# Patient Record
Sex: MALE | Race: WHITE | HISPANIC OR LATINO | Employment: FULL TIME | URBAN - METROPOLITAN AREA
[De-identification: names, ages, dates, MRNs, and addresses within clinical notes are randomized per-mention and may not be internally consistent; named-entity substitution may affect disease eponyms.]

---

## 2017-01-05 ENCOUNTER — HOSPITAL ENCOUNTER (EMERGENCY)
Facility: HOSPITAL | Age: 47
Discharge: HOME | End: 2017-01-05
Attending: EMERGENCY MEDICINE
Payer: COMMERCIAL

## 2017-01-05 VITALS
TEMPERATURE: 99 F | DIASTOLIC BLOOD PRESSURE: 71 MMHG | WEIGHT: 184 LBS | BODY MASS INDEX: 26.34 KG/M2 | SYSTOLIC BLOOD PRESSURE: 125 MMHG | HEART RATE: 71 BPM | RESPIRATION RATE: 18 BRPM | HEIGHT: 70 IN | OXYGEN SATURATION: 98 %

## 2017-01-05 DIAGNOSIS — R20.2 TINGLING: Primary | ICD-10-CM

## 2017-01-05 LAB
BUN SERPL-MCNC: 10 MG/DL (ref 6–30)
CHLORIDE SERPL-SCNC: 102 MMOL/L (ref 95–110)
CREAT SERPL-MCNC: 0.8 MG/DL (ref 0.5–1.4)
GLUCOSE SERPL-MCNC: 91 MG/DL (ref 70–110)
HCT VFR BLD CALC: 45 %PCV (ref 36–54)
POC IONIZED CALCIUM: 1.16 MMOL/L (ref 1.06–1.42)
POC TCO2 (MEASURED): 26 MMOL/L (ref 23–29)
POTASSIUM BLD-SCNC: 3.9 MMOL/L (ref 3.5–5.1)
SAMPLE: NORMAL
SODIUM BLD-SCNC: 139 MMOL/L (ref 136–145)

## 2017-01-05 PROCEDURE — 99283 EMERGENCY DEPT VISIT LOW MDM: CPT

## 2017-01-05 PROCEDURE — 99284 EMERGENCY DEPT VISIT MOD MDM: CPT | Mod: ,,, | Performed by: EMERGENCY MEDICINE

## 2017-01-05 PROCEDURE — 25000003 PHARM REV CODE 250: Performed by: EMERGENCY MEDICINE

## 2017-01-05 RX ORDER — DIAZEPAM 5 MG/1
5 TABLET ORAL
Status: COMPLETED | OUTPATIENT
Start: 2017-01-05 | End: 2017-01-05

## 2017-01-05 RX ADMIN — DIAZEPAM 5 MG: 5 TABLET ORAL at 05:01

## 2017-01-05 NOTE — ED AVS SNAPSHOT
[facility]  [address] [facility] [address]               Andre Padgett   2017  4:29 PM   ED    Description:  Male : 1970   Department:  [facility]           Your Care was Coordinated By:     Provider Role From To    Zully Maldonado MD Attending Provider 17 1379 --      Reason for Visit     Tingling           Diagnoses this Visit        Comments    Tingling    -  Primary       ED Disposition     ED Disposition Condition Comment    Discharge             To Do List           [facility]     [facility] -  Assistance  Registered nurses in the [facility] provide clinical advisement, health education, appointment booking, and other advisory services.  Call for this free service at [phone].             Medications           Message regarding Medications     Verify the changes and/or additions to your medication regime listed below are the same as discussed with your clinician today.  If any of these changes or additions are incorrect, please notify your healthcare provider.        These medications were administered today        Dose Freq    diazePAM tablet 5 mg 5 mg ED 1 Time    Sig: Take 1 tablet (5 mg total) by mouth ED 1 Time.    Class: Normal    Route: Oral           Verify that the below list of medications is an accurate representation of the medications you are currently taking.  If none reported, the list may be blank. If incorrect, please contact your healthcare provider. Carry this list with you in case of emergency.           Current Medications     alprazolam (XANAX) 0.5 MG tablet Take 1 tablet (0.5 mg total) by mouth nightly as needed.    ascorbic acid (VITAMIN C) 100 MG tablet Take 100 mg by mouth once daily.    atorvastatin (LIPITOR) 40 MG tablet Take 1 tablet (40 mg total) by mouth once daily.    esomeprazole (NEXIUM) 40 MG capsule Take 1 capsule (40 mg total) by mouth  "before breakfast.    albuterol nebulizer solution 1.25 mg Take 3 mLs (1.25 mg total) by nebulization one time.           Clinical Reference Information           Your Vitals Were     BP Pulse Temp Resp Height Weight    132/77 76 98.7 °F (37.1 °C) (Oral) 18 5' 10" (1.778 m) 83.5 kg (184 lb)    SpO2 BMI             97% 26.4 kg/m2         Allergies as of 1/5/2017     No Known Allergies      Immunizations Administered on Date of Encounter - 1/5/2017     None      ED Micro, Lab, POCT     Start Ordered       Status Ordering Provider    01/05/17 1740 01/05/17 1740  ISTAT PROCEDURE  Once      Final result     01/05/17 1707 01/05/17 1706  ISTAT CHEM8  Once      Acknowledged       ED Imaging Orders     None        Discharge Instructions         Paraesthesias  Paraesthesia refers to a burning or prickling sensation that is sometimes felt in the hands, arms, legs or feet. It can also occur in other parts of the body. It can also feel like tingling or numbness, skin crawling, or itching. The feeling is not comfortable, but it is not painful. (The "pins and needles" feeling that happens when a foot or hand "falls asleep" is a temporary paraesthesia.)  Paraesthesias that last or come and go may be caused by medical issues that need to be treated. These include stroke, bulging disk (pressing on a nerve), a trapped nerve), vitamin deficiencies, or even certain medicines.  Tests are often done. These tests may include blood tests, X-ray, CT (computerized tomography) scan, or a muscle test (electromyography). Depending on the cause, treatment may include physical therapy.  Home care  · Tell the healthcare provider about all medicines you take. This includes prescription and over-the-counter medicines, vitamins, and herbs. Ask if any of the medicines may be causing your problems. Do not make any changes to prescription medicines without talking to your healthcare provider first.  · You may be prescribed medicines to help relieve the " tingling feeling or for pain. Take all medicines as directed.  · A numb hand or foot may be more prone to injury. To help protect it:  ¨ Always use oven mitts.  ¨ Test water with an unaffected hand or foot.  ¨ Use caution when trimming nails. File sharp areas.  ¨ Wear shoes that fit well to avoid pressure points, blisters, and ulcers.  ¨ Inspect your hands and feet carefully (including the soles of your feet and between your toes) at least once a week. If you see red areas, sores, or other problems, tell your healthcare provider.  Follow-up care  Follow up with your doctor or as advised by our staff. You may need further testing or evaluation.  When to seek medical advice  Call your healthcare provider right away if any of the following occur:  · Numbness or weakness of the face, one arm, or one leg  · Slurred speech, confusion, trouble speaking, walking, or seeing  · Severe headache, fainting spell, dizziness, or seizure  · Chest, arm, neck, or upper back pain  · Loss of bladder or bowel control  · Open wound with redness, swelling, or pus  © 4865-6770 [employer]. [address] [place] [address]. All rights reserved. This information is not intended as a substitute for professional medical care. Always follow your healthcare professional's instructions.          Your Scheduled Appointments     Vishnu 10, 2017  8:00 AM CST   DLCO with PULMONARY FUNCTION   [facility] ([facility] )    [address]  [place] [address]   [phone]            Vishnu 10, 2017  8:15 AM CST   Spirometry with Tracing with PULMONARY FUNCTION   [facility] ([facility] )    [address]  [place] [address]   [phone]            Vishnu 10, 2017  9:00 AM CST   Proc 15Min/6Min Walk with SIX, MINUTE WALK   [facility] ([facility] )    [address]  [place] [address]   [phone]            Vishnu 10, 2017  9:30 AM CST   Consult with Faiza  HUAN Garcia MD   [facility] ([facility] )    [address]  [facility] [id number] [phone]            Vishnu 10, 2017 10:00 AM CST   Lung Volumes with PULMONARY FUNCTION   [facility] ([facility] )    [address]  [facility] [id number] [phone]               [facility] complies with applicable Federal civil rights laws and does not discriminate on the basis of race, color, national origin, age, disability, or sex.        Language Assistance Services     ATTENTION: Language assistance services are available, free of charge. Please call [phone].      ATENCIÓN: Si habla español, tiene a infante disposición servicios gratuitos de asistencia lingüística. Llame al [phone].     CHÚ Ý: N?u b?n nói Ti?ng Vi?t, có các d?ch v? h? tr? ngôn ng? mi?n phí dành cho b?n. G?i s? [phone].

## 2017-01-05 NOTE — ED NOTES
Pt presented to the ED c/o tingling left foot x 2 days. Pt denies numbness. Pt states at time he feels tingling in his bilateral hands and his hands become warm. Pt denies chest pain and sob. Pt alert. Pt had a coil performed for an aneurysm here at [facility] in 2011. No slurred speech noted.

## 2017-01-05 NOTE — DISCHARGE INSTRUCTIONS
"  Paraesthesias  Paraesthesia refers to a burning or prickling sensation that is sometimes felt in the hands, arms, legs or feet. It can also occur in other parts of the body. It can also feel like tingling or numbness, skin crawling, or itching. The feeling is not comfortable, but it is not painful. (The "pins and needles" feeling that happens when a foot or hand "falls asleep" is a temporary paraesthesia.)  Paraesthesias that last or come and go may be caused by medical issues that need to be treated. These include stroke, bulging disk (pressing on a nerve), a trapped nerve), vitamin deficiencies, or even certain medicines.  Tests are often done. These tests may include blood tests, X-ray, CT (computerized tomography) scan, or a muscle test (electromyography). Depending on the cause, treatment may include physical therapy.  Home care  · Tell the healthcare provider about all medicines you take. This includes prescription and over-the-counter medicines, vitamins, and herbs. Ask if any of the medicines may be causing your problems. Do not make any changes to prescription medicines without talking to your healthcare provider first.  · You may be prescribed medicines to help relieve the tingling feeling or for pain. Take all medicines as directed.  · A numb hand or foot may be more prone to injury. To help protect it:  ¨ Always use oven mitts.  ¨ Test water with an unaffected hand or foot.  ¨ Use caution when trimming nails. File sharp areas.  ¨ Wear shoes that fit well to avoid pressure points, blisters, and ulcers.  ¨ Inspect your hands and feet carefully (including the soles of your feet and between your toes) at least once a week. If you see red areas, sores, or other problems, tell your healthcare provider.  Follow-up care  Follow up with your doctor or as advised by our staff. You may need further testing or evaluation.  When to seek medical advice  Call your healthcare provider right away if any of the " following occur:  · Numbness or weakness of the face, one arm, or one leg  · Slurred speech, confusion, trouble speaking, walking, or seeing  · Severe headache, fainting spell, dizziness, or seizure  · Chest, arm, neck, or upper back pain  · Loss of bladder or bowel control  · Open wound with redness, swelling, or pus  © 2302-1596 [employer]. [address] [place] [address]. All rights reserved. This information is not intended as a substitute for professional medical care. Always follow your healthcare professional's instructions.

## 2017-01-05 NOTE — ED NOTES
Pt identifiers Andre Padgett were checked and correct  LOC: The patient is awake, alert, aware of environment with an appropriate affect. Oriented x3, speaking appropriately  APPEARANCE: Pt resting comfortably, in no acute distress, pt is clean and well groomed, clothing properly fastened  SKIN: Skin warm, dry and intact, normal skin turgor, moist mucus membranes  RESPIRATORY: Airway is open and patent, respirations are spontaneous, even and unlabored, normal effort and rate  CARDIAC: Normal rate and rhythm, no peripheral edema noted, capillary refill < 3 seconds, bilateral radial pulses 2+  ABDOMEN: Soft, non tender, non distended. Bowel sounds present x 4 quadrants.   NEUROLOGIC: PERRLA, facial expression is symmetrical, patient moving all extremities spontaneously, normal sensation in all extremities when touched with a finger.  Follows all commands appropriately. Pt c/o tingling noted to his left foot and bilateral hands.   MUSCULOSKELETAL: No obvious deformities.

## 2017-01-05 NOTE — ED PROVIDER NOTES
"Encounter Date: 1/5/2017       History     Chief Complaint   Patient presents with    Tingling     tingling to both sera and L foot started on friday, 2011 had coiling to aneurysm,     Review of patient's allergies indicates:  No Known Allergies  HPI Comments: Mr. Padgett is a 47 yo M with PMHx of R cerebral aneurysm s/p coiling in 2011 and anxiety, who presents with 1 week hx of tingling in his bilateral hands and L foot. Patient had an IR cerebral angiogram done on 12/27 due to headaches. The angiogram was normal and patient resumed his daily activities. The tingling started on 12/30 and is not associated with any particular activity, can occur at any time and last for varying amounts of time. Patient has had days without tingling. He states that he does not have any numbness or weakness, but sometimes has "hot flashes" in his hands. He has had these hot flashes after his prior angiogram in 2009 and that resolved without treatment after a month. The present tingling has remained at around the same level and he called his neurologist yesterday, who told him to come to ED to be evaluated for stroke. He was too busy at work yesterday and decided to come in today. Denies fevers, chills, chest pain, SOB, changes in taste/smell/vision. He did take a xanax yesterday thinking the tingling may be due to anxiety, and the tingling improved but did not completely resolve. Patient has no personal or family history of stroke or clots.     The history is provided by the patient.     Past Medical History   Diagnosis Date    Aneurysm      Right sided filling anterior communicating aneurysm s/p coiling 2011    Anxiety     Asthma     Generalized headaches     Hyperlipidemia      Past Medical History Pertinent Negatives   Diagnosis Date Noted    Amblyopia 3/18/2014    Cataract 3/18/2014    Diabetic retinopathy 3/18/2014    Glaucoma 3/18/2014    Macular degeneration 3/18/2014    Retinal detachment 3/18/2014    Strabismus " 3/18/2014    Uveitis 3/18/2014     Past Surgical History   Procedure Laterality Date    Brain surgery  2011     angiogram/coiling     Family History   Problem Relation Age of Onset    DAVID disease Mother     Hypertension Mother     Cancer Father      brain    Celiac disease Neg Hx     Cirrhosis Neg Hx     Colon polyps Neg Hx     Crohn's disease Neg Hx     Cystic fibrosis Neg Hx     Esophageal cancer Neg Hx     Hemochromatosis Neg Hx     Inflammatory bowel disease Neg Hx     Irritable bowel syndrome Neg Hx     Liver cancer Neg Hx     Liver disease Neg Hx     Rectal cancer Neg Hx     Stomach cancer Neg Hx     Ulcerative colitis Neg Hx     Alli's disease Neg Hx     Heart disease Neg Hx     Heart attack Neg Hx     Amblyopia Neg Hx     Blindness Neg Hx     Cataracts Neg Hx     Glaucoma Neg Hx     Macular degeneration Neg Hx     Retinal detachment Neg Hx     Strabismus Neg Hx     Colon cancer Neg Hx      Social History   Substance Use Topics    Smoking status: Never Smoker    Smokeless tobacco: Never Used    Alcohol use Yes      Comment: ocassionally - twice a week     Review of Systems   Constitutional: Negative for chills and fever.   HENT: Positive for congestion.    Eyes: Negative.    Respiratory: Negative for cough, chest tightness and shortness of breath.    Cardiovascular: Negative for chest pain and palpitations.   Gastrointestinal: Negative for abdominal pain, nausea and vomiting.   Endocrine: Negative.    Genitourinary: Negative for difficulty urinating and dysuria.   Musculoskeletal: Negative for arthralgias and neck pain.   Skin: Negative for rash.   Neurological: Negative for speech difficulty, weakness, numbness and headaches.   Psychiatric/Behavioral: The patient is nervous/anxious.        Physical Exam   Initial Vitals   BP Pulse Resp Temp SpO2   01/05/17 1407 01/05/17 1407 01/05/17 1407 01/05/17 1407 01/05/17 1407   132/77 76 18 98.7 °F (37.1 °C) 97 %     Physical  Exam    Constitutional: He appears well-developed and well-nourished. No distress.   HENT:   Head: Normocephalic and atraumatic.   Eyes: Conjunctivae are normal. Pupils are equal, round, and reactive to light.   Neck: Normal range of motion. Neck supple.   Cardiovascular: Normal rate, regular rhythm, normal heart sounds and intact distal pulses. Exam reveals no gallop and no friction rub.    No murmur heard.  Pulmonary/Chest: Breath sounds normal. He exhibits no tenderness.   Abdominal: Soft. Bowel sounds are normal. He exhibits no mass. There is no tenderness.   Musculoskeletal: Normal range of motion. He exhibits no edema.   Neurological: He is alert and oriented to person, place, and time. He has normal strength and normal reflexes. No cranial nerve deficit or sensory deficit.   Skin: Skin is warm and dry. No rash noted.   Psychiatric: He has a normal mood and affect.         ED Course   Procedures  Labs Reviewed   ISTAT PROCEDURE             Medical Decision Making:   Initial Assessment:   45 yo M with prior cerebral aneurysm s/p coiling and anxiety, presents with tingling. Extremely low suspicion for stroke or seizure given history and physical exam.   Clinical Tests:   Lab Tests: Ordered  Radiological Study: Reviewed  ED Management:  ISTAT CHEM8 and valium 5 mg.        APC / Resident Notes:   CHEM8 reviewed and lab values within normal limits. Patient given 1 dose of valium 5 mg and currently has no tingling. Patient advised to return to ED if tingling becomes worse or if he develops as weakness, numbness, acute changes in condition. Patient in agreement with this plan.          Attending Attestation:   Physician Attestation Statement for Resident:  As the supervising MD   Physician Attestation Statement: I have personally seen and examined this patient.   I agree with the above history. -: 45 yo m, remote h/o cerebral aneurysm with recent issues of cp/sob/ha for which he has received a stress test (negative),  repeat cerebral angiogram 1 week ago (negative) and now presenting for hand paresthesias and L foot paresthesias.  No speech/language issues, no focal weakness, no new HA.  Neuro exam completely normal and angiogram access site no signs infection/swelling.  Very low suspicion for stroke/aneurysm rupture.  More likely anxiety.  Other issues to consider would be MS but this would be need an outpatient work-up including MRI.  Plan to check electrolytes and if ok, pt has f/u at [facility] next week   As the supervising MD I agree with the above PE.    As the supervising MD I agree with the above treatment, course, plan, and disposition.  I have reviewed and agree with the residents interpretation of the following: lab data.  I have reviewed the following: old records at this facility.                    ED Course     Clinical Impression:   The encounter diagnosis was Tingling.    Disposition:   Disposition: Discharged  Condition: Stable       Roshni Burt MD  Resident  01/06/17 0679

## 2017-01-10 ENCOUNTER — HOSPITAL ENCOUNTER (OUTPATIENT)
Dept: PULMONOLOGY | Facility: CLINIC | Age: 47
Discharge: HOME | End: 2017-01-10
Payer: COMMERCIAL

## 2017-01-10 ENCOUNTER — OFFICE VISIT (OUTPATIENT)
Dept: PULMONOLOGY | Facility: CLINIC | Age: 47
End: 2017-01-10
Payer: COMMERCIAL

## 2017-01-10 VITALS
BODY MASS INDEX: 27.75 KG/M2 | DIASTOLIC BLOOD PRESSURE: 83 MMHG | OXYGEN SATURATION: 100 % | SYSTOLIC BLOOD PRESSURE: 133 MMHG | WEIGHT: 187.38 LBS | WEIGHT: 187.38 LBS | HEIGHT: 69 IN | BODY MASS INDEX: 27.75 KG/M2 | HEIGHT: 69 IN | HEART RATE: 79 BPM

## 2017-01-10 DIAGNOSIS — R06.9 ABNORMAL BREATHING: ICD-10-CM

## 2017-01-10 DIAGNOSIS — R06.9 ABNORMAL BREATHING: Primary | ICD-10-CM

## 2017-01-10 LAB
PRE FEV1 FVC: 76
PRE FEV1: 3.12
PRE FVC: 4.12
PREDICTED FEV1 FVC: 82
PREDICTED FEV1: 3.83
PREDICTED FVC: 4.66

## 2017-01-10 PROCEDURE — 94727 GAS DIL/WSHOT DETER LNG VOL: CPT | Mod: S$GLB,,, | Performed by: INTERNAL MEDICINE

## 2017-01-10 PROCEDURE — 94010 BREATHING CAPACITY TEST: CPT | Mod: 59,S$GLB,, | Performed by: INTERNAL MEDICINE

## 2017-01-10 PROCEDURE — 94620 PR PULMONARY STRESS TESTING,SIMPLE: CPT | Mod: S$GLB,,, | Performed by: INTERNAL MEDICINE

## 2017-01-10 PROCEDURE — 99204 OFFICE O/P NEW MOD 45 MIN: CPT | Mod: 25,S$GLB,, | Performed by: INTERNAL MEDICINE

## 2017-01-10 PROCEDURE — 94729 DIFFUSING CAPACITY: CPT | Mod: S$GLB,,, | Performed by: INTERNAL MEDICINE

## 2017-01-10 PROCEDURE — 1159F MED LIST DOCD IN RCRD: CPT | Mod: S$GLB,,, | Performed by: INTERNAL MEDICINE

## 2017-01-10 PROCEDURE — 99999 PR PBB SHADOW E&M-EST. PATIENT-LVL III: CPT | Mod: PBBFAC,,, | Performed by: INTERNAL MEDICINE

## 2017-01-10 NOTE — LETTER
January 11, 2017      Hunter Diop MD  [address]  [place] [address]           [facility]  [address]  [place] [address]  Phone: [phone]          Patient: Andre Padgett   MR Number: [id number]   YOB: 1970   Date of Visit: 1/10/2017       Dear Dr. Hunter Diop:    Thank you for referring Andre Padgett to me for evaluation. Attached you will find relevant portions of my assessment and plan of care.    If you have questions, please do not hesitate to call me. I look forward to following Andre Padgett along with you.    Sincerely,    Faiza Garcia MD    Enclosure  CC:  No Recipients    If you would like to receive this communication electronically, please contact [email or web address] or [phone] to request more information on [facility] [employer] Link access.    For providers and/or their staff who would like to refer a patient to [facility], please contact us through our one-stop-shop provider referral line, [facility], at [phone].    If you feel you have received this communication in error or would no longer like to receive these types of communications, please e-mail [email or web address]

## 2017-01-11 ENCOUNTER — PATIENT MESSAGE (OUTPATIENT)
Dept: INTERNAL MEDICINE | Facility: CLINIC | Age: 47
End: 2017-01-11

## 2017-01-11 NOTE — PROGRESS NOTES
Subjective:       Patient ID: Andre Padgett is a 46 y.o. male.    Chief Complaint: abnormal breathing    HPI   Andre Padgett 46 y.o. male    has a past medical history of Aneurysm; Anxiety; Generalized headaches; and Hyperlipidemia.    has a past surgical history that includes Brain surgery (2011).   reports that he has never smoked. He has never used smokeless tobacco. He reports that he drinks alcohol. He reports that he does not use illicit drugs.  Referred by: Dr. Hunter Diop  Who had concerns including abnormal breathing.  The patient's last visit with me was on Visit date not found.    Patient with complaints of chills in the chest- notes sx began few months ago. Also notes pressure feeling on chest  States he thought it was related to cold, or viru.  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      Objective:      Physical Exam   Constitutional: He is oriented to person, place, and time. He appears well-developed and well-nourished. He appears not cachectic. No distress.   HENT:   Head: Normocephalic.   Nose: Nose normal. No mucosal edema.   Mouth/Throat: Oropharynx is clear and moist. Normal dentition. No oropharyngeal exudate.   Neck: Normal range of motion. Neck supple.   Cardiovascular: Normal rate, regular rhythm, normal heart sounds and intact distal pulses.  Exam reveals no gallop and no friction rub.    No murmur  heard.  Pulmonary/Chest: Effort normal and breath sounds normal. No stridor.   Abdominal: Soft. Bowel sounds are normal. He exhibits no distension.   Musculoskeletal: Normal range of motion. He exhibits no edema or tenderness.   Lymphadenopathy: No supraclavicular adenopathy is present.     He has no cervical adenopathy.   Neurological: He is alert and oriented to person, place, and time. Gait normal.   Skin: Skin is warm and dry. No rash noted. He is not diaphoretic. No cyanosis or erythema. No pallor. Nails show no clubbing.   Psychiatric: He has a normal mood and affect. His behavior is normal. Judgment and thought content normal.   Nursing note and vitals reviewed.    Personal Diagnostic Review    No flowsheet data found.      Assessment:       1. Abnormal breathing        Outpatient Encounter Prescriptions as of 1/10/2017   Medication Sig Dispense Refill    alprazolam (XANAX) 0.5 MG tablet Take 1 tablet (0.5 mg total) by mouth nightly as needed. 30 tablet 1    ascorbic acid (VITAMIN C) 100 MG tablet Take 100 mg by mouth once daily.      atorvastatin (LIPITOR) 40 MG tablet Take 1 tablet (40 mg total) by mouth once daily. 90 tablet 3    esomeprazole (NEXIUM) 40 MG capsule Take 1 capsule (40 mg total) by mouth before breakfast. 30 capsule 11     Facility-Administered Encounter Medications as of 1/10/2017   Medication Dose Route Frequency Provider Last Rate Last Dose    albuterol nebulizer solution 1.25 mg  1.25 mg Nebulization 1 time in Clinic/HOD Anjelica Loyola MD         No orders of the defined types were placed in this encounter.    Plan:           Andre was seen today for abnormal breathing.    Diagnoses and all orders for this visit:    Abnormal breathing        I personally reviewed the      1. Echo report 12/2016 nl stress  2. PFT wnl  3. 6MWD [id number], no desat  4. CXR normal  5. CXR report     Cause of his sx is unclear but no pulmonary cause identified.   Continue supportive care.  possibley  related to gerd, rec continued ppi therapy and may require bid therapy to test    Return if symptoms worsen or fail to improve.    There are no Patient Instructions on file for this visit.      There is no immunization history on file for this patient.

## 2017-01-12 NOTE — PROCEDURES
Andre Padgett is a 46 y.o.  male patient, who presents for a 6 minute walk test ordered by MD Jose.  The diagnosis is Shortness of Breath.  The patient's BMI is 27.7 kg/m2.  Predicted distance (lower limit of normal) is 519.3 meters.      Test Results:    The test was completed without stopping.    The total time walked was 360 seconds.  During walking, the patient reported:  No complaints. The patient used no assistive devices during testing.     1/10/2017--------Distance: 487.68 meters (1600 feet)     O2 Sat % Supplemental Oxygen Heart Rate Blood Pressure Tushar Scale   Pre-exercise  (Resting) 100 % Room Air 79 bpm 133/83 mmHg 0.5   During Exercise 98 % Room Air 108 bpm 140/82 mmHg 0.5   Post-exercise  (Recovery) 98 % Room Air  102 bpm   mmHg       Recovery Time: 30 seconds    Performing nurse/tech: DAHLIA Claros      PREVIOUS STUDY:   The patient has not had a previous study.      CLINICAL INTERPRETATION:  Six minute walk distance is 487.68 meters (1600 feet) with very, very light dyspnea.  During exercise, there was no significant desaturation while breathing room air.  Blood pressure remained stable and Heart rate increased significantly with walking.  This may represent a tachycardic response to exercise.  The patient did not report non-pulmonary symptoms during exercise.  No previous study performed.  Based upon age and body mass index, exercise capacity is less than predicted.

## 2017-01-13 ENCOUNTER — OFFICE VISIT (OUTPATIENT)
Dept: INTERNAL MEDICINE | Facility: CLINIC | Age: 47
End: 2017-01-13
Payer: COMMERCIAL

## 2017-01-13 ENCOUNTER — LAB VISIT (OUTPATIENT)
Dept: LAB | Facility: HOSPITAL | Age: 47
End: 2017-01-13
Attending: INTERNAL MEDICINE
Payer: COMMERCIAL

## 2017-01-13 ENCOUNTER — PATIENT MESSAGE (OUTPATIENT)
Dept: INTERNAL MEDICINE | Facility: CLINIC | Age: 47
End: 2017-01-13

## 2017-01-13 VITALS
HEART RATE: 76 BPM | HEIGHT: 69 IN | BODY MASS INDEX: 28.08 KG/M2 | WEIGHT: 189.63 LBS | SYSTOLIC BLOOD PRESSURE: 120 MMHG | DIASTOLIC BLOOD PRESSURE: 86 MMHG

## 2017-01-13 DIAGNOSIS — R20.0 NUMBNESS OF LEFT FOOT: ICD-10-CM

## 2017-01-13 DIAGNOSIS — I67.1 CEREBRAL ANEURYSM: ICD-10-CM

## 2017-01-13 DIAGNOSIS — R20.0 HAND NUMBNESS: Primary | ICD-10-CM

## 2017-01-13 DIAGNOSIS — R20.0 HAND NUMBNESS: ICD-10-CM

## 2017-01-13 DIAGNOSIS — F41.9 ANXIETY: ICD-10-CM

## 2017-01-13 LAB
TSH SERPL DL<=0.005 MIU/L-ACNC: 1.18 UIU/ML
VIT B12 SERPL-MCNC: 271 PG/ML

## 2017-01-13 PROCEDURE — 1159F MED LIST DOCD IN RCRD: CPT | Mod: S$GLB,,, | Performed by: INTERNAL MEDICINE

## 2017-01-13 PROCEDURE — 82607 VITAMIN B-12: CPT

## 2017-01-13 PROCEDURE — 99999 PR PBB SHADOW E&M-EST. PATIENT-LVL III: CPT | Mod: PBBFAC,,, | Performed by: INTERNAL MEDICINE

## 2017-01-13 PROCEDURE — 99214 OFFICE O/P EST MOD 30 MIN: CPT | Mod: S$GLB,,, | Performed by: INTERNAL MEDICINE

## 2017-01-13 PROCEDURE — 36415 COLL VENOUS BLD VENIPUNCTURE: CPT

## 2017-01-13 PROCEDURE — 84443 ASSAY THYROID STIM HORMONE: CPT

## 2017-01-13 NOTE — MR AVS SNAPSHOT
[facility]  [address]  [facility] [address]  Phone: [phone]  Fax: [phone]                  Andre Padgett   2017 7:45 AM   Office Visit    Description:  Male : 1970   Provider:  Hunter Diop MD   Department:  [facility]           Reason for Visit     Hospital Follow Up           Diagnoses this Visit        Comments    Hand numbness    -  Primary     Numbness of left foot                To Do List           Future Appointments        Provider Department Dept Phone    2017 8:30 AM LAB, SAME DAY [facility] [phone]      Goals (5 Years of Data)     None      [facility]     [facility] Nurse Care Line -  Assistance  Registered nurses in the [facility] provide clinical advisement, health education, appointment booking, and other advisory services.  Call for this free service at [phone].             Medications           Message regarding Medications     Verify the changes and/or additions to your medication regime listed below are the same as discussed with your clinician today.  If any of these changes or additions are incorrect, please notify your healthcare provider.             Verify that the below list of medications is an accurate representation of the medications you are currently taking.  If none reported, the list may be blank. If incorrect, please contact your healthcare provider. Carry this list with you in case of emergency.           Current Medications     alprazolam (XANAX) 0.5 MG tablet Take 1 tablet (0.5 mg total) by mouth nightly as needed.    ascorbic acid (VITAMIN C) 100 MG tablet Take 100 mg by mouth once daily.    atorvastatin (LIPITOR) 40 MG tablet Take 1 tablet (40 mg total) by mouth once daily.    esomeprazole (NEXIUM) 40 MG capsule Take 1 capsule (40 mg total) by mouth before breakfast.           Clinical Reference Information           Vital  "Signs - Last Recorded  Most recent update: 1/13/2017  7:59 AM by Bryanna Gonzalez    BP Pulse Ht Wt BMI    120/86 76 5' 9" (1.753 m) 86 kg (189 lb 9.5 oz) 28 kg/m2      Blood Pressure          Most Recent Value    BP  120/86      Allergies as of 1/13/2017     No Known Allergies      Immunizations Administered on Date of Encounter - 1/13/2017     None      Orders Placed During Today's Visit     Future Labs/Procedures Expected by Expires    TSH  1/13/2017 1/13/2018    Vitamin B12  1/13/2017 1/13/2018      "

## 2017-01-13 NOTE — PROGRESS NOTES
Subjective:       Patient ID: Andre Padgett is a 46 y.o. male.    Chief Complaint: Hospital Follow Up    HPI Comments: History of present illness: Patient comes in for hospital and ER follow-up.  He's had intermittent bilateral hand tingling and left foot tingling.  He had an angiogram on December 27 for follow-up of his previously coiled aneurysm.  That test was stable.  He said about 2 days later he had tingling in the left foot and both hands intermittently.  He spoke to the doctor who recommended he go to the emergency room for further evaluation.  He was evaluated there and thought to not be having a stroke of the risk for a stroke based on those symptoms so he was sent out.  Presently he says this is intermittent.  He thinks that he takes anxiety but he has not tested that a regular basis.  He has only noticed this once or twice.  The symptoms come intermittently and it is not always all 3 extremities at this sometimes the hands seem to sweat.  He denies any chest pain palpitations shortness of breath or other complaints.    Review of Systems   Constitutional: Negative for chills, fatigue, fever and unexpected weight change.   HENT: Negative for trouble swallowing.    Eyes: Negative for visual disturbance.   Respiratory: Negative for cough, shortness of breath and wheezing.    Cardiovascular: Negative for chest pain and palpitations.   Gastrointestinal: Negative for abdominal pain, constipation, diarrhea, nausea and vomiting.   Genitourinary: Negative for difficulty urinating.   Musculoskeletal: Negative for neck pain.   Skin: Negative for rash.   Neurological: Positive for numbness. Negative for dizziness, seizures, facial asymmetry, speech difficulty, weakness, light-headedness and headaches.   Psychiatric/Behavioral: The patient is nervous/anxious (chronic possibly worse recently).        Objective:      Physical Exam   Constitutional: He is oriented to person, place, and time. He appears well-developed  and well-nourished. No distress.   HENT:   Head: Normocephalic and atraumatic.   Mouth/Throat: No oropharyngeal exudate.   TM's clear, pharynx clear   Eyes: Conjunctivae and EOM are normal. Pupils are equal, round, and reactive to light. No scleral icterus.   Neck: Normal range of motion. Neck supple. No thyromegaly present.   No supraclavicular nodes palpated   Cardiovascular: Normal rate, regular rhythm, normal heart sounds and intact distal pulses.    No murmur heard.  Peripheral pulses are intact.   Pulmonary/Chest: Effort normal and breath sounds normal. He has no wheezes.   Abdominal: Soft. Bowel sounds are normal. He exhibits no mass. There is no tenderness.   Musculoskeletal: He exhibits no edema or tenderness.   Lymphadenopathy:     He has no cervical adenopathy.   Neurological: He is alert and oriented to person, place, and time. He displays normal reflexes. No cranial nerve deficit. He exhibits normal muscle tone. Coordination normal.   Patient says both hands feel tingly at this time.  The entire hand like a glove specific finger.  Gait is intact.  No tremors.   Skin: No pallor.   Psychiatric: He has a normal mood and affect.       Assessment:       1. Hand numbness    2. Numbness of left foot    3. Anxiety    4. Cerebral aneurysm        Plan:       Andre was seen today for hospital follow up.    Diagnoses and all orders for this visit:    Hand numbness  -     TSH; Future  -     Vitamin B12; Future    Numbness of left foot  -     TSH; Future  -     Vitamin B12; Future    Anxiety    Cerebral aneurysm        I have asked the patient to try taking his Xanax twice daily for the next 3 days since he is not working and observe symptoms.  If labs are unrevealing and that is not clearly helping from an anxiety standpoint we will proceed with a medical neurology workup.  Certainly call or present to the emergency room for any acute symptoms.

## 2017-01-16 RX ORDER — ALPRAZOLAM 0.5 MG/1
TABLET ORAL
Qty: 30 TABLET | Refills: 1 | Status: SHIPPED | OUTPATIENT
Start: 2017-01-16 | End: 2017-10-24

## 2017-01-21 ENCOUNTER — HOSPITAL ENCOUNTER (EMERGENCY)
Facility: HOSPITAL | Age: 47
Discharge: HOME | End: 2017-01-21
Attending: EMERGENCY MEDICINE
Payer: COMMERCIAL

## 2017-01-21 VITALS
BODY MASS INDEX: 27.7 KG/M2 | TEMPERATURE: 97 F | RESPIRATION RATE: 18 BRPM | WEIGHT: 187 LBS | OXYGEN SATURATION: 97 % | HEIGHT: 69 IN | DIASTOLIC BLOOD PRESSURE: 73 MMHG | HEART RATE: 70 BPM | SYSTOLIC BLOOD PRESSURE: 122 MMHG

## 2017-01-21 DIAGNOSIS — R25.2 SPASM: Primary | ICD-10-CM

## 2017-01-21 DIAGNOSIS — G81.94 HEMIPARESIS, LEFT: Primary | ICD-10-CM

## 2017-01-21 LAB
ABO + RH BLD: NORMAL
ALBUMIN SERPL BCP-MCNC: 4 G/DL
ALP SERPL-CCNC: 62 U/L
ALT SERPL W/O P-5'-P-CCNC: 23 U/L
ANION GAP SERPL CALC-SCNC: 8 MMOL/L
AST SERPL-CCNC: 18 U/L
BASOPHILS # BLD AUTO: 0.01 K/UL
BASOPHILS NFR BLD: 0.2 %
BILIRUB SERPL-MCNC: 0.7 MG/DL
BLD GP AB SCN CELLS X3 SERPL QL: NORMAL
BUN SERPL-MCNC: 12 MG/DL
CALCIUM SERPL-MCNC: 9.4 MG/DL
CHLORIDE SERPL-SCNC: 105 MMOL/L
CHOLEST/HDLC SERPL: 3.4 {RATIO}
CO2 SERPL-SCNC: 27 MMOL/L
CREAT SERPL-MCNC: 0.8 MG/DL
DIFFERENTIAL METHOD: ABNORMAL
EOSINOPHIL # BLD AUTO: 0.1 K/UL
EOSINOPHIL NFR BLD: 1.6 %
ERYTHROCYTE [DISTWIDTH] IN BLOOD BY AUTOMATED COUNT: 11.8 %
EST. GFR  (AFRICAN AMERICAN): >60 ML/MIN/1.73 M^2
EST. GFR  (NON AFRICAN AMERICAN): >60 ML/MIN/1.73 M^2
GLUCOSE SERPL-MCNC: 104 MG/DL
HCT VFR BLD AUTO: 43.6 %
HDL/CHOLESTEROL RATIO: 29.6 %
HDLC SERPL-MCNC: 162 MG/DL
HDLC SERPL-MCNC: 48 MG/DL
HGB BLD-MCNC: 15.3 G/DL
INR PPP: 1.3
LDLC SERPL CALC-MCNC: 92.6 MG/DL
LYMPHOCYTES # BLD AUTO: 1.3 K/UL
LYMPHOCYTES NFR BLD: 20.5 %
MCH RBC QN AUTO: 32.3 PG
MCHC RBC AUTO-ENTMCNC: 35.1 %
MCV RBC AUTO: 92 FL
MONOCYTES # BLD AUTO: 0.5 K/UL
MONOCYTES NFR BLD: 7.2 %
NEUTROPHILS # BLD AUTO: 4.5 K/UL
NEUTROPHILS NFR BLD: 70.3 %
NONHDLC SERPL-MCNC: 114 MG/DL
PLATELET # BLD AUTO: 178 K/UL
PMV BLD AUTO: 10.2 FL
POCT GLUCOSE: 112 MG/DL (ref 70–110)
POTASSIUM SERPL-SCNC: 4.1 MMOL/L
PROT SERPL-MCNC: 7.4 G/DL
PROTHROMBIN TIME: 13.3 SEC
RBC # BLD AUTO: 4.74 M/UL
SODIUM SERPL-SCNC: 140 MMOL/L
TRIGL SERPL-MCNC: 107 MG/DL
TSH SERPL DL<=0.005 MIU/L-ACNC: 1.89 UIU/ML
WBC # BLD AUTO: 6.43 K/UL

## 2017-01-21 PROCEDURE — 86900 BLOOD TYPING SEROLOGIC ABO: CPT

## 2017-01-21 PROCEDURE — 93005 ELECTROCARDIOGRAM TRACING: CPT

## 2017-01-21 PROCEDURE — 80053 COMPREHEN METABOLIC PANEL: CPT

## 2017-01-21 PROCEDURE — 99285 EMERGENCY DEPT VISIT HI MDM: CPT | Mod: 25

## 2017-01-21 PROCEDURE — 86901 BLOOD TYPING SEROLOGIC RH(D): CPT

## 2017-01-21 PROCEDURE — 99285 EMERGENCY DEPT VISIT HI MDM: CPT | Mod: ,,, | Performed by: EMERGENCY MEDICINE

## 2017-01-21 PROCEDURE — 85025 COMPLETE CBC W/AUTO DIFF WBC: CPT

## 2017-01-21 PROCEDURE — 93010 ELECTROCARDIOGRAM REPORT: CPT | Mod: ,,, | Performed by: INTERNAL MEDICINE

## 2017-01-21 PROCEDURE — 80061 LIPID PANEL: CPT

## 2017-01-21 PROCEDURE — 82962 GLUCOSE BLOOD TEST: CPT

## 2017-01-21 PROCEDURE — 84443 ASSAY THYROID STIM HORMONE: CPT

## 2017-01-21 PROCEDURE — 85610 PROTHROMBIN TIME: CPT

## 2017-01-21 NOTE — ED NOTES
Discharge instructions given; pt verbalized understanding; IV to left AC removed; catheter tip intact; hemostasis occurred; bandage applied to site

## 2017-01-21 NOTE — ED PROVIDER NOTES
Encounter Date: 1/21/2017       History     Chief Complaint   Patient presents with    Multiple Complaints     Pt reports left arm heaviness and left leg spasms x 2 weeks, worse last 2 days. Patient was seen in ED 2 weeks ago for the same. Patient had a cerebral angiogram on Dec 27, no diagnosis. Patient continues to have same symptoms. Patient also reports chills.     Review of patient's allergies indicates:  No Known Allergies  HPI Comments: Time seen by provider: 6:20 AM    This is a 46 y.o. male with active co-morbidities of anxiety, HLD, and a cerebral aneurysm who presents to the ED for evaluation of waxing and waning heaviness of his left arm and leg, and left leg spasms for the past 2 weeks, becoming worse this morning. The patient states that these symptoms began a few days after his angiogram, and they have not improved. He reports that his leg spasm this morning lasted for a few minutes, and was relieved after taking Xanax. He says that after the spasm he then became extremely cold and felt as if he may pass out, so came to the ED. Pt is experiencing associated abdominal pain and diarrhea, but denies any nausea, vomiting, diarrhea, or pain at his angiogram incision site.       The history is provided by the patient.     Past Medical History   Diagnosis Date    Aneurysm      Right sided filling anterior communicating aneurysm s/p coiling 2011    Anxiety     Generalized headaches     Hyperlipidemia      Past Medical History Pertinent Negatives   Diagnosis Date Noted    Amblyopia 3/18/2014    Cataract 3/18/2014    Diabetic retinopathy 3/18/2014    Glaucoma 3/18/2014    Macular degeneration 3/18/2014    Retinal detachment 3/18/2014    Strabismus 3/18/2014    Uveitis 3/18/2014     Past Surgical History   Procedure Laterality Date    Brain surgery  2011     angiogram/coiling     Family History   Problem Relation Age of Onset    DAVID disease Mother     Hypertension Mother     Cancer Father       brain    Celiac disease Neg Hx     Cirrhosis Neg Hx     Colon polyps Neg Hx     Crohn's disease Neg Hx     Cystic fibrosis Neg Hx     Esophageal cancer Neg Hx     Hemochromatosis Neg Hx     Inflammatory bowel disease Neg Hx     Irritable bowel syndrome Neg Hx     Liver cancer Neg Hx     Liver disease Neg Hx     Rectal cancer Neg Hx     Stomach cancer Neg Hx     Ulcerative colitis Neg Hx     Alli's disease Neg Hx     Heart disease Neg Hx     Heart attack Neg Hx     Amblyopia Neg Hx     Blindness Neg Hx     Cataracts Neg Hx     Glaucoma Neg Hx     Macular degeneration Neg Hx     Retinal detachment Neg Hx     Strabismus Neg Hx     Colon cancer Neg Hx      Social History   Substance Use Topics    Smoking status: Never Smoker    Smokeless tobacco: Never Used    Alcohol use Yes      Comment: ocassionally - twice a week     Review of Systems   Constitutional: Negative for fever.   HENT: Negative for sore throat.    Respiratory: Negative for shortness of breath.    Cardiovascular: Negative for chest pain.   Gastrointestinal: Positive for abdominal pain. Negative for nausea.   Genitourinary: Negative for dysuria.   Musculoskeletal: Negative for back pain.        Left arm pain. Left leg spasms.    Skin: Negative for rash.   Neurological: Negative for weakness.   Hematological: Does not bruise/bleed easily.     All systems were reviewed and were negative except as noted in the HPI.      Physical Exam   Initial Vitals   BP Pulse Resp Temp SpO2   01/21/17 0512 01/21/17 0512 01/21/17 0512 01/21/17 0512 01/21/17 0512   143/79 78 18 98.7 °F (37.1 °C) 96 %     Physical Exam    Constitutional: No distress.   HENT:   Head: Normocephalic.   Mouth/Throat: Oropharynx is clear and moist.   Eyes: Conjunctivae are normal.   Neck: Neck supple.   Cardiovascular: Normal rate, regular rhythm and intact distal pulses.   Pulmonary/Chest: Breath sounds normal.   Abdominal: Soft. There is no tenderness.    Musculoskeletal: Normal range of motion.   Neurological: He is alert and oriented to person, place, and time. He has normal strength.   Skin: Skin is warm and dry.   Access site for angiogram in the right groin is clean, dry, and intact. There are no masses or hematomas.    Psychiatric: Thought content normal.         ED Course   Procedures  Labs Reviewed - No data to display  EKG Readings: (Independently Interpreted)   Normal sinus rhythm at 77 bpm. No obvious ischemia. QTC of 400.        X-Rays:   Independently Interpreted Readings:   Head CT: No acute findings.     Medical Decision Making:    I reviewed and interpreted the ECG and any monitoring strips.  I reviewed radiology personally along with interpretations.  I reviewed and interpreted the laboratory results.    Raúl Hillman MD, NEGRO, CPE, FACEP  Department of Emergency Medicine  , [facility]        Medical Decision Making:   History:   Old Medical Records: I decided to obtain old medical records.  Independently Interpreted Test(s):   I have ordered and independently interpreted X-rays - see prior notes.  I have ordered and independently interpreted EKG Reading(s) - see prior notes  Clinical Tests:   Lab Tests: Reviewed and Ordered  Radiological Study: Ordered and Reviewed  Medical Tests: Ordered and Reviewed  ED Management:  His labs are unremarkable. I contacted neurology who evaluated the patient and feel his symptoms are not consistent with a stroke. Will schedule him for an outpatient MRI. Recommend continue taking Xanax intermittently as this has helped with muscle aches. Neurologist said pt had a spasm in front of him and he felt it was a muscle spasm rather than TIA. He will be discharged home.                   ED Course     Clinical Impression:   There were no encounter diagnoses.    Disposition:   Disposition: Discharged  Condition: Stable    Raúl Hillman MD, NEGRO, CPE, FACEP  Department of  Emergency Medicine  , [facility]       Azeem Hillman MD  01/22/17 1922

## 2017-01-21 NOTE — ED NOTES
Pt assisted to bed. No acute distress is noted. Pt identifiers verified  Appearance: Pt is clean and well groomed. Clothes appropriately fastened  Neuro: pt awake and alert. Pt oriented x4. Speech is clear and appropriate. Facial movement is symmetrical. Pt moving all extremities. Sensation in all extremities is intact, heaviness to left extremities, tingling to left foot.   Resp: airway is patent with normal rate and effort noted. Pt on room air.   Skin: skin is warm, dry and intact  Cardiac: Heart sounds are normal. Peripheral pulses are palpable and intact. Cap refill <3 seconds. No edema noted  GI: abdomen is soft and non-tender. Pt denies abdominal pain. Pt states bowel movements have been regular  : pt denies frequency or pain when urinating

## 2017-01-21 NOTE — SUBJECTIVE & OBJECTIVE
Past Medical History   Diagnosis Date    Aneurysm      Right sided filling anterior communicating aneurysm s/p coiling 2011    Anxiety     Generalized headaches     Hyperlipidemia      Past Surgical History   Procedure Laterality Date    Brain surgery  2011     angiogram/coiling     Family History   Problem Relation Age of Onset    DAVID disease Mother     Hypertension Mother     Cancer Father      brain    Celiac disease Neg Hx     Cirrhosis Neg Hx     Colon polyps Neg Hx     Crohn's disease Neg Hx     Cystic fibrosis Neg Hx     Esophageal cancer Neg Hx     Hemochromatosis Neg Hx     Inflammatory bowel disease Neg Hx     Irritable bowel syndrome Neg Hx     Liver cancer Neg Hx     Liver disease Neg Hx     Rectal cancer Neg Hx     Stomach cancer Neg Hx     Ulcerative colitis Neg Hx     Alli's disease Neg Hx     Heart disease Neg Hx     Heart attack Neg Hx     Amblyopia Neg Hx     Blindness Neg Hx     Cataracts Neg Hx     Glaucoma Neg Hx     Macular degeneration Neg Hx     Retinal detachment Neg Hx     Strabismus Neg Hx     Colon cancer Neg Hx      Social History   Substance Use Topics    Smoking status: Never Smoker    Smokeless tobacco: Never Used    Alcohol use Yes      Comment: ocassionally - twice a week     Review of patient's allergies indicates:  No Known Allergies  Medications: I have reviewed the current medication administration record.      (Not in a hospital admission)    Review of Systems   Constitutional: Negative for chills and fever.   HENT: Negative for trouble swallowing and voice change.    Eyes: Negative for pain and visual disturbance.   Respiratory: Negative for cough and shortness of breath.    Cardiovascular: Negative for chest pain and palpitations.   Gastrointestinal: Positive for constipation. Negative for nausea and vomiting.   Genitourinary: Negative for difficulty urinating and dysuria.   Musculoskeletal: Positive for myalgias (muscle spasms).  Negative for back pain.   Skin: Negative for rash and wound.   Psychiatric/Behavioral: Negative for confusion. The patient is nervous/anxious.      Objective:     Vital Signs (Most Recent):  Temp: 97.4 °F (36.3 °C) (01/21/17 0954)  Pulse: 70 (01/21/17 0954)  Resp: 18 (01/21/17 0954)  BP: 122/73 (01/21/17 0954)  SpO2: 97 % (01/21/17 0954)    Vital Signs Range (Last 24H):  Temp:  [97.4 °F (36.3 °C)-98.7 °F (37.1 °C)]   Pulse:  [70-85]   Resp:  [14-24]   BP: (116-143)/(73-89)   SpO2:  [96 %-98 %]     Physical Exam   Constitutional: He is oriented to person, place, and time. He appears well-developed and well-nourished. No distress.   HENT:   Head: Normocephalic and atraumatic.   Nose: Nose normal.   Mouth/Throat: Oropharynx is clear and moist.   Eyes: Conjunctivae are normal. Pupils are equal, round, and reactive to light.   Cardiovascular: Normal rate, regular rhythm and intact distal pulses.    Pulmonary/Chest: Effort normal. No respiratory distress.   Abdominal: Soft. He exhibits no distension. There is no tenderness.   Musculoskeletal: He exhibits no edema.   Neurological: He is alert and oriented to person, place, and time.   Skin: Skin is warm and dry. No rash noted.   Vitals reviewed.    Neurological Exam:   Mental status: Alert, oriented x 3, normal attention and concentration  Cranial nerves: CNII: PERRL, visual fields intact. CNIII, IV, VI: EOMI. CNV, VII: Facial muscles symmetrical with no noted weakness. Sensation intact and symmetrical. CNVIII: hearing intact. CNIX, X, XII: tongue and palatal motion intact, normal phonation and cough. CNXI: strength 5/5 and symmetrical.  Muscular: Normal bulk and tone, strength 5/5 all four extremities.  Sensation: Grossly intact to light touch and temperature all four extremities  Cerebellar: Normal finger-nose-finger and heel-to-shin bilaterally  Reflexes: 2+ brachioradialis, bicep, tricep, patellar, ankle bilaterally    NIH Stroke Scale:    Level of Consciousness: 0 -  alert  LOC Questions: 0 - answers both correctly  LOC Commands: 0 - performs both correctly  Best Gaze: 0 - normal  Visual: 0 - no visual loss  Facial Palsy: 0 - normal  Motor Left Arm: 0 - no drift  Motor Right Arm: 0 - no drift  Motor Left Le - no drift  Motor Right Le - no drift  Limb Ataxia: 0 - absent  Sensory: 0 - normal  Best Language: 0 - no aphasia  Dysarthria: 0 - normal articulation  Extinction and Inattention: 0 - no neglect  NIH Stroke Scale Total: 0      Laboratory:  Recent Results (from the past 24 hour(s))   POCT glucose    Collection Time: 17  6:37 AM   Result Value Ref Range    POCT Glucose 112 (H) 70 - 110 mg/dL   CBC W/ AUTO DIFFERENTIAL    Collection Time: 17  6:40 AM   Result Value Ref Range    WBC 6.43 3.90 - 12.70 K/uL    RBC 4.74 4.60 - 6.20 M/uL    Hemoglobin 15.3 14.0 - 18.0 g/dL    Hematocrit 43.6 40.0 - 54.0 %    MCV 92 82 - 98 fL    MCH 32.3 (H) 27.0 - 31.0 pg    MCHC 35.1 32.0 - 36.0 %    RDW 11.8 11.5 - 14.5 %    Platelets 178 150 - 350 K/uL    MPV 10.2 9.2 - 12.9 fL    Gran # 4.5 1.8 - 7.7 K/uL    Lymph # 1.3 1.0 - 4.8 K/uL    Mono # 0.5 0.3 - 1.0 K/uL    Eos # 0.1 0.0 - 0.5 K/uL    Baso # 0.01 0.00 - 0.20 K/uL    Gran% 70.3 38.0 - 73.0 %    Lymph% 20.5 18.0 - 48.0 %    Mono% 7.2 4.0 - 15.0 %    Eosinophil% 1.6 0.0 - 8.0 %    Basophil% 0.2 0.0 - 1.9 %    Differential Method Automated    Comprehensive metabolic panel    Collection Time: 17  6:40 AM   Result Value Ref Range    Sodium 140 136 - 145 mmol/L    Potassium 4.1 3.5 - 5.1 mmol/L    Chloride 105 95 - 110 mmol/L    CO2 27 23 - 29 mmol/L    Glucose 104 70 - 110 mg/dL    BUN, Bld 12 6 - 20 mg/dL    Creatinine 0.8 0.5 - 1.4 mg/dL    Calcium 9.4 8.7 - 10.5 mg/dL    Total Protein 7.4 6.0 - 8.4 g/dL    Albumin 4.0 3.5 - 5.2 g/dL    Total Bilirubin 0.7 0.1 - 1.0 mg/dL    Alkaline Phosphatase 62 55 - 135 U/L    AST 18 10 - 40 U/L    ALT 23 10 - 44 U/L    Anion Gap 8 8 - 16 mmol/L    eGFR if African American  >60.0 >60 mL/min/1.73 m^2    eGFR if non African American >60.0 >60 mL/min/1.73 m^2   Protime-INR    Collection Time: 01/21/17  6:40 AM   Result Value Ref Range    Prothrombin Time 13.3 (H) 9.0 - 12.5 sec    INR 1.3 (H) 0.8 - 1.2   TSH    Collection Time: 01/21/17  6:40 AM   Result Value Ref Range    TSH 1.891 0.400 - 4.000 uIU/mL   Type & Screen    Collection Time: 01/21/17  6:40 AM   Result Value Ref Range    Group & Rh A POS     Indirect Capri NEG    LDL - Lipid Panel    Collection Time: 01/21/17  6:40 AM   Result Value Ref Range    Cholesterol 162 120 - 199 mg/dL    Triglycerides 107 30 - 150 mg/dL    HDL 48 40 - 75 mg/dL    LDL Cholesterol 92.6 63.0 - 159.0 mg/dL    HDL/Chol Ratio 29.6 20.0 - 50.0 %    Total Cholesterol/HDL Ratio 3.4 2.0 - 5.0    Non-HDL Cholesterol 114 mg/dL       Diagnostic Results:  CT Head WO Contrast 01/21/17:  Remote operative change from anterior communicating artery endovascular aneurysm coiling. Allowing for artifact from the metallic coil pack there is no acute intracranial findings specifically without evidence for acute intracranial hemorrhage or sulcal effacement.

## 2017-01-21 NOTE — CONSULTS
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 Patient states had recent headaches for which he underwent angiography with neurosurgery to re-evaluate his coiling and vasculature. No acute abnormalities noted on angio. Patient states feels symptoms began several days after his angiogram and have been awakening him from sleep. States that symptoms feel improved after taking alprazolam.    He denies weakness/numbness, fever/chills, CP/palpitations, SOB/cough. He complains of some abdominal discomfort with constipation.           Past Medical History   Diagnosis Date    Aneurysm      Right sided filling anterior communicating aneurysm s/p coiling 2011    Anxiety     Generalized headaches     Hyperlipidemia      Past Surgical History   Procedure Laterality Date    Brain surgery  2011     angiogram/coiling     Family History   Problem Relation Age of Onset    DAVID disease Mother     Hypertension Mother     Cancer Father      brain    Celiac disease Neg Hx     Cirrhosis Neg Hx     Colon polyps Neg Hx     Crohn's disease Neg Hx     Cystic fibrosis Neg Hx     Esophageal cancer Neg Hx     Hemochromatosis Neg Hx     Inflammatory bowel disease Neg Hx     Irritable bowel syndrome Neg Hx     Liver cancer Neg Hx     Liver disease Neg Hx     Rectal cancer Neg Hx     Stomach cancer Neg Hx     Ulcerative colitis Neg Hx     Alli's disease Neg Hx     Heart disease Neg Hx     Heart attack Neg Hx     Amblyopia Neg Hx     Blindness Neg Hx     Cataracts Neg Hx     Glaucoma Neg Hx     Macular degeneration " Neg Hx     Retinal detachment Neg Hx     Strabismus Neg Hx     Colon cancer Neg Hx      Social History   Substance Use Topics    Smoking status: Never Smoker    Smokeless tobacco: Never Used    Alcohol use Yes      Comment: ocassionally - twice a week     Review of patient's allergies indicates:  No Known Allergies  Medications: I have reviewed the current medication administration record.      (Not in a hospital admission)    Review of Systems   Constitutional: Negative for chills and fever.   HENT: Negative for trouble swallowing and voice change.    Eyes: Negative for pain and visual disturbance.   Respiratory: Negative for cough and shortness of breath.    Cardiovascular: Negative for chest pain and palpitations.   Gastrointestinal: Positive for constipation. Negative for nausea and vomiting.   Genitourinary: Negative for difficulty urinating and dysuria.   Musculoskeletal: Positive for myalgias (muscle spasms). Negative for back pain.   Skin: Negative for rash and wound.   Psychiatric/Behavioral: Negative for confusion. The patient is nervous/anxious.      Objective:     Vital Signs (Most Recent):  Temp: 97.4 °F (36.3 °C) (01/21/17 0954)  Pulse: 70 (01/21/17 0954)  Resp: 18 (01/21/17 0954)  BP: 122/73 (01/21/17 0954)  SpO2: 97 % (01/21/17 0954)    Vital Signs Range (Last 24H):  Temp:  [97.4 °F (36.3 °C)-98.7 °F (37.1 °C)]   Pulse:  [70-85]   Resp:  [14-24]   BP: (116-143)/(73-89)   SpO2:  [96 %-98 %]     Physical Exam   Constitutional: He is oriented to person, place, and time. He appears well-developed and well-nourished. No distress.   HENT:   Head: Normocephalic and atraumatic.   Nose: Nose normal.   Mouth/Throat: Oropharynx is clear and moist.   Eyes: Conjunctivae are normal. Pupils are equal, round, and reactive to light.   Cardiovascular: Normal rate, regular rhythm and intact distal pulses.    Pulmonary/Chest: Effort normal. No respiratory distress.   Abdominal: Soft. He exhibits no distension.  There is no tenderness.   Musculoskeletal: He exhibits no edema.   Neurological: He is alert and oriented to person, place, and time.   Skin: Skin is warm and dry. No rash noted.   Vitals reviewed.    Neurological Exam:   Mental status: Alert, oriented x 3, normal attention and concentration  Cranial nerves: CNII: PERRL, visual fields intact. CNIII, IV, VI: EOMI. CNV, VII: Facial muscles symmetrical with no noted weakness. Sensation intact and symmetrical. CNVIII: hearing intact. CNIX, X, XII: tongue and palatal motion intact, normal phonation and cough. CNXI: strength 5/5 and symmetrical.  Muscular: Normal bulk and tone, strength 5/5 all four extremities.  Sensation: Grossly intact to light touch and temperature all four extremities  Cerebellar: Normal finger-nose-finger and heel-to-shin bilaterally  Reflexes: 2+ brachioradialis, bicep, tricep, patellar, ankle bilaterally    NIH Stroke Scale:    Level of Consciousness: 0 - alert  LOC Questions: 0 - answers both correctly  LOC Commands: 0 - performs both correctly  Best Gaze: 0 - normal  Visual: 0 - no visual loss  Facial Palsy: 0 - normal  Motor Left Arm: 0 - no drift  Motor Right Arm: 0 - no drift  Motor Left Le - no drift  Motor Right Le - no drift  Limb Ataxia: 0 - absent  Sensory: 0 - normal  Best Language: 0 - no aphasia  Dysarthria: 0 - normal articulation  Extinction and Inattention: 0 - no neglect  NIH Stroke Scale Total: 0      Laboratory:  Recent Results (from the past 24 hour(s))   POCT glucose    Collection Time: 17  6:37 AM   Result Value Ref Range    POCT Glucose 112 (H) 70 - 110 mg/dL   CBC W/ AUTO DIFFERENTIAL    Collection Time: 17  6:40 AM   Result Value Ref Range    WBC 6.43 3.90 - 12.70 K/uL    RBC 4.74 4.60 - 6.20 M/uL    Hemoglobin 15.3 14.0 - 18.0 g/dL    Hematocrit 43.6 40.0 - 54.0 %    MCV 92 82 - 98 fL    MCH 32.3 (H) 27.0 - 31.0 pg    MCHC 35.1 32.0 - 36.0 %    RDW 11.8 11.5 - 14.5 %    Platelets 178 150 - 350 K/uL     MPV 10.2 9.2 - 12.9 fL    Gran # 4.5 1.8 - 7.7 K/uL    Lymph # 1.3 1.0 - 4.8 K/uL    Mono # 0.5 0.3 - 1.0 K/uL    Eos # 0.1 0.0 - 0.5 K/uL    Baso # 0.01 0.00 - 0.20 K/uL    Gran% 70.3 38.0 - 73.0 %    Lymph% 20.5 18.0 - 48.0 %    Mono% 7.2 4.0 - 15.0 %    Eosinophil% 1.6 0.0 - 8.0 %    Basophil% 0.2 0.0 - 1.9 %    Differential Method Automated    Comprehensive metabolic panel    Collection Time: 01/21/17  6:40 AM   Result Value Ref Range    Sodium 140 136 - 145 mmol/L    Potassium 4.1 3.5 - 5.1 mmol/L    Chloride 105 95 - 110 mmol/L    CO2 27 23 - 29 mmol/L    Glucose 104 70 - 110 mg/dL    BUN, Bld 12 6 - 20 mg/dL    Creatinine 0.8 0.5 - 1.4 mg/dL    Calcium 9.4 8.7 - 10.5 mg/dL    Total Protein 7.4 6.0 - 8.4 g/dL    Albumin 4.0 3.5 - 5.2 g/dL    Total Bilirubin 0.7 0.1 - 1.0 mg/dL    Alkaline Phosphatase 62 55 - 135 U/L    AST 18 10 - 40 U/L    ALT 23 10 - 44 U/L    Anion Gap 8 8 - 16 mmol/L    eGFR if African American >60.0 >60 mL/min/1.73 m^2    eGFR if non African American >60.0 >60 mL/min/1.73 m^2   Protime-INR    Collection Time: 01/21/17  6:40 AM   Result Value Ref Range    Prothrombin Time 13.3 (H) 9.0 - 12.5 sec    INR 1.3 (H) 0.8 - 1.2   TSH    Collection Time: 01/21/17  6:40 AM   Result Value Ref Range    TSH 1.891 0.400 - 4.000 uIU/mL   Type & Screen    Collection Time: 01/21/17  6:40 AM   Result Value Ref Range    Group & Rh A POS     Indirect Capri NEG    LDL - Lipid Panel    Collection Time: 01/21/17  6:40 AM   Result Value Ref Range    Cholesterol 162 120 - 199 mg/dL    Triglycerides 107 30 - 150 mg/dL    HDL 48 40 - 75 mg/dL    LDL Cholesterol 92.6 63.0 - 159.0 mg/dL    HDL/Chol Ratio 29.6 20.0 - 50.0 %    Total Cholesterol/HDL Ratio 3.4 2.0 - 5.0    Non-HDL Cholesterol 114 mg/dL       Diagnostic Results:  CT Head WO Contrast 01/21/17:  Remote operative change from anterior communicating artery endovascular aneurysm coiling. Allowing for artifact from the metallic coil pack there is no acute  "intracranial findings specifically without evidence for acute intracranial hemorrhage or sulcal effacement.      Assessment/Plan:     46/M PMH HLD, R SOTO aneurysm s/p coiling, anxiety presents with L-sided "heaviness" that resolves with anxiolytics for the past ~3 weeks after angiography.    L-sided heaviness  - Low suspicion for stroke at this time. CT Head negative, NIHSS 0. Recent angiography without acute abnormalities. No symptoms present at this time. Given timeline, if stroke CT Head would likely demonstrate some abnormalities. Migrating pattern of numbness and inconsistent presentation not highly suggestive of stroke-like pattern.  - Given history will order outpatient MRI to re-evaluate.  - Recommend patient continue atorvastatin 40mg PO daily.  - OK from vascular neurology standpoint to D/C home at this time.    Discussed with staff, Dr. Castro.    HUAN Saavedra MD   PGY-2   [phone]    "

## 2017-01-21 NOTE — ED AVS SNAPSHOT
[facility]  [address]  [facility] [address]               Andre Padgett   2017  6:06 AM   ED    Description:  Male : 1970   Department:  [facility]           Your Care was Coordinated By:     Provider Role From To    Azeem Hillman MD Attending Provider 17 0619 --      Reason for Visit     Multiple Complaints           Diagnoses this Visit        Comments    Spasm    -  Primary       ED Disposition     None           To Do List           Follow-up Information     Schedule an appointment as soon as possible for a visit with [facility] - Neurology.    Specialty:  Neurology    Contact information:    [address]  [facility] [id number] [phone]    Additional information:    7th Floor - Clinic Tower [facility]     [facility] Nurse Care Line -  Assistance  Registered nurses in the [facility] provide clinical advisement, health education, appointment booking, and other advisory services.  Call for this free service at [phone].             Medications           Message regarding Medications     Verify the changes and/or additions to your medication regime listed below are the same as discussed with your clinician today.  If any of these changes or additions are incorrect, please notify your healthcare provider.             Verify that the below list of medications is an accurate representation of the medications you are currently taking.  If none reported, the list may be blank. If incorrect, please contact your healthcare provider. Carry this list with you in case of emergency.           Current Medications     ascorbic acid (VITAMIN C) 100 MG tablet Take 100 mg by mouth once daily.    atorvastatin (LIPITOR) 40 MG tablet Take 1 tablet (40 mg total) by mouth once daily.    albuterol nebulizer solution 1.25 mg Take 3 mLs (1.25 mg total) by nebulization one time.    alprazolam  "(XANAX) 0.5 MG tablet take 1 tablet by mouth at bedtime if needed    esomeprazole (NEXIUM) 40 MG capsule Take 1 capsule (40 mg total) by mouth before breakfast.           Clinical Reference Information           Your Vitals Were     BP Pulse Temp Resp Height Weight    116/73 72 98.5 °F (36.9 °C) (Oral) 20 5' 9" (1.753 m) 84.8 kg (187 lb)    SpO2 BMI             97% 27.62 kg/m2         Allergies as of 1/21/2017     No Known Allergies      Immunizations Administered on Date of Encounter - 1/21/2017     None      ED Micro, Lab, POCT     Start Ordered       Status Ordering Provider    01/21/17 0637 01/21/17 0637  POCT glucose  Once      Final result     01/21/17 0625 01/21/17 0624  CBC W/ AUTO DIFFERENTIAL  Once      Final result     01/21/17 0625 01/21/17 0624  Comprehensive metabolic panel  Once      Final result     01/21/17 0625 01/21/17 0624  Protime-INR  Once      Final result     01/21/17 0625 01/21/17 0624  TSH  Once      Final result     01/21/17 0625 01/21/17 0624  POCT glucose  Once      Acknowledged     01/21/17 0625 01/21/17 0624  LDL - Lipid Panel  STAT      Final result       ED Imaging Orders     Start Ordered       Status Ordering Provider    01/21/17 0625 01/21/17 0624  CT Head Without Contrast  1 time imaging      Final result     01/21/17 0625 01/21/17 0624  X-Ray Chest AP Portable  1 time imaging      Final result       Discharge References/Attachments     MUSCLE SPASM (ENGLISH)       [facility] complies with applicable Federal civil rights laws and does not discriminate on the basis of race, color, national origin, age, disability, or sex.        Language Assistance Services     ATTENTION: Language assistance services are available, free of charge. Please call [phone].      ATENCIÓN: Si habla español, tiene a infante disposición servicios gratuitos de asistencia lingüística. Llame al [phone].     CHÚ Ý: N?u b?n nói Ti?ng Vi?t, có các d?ch v? h? tr? ngôn ng? mi?n phí " dành cho b?aron. G?i s? [phone].

## 2017-01-21 NOTE — ED NOTES
Denies any symptoms at this time. Hand grasp strong and equal bilaterally. Maintained on cardiac monitor in NSR --Able lift legs off the bed. No c/o ha. Waiting for vascular

## 2017-01-23 ENCOUNTER — TELEPHONE (OUTPATIENT)
Dept: NEUROLOGY | Facility: CLINIC | Age: 47
End: 2017-01-23

## 2017-01-23 NOTE — TELEPHONE ENCOUNTER
Spoke with pt- MRI's booked 01/27/2017 @ 6:45pm and hosp. F/u booked 01/31/2017 @ 8am with Dr. Castro.

## 2017-01-23 NOTE — TELEPHONE ENCOUNTER
----- Message from Vinny Castro MD sent at 1/21/2017 10:24 AM CST -----  I saw Mr. Padgett on Sat in the ED and would like to schedule an MRI/MRA brain and MRA neck and then f/u appt with me.  I gave him Shay's #.  Thx

## 2017-01-26 ENCOUNTER — TELEPHONE (OUTPATIENT)
Dept: ADMINISTRATIVE | Facility: OTHER | Age: 47
End: 2017-01-26

## 2017-01-26 RX ORDER — LORAZEPAM 1 MG/1
1 TABLET ORAL
Qty: 2 TABLET | Refills: 0 | Status: SHIPPED | OUTPATIENT
Start: 2017-01-26 | End: 2017-02-02 | Stop reason: SDUPTHER

## 2017-01-26 NOTE — TELEPHONE ENCOUNTER
Dr. Castro,    Sedation is only administered on M-F from 8 am to 4pm, he's currently scheduled for tomorrow night starting at 6:45pm (pt told me that it was urgent that he get scheduled right away and this date/time worked for him with his work schedule). Would you like to call him in something or reschedule?    Please advise

## 2017-01-26 NOTE — TELEPHONE ENCOUNTER
Mr. Padgett has an MRI scheduled for the 27th of January at 6:45PM.  He has called, noting that he is claustrophobic and wants to make sure that the MRI staff, etc are aware and prepared to sedate him.  (He's had MRIs done in the past where a nurse has come in at the beginning of or just prior to the procedure and administered something that effectively sedates him for the procedure.)  Would someone please call him to confirm?  [phone].

## 2017-01-27 ENCOUNTER — HOSPITAL ENCOUNTER (OUTPATIENT)
Dept: RADIOLOGY | Facility: HOSPITAL | Age: 47
Discharge: HOME | End: 2017-01-27
Attending: PSYCHIATRY & NEUROLOGY
Payer: COMMERCIAL

## 2017-01-27 DIAGNOSIS — G81.94 HEMIPARESIS, LEFT: ICD-10-CM

## 2017-01-27 PROCEDURE — 70548 MR ANGIOGRAPHY NECK W/DYE: CPT | Mod: 26,,, | Performed by: RADIOLOGY

## 2017-01-27 PROCEDURE — 25500020 PHARM REV CODE 255: Performed by: PSYCHIATRY & NEUROLOGY

## 2017-01-27 PROCEDURE — 70544 MR ANGIOGRAPHY HEAD W/O DYE: CPT | Mod: 26,,, | Performed by: RADIOLOGY

## 2017-01-27 PROCEDURE — 70544 MR ANGIOGRAPHY HEAD W/O DYE: CPT | Mod: TC

## 2017-01-27 PROCEDURE — 70553 MRI BRAIN STEM W/O & W/DYE: CPT | Mod: 26,,, | Performed by: RADIOLOGY

## 2017-01-27 PROCEDURE — 70553 MRI BRAIN STEM W/O & W/DYE: CPT | Mod: TC

## 2017-01-27 PROCEDURE — 70548 MR ANGIOGRAPHY NECK W/DYE: CPT | Mod: TC

## 2017-01-27 PROCEDURE — A9585 GADOBUTROL INJECTION: HCPCS | Performed by: PSYCHIATRY & NEUROLOGY

## 2017-01-27 RX ORDER — GADOBUTROL 604.72 MG/ML
10 INJECTION INTRAVENOUS
Status: COMPLETED | OUTPATIENT
Start: 2017-01-27 | End: 2017-01-27

## 2017-01-27 RX ADMIN — GADOBUTROL 10 ML: 604.72 INJECTION INTRAVENOUS at 08:01

## 2017-01-30 ENCOUNTER — PATIENT MESSAGE (OUTPATIENT)
Dept: NEUROLOGY | Facility: CLINIC | Age: 47
End: 2017-01-30

## 2017-01-31 ENCOUNTER — OFFICE VISIT (OUTPATIENT)
Dept: NEUROLOGY | Facility: CLINIC | Age: 47
End: 2017-01-31
Payer: COMMERCIAL

## 2017-01-31 VITALS
BODY MASS INDEX: 27.75 KG/M2 | SYSTOLIC BLOOD PRESSURE: 125 MMHG | HEIGHT: 69 IN | DIASTOLIC BLOOD PRESSURE: 86 MMHG | WEIGHT: 187.38 LBS | HEART RATE: 93 BPM

## 2017-01-31 DIAGNOSIS — F41.9 ANXIETY: ICD-10-CM

## 2017-01-31 DIAGNOSIS — R20.2 NUMBNESS AND TINGLING OF LEFT ARM AND LEG: Primary | ICD-10-CM

## 2017-01-31 DIAGNOSIS — R20.0 NUMBNESS AND TINGLING OF LEFT ARM AND LEG: Primary | ICD-10-CM

## 2017-01-31 PROCEDURE — 99999 PR PBB SHADOW E&M-EST. PATIENT-LVL III: CPT | Mod: PBBFAC,,, | Performed by: PSYCHIATRY & NEUROLOGY

## 2017-01-31 PROCEDURE — 99215 OFFICE O/P EST HI 40 MIN: CPT | Mod: S$GLB,,, | Performed by: PSYCHIATRY & NEUROLOGY

## 2017-01-31 PROCEDURE — 1159F MED LIST DOCD IN RCRD: CPT | Mod: S$GLB,,, | Performed by: PSYCHIATRY & NEUROLOGY

## 2017-01-31 NOTE — PROGRESS NOTES
"Neurology Clinic    Impression:  1. Episodic L heaviness and numbness: etiology remains uncertain.  Anxiety high on differential which also includes migrainous, simple partial seizures. The long duration (up to hours) without associated typical symptoms make migraine and SPS unlikely. Not likely TIAs. Doubt cervical localization as heaviness and sensory symptoms are on the same side but this is also remotely possible.  2. Anxiety  3. ACOM aneurysm, s/p coiling 2011  4. Remote episodic migraine without aura    Plan:  1. EEG  2. F/U with Dr. Diop to discuss alternative to Xanax for chronic anxiety (e.g., SSRI)  3. Consider c-spine MRI and/or ambulatory EEG if routine EEG negative and symptoms do not continue to improve  4. Call me prn  5. RTC 3 months    45 y/o M seen in f/u from ED evaluation.  Episodic L sided "heaviness" since angio still persists but is less frequent and less severe.  He is taking Xanax bid since I saw him in the ED.  MRI/MRA brain and MRA neck were unrevealing.       Past Medical History   Diagnosis Date    Aneurysm      Right sided filling anterior communicating aneurysm s/p coiling 2011    Anxiety     Generalized headaches     Hyperlipidemia       Past Surgical History   Procedure Laterality Date    Brain surgery  2011     angiogram/coiling      Outpatient Prescriptions Marked as Taking for the 1/31/17 encounter (Office Visit) with Vinny Castro MD   Medication Sig Dispense Refill    alprazolam (XANAX) 0.5 MG tablet take 1 tablet by mouth at bedtime if needed 30 tablet 1    ascorbic acid (VITAMIN C) 100 MG tablet Take 100 mg by mouth once daily.      atorvastatin (LIPITOR) 40 MG tablet Take 1 tablet (40 mg total) by mouth once daily. 90 tablet 3    esomeprazole (NEXIUM) 40 MG capsule Take 1 capsule (40 mg total) by mouth before breakfast. 30 capsule 11    lorazepam (ATIVAN) 1 MG tablet Take 1 tablet (1 mg total) by mouth as needed for Anxiety (MRI). 1 tab 45 minutes prior to " MRI.  May repeat x1 if needed. 2 tablet 0     Current Facility-Administered Medications for the 1/31/17 encounter (Office Visit) with Vinny Castro MD   Medication Dose Route Frequency Provider Last Rate Last Dose    albuterol nebulizer solution 1.25 mg  1.25 mg Nebulization 1 time in Clinic/HOD Anjelica Loyola MD          Review of patient's allergies indicates:  No Known Allergies   Family History   Problem Relation Age of Onset    DAVID disease Mother     Hypertension Mother     Cancer Father      brain    Celiac disease Neg Hx     Cirrhosis Neg Hx     Colon polyps Neg Hx     Crohn's disease Neg Hx     Cystic fibrosis Neg Hx     Esophageal cancer Neg Hx     Hemochromatosis Neg Hx     Inflammatory bowel disease Neg Hx     Irritable bowel syndrome Neg Hx     Liver cancer Neg Hx     Liver disease Neg Hx     Rectal cancer Neg Hx     Stomach cancer Neg Hx     Ulcerative colitis Neg Hx     Alli's disease Neg Hx     Heart disease Neg Hx     Heart attack Neg Hx     Amblyopia Neg Hx     Blindness Neg Hx     Cataracts Neg Hx     Glaucoma Neg Hx     Macular degeneration Neg Hx     Retinal detachment Neg Hx     Strabismus Neg Hx     Colon cancer Neg Hx       Social History     Social History    Marital status:      Spouse name: N/A    Number of children: N/A    Years of education: N/A     Occupational History    Not on file.     Social History Main Topics    Smoking status: Never Smoker    Smokeless tobacco: Never Used    Alcohol use Yes      Comment: ocassionally - twice a week    Drug use: No    Sexual activity: Not on file     Other Topics Concern    Not on file     Social History Narrative     Review Of Systems:  General: Negative for fever   HENT: Negative for tinnitus, nose bleeds, neck stiffness   Cardiac Negative for palpitations   Vascular: Negative for easy bruising   Pulmonary: Negative for cough   Gastrointestinal: Negative for constipation   Urinary: Negative  "for incomplete bladder emptying   Musculoskeletal: Negative for muscle aches   Neurological: As above. Otherwise negative for abnormal movements   Psychiatric:  +anxiety       Visit Vitals    /86    Pulse 93    Ht 5' 9" (1.753 m)    Wt 85 kg (187 lb 6.3 oz)    BMI 27.67 kg/m2      Well developed, well nourished male  Extremities: no edema    Mental status:   Awake, alert and appropriately oriented   Normal recent and remote memory   Normal attention and concentration   Normal speech and language   Normal fund of knowledge   No extinction  Cranial nerves:   EOMF without nystagmus   VFF   Normal facial sensation   Normal facial movements  Motor:   No pronator drift   Normal FF movements bilaterally   Normal muscle tone, bulk and power   No abnormal movements  Sensory   Intact to LT  DTRs   2+ and symmetric   Plantar responses NT  Coordination   Intact to RAH  Gait   Normal base and gait    Data Reviewed:  CMP  Sodium   Date Value Ref Range Status   01/21/2017 140 136 - 145 mmol/L Final     Potassium   Date Value Ref Range Status   01/21/2017 4.1 3.5 - 5.1 mmol/L Final     Comment:     *Slightly Hemolyzed     Chloride   Date Value Ref Range Status   01/21/2017 105 95 - 110 mmol/L Final     CO2   Date Value Ref Range Status   01/21/2017 27 23 - 29 mmol/L Final     Glucose   Date Value Ref Range Status   01/21/2017 104 70 - 110 mg/dL Final     BUN, Bld   Date Value Ref Range Status   01/21/2017 12 6 - 20 mg/dL Final     Creatinine   Date Value Ref Range Status   01/21/2017 0.8 0.5 - 1.4 mg/dL Final     Calcium   Date Value Ref Range Status   01/21/2017 9.4 8.7 - 10.5 mg/dL Final     Total Protein   Date Value Ref Range Status   01/21/2017 7.4 6.0 - 8.4 g/dL Final     Albumin   Date Value Ref Range Status   01/21/2017 4.0 3.5 - 5.2 g/dL Final     Total Bilirubin   Date Value Ref Range Status   01/21/2017 0.7 0.1 - 1.0 mg/dL Final     Comment:     For infants and newborns, interpretation of results should be " based  on gestational age, weight and in agreement with clinical  observations.  Premature Infant recommended reference ranges:  Up to 24 hours.............<8.0 mg/dL  Up to 48 hours............<12.0 mg/dL  3-5 days..................<15.0 mg/dL  6-29 days.................<15.0 mg/dL       Alkaline Phosphatase   Date Value Ref Range Status   01/21/2017 62 55 - 135 U/L Final     AST   Date Value Ref Range Status   01/21/2017 18 10 - 40 U/L Final     ALT   Date Value Ref Range Status   01/21/2017 23 10 - 44 U/L Final     Anion Gap   Date Value Ref Range Status   01/21/2017 8 8 - 16 mmol/L Final     eGFR if    Date Value Ref Range Status   01/21/2017 >60.0 >60 mL/min/1.73 m^2 Final     eGFR if non    Date Value Ref Range Status   01/21/2017 >60.0 >60 mL/min/1.73 m^2 Final     Comment:     Calculation used to obtain the estimated glomerular filtration  rate (eGFR) is the CKD-EPI equation. Since race is unknown   in our information system, the eGFR values for   -American and Non--American patients are given   for each creatinine result.       MRI/MRAs nl    Vinny Castro MD

## 2017-01-31 NOTE — MR AVS SNAPSHOT
[facility] [place]  [address] [facility] [address]  Phone: [phone]                  Andre Padgett   2017 8:00 AM   Office Visit    Description:  Male : 1970   Provider:  Vinny Castro MD   Department:  [facility]           Diagnoses this Visit        Comments    Numbness and tingling of left arm and leg    -  Primary            To Do List           Future Appointments        Provider Department Dept Phone    2/10/2017 10:30 AM NEURODIAGNOSTICS, APPT [facility] [phone]    2017 9:40 AM Vinny Castro MD [facility] [place] [phone]      Goals (5 Years of Data)     None      Follow-Up and Disposition     Return in about 3 months (around 2017).      [facility]     [facility] On Call Nurse [facility] Emergency Department Line - / Assistance  Registered nurses in the [facility] Center provide clinical advisement, health education, appointment booking, and other advisory services.  Call for this free service at [phone].             Medications           Message regarding Medications     Verify the changes and/or additions to your medication regime listed below are the same as discussed with your clinician today.  If any of these changes or additions are incorrect, please notify your healthcare provider.             Verify that the below list of medications is an accurate representation of the medications you are currently taking.  If none reported, the list may be blank. If incorrect, please contact your healthcare provider. Carry this list with you in case of emergency.           Current Medications     alprazolam (XANAX) 0.5 MG tablet take 1 tablet by mouth at bedtime if needed    ascorbic acid (VITAMIN C) 100 MG tablet Take 100 mg by mouth once daily.    atorvastatin (LIPITOR) 40 MG tablet Take 1 tablet (40 mg total) by mouth once daily.    esomeprazole (NEXIUM) 40 MG capsule Take 1 capsule (40 mg  "total) by mouth before breakfast.    lorazepam (ATIVAN) 1 MG tablet Take 1 tablet (1 mg total) by mouth as needed for Anxiety (MRI). 1 tab 45 minutes prior to MRI.  May repeat x1 if needed.           Clinical Reference Information           Vital Signs - Last Recorded  Most recent update: 1/31/2017  8:20 AM by Stephanie Abarca MA    BP Pulse Ht Wt BMI    125/86 93 5' 9" (1.753 m) 85 kg (187 lb 6.3 oz) 27.67 kg/m2      Blood Pressure          Most Recent Value    BP  125/86      Allergies as of 1/31/2017     No Known Allergies      Immunizations Administered on Date of Encounter - 1/31/2017     None      Orders Placed During Today's Visit     Future Labs/Procedures Expected by Expires    EEG,w/awake & asleep record  As directed 1/31/2018      "

## 2017-02-01 ENCOUNTER — PATIENT MESSAGE (OUTPATIENT)
Dept: NEUROLOGY | Facility: CLINIC | Age: 47
End: 2017-02-01

## 2017-02-01 DIAGNOSIS — R20.2 NUMBNESS AND TINGLING IN LEFT UPPER EXTREMITY: Primary | ICD-10-CM

## 2017-02-01 DIAGNOSIS — R20.0 NUMBNESS AND TINGLING IN LEFT UPPER EXTREMITY: Primary | ICD-10-CM

## 2017-02-02 RX ORDER — LORAZEPAM 1 MG/1
1 TABLET ORAL
Qty: 2 TABLET | Refills: 0 | Status: SHIPPED | OUTPATIENT
Start: 2017-02-02 | End: 2018-04-04

## 2017-02-03 ENCOUNTER — TELEPHONE (OUTPATIENT)
Dept: NEUROLOGY | Facility: CLINIC | Age: 47
End: 2017-02-03

## 2017-02-03 NOTE — TELEPHONE ENCOUNTER
Spoke with Mr. Padgett- Appt booked 02/06/2017 @ 7:30am  Prescription called into [facility] (rx printed-not found) Spoke w/Stephanie

## 2017-02-03 NOTE — TELEPHONE ENCOUNTER
----- Message from Chandler Stone sent at 2/3/2017  4:51 PM CST -----  Contact: PT  PT has to CX his appt on 2/6/17 due to insurance. He will cb to r/s appt.    He would like a call on 2/6/17 regarding this matter.     Call: [phone]

## 2017-02-03 NOTE — TELEPHONE ENCOUNTER
Returned Mr. Padgett's call- He explained that he just recently left a job and is waiting in his [employer] ins. Kick in. Appt rescheduled to 02/09/2017 @ 6:45am

## 2017-02-09 ENCOUNTER — PATIENT MESSAGE (OUTPATIENT)
Dept: NEUROLOGY | Facility: CLINIC | Age: 47
End: 2017-02-09

## 2017-02-09 ENCOUNTER — HOSPITAL ENCOUNTER (OUTPATIENT)
Dept: RADIOLOGY | Facility: HOSPITAL | Age: 47
Discharge: HOME | End: 2017-02-09
Attending: PSYCHIATRY & NEUROLOGY
Payer: COMMERCIAL

## 2017-02-09 DIAGNOSIS — R20.0 NUMBNESS AND TINGLING IN LEFT UPPER EXTREMITY: ICD-10-CM

## 2017-02-09 DIAGNOSIS — R20.2 NUMBNESS AND TINGLING IN LEFT UPPER EXTREMITY: ICD-10-CM

## 2017-02-09 PROCEDURE — 72141 MRI NECK SPINE W/O DYE: CPT | Mod: TC

## 2017-02-09 PROCEDURE — 72141 MRI NECK SPINE W/O DYE: CPT | Mod: 26,,, | Performed by: RADIOLOGY

## 2017-02-10 ENCOUNTER — HOSPITAL ENCOUNTER (OUTPATIENT)
Dept: NEUROLOGY | Facility: CLINIC | Age: 47
Discharge: HOME | End: 2017-02-10
Payer: COMMERCIAL

## 2017-02-10 ENCOUNTER — PATIENT MESSAGE (OUTPATIENT)
Dept: NEUROLOGY | Facility: CLINIC | Age: 47
End: 2017-02-10

## 2017-02-10 DIAGNOSIS — R20.0 NUMBNESS AND TINGLING OF LEFT ARM AND LEG: ICD-10-CM

## 2017-02-10 DIAGNOSIS — R20.2 NUMBNESS AND TINGLING OF LEFT ARM AND LEG: ICD-10-CM

## 2017-02-10 PROCEDURE — 95816 PR EEG,W/AWAKE & DROWSY RECORD: ICD-10-PCS | Mod: S$GLB,,, | Performed by: PSYCHIATRY & NEUROLOGY

## 2017-02-10 PROCEDURE — 95816 EEG AWAKE AND DROWSY: CPT | Mod: S$GLB,,, | Performed by: PSYCHIATRY & NEUROLOGY

## 2017-02-10 NOTE — PROCEDURES
EEG #[id number]    REQUESTING PHYSICIAN:  Dr. Castro.    ICU EEG/VIDEO MONITORING REPORT    METHODOLOGY:  Electroencephalographic (EEG) is recorded with electrodes placed   according to the International 10-20 placement system.  Thirty two (32) channels   of digital signal (sampling rate of 512/sec), including T1 and T2, were   simultaneously recorded from the scalp and may include EKG, EMG, and/or eye   monitors.  Recording band pass was 0.1 to 512 Hz.  Digital video recording of   the patient is simultaneously recorded with the EEG.  The patient is instructed   to report clinical symptoms which may occur during the recording session.  EEG   and video recording are stored and archived in digital format.  Activation   procedures, which include photic stimulation, hyperventilation and instructing   patients to perform simple tasks, are done in selected patients.    The EEG is displayed on a monitor screen and can be reviewed using different   montages.  Computer-assisted analysis is employed to detect spike and   electrographic seizure activity.   The entire record is submitted for computer   analysis.  The entire recording is visually reviewed, and the times identified   by computer analysis as being spikes or seizures are reviewed again.    Compressed spectral analysis (CSA) is also performed on the activity recorded   from each individual channel.  This is displayed as a power display of   frequencies from 0 to 30 Hz over time.   The CSA is reviewed looking for   asymmetries in power between homologous areas of the scalp, then compared with   the original EEG recording.    LEYIO software was also utilized in the review of this study.  This software   suite analyzes the EEG recording in multiple domains.  Coherence and rhythmicity   are computed to identify EEG sections which may contain organized seizures.    Each channel undergoes analysis to detect the presence of spike and sharp waves   which have special and  morphological characteristics of epileptic activity.  The   routine EEG recording is converted from special into frequency domain.  This is   then displayed comparing homologous areas to identify areas of significant   asymmetry.  Algorithm to identify non-cortically generated artifact is used to   separate artifact from the EEG.    EEG FINDINGS:  At the onset of the recording, the patient was awake.  Background   consisted of a posterior dominant rhythm 8.5 to 9 Hz seen in the occipital,   parietal, central and posterior temporal regions.  Low voltage irregular beta   was seen diffusely.  With drowsiness, the posterior dominant rhythm was replaced   by theta and beta frequencies, which are symmetrical distribution over both   hemispheres.  Intermittently brief appearance of a spindly 7-8 Hz activity was   seen symmetrically over both hemispheres.  The patient did not pass beyond stage   I sleep.  A 3 minutes of hyperventilation was carried out, which did not   significantly alter the recording.  An intermittent photic stimulation was then   conducted, which did not affect the electrocortical activity.  At times, in the   waking state, there is some intermixed theta noted in the frontotemporal area,   particularly on the left side.  No other lateralized changes were noted and no   spike or sharp wave activity was seen.    IMPRESSION:  Mildly abnormal EEG with intermixed theta noted at times in the   waking state over the left frontotemporal area.  This is a nonspecific finding   seen with increasing frequency after the fourth decade of life and usually this   is associated with subcortical ischemic vascular changes.  There were no other   significant findings.    CLINICAL CORRELATION:  The patient is a 46-year-old male with history of   anxiety, hyperlipidemia and had cerebral aneurysm.  He presented to the   Emergency Room recently with the complaint of waxing and waning, heaviness of   the left arm and leg.  This  tracing, however, does not show any evidence for   right hemispheric dysfunction.      RR/HN  dd: 02/10/2017 12:13:43 (CST)  td: 02/10/2017 13:09:13 (CST)  Doc ID   #[id number]  Job ID #[id number]    CC:

## 2017-04-16 ENCOUNTER — HOSPITAL ENCOUNTER (EMERGENCY)
Facility: HOSPITAL | Age: 47
Discharge: HOME | End: 2017-04-17
Attending: EMERGENCY MEDICINE
Payer: COMMERCIAL

## 2017-04-16 VITALS
HEART RATE: 82 BPM | TEMPERATURE: 99 F | WEIGHT: 192 LBS | OXYGEN SATURATION: 98 % | BODY MASS INDEX: 28.35 KG/M2 | DIASTOLIC BLOOD PRESSURE: 79 MMHG | RESPIRATION RATE: 18 BRPM | SYSTOLIC BLOOD PRESSURE: 160 MMHG

## 2017-04-16 DIAGNOSIS — R42 DIZZINESS: Primary | ICD-10-CM

## 2017-04-16 DIAGNOSIS — S09.90XA HEAD INJURY: ICD-10-CM

## 2017-04-16 PROCEDURE — 25000003 PHARM REV CODE 250: Performed by: EMERGENCY MEDICINE

## 2017-04-16 PROCEDURE — 99284 EMERGENCY DEPT VISIT MOD MDM: CPT | Mod: ,,, | Performed by: EMERGENCY MEDICINE

## 2017-04-16 PROCEDURE — 99284 EMERGENCY DEPT VISIT MOD MDM: CPT

## 2017-04-16 RX ORDER — TRAMADOL HYDROCHLORIDE 50 MG/1
50 TABLET ORAL
Status: COMPLETED | OUTPATIENT
Start: 2017-04-16 | End: 2017-04-16

## 2017-04-16 RX ORDER — ONDANSETRON 4 MG/1
4 TABLET, FILM COATED ORAL ONCE
Status: COMPLETED | OUTPATIENT
Start: 2017-04-17 | End: 2017-04-17

## 2017-04-16 RX ORDER — METHOCARBAMOL 500 MG/1
1000 TABLET, FILM COATED ORAL
Status: COMPLETED | OUTPATIENT
Start: 2017-04-16 | End: 2017-04-16

## 2017-04-16 RX ADMIN — TRAMADOL HYDROCHLORIDE 50 MG: 50 TABLET, FILM COATED ORAL at 11:04

## 2017-04-16 RX ADMIN — METHOCARBAMOL 1000 MG: 500 TABLET ORAL at 11:04

## 2017-04-16 NOTE — ED AVS SNAPSHOT
[facility]  [address] [address]               Andre Padgett   2017 11:05 PM   ED    Description:  Male : 1970   Department:  [facility]           Your Care was Coordinated By:     Provider Role From To    Melquiades Paez MD Attending Provider 17 [phone] --    Tu Michael MD Resident 17 [phone] --      Reason for Visit     Emesis           Diagnoses this Visit        Comments    Dizziness    -  Primary     Head injury           ED Disposition     None           To Do List           Follow-up Information     Follow up with [facility].    Specialty:  Emergency Medicine    Why:  If symptoms worsen    Contact information:    [address] [facility] [id number] [phone]        Follow up with Hunter Diop MD In 1 week.    Specialty:  Internal Medicine    Contact information:    [address]  [place] [address]  [phone]        [facility] On [facility]     [facility] Nurse Care Line -  Assistance  Unless otherwise directed by your provider, please contact [facility], our nurse care line that is available for  assistance.     Registered nurses in the [facility] provide: appointment scheduling, clinical advisement, health education, and other advisory services.  Call: [phone] (toll free)               Medications           Message regarding Medications     Verify the changes and/or additions to your medication regime listed below are the same as discussed with your clinician today.  If any of these changes or additions are incorrect, please notify your healthcare provider.        These medications were administered today        Dose Freq    tramadol tablet 50 mg 50 mg ED 1 Time    Sig: Take 1 tablet (50 mg total) by mouth ED 1 Time.    Class: Normal    Route: Oral    methocarbamol tablet 1,000 mg 1,000 mg ED 1 Time    Sig: Take 2 tablets  (1,000 mg total) by mouth ED 1 Time.    Class: Normal    Route: Oral    ondansetron tablet 4 mg 4 mg Once    Starting on: 4/17/2017    Sig: Take 1 tablet (4 mg total) by mouth once.    Class: Normal    Route: Oral           Verify that the below list of medications is an accurate representation of the medications you are currently taking.  If none reported, the list may be blank. If incorrect, please contact your healthcare provider. Carry this list with you in case of emergency.           Current Medications     alprazolam (XANAX) 0.5 MG tablet take 1 tablet by mouth at bedtime if needed    ascorbic acid (VITAMIN C) 100 MG tablet Take 100 mg by mouth once daily.    atorvastatin (LIPITOR) 40 MG tablet Take 1 tablet (40 mg total) by mouth once daily.    esomeprazole (NEXIUM) 40 MG capsule Take 1 capsule (40 mg total) by mouth before breakfast.    lorazepam (ATIVAN) 1 MG tablet Take 1 tablet (1 mg total) by mouth as needed for Anxiety (MRI). 1 tab 45 minutes prior to MRI.  May repeat x1 if needed.           Clinical Reference Information           Your Vitals Were     BP Pulse Temp Resp Weight SpO2    160/79 82 98.5 °F (36.9 °C) (Oral) 18 87.1 kg (192 lb) 98%    BMI                28.35 kg/m2          Allergies as of 4/17/2017     No Known Allergies      Immunizations Administered on Date of Encounter - 4/17/2017     None      ED Micro, Lab, POCT     None      ED Imaging Orders     Start Ordered       Status Ordering Provider    04/16/17 2339 04/16/17 2339  CT Head Without Contrast  1 time imaging      Final result     04/16/17 2339 04/16/17 2339    1 time imaging,   Status:  Canceled      Canceled       Discharge References/Attachments     GASTRITIS, UNDERSTANDING (ENGLISH)    NAUSEA AND VOMITING, HOW TO CONTROL (ENGLISH)    FOOD POISONING (ADULT) (ENGLISH)      Your Scheduled Appointments     Apr 18, 2017  9:40 AM CDT   Neurology - Established Patient with Vinny Castro MD   [facility]  ([facility] )    [address]  [facility] [id number] [phone]               [facility] complies with applicable Federal civil rights laws and does not discriminate on the basis of race, color, national origin, age, disability, or sex.        Language Assistance Services     ATTENTION: Language assistance services are available, free of charge. Please call [phone].      ATENCIÓN: Si habla español, tiene a infante disposición servicios gratuitos de asistencia lingüística. Llame al [phone].     CHÚ Ý: N?u b?n nói Ti?ng Vi?t, có các d?ch v? h? tr? ngôn ng? mi?n phí dành cho b?n. G?i s? [phone].

## 2017-04-17 PROCEDURE — 25000003 PHARM REV CODE 250: Performed by: ANESTHESIOLOGY

## 2017-04-17 RX ORDER — ONDANSETRON 4 MG/1
4 TABLET, FILM COATED ORAL EVERY 6 HOURS
Qty: 12 TABLET | Refills: 0 | Status: SHIPPED | OUTPATIENT
Start: 2017-04-17 | End: 2018-04-04

## 2017-04-17 RX ADMIN — ONDANSETRON 4 MG: 4 TABLET, FILM COATED ORAL at 12:04

## 2017-04-17 NOTE — ED TRIAGE NOTES
Pt presents to ED c/o dizziness and vomiting that started today. Pt stated that he has not felt nauseated but vomited once.     LOC: Patient name and date of birth verified.  The patient is awake, alert and aware of environment with an appropriate affect, the patient is oriented x 3 and speaking appropriately.  Pt in NAD.    APPEARANCE: Patient resting comfortably and in no acute distress, patient is clean and well groomed, patient's clothing is properly fastened.  SKIN: The skin is warm and dry, color consistent with ethnicity, patient has normal skin turgor and moist mucus membranes, skin intact, no breakdown or brusing noted.  MUSCULOSKELETAL: Patient moving all extremities well, no obvious swelling or deformities noted.  RESPIRATORY: Airway is open and patent, respirations are spontaneous, patient has a normal effort and rate, no accessory muscle use noted.  CARDIAC: Patient has a normal rate and rhythm, no periphreal edema noted, capillary refill < 3 seconds.  ABDOMEN: Soft and non tender to palpation, no distention noted. Bowel sounds present in all four quadrants.  NEUROLOGIC: Eyes open spontaneously, behavior appropriate to situation, follows commands, facial expression symmetrical, bilateral hand grasp equal and even, purposeful motor response noted, normal sensation in all extremities when touched with a finger.

## 2017-04-17 NOTE — ED PROVIDER NOTES
Encounter Date: 4/16/2017    SCRIBE #1 NOTE: I, Alli Metcalf, am scribing for, and in the presence of,  Dr. Paez. I have scribed the following portions of the note - the Resident attestation.       History     Chief Complaint   Patient presents with    Emesis     Woke up felt dizzy and nauseated and threw up. Pt denies nay CP or SOB.      Review of patient's allergies indicates:  No Known Allergies  HPI Comments: 47 yo M with PMH anxiety here with chief complaint of dizziness. Dizziness began at about 4391-1802 after patient got off work this evening. He reports that he ate some pasta at work with fish and shrimp in it and he was concerned it may have been bad. He reports some associated nausea and did vomit one time at home. No abdominal pain, diarrhea, fevers, chills, chest pain, or shortness of breath. Patient does have anxiety but is also insisting this is not an anxiety attack. Dizziness gets worse with sitting and lying down. Improves with standing and walking around.     The history is provided by the patient.     Past Medical History:   Diagnosis Date    Aneurysm     Right sided filling anterior communicating aneurysm s/p coiling 2011    Anxiety     Generalized headaches     Hyperlipidemia      Past Surgical History:   Procedure Laterality Date    BRAIN SURGERY  2011    angiogram/coiling     Family History   Problem Relation Age of Onset    DAVID disease Mother     Hypertension Mother     Cancer Father      brain    Celiac disease Neg Hx     Cirrhosis Neg Hx     Colon polyps Neg Hx     Crohn's disease Neg Hx     Cystic fibrosis Neg Hx     Esophageal cancer Neg Hx     Hemochromatosis Neg Hx     Inflammatory bowel disease Neg Hx     Irritable bowel syndrome Neg Hx     Liver cancer Neg Hx     Liver disease Neg Hx     Rectal cancer Neg Hx     Stomach cancer Neg Hx     Ulcerative colitis Neg Hx     Alli's disease Neg Hx     Heart disease Neg Hx     Heart attack Neg Hx     Amblyopia  Neg Hx     Blindness Neg Hx     Cataracts Neg Hx     Glaucoma Neg Hx     Macular degeneration Neg Hx     Retinal detachment Neg Hx     Strabismus Neg Hx     Colon cancer Neg Hx      Social History   Substance Use Topics    Smoking status: Never Smoker    Smokeless tobacco: Never Used    Alcohol use Yes      Comment: ocassionally - twice a week     Review of Systems   Constitutional: Negative for fever.   HENT: Negative for sore throat.    Respiratory: Negative for shortness of breath.    Cardiovascular: Negative for chest pain.   Gastrointestinal: Positive for nausea and vomiting.   Genitourinary: Negative for dysuria.   Musculoskeletal: Negative for back pain.   Skin: Negative for rash.   Neurological: Positive for dizziness. Negative for weakness.   Hematological: Does not bruise/bleed easily.       Physical Exam   Initial Vitals   BP Pulse Resp Temp SpO2   04/16/17 2200 04/16/17 2200 04/16/17 2200 04/16/17 2200 04/16/17 2200   160/79 82 18 98.5 °F (36.9 °C) 98 %     Physical Exam    Constitutional: He appears well-developed. He is not diaphoretic. No distress.   HENT:   Head: Normocephalic and atraumatic.   Eyes: EOM are normal. Pupils are equal, round, and reactive to light.   Neck: Normal range of motion. Neck supple. No thyromegaly present. No tracheal deviation present.   Cardiovascular: Normal rate, regular rhythm, normal heart sounds and intact distal pulses. Exam reveals no gallop and no friction rub.    No murmur heard.  Pulmonary/Chest: Breath sounds normal. No stridor. No respiratory distress. He has no wheezes. He has no rales.   Abdominal: Soft. Bowel sounds are normal. He exhibits no distension. There is no tenderness. There is no rebound.   Musculoskeletal: Normal range of motion. He exhibits no edema or tenderness.   Neurological: He is alert and oriented to person, place, and time. He has normal strength and normal reflexes. No cranial nerve deficit.   Skin: Skin is warm.   Psychiatric:  He has a normal mood and affect. His behavior is normal. Judgment and thought content normal.         ED Course   Procedures  Labs Reviewed - No data to display          Medical Decision Making:   History:   Old Medical Records: I decided to obtain old medical records.  Initial Assessment:   47 yo M with PMH anxiety here with dizziness and nausea since earlier this evening  Differential Diagnosis:   Food poisoning, gastroenteritis, anxiety  Clinical Tests:   Radiological Study: Ordered and Reviewed            Scribe Attestation:   Scribe #1: I performed the above scribed service and the documentation accurately describes the services I performed. I attest to the accuracy of the note.    Attending Attestation:   Physician Attestation Statement for Resident:  As the supervising MD   Physician Attestation Statement: I have personally seen and examined this patient.   I agree with the above history. -: Evaluation of 47 yo male with dizziness. Onset earlier this afternoon associated with nausea and abdominal cramping. He feels like he is about to have diarrhea. On exam pt with no focal or neurological deficits. CT of head is w/o acute process. Sx improved after nausea medicine. Will dc with Zofran, likely early onset of viral illness.      As the supervising MD I agree with the above PE.    As the supervising MD I agree with the above treatment, course, plan, and disposition.  I have reviewed and agree with the residents interpretation of the following: CT scans.          Physician Attestation for Scribe:  Physician Attestation Statement for Scribe #1: I, Dr. Paez, reviewed documentation, as scribed by Alli Metcalf in my presence, and it is both accurate and complete.                 ED Course     Clinical Impression:   The primary encounter diagnosis was Dizziness. A diagnosis of Head injury was also pertinent to this visit.    Disposition:   Disposition: Discharged  Condition: Stable       Melquiades Paez  MD  04/19/17 1014

## 2017-04-18 ENCOUNTER — OFFICE VISIT (OUTPATIENT)
Dept: NEUROLOGY | Facility: CLINIC | Age: 47
End: 2017-04-18
Payer: COMMERCIAL

## 2017-04-18 VITALS
SYSTOLIC BLOOD PRESSURE: 113 MMHG | DIASTOLIC BLOOD PRESSURE: 72 MMHG | HEIGHT: 71 IN | BODY MASS INDEX: 26.55 KG/M2 | WEIGHT: 189.63 LBS | HEART RATE: 79 BPM

## 2017-04-18 DIAGNOSIS — R20.2 NUMBNESS AND TINGLING OF LEFT ARM AND LEG: Primary | ICD-10-CM

## 2017-04-18 DIAGNOSIS — R20.0 NUMBNESS AND TINGLING OF LEFT ARM AND LEG: Primary | ICD-10-CM

## 2017-04-18 DIAGNOSIS — I67.1 CEREBRAL ANEURYSM: ICD-10-CM

## 2017-04-18 PROCEDURE — 99214 OFFICE O/P EST MOD 30 MIN: CPT | Mod: S$GLB,,, | Performed by: PSYCHIATRY & NEUROLOGY

## 2017-04-18 PROCEDURE — 99999 PR PBB SHADOW E&M-EST. PATIENT-LVL III: CPT | Mod: PBBFAC,,, | Performed by: PSYCHIATRY & NEUROLOGY

## 2017-04-18 PROCEDURE — 1160F RVW MEDS BY RX/DR IN RCRD: CPT | Mod: S$GLB,,, | Performed by: PSYCHIATRY & NEUROLOGY

## 2017-04-18 NOTE — PROGRESS NOTES
"Neurology Clinic Follow-Up Note    Impression:  1. Episodic L-sided heaviness and numbness: etiology remains uncertain.  Anxiety high on differential which also includes migrainous although the long duration (up to hours) without associated typical symptoms make migraine and SPS unlikely. Not likely TIAs.   2. Anxiety  3. ACOM aneurysm, s/p coiling 2011  4. Remote episodic migraine without aura    Plan:  1. He will F/U with Dr. Diop to discuss alternative to Xanax for chronic anxiety (e.g., SSRI)  2. Reviewed stroke signs/symptoms  3. Call me prn  4. RTC prn    45 y/o M seen in f/u for above.  EEG: no epileptiform abnormalities  MRI c-spine: no significant pathology  L sided symptoms are less frequent and severe but not resolved.  He is still taking Xanax.  No symptoms suggestive of SAH.   No recent migraine    He was seen in the ED on 4/16/17 for nausea, "dizziness" and felt to have a viral illness. Has felt fine since given Zofran.  CT brain 4/16/17 shows no acute changes    Outpatient Prescriptions Marked as Taking for the 4/18/17 encounter (Office Visit) with Vinny Castro MD   Medication Sig Dispense Refill    alprazolam (XANAX) 0.5 MG tablet take 1 tablet by mouth at bedtime if needed 30 tablet 1    ascorbic acid (VITAMIN C) 100 MG tablet Take 100 mg by mouth once daily.      atorvastatin (LIPITOR) 40 MG tablet Take 1 tablet (40 mg total) by mouth once daily. 90 tablet 3     Current Facility-Administered Medications for the 4/18/17 encounter (Office Visit) with Vinny Castro MD   Medication Dose Route Frequency Provider Last Rate Last Dose    albuterol nebulizer solution 1.25 mg  1.25 mg Nebulization 1 time in Clinic/HOD Anjelica Loyola MD           /72 (BP Location: Left arm, Patient Position: Sitting, BP Method: Automatic)  Pulse 79  Ht 5' 11" (1.803 m)  Wt 86 kg (189 lb 9.5 oz)  BMI 26.44 kg/m2  Well-developed, well nourished.  Awake, alert and oriented.  Language is normal.  "  EOMF without nystagmus, VFF, facial movements intact. Tongue ML.  No UE drift.  No extinction to double simultaneous tactile stimulation.  Muscle power is normal.  DTRs symmetric. Gait is steady.    Vinny Castro MD

## 2017-04-18 NOTE — LETTER
April 18, 2017      Hunter Diop MD  [address]  [place] [address]           [facility]  [address]  [place] [address]  Phone: [phone]          Patient: Andre Padgett   MR Number: [id number]   YOB: 1970   Date of Visit: 4/18/2017       Dear Dr. Hunter Diop:    Thank you for referring Andre Padgett to me for evaluation. Attached you will find relevant portions of my assessment and plan of care.    If you have questions, please do not hesitate to call me. I look forward to following Andre Padgett along with you.    Sincerely,    Vinny Castro MD    Enclosure  CC:  No Recipients    If you would like to receive this communication electronically, please contact [email or web address] or [phone] to request more information on [facility] access.    For providers and/or their staff who would like to refer a patient to [facility], please contact us through our one-stop-shop provider referral line, [facility], at [phone].    If you feel you have received this communication in error or would no longer like to receive these types of communications, please e-mail [email or web address]

## 2017-07-14 ENCOUNTER — PATIENT MESSAGE (OUTPATIENT)
Dept: INTERNAL MEDICINE | Facility: CLINIC | Age: 47
End: 2017-07-14

## 2017-07-14 ENCOUNTER — OFFICE VISIT (OUTPATIENT)
Dept: INTERNAL MEDICINE | Facility: CLINIC | Age: 47
End: 2017-07-14
Payer: COMMERCIAL

## 2017-07-14 VITALS
HEIGHT: 71 IN | HEART RATE: 87 BPM | BODY MASS INDEX: 27.77 KG/M2 | DIASTOLIC BLOOD PRESSURE: 80 MMHG | TEMPERATURE: 98 F | WEIGHT: 198.38 LBS | SYSTOLIC BLOOD PRESSURE: 130 MMHG

## 2017-07-14 DIAGNOSIS — M54.50 ACUTE RIGHT-SIDED LOW BACK PAIN WITHOUT SCIATICA: Primary | ICD-10-CM

## 2017-07-14 DIAGNOSIS — J35.8 SYMPTOMATIC TONSILLAR CRYPT: ICD-10-CM

## 2017-07-14 LAB
BILIRUB SERPL-MCNC: NORMAL MG/DL
BILIRUB UR QL STRIP: NEGATIVE
BLOOD URINE, POC: NORMAL
CLARITY UR REFRACT.AUTO: CLEAR
COLOR UR AUTO: YELLOW
COLOR, POC UA: YELLOW
GLUCOSE UR QL STRIP: NEGATIVE
GLUCOSE UR QL STRIP: NORMAL
HGB UR QL STRIP: NEGATIVE
KETONES UR QL STRIP: NEGATIVE
KETONES UR QL STRIP: NORMAL
LEUKOCYTE ESTERASE UR QL STRIP: NEGATIVE
LEUKOCYTE ESTERASE URINE, POC: NORMAL
MICROSCOPIC COMMENT: NORMAL
NITRITE UR QL STRIP: NEGATIVE
NITRITE, POC UA: NORMAL
PH UR STRIP: 6 [PH] (ref 5–8)
PH, POC UA: 6
PROT UR QL STRIP: NEGATIVE
PROTEIN, POC: NORMAL
RBC #/AREA URNS AUTO: 2 /HPF (ref 0–4)
SP GR UR STRIP: 1.02 (ref 1–1.03)
SPECIFIC GRAVITY, POC UA: 1.01
SQUAMOUS #/AREA URNS AUTO: 0 /HPF
URN SPEC COLLECT METH UR: NORMAL
UROBILINOGEN UR STRIP-ACNC: NEGATIVE EU/DL
UROBILINOGEN, POC UA: NORMAL
WBC #/AREA URNS AUTO: 1 /HPF (ref 0–5)

## 2017-07-14 PROCEDURE — 81002 URINALYSIS NONAUTO W/O SCOPE: CPT | Mod: S$GLB,,, | Performed by: INTERNAL MEDICINE

## 2017-07-14 PROCEDURE — 99213 OFFICE O/P EST LOW 20 MIN: CPT | Mod: S$GLB,,, | Performed by: INTERNAL MEDICINE

## 2017-07-14 PROCEDURE — 81001 URINALYSIS AUTO W/SCOPE: CPT

## 2017-07-14 PROCEDURE — 87086 URINE CULTURE/COLONY COUNT: CPT

## 2017-07-14 PROCEDURE — 99999 PR PBB SHADOW E&M-EST. PATIENT-LVL III: CPT | Mod: PBBFAC,,, | Performed by: INTERNAL MEDICINE

## 2017-07-14 RX ORDER — METHOCARBAMOL 750 MG/1
750-1500 TABLET, FILM COATED ORAL 2 TIMES DAILY PRN
Qty: 40 TABLET | Refills: 0 | Status: SHIPPED | OUTPATIENT
Start: 2017-07-14 | End: 2017-07-25

## 2017-07-14 NOTE — PROGRESS NOTES
Subjective:       Patient ID: Andre Padgett is a 46 y.o. male.    Chief Complaint: Back Pain    Patient of Dr. Diop presents for an urgent visit c/o low back pain. Reports onset non-traumatic right sided low back pain 10-14 days ago. Denies any strenuous physical activity out of the ordinary. Pain is worse with movement, and keeps him up at night with pain and stiffness. 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 No radiation of pain, but he has a mild tingling sensation in legs (history of paresthesias evaluated by Neurology three months ago). Also has a history of mild DJD of the spine seen on Cervical MRI 2/17 and Thoracolumbar x-rays 6/13, nothing severe. Denies leg pain, weakness, bowel or bladder incontinence. No dysuria, frequency, urgency or hematuria but urine is a bit deeper in color. No fever, chills, sweats, N/V or change in bowel habits. Using Advil 2 tabs in the mornings, none in the evening. Feels better with massage. Pain severity has been up to 8/10, currently 5/10. He aggravated it two days ago when he slipped and twisted back, did not fall to the floor.     Past history reviewed. Hyperlipidemia. No hypertension, normal CMP five months ago.   Social: Non-smoker.   NKDA.        Review of Systems   HENT: Negative for congestion, sinus pressure, sore throat, trouble swallowing and voice change.         Also c/o sensation like there is something in his throat on the left side x few days. No difficulty swallowing, able to eat and drink normally. Not painful. Feels like he may be coming down with a cold.        Objective:    /80, Pulse 87, Temp 98.3, Wt 198.4 lbs (from 187)  Physical Exam   Constitutional: He appears well-developed and well-nourished. No distress.   Ambulatory with a normal gait, no apparent discomfort with positional changes.    HENT:   Nose: Nose normal.   Mild tonsillar edema  bilaterally with mild erythema and tonsillar crypt high on left side, no postnasal drip at this time, no exudate.   Eyes: Conjunctivae are normal. Right eye exhibits no discharge. Left eye exhibits no discharge.   Neck: Normal range of motion. Neck supple.   Musculoskeletal: Normal range of motion.   No point tenderness to palpation along the spine, no spinal deformity. Mild muscle spasm and tenderness right lumbar area with mild discomfort upon full range of motion.    Lymphadenopathy:     He has no cervical adenopathy.   Neurological:   Motor strength equal both legs, sensation intact.    Skin: Skin is warm and dry. He is not diaphoretic.   No rash in the area of his pain.       Urine: clear yellow, 1.015, tr leuk, neg nit, tr prot, neg gluc, tr blood.     Assessment:       1. Acute right-sided low back pain without sciatica    2. Symptomatic tonsillar crypt        Plan:       Acute right-sided low back pain without sciatica - muscular in origin, no indication for imaging at this time.  -     methocarbamol (ROBAXIN) 750 MG Tab; Take 1-2 tablets (750-1,500 mg total) by mouth 2 (two) times daily as needed (Muscle spasm.).  Dispense: 40 tablet; Refill: 0  -     May also continue Ibuprofen every 8 hours as needed, heating pad, massage, topical analgesics.    -     POCT URINE DIPSTICK WITHOUT MICROSCOPE - slightly abnormal, further testing as below.   -     Urinalysis  -     Urine culture    Symptomatic tonsillar crypt vs mild pharyngitis associated with viral infection        -     Warm salt water gargling, antihistamines prn.    He does not need a work excuse.

## 2017-07-15 LAB — BACTERIA UR CULT: NO GROWTH

## 2017-07-25 ENCOUNTER — OFFICE VISIT (OUTPATIENT)
Dept: INTERNAL MEDICINE | Facility: CLINIC | Age: 47
End: 2017-07-25
Payer: COMMERCIAL

## 2017-07-25 VITALS
DIASTOLIC BLOOD PRESSURE: 76 MMHG | HEIGHT: 71 IN | SYSTOLIC BLOOD PRESSURE: 132 MMHG | OXYGEN SATURATION: 98 % | BODY MASS INDEX: 27.53 KG/M2 | TEMPERATURE: 98 F | HEART RATE: 97 BPM | WEIGHT: 196.63 LBS

## 2017-07-25 DIAGNOSIS — M54.6 ACUTE MIDLINE THORACIC BACK PAIN: Primary | ICD-10-CM

## 2017-07-25 PROCEDURE — 99213 OFFICE O/P EST LOW 20 MIN: CPT | Mod: S$GLB,,, | Performed by: NURSE PRACTITIONER

## 2017-07-25 PROCEDURE — 99999 PR PBB SHADOW E&M-EST. PATIENT-LVL IV: CPT | Mod: PBBFAC,,, | Performed by: NURSE PRACTITIONER

## 2017-07-25 RX ORDER — CYCLOBENZAPRINE HCL 10 MG
10 TABLET ORAL NIGHTLY
Qty: 10 TABLET | Refills: 0 | Status: SHIPPED | OUTPATIENT
Start: 2017-07-25 | End: 2017-08-04

## 2017-07-25 RX ORDER — METHYLPREDNISOLONE 4 MG/1
TABLET ORAL
Qty: 1 PACKAGE | Refills: 0 | Status: SHIPPED | OUTPATIENT
Start: 2017-07-25 | End: 2018-02-21

## 2017-07-26 NOTE — PROGRESS NOTES
Subjective:       Patient ID: Andre Padgett is a 46 y.o. male.    Chief Complaint: Back Pain    Mr Padgett was seen in the clinic on 7/14/17 for back pain.  He was prescribed Robaxin.  He says it did not help/  The back pain persists and told he also experienced sharp abdominal pains after taking Aleve on an empty stomach.       Back Pain   This is a new problem. The current episode started 1 to 4 weeks ago. The problem occurs constantly. The problem is unchanged. The pain is present in the thoracic spine, lumbar spine and sacro-iliac. The quality of the pain is described as aching. The pain radiates to the left thigh. The pain is severe. The symptoms are aggravated by twisting and position. Stiffness is present all day. Associated symptoms include abdominal pain. Pertinent negatives include no bladder incontinence, bowel incontinence, chest pain, dysuria, fever, headaches, leg pain, numbness, paresis, paresthesias, pelvic pain, perianal numbness, tingling, weakness or weight loss.     Review of Systems   Constitutional: Negative for fever and weight loss.   HENT: Negative for facial swelling and trouble swallowing.    Eyes: Negative for visual disturbance.   Respiratory: Negative for shortness of breath.    Cardiovascular: Negative for chest pain.   Gastrointestinal: Positive for abdominal pain. Negative for bowel incontinence, constipation, diarrhea, nausea and vomiting.   Genitourinary: Negative for bladder incontinence, dysuria and pelvic pain.   Musculoskeletal: Positive for back pain.   Skin: Negative for rash.   Neurological: Negative for tingling, weakness, numbness, headaches and paresthesias.   Psychiatric/Behavioral: Negative for confusion.       Objective:      Physical Exam   Constitutional: He is oriented to person, place, and time. He appears well-developed and well-nourished. No distress.   HENT:   Head: Atraumatic.   Eyes: No scleral icterus.   Neck: Normal range of motion. Neck supple.    Cardiovascular: Normal rate, regular rhythm and normal heart sounds.    Pulmonary/Chest: Effort normal and breath sounds normal. No respiratory distress. He has no wheezes. He has no rales.   Abdominal: Soft. Bowel sounds are normal. He exhibits no distension and no mass. There is no tenderness. There is no rebound and no guarding.   Musculoskeletal: He exhibits no edema.        Thoracic back: He exhibits tenderness, pain and spasm. He exhibits normal range of motion, no swelling, no edema, no deformity and no laceration.        Lumbar back: He exhibits decreased range of motion, tenderness and pain. He exhibits no bony tenderness, no swelling, no edema, no deformity, no laceration and no spasm.   Neurological: He is alert and oriented to person, place, and time.   Skin: He is not diaphoretic.   Psychiatric: He has a normal mood and affect. His behavior is normal.   Nursing note and vitals reviewed.      Assessment:       1. Acute midline thoracic back pain        Plan:   1. Acute midline thoracic back pain  - methylPREDNISolone (MEDROL DOSEPACK) 4 mg tablet; use as directed  Dispense: 1 Package; Refill: 0  - cyclobenzaprine (FLEXERIL) 10 MG tablet; Take 1 tablet (10 mg total) by mouth every evening.  Dispense: 10 tablet; Refill: 0  - If no resolution, will refer to back and spine clinic    Pt has been given instructions populated from [employer] database and has verbalized understanding of the after visit summary and information contained wherein.    Follow up with a primary care provider. May go to ER for acute shortness of breath, lightheadedness, fever, or any other emergent complaints or changes in condition.

## 2017-08-18 ENCOUNTER — HOSPITAL ENCOUNTER (OUTPATIENT)
Dept: RADIOLOGY | Facility: HOSPITAL | Age: 47
Discharge: HOME | End: 2017-08-18
Attending: INTERNAL MEDICINE
Payer: COMMERCIAL

## 2017-08-18 ENCOUNTER — OFFICE VISIT (OUTPATIENT)
Dept: INTERNAL MEDICINE | Facility: CLINIC | Age: 47
End: 2017-08-18
Payer: COMMERCIAL

## 2017-08-18 ENCOUNTER — PATIENT MESSAGE (OUTPATIENT)
Dept: INTERNAL MEDICINE | Facility: CLINIC | Age: 47
End: 2017-08-18

## 2017-08-18 VITALS
BODY MASS INDEX: 28.06 KG/M2 | HEART RATE: 91 BPM | WEIGHT: 196 LBS | DIASTOLIC BLOOD PRESSURE: 80 MMHG | SYSTOLIC BLOOD PRESSURE: 110 MMHG | HEIGHT: 70 IN

## 2017-08-18 DIAGNOSIS — M79.675 TOE PAIN, LEFT: ICD-10-CM

## 2017-08-18 DIAGNOSIS — M54.41 ACUTE BILATERAL LOW BACK PAIN WITH BILATERAL SCIATICA: Primary | ICD-10-CM

## 2017-08-18 DIAGNOSIS — M79.672 LEFT FOOT PAIN: ICD-10-CM

## 2017-08-18 DIAGNOSIS — M54.42 ACUTE BILATERAL LOW BACK PAIN WITH BILATERAL SCIATICA: Primary | ICD-10-CM

## 2017-08-18 PROCEDURE — 73630 X-RAY EXAM OF FOOT: CPT | Mod: TC,LT

## 2017-08-18 PROCEDURE — 99214 OFFICE O/P EST MOD 30 MIN: CPT | Mod: S$GLB,,, | Performed by: INTERNAL MEDICINE

## 2017-08-18 PROCEDURE — 3008F BODY MASS INDEX DOCD: CPT | Mod: S$GLB,,, | Performed by: INTERNAL MEDICINE

## 2017-08-18 PROCEDURE — 99999 PR PBB SHADOW E&M-EST. PATIENT-LVL III: CPT | Mod: PBBFAC,,, | Performed by: INTERNAL MEDICINE

## 2017-08-18 PROCEDURE — 73630 X-RAY EXAM OF FOOT: CPT | Mod: 26,LT,, | Performed by: RADIOLOGY

## 2017-08-18 NOTE — PROGRESS NOTES
Subjective:       Patient ID: Andre Padgett is a 46 y.o. male.    Chief Complaint: Follow-up    Here to follow-up back pain after musculoskeletal injury and new problem of left fifth toe pain.  Patient was seen twice for urgent care visits for low back pain radiating down both legs.  He had a fall and on grabbing himself to keep from falling he strained his low back.  For a week or so he felt pain radiating down both legs toward the knees but this has definitely improved over the last week or so.  It is not quite normal but is getting better.  Most recently however he struck his left fifth toe on the piece of furniture and it has been tender to touch and bear weight.  He treated it symptomatically but wanted to make sure he did not have a fracture.      Review of Systems   Constitutional: Negative for chills, fatigue, fever and unexpected weight change.   HENT: Negative for trouble swallowing.    Eyes: Negative for visual disturbance.   Respiratory: Negative for cough, shortness of breath and wheezing.    Cardiovascular: Negative for chest pain and palpitations.   Gastrointestinal: Negative for abdominal pain, constipation, diarrhea, nausea and vomiting.   Genitourinary: Negative for difficulty urinating.   Musculoskeletal: Positive for arthralgias, back pain and gait problem (from foot pain). Negative for neck pain.   Skin: Negative for rash.   Neurological: Negative for dizziness and headaches.       Objective:      Physical Exam   Constitutional: He is oriented to person, place, and time. He appears well-developed and well-nourished. No distress.   HENT:   Head: Normocephalic and atraumatic.   Mouth/Throat: No oropharyngeal exudate.   TM's clear, pharynx clear   Eyes: Conjunctivae and EOM are normal. Pupils are equal, round, and reactive to light. No scleral icterus.   Neck: Normal range of motion. Neck supple. No thyromegaly present.   No supraclavicular nodes palpated   Cardiovascular: Normal rate, regular  rhythm and normal heart sounds.    No murmur heard.  Pulmonary/Chest: Effort normal and breath sounds normal. He has no wheezes.   Abdominal: Soft. Bowel sounds are normal. He exhibits no mass. There is no tenderness.   Musculoskeletal: He exhibits tenderness (Fifth toe). He exhibits no edema.   No lower back tenderness and negative straight leg raise   Lymphadenopathy:     He has no cervical adenopathy.   Neurological: He is alert and oriented to person, place, and time.   Skin: No pallor.   Psychiatric: He has a normal mood and affect.       Assessment:       1. Acute bilateral low back pain with bilateral sciatica    2. Toe pain, left    3. Left foot pain        Plan:       Andre was seen today for follow-up.    Diagnoses and all orders for this visit:    Acute bilateral low back pain with bilateral sciatica    Toe pain, left  -     X-Ray Foot Complete Left; Future    Left foot pain  -     X-Ray Foot Complete Left; Future

## 2017-09-25 ENCOUNTER — LAB VISIT (OUTPATIENT)
Dept: LAB | Facility: HOSPITAL | Age: 47
End: 2017-09-25
Attending: INTERNAL MEDICINE
Payer: COMMERCIAL

## 2017-09-25 ENCOUNTER — OFFICE VISIT (OUTPATIENT)
Dept: INTERNAL MEDICINE | Facility: CLINIC | Age: 47
End: 2017-09-25
Payer: COMMERCIAL

## 2017-09-25 VITALS
DIASTOLIC BLOOD PRESSURE: 70 MMHG | SYSTOLIC BLOOD PRESSURE: 116 MMHG | WEIGHT: 198.19 LBS | HEART RATE: 98 BPM | OXYGEN SATURATION: 98 % | TEMPERATURE: 98 F | BODY MASS INDEX: 28.44 KG/M2

## 2017-09-25 DIAGNOSIS — R30.0 DYSURIA: ICD-10-CM

## 2017-09-25 DIAGNOSIS — R10.31 GROIN PAIN, RIGHT: ICD-10-CM

## 2017-09-25 DIAGNOSIS — J02.9 SORE THROAT: Primary | ICD-10-CM

## 2017-09-25 LAB
BACTERIA #/AREA URNS AUTO: NORMAL /HPF
BILIRUB UR QL STRIP: NEGATIVE
CLARITY UR REFRACT.AUTO: CLEAR
COLOR UR AUTO: YELLOW
DEPRECATED S PYO AG THROAT QL EIA: NEGATIVE
GLUCOSE UR QL STRIP: NEGATIVE
HGB UR QL STRIP: NEGATIVE
KETONES UR QL STRIP: NEGATIVE
LEUKOCYTE ESTERASE UR QL STRIP: NEGATIVE
MICROSCOPIC COMMENT: NORMAL
NITRITE UR QL STRIP: NEGATIVE
PH UR STRIP: 5 [PH] (ref 5–8)
PROT UR QL STRIP: NEGATIVE
RBC #/AREA URNS AUTO: 1 /HPF (ref 0–4)
SP GR UR STRIP: 1.03 (ref 1–1.03)
SQUAMOUS #/AREA URNS AUTO: 1 /HPF
URN SPEC COLLECT METH UR: NORMAL
UROBILINOGEN UR STRIP-ACNC: NEGATIVE EU/DL
WBC #/AREA URNS AUTO: 0 /HPF (ref 0–5)

## 2017-09-25 PROCEDURE — 81001 URINALYSIS AUTO W/SCOPE: CPT

## 2017-09-25 PROCEDURE — 99999 PR PBB SHADOW E&M-EST. PATIENT-LVL III: CPT | Mod: PBBFAC,,, | Performed by: INTERNAL MEDICINE

## 2017-09-25 PROCEDURE — 87081 CULTURE SCREEN ONLY: CPT

## 2017-09-25 PROCEDURE — 87880 STREP A ASSAY W/OPTIC: CPT

## 2017-09-25 PROCEDURE — 99213 OFFICE O/P EST LOW 20 MIN: CPT | Mod: S$GLB,,, | Performed by: INTERNAL MEDICINE

## 2017-09-25 PROCEDURE — 3008F BODY MASS INDEX DOCD: CPT | Mod: S$GLB,,, | Performed by: INTERNAL MEDICINE

## 2017-09-25 PROCEDURE — 87086 URINE CULTURE/COLONY COUNT: CPT

## 2017-09-25 NOTE — PROGRESS NOTES
Subjective:       Patient ID: Andre Padgett is a 47 y.o. male.    Chief Complaint: Sore Throat (2 days); Dysphagia; and Fatigue    Patient comes in for urgent care for 2 problems today.  The first is sore throat for about 2-3 days.  Mildly painful to swallow.  No vomiting.  No cough.  Possible low-grade fever.  He also has had some mild right lower groin discomfort with some testicular ache.  He said he thought this related to the site where his angiogram was done in December but there is no redness or bruising.  He does have some low back with some mild sciatica symptoms and is seeing a back specialist in the coming weeks.  He has not seen a rash at either area.  No clear exposure to anyone ill although deals with the public periodically.  Job in the hotel industry.  No exudates or purulent sputum.      Sore Throat    Pertinent negatives include no abdominal pain, coughing, diarrhea, headaches, neck pain, shortness of breath, trouble swallowing or vomiting.   Fatigue   Associated symptoms include fatigue and a sore throat. Pertinent negatives include no abdominal pain, chest pain, chills, coughing, fever, headaches, nausea, neck pain, rash or vomiting.     Review of Systems   Constitutional: Positive for fatigue. Negative for chills, fever and unexpected weight change.   HENT: Positive for sore throat. Negative for trouble swallowing.    Eyes: Negative for visual disturbance.   Respiratory: Negative for cough, shortness of breath and wheezing.    Cardiovascular: Negative for chest pain and palpitations.   Gastrointestinal: Negative for abdominal pain, constipation, diarrhea, nausea and vomiting.   Genitourinary: Negative for difficulty urinating.        Right groin pain, no bulge.    Musculoskeletal: Negative for neck pain.   Skin: Negative for rash.   Neurological: Negative for dizziness and headaches.       Objective:      Physical Exam   Constitutional: He is oriented to person, place, and time. He appears  well-developed and well-nourished. No distress.   HENT:   Head: Normocephalic and atraumatic.   Right Ear: External ear normal.   Left Ear: External ear normal.   Mouth/Throat: No oropharyngeal exudate.   THroat is red, but no exudate.    Eyes: Conjunctivae and EOM are normal. Pupils are equal, round, and reactive to light. No scleral icterus.   Neck: Normal range of motion. Neck supple. No thyromegaly present.   No supraclavicular nodes palpated   Cardiovascular: Normal rate, regular rhythm and normal heart sounds.    No murmur heard.  Pulmonary/Chest: Effort normal and breath sounds normal. He has no wheezes.   Abdominal: Soft. Bowel sounds are normal. He exhibits no mass. There is no tenderness. No hernia.   No tenderness to palpation in the groin   Genitourinary: No penile tenderness.   Genitourinary Comments: No scrotal or testicular tenderness.  No masses.   Musculoskeletal: He exhibits no edema.   Lymphadenopathy:     He has no cervical adenopathy.   Neurological: He is alert and oriented to person, place, and time.   Skin: No pallor.   Psychiatric: He has a normal mood and affect.       Assessment:       1. Sore throat    2. Dysuria    3. Groin pain, right        Plan:       Andre was seen today for sore throat, dysphagia and fatigue.    Diagnoses and all orders for this visit:    Sore throat  -     Throat Screen, Rapid    Dysuria  -     Urine culture; Future  -     Urinalysis; Future    Groin pain, right  -     Urine culture; Future  -     Urinalysis; Future        Symptomatic treatment.  Review results

## 2017-09-26 ENCOUNTER — PATIENT MESSAGE (OUTPATIENT)
Dept: INTERNAL MEDICINE | Facility: CLINIC | Age: 47
End: 2017-09-26

## 2017-09-26 LAB — BACTERIA UR CULT: NORMAL

## 2017-09-28 LAB — BACTERIA THROAT CULT: NORMAL

## 2017-09-29 DIAGNOSIS — M54.50 LUMBAR SPINE PAIN: Primary | ICD-10-CM

## 2017-10-04 ENCOUNTER — HOSPITAL ENCOUNTER (OUTPATIENT)
Dept: RADIOLOGY | Facility: HOSPITAL | Age: 47
Discharge: HOME | End: 2017-10-04
Attending: ORTHOPAEDIC SURGERY
Payer: COMMERCIAL

## 2017-10-04 ENCOUNTER — OFFICE VISIT (OUTPATIENT)
Dept: ORTHOPEDICS | Facility: CLINIC | Age: 47
End: 2017-10-04
Payer: COMMERCIAL

## 2017-10-04 VITALS — BODY MASS INDEX: 28.27 KG/M2 | HEIGHT: 70 IN | WEIGHT: 197.44 LBS

## 2017-10-04 DIAGNOSIS — M54.50 RIGHT-SIDED LOW BACK PAIN WITHOUT SCIATICA, UNSPECIFIED CHRONICITY: Primary | ICD-10-CM

## 2017-10-04 DIAGNOSIS — M54.50 LUMBAR SPINE PAIN: ICD-10-CM

## 2017-10-04 PROCEDURE — 72100 X-RAY EXAM L-S SPINE 2/3 VWS: CPT | Mod: TC

## 2017-10-04 PROCEDURE — 72120 X-RAY BEND ONLY L-S SPINE: CPT | Mod: 26,,, | Performed by: RADIOLOGY

## 2017-10-04 PROCEDURE — 72100 X-RAY EXAM L-S SPINE 2/3 VWS: CPT | Mod: 26,,, | Performed by: RADIOLOGY

## 2017-10-04 PROCEDURE — 99999 PR PBB SHADOW E&M-EST. PATIENT-LVL III: CPT | Mod: PBBFAC,,, | Performed by: PHYSICIAN ASSISTANT

## 2017-10-04 PROCEDURE — 99204 OFFICE O/P NEW MOD 45 MIN: CPT | Mod: S$GLB,,, | Performed by: PHYSICIAN ASSISTANT

## 2017-10-04 RX ORDER — MELOXICAM 15 MG/1
15 TABLET ORAL DAILY
Qty: 30 TABLET | Refills: 0 | Status: SHIPPED | OUTPATIENT
Start: 2017-10-04 | End: 2017-11-03

## 2017-10-12 ENCOUNTER — CLINICAL SUPPORT (OUTPATIENT)
Dept: REHABILITATION | Facility: HOSPITAL | Age: 47
End: 2017-10-12
Attending: PHYSICIAN ASSISTANT
Payer: COMMERCIAL

## 2017-10-12 DIAGNOSIS — M53.86 DECREASED RANGE OF MOTION OF LUMBAR SPINE: ICD-10-CM

## 2017-10-12 DIAGNOSIS — M62.81 MUSCLE WEAKNESS OF LOWER EXTREMITY: ICD-10-CM

## 2017-10-12 DIAGNOSIS — M62.89 MUSCLE TIGHTNESS: ICD-10-CM

## 2017-10-12 DIAGNOSIS — R07.81 RIB PAIN ON RIGHT SIDE: ICD-10-CM

## 2017-10-12 PROCEDURE — 97161 PT EVAL LOW COMPLEX 20 MIN: CPT | Mod: PO

## 2017-10-12 NOTE — PROGRESS NOTES
Physical Therapy Evaluation    Name: Andre Padgett  Clinic Number: [id number]      Diagnosis:   Encounter Diagnoses   Name Primary?    Rib pain on right side     Muscle tightness     Muscle weakness of lower extremity     Decreased range of motion of lumbar spine      Physician: Madiha Christian PA-C  Treatment Orders: PT Eval and Treat    Past Medical History:   Diagnosis Date    Aneurysm     Right sided filling anterior communicating aneurysm s/p coiling 2011    Anxiety     Generalized headaches     Hyperlipidemia      Current Outpatient Prescriptions   Medication Sig    alprazolam (XANAX) 0.5 MG tablet take 1 tablet by mouth at bedtime if needed    ascorbic acid (VITAMIN C) 100 MG tablet Take 100 mg by mouth once daily.    atorvastatin (LIPITOR) 40 MG tablet Take 1 tablet (40 mg total) by mouth once daily.    esomeprazole (NEXIUM) 40 MG capsule Take 1 capsule (40 mg total) by mouth before breakfast.    lorazepam (ATIVAN) 1 MG tablet Take 1 tablet (1 mg total) by mouth as needed for Anxiety (MRI). 1 tab 45 minutes prior to MRI.  May repeat x1 if needed.    meloxicam (MOBIC) 15 MG tablet Take 1 tablet (15 mg total) by mouth once daily.    methylPREDNISolone (MEDROL DOSEPACK) 4 mg tablet use as directed    ondansetron (ZOFRAN) 4 MG tablet Take 1 tablet (4 mg total) by mouth every 6 (six) hours.     Current Facility-Administered Medications   Medication    albuterol nebulizer solution 1.25 mg     Review of patient's allergies indicates:  No Known Allergies  Precautions: standard    Evaluation Date: 10/12/17  Visit # authorized: 20  Authorization period: 12/31/17    Bryan Powell is a 47 y.o. male that presents to [facility] outpatient clinic secondary to R LBP without sciatica.     Patient c/o: intermittent symptoms in mid back  Radicular symptoms: numbness and tingling in anterior B thighs that stop at knees, pain radiates to R side  Onset: insidious about the last 4-5 months ago when he  "slipped outside of his house  Pain Scale: Andre rates pain on a scale of 0-10 to be 5 at worst; 2 currently; 0 at best .  Aggravating factors: lying supine  Pain with coughing/sneezing, B&B, sleep disturbance: denies all except increased pain when sneezing  Relieving factors: sitting in recliner, Aleve, hot shower  Previous treatment: PT in past for back pain with little relief  Imaging: 10/4/17 lumbar x-ray revealed: "There is mild DJD.  Alignment is normal.  No fracture dislocation bone destruction seen."  Past surgical history: aneurysm coiling in 2011  Functional deficits: tolerance for work duties, fatherly duties, sleeping tolerance  Prior level of function: independent with all ADLs  Occupation:  for [employer], work duties include: walking and active  Environment: 2 story home with 5 steps to enter, lives with wife and daughter  No cultural or spiritual barriers identified to treatment or learning.  Patient's goals: "to be able to lie on floor without pain/discomfort"    Objective     Observation: pleasant and cooperative    Posture: forward head, rounded shoulders    Lumbar Range of Motion:    %   Flexion 70 mid back pain with return     Extension 80 mid and low back pain     Left Side Bending 80 right mid back discomfort   Right Side Bending 90   Left rotation   90   Right Rotation   80    *= pain    Hip IR/ER PROM: R mild limitation ER, IR WFL     L ER and IR mild limitation    Lower Extremity Strength    Right LE  Left LE    Hip flexion: 4+/5 Hip flexion: 4+/5   Knee extension: 5/5 Knee extension: 5/5   Knee flexion: 5/5 Knee flexion: 5/5   Hip extension:  4/5 Hip extension: 4+/5   Hip abduction: 4/5 Hip abduction: 5/5   Hip adduction: 4+/5 Hip adduction 4/5   Ankle dorsiflexion: 5/5 Ankle dorsiflexion: 5/5   Ankle plantarflexion: 5/5 Ankle plantarflexion: 5/5     Special Tests:  -Quadrant testing: B negative with extension  -CARLO: B negative  -Scour: B negative  -Repeated Ext: " negative for improvement in symptoms    Neuro Dynamic Testing:    Sciatic nerve:      SLR: B negative    Joint Mobility: hypomobility in lumbar and thoracic spinal segments    Palpation: posterior translation of R ribs 4-5    Sensation: B LEs grossly intact to light touch    Flexibility:    Popliteal Angle: R = -25 degrees ; L = -25 degrees   Suraj test: R = positive; L = positive    Functional Limitations Reports:  CMS Impairment/Limitation/Restriction for FOTO Lumbar Spine Survey  Status Limitation G-Code CMS Severity Modifier  Intake 83% 17% Current Status CI - At least 1 percent but less than 20 percent  Predicted 85% 15% Goal Status+ CI - At least 1 percent but less than 20 percent  +Based on FOTO predicted change score    PT Evaluation Completed? Yes  Discussed Plan of Care with patient: Yes    Assessment     This is a 47 y.o. male referred to outpatient physical therapy and presents with a medical diagnosis of R LBP without sciatica and demonstrates limitations as described in the problem list. Pt presents to clinic with complaints of mid back pain with occasional radicular symptoms to R and demonstrates decreased lumbar and B hip rotation ROM, posteriorly translated ribs 4-5, B hamstring and hip flexor/quad tightness, B LE weakness, and hypomobility in lumbar and thoracic spinal segments. Pt's symptoms possibly related to recent fall. Pt will benefit from physcial therapy services in order to maximize pain free functional independence. The following goals were discussed with the patient and patient is in agreement with them as to be addressed in the treatment plan.     History  Co-morbidities and personal factors that may impact the plan of care Examination  Body Structures and Functions, activity limitations and participation restrictions that may impact the plan of care Clinical Presentation   Decision Making/ Complexity Score   Co-morbidities:     None    Personal Factors:     Work duties Body Regions:  Back and B LEs    Body Systems: musculoskeletal (B LE weakness, hypomobility in lumbar and thoracic segments, B hamstring and hip flexor/quad tightness, decreased lumbar AROM, decreased B hip rotation PROM, rib dysfunction)    Activity limitations: tolerance for work duties, fatherly duties, sleeping tolerance    Participation Restrictions: ADLs, IADLs, work duties   Stable and predictable   Low     Prognosis: fair to good    Anticipated barriers to physical therapy: none    Medical necessity is demonstrated by the following IMPAIRMENTS/PROBLEM LIST:   1) Increase in pain level limiting function   2) B hamstring tightness   3) B hip flexor/quad tightness   4) LE weakness   5) Lack of HEP   6) Difficulty lying supine    GOALS: Short Term Goals:  6 weeks  1. Report decreased R mid back pain  <   / =  2 /10 at worst to increase tolerance for work duties.  2. Pt to increase B popliteal angle to -15 degrees in order to improve flexibility and posture.   3. Pt will be able to tolerate multi-directional LE strengthening in order to improve ability to perform fatherly duties.  4. Pt will report 50% ability to lie supine without symptoms to indicate improved sleeping tolerance.   5. Pt to tolerate HEP to improve ROM and independence with ADL's    Long Term Goals: 12 weeks  1. Report decreased R mid back pain  <   / =  0/10 at worst to increase tolerance for work duties.  2. Pt to increase B popliteal angle to -10 degrees in order to improve flexibility and posture.   3. Pt will be able to perform 2 x 10 multi-directional LE strengthening without fatigue in order to improve ability to perform fatherly duties.  4. Pt will test negative on B Suraj test to indicate improve hip flexor/quad flexibility.   5. Pt to be Independent with HEP to improve ROM and independence with ADL's    Plan     Pt will be treated by physical therapy 1-2 times a week for 12 weeks for Pt Education, HEP, therapeutic exercises, neuromuscular  re-education, joint mobilizations, modalities prn to achieve established goals. Andre may at times be seen by a PTA as part of the Rehab Team. Cont PT for 12 weeks.     I certify the need for these services furnished under this plan of treatment and while under my care.______________________________ Physician/Referring Practitioner  Date of Signature

## 2017-10-12 NOTE — PLAN OF CARE
Physical Therapy Evaluation    Name: Andre Padgett  Clinic Number: [id number]      Diagnosis:   Encounter Diagnoses   Name Primary?    Rib pain on right side     Muscle tightness     Muscle weakness of lower extremity     Decreased range of motion of lumbar spine      Physician: Madiha Christian PA-C  Treatment Orders: PT Eval and Treat    Past Medical History:   Diagnosis Date    Aneurysm     Right sided filling anterior communicating aneurysm s/p coiling 2011    Anxiety     Generalized headaches     Hyperlipidemia      Current Outpatient Prescriptions   Medication Sig    alprazolam (XANAX) 0.5 MG tablet take 1 tablet by mouth at bedtime if needed    ascorbic acid (VITAMIN C) 100 MG tablet Take 100 mg by mouth once daily.    atorvastatin (LIPITOR) 40 MG tablet Take 1 tablet (40 mg total) by mouth once daily.    esomeprazole (NEXIUM) 40 MG capsule Take 1 capsule (40 mg total) by mouth before breakfast.    lorazepam (ATIVAN) 1 MG tablet Take 1 tablet (1 mg total) by mouth as needed for Anxiety (MRI). 1 tab 45 minutes prior to MRI.  May repeat x1 if needed.    meloxicam (MOBIC) 15 MG tablet Take 1 tablet (15 mg total) by mouth once daily.    methylPREDNISolone (MEDROL DOSEPACK) 4 mg tablet use as directed    ondansetron (ZOFRAN) 4 MG tablet Take 1 tablet (4 mg total) by mouth every 6 (six) hours.     Current Facility-Administered Medications   Medication    albuterol nebulizer solution 1.25 mg     Review of patient's allergies indicates:  No Known Allergies  Precautions: standard    Evaluation Date: 10/12/17  Visit # authorized: 20  Authorization period: 12/31/17    Bryan Powell is a 47 y.o. male that presents to [facility] outpatient clinic secondary to R LBP without sciatica.     Patient c/o: intermittent symptoms in mid back  Radicular symptoms: numbness and tingling in anterior B thighs that stop at knees, pain radiates to R side  Onset: insidious about the last 4-5 months ago when he  "slipped outside of his house  Pain Scale: Andre rates pain on a scale of 0-10 to be 5 at worst; 2 currently; 0 at best .  Aggravating factors: lying supine  Pain with coughing/sneezing, B&B, sleep disturbance: denies all except increased pain when sneezing  Relieving factors: sitting in recliner, Aleve, hot shower  Previous treatment: PT in past for back pain with little relief  Imaging: 10/4/17 lumbar x-ray revealed: "There is mild DJD.  Alignment is normal.  No fracture dislocation bone destruction seen."  Past surgical history: aneurysm coiling in 2011  Functional deficits: tolerance for work duties, fatherly duties, sleeping tolerance  Prior level of function: independent with all ADLs  Occupation:  for [employer], work duties include: walking and active  Environment: 2 story home with 5 steps to enter, lives with wife and daughter  No cultural or spiritual barriers identified to treatment or learning.  Patient's goals: "to be able to lie on floor without pain/discomfort"    Objective     Observation: pleasant and cooperative    Posture: forward head, rounded shoulders    Lumbar Range of Motion:    %   Flexion 70 mid back pain with return     Extension 80 mid and low back pain     Left Side Bending 80 right mid back discomfort   Right Side Bending 90   Left rotation   90   Right Rotation   80    *= pain    Hip IR/ER PROM: R mild limitation ER, IR WFL     L ER and IR mild limitation    Lower Extremity Strength    Right LE  Left LE    Hip flexion: 4+/5 Hip flexion: 4+/5   Knee extension: 5/5 Knee extension: 5/5   Knee flexion: 5/5 Knee flexion: 5/5   Hip extension:  4/5 Hip extension: 4+/5   Hip abduction: 4/5 Hip abduction: 5/5   Hip adduction: 4+/5 Hip adduction 4/5   Ankle dorsiflexion: 5/5 Ankle dorsiflexion: 5/5   Ankle plantarflexion: 5/5 Ankle plantarflexion: 5/5     Special Tests:  -Quadrant testing: B negative with extension  -CARLO: B negative  -Scour: B negative  -Repeated Ext: " negative for improvement in symptoms    Neuro Dynamic Testing:    Sciatic nerve:      SLR: B negative    Joint Mobility: hypomobility in lumbar and thoracic spinal segments    Palpation: posterior translation of R ribs 4-5    Sensation: B LEs grossly intact to light touch    Flexibility:    Popliteal Angle: R = -25 degrees ; L = -25 degrees   Suraj test: R = positive; L = positive    Functional Limitations Reports:  CMS Impairment/Limitation/Restriction for FOTO Lumbar Spine Survey  Status Limitation G-Code CMS Severity Modifier  Intake 83% 17% Current Status CI - At least 1 percent but less than 20 percent  Predicted 85% 15% Goal Status+ CI - At least 1 percent but less than 20 percent  +Based on FOTO predicted change score    PT Evaluation Completed? Yes  Discussed Plan of Care with patient: Yes    Assessment     This is a 47 y.o. male referred to outpatient physical therapy and presents with a medical diagnosis of R LBP without sciatica and demonstrates limitations as described in the problem list. Pt presents to clinic with complaints of mid back pain with occasional radicular symptoms to R and demonstrates decreased lumbar and B hip rotation ROM, posteriorly translated ribs 4-5, B hamstring and hip flexor/quad tightness, B LE weakness, and hypomobility in lumbar and thoracic spinal segments. Pt's symptoms possibly related to recent fall. Pt will benefit from physcial therapy services in order to maximize pain free functional independence. The following goals were discussed with the patient and patient is in agreement with them as to be addressed in the treatment plan.     History  Co-morbidities and personal factors that may impact the plan of care Examination  Body Structures and Functions, activity limitations and participation restrictions that may impact the plan of care Clinical Presentation   Decision Making/ Complexity Score   Co-morbidities:     None    Personal Factors:     Work duties Body Regions:  Back and B LEs    Body Systems: musculoskeletal (B LE weakness, hypomobility in lumbar and thoracic segments, B hamstring and hip flexor/quad tightness, decreased lumbar AROM, decreased B hip rotation PROM, rib dysfunction)    Activity limitations: tolerance for work duties, fatherly duties, sleeping tolerance    Participation Restrictions: ADLs, IADLs, work duties   Stable and predictable   Low     Prognosis: fair to good    Anticipated barriers to physical therapy: none    Medical necessity is demonstrated by the following IMPAIRMENTS/PROBLEM LIST:   1) Increase in pain level limiting function   2) B hamstring tightness   3) B hip flexor/quad tightness   4) LE weakness   5) Lack of HEP   6) Difficulty lying supine    GOALS: Short Term Goals:  6 weeks  1. Report decreased R mid back pain  <   / =  2 /10 at worst to increase tolerance for work duties.  2. Pt to increase B popliteal angle to -15 degrees in order to improve flexibility and posture.   3. Pt will be able to tolerate multi-directional LE strengthening in order to improve ability to perform fatherly duties.  4. Pt will report 50% ability to lie supine without symptoms to indicate improved sleeping tolerance.   5. Pt to tolerate HEP to improve ROM and independence with ADL's    Long Term Goals: 12 weeks  1. Report decreased R mid back pain  <   / =  0/10 at worst to increase tolerance for work duties.  2. Pt to increase B popliteal angle to -10 degrees in order to improve flexibility and posture.   3. Pt will be able to perform 2 x 10 multi-directional LE strengthening without fatigue in order to improve ability to perform fatherly duties.  4. Pt will test negative on B Suraj test to indicate improve hip flexor/quad flexibility.   5. Pt to be Independent with HEP to improve ROM and independence with ADL's    Plan     Pt will be treated by physical therapy 1-2 times a week for 12 weeks for Pt Education, HEP, therapeutic exercises, neuromuscular  re-education, joint mobilizations, modalities prn to achieve established goals. Andre may at times be seen by a PTA as part of the Rehab Team. Cont PT for 12 weeks.     I certify the need for these services furnished under this plan of treatment and while under my care.______________________________ Physician/Referring Practitioner  Date of Signature

## 2017-10-19 ENCOUNTER — CLINICAL SUPPORT (OUTPATIENT)
Dept: REHABILITATION | Facility: HOSPITAL | Age: 47
End: 2017-10-19
Attending: PHYSICIAN ASSISTANT
Payer: COMMERCIAL

## 2017-10-19 DIAGNOSIS — M62.89 MUSCLE TIGHTNESS: ICD-10-CM

## 2017-10-19 DIAGNOSIS — M62.81 MUSCLE WEAKNESS OF LOWER EXTREMITY: ICD-10-CM

## 2017-10-19 DIAGNOSIS — M53.86 DECREASED RANGE OF MOTION OF LUMBAR SPINE: ICD-10-CM

## 2017-10-19 DIAGNOSIS — R07.81 RIB PAIN ON RIGHT SIDE: ICD-10-CM

## 2017-10-19 PROCEDURE — 97140 MANUAL THERAPY 1/> REGIONS: CPT | Mod: PO

## 2017-10-19 PROCEDURE — 97110 THERAPEUTIC EXERCISES: CPT | Mod: PO

## 2017-10-19 NOTE — PROGRESS NOTES
Name: Andre PEDRO [facility] Number: [id number]  Date of Treatment: 10/19/2017   Diagnosis:   Encounter Diagnoses   Name Primary?    Rib pain on right side     Muscle tightness     Muscle weakness of lower extremity     Decreased range of motion of lumbar spine        Physician: Madiha Christian PA-C    Time in: 1320  Time Out: 1405  Total Treatment Time: 45 minutes  Last PN: NA  Date of eval: 10/12/17  Visit #: 2/20  Auth expiration: 12/31/17  POC expiration: 1/12/18    Precautions: standard    Subjective     Andre reports no changes in symptoms since initial evaluation. Patient reports their back discomfort to be 2-3/10 on a 0-10 scale with 0 being no pain and 10 being the worst pain imaginable.    Objective     Andre received therapeutic exercises to develop strength, endurance, ROM, flexibility, posture and core stabilization for 35 minutes including:     UBE 3' F/3' B level 2  Open books x 20 ea  Sustained thoracic ext on half roll x 3 min  Supine hamstring str 3 x 30 sec ea  Hip flexor str EOB 3 x 30 sec ea    Andre received the following manual therapy techniques for 10 minutes: MET for posteriorly translated lower ribs in sitting and grade III-IV PA mobs for ribs 4-5    Written Home Exercises Provided: open books, supine hamstring stretch, supine hip flexor stretch EOB     Pt educated on performing HEP to tolerance and rib dysfunction. Pt demo good understanding of the education provided. Andre demonstrated good return demonstration of activities.     Assessment     Andre had good tolerance to treatment with no adverse effects. Post-treatment thoracic discomfort rated as 1/10 following manual therapy techniques. Pt demonstrates decreased thoracic rotation with open books. Significant stretch felt with B hamstring and hip flexor  and sustained thoracic extension stretching.      Pt will continue to benefit from skilled PT intervention. Medical Necessity is demonstrated by:  Pain limits function of  effected part for some activities, Unable to participate fully in daily activities, Requires skilled supervision to complete and progress HEP and Weakness.    Patient is making good progress towards established goals.    GOALS: Short Term Goals:  6 weeks  1. Report decreased R mid back pain  <   / =  2 /10 at worst to increase tolerance for work duties.  2. Pt to increase B popliteal angle to -15 degrees in order to improve flexibility and posture.   3. Pt will be able to tolerate multi-directional LE strengthening in order to improve ability to perform fatherly duties.  4. Pt will report 50% ability to lie supine without symptoms to indicate improved sleeping tolerance.   5. Pt to tolerate HEP to improve ROM and independence with ADL's    Long Term Goals: 12 weeks  1. Report decreased R mid back pain  <   / =  0/10 at worst to increase tolerance for work duties.  2. Pt to increase B popliteal angle to -10 degrees in order to improve flexibility and posture.   3. Pt will be able to perform 2 x 10 multi-directional LE strengthening without fatigue in order to improve ability to perform fatherly duties.  4. Pt will test negative on B Suraj test to indicate improve hip flexor/quad flexibility.   5. Pt to be Independent with HEP to improve ROM and independence with ADL's    New/Revised goals: none at this time    Plan     Continue with established Plan of Care towards PT goals.

## 2017-10-24 RX ORDER — ALPRAZOLAM 0.5 MG/1
TABLET ORAL
Qty: 30 TABLET | Refills: 1 | Status: SHIPPED | OUTPATIENT
Start: 2017-10-24 | End: 2018-06-05 | Stop reason: SDUPTHER

## 2017-10-24 RX ORDER — ATORVASTATIN CALCIUM 40 MG/1
TABLET, FILM COATED ORAL
Qty: 90 TABLET | Refills: 3 | Status: SHIPPED | OUTPATIENT
Start: 2017-10-24 | End: 2019-04-02 | Stop reason: SDUPTHER

## 2018-01-10 ENCOUNTER — OFFICE VISIT (OUTPATIENT)
Dept: INTERNAL MEDICINE | Facility: CLINIC | Age: 48
End: 2018-01-10
Payer: COMMERCIAL

## 2018-01-10 ENCOUNTER — IMMUNIZATION (OUTPATIENT)
Dept: INTERNAL MEDICINE | Facility: CLINIC | Age: 48
End: 2018-01-10
Payer: COMMERCIAL

## 2018-01-10 VITALS
WEIGHT: 200.19 LBS | SYSTOLIC BLOOD PRESSURE: 118 MMHG | OXYGEN SATURATION: 97 % | BODY MASS INDEX: 28.72 KG/M2 | DIASTOLIC BLOOD PRESSURE: 82 MMHG | HEART RATE: 85 BPM

## 2018-01-10 DIAGNOSIS — M47.814 SPONDYLOSIS OF THORACIC REGION WITHOUT MYELOPATHY OR RADICULOPATHY: Primary | ICD-10-CM

## 2018-01-10 DIAGNOSIS — J06.9 UPPER RESPIRATORY TRACT INFECTION, UNSPECIFIED TYPE: ICD-10-CM

## 2018-01-10 DIAGNOSIS — F41.9 ANXIETY: ICD-10-CM

## 2018-01-10 DIAGNOSIS — E78.2 MIXED HYPERLIPIDEMIA: ICD-10-CM

## 2018-01-10 PROCEDURE — 90686 IIV4 VACC NO PRSV 0.5 ML IM: CPT | Mod: S$GLB,,, | Performed by: INTERNAL MEDICINE

## 2018-01-10 PROCEDURE — 99213 OFFICE O/P EST LOW 20 MIN: CPT | Mod: 25,S$GLB,, | Performed by: INTERNAL MEDICINE

## 2018-01-10 PROCEDURE — 99999 PR PBB SHADOW E&M-EST. PATIENT-LVL III: CPT | Mod: PBBFAC,,, | Performed by: INTERNAL MEDICINE

## 2018-01-10 PROCEDURE — 90471 IMMUNIZATION ADMIN: CPT | Mod: S$GLB,,, | Performed by: INTERNAL MEDICINE

## 2018-01-10 NOTE — PROGRESS NOTES
Subjective:       Patient ID: Andre Padgett is a 47 y.o. male.    Chief Complaint: Nasal Congestion    HPI: Patient complains of some mild cough and respiratory symptoms.  He thinks he may have a cold but wanted to be evaluated.  He has a history of cerebral aneurysm status post coiling.  He sometimes gets headaches but also says he gets anxiety.  May have some mild acid reflux symptoms as well.  He had a stress test in the recent past that was unremarkable in the lung testing was unremarkable.  He feels healthy generally but he admits he gets anxious because of the prior history of aneurysm.  Presently no chest pain or shortness of breath.  He does have occasional indigestion but has not correlated symptoms with any antacid use.  He also has a history of back and spine arthritis and a periodic flare up although that seems to be going well now as well      Review of Systems   Constitutional: Negative for chills, fatigue, fever and unexpected weight change.   HENT: Positive for congestion. Negative for nosebleeds and trouble swallowing.    Eyes: Negative for pain and visual disturbance.   Respiratory: Positive for cough. Negative for shortness of breath and wheezing.    Cardiovascular: Negative for chest pain and palpitations.   Gastrointestinal: Negative for abdominal pain, constipation, diarrhea, nausea and vomiting.   Genitourinary: Negative for difficulty urinating and hematuria.   Musculoskeletal: Positive for arthralgias. Negative for neck pain.   Skin: Negative for rash.   Neurological: Negative for dizziness and headaches.   Hematological: Does not bruise/bleed easily.   Psychiatric/Behavioral: Negative for dysphoric mood, sleep disturbance and suicidal ideas.       Objective:      Physical Exam   Constitutional: He is oriented to person, place, and time. He appears well-developed and well-nourished. No distress.   HENT:   Head: Normocephalic and atraumatic.   Right Ear: External ear normal.   Left Ear:  External ear normal.   Mouth/Throat: Oropharynx is clear and moist. No oropharyngeal exudate.   TM's clear, pharynx clear   Eyes: Conjunctivae and EOM are normal. Pupils are equal, round, and reactive to light. No scleral icterus.   Neck: Normal range of motion. Neck supple. No thyromegaly present.   No supraclavicular nodes palpated   Cardiovascular: Normal rate, regular rhythm and normal heart sounds.    No murmur heard.  Pulmonary/Chest: Effort normal and breath sounds normal. He has no wheezes.   Abdominal: Soft. Bowel sounds are normal. He exhibits no mass. There is no tenderness.   Musculoskeletal: He exhibits tenderness (low back). He exhibits no edema.   Lymphadenopathy:     He has no cervical adenopathy.   Neurological: He is alert and oriented to person, place, and time.   Skin: No pallor.   Psychiatric: He has a normal mood and affect.       Assessment:       1. Spondylosis of thoracic region without myelopathy or radiculopathy    2. Mixed hyperlipidemia    3. Anxiety    4. Upper respiratory tract infection, unspecified type        Plan:       Andre was seen today for nasal congestion.    Diagnoses and all orders for this visit:    Spondylosis of thoracic region without myelopathy or radiculopathy    Mixed hyperlipidemia    Anxiety    Upper respiratory tract infection, unspecified type        Neurologic exam is normal. No need for imaging at this time.

## 2018-02-21 ENCOUNTER — LAB VISIT (OUTPATIENT)
Dept: LAB | Facility: HOSPITAL | Age: 48
End: 2018-02-21
Attending: INTERNAL MEDICINE
Payer: COMMERCIAL

## 2018-02-21 ENCOUNTER — OFFICE VISIT (OUTPATIENT)
Dept: INTERNAL MEDICINE | Facility: CLINIC | Age: 48
End: 2018-02-21
Payer: COMMERCIAL

## 2018-02-21 VITALS
OXYGEN SATURATION: 97 % | WEIGHT: 201.75 LBS | HEART RATE: 85 BPM | BODY MASS INDEX: 28.94 KG/M2 | TEMPERATURE: 98 F | SYSTOLIC BLOOD PRESSURE: 112 MMHG | DIASTOLIC BLOOD PRESSURE: 72 MMHG

## 2018-02-21 DIAGNOSIS — Z00.00 ROUTINE PHYSICAL EXAMINATION: ICD-10-CM

## 2018-02-21 DIAGNOSIS — R53.83 FATIGUE, UNSPECIFIED TYPE: Primary | ICD-10-CM

## 2018-02-21 DIAGNOSIS — R53.83 FATIGUE, UNSPECIFIED TYPE: ICD-10-CM

## 2018-02-21 DIAGNOSIS — G89.29 ABDOMINAL PAIN, CHRONIC, RIGHT LOWER QUADRANT: ICD-10-CM

## 2018-02-21 DIAGNOSIS — R10.31 ABDOMINAL PAIN, CHRONIC, RIGHT LOWER QUADRANT: ICD-10-CM

## 2018-02-21 LAB
ALBUMIN SERPL BCP-MCNC: 4.4 G/DL
ALP SERPL-CCNC: 58 U/L
ALT SERPL W/O P-5'-P-CCNC: 32 U/L
ANION GAP SERPL CALC-SCNC: 8 MMOL/L
AST SERPL-CCNC: 19 U/L
BASOPHILS # BLD AUTO: 0.02 K/UL
BASOPHILS NFR BLD: 0.3 %
BILIRUB SERPL-MCNC: 0.8 MG/DL
BUN SERPL-MCNC: 11 MG/DL
CALCIUM SERPL-MCNC: 10.7 MG/DL
CHLORIDE SERPL-SCNC: 105 MMOL/L
CHOLEST SERPL-MCNC: 191 MG/DL
CHOLEST/HDLC SERPL: 3.4 {RATIO}
CO2 SERPL-SCNC: 29 MMOL/L
CREAT SERPL-MCNC: 0.8 MG/DL
DIFFERENTIAL METHOD: ABNORMAL
EOSINOPHIL # BLD AUTO: 0.1 K/UL
EOSINOPHIL NFR BLD: 0.8 %
ERYTHROCYTE [DISTWIDTH] IN BLOOD BY AUTOMATED COUNT: 11.7 %
EST. GFR  (AFRICAN AMERICAN): >60 ML/MIN/1.73 M^2
EST. GFR  (NON AFRICAN AMERICAN): >60 ML/MIN/1.73 M^2
ESTIMATED AVG GLUCOSE: 103 MG/DL
GLUCOSE SERPL-MCNC: 100 MG/DL
HBA1C MFR BLD HPLC: 5.2 %
HCT VFR BLD AUTO: 44.3 %
HDLC SERPL-MCNC: 56 MG/DL
HDLC SERPL: 29.3 %
HGB BLD-MCNC: 15.2 G/DL
LDLC SERPL CALC-MCNC: 114.8 MG/DL
LYMPHOCYTES # BLD AUTO: 1.3 K/UL
LYMPHOCYTES NFR BLD: 19.7 %
MCH RBC QN AUTO: 31.9 PG
MCHC RBC AUTO-ENTMCNC: 34.3 G/DL
MCV RBC AUTO: 93 FL
MONOCYTES # BLD AUTO: 0.4 K/UL
MONOCYTES NFR BLD: 5.3 %
NEUTROPHILS # BLD AUTO: 4.9 K/UL
NEUTROPHILS NFR BLD: 73.6 %
NONHDLC SERPL-MCNC: 135 MG/DL
NRBC BLD-RTO: 0 /100 WBC
PLATELET # BLD AUTO: 217 K/UL
PMV BLD AUTO: 10.8 FL
POTASSIUM SERPL-SCNC: 4.3 MMOL/L
PROT SERPL-MCNC: 7.6 G/DL
RBC # BLD AUTO: 4.76 M/UL
SODIUM SERPL-SCNC: 142 MMOL/L
TRIGL SERPL-MCNC: 101 MG/DL
TSH SERPL DL<=0.005 MIU/L-ACNC: 1.39 UIU/ML
WBC # BLD AUTO: 6.66 K/UL

## 2018-02-21 PROCEDURE — 36415 COLL VENOUS BLD VENIPUNCTURE: CPT

## 2018-02-21 PROCEDURE — 84443 ASSAY THYROID STIM HORMONE: CPT

## 2018-02-21 PROCEDURE — 99999 PR PBB SHADOW E&M-EST. PATIENT-LVL III: CPT | Mod: PBBFAC,,, | Performed by: INTERNAL MEDICINE

## 2018-02-21 PROCEDURE — 83036 HEMOGLOBIN GLYCOSYLATED A1C: CPT

## 2018-02-21 PROCEDURE — 85025 COMPLETE CBC W/AUTO DIFF WBC: CPT

## 2018-02-21 PROCEDURE — 80053 COMPREHEN METABOLIC PANEL: CPT

## 2018-02-21 PROCEDURE — 80061 LIPID PANEL: CPT

## 2018-02-21 PROCEDURE — 3008F BODY MASS INDEX DOCD: CPT | Mod: S$GLB,,, | Performed by: INTERNAL MEDICINE

## 2018-02-21 PROCEDURE — 99214 OFFICE O/P EST MOD 30 MIN: CPT | Mod: S$GLB,,, | Performed by: INTERNAL MEDICINE

## 2018-02-21 NOTE — PROGRESS NOTES
Subjective:       Patient ID: Andre Padgett is a 47 y.o. male.    Chief Complaint: Fatigue     HPI:  Patient comes in for urgent care visit stating he just has not felt himself for a few days.  He has had some fatigue.  He works in the hotel industry and this may relate to a very busy somewhat stressful week last week related to Karri Gras.  He thinks he is sleeping well.   He has continued to have some mild intermittent right-sided abdominal pain but otherwise denies any new problems.  No fevers or chills.  No dysuria.  Appetite has been fine and stable.  He has been drinking more Cokes lately.  No change in his medication.      Review of Systems   Constitutional: Positive for fatigue. Negative for chills, fever and unexpected weight change.   HENT: Negative for nosebleeds and trouble swallowing.    Eyes: Negative for pain and visual disturbance.   Respiratory: Negative for cough, shortness of breath and wheezing.    Cardiovascular: Negative for chest pain and palpitations.   Gastrointestinal: Positive for abdominal pain (  Mild offAnd on for a few years). Negative for constipation, diarrhea, nausea and vomiting.   Genitourinary: Negative for difficulty urinating and hematuria.   Musculoskeletal: Negative for neck pain.   Skin: Negative for rash.   Neurological: Negative for dizziness and headaches.   Hematological: Does not bruise/bleed easily.   Psychiatric/Behavioral: Negative for dysphoric mood, sleep disturbance and suicidal ideas.       Objective:      Physical Exam   Constitutional: He is oriented to person, place, and time. He appears well-developed and well-nourished. No distress.   HENT:   Head: Normocephalic and atraumatic.   Right Ear: External ear normal.   Left Ear: External ear normal.   Mouth/Throat: Oropharynx is clear and moist. No oropharyngeal exudate.   TM's clear, pharynx clear   Eyes: Conjunctivae and EOM are normal. Pupils are equal, round, and reactive to light. No scleral icterus.    Neck: Normal range of motion. Neck supple. No thyromegaly present.   No supraclavicular nodes palpated   Cardiovascular: Normal rate, regular rhythm and normal heart sounds.    No murmur heard.  Pulmonary/Chest: Effort normal and breath sounds normal. He has no wheezes.   Abdominal: Soft. Bowel sounds are normal. He exhibits no distension and no mass. There is tenderness ( mild right lateral abdomen tenderness.  No masses noted). There is no rebound and no guarding.   Musculoskeletal: He exhibits no edema.   Lymphadenopathy:     He has no cervical adenopathy.   Neurological: He is alert and oriented to person, place, and time.   Skin: No pallor.   Psychiatric: He has a normal mood and affect.       Assessment:       1. Fatigue, unspecified type    2. Abdominal pain, chronic, right lower quadrant    3. Routine physical examination        Plan:       Andre was seen today for fatigue.    Diagnoses and all orders for this visit:    Fatigue, unspecified type  -     Lipid panel; Future  -     CBC auto differential; Future  -     Comprehensive metabolic panel; Future  -     TSH; Future  -     Hemoglobin A1c; Future  -     Ambulatory referral to Gastroenterology    Abdominal pain, chronic, right lower quadrant  -     Ambulatory referral to Gastroenterology    Routine physical examination  -     Lipid panel; Future  -     CBC auto differential; Future  -     Comprehensive metabolic panel; Future  -     TSH; Future  -     Hemoglobin A1c; Future

## 2018-02-22 ENCOUNTER — HOSPITAL ENCOUNTER (EMERGENCY)
Facility: HOSPITAL | Age: 48
Discharge: HOME | End: 2018-02-22
Attending: EMERGENCY MEDICINE
Payer: COMMERCIAL

## 2018-02-22 ENCOUNTER — PATIENT MESSAGE (OUTPATIENT)
Dept: INTERNAL MEDICINE | Facility: CLINIC | Age: 48
End: 2018-02-22

## 2018-02-22 VITALS
BODY MASS INDEX: 28.63 KG/M2 | HEIGHT: 70 IN | WEIGHT: 200 LBS | RESPIRATION RATE: 20 BRPM | HEART RATE: 87 BPM | OXYGEN SATURATION: 97 % | TEMPERATURE: 98 F | DIASTOLIC BLOOD PRESSURE: 76 MMHG | SYSTOLIC BLOOD PRESSURE: 121 MMHG

## 2018-02-22 DIAGNOSIS — I67.1 CEREBRAL ANEURYSM: Primary | ICD-10-CM

## 2018-02-22 DIAGNOSIS — G44.52 NEW DAILY PERSISTENT HEADACHE: ICD-10-CM

## 2018-02-22 PROCEDURE — 99284 EMERGENCY DEPT VISIT MOD MDM: CPT | Mod: ,,, | Performed by: EMERGENCY MEDICINE

## 2018-02-22 PROCEDURE — 99284 EMERGENCY DEPT VISIT MOD MDM: CPT

## 2018-02-22 NOTE — ED TRIAGE NOTES
Pt arrived to ED with CC of intermittent episodes of headaches, hot flashes, and blurred vision since Monday - reports he feels like he is going to pass out during episodes.    Pt reports hx of cerebral aneurysm 2011.    Pt also c/o abdominal pain to RLQ onset Monday, intermittent, denies pain at this time. Denies N/V. Denies bowel or urinary complaints.    Pt reports episode of numbness to right thumb duration approx x1 hour, denies at this time.

## 2018-02-23 ENCOUNTER — TELEPHONE (OUTPATIENT)
Dept: INTERNAL MEDICINE | Facility: CLINIC | Age: 48
End: 2018-02-23

## 2018-02-23 NOTE — ED NOTES
Patient identifiers verified and correct for Andre Padgett.    LOC: The patient is awake, alert and oriented x 4. Pt is speaking appropriately, no slurred speech.  APPEARANCE: Patient resting and in no acute distress. Pt is clean and well groomed.   SKIN: Skin is warm dry and intact, and color is consistent with ethnicity. No tenting observed and capillary refill <3 seconds. No clubbing noted to nail beds. No breakdown or brusing visible and mucus membranes moist and acyanotic.  MUSCULOSKELETAL: Full range of motion present in all extremities. Hand  equal and leg strength strong +5 bilaterally.  RESPIRATORY: Airway is open and patent. Respirations-unlabored, regular rate, equal bilaterally on inspiration and expiration. No accessory muscle use noted. Lungs clear to auscultation in all fields bilaterally anterior and posterior.   CARDIAC: No peripheral edema noted, and patient has no c/o chest pain.  ABDOMEN: Soft, mild tenderness to RLQ, with no distention noted. Normoactive bowel sounds X4 quadrants. Pt has no complaints of abnormal bowel movements. Pt reports normal appetite.   NEUROLOGIC: Eyes open spontaneously and facial expression symmetrical. Pt behavior appropriate to situation, and pt follows commands.  Pt reports sensation present in all extremities when touched with a finger. PERRLA  : No complaints of frequency, burning, urgency or blood in the urine.

## 2018-02-23 NOTE — TELEPHONE ENCOUNTER
----- Message from Shelton Vidal sent at 2/22/2018  4:22 PM CST -----  Contact: self/[phone]  Pt is calling to speak with someone in the office in regards to getting some advisement on what he should do. He states that he has had one of the episodes him and the doctor talked about and is at the ER right now. Please advise.        Thank You

## 2018-02-23 NOTE — ED PROVIDER NOTES
"Encounter Date: 2/22/2018       History     Chief Complaint   Patient presents with    Headache     Pt reports several episodes of headaches and "feeling like he would pass out".  Pt reports episode today while eating lunch.   Pt states he saw his PCP yesterday for same-had labs drawn which were negative.     Andre Padgett is a 47 year old male with pertinent h/o SOTO aneurysm s/p coiling who presents with "hot flashes" throughout his body associated with mild frontal headaches and blurry vision. Patient states these symptoms started Monday, occur once/day and last anywhere from minutes to hours until he falls asleep. Denies fever, chills, constipation, diarrhea, melena, BRBPR.          Review of patient's allergies indicates:  No Known Allergies  Past Medical History:   Diagnosis Date    Aneurysm     Right sided filling anterior communicating aneurysm s/p coiling 2011    Anxiety     Generalized headaches     Hyperlipidemia      Past Surgical History:   Procedure Laterality Date    BRAIN SURGERY  2011    angiogram/coiling     Family History   Problem Relation Age of Onset    DAVID disease Mother     Hypertension Mother     Cancer Father      brain    Celiac disease Neg Hx     Cirrhosis Neg Hx     Colon polyps Neg Hx     Crohn's disease Neg Hx     Cystic fibrosis Neg Hx     Esophageal cancer Neg Hx     Hemochromatosis Neg Hx     Inflammatory bowel disease Neg Hx     Irritable bowel syndrome Neg Hx     Liver cancer Neg Hx     Liver disease Neg Hx     Rectal cancer Neg Hx     Stomach cancer Neg Hx     Ulcerative colitis Neg Hx     Alli's disease Neg Hx     Heart disease Neg Hx     Heart attack Neg Hx     Amblyopia Neg Hx     Blindness Neg Hx     Cataracts Neg Hx     Glaucoma Neg Hx     Macular degeneration Neg Hx     Retinal detachment Neg Hx     Strabismus Neg Hx     Colon cancer Neg Hx      Social History   Substance Use Topics    Smoking status: Never Smoker    Smokeless tobacco: " "Never Used    Alcohol use Yes      Comment: ocassionally - twice a week     Review of Systems   Constitutional: Negative for chills and fever.   HENT: Negative for congestion and sore throat.    Eyes: Negative.    Respiratory: Negative for cough and shortness of breath.    Cardiovascular: Negative for chest pain and leg swelling.   Gastrointestinal: Negative for abdominal pain, blood in stool, constipation and diarrhea.   Genitourinary: Negative for dysuria and frequency.   Musculoskeletal: Negative.    Neurological: Positive for headaches. Negative for seizures.       Physical Exam     Initial Vitals [02/22/18 1518]   BP Pulse Resp Temp SpO2   (!) 144/87 87 18 98.3 °F (36.8 °C) 98 %      MAP       106         Physical Exam    Constitutional: He appears well-developed and well-nourished.   HENT:   Head: Normocephalic and atraumatic.   Eyes: EOM are normal.   Neck: Normal range of motion.   Cardiovascular: Normal rate and regular rhythm. Exam reveals no gallop and no friction rub.    No murmur heard.  Pulmonary/Chest: No respiratory distress. He has no wheezes. He has no rhonchi. He has no rales.   Neurological: He is alert and oriented to person, place, and time.         ED Course   Procedures  Labs Reviewed - No data to display          Medical Decision Making:   History:   Old Medical Records: I decided to obtain old medical records.  Initial Assessment:   48 yo M who presents with "hot flashes".  Differential Diagnosis:   Ddx includes hypo/hyperthyroid, ICH, CVA, electrolyte abnormality.  Clinical Tests:   Radiological Study: Ordered and Reviewed       APC / Resident Notes:   Received labs yesterday at PCP visit which were unremarkable. Ordered CTH without contrast. No acute intracranial abnormalities. Stable sequela of SOTO aneurysm coiling. Will discharge with close follow up with PCP.         Attending Attestation:   Physician Attestation Statement for Resident:  As the supervising MD   Physician Attestation " "Statement: I have personally seen and examined this patient.   I agree with the above history. -: Ha at time aneurysm diagnosed was sudden/severe, assoc with photophobia and confusion.    These episodes have happened once per day for past 4 days.  They tend to occur in the afternoon.  Today was right after eating but not all have been.    At present time, all sxs have resolved.  No ha.  Does not feel warm.  Notes his palms are red during the episodes but hasn't seen any other skin changes with it  No fevers  No trauma  Shoulders feel "tight" and this makes his neck feel "tight" too - again, this has resolved and not present at this time.    He took a xanax for today's episode which seemed to help.   As the supervising MD I agree with the above PE.   -: Nad, wdwn  gcs 15, aox3  Clear speech, no facial droop, steady gait  5/5 strength and intact sensation bue/ble  Perrl, eomi  Skin warm, dry, no rash  ctab  Rrr, nl s1/2  abd benign  Anxious affect   As the supervising MD I agree with the above treatment, course, plan, and disposition.   -: Hot flashes associated with headache, daily for several days, with full resolution in between, hx of prior aneurysm s/p coiling.  Nl exam at this time.  Unclear etiology.  Pt concerned about the aneurysm given the prior hx and assoc of ha's with this.  Will obtain screening CT.  Low clinical suspicion for bleed and do not anticipate LP will be indicated.  Labs reviewed from yesterday - no indication for repeat today.   I have reviewed and agree with the residents interpretation of the following: CT scans.  I have reviewed the following: old records at this facility.                       Clinical Impression:   The primary encounter diagnosis was Cerebral aneurysm. A diagnosis of New daily persistent headache was also pertinent to this visit.                           Vikram Allen MD  Resident  02/22/18 2102       Azeem Chan MD  02/22/18 2202    "

## 2018-02-23 NOTE — ED NOTES
Assumed care of patient. Denies headache. Call bell is within reach. Will continue to monitor.  Waiting for CT scan result.

## 2018-02-23 NOTE — TELEPHONE ENCOUNTER
Spoke to pt and he states during his visit you told him to go to the Ed if his symptoms persisted. He visited The ED and was told follow up with you. Pt hospital follow up booked.

## 2018-03-02 ENCOUNTER — PATIENT MESSAGE (OUTPATIENT)
Dept: INTERNAL MEDICINE | Facility: CLINIC | Age: 48
End: 2018-03-02

## 2018-03-02 ENCOUNTER — OFFICE VISIT (OUTPATIENT)
Dept: INTERNAL MEDICINE | Facility: CLINIC | Age: 48
End: 2018-03-02
Payer: COMMERCIAL

## 2018-03-02 VITALS
DIASTOLIC BLOOD PRESSURE: 78 MMHG | OXYGEN SATURATION: 98 % | BODY MASS INDEX: 29.48 KG/M2 | SYSTOLIC BLOOD PRESSURE: 126 MMHG | WEIGHT: 205.5 LBS | HEART RATE: 78 BPM

## 2018-03-02 DIAGNOSIS — G89.29 CHRONIC RLQ PAIN: Primary | ICD-10-CM

## 2018-03-02 DIAGNOSIS — R10.84 GENERALIZED ABDOMINAL PAIN: ICD-10-CM

## 2018-03-02 DIAGNOSIS — R10.31 CHRONIC RLQ PAIN: Primary | ICD-10-CM

## 2018-03-02 PROCEDURE — 99214 OFFICE O/P EST MOD 30 MIN: CPT | Mod: S$GLB,,, | Performed by: INTERNAL MEDICINE

## 2018-03-02 PROCEDURE — 99999 PR PBB SHADOW E&M-EST. PATIENT-LVL III: CPT | Mod: PBBFAC,,, | Performed by: INTERNAL MEDICINE

## 2018-03-02 NOTE — PROGRESS NOTES
Subjective:       Patient ID: Andre Padgett is a 47 y.o. male.    Chief Complaint: Hospital Follow Up    HPI: Patient comes in for ER follow-up for abdominal pain.  He has continued to have right lower quadrant pain and we discussed this at length including the fact that it could be coming from his back, into the flank or groin.  We even discussed the fact that it could be musculoskeletal since it does seem to be a radiating pain.  Was concerned about a hernia however.  Apparently yesterday there were a few episodes that were severe pain in the groin radiating toward testicle.  He had no swelling redness drainage or discharge.  No rash.  These did not occur with exertion.  He has not seen a bulge.  They are not associated with eating drinking passing urine or stool.  Says  He even tried his anxiety medication which did not help.          Review of Systems   Constitutional: Negative for chills, fatigue, fever and unexpected weight change.   HENT: Negative for nosebleeds and trouble swallowing.    Eyes: Negative for pain and visual disturbance.   Respiratory: Negative for cough, shortness of breath and wheezing.    Cardiovascular: Negative for chest pain and palpitations.   Gastrointestinal: Positive for abdominal pain. Negative for constipation, diarrhea, nausea and vomiting.   Genitourinary: Negative for difficulty urinating and hematuria.        Right groin pain   Musculoskeletal: Negative for neck pain.   Skin: Negative for rash.   Neurological: Negative for dizziness and headaches.   Hematological: Does not bruise/bleed easily.   Psychiatric/Behavioral: Negative for dysphoric mood, sleep disturbance and suicidal ideas.       Objective:      Physical Exam   Constitutional: He is oriented to person, place, and time. He appears well-developed and well-nourished. No distress.   HENT:   Head: Normocephalic and atraumatic.   Right Ear: External ear normal.   Left Ear: External ear normal.   Mouth/Throat: Oropharynx  is clear and moist. No oropharyngeal exudate.   TM's clear, pharynx clear   Eyes: Conjunctivae and EOM are normal. Pupils are equal, round, and reactive to light. No scleral icterus.   Neck: Normal range of motion. Neck supple. No thyromegaly present.   No supraclavicular nodes palpated   Cardiovascular: Normal rate, regular rhythm and normal heart sounds.    No murmur heard.  Pulmonary/Chest: Effort normal and breath sounds normal. He has no wheezes.   Abdominal: Soft. Bowel sounds are normal. He exhibits no mass. There is tenderness.        Near the area depicted in Red there was not a bulge but there seemed to be a soft spot which actually may correspond to a small hernia I do not think it should cause pain further up into the abdomen and flank as depicted in blue.   Musculoskeletal: He exhibits no edema.   Lymphadenopathy:     He has no cervical adenopathy.   Neurological: He is alert and oriented to person, place, and time.   Skin: No pallor.   Psychiatric: He has a normal mood and affect.       Assessment:       1. Chronic RLQ pain    2. Generalized abdominal pain        Plan:       Andre was seen today for hospital follow up.    Diagnoses and all orders for this visit:    Chronic RLQ pain  -     CT Abdomen Pelvis W Wo Contrast; Future    Generalized abdominal pain  -     CT Abdomen Pelvis W Wo Contrast; Future          We talked about multiple options including keeping the GI appointment, getting an abdomen and pelvis CT scan to evaluate the organs in this region.  Possibly seeing a surgeon to get their opinion on hernia and lastly seeing a back and spine specialist if none of these gives answers.

## 2018-03-05 ENCOUNTER — HOSPITAL ENCOUNTER (OUTPATIENT)
Dept: RADIOLOGY | Facility: HOSPITAL | Age: 48
Discharge: HOME | End: 2018-03-05
Attending: INTERNAL MEDICINE
Payer: COMMERCIAL

## 2018-03-05 DIAGNOSIS — R10.31 CHRONIC RLQ PAIN: ICD-10-CM

## 2018-03-05 DIAGNOSIS — R10.84 GENERALIZED ABDOMINAL PAIN: ICD-10-CM

## 2018-03-05 DIAGNOSIS — G89.29 CHRONIC RLQ PAIN: ICD-10-CM

## 2018-03-05 PROCEDURE — 74177 CT ABD & PELVIS W/CONTRAST: CPT | Mod: 26,,, | Performed by: RADIOLOGY

## 2018-03-05 PROCEDURE — 25500020 PHARM REV CODE 255: Performed by: INTERNAL MEDICINE

## 2018-03-05 PROCEDURE — 74177 CT ABD & PELVIS W/CONTRAST: CPT | Mod: TC

## 2018-03-05 RX ADMIN — IOHEXOL 15 ML: 350 INJECTION, SOLUTION INTRAVENOUS at 07:03

## 2018-03-05 RX ADMIN — IOHEXOL 100 ML: 350 INJECTION, SOLUTION INTRAVENOUS at 08:03

## 2018-03-05 RX ADMIN — IOHEXOL 15 ML: 350 INJECTION, SOLUTION INTRAVENOUS at 08:03

## 2018-03-06 ENCOUNTER — OFFICE VISIT (OUTPATIENT)
Dept: GASTROENTEROLOGY | Facility: CLINIC | Age: 48
End: 2018-03-06
Payer: COMMERCIAL

## 2018-03-06 ENCOUNTER — PATIENT MESSAGE (OUTPATIENT)
Dept: INTERNAL MEDICINE | Facility: CLINIC | Age: 48
End: 2018-03-06

## 2018-03-06 VITALS
BODY MASS INDEX: 28.95 KG/M2 | DIASTOLIC BLOOD PRESSURE: 75 MMHG | HEIGHT: 70 IN | WEIGHT: 202.19 LBS | HEART RATE: 87 BPM | SYSTOLIC BLOOD PRESSURE: 116 MMHG

## 2018-03-06 DIAGNOSIS — R10.31 ABDOMINAL PAIN, RLQ (RIGHT LOWER QUADRANT): Primary | ICD-10-CM

## 2018-03-06 PROCEDURE — 99999 PR PBB SHADOW E&M-EST. PATIENT-LVL III: CPT | Mod: PBBFAC,,, | Performed by: NURSE PRACTITIONER

## 2018-03-06 PROCEDURE — 99214 OFFICE O/P EST MOD 30 MIN: CPT | Mod: SA,S$GLB,, | Performed by: NURSE PRACTITIONER

## 2018-03-06 NOTE — PROGRESS NOTES
Subjective:       Patient ID: Andre Padgtet is a 47 y.o. male.    Chief Complaint: Abdominal Pain (right side )    HPI  Reports one week of constant, though come and goes in intensity, abdominal pain in RLQ.  He also reports some radiation into the groin.  Denies urinary complaints.  Denies bowel changes.  Report regular daily bowel movement with the feeling of complete emptying.  Denies blood with bowel movements or black stools.  Denies nausea or vomiting.  Denies fever.  He reports that if he lies on the right side he may have some improvement in pain.    Appetite and weight are stable..  He had normal colonoscopy in 2005.  He had EGD in 2015.    Recent CT:  Findings:    The lung bases are unremarkable.  There is no pleural fluid present.  The visualized portions of the heart appear normal.    The liver is normal in size. There is decreased attenuation of the hepatic parenchyma suggesting hepatic steatosis. No focal hepatic abnormality is identified.  The gallbladder shows no evidence of stones or pericholecystic fluid.  There is no intra-or extrahepatic biliary ductal dilatation.    The stomach, spleen, pancreas, and adrenal glands are unremarkable.    The kidneys are normal in size and location and concentrate and excrete contrast properly on delayed imaging.  There is no evidence of hydronephrosis.  The ureters appear normal in course and caliber without evidence of ureteral dilatation. The urinary bladder and prostate demonstrate no significant abnormality.    The abdominal aorta is normal in course and caliber without significant atherosclerotic calcifications.    The visualized loops of small and large bowel show no evidence of obstruction or inflammation.  The appendix is air-filled and normal in caliber without evidence of abnormal periappendiceal inflammatory change. There is scattered stool throughout the colon suggesting mild constipation. There is no ascites or free intraperitoneal air. There is a  fat containing periumbilical hernia. No bulky lymphadenopathy is identified.    The osseous structures demonstrate mild degenerative changes of the spine and hips.  The extraperitoneal soft tissues are unremarkable.      Denies family history of colon cancer, colon polyps or IBD.      Review of Systems   Constitutional: Negative.  Negative for activity change, appetite change, fatigue, fever and unexpected weight change.   Respiratory: Negative.  Negative for cough, choking and shortness of breath.    Cardiovascular: Negative.  Negative for chest pain.   Gastrointestinal: Positive for abdominal pain and nausea. Negative for blood in stool, constipation, diarrhea and vomiting.   Genitourinary: Negative.  Negative for difficulty urinating, dysuria and hematuria.   Musculoskeletal: Negative.  Negative for neck pain and neck stiffness.   Skin: Negative.    Neurological: Negative.  Negative for dizziness, syncope, weakness and light-headedness.   Psychiatric/Behavioral: Negative.        Objective:      Physical Exam   Constitutional: He is oriented to person, place, and time. He appears well-developed and well-nourished. No distress.   HENT:   Head: Normocephalic.   Eyes: No scleral icterus.   Cardiovascular: Normal rate.    Pulmonary/Chest: Effort normal. No respiratory distress.   Abdominal: Soft. Bowel sounds are normal. He exhibits no distension and no mass. There is tenderness in the right lower quadrant. There is no guarding.       Musculoskeletal: Normal range of motion.   Neurological: He is alert and oriented to person, place, and time.   Skin: Skin is warm and dry. He is not diaphoretic.   Psychiatric: He has a normal mood and affect. His behavior is normal. Judgment and thought content normal.   Vitals reviewed.      Assessment:       1. Abdominal pain, RLQ (right lower quadrant)        Plan:         Andre was seen today for abdominal pain.    Diagnoses and all orders for this visit:    Abdominal pain, RLQ  (right lower quadrant)  -     Case request GI: COLONOSCOPY         Would suggest fiber supplement, increased water intake, high fiber diet as CT does suggest mild constipation.  Would add probiotic.  Low suspicion that his pain is related to GI, but would suggest colonoscopy to rule out significant colon pathology as cause for RLQ pain if this continues.  He requests this to be performed at [facility].

## 2018-03-06 NOTE — PATIENT INSTRUCTIONS
Fiber supplement- metamucil, citrucel, benefiber    Probiotic- Angelantoni or VeriTainer [facility]

## 2018-03-06 NOTE — LETTER
March 6, 2018      Hunter Diop MD  [address]  [facility] [address]           [place] [facility]  [address]  [place] [address]  Phone: [phone]          Patient: Andre Padgett   MR Number: [id number]   YOB: 1970   Date of Visit: 3/6/2018       Dear Dr. Hunter Diop:    Thank you for referring Andre Padgett to me for evaluation. Attached you will find relevant portions of my assessment and plan of care.    If you have questions, please do not hesitate to call me. I look forward to following Andre Padgett along with you.    Sincerely,    Chata Flores, [facility]    Enclosure  CC:  No Recipients    If you would like to receive this communication electronically, please contact [email or web address] or [phone] to request more information on [employer] access.    For providers and/or their staff who would like to refer a patient to [facility], please contact us through our one-stop-shop provider referral line, [facility], at [phone].    If you feel you have received this communication in error or would no longer like to receive these types of communications, please e-mail [email or web address]

## 2018-03-20 DIAGNOSIS — Z12.11 SPECIAL SCREENING FOR MALIGNANT NEOPLASMS, COLON: Primary | ICD-10-CM

## 2018-03-20 RX ORDER — POLYETHYLENE GLYCOL 3350, SODIUM SULFATE ANHYDROUS, SODIUM BICARBONATE, SODIUM CHLORIDE, POTASSIUM CHLORIDE 236; 22.74; 6.74; 5.86; 2.97 G/4L; G/4L; G/4L; G/4L; G/4L
4 POWDER, FOR SOLUTION ORAL ONCE
Qty: 4000 ML | Refills: 0 | Status: SHIPPED | OUTPATIENT
Start: 2018-03-20 | End: 2018-03-20

## 2018-03-21 DIAGNOSIS — Z12.10 SPECIAL SCREENING FOR MALIGNANT NEOPLASM OF INTESTINE: Primary | ICD-10-CM

## 2018-03-21 RX ORDER — POLYETHYLENE GLYCOL 3350, SODIUM SULFATE ANHYDROUS, SODIUM BICARBONATE, SODIUM CHLORIDE, POTASSIUM CHLORIDE 236; 22.74; 6.74; 5.86; 2.97 G/4L; G/4L; G/4L; G/4L; G/4L
4 POWDER, FOR SOLUTION ORAL ONCE
Qty: 4000 ML | Refills: 0 | Status: SHIPPED | OUTPATIENT
Start: 2018-03-21 | End: 2018-03-21

## 2018-03-26 ENCOUNTER — ANESTHESIA (OUTPATIENT)
Dept: ENDOSCOPY | Facility: HOSPITAL | Age: 48
End: 2018-03-26
Payer: COMMERCIAL

## 2018-03-26 ENCOUNTER — HOSPITAL ENCOUNTER (OUTPATIENT)
Facility: HOSPITAL | Age: 48
Discharge: HOME | End: 2018-03-26
Attending: INTERNAL MEDICINE | Admitting: INTERNAL MEDICINE
Payer: COMMERCIAL

## 2018-03-26 ENCOUNTER — ANESTHESIA EVENT (OUTPATIENT)
Dept: ENDOSCOPY | Facility: HOSPITAL | Age: 48
End: 2018-03-26
Payer: COMMERCIAL

## 2018-03-26 ENCOUNTER — SURGERY (OUTPATIENT)
Age: 48
End: 2018-03-26

## 2018-03-26 VITALS
SYSTOLIC BLOOD PRESSURE: 114 MMHG | BODY MASS INDEX: 28 KG/M2 | HEIGHT: 71 IN | DIASTOLIC BLOOD PRESSURE: 74 MMHG | TEMPERATURE: 98 F | RESPIRATION RATE: 19 BRPM | OXYGEN SATURATION: 96 % | HEART RATE: 71 BPM | WEIGHT: 200 LBS

## 2018-03-26 DIAGNOSIS — R10.31 ABDOMINAL PAIN, RLQ (RIGHT LOWER QUADRANT): ICD-10-CM

## 2018-03-26 PROCEDURE — 88305 TISSUE EXAM BY PATHOLOGIST: CPT | Mod: 26,,, | Performed by: PATHOLOGY

## 2018-03-26 PROCEDURE — 45385 COLONOSCOPY W/LESION REMOVAL: CPT | Mod: ,,, | Performed by: INTERNAL MEDICINE

## 2018-03-26 PROCEDURE — 27201089 HC SNARE, DISP (ANY): Performed by: INTERNAL MEDICINE

## 2018-03-26 PROCEDURE — D9220A PRA ANESTHESIA: Mod: CRNA,,, | Performed by: NURSE ANESTHETIST, CERTIFIED REGISTERED

## 2018-03-26 PROCEDURE — 45385 COLONOSCOPY W/LESION REMOVAL: CPT | Performed by: INTERNAL MEDICINE

## 2018-03-26 PROCEDURE — 63600175 PHARM REV CODE 636 W HCPCS: Performed by: NURSE ANESTHETIST, CERTIFIED REGISTERED

## 2018-03-26 PROCEDURE — 37000009 HC ANESTHESIA EA ADD 15 MINS: Performed by: INTERNAL MEDICINE

## 2018-03-26 PROCEDURE — 25000003 PHARM REV CODE 250: Performed by: INTERNAL MEDICINE

## 2018-03-26 PROCEDURE — 88305 TISSUE EXAM BY PATHOLOGIST: CPT | Performed by: PATHOLOGY

## 2018-03-26 PROCEDURE — 37000008 HC ANESTHESIA 1ST 15 MINUTES: Performed by: INTERNAL MEDICINE

## 2018-03-26 PROCEDURE — D9220A PRA ANESTHESIA: Mod: ANES,,, | Performed by: ANESTHESIOLOGY

## 2018-03-26 RX ORDER — PROPOFOL 10 MG/ML
VIAL (ML) INTRAVENOUS
Status: DISCONTINUED | OUTPATIENT
Start: 2018-03-26 | End: 2018-03-26

## 2018-03-26 RX ORDER — LIDOCAINE HCL/PF 100 MG/5ML
SYRINGE (ML) INTRAVENOUS
Status: DISCONTINUED | OUTPATIENT
Start: 2018-03-26 | End: 2018-03-26

## 2018-03-26 RX ORDER — PROPOFOL 10 MG/ML
VIAL (ML) INTRAVENOUS CONTINUOUS PRN
Status: DISCONTINUED | OUTPATIENT
Start: 2018-03-26 | End: 2018-03-26

## 2018-03-26 RX ORDER — SODIUM CHLORIDE 9 MG/ML
INJECTION, SOLUTION INTRAVENOUS CONTINUOUS
Status: DISCONTINUED | OUTPATIENT
Start: 2018-03-26 | End: 2018-03-26 | Stop reason: HOSPADM

## 2018-03-26 RX ADMIN — SODIUM CHLORIDE: 9 INJECTION, SOLUTION INTRAVENOUS at 09:03

## 2018-03-26 RX ADMIN — PROPOFOL 150 MCG/KG/MIN: 10 INJECTION, EMULSION INTRAVENOUS at 09:03

## 2018-03-26 RX ADMIN — PROPOFOL 20 MG: 10 INJECTION, EMULSION INTRAVENOUS at 09:03

## 2018-03-26 RX ADMIN — PROPOFOL 50 MG: 10 INJECTION, EMULSION INTRAVENOUS at 09:03

## 2018-03-26 RX ADMIN — LIDOCAINE HYDROCHLORIDE 40 MG: 20 INJECTION, SOLUTION INTRAVENOUS at 09:03

## 2018-03-26 NOTE — PROVATION PATIENT INSTRUCTIONS
Discharge Summary/Instructions after an Endoscopic Procedure  Patient Name: Andre Padgett  Patient MRN: [id number]  Patient YOB: 1970 Monday, March 26, 2018  Sanjiv Duran MD  RESTRICTIONS:  During your procedure today, you received medications for sedation.  These   medications may affect your judgment, balance and coordination.  Therefore,   for 24 hours, you have the following restrictions:   - DO NOT drive a car, operate machinery, make legal/financial decisions,   sign important papers or drink alcohol.    ACTIVITY:  The following day: return to full activity including work, except no heavy   lifting, straining or running for 3 days if polyps were removed.  DIET:  Eat and drink normally unless instructed otherwise.     TREATMENT FOR COMMON SIDE EFFECTS:  - Mild abdominal pain, nausea, belching, bloating or excessive gas:  rest,   eat lightly and use a heating pad.  - Sore Throat: treat with throat lozenges and/or gargle with warm salt   water.  - Because air was used during the procedure, expelling large amounts of air   from your rectum or belching is normal.  - If a bowel prep was taken, you may not have a bowel movement for 1-3 days.    This is normal.  SYMPTOMS TO WATCH FOR AND REPORT TO YOUR PHYSICIAN:  1. Abdominal pain or bloating, other than gas cramps.  2. Chest pain.  3. Back pain.  4. Signs of infection such as: chills or fever occurring within 24 hours   after the procedure.  5. Rectal bleeding, which would show as bright red, maroon, or black stools.   (A tablespoon of blood from the rectum is not serious, especially if   hemorrhoids are present.)  6. Vomiting.  7. Weakness or dizziness.  GO DIRECTLY TO THE NEAREST EMERGENCY ROOM IF YOU HAVE ANY OF THE FOLLOWING:      Difficulty breathing              Chills and/or fever over 101 F   Persistent vomiting and/or vomiting blood   Severe abdominal pain   Severe chest pain   Black, tarry stools   Bleeding- more than one tablespoon   Any  other symptom or condition that you feel may need urgent attention  Your doctor recommends these additional instructions:  If any biopsies were taken, your doctors clinic will contact you in 1 to 2   weeks with any results.  You are being discharged to home.   We are waiting for your pathology results.   Telephone your endoscopist for pathology results in two weeks.   Your physician has recommended a repeat colonoscopy in five years for   surveillance based on pathology results.   The findings and recommendations have been discussed with you.   Return to your primary care physician.  For questions, problems or results please call your physician - Sanjiv Duran MD at Work:  [phone].  [facility], EMERGENCY ROOM PHONE NUMBER: [phone]  IF A COMPLICATION OR EMERGENCY SITUATION ARISES AND YOU ARE UNABLE TO REACH   YOUR PHYSICIAN - GO DIRECTLY TO THE EMERGENCY ROOM.  Sanjiv Duran MD  3/26/2018 10:03:51 AM  This report has been verified and signed electronically.

## 2018-03-26 NOTE — ANESTHESIA RELEASE NOTE
"Anesthesia Release from PACU Note    Patient: Andre Padgett    Procedure(s) Performed: Procedure(s) (LRB):  COLONOSCOPY (N/A)    Anesthesia type: general    Post pain: Adequate analgesia    Post assessment: no apparent anesthetic complications and tolerated procedure well    Last Vitals:   Visit Vitals  /74   Pulse 71   Temp 36.5 °C (97.7 °F)   Resp 19   Ht 5' 11" (1.803 m)   Wt 90.7 kg (200 lb)   SpO2 96%   BMI 27.89 kg/m²       Post vital signs: stable    Level of consciousness: awake and alert     Nausea/Vomiting: no nausea/no vomiting    Complications: none    Airway Patency: patent    Respiratory: unassisted, spontaneous ventilation, room air    Cardiovascular: stable and blood pressure at baseline    Hydration: euvolemic  "

## 2018-03-26 NOTE — DISCHARGE INSTRUCTIONS
Colonoscopy     A camera attached to a flexible tube with a viewing lens is used to take video pictures.     Colonoscopy is a test to view the inside of your lower digestive tract (colon and rectum). Sometimes it can show the last part of the small intestine (ileum). During the test, small pieces of tissue may be removed for testing. This is called a biopsy. Small growths, such as polyps, may also be removed.   Why is colonoscopy done?  The test is done to help look for colon cancer. And it can help find the source of abdominal pain, bleeding, and changes in bowel habits. It may be needed once a year, depending on factors such as your:  · Age  · Health history  · Family health history  · Symptoms  · Results from any prior colonoscopy  Risks and possible complications  These include:  · Bleeding               · A puncture or tear in the colon   · Risks of anesthesia  · A cancer lesion not being seen  Getting ready   To prepare for the test:  · Talk with your healthcare provider about the risks of the test (see below). Also ask your healthcare provider about alternatives to the test.  · Tell your healthcare provider about any medicines you take. Also tell him or her about any health conditions you may have.  · Make sure your rectum and colon are empty for the test. Follow the diet and bowel prep instructions exactly. If you dont, the test may need to be rescheduled.  · Plan for a friend or family member to drive you home after the test.     Colonoscopy provides an inside view of the entire colon.     You may discuss the results with your doctor right away or at a future visit.  During the test   The test is usually done in the hospital on an outpatient basis. This means you go home the same day. The procedure takes about 30 minutes. During that time:  · You are given relaxing (sedating) medicine through an IV line. You may be drowsy, or fully asleep.  · The healthcare provider will first give you a physical exam to  check for anal and rectal problems.  · Then the anus is lubricated and the scope inserted.  · If you are awake, you may have a feeling similar to needing to have a bowel movement. You may also feel pressure as air is pumped into the colon. Its OK to pass gas during the procedure.  · Biopsy, polyp removal, or other treatments may be done during the test.  After the test   You may have gas right after the test. It can help to try to pass it to help prevent later bloating. Your healthcare provider may discuss the results with you right away. Or you may need to schedule a follow-up visit to talk about the results. After the test, you can go back to your normal eating and other activities. You may be tired from the sedation and need to rest for a few hours.  Date Last Reviewed: 11/1/2016 © 2000-2017 [employer]. [address] [place] [address], [place] [address]. All rights reserved. This information is not intended as a substitute for professional medical care. Always follow your healthcare professional's instructions.

## 2018-03-26 NOTE — TRANSFER OF CARE
"Anesthesia Transfer of Care Note    Patient: Andre Padgett    Procedure(s) Performed: Procedure(s) (LRB):  COLONOSCOPY (N/A)    Patient location: PACU    Anesthesia Type: general    Transport from OR: Transported from OR on room air with adequate spontaneous ventilation    Post pain: adequate analgesia    Post assessment: no apparent anesthetic complications    Post vital signs: stable    Level of consciousness: sedated    Nausea/Vomiting: no nausea/vomiting    Complications: none    Transfer of care protocol was followed      Last vitals:   Visit Vitals  BP (!) 100/58   Pulse 97   Temp 36.5 °C (97.7 °F)   Resp (!) 24   Ht 5' 11" (1.803 m)   Wt 90.7 kg (200 lb)   SpO2 98%   BMI 27.89 kg/m²     "

## 2018-03-26 NOTE — ANESTHESIA PREPROCEDURE EVALUATION
03/26/2018  Andre Padgett is a 47 y.o., male.  Past Medical History:   Diagnosis Date    Aneurysm     Right sided filling anterior communicating aneurysm s/p coiling 2011    Anxiety     Generalized headaches     Hyperlipidemia        Anesthesia Evaluation    I have reviewed the Patient Summary Reports.    I have reviewed the Nursing Notes.   I have reviewed the Medications.     Review of Systems  Anesthesia Hx:  No problems with previous Anesthesia  History of prior surgery of interest to airway management or planning: Previous anesthesia: General Denies Family Hx of Anesthesia complications.   Denies Personal Hx of Anesthesia complications.   Social:  Non-Smoker    Pulmonary:  Pulmonary Normal    Hepatic/GI:  Hepatic/GI Normal    Neurological:   S/p SOTO aneurysm coiling   Endocrine:  Endocrine Normal        Physical Exam  General:  Well nourished    Airway/Jaw/Neck:  Airway Findings: Mouth Opening: Normal Tongue: Normal  General Airway Assessment: Adult  Mallampati: I  TM Distance: Normal, at least 6 cm  Jaw/Neck Findings:  Neck ROM: Normal ROM      Dental:  Dental Findings: In tact        Mental Status:  Mental Status Findings:  Cooperative, Alert and Oriented         Anesthesia Plan  Type of Anesthesia, risks & benefits discussed:  Anesthesia Type:  general  Patient's Preference:   Intra-op Monitoring Plan:   Intra-op Monitoring Plan Comments:   Post Op Pain Control Plan:   Post Op Pain Control Plan Comments:   Induction:   IV  Beta Blocker:  Patient is not currently on a Beta-Blocker (No further documentation required).       Informed Consent: Patient understands risks and agrees with Anesthesia plan.  Questions answered. Anesthesia consent signed with patient.  ASA Score: 2     Day of Surgery Review of History & Physical:    H&P update referred to the surgeon.         Ready For Surgery From  Anesthesia Perspective.

## 2018-03-26 NOTE — ANESTHESIA POSTPROCEDURE EVALUATION
"Anesthesia Post Evaluation    Patient: Andre Padgett    Procedure(s) Performed: Procedure(s) (LRB):  COLONOSCOPY (N/A)    Final Anesthesia Type: general  Patient location during evaluation: GI PACU  Patient participation: Yes- Able to Participate  Level of consciousness: awake and alert  Post-procedure vital signs: reviewed and stable  Pain management: adequate  Airway patency: patent  PONV status at discharge: No PONV  Anesthetic complications: no      Cardiovascular status: hemodynamically stable  Respiratory status: unassisted, spontaneous ventilation and room air  Hydration status: euvolemic  Follow-up not needed.        Visit Vitals  /74   Pulse 71   Temp 36.5 °C (97.7 °F)   Resp 19   Ht 5' 11" (1.803 m)   Wt 90.7 kg (200 lb)   SpO2 96%   BMI 27.89 kg/m²       Pain/Aamir Score: Presence of Pain: denies (3/26/2018 10:01 AM)  Aamir Score: 9 (3/26/2018 10:29 AM)      "

## 2018-03-26 NOTE — H&P
[facility]  History & Physical    Subjective:      Chief Complaint/Reason for Admission:     Colonoscopy    Andre Padgett is a 47 y.o. male.    Past Medical History:   Diagnosis Date    Aneurysm     Right sided filling anterior communicating aneurysm s/p coiling 2011    Anxiety     Generalized headaches     Hyperlipidemia      Past Surgical History:   Procedure Laterality Date    BRAIN SURGERY  2011    angiogram/coiling     Family History   Problem Relation Age of Onset    DAVID disease Mother     Hypertension Mother     Cancer Father      brain    Celiac disease Neg Hx     Cirrhosis Neg Hx     Colon polyps Neg Hx     Crohn's disease Neg Hx     Cystic fibrosis Neg Hx     Esophageal cancer Neg Hx     Hemochromatosis Neg Hx     Inflammatory bowel disease Neg Hx     Irritable bowel syndrome Neg Hx     Liver cancer Neg Hx     Liver disease Neg Hx     Rectal cancer Neg Hx     Stomach cancer Neg Hx     Ulcerative colitis Neg Hx     Alli's disease Neg Hx     Heart disease Neg Hx     Heart attack Neg Hx     Amblyopia Neg Hx     Blindness Neg Hx     Cataracts Neg Hx     Glaucoma Neg Hx     Macular degeneration Neg Hx     Retinal detachment Neg Hx     Strabismus Neg Hx     Colon cancer Neg Hx      Social History   Substance Use Topics    Smoking status: Never Smoker    Smokeless tobacco: Never Used    Alcohol use Yes      Comment: ocassionally - twice a week       Facility-Administered Medications Prior to Admission   Medication    albuterol nebulizer solution 1.25 mg     PTA Medications   Medication Sig    ALPRAZolam (XANAX) 0.5 MG tablet take 1 tablet by mouth at bedtime if needed    ascorbic acid (VITAMIN C) 100 MG tablet Take 100 mg by mouth once daily.    atorvastatin (LIPITOR) 40 MG tablet take 1 tablet by mouth once daily    esomeprazole (NEXIUM) 40 MG capsule Take 1 capsule (40 mg total) by mouth before breakfast.    lorazepam (ATIVAN) 1 MG tablet Take 1  tablet (1 mg total) by mouth as needed for Anxiety (MRI). 1 tab 45 minutes prior to MRI.  May repeat x1 if needed.    ondansetron (ZOFRAN) 4 MG tablet Take 1 tablet (4 mg total) by mouth every 6 (six) hours.     Review of patient's allergies indicates:  No Known Allergies     Review of Systems   Constitutional: Negative for chills, fever and weight loss.   Respiratory: Negative for shortness of breath and wheezing.    Cardiovascular: Negative for chest pain.   Gastrointestinal: Positive for abdominal pain. Negative for blood in stool, constipation, diarrhea and melena.       Objective:      Vital Signs (Most Recent)  Temp: 98.1 °F (36.7 °C) (03/26/18 0906)  Pulse: 85 (03/26/18 0906)  Resp: 17 (03/26/18 0906)  BP: (!) 145/95 (03/26/18 0906)  SpO2: 99 % (03/26/18 0906)    Vital Signs Range (Last 24H):  Temp:  [98.1 °F (36.7 °C)]   Pulse:  [85]   Resp:  [17]   BP: (145)/(95)   SpO2:  [99 %]     Physical Exam   Constitutional: He appears well-developed and well-nourished.   Cardiovascular: Normal rate.    Pulmonary/Chest: Effort normal and breath sounds normal.   Abdominal: Soft. Bowel sounds are normal.   Skin: Skin is warm and dry.   Psychiatric: He has a normal mood and affect. His behavior is normal. Judgment and thought content normal.           Assessment:      Active Hospital Problems    Diagnosis  POA    Abdominal pain, RLQ (right lower quadrant) [R10.31]  Yes      Resolved Hospital Problems    Diagnosis Date Resolved POA   No resolved problems to display.       Plan:    Colonoscopy direct access colonoscopy for RLQ pain

## 2018-03-28 ENCOUNTER — PATIENT MESSAGE (OUTPATIENT)
Dept: INTERNAL MEDICINE | Facility: CLINIC | Age: 48
End: 2018-03-28

## 2018-04-02 ENCOUNTER — TELEPHONE (OUTPATIENT)
Dept: ENDOSCOPY | Facility: HOSPITAL | Age: 48
End: 2018-04-02

## 2018-04-04 ENCOUNTER — OFFICE VISIT (OUTPATIENT)
Dept: INTERNAL MEDICINE | Facility: CLINIC | Age: 48
End: 2018-04-04
Payer: COMMERCIAL

## 2018-04-04 VITALS
HEIGHT: 71 IN | SYSTOLIC BLOOD PRESSURE: 136 MMHG | BODY MASS INDEX: 28.58 KG/M2 | DIASTOLIC BLOOD PRESSURE: 66 MMHG | HEART RATE: 81 BPM | WEIGHT: 204.13 LBS

## 2018-04-04 DIAGNOSIS — R10.9 ABDOMINAL PAIN, UNSPECIFIED ABDOMINAL LOCATION: ICD-10-CM

## 2018-04-04 DIAGNOSIS — K42.9 UMBILICAL HERNIA WITHOUT OBSTRUCTION AND WITHOUT GANGRENE: Primary | ICD-10-CM

## 2018-04-04 PROCEDURE — 99999 PR PBB SHADOW E&M-EST. PATIENT-LVL IV: CPT | Mod: PBBFAC,,, | Performed by: INTERNAL MEDICINE

## 2018-04-04 PROCEDURE — 99214 OFFICE O/P EST MOD 30 MIN: CPT | Mod: S$GLB,,, | Performed by: INTERNAL MEDICINE

## 2018-04-04 NOTE — PROGRESS NOTES
Subjective:       Patient ID: Andre Padgett is a 47 y.o. male.    Chief Complaint: Follow-up (chronic RLQ pain)      Patient here to follow up abdominal pain.  The right-sided upper abdominal pain has resolved at least in the last several days and now he is having umbilical pain.  We had probably felt a small hernia and a ebonie umbilical hernia was noted on CT scan.  He says pain in the umbilical area radiates to the right lower quadrant.  No testicular or urinary pain.  We went over his CT scan and his colonoscopy in detail.  He had an upper scope about 2 years ago.  He started taking Metamucil and it is possible that treating some mild constipation help the right upper quadrant pain but he is still concerned about the new periumbilical pain and the hernia.  We will arrange a general surgery consult.  He wanted to know what to expect if that was negative or unremarkable.  I said we could continue to pursue evaluation is a chronic pain including seeing a back pain specialist to determine if that is radiating to the front.      Review of Systems   Constitutional: Negative for chills, fatigue, fever and unexpected weight change.   HENT: Negative for nosebleeds and trouble swallowing.    Eyes: Negative for pain and visual disturbance.   Respiratory: Negative for cough, shortness of breath and wheezing.    Cardiovascular: Negative for chest pain and palpitations.   Gastrointestinal: Positive for abdominal pain. Negative for constipation, diarrhea, nausea and vomiting.   Genitourinary: Negative for difficulty urinating and hematuria.   Musculoskeletal: Negative for neck pain.   Skin: Negative for rash.   Neurological: Negative for dizziness and headaches.   Hematological: Does not bruise/bleed easily.   Psychiatric/Behavioral: Negative for dysphoric mood, sleep disturbance and suicidal ideas.       Objective:      Physical Exam   Constitutional: He is oriented to person, place, and time. He appears well-developed and  well-nourished. No distress.   HENT:   Head: Normocephalic and atraumatic.   Mouth/Throat: No oropharyngeal exudate.   TM's clear, pharynx clear   Eyes: Conjunctivae and EOM are normal. Pupils are equal, round, and reactive to light. No scleral icterus.   Neck: Normal range of motion. Neck supple. No thyromegaly present.   No supraclavicular nodes palpated   Cardiovascular: Normal rate, regular rhythm and normal heart sounds.    No murmur heard.  Pulmonary/Chest: Effort normal and breath sounds normal. He has no wheezes.   Abdominal: Soft. Bowel sounds are normal. He exhibits no mass. There is tenderness ( mild umbilical tenderness with radiation to right groin.  No right groin tenderness to palpation, no bulge in the right groin). A hernia (  UmbilicalWith radiation to the right groin) is present.   Musculoskeletal: He exhibits no edema.   Lymphadenopathy:     He has no cervical adenopathy.   Neurological: He is alert and oriented to person, place, and time.   Skin: No pallor.   Psychiatric: He has a normal mood and affect.       Assessment:       1. Umbilical hernia without obstruction and without gangrene    2. Abdominal pain, unspecified abdominal location        Plan:       Andre was seen today for follow-up.    Diagnoses and all orders for this visit:    Umbilical hernia without obstruction and without gangrene  -     Ambulatory referral to General Surgery    Abdominal pain, unspecified abdominal location         Continue stool softener, monitor diet.  Follow-up after general surgery consult

## 2018-04-11 ENCOUNTER — OFFICE VISIT (OUTPATIENT)
Dept: SURGERY | Facility: CLINIC | Age: 48
End: 2018-04-11
Payer: COMMERCIAL

## 2018-04-11 VITALS
BODY MASS INDEX: 28.37 KG/M2 | DIASTOLIC BLOOD PRESSURE: 72 MMHG | HEIGHT: 71 IN | SYSTOLIC BLOOD PRESSURE: 115 MMHG | TEMPERATURE: 98 F | HEART RATE: 85 BPM | WEIGHT: 202.63 LBS

## 2018-04-11 DIAGNOSIS — K42.9 UMBILICAL HERNIA WITHOUT OBSTRUCTION AND WITHOUT GANGRENE: Primary | ICD-10-CM

## 2018-04-11 DIAGNOSIS — R10.31 RIGHT GROIN PAIN: ICD-10-CM

## 2018-04-11 PROCEDURE — 99999 PR PBB SHADOW E&M-EST. PATIENT-LVL III: CPT | Mod: PBBFAC,,, | Performed by: SURGERY

## 2018-04-11 PROCEDURE — 99203 OFFICE O/P NEW LOW 30 MIN: CPT | Mod: S$GLB,,, | Performed by: SURGERY

## 2018-04-11 NOTE — LETTER
April 11, 2018      Hunter Diop MD  [address]  [place] [address]           [facility] - General Surgery  [address]  [place] [address]  Phone: [phone]          Patient: Andre Padgett   MR Number: [id number]   YOB: 1970   Date of Visit: 4/11/2018       Dear Dr. Hunter Diop:    Thank you for referring Andre Padgett to me for evaluation. Attached you will find relevant portions of my assessment and plan of care.    If you have questions, please do not hesitate to call me. I look forward to following Andre Padgett along with you.    Sincerely,    Azeem Green MD    Enclosure  CC:  No Recipients    If you would like to receive this communication electronically, please contact [email or web address] or [phone] to request more information on [employer] Link access.    For providers and/or their staff who would like to refer a patient to [facility], please contact us through our one-stop-shop provider referral line, [facility], at [phone].    If you feel you have received this communication in error or would no longer like to receive these types of communications, please e-mail [email or web address]

## 2018-04-11 NOTE — PROGRESS NOTES
History & Physical    SUBJECTIVE:     History of Present Illness:  Patient is a 47 y.o. male presents with abdominal pain involving the right lateral,  periumbillical and right groin areas. Onset of symptoms was gradual starting several months ago with unchanged course since that time. Patient denies obstructive symptoms or GI symptoms. Symptoms are aggravated by nothing inparticular as the symptoms occur randomly . Symptoms improve with rest.      Chief Complaint   Patient presents with    Consult       Review of patient's allergies indicates:  No Known Allergies    Current Outpatient Prescriptions   Medication Sig Dispense Refill    ALPRAZolam (XANAX) 0.5 MG tablet take 1 tablet by mouth at bedtime if needed 30 tablet 1    ascorbic acid (VITAMIN C) 100 MG tablet Take 100 mg by mouth once daily.      atorvastatin (LIPITOR) 40 MG tablet take 1 tablet by mouth once daily 90 tablet 3     Current Facility-Administered Medications   Medication Dose Route Frequency Provider Last Rate Last Dose    albuterol nebulizer solution 1.25 mg  1.25 mg Nebulization 1 time in Clinic/HOD Anjelica Loyola MD           Past Medical History:   Diagnosis Date    Aneurysm     Right sided filling anterior communicating aneurysm s/p coiling 2011    Anxiety     Generalized headaches     Hyperlipidemia      Past Surgical History:   Procedure Laterality Date    BRAIN SURGERY  2011    angiogram/coiling    COLONOSCOPY N/A 3/26/2018    Procedure: COLONOSCOPY;  Surgeon: Sanjiv Duran MD;  Location: [facility] ([address] [place] [address]);  Service: Endoscopy;  Laterality: N/A;     Family History   Problem Relation Age of Onset    DAVID disease Mother     Hypertension Mother     Cancer Father      brain    Celiac disease Neg Hx     Cirrhosis Neg Hx     Colon polyps Neg Hx     Crohn's disease Neg Hx     Cystic fibrosis Neg Hx     Esophageal cancer Neg Hx     Hemochromatosis Neg Hx     Inflammatory bowel disease Neg Hx     Irritable bowel  "syndrome Neg Hx     Liver cancer Neg Hx     Liver disease Neg Hx     Rectal cancer Neg Hx     Stomach cancer Neg Hx     Ulcerative colitis Neg Hx     Alli's disease Neg Hx     Heart disease Neg Hx     Heart attack Neg Hx     Amblyopia Neg Hx     Blindness Neg Hx     Cataracts Neg Hx     Glaucoma Neg Hx     Macular degeneration Neg Hx     Retinal detachment Neg Hx     Strabismus Neg Hx     Colon cancer Neg Hx      Social History   Substance Use Topics    Smoking status: Never Smoker    Smokeless tobacco: Never Used    Alcohol use Yes      Comment: ocassionally - twice a week        Review of Systems:      I have reviewed the Patient Summary Reports.        Review of Systems  Anesthesia Hx:  No problems with previous Anesthesia    Hematology/Oncology:  Hematology Normal   Oncology Normal     EENT/Dental:EENT/Dental Normal   Cardiovascular:  Cardiovascular Normal     Renal/:  Renal/ Normal     Hepatic/GI:  Hepatic/GI Normal    Musculoskeletal:   Arthritis     Neurological:   Headaches    Dermatological:  Skin Normal    Psych:  Psychiatric Normal           OBJECTIVE:     Vital Signs (Most Recent)  Temp: 98.4 °F (36.9 °C) (04/11/18 1033)  Pulse: 85 (04/11/18 1033)  BP: 115/72 (04/11/18 1033)  5' 11" (1.803 m)  91.9 kg (202 lb 9.6 oz)     Physical Exam:    Physical Exam  General:  Well nourished    Airway/Jaw/Neck:  AIRWAY FINDINGS: Normal Jaw/Neck Findings: Micrognathia: Negative Mandibular Fracture: Negative Neck Findings: Normal    Eyes/Ears/Nose:  EYES/EARS/NOSE FINDINGS: Normal   Dental:  DENTAL FINDINGS: Normal    Heart/Vascular:  Heart Findings: Normal Heart murmur: negative Vascular Findings: Normal    Abdomen:  Abdomen Findings:  Normal  Reducible umbillical hernia  No palpable groin hernia     Musculoskeletal:  Musculoskeletal Findings: Normal   Skin:  Skin Findings: Normal    Mental Status:  Mental Status Findings: Normal        Laboratory  none    Diagnostic Results:  CT: " Reviewed  umbillical hernia    ASSESSMENT/PLAN:     Patient has an umbillical hernia but symptoms atypical involving right lateral and right groin areas.      PLAN:Plan     Provocative ultrasound right groin

## 2018-04-12 ENCOUNTER — HOSPITAL ENCOUNTER (OUTPATIENT)
Dept: RADIOLOGY | Facility: HOSPITAL | Age: 48
Discharge: HOME | End: 2018-04-12
Attending: SURGERY
Payer: COMMERCIAL

## 2018-04-12 DIAGNOSIS — R10.31 RIGHT GROIN PAIN: ICD-10-CM

## 2018-04-12 PROCEDURE — 76705 ECHO EXAM OF ABDOMEN: CPT | Mod: TC

## 2018-04-12 PROCEDURE — 76705 ECHO EXAM OF ABDOMEN: CPT | Mod: 26,,, | Performed by: RADIOLOGY

## 2018-04-18 ENCOUNTER — OFFICE VISIT (OUTPATIENT)
Dept: GASTROENTEROLOGY | Facility: CLINIC | Age: 48
End: 2018-04-18
Payer: COMMERCIAL

## 2018-04-18 VITALS
HEART RATE: 74 BPM | BODY MASS INDEX: 29.2 KG/M2 | SYSTOLIC BLOOD PRESSURE: 127 MMHG | HEIGHT: 70 IN | WEIGHT: 203.94 LBS | DIASTOLIC BLOOD PRESSURE: 77 MMHG

## 2018-04-18 DIAGNOSIS — R10.31 RLQ ABDOMINAL PAIN: Primary | ICD-10-CM

## 2018-04-18 PROCEDURE — 99213 OFFICE O/P EST LOW 20 MIN: CPT | Mod: SA,S$GLB,, | Performed by: NURSE PRACTITIONER

## 2018-04-18 PROCEDURE — 99999 PR PBB SHADOW E&M-EST. PATIENT-LVL IV: CPT | Mod: PBBFAC,,, | Performed by: NURSE PRACTITIONER

## 2018-04-18 RX ORDER — DICYCLOMINE HYDROCHLORIDE 10 MG/1
10 CAPSULE ORAL
Qty: 120 CAPSULE | Refills: 0 | Status: SHIPPED | OUTPATIENT
Start: 2018-04-18 | End: 2018-05-18

## 2018-04-18 NOTE — PATIENT INSTRUCTIONS
"Take over the counter IBgard for abdominal discomfort.          Probiotics      For IBS and bloating, we typically recommend Align, VSL#3, or [employer], which can typically be found without a prescription at the pharmacy. Give this at least  a month to work, but can take up to 3 months to repopulate your intestines, so be patient!     VSL#3 is a potent probiotic which can be found in pill form (try [employer] for best price, or VSL[employer].com). $52 for a 2 month supply. The sachets are for ulcerative colitis and require a prescription.    If you go on the internet, a reputable/powerful probiotic appears to be Super Shield from [employer] at: http://www.bluerockholistics.Splash.FM/product/pross.asp  You can also choose PB 8 which can be found at [employer] or online.    For diarrhea from travel or antibiotics, we typically recommend Culturelle or Florastor (especially for diarrhea from C diff infection).         General information:  Pick one that has at least seven strains, and five billion CFU (colony forming units).    Products containing probiotics have flooded the market in recent years. As more people seek natural or non-drug ways to maintain their health, manufacturers have responded by offering probiotics in everything from yogurt to chocolate and granola bars to powders and capsules.    Although probiotics have been around for generations - think of the "live active cultures"in several brands of yogurt - the sheer number of products with probiotics now available may overwhelm even the most conscientious of shoppers. In some respects, the industry has grown faster than the research and scientists and doctors are calling for more studies to help determine which probiotics are beneficial and which might be a waste of money.    What Are Probiotics?  Probiotics are living microscopic organisms, or micro-organisms, that scientific research has shown to benefit your health. Most often they are bacteria, but " they may also be other organisms such as yeasts. In some cases they are similar, or the same, as the good bacteria already in your body, particularly those in your gut.    The most common probiotic bacteria come from two groups, Lactobacillus or Bifidobacterium, although it is important to remember that there are many other types of bacteria that are also classified as probiotics. Each group of bacteria has different species and each species has different strains. This is important to remember because different strains have different benefits for different parts of your body. For example, Lactobacillus casei Shirota has been shown to support the immune system and to help food move through the gut, but Lactobacillus bulgaricus may help relieve symptoms of lactose intolerance, a condition in which people cannot digest the lactose found in most milk and dairy products. In general, not all probiotics are the same, and they dont all work the same way.    Scientists are still sorting out exactly how probiotics work. They may:       Boost your immune system by producing antibodies for certain viruses.  Produce substances that prevent infection.  Prevent harmful bacteria from attaching to the gut wall and growing there.  Send signals to your cells to strengthen the mucus in your intestine and help it act as a barrier against infection.  Inhibit or destroy toxins released by certain bad bacteria that can make you sick.  Produce B vitamins necessary for metabolizing the food you eat, warding off anemia caused by deficiencies in B-6 and B-12, and maintaining healthy skin and a healthy nervous system.      Common Uses  Probiotics are most often used to promote digestive health. Because there are different kinds of probiotics, it is important to find the right one for the specific health benefit you seek. Researchers are still studying which probiotic should be used for which health or disease state. Nevertheless,  probiotics have been shown to help regulate the movement of food through the intestine. They also may help treat digestive disease, something of much interest to gastroenterologists. Some of the most common uses for probiotics include the treatment of the following:    Irritable Bowel Syndrome    Irritable bowel syndrome (IBS) is a disorder of movement in the gut. People who have IBS may have diarrhea, constipation or alternating bouts of both. IBS is not caused by injury or illness. Often the only way doctors can diagnose it is to rule out other conditions through testing.    Probiotics, particularly Bifidobacterium infantis, Sacchromyces boulardii, Lactobacillus plantarum and combination probiotics may help regulate how often people with IBS have bowel movements. Probiotics may also help relieve bloating from gas. Bifidobacterium infantis 30367, Lactobacillus plantarum 299V or Bifidobacterium bifidum [id number] have been shown to help regulate bowel movements and relieve bloating, pain and gas.    Inflammatory Bowel Disease    Though some of the symptoms are the same, inflammatory bowel disease (IBD) is different from IBS because in IBD, the intestines become inflamed. Unlike IBS, IBD is a disorder of the immune system. Symptoms include abdominal cramps, pain, diarrhea, weight loss and blood in your stools. In Crohns disease, ulcers may develop anywhere in your intestine including both the large and small bowels. In ulcerative colitis, inflammation only involves the large intestine. Bouts of inflammation may come and go, but in some cases, prescription medication is needed to keep inflammation in check.        Some studies suggest that probiotics may help decrease inflammation and delay the next bout of disease. Ulcerative colitis seems to respond better to probiotics than Crohns disease does. So far, E. coli Nissle, and a mixture of several strains of Lactobacillus, Bifidobacterium and Streptococcus may be most  beneficial. Research is continuing to determine which probiotics are best to treat IBD.    Infectious Diarrhea    Infectious diarrhea is caused by bacteria, viruses or parasites. There is evidence that probiotics such as Lactobacillus rhamnosus and Lactobacillus casei may be particularly helpful in treating diarrhea caused by rotavirus, which often affects babies and small children. Several strains of Lactobacillus and a strain of the yeast Saccharomyces boulardii may help treat and shorten the course of infectious diarrhea.    Antibiotic-Related Diarrhea  Take Lactobacillus rhamnosus GG and/or Saccharomyces boulardii (Culturelle and/or Florastor) six hours after each dose of antibiotics. Increase the dose to 10 billion CFUs per day and continue for one to two weeks after you stop taking the antibiotic.    Sometimes taking an antibiotic can cause infectious diarrhea by reducing the number of good microorganisms in your gut. Then bacteria that normally do not give you any trouble can grow out of control. One such bacterium is Clostridium difficile, which is a major cause of diarrhea in hospitalized patients and people in long-term care facilities like nursing homes. The trouble with Clostridium difficile is that it tends to come back, but there is evidence that taking probiotics such as Saccharomyces boulardii may help prevent this. There is also evidence that taking probiotics when you first start taking an antibiotic may help prevent antibiotic-related diarrhea in the first place.    Travelers Diarrhea    Its possible to get infectious diarrhea when you travel and your body is exposed to new, normally harmless bacteria (travelers diarrhea). Most studies show that probiotics are not very effective in preventing or treating travelers diarrhea in adults. Taking Saccharomyces boulardii weeks before your trip may help prevent travelers diarrhea, which usually comes from ingesting food or water thats been  contaminated with bacteria.    Other Uses    Other potential uses for probiotics include maintaining a healthy mouth, preventing and treating certain skin conditions like eczema, promoting health in the urinary tract and vagina, and preventing allergies (especially in children). There is not as much research about these uses as there is about the benefits of probiotics for your digestive system, and studies have had mixed results. If you have eczema ?Lactobacillus rhamnosus  and Lactobacillus fermentum VRI-003 PCC have been shown to help treat those itchy, scaly skin rashes -- especially in children.If you have a cold ?Some research suggests that Bifidobacterium animalis lactis Bi-07 and Lactobacillus acidophilus NCFM can help reduce the duration and severity of the common cold and flu by enhancing the bodys production of antibodies. If you have a vaginal infection ?Lactobacillus acidophilus, Lactobacillus rhamnosus GR-1 and Lactobacillus reuteri RC-14 have been shown to help prevent and clear up bacterial vaginosis and urinary tract infections in some individuals. Researchers point to Lactobacillus reuteri RC-14 and Lactobacillus rhamnosus GR-1 as the most effective stains to protect against yeast infections as theyre especially adept at colonizing the vaginal environment and fighting off unwelcome bacteria and fungi. If you have bad breath, gingivitis or periodontitis ?A probiotic lozenge or mouthwash might be your best bet. Lactobacillus reuteri LR-1 or LR-2 promote oral health by binding to teeth and gums, preventing plaque formation in the mouth. Research has demonstrated the ability of Weissella cibaria to freshen breath by inhibiting the production of sulfur compounds in the mouth.    Are Probiotics Safe?  It is generally thought that most probiotics are safe, although it is not yet known if they are safe for people with severely impaired immune systems. They may be taken by people without a  diagnosed digestive problem. Their safety is evident since they have a long history of use in dairy foods like yogurt, cheese and milk.    However, you should talk to your doctor before adding these or any other probiotics to your diet. Probiotics might not be appropriate for seniors. Some probiotics may interfere with or interact with medications. Your doctor will be able to help you determine if probiotics are right for you based on your medical history.    Research about the use of probiotics in children has grown in recent years. Although studies have shown that probiotics may help to treat infectious diarrhea in babies and small children, researchers are unsure whether probiotics are particularly helpful for children with Crohns disease or other types of inflammatory bowel disease. Ask your childs pediatrician about probiotics before giving them to your child.    The exception here is breastfeeding. Breast milk stimulates the growth of normal gut organisms that are important for a babys digestive health and developing immune system. That is one reason why doctors strongly encourage mothers to breastfeed their babies.    Overall, scientists agree that more research is necessary before they can make blanket statements about the safety of probiotics in general or about individual groups and strains. Future studies will show whether probiotics can be used to treat diseases, are safe to use for a long time, and if it is possible to take too many probiotics or mix them in inappropriate ways. These studies will also guide us as to which probiotics to use for different disorders.    Keep in mind that probiotics are considered dietary supplements and are not FDA-regulated like drugs. They are not standardized, meaning they are made in different ways by different companies and have different additives. How well a probiotic works may differ from brand to brand and even from batch to batch within the same brand.  Probiotics also vary tremendously in their cost, and cost does not necessarily reflect higher quality.    Side effects may vary, too. The most common are gas and bloating. These are usually mild and temporary. More serious side effects include allergic reactions, either to the probiotics themselves or to other ingredients in the food or supplement.    Choosing a Probiotic  Probiotics are available in yogurt and other dairy products, chocolate and granola bars, juices, powders, and capsules. You can purchase them at your local supermarket or [employer] food store as well as on the Internet. Here are some tips to help you choose.    Check the label. The more information there is on the label, the better. Ideally, the label will tell you the probiotics group, species and strain, and how many of the microorganisms will still be alive on the use-by date. Although some products guarantee how many organisms were present at the time it was manufactured, often it is less clear how many organisms are present when these products are actually consumed.    Call the . Unfortunately, many labels dont say exactly which strain is in the product; many list only the group and the species, such as Lactobacillus acidophilus or Bifidobacterium lactis. If youre planning to take a probiotic for a specific condition, call the company and find out exactly which strains its products contain and what research they have done to support their health claims. You may be able to find this information on their Web site, as well.    Beware of the Internet. If you order products from the Internet, make sure you know the company from which you are ordering. There are plenty of scammers out there who are willing to send you fake products labeled as probiotics. At best, the ingredients could be harmless, like garlic powder. At worst, they could be laced with powerful herbs, prescription medications or illegal drugs. Some companies may simply  take your money and disappear.    Stick to well-established companies and companies you know. The longer a company has been around, the more likely its products have been tested and studied repeatedly and the bigger the reputation the company has to protect. Some manufacturers that have been making products with probiotics for a while are [employer], [employer], [employer], [employer], [employer], [employer], [employer], and [facility].    Storage    One final note: Remember to store your probiotic according to package instructions and make sure the product has a sell-by or expiration date. Probiotics are living organisms. Even if they are dried and dormant, like in a powder or capsule, they must be stored properly or they will die. Some require refrigeration whereas others do not. They also have a shelf-life, so make sure you use them before the expiration date on the package.

## 2018-04-18 NOTE — PROGRESS NOTES
[facility] Note    Reason for Visit:  The encounter diagnosis was RLQ abdominal pain.    PCP:   Hunter Diop       Referring MD:  No referring provider defined for this encounter.    HPI:  This is a 47 y.o. male here for evaluation of abdominal pain.  Seen by PCP, GI, surgeon: CT with mild constipation and umbilical hernia. Colonoscopy with polyps and hemorrhoids, but unrevealing for abd pain, abd us negative for inguinal hernia.     Abdominal pain   ONSET:2 months ago  LOCATION:RLQ  DURATION:last for a few minutes- few hours; daily  CHARACTER:bloating, dull  ASSOCIATED/ALLEVIATING/AGGRAVAITING:+ nausea. no vomiting. No fevers. not r/t meals. Can occur at rest or with movement. No triggers identified. Not r/t BMs. No relief with gas ex, norm seltzer,   RADIATION:to left abd and right groin  TEMPORAL:no patterns recognized  SEVERITY:2-6/10    Reflux - no  Dysphagia/odynophagia - no   Bowel habits - normal. 1 formed BM/day .   GI bleeding - denies hematochezia, hematemesis, melena, BRBPR, black/tarry stools, and coffee ground emesis  NSAID usage -BC goody for HA once-twice a month    ROS:  Constitutional: No fevers, no chills, No unintentional weight loss, + fatigue,   ENT: No allergies  CV: No chest pain, no palpitations, no perif. edema, no sob on exertion  Pulm: No cough, No shortness of breath, no wheezes, no sputum  Ophtho: No vision changes  GI: see HPI; also + nausea, no vomiting, no change in appetite  Derm: No rash  Heme: No lymphadenopathy, No bruising  MSK: + arthritis, no muscle pain, no muscle weakness  : No dysuria, No hematuria  Endo: No hot or cold intolerance  Neuro: No syncope, No seizure,       Medical History:  has a past medical history of Aneurysm; Anxiety; Generalized headaches; and Hyperlipidemia.    Surgical History:  has a past surgical history that includes Brain surgery (2011) and Colonoscopy (N/A, 3/26/2018).    Family History: family history includes Cancer  "in his father; DAVID disease in his mother; Hypertension in his mother..     Social History:  reports that he has never smoked. He has never used smokeless tobacco. He reports that he drinks alcohol. He reports that he does not use drugs.    Review of patient's allergies indicates:  No Known Allergies    Current Outpatient Prescriptions   Medication Sig    ALPRAZolam (XANAX) 0.5 MG tablet take 1 tablet by mouth at bedtime if needed    ascorbic acid (VITAMIN C) 100 MG tablet Take 100 mg by mouth once daily.    atorvastatin (LIPITOR) 40 MG tablet take 1 tablet by mouth once daily    dicyclomine (BENTYL) 10 MG capsule Take 1 capsule (10 mg total) by mouth 4 (four) times daily before meals and nightly.     Current Facility-Administered Medications   Medication    albuterol nebulizer solution 1.25 mg       Objective Findings:    Vital Signs:  /77   Pulse 74   Ht 5' 10" (1.778 m)   Wt 92.5 kg (203 lb 14.8 oz)   BMI 29.26 kg/m²   Body mass index is 29.26 kg/m².    Physical Exam:  General Appearance: Well appearing in no acute distress  Head:   Normocephalic, without obvious abnormality  Eyes:    No scleral icterus, EOMI  ENT: Neck supple, Lips, mucosa, and tongue normal; teeth and gums normal  Lungs: CTA bilaterally in anterior and posterior fields, no wheezes, no crackles.  Heart:  Regular rate and rhythm, S1, S2 normal, no murmurs heard  Abdomen: Soft, mild diffuse tenderness, non distended with positive bowel sounds in all four quadrants. No hepatosplenomegaly, ascites, or mass  Extremities: 2+ radial pulses, no clubbing, cyanosis or edema  Skin: No rash to exposed areas  Neurologic: A&Ox4      Labs:  Lab Results   Component Value Date    WBC 6.66 02/21/2018    HGB 15.2 02/21/2018    HCT 44.3 02/21/2018     02/21/2018    CHOL 191 02/21/2018    TRIG 101 02/21/2018    HDL 56 02/21/2018    ALT 32 02/21/2018    AST 19 02/21/2018     02/21/2018    K 4.3 02/21/2018     02/21/2018    CREATININE " 0.8 02/21/2018    BUN 11 02/21/2018    CO2 29 02/21/2018    TSH 1.389 02/21/2018    INR 1.3 (H) 01/21/2017    HGBA1C 5.2 02/21/2018       Imaging:  abd us limited 4/12/18: No evidence of inguinal hernia.  Ct abd/pelvis 3/5/18: No CT evidence of acute intra-abdominal abnormality. Fat containing periumbilical hernia. Fatty liver.  Ct renal stone study 9/15/15: fat containing umbilical hernia. Possible left kidney stones.   abd us 5/12/15: fatty liver.  Endoscopy:    Colon 3/26/18:EPTTI. NL TI. 2 mm transverse colon polyp (adenomatous polyp). Sigmoid and descending tics. Non bleeding hemorrhoids. Rpt 5 yrs.  EGD 10/29/15: NL esophagus. NL stomach(-). NL duodenum.   Assessment:  1. RLQ abdominal pain           Recommendations:  1. RLQ abd pain-unclear etiology. Trial bentyl as RX and OTC IBgard. Start daily probiotic as previously advised. Handout provided.     Follow-up in about 1 month (around 5/18/2018) for abd pain with MD provider.      Order summary:  Orders Placed This Encounter    dicyclomine (BENTYL) 10 MG capsule         Thank you so much for allowing me to participate in the care of Andre Ortiz, APRN, FNP-C

## 2018-04-20 ENCOUNTER — TELEPHONE (OUTPATIENT)
Dept: SURGERY | Facility: CLINIC | Age: 48
End: 2018-04-20

## 2018-04-20 NOTE — TELEPHONE ENCOUNTER
Notified patient after Dr. Green's review of Groin US:  No hernia noted in his bilateral groin.  He does have an umbilical hernia noted at consult visit.  He will speak to his wife and call back if he wants to discuss hernia repair.  He verbalized understanding and is agreeable to this plan of care.

## 2018-06-05 RX ORDER — ALPRAZOLAM 0.5 MG/1
TABLET ORAL
Qty: 30 TABLET | Refills: 1 | Status: SHIPPED | OUTPATIENT
Start: 2018-06-05 | End: 2019-04-30 | Stop reason: SDUPTHER

## 2018-06-22 ENCOUNTER — LAB VISIT (OUTPATIENT)
Dept: LAB | Facility: HOSPITAL | Age: 48
End: 2018-06-22
Attending: INTERNAL MEDICINE
Payer: COMMERCIAL

## 2018-06-22 ENCOUNTER — OFFICE VISIT (OUTPATIENT)
Dept: INTERNAL MEDICINE | Facility: CLINIC | Age: 48
End: 2018-06-22
Payer: COMMERCIAL

## 2018-06-22 VITALS
BODY MASS INDEX: 29.7 KG/M2 | DIASTOLIC BLOOD PRESSURE: 72 MMHG | SYSTOLIC BLOOD PRESSURE: 106 MMHG | HEART RATE: 92 BPM | HEIGHT: 70 IN | WEIGHT: 207.44 LBS

## 2018-06-22 DIAGNOSIS — K76.0 FATTY LIVER: ICD-10-CM

## 2018-06-22 DIAGNOSIS — E83.52 HYPERCALCEMIA: ICD-10-CM

## 2018-06-22 DIAGNOSIS — R10.31 ABDOMINAL PAIN, RLQ (RIGHT LOWER QUADRANT): Primary | ICD-10-CM

## 2018-06-22 DIAGNOSIS — K42.9 UMBILICAL HERNIA WITHOUT OBSTRUCTION AND WITHOUT GANGRENE: ICD-10-CM

## 2018-06-22 DIAGNOSIS — R93.7 ABNORMAL X-RAY OF THORACIC SPINE: ICD-10-CM

## 2018-06-22 DIAGNOSIS — M54.6 CHRONIC THORACIC BACK PAIN, UNSPECIFIED BACK PAIN LATERALITY: ICD-10-CM

## 2018-06-22 DIAGNOSIS — D12.6 COLON ADENOMA: ICD-10-CM

## 2018-06-22 DIAGNOSIS — K57.30 DIVERTICULOSIS OF LARGE INTESTINE WITHOUT HEMORRHAGE: ICD-10-CM

## 2018-06-22 DIAGNOSIS — M53.86 DECREASED RANGE OF MOTION OF LUMBAR SPINE: ICD-10-CM

## 2018-06-22 DIAGNOSIS — G89.29 CHRONIC THORACIC BACK PAIN, UNSPECIFIED BACK PAIN LATERALITY: ICD-10-CM

## 2018-06-22 DIAGNOSIS — R10.31 ABDOMINAL PAIN, RLQ (RIGHT LOWER QUADRANT): ICD-10-CM

## 2018-06-22 PROBLEM — M62.89 MUSCLE TIGHTNESS: Status: RESOLVED | Noted: 2017-10-12 | Resolved: 2018-06-22

## 2018-06-22 LAB
ALBUMIN SERPL BCP-MCNC: 4.4 G/DL
ALP SERPL-CCNC: 48 U/L
ALT SERPL W/O P-5'-P-CCNC: 43 U/L
ANION GAP SERPL CALC-SCNC: 10 MMOL/L
AST SERPL-CCNC: 26 U/L
BASOPHILS # BLD AUTO: 0.01 K/UL
BASOPHILS NFR BLD: 0.1 %
BILIRUB SERPL-MCNC: 0.8 MG/DL
BILIRUB UR QL STRIP: NEGATIVE
BUN SERPL-MCNC: 10 MG/DL
CALCIUM SERPL-MCNC: 9.7 MG/DL
CCP AB SER IA-ACNC: <0.5 U/ML
CHLORIDE SERPL-SCNC: 105 MMOL/L
CLARITY UR REFRACT.AUTO: CLEAR
CO2 SERPL-SCNC: 27 MMOL/L
COLOR UR AUTO: YELLOW
CREAT SERPL-MCNC: 0.7 MG/DL
CRP SERPL-MCNC: 0.5 MG/L
DIFFERENTIAL METHOD: ABNORMAL
EOSINOPHIL # BLD AUTO: 0.1 K/UL
EOSINOPHIL NFR BLD: 1.2 %
ERYTHROCYTE [DISTWIDTH] IN BLOOD BY AUTOMATED COUNT: 11.9 %
ERYTHROCYTE [SEDIMENTATION RATE] IN BLOOD BY WESTERGREN METHOD: 2 MM/HR
EST. GFR  (AFRICAN AMERICAN): >60 ML/MIN/1.73 M^2
EST. GFR  (NON AFRICAN AMERICAN): >60 ML/MIN/1.73 M^2
GLUCOSE SERPL-MCNC: 101 MG/DL
GLUCOSE UR QL STRIP: NEGATIVE
HCT VFR BLD AUTO: 43.4 %
HGB BLD-MCNC: 15.6 G/DL
HGB UR QL STRIP: NEGATIVE
KETONES UR QL STRIP: NEGATIVE
LEUKOCYTE ESTERASE UR QL STRIP: NEGATIVE
LYMPHOCYTES # BLD AUTO: 1.6 K/UL
LYMPHOCYTES NFR BLD: 23.5 %
MCH RBC QN AUTO: 32.8 PG
MCHC RBC AUTO-ENTMCNC: 35.9 G/DL
MCV RBC AUTO: 91 FL
MICROSCOPIC COMMENT: NORMAL
MONOCYTES # BLD AUTO: 0.5 K/UL
MONOCYTES NFR BLD: 7.8 %
NEUTROPHILS # BLD AUTO: 4.5 K/UL
NEUTROPHILS NFR BLD: 67.3 %
NITRITE UR QL STRIP: NEGATIVE
PH UR STRIP: 7 [PH] (ref 5–8)
PLATELET # BLD AUTO: 198 K/UL
PMV BLD AUTO: 10.4 FL
POTASSIUM SERPL-SCNC: 3.9 MMOL/L
PROT SERPL-MCNC: 7.6 G/DL
PROT UR QL STRIP: NEGATIVE
PTH-INTACT SERPL-MCNC: 80 PG/ML
RBC # BLD AUTO: 4.75 M/UL
RBC #/AREA URNS AUTO: 2 /HPF (ref 0–4)
SODIUM SERPL-SCNC: 142 MMOL/L
SP GR UR STRIP: 1.02 (ref 1–1.03)
SQUAMOUS #/AREA URNS AUTO: 0 /HPF
TSH SERPL DL<=0.005 MIU/L-ACNC: 1.33 UIU/ML
URN SPEC COLLECT METH UR: NORMAL
UROBILINOGEN UR STRIP-ACNC: NEGATIVE EU/DL
WBC # BLD AUTO: 6.68 K/UL
WBC #/AREA URNS AUTO: 0 /HPF (ref 0–5)

## 2018-06-22 PROCEDURE — 85651 RBC SED RATE NONAUTOMATED: CPT

## 2018-06-22 PROCEDURE — 85025 COMPLETE CBC W/AUTO DIFF WBC: CPT

## 2018-06-22 PROCEDURE — 99215 OFFICE O/P EST HI 40 MIN: CPT | Mod: S$GLB,,, | Performed by: INTERNAL MEDICINE

## 2018-06-22 PROCEDURE — 86038 ANTINUCLEAR ANTIBODIES: CPT

## 2018-06-22 PROCEDURE — 84443 ASSAY THYROID STIM HORMONE: CPT

## 2018-06-22 PROCEDURE — 86200 CCP ANTIBODY: CPT

## 2018-06-22 PROCEDURE — 81001 URINALYSIS AUTO W/SCOPE: CPT

## 2018-06-22 PROCEDURE — 80053 COMPREHEN METABOLIC PANEL: CPT

## 2018-06-22 PROCEDURE — 86431 RHEUMATOID FACTOR QUANT: CPT

## 2018-06-22 PROCEDURE — 86140 C-REACTIVE PROTEIN: CPT

## 2018-06-22 PROCEDURE — 3008F BODY MASS INDEX DOCD: CPT | Mod: CPTII,S$GLB,, | Performed by: INTERNAL MEDICINE

## 2018-06-22 PROCEDURE — 99999 PR PBB SHADOW E&M-EST. PATIENT-LVL V: CPT | Mod: PBBFAC,,, | Performed by: INTERNAL MEDICINE

## 2018-06-22 PROCEDURE — 36415 COLL VENOUS BLD VENIPUNCTURE: CPT

## 2018-06-22 PROCEDURE — 83970 ASSAY OF PARATHORMONE: CPT

## 2018-06-22 NOTE — PATIENT INSTRUCTIONS
Nonalcoholic Fatty Liver Disease (NAFLD)  Nonalcoholic fatty liver disease (NAFLD) is a common disease of the liver. It occurs when you have too much fat in the liver. If NAFLD is severe, it can cause liver damage that seems like the damage caused by drinking too much alcohol. But NAFLD is not caused by drinking alcohol. This sheet tells you more about NAFLD and how it can be managed.    How the liver works   The liver is an organ in the upper right side of the belly (abdomen). It has many important jobs. These include:  · Breaking down (metabolizing) proteins, carbohydrates, and fats  · Making a substance called bile that helps break down fats  · Storing and releasing sugar (glucose) into the blood to give the body energy  · Removing toxins from the blood  · Helping with blood clotting  Understanding NAFLD  A healthy liver may contain some fat. But if too much fat builds up in the liver, this causes NAFLD. NAFLD can be mild, causing fatty liver. Or it can be more severe and show inflammation, as well as the fat. This can cause non-alcoholic steatohepatitis (WHITLOCK).  · Fatty liver. With fatty liver, the liver simply has more fat than normal. This extra fat usually does not harm the liver.  · WHITLOCK. With WHITLOCK, the fatty liver becomes inflamed over time. WHITLOCK is serious because it can lead to scarring of the liver (fibrosis). Over time, the scarring may lead to cirrhosis of the liver. This can eventually cause liver failure or liver cancer.  Causes and risk factors of NAFLD  Doctors don't know what causes NAFLD. But certain things make the problem more likely to happen. These include:  · Obesity  · Prediabetes or diabetes  · High levels of fat found in the blood (cholesterol and triglycerides)  · Being exposed to certain medicines   Symptoms of NAFLD  Most people with NAFLD have no symptoms. If symptoms do occur, they can include:  · Tiredness  · Weakness  · Weight loss  · Loss of appetite  · Nausea and  vomiting  · Belly pain and cramping  · Yellowing of the skin and eyes (jaundice), as well as dark urine, or light-colored stools  · Swelling in the belly or legs  Diagnosing NAFLD  Your healthcare provider may think you have NAFLD if routine blood tests show high levels of liver enzymes. This may mean you have a liver problem. You may need one or more imaging tests, such as an ultrasound, CT, or MRI. You may need more blood tests to look for other causes of liver disease. You may also need a liver biopsy. During this test, a hollow needle is used to remove a tiny tissue sample from your liver. This tissue is then checked in a lab. This test can find signs of damage to liver tissue. It can also help figure out the cause of the damage and tell the difference between fatty liver and WHITLOCK.  Treating NAFLD  Treatment for NAFLD varies for each person. The best early treatment is to treat any underlying conditions causing metabolic syndrome. This is the name for a group of conditions that includes:  · High blood pressure  · High levels of cholesterol and triglycerides  · Being overweight or obese  · Diabetes  Your healthcare provider will monitor your health and treat any symptoms or underlying health problems you have. Your provider will also work with you to control your risk factors. This will make liver damage less likely. In fact, treating those underlying conditions can often improve liver disease. You may need to take certain medicines, but no medicine will cure NAFLD. This is why treating the underlying conditions is most important. Your plan may include:  · Losing extra weight  · Getting regular exercise  · Controlling diabetes and high cholesterol or triglyceride levels  · Taking medicines and vitamins as prescribed by your provider  · Quitting smoking  · Not drinking alcohol  · Eating a healthy and balanced diet  Living with NAFLD  If NAFLD is caught early, it can be managed with treatment. Your healthcare  provider will discuss further treatment choices with you as needed.  Be sure to ask your provider about recommended vaccines. These include vaccines for viruses that can cause liver disease.  Date Last Reviewed: 12/1/2016 © 2000-2017 [employer]. [address] [place] [address], [place] [address]. All rights reserved. This information is not intended as a substitute for professional medical care. Always follow your healthcare professional's instructions.        Having a Thoracic Epidural Injection  A thoracic epidural injection is a shot that helps ease pain in your upper to middle back (thoracic) area. Medicine is injected into the area around your spinal cord.  What to tell your healthcare provider  Tell your healthcare provider about all the medicines you take. This includes over-the-counter medicines such as ibuprofen. It also includes vitamins, herbs, and other supplements. And tell your healthcare provider if you:  · Have had any recent changes in your health, such as an infection or fever  · Are sensitive or allergic to contrast dye  · Are sensitive or allergic to any medicines, latex, tape, or anesthesia (local and general)  · Are pregnant or think you may be pregnant  Tests before your procedure  You may need other tests before you get the injection. For example, you may have an MRI scan of your upper to middle back area.  Getting ready for your procedure             Talk with your healthcare provider how to get ready for your procedure. You may need to stop taking some medicines before the procedure, such as blood thinners and aspirin.  Also, make sure to:  · Ask a family member or friend to take you home from the hospital  · Not eat or drink for several hours before your procedure  · Follow all other instructions from your healthcare provider  You will be asked to sign a consent form that gives your permission to do the procedure. Read the form carefully. Ask questions if something is not clear.  On  the day of your procedure  You may have the procedure at the hospital, a surgery center, or a clinic. A pain specialist, radiologist, or other type of specialist may do the procedure. Your healthcare provider can tell you exactly what to expect. In general:  · You may be given medicine to make you feel relaxed and sleepy (sedation).  · You will lie on your belly (abdomen) or on your side on a special table.  · The healthcare provider will clean the skin on your back in the area of the injection.  · He or she will inject some medicine to numb the area (local anesthetic).  · The provider will use special X-ray (fluoroscopy or CT scan) to put the needle into the correct area.  · He or she will inject contrast dye to make sure the needle is in the epidural space.  · He or she will slowly inject medicine. It is usually a mix of anti-inflammatory medicine (steroid) and numbing medicine (anesthetic). You may feel slight discomfort and pressure.  · Once done, the provider will remove the needle. He or she may cover the injection site with a small bandage.  · The steroid medicine lessens swelling and nerve irritation. The anesthetic medicine numbs the area.  After your procedure  After the procedure, you will be moved to a chair or bed to rest for a while. The healthcare staff will watch you for any problems. You should be able to go home in an hour or so. Make sure you follow all instructions from your healthcare provider and the staff. You may be told to:  · Rest for the remainder of the day, and go back to your normal activities the next day  · Not drive or make any important decisions for at least 24 hours, if you had sedation  · Expect some numbness in your arms that will go away within a few hours  Recovering at home  You may not notice any improvement right after the injection. You may even feel a little worse afterward. It may take a few days to a week until your pain lessens. The pain relief may last for weeks or  months. Or the pain may not return at all.  Follow-up care  Youll have an appointment with your healthcare provider in 1 to 2 weeks to discuss:  · If the injection lessened your pain  · If you need additional injections  · Other treatment you may need, such as exercises, medicines, and physical therapy  When to call your healthcare provider  Call your healthcare provider right away if you have any of these:  · Fever of 100.4°F (38.0°C) or higher  · Numbness at the injection site that doesnt go away  · Warmth and redness at the injection site  · Pain that is getting worse   Date Last Reviewed: 8/10/2015  © 0803-5663 [employer]. [address] [place] [address], [place] [address]. All rights reserved. This information is not intended as a substitute for professional medical care. Always follow your healthcare professional's instructions.        Sciatica    Sciatica is a condition that causes pain in the lower back that spreads down into the buttock, hip, and leg. Sometimes the leg pain can happen without any back pain. Sciatica happens when a spinal nerve is irritated or has pressure put on it as comes out of the spinal canal in the lower back. This most often happens when a bulge or rupture of a nearby spinal disk presses on the nerve. Sciatica can also be caused by a narrowing of the spinal canal (spinal stenosis) or spasm of the muscle in the buttocks that the sciatic nerve passes through (pyriform muscle). Sciatica is also called lumbar radiculopathy.  Sciatica may begin after a sudden twisting or bending force, such as in a car accident. Or it can happen after a simple awkward movement. In either case, muscle spasm often also happens. Muscle spasm makes the pain worse.  A healthcare provider makes a diagnosis of sciatica from your symptoms and a physical exam. Unless you had an injury from a car accident or fall, you usually wont have X-rays taken at this time. This is because the nerves and disks in your  back cant be seen on an X-ray. If the provider sees signs of a compressed nerve, you will need to schedule an MRI scan as an outpatient. Signs of a compressed nerve include loss of strength in a leg.  Most sciatica gets better with medicine, exercise, and physical therapy. If your symptoms continue after at least 3 months of medical treatment, you may need surgery or injections to your lower back.  Home care  Follow these tips when caring for yourself at home:  · You may need to stay in bed the first few days. But as soon as possible, begin sitting up or walking. This will help you avoid problems that come from staying in bed for long periods.  · When in bed, try to find a position that is comfortable. A firm mattress is best. Try lying flat on your back with pillows under your knees. You can also try lying on your side with your knees bent up toward your chest and a pillow between your knees.  · Avoid sitting for long periods. This puts more stress on your lower back than standing or walking.  · Use heat from a hot shower, hot bath, or heating pad to help ease pain. Massage can also help. You can also try using an ice pack. You can make your own ice pack by putting ice cubes in a plastic bag. Wrap the bag in a thin towel. Try both heat and cold to see which works best. Use the method that feels best for 20 minutes several times a day.  · You may use acetaminophen or ibuprofen to ease pain, unless another pain medicine was prescribed. Note: If you have chronic liver or kidney disease, talk with your healthcare provider before taking these medicines. Also talk with your provider if youve had a stomach ulcer or gastrointestinal bleeding.  · Use safe lifting methods. Dont lift anything heavier than 15 pounds until all of the pain is gone.  Follow-up care  Follow up with your healthcare provider, or as advised. You may need physical therapy or additional tests.  If X-rays were taken, a radiologist will look at them.  You will be told of any new findings that may affect your care.  When to seek medical advice  Call your healthcare provider right away if any of these occur:  · Pain gets worse even after taking prescribed medicine  · Weakness or numbness in 1 or both legs or hips  · Numbness in your groin or genital area  · You cant control your bowel or bladder  · Fever  · Redness or swelling over your back or spine   Date Last Reviewed: 8/1/2016 © 2000-2017 [employer]. [address] [place] [address]. All rights reserved. This information is not intended as a substitute for professional medical care. Always follow your healthcare professional's instructions.        Understanding Lumbar Radiculopathy    Lumbar radiculopathy is irritation or inflammation of a nerve root in the low back. It causes symptoms that spread out from the back down one or both legs. To understand this condition, it helps to understand the parts of the spine:  · Vertebrae. These are bones that stack to form the spine. The lumbar spine contains the 5 bottom vertebrae.  · Disks. These are soft pads of tissue between the vertebrae. They act as shock absorbers for the spine.  · Spinal canal. This is a tunnel formed within the stacked vertebrae. In the lumbar spine, nerves run through this canal.  · Nerves. These branch off and leave the spinal canal, traveling out to parts of the body. As they leave the spinal canal, nerves pass through openings between the vertebrae. The nerve root is the part of the nerve that is closest to the spinal canal.  · Sciatic nerve. This is a large nerve formed from several nerve roots in the low back. This nerve extends down the back of the leg to the foot.  With lumbar radiculopathy, nerve roots in the low back become irritated. This leads to pain and symptoms. The sciatic nerve is commonly involved, so the condition is often called sciatica.  What causes lumbar radiculopathy?  Aging, injury, poor  posture, extra body weight, and other issues can lead to problems in the low back. These problems may then irritate nerve roots. They include:  · Damage to a disk in the lumbar spine. The damaged disk may then press on nearby nerve roots.  · Degeneration from wear and tear, and aging. This can lead to narrowing (stenosis) of the openings between the vertebrae. The narrowed openings press on nerve roots as they leave the spinal canal.  · Unstable spine. This is when a vertebra slips forward. It can then press on a nerve root.  Other, less common things can put pressure on nerves in the low back. These include diabetes, infection, or a tumor.  Symptoms of lumbar radiculopathy  These include:  · Pain in the low back  · Pain, numbness, tingling, or weakness that travels into the buttocks, hip, groin, or leg  · Muscle spasms  Treatment for lumbar radiculopathy  In most cases, your healthcare provider will first try treatments that help relieve symptoms. These may include:  · Prescription and over-the-counter pain medicines. These help relieve pain, swelling, and irritation.  · Limits on positions and activities that increase pain. But lying in bed or avoiding all movement is only recommended for a short period of time.  · Physical therapy, including exercises and stretches. This helps decrease pain and increase movement and function.  · Steroid shots into the lower back. This may help relieve symptoms for a time.  · Weight-loss program. If you are overweight, losing extra pounds may help relieve symptoms.  In some cases, you may need surgery to fix the underlying problem. This depends on the cause, the symptoms, and how long the pain has lasted.  Possible complications  Over time, an irritated and inflamed nerve may become damaged. This may lead to long-lasting (permanent) numbness or weakness in your legs and feet. If symptoms change suddenly or get worse, be sure to let your healthcare provider know.  When to call your  healthcare provider  Call your healthcare provider right away if you have any of these:  · New pain or pain that gets worse  · New or increasing weakness, tingling, or numbness in your leg or foot  · Problems controlling your bladder or bowel   Date Last Reviewed: 3/10/2016  © 0273-0454 [employer]. [address] [place] [address], [place] [address]. All rights reserved. This information is not intended as a substitute for professional medical care. Always follow your healthcare professional's instructions.

## 2018-06-22 NOTE — PROGRESS NOTES
"Subjective:       Patient ID: Andre Padgett is a 47 y.o. male.    Chief Complaint: Abdominal Pain (right side)    [facility] appt Dr Diop patient    This is a 47 y.o. male here for evaluation of abdominal pain.    Has had for many months to years. What he describes is R sided upper abdominal pain that feels like a "puncture" and sometimes radiates into the groin.    Seen by PCP, GI, surgery.    Extensive workup in the past, numerous abdominal ultrasounds which have shown a fat containing umbilical hernia.  Most recently seen by General Surgery for the possibility of other hernias; provocative hernia ultrasound did not reveal any inguinal hernia.    Has fatty liver seen on CT scan.    CT scans of the abdomen and pelvis have been unrevealing other than mild constipation and umbilical hernia.  He has had several.    Colonoscopy 2018 revealed hemorrhoids, colon adenoma and diverticulosis.  EGD 2013 unrevealing.    Seen in GI 4/18: "Trial bentyl as RX and OTC IBgard. Start daily probiotic as previously advised."   Told to follow up in Gastro with 1 of the physicians but has not done so.    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  Last seen in 2014.    Patient Active Problem List:     Eye refraction disorder - Both Eyes     Dyspepsia     Anxiety     DJD (degenerative joint disease) of thoracic spine     DJD (degenerative joint disease) of cervical spine, mild     Hyperlipemia     Thoracic back pain     Cerebral aneurysm, nonruptured     Rib pain on right side     Muscle weakness of lower extremity     Decreased range of motion of lumbar spine     Colon adenoma: 3/18 repeat colonoscopy 2023     Umbilical hernia without obstruction and without gangrene: see CT 3/18     Fatty liver: see CT 3/18     Diverticulosis of large intestine without hemorrhage: see colonoscopy 3/18; sigmoid and descending colon        Review of Systems   Constitutional: Negative.     "     Weight gain 20 # in past couple of years   HENT: Negative for congestion, postnasal drip, rhinorrhea and sinus pressure.    Eyes: Negative for redness and visual disturbance.   Respiratory: Negative for apnea, choking, shortness of breath and stridor.    Cardiovascular: Negative for chest pain, palpitations and leg swelling.   Gastrointestinal: Positive for abdominal pain. Negative for constipation, diarrhea and nausea.        See above  Slightly sluggish bowels but no blood or major change in pattern.  Admits that diet is low in fiber   Genitourinary: Negative for difficulty urinating, flank pain, frequency, testicular pain and urgency.        Sometimes pain shoots into the right side of the groin   Musculoskeletal: Negative for arthralgias, back pain and myalgias.   Skin: Negative for color change and rash.   Neurological: Negative.    Psychiatric/Behavioral: Negative.        Objective:      Physical Exam   Constitutional: He is oriented to person, place, and time. He appears well-developed and well-nourished.   HENT:   Head: Normocephalic and atraumatic.   Right Ear: External ear normal.   Left Ear: External ear normal.   Eyes: Conjunctivae and EOM are normal. Pupils are equal, round, and reactive to light.   Neck: Normal range of motion. Neck supple. No thyromegaly present.   Cardiovascular: Normal rate and regular rhythm.    No murmur heard.  Pulmonary/Chest: Effort normal and breath sounds normal. No respiratory distress. He has no wheezes.   Abdominal: Soft. He exhibits no distension. There is no tenderness.   Slight pain edge of liver anteriorly  No masses  Reducible umbilical hernia   Musculoskeletal: He exhibits no edema or tenderness.   Lymphadenopathy:     He has no cervical adenopathy.   Neurological: He is alert and oriented to person, place, and time. No cranial nerve deficit.   Skin: Skin is warm and dry.   Psychiatric: He has a normal mood and affect. His behavior is normal.       Assessment:        1. Abdominal pain, RLQ (right lower quadrant)    2. Colon adenoma: 3/18 repeat colonoscopy 2023    3. Umbilical hernia without obstruction and without gangrene: see CT 3/18    4. Fatty liver: see CT 3/18    5. Diverticulosis of large intestine without hemorrhage: see colonoscopy 3/18; sigmoid and descending colon    6. Chronic thoracic back pain, unspecified back pain laterality    7. Decreased range of motion of lumbar spine    8. Abnormal x-ray of thoracic spine    9. Hypercalcemia        Plan:         Abdominal pain, RLQ (right lower quadrant):  Extensive workup including panendoscopy, numerous ultrasounds and CT scans have been unremarkable in terms of explaining his abdominal discomfort.  He does have fatty liver but liver is not that enlarged.  This was discussed.  Diverticulosis was discussed but he does not have diverticulosis on the right side.  My strong suspicion is this coming from his thoracic spine, discussed at length  -     TSH; Future; Expected date: 06/22/2018  -     Urinalysis    Colon adenoma: 3/18 repeat colonoscopy 2023:  He is aware    Umbilical hernia without obstruction and without gangrene: see CT 3/18:  Alarm symptoms reviewed, he is having none    Fatty liver: see CT 3/18:  Low fat, low carbohydrate diet reviewed, recommended 10 lb weight loss in the next 6 months.  Curtail alcohol, he drinks very little.  Handouts given    Diverticulosis of large intestine without hemorrhage: see colonoscopy 3/18; sigmoid and descending colon:  No alarm symptoms, handouts given    Chronic thoracic back pain, unspecified back pain laterality:  MRI thoracic spine and review  -     [facility]    Decreased range of motion of lumbar spine  -     MRI Lumbar Spine Without Contrast; Future; Expected date: 06/22/2018  -     Ambulatory Consult to [facility]    Abnormal x-ray of thoracic spine  -     MRI Thoracic Spine Without Contrast; Future; Expected date: 06/22/2018  -      Comprehensive metabolic panel; Future; Expected date: 06/22/2018  -     CBC auto differential; Future; Expected date: 06/22/2018  -     DONATO; Future; Expected date: 06/22/2018  -     Cyclic citrul peptide antibody, IgG; Future; Expected date: 06/22/2018  -     C-reactive protein; Future; Expected date: 06/22/2018  -     Rheumatoid factor; Future; Expected date: 06/22/2018  -     Sedimentation rate, manual; Future; Expected date: 06/22/2018  -     Ambulatory Consult [facility]    Hypercalcemia:  Of doubtful significance, will reassess  -     PTH, intact; Future; Expected date: 06/22/2018    I will review all studies and determine further tx depending on findings    Patient evaluated for over 40 minutes with this appoinment, including diagnostic testing and treatment.  All questions answered,  chart reviewed and care coordinated.

## 2018-06-25 LAB
ANA SER QL IF: NORMAL
RHEUMATOID FACT SERPL-ACNC: <10 IU/ML

## 2018-06-26 ENCOUNTER — PATIENT MESSAGE (OUTPATIENT)
Dept: INTERNAL MEDICINE | Facility: CLINIC | Age: 48
End: 2018-06-26

## 2018-06-27 DIAGNOSIS — M54.9 BACK PAIN, UNSPECIFIED BACK LOCATION, UNSPECIFIED BACK PAIN LATERALITY, UNSPECIFIED CHRONICITY: Primary | ICD-10-CM

## 2018-07-17 ENCOUNTER — OFFICE VISIT (OUTPATIENT)
Dept: ORTHOPEDICS | Facility: CLINIC | Age: 48
End: 2018-07-17
Payer: COMMERCIAL

## 2018-07-17 ENCOUNTER — HOSPITAL ENCOUNTER (OUTPATIENT)
Dept: RADIOLOGY | Facility: HOSPITAL | Age: 48
Discharge: HOME | End: 2018-07-17
Attending: PHYSICIAN ASSISTANT
Payer: COMMERCIAL

## 2018-07-17 VITALS — WEIGHT: 207.44 LBS | HEIGHT: 70 IN | BODY MASS INDEX: 29.7 KG/M2

## 2018-07-17 DIAGNOSIS — R10.31 RIGHT LOWER QUADRANT ABDOMINAL PAIN: ICD-10-CM

## 2018-07-17 DIAGNOSIS — M54.9 BACK PAIN, UNSPECIFIED BACK LOCATION, UNSPECIFIED BACK PAIN LATERALITY, UNSPECIFIED CHRONICITY: ICD-10-CM

## 2018-07-17 DIAGNOSIS — M54.6 BILATERAL THORACIC BACK PAIN, UNSPECIFIED CHRONICITY: Primary | ICD-10-CM

## 2018-07-17 PROCEDURE — 72100 X-RAY EXAM L-S SPINE 2/3 VWS: CPT | Mod: 26,,, | Performed by: RADIOLOGY

## 2018-07-17 PROCEDURE — 72070 X-RAY EXAM THORAC SPINE 2VWS: CPT | Mod: 26,,, | Performed by: RADIOLOGY

## 2018-07-17 PROCEDURE — 99214 OFFICE O/P EST MOD 30 MIN: CPT | Mod: S$GLB,,, | Performed by: PHYSICIAN ASSISTANT

## 2018-07-17 PROCEDURE — 72120 X-RAY BEND ONLY L-S SPINE: CPT | Mod: 26,,, | Performed by: RADIOLOGY

## 2018-07-17 PROCEDURE — 72100 X-RAY EXAM L-S SPINE 2/3 VWS: CPT | Mod: TC

## 2018-07-17 PROCEDURE — 3008F BODY MASS INDEX DOCD: CPT | Mod: CPTII,S$GLB,, | Performed by: PHYSICIAN ASSISTANT

## 2018-07-17 PROCEDURE — 99999 PR PBB SHADOW E&M-EST. PATIENT-LVL III: CPT | Mod: PBBFAC,,, | Performed by: PHYSICIAN ASSISTANT

## 2018-07-17 PROCEDURE — 72070 X-RAY EXAM THORAC SPINE 2VWS: CPT | Mod: TC

## 2018-07-17 RX ORDER — MELOXICAM 15 MG/1
15 TABLET ORAL DAILY
Qty: 30 TABLET | Refills: 0 | Status: SHIPPED | OUTPATIENT
Start: 2018-07-17 | End: 2018-08-16

## 2018-07-17 NOTE — PROGRESS NOTES
DATE: 7/17/2018  PATIENT: Andre Padgett    Supervising Physician: Hosea Dudley M.D.    CHIEF COMPLAINT: abdominal pain and mid back pain    HISTORY:  Andre Padgett is a 47 y.o. male here for initial evaluation of abdominal pain and mid back pain (Back - 5, Abdomen - 5). The pain has been present for about 3 months.  The pain in the abdomen is what bothers him the most.  The pain is located in the right lower quadrant and sometimes radiates to the groin. The patient describes the pain as sharp.  The pain is intermittent and is not associated with the back pain.  He says right now, he has some back pain, but no abdominal pain.  Other times, he will have abdominal pain but no back pain.  He says they do not occur together and do not seem to be related.  He has had extensive workup with Gastroenterology for the abdominal pain which has been unremarkable thus far.  The pain is worse with nothing in particular and improved by nothing in particular.  However, he does say that it has started to improve slightly.  He is currently taking a probiotic.  He has not tried any diet elimination.  There is no associated numbness and tingling. There is no subjective weakness. Prior treatments have included aleve, but no physical therapy, ESIs or surgery.    The patient denies myelopathic symptoms such as handwriting changes or difficulty with buttons/coins/keys. Denies perineal paresthesias, bowel/bladder dysfunction.    PAST MEDICAL/SURGICAL HISTORY:  Past Medical History:   Diagnosis Date    Aneurysm     Right sided filling anterior communicating aneurysm s/p coiling 2011    Anxiety     Colon adenoma: 3/18 repeat colonoscpy 2023 6/22/2018    Diverticulosis of large intestine without hemorrhage: see colonoscopy 3/18; sigmoid and descending colon 6/22/2018    Fatty liver: see CT 3/18 6/22/2018    Generalized headaches     Hyperlipidemia     Umbilical hernia without obstruction and without gangrene: see CT 3/18  6/22/2018     Past Surgical History:   Procedure Laterality Date    BRAIN SURGERY  2011    angiogram/coiling    COLONOSCOPY N/A 3/26/2018    Procedure: COLONOSCOPY;  Surgeon: Sanjiv Duran MD;  Location: [address];  Service: Endoscopy;  Laterality: N/A;       Current Medications:   Current Outpatient Prescriptions:     ALPRAZolam (XANAX) 0.5 MG tablet, take 1 tablet by mouth at bedtime if needed, Disp: 30 tablet, Rfl: 1    ascorbic acid (VITAMIN C) 100 MG tablet, Take 100 mg by mouth once daily., Disp: , Rfl:     atorvastatin (LIPITOR) 40 MG tablet, take 1 tablet by mouth once daily, Disp: 90 tablet, Rfl: 3    meloxicam (MOBIC) 15 MG tablet, Take 1 tablet (15 mg total) by mouth once daily., Disp: 30 tablet, Rfl: 0    Social History:   Social History     Social History    Marital status:      Spouse name: N/A    Number of children: N/A    Years of education: N/A     Occupational History    Not on file.     Social History Main Topics    Smoking status: Never Smoker    Smokeless tobacco: Never Used    Alcohol use Yes      Comment: ocassionally - twice a week    Drug use: No    Sexual activity: Not on file     Other Topics Concern    Not on file     Social History Narrative    No narrative on file       REVIEW OF SYSTEMS:  Constitution: Negative. Negative for chills, fever and night sweats.   Cardiovascular: Negative for chest pain and syncope.   Respiratory: Negative for cough and shortness of breath.   Gastrointestinal: See HPI. Negative for nausea/vomiting. Negative for abdominal pain.  Genitourinary: See HPI. Negative for discoloration or dysuria.  Skin: Negative for dry skin, itching and rash.   Hematologic/Lymphatic: Negative for bleeding problem. Does not bruise/bleed easily.   Musculoskeletal: Negative for falls and muscle weakness.   Neurological: See HPI. No seizures.   Endocrine: Negative for polydipsia, polyphagia and polyuria.   Allergic/Immunologic: Negative for hives  "and persistent infections.    PHYSICAL EXAMINATION:    Ht 5' 10" (1.778 m)   Wt 94.1 kg (207 lb 7.3 oz)   BMI 29.77 kg/m²     General: The patient is a pleasant 47 y.o. male in no apparent distress, the patient is oriented to person, place and time.   Psych: Normal mood and affect  HEENT: Vision grossly intact, hearing intact to the spoken word.  Lungs: Respirations unlabored.  Gait: Normal station and gait, no difficulty with toe or heel walk.   Skin: Dorsal lumbar skin negative for rashes, lesions, hairy patches and surgical scars.  Range of motion: Cervical and lumbar range of motion is acceptable. There is mild thoracic tenderness to palpation.  Spinal Balance: Global saggital and coronal spinal balance acceptable, no significant for scoliosis and kyphosis.  Musculoskeletal: No pain with the range of motion of the bilateral hips. No trochanteric tenderness to palpation.  No pain with range of motion of the bilateral shoulders or elbows.   Vascular: Bilateral lower extremities warm and well perfused, Dorsalis pedis pulses 2+ bilaterally.  Neurological: Normal strength and tone in all major motor groups in the bilateral upper and lower extremities. Normal sensation to light touch in the C5-T1 and L2-S1 dermatomes bilaterally.  Deep tendon reflexes symmetric 2+ in the bilateral upper and lower extremities.  Negative Babinski bilaterally.  Straight leg raise negative bilaterally. Negative inverted radial reflex and grace's bilaterally.     IMAGING:   Today I personally reviewed AP, Lat and Flex/Ex upright T and L-spine films that demonstrate mild degenerative changes of the thoracic spine.        Body mass index is 29.77 kg/m².    Hemoglobin A1C   Date Value Ref Range Status   02/21/2018 5.2 4.0 - 5.6 % Final     Comment:     According to ADA guidelines, hemoglobin A1c <7.0% represents  optimal control in non-pregnant diabetic patients. Different  metrics may apply to specific patient populations.   Standards of " Medical Care in Diabetes-2016.  For the purpose of screening for the presence of diabetes:  <5.7%     Consistent with the absence of diabetes  5.7-6.4%  Consistent with increasing risk for diabetes   (prediabetes)  >or=6.5%  Consistent with diabetes  Currently, no consensus exists for use of hemoglobin A1c  for diagnosis of diabetes for children.  This Hemoglobin A1c assay has significant interference with fetal   hemoglobin   (HbF). The results are invalid for patients with abnormal amounts of   HbF,   including those with known Hereditary Persistence   of Fetal Hemoglobin. Heterozygous hemoglobin variants (HbAS, HbAC,   HbAD, HbAE, HbA2) do not significantly interfere with this assay;   however, presence of multiple variants in a sample may impact the %   interference.     11/01/2016 5.5 4.5 - 6.2 % Final     Comment:     According to ADA guidelines, hemoglobin A1C <7.0% represents  optimal control in non-pregnant diabetic patients.  Different  metrics may apply to specific populations.   Standards of Medical Care in Diabetes - 2016.  For the purpose of screening for the presence of diabetes:  <5.7%     Consistent with the absence of diabetes  5.7-6.4%  Consistent with increasing risk for diabetes   (prediabetes)  >or=6.5%  Consistent with diabetes  Currently no consensus exists for use of hemoglobin A1C  for diagnosis of diabetes for children.     02/04/2016 5.4 4.5 - 6.2 % Final         ASSESSMENT/PLAN:    Diagnoses and all orders for this visit:    Bilateral thoracic back pain, unspecified chronicity    Right lower quadrant abdominal pain    Other orders  -     meloxicam (MOBIC) 15 MG tablet; Take 1 tablet (15 mg total) by mouth once daily.        The patient is having mid back pain and abdominal pain.  These seem to be two separate pains that are not related to each other.  They do not occur at the same time and there does not seem to be a correlation between the two pains.      Today we discussed options  including trying some treatment for his back pain including physical therapy with dry needling and possibly trying acupuncture for his abdominal pain.  We also discussed ordering an MRI thoracic and lumbar spine.  The patient would like to hold off on further imaging as he has had extensive imaging done with GI.  We will try some PT/acupuncture first.  He will follow up with me as needed.         Follow-up if symptoms worsen or fail to improve.

## 2018-07-17 NOTE — LETTER
July 18, 2018      Anjelica Loyola MD  [address]  [place] [address]           [facility]  [address]  [place] [address]  Phone: [phone]          Patient: Andre Padgett   MR Number: [id number]   YOB: 1970   Date of Visit: 7/17/2018       Dear Dr. Anjelica Loyola:    Thank you for referring Andre Padgett to me for evaluation. Attached you will find relevant portions of my assessment and plan of care.    If you have questions, please do not hesitate to call me. I look forward to following Andre Padgett along with you.    Sincerely,    Madiha Christian PA-C    Enclosure  CC:  No Recipients    If you would like to receive this communication electronically, please contact [email or web address] or [phone] to request more information on [employer] Link access.    For providers and/or their staff who would like to refer a patient to [facility], please contact us through our one-stop-shop provider referral line, [facility], at [phone].    If you feel you have received this communication in error or would no longer like to receive these types of communications, please e-mail [email or web address]

## 2018-07-19 ENCOUNTER — TELEPHONE (OUTPATIENT)
Dept: ORTHOPEDICS | Facility: CLINIC | Age: 48
End: 2018-07-19

## 2018-07-19 NOTE — TELEPHONE ENCOUNTER
Spoke with patient and he stated that he will give his insurance company to see if acupuncture is covered. He also stated that he will try acupuncture first.

## 2018-07-19 NOTE — TELEPHONE ENCOUNTER
----- Message from Madiha Christian PA-C sent at 7/19/2018  8:36 AM CDT -----  Will you call him and tell him he would need to go to acupuncture for the abdominal pain, not dry needling.  He would need to call his insurance to see if acupuncture is covered, and where he could go.  Will you ask him if he still wants to go to PT for dry needling too or just try acupuncture first?

## 2018-12-17 ENCOUNTER — OFFICE VISIT (OUTPATIENT)
Dept: INTERNAL MEDICINE | Facility: CLINIC | Age: 48
End: 2018-12-17
Payer: COMMERCIAL

## 2018-12-17 ENCOUNTER — HOSPITAL ENCOUNTER (OUTPATIENT)
Dept: RADIOLOGY | Facility: HOSPITAL | Age: 48
Discharge: HOME | End: 2018-12-17
Attending: PHYSICIAN ASSISTANT
Payer: COMMERCIAL

## 2018-12-17 VITALS
HEART RATE: 79 BPM | BODY MASS INDEX: 29.48 KG/M2 | HEIGHT: 70 IN | DIASTOLIC BLOOD PRESSURE: 98 MMHG | OXYGEN SATURATION: 98 % | SYSTOLIC BLOOD PRESSURE: 128 MMHG | WEIGHT: 205.94 LBS

## 2018-12-17 DIAGNOSIS — Z13.1 DIABETES MELLITUS SCREENING: ICD-10-CM

## 2018-12-17 DIAGNOSIS — R07.81 RIB PAIN: ICD-10-CM

## 2018-12-17 DIAGNOSIS — R09.81 NASAL CONGESTION: ICD-10-CM

## 2018-12-17 DIAGNOSIS — R07.81 RIB PAIN: Primary | ICD-10-CM

## 2018-12-17 PROCEDURE — 99214 OFFICE O/P EST MOD 30 MIN: CPT | Mod: S$GLB,,, | Performed by: PHYSICIAN ASSISTANT

## 2018-12-17 PROCEDURE — 71100 X-RAY EXAM RIBS UNI 2 VIEWS: CPT | Mod: 26,,, | Performed by: RADIOLOGY

## 2018-12-17 PROCEDURE — 3008F BODY MASS INDEX DOCD: CPT | Mod: CPTII,S$GLB,, | Performed by: PHYSICIAN ASSISTANT

## 2018-12-17 PROCEDURE — 99999 PR PBB SHADOW E&M-EST. PATIENT-LVL IV: CPT | Mod: PBBFAC,,, | Performed by: PHYSICIAN ASSISTANT

## 2018-12-17 PROCEDURE — 71100 X-RAY EXAM RIBS UNI 2 VIEWS: CPT | Mod: TC

## 2018-12-17 RX ORDER — IBUPROFEN 800 MG/1
800 TABLET ORAL 3 TIMES DAILY
Qty: 30 TABLET | Refills: 0 | Status: SHIPPED | OUTPATIENT
Start: 2018-12-17 | End: 2019-08-20

## 2018-12-17 RX ORDER — FLUTICASONE PROPIONATE 50 MCG
1 SPRAY, SUSPENSION (ML) NASAL DAILY
Qty: 16 G | Refills: 0 | Status: SHIPPED | OUTPATIENT
Start: 2018-12-17 | End: 2019-04-03

## 2018-12-17 NOTE — PATIENT INSTRUCTIONS
Costochondritis    Costochondritis is inflammation of a rib or the cartilage that connects a rib to your breastbone (sternum). It causes tenderness, and sometimes chest pain may be sharp or aching, or it may feel like pressure. Pain may get worse with deep breathing, movement, or exercise. In some cases, the pain is mistaken for a heart attack. Despite this, the condition is not serious. Read on to learn more about the condition and how it can be treated.  What causes costochondritis?  The cause of costochondritis is not completely clear, but it may happen after a chest injury, chest infection, or coughing episode. Some physical activities can sometimes lead to costochondritis. Large-breasted women may be more likely to have the condition. Often, the reason for the inflammation is unknown.  Diagnosing costochondritis  There is no test for costochondritis. The condition is diagnosed by the symptoms you have. Your healthcare provider will perform a physical exam. He or she will ask you about your symptoms and examine your chest for tenderness. In some cases, tests are done to rule out more serious problems. These tests may include imaging tests such as chest X-ray, CT scan, or an ECG.  Treating costochondritis  If an underlying cause is found, treatment for that will likely relieve the problem. Costochondritis often goes away on its own. The course of the condition varies from person to person. It usually lasts from weeks to months. In some cases, mild symptoms continue for months to years. To ease symptoms:  · Take medicine as directed. These relieve pain and swelling. Ibuprofen or other NSAIDs are often recommended. In some cases, you may be given prescription medicine, such as muscle relaxants.  · Avoid activities that put stress on the chest or spine.  · Apply a heating pad (set to warm, not too high, heat) to the breastbone several times a day.  · Perform stretching exercises as directed.  Call the healthcare  provider right away if you have any of the following:  · Pain that is not relieved by medicine  · Shortness of breath  · Lightheadedness, dizziness, or fainting  · Feeling of irregular heartbeat or fast pulse  Anyone with chest pain should see a healthcare provider, especially those who are older and may be at risk for heart disease.   Date Last Reviewed: 10/1/2016  © 6230-8018 [employer]. [address] [place] [address], [place] [address]. All rights reserved. This information is not intended as a substitute for professional medical care. Always follow your healthcare professional's instructions.

## 2018-12-19 ENCOUNTER — PATIENT MESSAGE (OUTPATIENT)
Dept: INTERNAL MEDICINE | Facility: CLINIC | Age: 48
End: 2018-12-19

## 2018-12-20 ENCOUNTER — OFFICE VISIT (OUTPATIENT)
Dept: INTERNAL MEDICINE | Facility: CLINIC | Age: 48
End: 2018-12-20
Payer: COMMERCIAL

## 2018-12-20 VITALS
BODY MASS INDEX: 29.38 KG/M2 | HEART RATE: 87 BPM | OXYGEN SATURATION: 98 % | WEIGHT: 205.25 LBS | HEIGHT: 70 IN | DIASTOLIC BLOOD PRESSURE: 86 MMHG | SYSTOLIC BLOOD PRESSURE: 126 MMHG

## 2018-12-20 DIAGNOSIS — F41.9 ANXIETY: ICD-10-CM

## 2018-12-20 DIAGNOSIS — M79.10 MUSCLE SORENESS: ICD-10-CM

## 2018-12-20 DIAGNOSIS — R07.89 ATYPICAL CHEST PAIN: Primary | ICD-10-CM

## 2018-12-20 PROCEDURE — 3008F BODY MASS INDEX DOCD: CPT | Mod: CPTII,S$GLB,, | Performed by: PHYSICIAN ASSISTANT

## 2018-12-20 PROCEDURE — 99999 PR PBB SHADOW E&M-EST. PATIENT-LVL III: CPT | Mod: PBBFAC,,, | Performed by: PHYSICIAN ASSISTANT

## 2018-12-20 PROCEDURE — 99213 OFFICE O/P EST LOW 20 MIN: CPT | Mod: S$GLB,,, | Performed by: PHYSICIAN ASSISTANT

## 2018-12-20 RX ORDER — CYCLOBENZAPRINE HCL 10 MG
10 TABLET ORAL 3 TIMES DAILY PRN
Qty: 30 TABLET | Refills: 0 | Status: SHIPPED | OUTPATIENT
Start: 2018-12-20 | End: 2018-12-30

## 2018-12-20 NOTE — PROGRESS NOTES
"Subjective:       Patient ID: Andre Padgett is a 48 y.o. male.    Chief Complaint: Follow-up (EKG)    HPI     Established pt of Hunter Diop MD    C/o pain to right upper chest that he describes as a "puncture" feeling and soreness that last for a few seconds, it has happened twice over the last few days, episodes happened at rest. No associated n/v, sob or diaphoresis. He admits he thinks it may be anxiety(takes xanax prn) related as he notes increased stress at work but is concerned about his heart.     Review of patient's allergies indicates:  No Known Allergies    Social History     Tobacco Use    Smoking status: Never Smoker    Smokeless tobacco: Never Used   Substance Use Topics    Alcohol use: Yes     Comment: ocassionally - twice a week    Drug use: No     Patient Active Problem List   Diagnosis    Eye refraction disorder - Both Eyes    Dyspepsia    Anxiety    DJD (degenerative joint disease) of thoracic spine    DJD (degenerative joint disease) of cervical spine, mild    Hyperlipemia    Thoracic back pain    Cerebral aneurysm, nonruptured    Rib pain on right side    Muscle weakness of lower extremity    Decreased range of motion of lumbar spine    Colon adenoma: 3/18 repeat colonoscopy 2023    Umbilical hernia without obstruction and without gangrene: see CT 3/18    Fatty liver: see CT 3/18    Diverticulosis of large intestine without hemorrhage: see colonoscopy 3/18; sigmoid and descending colon           Review of Systems   Constitutional: Negative for chills, fever and unexpected weight change.   Eyes: Negative for visual disturbance.   Respiratory: Negative for cough, shortness of breath and wheezing.    Cardiovascular: Positive for chest pain. Negative for leg swelling.   Gastrointestinal: Negative for abdominal pain, nausea and vomiting.   Genitourinary: Negative for dysuria and hematuria.   Musculoskeletal: Positive for arthralgias and myalgias.   Skin: Negative for rash. " "  Neurological: Negative for weakness, light-headedness and headaches.           Objective: /86   Pulse 87   Ht 5' 10" (1.778 m)   Wt 93.1 kg (205 lb 4 oz)   SpO2 98%   BMI 29.45 kg/m²         Physical Exam   Constitutional: He is oriented to person, place, and time. He appears well-developed and well-nourished. No distress.   HENT:   Head: Normocephalic and atraumatic.   Mouth/Throat: Oropharynx is clear and moist.   Eyes: Pupils are equal, round, and reactive to light.   Cardiovascular: Normal rate and regular rhythm. Exam reveals no friction rub.   No murmur heard.  Pulmonary/Chest: Effort normal and breath sounds normal. He has no wheezes. He has no rales. He exhibits tenderness. He exhibits no mass, no edema, no deformity and no swelling. Right breast exhibits no inverted nipple, no mass, no nipple discharge and no skin change. Left breast exhibits no inverted nipple, no mass, no nipple discharge and no skin change. Breasts are symmetrical.       Abdominal: Soft. Bowel sounds are normal. There is no tenderness.   Musculoskeletal: Normal range of motion.   Neurological: He is alert and oriented to person, place, and time.   Skin: Skin is warm and dry. Capillary refill takes less than 2 seconds. No rash noted.   Psychiatric: He has a normal mood and affect.   Vitals reviewed.      Assessment:       1. Atypical chest pain    2. Anxiety    3. Muscle soreness        Plan:         Andre was seen today for follow-up.    Diagnoses and all orders for this visit:    Atypical chest pain  -     Stress test (Cupid Only); Future    Anxiety  Continue xanax prn    Muscle soreness  -     cyclobenzaprine (FLEXERIL) 10 MG tablet; Take 1 tablet (10 mg total) by mouth 3 (three) times daily as needed for Muscle spasms.    Suspect pain is non cardiac in origin as pain is reproducible on exam  Pt concerned about having his heart evaluated  Exercise stress test ordered as above  Advised to try NSAIDs, Ice, Rest for " suspected costochondritis  ED precautions discussed      Radha Hunt PA-C

## 2018-12-24 ENCOUNTER — HOSPITAL ENCOUNTER (OUTPATIENT)
Dept: CARDIOLOGY | Facility: CLINIC | Age: 48
Discharge: HOME | End: 2018-12-24
Attending: PHYSICIAN ASSISTANT
Payer: COMMERCIAL

## 2018-12-24 DIAGNOSIS — R07.89 ATYPICAL CHEST PAIN: ICD-10-CM

## 2018-12-24 LAB
CV STRESS BASE HR: 79
DIASTOLIC BLOOD PRESSURE: 82
OHS CV CPX 1 MINUTE RECOVERY HEART RATE: 127 BPM
OHS CV CPX 85 PERCENT MAX PREDICTED HEART RATE MALE: 147
OHS CV CPX ESTIMATED METS: 13
OHS CV CPX MAX PREDICTED HEART RATE: 173
OHS CV CPX PATIENT IS FEMALE: 0
OHS CV CPX PATIENT IS MALE: 1
OHS CV CPX PEAK DIASTOLIC BLOOD PRESSURE: 67 MMHG
OHS CV CPX PEAK HEAR RATE: 157
OHS CV CPX PEAK RATE PRESSURE PRODUCT: NORMAL
OHS CV CPX PEAK SYSTOLIC BLOOD PRESSURE: 172
OHS CV CPX PERCENT MAX PREDICTED HEART RATE ACHIEVED: 91
OHS CV CPX PERCENT TARGET HEART RATE ACHIEVED: 106.8
OHS CV CPX RATE PRESSURE PRODUCT PRESENTING: NORMAL
OHS CV CPX TARGET HEART RATE: 147
STRESS ECHO POST EXERCISE DUR MIN: 8 MIN
STRESS ECHO POST EXERCISE DUR SEC: 0
SYSTOLIC BLOOD PRESSURE: 131

## 2018-12-24 PROCEDURE — 93015 CV STRESS TEST SUPVJ I&R: CPT | Mod: S$GLB,,, | Performed by: INTERNAL MEDICINE

## 2019-02-21 ENCOUNTER — OFFICE VISIT (OUTPATIENT)
Dept: INTERNAL MEDICINE | Facility: CLINIC | Age: 49
End: 2019-02-21
Payer: COMMERCIAL

## 2019-02-21 ENCOUNTER — PATIENT MESSAGE (OUTPATIENT)
Dept: INTERNAL MEDICINE | Facility: CLINIC | Age: 49
End: 2019-02-21

## 2019-02-21 VITALS
DIASTOLIC BLOOD PRESSURE: 80 MMHG | BODY MASS INDEX: 28.88 KG/M2 | HEIGHT: 70 IN | WEIGHT: 201.75 LBS | OXYGEN SATURATION: 95 % | HEART RATE: 94 BPM | SYSTOLIC BLOOD PRESSURE: 122 MMHG

## 2019-02-21 DIAGNOSIS — I67.1 CEREBRAL ANEURYSM, NONRUPTURED: ICD-10-CM

## 2019-02-21 DIAGNOSIS — R07.9 CHEST PAIN, UNSPECIFIED TYPE: ICD-10-CM

## 2019-02-21 DIAGNOSIS — Z98.890 HISTORY OF SURGERY FOR CEREBRAL ANEURYSM: ICD-10-CM

## 2019-02-21 DIAGNOSIS — R20.0 NUMBNESS AND TINGLING: Primary | ICD-10-CM

## 2019-02-21 DIAGNOSIS — R20.2 NUMBNESS AND TINGLING: Primary | ICD-10-CM

## 2019-02-21 DIAGNOSIS — R73.09 ELEVATED GLUCOSE: ICD-10-CM

## 2019-02-21 DIAGNOSIS — M62.81 MUSCLE WEAKNESS OF LOWER EXTREMITY: ICD-10-CM

## 2019-02-21 PROCEDURE — 3008F BODY MASS INDEX DOCD: CPT | Mod: CPTII,S$GLB,, | Performed by: INTERNAL MEDICINE

## 2019-02-21 PROCEDURE — 3008F PR BODY MASS INDEX (BMI) DOCUMENTED: ICD-10-PCS | Mod: CPTII,S$GLB,, | Performed by: INTERNAL MEDICINE

## 2019-02-21 PROCEDURE — 99999 PR PBB SHADOW E&M-EST. PATIENT-LVL III: ICD-10-PCS | Mod: PBBFAC,,, | Performed by: INTERNAL MEDICINE

## 2019-02-21 PROCEDURE — 99214 OFFICE O/P EST MOD 30 MIN: CPT | Mod: S$GLB,,, | Performed by: INTERNAL MEDICINE

## 2019-02-21 PROCEDURE — 99999 PR PBB SHADOW E&M-EST. PATIENT-LVL III: CPT | Mod: PBBFAC,,, | Performed by: INTERNAL MEDICINE

## 2019-02-21 PROCEDURE — 99214 PR OFFICE/OUTPT VISIT, EST, LEVL IV, 30-39 MIN: ICD-10-PCS | Mod: S$GLB,,, | Performed by: INTERNAL MEDICINE

## 2019-02-21 NOTE — PROGRESS NOTES
Subjective:       Patient ID: Andre Padgett is a 48 y.o. male.    Chief Complaint: Chest Pain and Numbness    HPI:  Patient was seen by my partner KAYY Hunt a few weeks ago and had a stress test that was negative.  He was having some atypical chest pain. He says that eventually went away however he started about 4 days ago with symptoms of similar atypical chest discomfort which she says may be related to stress but episodes of numbness tingling and spasms diffusely throughout the body.  Presently it is on the left side of the head.  It has been in the neck, both upper extremities, both lower extremities and across the abdomen.  Sometimes it lasts for a few hours other times a few minutes and has awakened him from sleep.  He had a remote aneurysm repaired or treated several years ago and had follow-ups in all looked well.  He denies any headache.  He says he has been functioning and working well.  He admits he has not been sleeping and has had somewhat of a stressful.  At work but he did not feel like this was his same anxiety.  No major change in diet or medication.      Review of Systems   Constitutional: Negative for chills, fatigue, fever and unexpected weight change.   HENT: Negative for nosebleeds and trouble swallowing.    Eyes: Negative for pain and visual disturbance.   Respiratory: Negative for cough, shortness of breath and wheezing.    Cardiovascular: Positive for chest pain (atypical for cardiac). Negative for palpitations.   Gastrointestinal: Negative for abdominal pain, constipation, diarrhea, nausea and vomiting.   Genitourinary: Negative for difficulty urinating and hematuria.   Musculoskeletal: Negative for neck pain.   Skin: Negative for rash.   Neurological: Positive for weakness (Intermittent left leg) and numbness ( diffuse patches that coming go as above in the HPI). Negative for dizziness and headaches.   Hematological: Does not bruise/bleed easily.   Psychiatric/Behavioral: Negative for  dysphoric mood, sleep disturbance and suicidal ideas. The patient is nervous/anxious.        Objective:      Physical Exam   Constitutional: He is oriented to person, place, and time. He appears well-developed and well-nourished. No distress.   HENT:   Head: Normocephalic and atraumatic.   Right Ear: External ear normal.   Left Ear: External ear normal.   Mouth/Throat: Oropharynx is clear and moist. No oropharyngeal exudate.   TM's clear, pharynx clear   Eyes: Conjunctivae and EOM are normal. Pupils are equal, round, and reactive to light. No scleral icterus.   Neck: Normal range of motion. Neck supple. No thyromegaly present.   No supraclavicular nodes palpated   Cardiovascular: Normal rate, regular rhythm and normal heart sounds.   No murmur heard.  Pulmonary/Chest: Effort normal and breath sounds normal. He has no wheezes.   Abdominal: Soft. Bowel sounds are normal. He exhibits no mass. There is no tenderness.   Musculoskeletal: He exhibits no edema.   Lymphadenopathy:     He has no cervical adenopathy.   Neurological: He is alert and oriented to person, place, and time. He displays normal reflexes. A sensory deficit (Patient states the left side of the scalp feels a little tingly) is present. No cranial nerve deficit. He exhibits normal muscle tone. Coordination normal.   Skin: No rash noted. No erythema. No pallor.   Psychiatric: He has a normal mood and affect. His behavior is normal.       Assessment:       1. Numbness and tingling    2. Chest pain, unspecified type    3. Cerebral aneurysm, nonruptured    4. Elevated glucose    5. History of surgery for cerebral aneurysm    6. Muscle weakness of lower extremity        Plan:       Andre was seen today for chest pain and numbness.    Diagnoses and all orders for this visit:    Numbness and tingling  -     CBC auto differential; Future  -     Comprehensive metabolic panel; Future  -     TSH; Future  -     Vitamin B12; Future  -     Hemoglobin A1c; Future  -      Ambulatory referral to Neurology    Chest pain, unspecified type  Comments:  Atypical pain for heart. Negative stress test 12/24/18,     Cerebral aneurysm, nonruptured    Elevated glucose  -     Hemoglobin A1c; Future    History of surgery for cerebral aneurysm  -     Ambulatory referral to Neurology    Muscle weakness of lower extremity        patient to call for any acute changes related to above

## 2019-02-25 ENCOUNTER — PATIENT MESSAGE (OUTPATIENT)
Dept: INTERNAL MEDICINE | Facility: CLINIC | Age: 49
End: 2019-02-25

## 2019-02-26 ENCOUNTER — OFFICE VISIT (OUTPATIENT)
Dept: NEUROLOGY | Facility: CLINIC | Age: 49
End: 2019-02-26
Payer: COMMERCIAL

## 2019-02-26 VITALS
HEART RATE: 91 BPM | BODY MASS INDEX: 28.92 KG/M2 | SYSTOLIC BLOOD PRESSURE: 120 MMHG | DIASTOLIC BLOOD PRESSURE: 83 MMHG | WEIGHT: 202 LBS | HEIGHT: 70 IN

## 2019-02-26 DIAGNOSIS — R20.2 PARESTHESIAS: ICD-10-CM

## 2019-02-26 DIAGNOSIS — R25.2 CRAMP AND SPASM: Primary | ICD-10-CM

## 2019-02-26 PROCEDURE — 99999 PR PBB SHADOW E&M-EST. PATIENT-LVL III: CPT | Mod: PBBFAC,,, | Performed by: PSYCHIATRY & NEUROLOGY

## 2019-02-26 PROCEDURE — 99214 PR OFFICE/OUTPT VISIT, EST, LEVL IV, 30-39 MIN: ICD-10-PCS | Mod: S$GLB,,, | Performed by: PSYCHIATRY & NEUROLOGY

## 2019-02-26 PROCEDURE — 99999 PR PBB SHADOW E&M-EST. PATIENT-LVL III: ICD-10-PCS | Mod: PBBFAC,,, | Performed by: PSYCHIATRY & NEUROLOGY

## 2019-02-26 PROCEDURE — 99214 OFFICE O/P EST MOD 30 MIN: CPT | Mod: S$GLB,,, | Performed by: PSYCHIATRY & NEUROLOGY

## 2019-02-26 PROCEDURE — 3008F BODY MASS INDEX DOCD: CPT | Mod: CPTII,S$GLB,, | Performed by: PSYCHIATRY & NEUROLOGY

## 2019-02-26 PROCEDURE — 3008F PR BODY MASS INDEX (BMI) DOCUMENTED: ICD-10-PCS | Mod: CPTII,S$GLB,, | Performed by: PSYCHIATRY & NEUROLOGY

## 2019-02-26 NOTE — LETTER
February 26, 2019      Hunter Diop MD  [address]  [facility] [address]           [facility]  [address]  [facility] [address]  Phone: [phone]  Fax: [phone]          Patient: Andre Padgett   MR Number: [id number]   YOB: 1970   Date of Visit: 2/26/2019       Dear Dr. Hunter Diop:    Thank you for referring Andre Padgett to me for evaluation. Attached you will find relevant portions of my assessment and plan of care.    If you have questions, please do not hesitate to call me. I look forward to following Andre Padgett along with you.    Sincerely,    Fco Dumont MD    Enclosure  CC:  No Recipients    If you would like to receive this communication electronically, please contact [email or web address] or [phone] to request more information on [employer] Link access.    For providers and/or their staff who would like to refer a patient to [facility], please contact us through our one-stop-shop provider referral line, [facility], at [phone].    If you feel you have received this communication in error or would no longer like to receive these types of communications, please e-mail [email or web address]

## 2019-02-26 NOTE — PATIENT INSTRUCTIONS
BEGIN SUPPLEMENTATION WITH B-COMPLEX VITAMIN TO HELP WITH TINGLING DUE TO MILD B12 DEFICIENCY    CONSIDER HOME REMEDIES FOR CRAMPS SUCH AS TABLESPOON OF PICKLE JUICE, MAGNESIUM 400MG DAILY, BENADRYL 12.5-15MG AT BED TIME, COQ10 SUPPLEMENT, OR TONIC WATER WITH QUININE    IF SYMPTOMS DO NOT IMPROVE, CONSIDER CONSULTATION WITH MOVEMENT DISORDER SPECIALIST TO EVALUATE FOR DYSTONIA

## 2019-02-26 NOTE — PROGRESS NOTES
[facility] - NEUROLOGY  [facility] [place]    Date: February 26, 2019   Patient Name: Andre Padgett   MRN: [id number]   PCP: Hunter Diop  Referring Provider: Hunter Diop MD    Assessment:      This is Andre Padgett, 48 y.o. male with ACOM aneurysm s/p coil in 2011 with repeat angio 2016 with symptoms most likely due to relative B12 deficiency and benign cramps, would consider consultation with movement disorder specialist to evaluate for dystonia if symptoms persist with conservative treatment.     Plan:      -  Advised to start B-complex and Mg supplement, provided with home remedies    Follow up as needed       I discussed side effects of the medications. I asked the patient to stop the medication if he notices serious adverse effects as we discussed and to seek immediate medical attention at an ER.     Fco Dumont MD  [facility]   Department of Neurology    Subjective:      HPI:   Mr. Andre Padgett is a 48 y.o. male who presents with a chief complaint of cramps and paresthesias    Patient reports never experiencing current symptoms prior to several days ago despite very similar description in notes from 2017.  He reports awakening during the night with left much more than right arm and leg cramping and spasming, occasional episodes during that day.  He does not appreciate triggers or sensory trick and does not feel significant functional limitation in the affected parts of the body during episodes.  Mild occasional headaches and no family history of similar symptoms.  He reports this entirely resolved after starting low dose xanax at bed time.  He does endorse some anxiety and expresses concerns that symptoms may be due to aneurysm/coil.    PAST MEDICAL HISTORY:  Past Medical History:   Diagnosis Date    Aneurysm     Right sided filling anterior communicating aneurysm s/p coiling 2011    Anxiety     Colon adenoma: 3/18 repeat colonoscpy 2023  6/22/2018    Diverticulosis of large intestine without hemorrhage: see colonoscopy 3/18; sigmoid and descending colon 6/22/2018    Fatty liver: see CT 3/18 6/22/2018    Generalized headaches     Hyperlipidemia     Umbilical hernia without obstruction and without gangrene: see CT 3/18 6/22/2018       PAST SURGICAL HISTORY:  Past Surgical History:   Procedure Laterality Date    ANGIOGRAM-CEREBRAL N/A 12/27/2016    Performed by [facility] at [facility] OR 2ND FLR    BRAIN SURGERY  2011    angiogram/coiling    COLONOSCOPY N/A 3/26/2018    Performed by Sanjiv Duran MD at [facility] ENDO (4TH FLR)    ESOPHAGOGASTRODUODENOSCOPY (EGD) N/A 10/29/2015    Performed by Hao Hillman MD at [facility] ENDO (4TH FLR)       CURRENT MEDS:  Current Outpatient Medications   Medication Sig Dispense Refill    ALPRAZolam (XANAX) 0.5 MG tablet take 1 tablet by mouth at bedtime if needed 30 tablet 1    ascorbic acid (VITAMIN C) 100 MG tablet Take 100 mg by mouth once daily.      atorvastatin (LIPITOR) 40 MG tablet take 1 tablet by mouth once daily 90 tablet 3    fluticasone (FLONASE) 50 mcg/actuation nasal spray 1 spray (50 mcg total) by Each Nare route once daily. 16 g 0    ibuprofen (ADVIL,MOTRIN) 800 MG tablet Take 1 tablet (800 mg total) by mouth 3 (three) times daily. 30 tablet 0     No current facility-administered medications for this visit.        ALLERGIES:  Review of patient's allergies indicates:  No Known Allergies    FAMILY HISTORY:  Family History   Problem Relation Age of Onset    DAVID disease Mother     Hypertension Mother     Diabetes Mother     Cancer Father         brain    Thyroid disease Sister     No Known Problems Daughter     No Known Problems Sister     Celiac disease Neg Hx     Cirrhosis Neg Hx     Colon polyps Neg Hx     Crohn's disease Neg Hx     Cystic fibrosis Neg Hx     Esophageal cancer Neg Hx     Hemochromatosis Neg Hx     Inflammatory bowel disease Neg Hx     Irritable bowel  "syndrome Neg Hx     Liver cancer Neg Hx     Liver disease Neg Hx     Rectal cancer Neg Hx     Stomach cancer Neg Hx     Ulcerative colitis Neg Hx     Alli's disease Neg Hx     Heart disease Neg Hx     Heart attack Neg Hx     Amblyopia Neg Hx     Blindness Neg Hx     Cataracts Neg Hx     Glaucoma Neg Hx     Macular degeneration Neg Hx     Retinal detachment Neg Hx     Strabismus Neg Hx     Colon cancer Neg Hx        SOCIAL HISTORY:  Social History     Tobacco Use    Smoking status: Never Smoker    Smokeless tobacco: Never Used   Substance Use Topics    Alcohol use: Yes     Comment: ocassionally - twice a week    Drug use: No       Review of Systems:  12 review of systems is negative except for the symptoms mentioned in HPI.        Objective:     Vitals:    02/26/19 1331   BP: 120/83   Pulse: 91   Weight: 91.6 kg (202 lb)   Height: 5' 10" (1.778 m)       General: NAD, well nourished   Eyes: no tearing, discharge, no erythema   ENT: moist mucous membranes of the oral cavity, nares patent    Neck: Supple, full range of motion  Cardiovascular: Warm and well perfused, pulses equal and symmetrical  Lungs: Normal work of breathing, normal chest wall excursions  Skin: No rash, lesions, or breakdown on exposed skin  Psychiatry: Mood and affect are appropriate   Abdomen: soft, non tender, non distended  Extremeties: No cyanosis, clubbing or edema.    Neurological   MENTAL STATUS: Alert and oriented to person, place, and time. Attention and concentration within normal limits. Speech without dysarthria, able to name and repeat without difficulty. Recent and remote memory within normal limits   CRANIAL NERVES: Visual fields intact. PERRL. EOMI. Facial sensation intact. Face symmetrical. Hearing grossly intact. Full shoulder shrug bilaterally. Tongue protrudes midline   SENSORY: Sensation is intact to light touch and vibration throughout.  Negative Romberg.   MOTOR: Normal bulk and tone. No pronator drift.  " 5/5 deltoid, biceps, triceps, interosseous, hand  bilaterally. 5/5 iliopsoas, knee extension/flexion, foot dorsi/plantarflexion bilaterally.    REFLEXES: Symmetric and 2+ throughout.   CEREBELLAR/COORDINATION/GAIT: Gait steady with normal arm swing and stride length.  Heel to shin intact. Finger to nose intact. Normal rapid alternating movements.

## 2019-04-02 RX ORDER — ATORVASTATIN CALCIUM 40 MG/1
40 TABLET, FILM COATED ORAL DAILY
Qty: 90 TABLET | Refills: 3 | Status: SHIPPED | OUTPATIENT
Start: 2019-04-02 | End: 2020-04-03

## 2019-04-03 ENCOUNTER — OFFICE VISIT (OUTPATIENT)
Dept: NEUROLOGY | Facility: CLINIC | Age: 49
End: 2019-04-03
Payer: COMMERCIAL

## 2019-04-03 VITALS
DIASTOLIC BLOOD PRESSURE: 85 MMHG | SYSTOLIC BLOOD PRESSURE: 135 MMHG | HEIGHT: 70 IN | WEIGHT: 207.44 LBS | HEART RATE: 82 BPM | BODY MASS INDEX: 29.7 KG/M2

## 2019-04-03 DIAGNOSIS — I67.1 CEREBRAL ANEURYSM: ICD-10-CM

## 2019-04-03 PROCEDURE — 99999 PR PBB SHADOW E&M-EST. PATIENT-LVL III: CPT | Mod: PBBFAC,,, | Performed by: PSYCHIATRY & NEUROLOGY

## 2019-04-03 PROCEDURE — 99214 PR OFFICE/OUTPT VISIT, EST, LEVL IV, 30-39 MIN: ICD-10-PCS | Mod: S$GLB,,, | Performed by: PSYCHIATRY & NEUROLOGY

## 2019-04-03 PROCEDURE — 99999 PR PBB SHADOW E&M-EST. PATIENT-LVL III: ICD-10-PCS | Mod: PBBFAC,,, | Performed by: PSYCHIATRY & NEUROLOGY

## 2019-04-03 PROCEDURE — 99214 OFFICE O/P EST MOD 30 MIN: CPT | Mod: S$GLB,,, | Performed by: PSYCHIATRY & NEUROLOGY

## 2019-04-03 PROCEDURE — 3008F BODY MASS INDEX DOCD: CPT | Mod: CPTII,S$GLB,, | Performed by: PSYCHIATRY & NEUROLOGY

## 2019-04-03 PROCEDURE — 3008F PR BODY MASS INDEX (BMI) DOCUMENTED: ICD-10-PCS | Mod: CPTII,S$GLB,, | Performed by: PSYCHIATRY & NEUROLOGY

## 2019-04-03 RX ORDER — LORAZEPAM 1 MG/1
TABLET ORAL
Qty: 2 TABLET | Refills: 0 | Status: SHIPPED | OUTPATIENT
Start: 2019-04-03 | End: 2019-08-20

## 2019-04-03 NOTE — PROGRESS NOTES
[facility] - NEUROLOGY  [facility] [place]    Date: April 3, 2019   Patient Name: Andre Padgett   MRN: [id number]   PCP: Hunter Diop  Referring Provider: Fco Dumont MD    Assessment:      This is Andre Padgett, 48 y.o. male with ACOM aneurysm s/p coil in 2011 with repeat angio 2016 with relative B12 deficiency and benign cramps responsive to vitamin supplementation, presents back with new onset daily headache.     Plan:      -  MRA brain    Follow up as needed    Patient checked in 15 minutes past appointment time       I discussed side effects of the medications. I asked the patient to stop the medication if he notices serious adverse effects as we discussed and to seek immediate medical attention at an ER.     Fco Dumont MD  [facility]   Department of Neurology    Subjective:   --  Presents back due to new onset daily headache last month, onset typically while at work, not on awakening or from sleep  --  Pain may be frontal, temporal, or occipital with no associated features, describes as similar to HA at time of aneurysm  --  History of myopia but no corrective lenses, has not had ophtho exam in some time  --  Does endorse baseline anxiety and poor sleep, questions whether this is related  --  Muscle cramping resolved since starting vitamin supplement    HPI 2/201:   Mr. Andre Padgett is a 48 y.o. male who presents with a chief complaint of cramps and paresthesias    Patient reports never experiencing current symptoms prior to several days ago despite very similar description in notes from 2017.  He reports awakening during the night with left much more than right arm and leg cramping and spasming, occasional episodes during that day.  He does not appreciate triggers or sensory trick and does not feel significant functional limitation in the affected parts of the body during episodes.  Mild occasional headaches and no family history of similar  symptoms.  He reports this entirely resolved after starting low dose xanax at bed time.  He does endorse some anxiety and expresses concerns that symptoms may be due to aneurysm/coil.    PAST MEDICAL HISTORY:  Past Medical History:   Diagnosis Date    Aneurysm     Right sided filling anterior communicating aneurysm s/p coiling 2011    Anxiety     Colon adenoma: 3/18 repeat colonoscpy 2023 6/22/2018    Diverticulosis of large intestine without hemorrhage: see colonoscopy 3/18; sigmoid and descending colon 6/22/2018    Fatty liver: see CT 3/18 6/22/2018    Generalized headaches     Hyperlipidemia     Umbilical hernia without obstruction and without gangrene: see CT 3/18 6/22/2018       PAST SURGICAL HISTORY:  Past Surgical History:   Procedure Laterality Date    ANGIOGRAM-CEREBRAL N/A 12/27/2016    Performed by [facility] Provider at [facility] OR [address] [place] [address]    BRAIN SURGERY  2011    angiogram/coiling    COLONOSCOPY N/A 3/26/2018    Performed by Sanjiv Duran MD at [facility] ENDO (4TH FLR)    ESOPHAGOGASTRODUODENOSCOPY (EGD) N/A 10/29/2015    Performed by Hao Hillman MD at [facility] ENDO (4TH FLR)       CURRENT MEDS:  Current Outpatient Medications   Medication Sig Dispense Refill    ALPRAZolam (XANAX) 0.5 MG tablet take 1 tablet by mouth at bedtime if needed 30 tablet 1    ascorbic acid (VITAMIN C) 100 MG tablet Take 100 mg by mouth once daily.      atorvastatin (LIPITOR) 40 MG tablet Take 1 tablet (40 mg total) by mouth once daily. 90 tablet 3    ibuprofen (ADVIL,MOTRIN) 800 MG tablet Take 1 tablet (800 mg total) by mouth 3 (three) times daily. 30 tablet 0    LORazepam (ATIVAN) 1 MG tablet Take 1-2 tabs at check in for MRI 2 tablet 0     No current facility-administered medications for this visit.        ALLERGIES:  Review of patient's allergies indicates:  No Known Allergies    FAMILY HISTORY:  Family History   Problem Relation Age of Onset    DAVID disease Mother     Hypertension Mother     Diabetes  "Mother     Cancer Father         brain    Thyroid disease Sister     No Known Problems Daughter     No Known Problems Sister     Celiac disease Neg Hx     Cirrhosis Neg Hx     Colon polyps Neg Hx     Crohn's disease Neg Hx     Cystic fibrosis Neg Hx     Esophageal cancer Neg Hx     Hemochromatosis Neg Hx     Inflammatory bowel disease Neg Hx     Irritable bowel syndrome Neg Hx     Liver cancer Neg Hx     Liver disease Neg Hx     Rectal cancer Neg Hx     Stomach cancer Neg Hx     Ulcerative colitis Neg Hx     Alli's disease Neg Hx     Heart disease Neg Hx     Heart attack Neg Hx     Amblyopia Neg Hx     Blindness Neg Hx     Cataracts Neg Hx     Glaucoma Neg Hx     Macular degeneration Neg Hx     Retinal detachment Neg Hx     Strabismus Neg Hx     Colon cancer Neg Hx        SOCIAL HISTORY:  Social History     Tobacco Use    Smoking status: Never Smoker    Smokeless tobacco: Never Used   Substance Use Topics    Alcohol use: Yes     Comment: ocassionally - twice a week    Drug use: No       Review of Systems:  12 review of systems is negative except for the symptoms mentioned in HPI.        Objective:     Vitals:    04/03/19 0817   BP: 135/85   Pulse: 82   Weight: 94.1 kg (207 lb 7.3 oz)   Height: 5' 10" (1.778 m)       General: NAD, well nourished   Eyes: no tearing, discharge, no erythema   ENT: moist mucous membranes of the oral cavity, nares patent    Neck: Supple, full range of motion  Cardiovascular: Warm and well perfused, pulses equal and symmetrical  Lungs: Normal work of breathing, normal chest wall excursions  Skin: No rash, lesions, or breakdown on exposed skin  Psychiatry: Mood and affect are appropriate   Abdomen: soft, non tender, non distended  Extremeties: No cyanosis, clubbing or edema.    Neurological   MENTAL STATUS: Alert and oriented to person, place, and time. Attention and concentration within normal limits. Speech without dysarthria, able to name and repeat " without difficulty. Recent and remote memory within normal limits   CRANIAL NERVES: Visual fields intact. PERRL. EOMI. Facial sensation intact. Face symmetrical. Hearing grossly intact. Full shoulder shrug bilaterally. Tongue protrudes midline   SENSORY: Sensation is intact to light touch and vibration throughout.  Negative Romberg.   MOTOR: Normal bulk and tone. No pronator drift.  5/5 deltoid, biceps, triceps, interosseous, hand  bilaterally. 5/5 iliopsoas, knee extension/flexion, foot dorsi/plantarflexion bilaterally.    REFLEXES: Symmetric and 2+ throughout.   CEREBELLAR/COORDINATION/GAIT: Gait steady with normal arm swing and stride length.  Heel to shin intact. Finger to nose intact. Normal rapid alternating movements.

## 2019-04-03 NOTE — PATIENT INSTRUCTIONS
FOLLOW UP FOR MRA TO RULE OUT ANEURYSM    FOLLOW UP WITH OPHTHALMOLOGY OR OPTOMETRY TO EVALUATE FOR EYE STRAIN CAUSING HEADACHES    TRY MELATONIN 5-10MG AT BED TIME TO HELP WITH SLEEP

## 2019-04-15 ENCOUNTER — HOSPITAL ENCOUNTER (OUTPATIENT)
Dept: RADIOLOGY | Facility: HOSPITAL | Age: 49
Discharge: HOME | End: 2019-04-15
Attending: PSYCHIATRY & NEUROLOGY
Payer: COMMERCIAL

## 2019-04-15 DIAGNOSIS — I67.1 CEREBRAL ANEURYSM: ICD-10-CM

## 2019-04-15 PROCEDURE — 70544 MR ANGIOGRAPHY HEAD W/O DYE: CPT | Mod: TC

## 2019-04-15 PROCEDURE — 70544 MR ANGIOGRAPHY HEAD W/O DYE: CPT | Mod: 26,,, | Performed by: RADIOLOGY

## 2019-04-15 PROCEDURE — 70544 MRA BRAIN WITHOUT CONTRAST: ICD-10-PCS | Mod: 26,,, | Performed by: RADIOLOGY

## 2019-04-30 RX ORDER — ALPRAZOLAM 0.5 MG/1
0.5 TABLET ORAL NIGHTLY PRN
Qty: 30 TABLET | Refills: 1 | Status: SHIPPED | OUTPATIENT
Start: 2019-04-30 | End: 2019-08-27 | Stop reason: SDUPTHER

## 2019-07-17 ENCOUNTER — LAB VISIT (OUTPATIENT)
Dept: LAB | Facility: HOSPITAL | Age: 49
End: 2019-07-17
Payer: COMMERCIAL

## 2019-07-17 ENCOUNTER — OFFICE VISIT (OUTPATIENT)
Dept: INTERNAL MEDICINE | Facility: CLINIC | Age: 49
End: 2019-07-17
Payer: COMMERCIAL

## 2019-07-17 VITALS
OXYGEN SATURATION: 98 % | HEIGHT: 70 IN | HEART RATE: 86 BPM | SYSTOLIC BLOOD PRESSURE: 130 MMHG | WEIGHT: 200.63 LBS | TEMPERATURE: 99 F | DIASTOLIC BLOOD PRESSURE: 80 MMHG | BODY MASS INDEX: 28.72 KG/M2

## 2019-07-17 DIAGNOSIS — J11.1 FLU: Primary | ICD-10-CM

## 2019-07-17 DIAGNOSIS — J11.1 FLU: ICD-10-CM

## 2019-07-17 LAB
ALBUMIN SERPL BCP-MCNC: 4.2 G/DL (ref 3.5–5.2)
ALP SERPL-CCNC: 59 U/L (ref 55–135)
ALT SERPL W/O P-5'-P-CCNC: 27 U/L (ref 10–44)
ANION GAP SERPL CALC-SCNC: 10 MMOL/L (ref 8–16)
AST SERPL-CCNC: 22 U/L (ref 10–40)
BILIRUB SERPL-MCNC: 0.6 MG/DL (ref 0.1–1)
BUN SERPL-MCNC: 10 MG/DL (ref 6–20)
CALCIUM SERPL-MCNC: 9.3 MG/DL (ref 8.7–10.5)
CHLORIDE SERPL-SCNC: 103 MMOL/L (ref 95–110)
CO2 SERPL-SCNC: 28 MMOL/L (ref 23–29)
CREAT SERPL-MCNC: 0.8 MG/DL (ref 0.5–1.4)
EST. GFR  (AFRICAN AMERICAN): >60 ML/MIN/1.73 M^2
EST. GFR  (NON AFRICAN AMERICAN): >60 ML/MIN/1.73 M^2
GLUCOSE SERPL-MCNC: 95 MG/DL (ref 70–110)
INFLUENZA A, MOLECULAR: NEGATIVE
INFLUENZA B, MOLECULAR: NEGATIVE
POTASSIUM SERPL-SCNC: 4.1 MMOL/L (ref 3.5–5.1)
PROT SERPL-MCNC: 7.4 G/DL (ref 6–8.4)
SODIUM SERPL-SCNC: 141 MMOL/L (ref 136–145)
SPECIMEN SOURCE: NORMAL

## 2019-07-17 PROCEDURE — 3008F BODY MASS INDEX DOCD: CPT | Mod: CPTII,S$GLB,, | Performed by: STUDENT IN AN ORGANIZED HEALTH CARE EDUCATION/TRAINING PROGRAM

## 2019-07-17 PROCEDURE — 80053 COMPREHEN METABOLIC PANEL: CPT

## 2019-07-17 PROCEDURE — 3008F PR BODY MASS INDEX (BMI) DOCUMENTED: ICD-10-PCS | Mod: CPTII,S$GLB,, | Performed by: STUDENT IN AN ORGANIZED HEALTH CARE EDUCATION/TRAINING PROGRAM

## 2019-07-17 PROCEDURE — 99999 PR PBB SHADOW E&M-EST. PATIENT-LVL IV: ICD-10-PCS | Mod: PBBFAC,,, | Performed by: STUDENT IN AN ORGANIZED HEALTH CARE EDUCATION/TRAINING PROGRAM

## 2019-07-17 PROCEDURE — 87502 INFLUENZA DNA AMP PROBE: CPT

## 2019-07-17 PROCEDURE — 99213 PR OFFICE/OUTPT VISIT, EST, LEVL III, 20-29 MIN: ICD-10-PCS | Mod: S$GLB,,, | Performed by: STUDENT IN AN ORGANIZED HEALTH CARE EDUCATION/TRAINING PROGRAM

## 2019-07-17 PROCEDURE — 99999 PR PBB SHADOW E&M-EST. PATIENT-LVL IV: CPT | Mod: PBBFAC,,, | Performed by: STUDENT IN AN ORGANIZED HEALTH CARE EDUCATION/TRAINING PROGRAM

## 2019-07-17 PROCEDURE — 99213 OFFICE O/P EST LOW 20 MIN: CPT | Mod: S$GLB,,, | Performed by: STUDENT IN AN ORGANIZED HEALTH CARE EDUCATION/TRAINING PROGRAM

## 2019-07-17 PROCEDURE — 36415 COLL VENOUS BLD VENIPUNCTURE: CPT

## 2019-07-17 RX ORDER — OSELTAMIVIR PHOSPHATE 75 MG/1
75 CAPSULE ORAL 2 TIMES DAILY
Qty: 10 CAPSULE | Refills: 0 | Status: SHIPPED | OUTPATIENT
Start: 2019-07-17 | End: 2019-07-22

## 2019-07-17 NOTE — PROGRESS NOTES
"Subjective:       Patient ID: Andre Padgett is a 48 y.o. male.    Chief Complaint: Fatigue and Generalized Body Aches    HPI   48 y.o M presenting as a urgent care visit for generalized body aches. Patient was in his normal state of health on 7/16 during the day and in the evening/night started experiencing chills, myalgias, joint pain generalized body aches, chills. No upper/lower respiratory issues. Today 7/17, he woke up and continued to feel extremely fatigued and generalized body aches, took a 500mg naproxen which provided mild relief. Subsequently, presented to clinic.     No sick contacts. No new pets.   No travel hx.  No new sexual contacts.      Review of Systems   Constitutional: Positive for chills and fatigue. Negative for fever.   HENT: Negative for sinus pressure, sinus pain and sneezing.    Eyes: Negative for redness.        Watery eyes    Respiratory: Negative for cough, chest tightness and shortness of breath.    Cardiovascular: Negative for chest pain and leg swelling.   Gastrointestinal: Negative for abdominal pain, nausea and vomiting.   Genitourinary: Negative for dysuria and hematuria.   Musculoskeletal: Positive for arthralgias, back pain and myalgias.   Neurological: Negative for syncope, weakness and headaches.       Objective:       Vitals:    07/17/19 1457   BP: 130/80   BP Location: Left arm   Patient Position: Sitting   BP Method: Large (Manual)   Pulse: 86   Temp: 98.8 °F (37.1 °C)   TempSrc: Oral   SpO2: 98%   Weight: 91 kg (200 lb 9.9 oz)   Height: 5' 10" (1.778 m)       Physical Exam   Constitutional: He is oriented to person, place, and time. He appears well-developed and well-nourished.   HENT:   Head: Normocephalic and atraumatic.   Eyes: Pupils are equal, round, and reactive to light. EOM are normal.   Neck: Normal range of motion. Neck supple.   Cardiovascular: Normal rate and regular rhythm.   Pulmonary/Chest: Effort normal and breath sounds normal.   Abdominal: Soft. Bowel " sounds are normal.   Musculoskeletal: Normal range of motion. He exhibits no deformity.   Neurological: He is alert and oriented to person, place, and time.       Assessment:       1. Flu        Plan:        FLU/viral illness  - history of illness and abrut onset of symptoms is typical of the flu/viral illness, although the season is atypical. Will check for flu, prescribe tamiflu. Discussed ibuoprofen for symptom control. If patients symptoms worsen, or spikes fevers. RTC         Nancy Jimenez MD     Internal Medicine PGY3

## 2019-07-26 ENCOUNTER — OFFICE VISIT (OUTPATIENT)
Dept: INTERNAL MEDICINE | Facility: CLINIC | Age: 49
End: 2019-07-26
Payer: COMMERCIAL

## 2019-07-26 VITALS
HEIGHT: 70 IN | DIASTOLIC BLOOD PRESSURE: 70 MMHG | WEIGHT: 199.5 LBS | OXYGEN SATURATION: 96 % | BODY MASS INDEX: 28.56 KG/M2 | HEART RATE: 81 BPM | SYSTOLIC BLOOD PRESSURE: 114 MMHG

## 2019-07-26 DIAGNOSIS — R00.2 PALPITATION: Primary | ICD-10-CM

## 2019-07-26 DIAGNOSIS — M47.814 SPONDYLOSIS OF THORACIC REGION WITHOUT MYELOPATHY OR RADICULOPATHY: ICD-10-CM

## 2019-07-26 DIAGNOSIS — F41.9 ANXIETY: ICD-10-CM

## 2019-07-26 PROCEDURE — 93010 ELECTROCARDIOGRAM REPORT: CPT | Mod: S$GLB,,, | Performed by: INTERNAL MEDICINE

## 2019-07-26 PROCEDURE — 99999 PR PBB SHADOW E&M-EST. PATIENT-LVL III: ICD-10-PCS | Mod: PBBFAC,,, | Performed by: INTERNAL MEDICINE

## 2019-07-26 PROCEDURE — 99214 PR OFFICE/OUTPT VISIT, EST, LEVL IV, 30-39 MIN: ICD-10-PCS | Mod: S$GLB,,, | Performed by: INTERNAL MEDICINE

## 2019-07-26 PROCEDURE — 3008F PR BODY MASS INDEX (BMI) DOCUMENTED: ICD-10-PCS | Mod: CPTII,S$GLB,, | Performed by: INTERNAL MEDICINE

## 2019-07-26 PROCEDURE — 93005 ELECTROCARDIOGRAM TRACING: CPT | Mod: S$GLB,,, | Performed by: INTERNAL MEDICINE

## 2019-07-26 PROCEDURE — 93010 EKG 12-LEAD: ICD-10-PCS | Mod: S$GLB,,, | Performed by: INTERNAL MEDICINE

## 2019-07-26 PROCEDURE — 99999 PR PBB SHADOW E&M-EST. PATIENT-LVL III: CPT | Mod: PBBFAC,,, | Performed by: INTERNAL MEDICINE

## 2019-07-26 PROCEDURE — 99214 OFFICE O/P EST MOD 30 MIN: CPT | Mod: S$GLB,,, | Performed by: INTERNAL MEDICINE

## 2019-07-26 PROCEDURE — 3008F BODY MASS INDEX DOCD: CPT | Mod: CPTII,S$GLB,, | Performed by: INTERNAL MEDICINE

## 2019-07-26 PROCEDURE — 93005 EKG 12-LEAD: ICD-10-PCS | Mod: S$GLB,,, | Performed by: INTERNAL MEDICINE

## 2019-07-26 NOTE — PROGRESS NOTES
Subjective:       Patient ID: Andre Padgett is a 48 y.o. male.    Chief Complaint: Anxiety    HPI:  Patient comes in because he is worried he is having a mild flare of his anxiety but wanted to have his heart checked.  He was on a vacation at the beach with his family and said things went well.  He was relaxing, drinking more caffeine and alcohol van when he is not on vacation.  No problems. On his return home 2 days ago he noticed some palpitations or skipped beats and has had those off and on the last 2 days per  Off is EKG was normal.  He denies chest pains or shortness of breath.  He had a stress test in the last few months and saw his neurologist and scan showed no evidence of cerebral aneurysm which he has had in the past.    Review of Systems   Constitutional: Negative for chills, fatigue, fever and unexpected weight change.   HENT: Negative for nosebleeds and trouble swallowing.    Eyes: Negative for pain and visual disturbance.   Respiratory: Negative for cough, shortness of breath and wheezing.    Cardiovascular: Positive for palpitations. Negative for chest pain.   Gastrointestinal: Negative for abdominal pain, constipation, diarrhea, nausea and vomiting.   Genitourinary: Negative for difficulty urinating and hematuria.   Musculoskeletal: Negative for neck pain.   Skin: Negative for rash.   Neurological: Negative for dizziness and headaches.   Hematological: Does not bruise/bleed easily.   Psychiatric/Behavioral: Negative for dysphoric mood, sleep disturbance and suicidal ideas. The patient is nervous/anxious.        Objective:      Physical Exam   Constitutional: He is oriented to person, place, and time. He appears well-developed and well-nourished. No distress.   HENT:   Head: Normocephalic and atraumatic.   Right Ear: External ear normal.   Left Ear: External ear normal.   Mouth/Throat: Oropharynx is clear and moist. No oropharyngeal exudate.   TM's clear, pharynx clear   Eyes: Pupils are equal,  round, and reactive to light. Conjunctivae and EOM are normal. No scleral icterus.   Neck: Normal range of motion. Neck supple. No thyromegaly present.   No supraclavicular nodes palpated   Cardiovascular: Normal rate, regular rhythm and normal heart sounds.   No murmur heard.  Pulmonary/Chest: Effort normal and breath sounds normal. He has no wheezes.   Abdominal: Soft. Bowel sounds are normal. He exhibits no mass. There is no tenderness.   Musculoskeletal: He exhibits no edema.   Lymphadenopathy:     He has no cervical adenopathy.   Neurological: He is alert and oriented to person, place, and time.   Skin: No rash noted. No erythema. No pallor.   Psychiatric: He has a normal mood and affect. His behavior is normal.       Assessment:       1. Palpitation    2. Anxiety    3. Spondylosis of thoracic region without myelopathy or radiculopathy        Plan:       Andre was seen today for anxiety.    Diagnoses and all orders for this visit:    Palpitation  -     EKG 12-lead    Anxiety    Spondylosis of thoracic region without myelopathy or radiculopathy          Nothing to suggest cardiac etiology for symptoms.  Vitals, exam and EKG or all very stable.  Patient reassured.

## 2019-08-03 ENCOUNTER — PATIENT MESSAGE (OUTPATIENT)
Dept: INTERNAL MEDICINE | Facility: CLINIC | Age: 49
End: 2019-08-03

## 2019-08-03 DIAGNOSIS — R00.2 PALPITATIONS: Primary | ICD-10-CM

## 2019-08-07 ENCOUNTER — TELEPHONE (OUTPATIENT)
Dept: INTERNAL MEDICINE | Facility: CLINIC | Age: 49
End: 2019-08-07

## 2019-08-07 NOTE — TELEPHONE ENCOUNTER
----- Message from Judi Almonte sent at 8/7/2019  4:42 PM CDT -----  Contact: Patient [phone]  Patient stated that he is still having issues with the heartbeat that was discussed with you and is requesting a phone call.    Please call and advise.    Thank You

## 2019-08-09 ENCOUNTER — HOSPITAL ENCOUNTER (OUTPATIENT)
Dept: CARDIOLOGY | Facility: HOSPITAL | Age: 49
Discharge: HOME | End: 2019-08-09
Attending: INTERNAL MEDICINE
Payer: COMMERCIAL

## 2019-08-09 DIAGNOSIS — R00.2 PALPITATIONS: ICD-10-CM

## 2019-08-09 PROCEDURE — 93227 XTRNL ECG REC<48 HR R&I: CPT | Mod: ,,, | Performed by: INTERNAL MEDICINE

## 2019-08-09 PROCEDURE — 93227 HOLTER MONITOR - 48 HOUR (CUPID ONLY): ICD-10-PCS | Mod: ,,, | Performed by: INTERNAL MEDICINE

## 2019-08-09 PROCEDURE — 93226 XTRNL ECG REC<48 HR SCAN A/R: CPT

## 2019-08-14 ENCOUNTER — PATIENT MESSAGE (OUTPATIENT)
Dept: INTERNAL MEDICINE | Facility: CLINIC | Age: 49
End: 2019-08-14

## 2019-08-14 DIAGNOSIS — R07.9 CHEST PAIN, UNSPECIFIED TYPE: ICD-10-CM

## 2019-08-14 DIAGNOSIS — R00.2 PALPITATIONS: Primary | ICD-10-CM

## 2019-08-14 LAB
OHS CV EVENT MONITOR DAY: 2
OHS CV HOLTER LENGTH DECIMAL HOURS: 96
OHS CV HOLTER LENGTH HOURS: 48
OHS CV HOLTER LENGTH MINUTES: 0

## 2019-08-20 ENCOUNTER — HOSPITAL ENCOUNTER (OUTPATIENT)
Dept: RADIOLOGY | Facility: HOSPITAL | Age: 49
Discharge: HOME | End: 2019-08-20
Attending: INTERNAL MEDICINE
Payer: COMMERCIAL

## 2019-08-20 ENCOUNTER — CLINICAL SUPPORT (OUTPATIENT)
Dept: CARDIOLOGY | Facility: HOSPITAL | Age: 49
End: 2019-08-20
Attending: INTERNAL MEDICINE
Payer: COMMERCIAL

## 2019-08-20 ENCOUNTER — OFFICE VISIT (OUTPATIENT)
Dept: CARDIOLOGY | Facility: CLINIC | Age: 49
End: 2019-08-20
Payer: COMMERCIAL

## 2019-08-20 VITALS
BODY MASS INDEX: 28.71 KG/M2 | WEIGHT: 200.5 LBS | HEART RATE: 78 BPM | SYSTOLIC BLOOD PRESSURE: 114 MMHG | HEIGHT: 70 IN | DIASTOLIC BLOOD PRESSURE: 74 MMHG

## 2019-08-20 DIAGNOSIS — R00.2 PALPITATIONS: Primary | ICD-10-CM

## 2019-08-20 DIAGNOSIS — Z91.89 AT RISK FOR CORONARY ARTERY DISEASE: ICD-10-CM

## 2019-08-20 DIAGNOSIS — R00.2 PALPITATIONS: ICD-10-CM

## 2019-08-20 DIAGNOSIS — E78.00 PURE HYPERCHOLESTEROLEMIA: ICD-10-CM

## 2019-08-20 PROCEDURE — 93272 CARDIAC EVENT MONITOR (CUPID ONLY): ICD-10-PCS | Mod: ,,, | Performed by: INTERNAL MEDICINE

## 2019-08-20 PROCEDURE — 99203 OFFICE O/P NEW LOW 30 MIN: CPT | Mod: S$GLB,,, | Performed by: INTERNAL MEDICINE

## 2019-08-20 PROCEDURE — 93272 ECG/REVIEW INTERPRET ONLY: CPT | Mod: ,,, | Performed by: INTERNAL MEDICINE

## 2019-08-20 PROCEDURE — 93271 ECG/MONITORING AND ANALYSIS: CPT

## 2019-08-20 PROCEDURE — 99203 PR OFFICE/OUTPT VISIT, NEW, LEVL III, 30-44 MIN: ICD-10-PCS | Mod: S$GLB,,, | Performed by: INTERNAL MEDICINE

## 2019-08-20 PROCEDURE — 75571 CT CALCIUM SCORING CARDIAC: ICD-10-PCS | Mod: 26,,, | Performed by: RADIOLOGY

## 2019-08-20 PROCEDURE — 75571 CT HRT W/O DYE W/CA TEST: CPT | Mod: TC

## 2019-08-20 PROCEDURE — 99999 PR PBB SHADOW E&M-EST. PATIENT-LVL III: CPT | Mod: PBBFAC,,, | Performed by: INTERNAL MEDICINE

## 2019-08-20 PROCEDURE — 75571 CT HRT W/O DYE W/CA TEST: CPT | Mod: 26,,, | Performed by: RADIOLOGY

## 2019-08-20 PROCEDURE — 99999 PR PBB SHADOW E&M-EST. PATIENT-LVL III: ICD-10-PCS | Mod: PBBFAC,,, | Performed by: INTERNAL MEDICINE

## 2019-08-20 RX ORDER — TERBINAFINE HYDROCHLORIDE 250 MG/1
1 TABLET ORAL DAILY
Refills: 0 | COMMUNITY
Start: 2019-08-10 | End: 2019-12-06

## 2019-08-20 NOTE — PROGRESS NOTES
Subjective:     Patient ID:  Andre Padgett is a 48 y.o. male who presents for evaluation of Palpitations      Problem List:      HPI:     Andre Padgett is here for evaluation of palpitations. The palpitations started while on vacation in [place]. He describes it as a skipped beat or a brief dropped feeling, sometimes he will have 3 in a row. He usually does not feel them while active. Holter 8/19: very rare PVCs and PACs. Evaluated by Dr. Granger in 2013 for similar complaints.  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 He felt weak and felt his heart racing for 30-40 minutes.  Stress echo 12/16: structurally normal heart, no ischemia.  He has hypercholesterolemia. His lipid levels have varied considerably.  In 2103: total cholesterol was 269 with a LDL (c) of 186 mg/dl. The lowest on file is from 2017: total cholesterol was 162 and LDL (c) was 93 mg/dl.  He does not have a history of CAD.  He does not have a family history of MI, stroke or CHF      ROS      Current Outpatient Medications   Medication Sig    ALPRAZolam (XANAX) 0.5 MG tablet Take 1 tablet (0.5 mg total) by mouth nightly as needed.    ascorbic acid (VITAMIN C) 100 MG tablet Take 100 mg by mouth once daily.    atorvastatin (LIPITOR) 40 MG tablet Take 1 tablet (40 mg total) by mouth once daily.    ibuprofen (ADVIL,MOTRIN) 800 MG tablet Take 1 tablet (800 mg total) by mouth 3 (three) times daily.    LORazepam (ATIVAN) 1 MG tablet Take 1-2 tabs at check in for MRI     No current facility-administered medications for this visit.          Past Medical and Surgical history:  Andre Padgett  has a past medical history of Aneurysm, Anxiety, Colon adenoma: 3/18 repeat colonoscpy 2023 (6/22/2018), Diverticulosis of large intestine without hemorrhage: see colonoscopy 3/18; sigmoid and descending colon (6/22/2018), Fatty liver: see CT 3/18 (6/22/2018), Generalized headaches, Hyperlipidemia, and Umbilical hernia without obstruction  "and without gangrene: see CT 3/18 (6/22/2018).   Past Surgical History:   Procedure Laterality Date    ANGIOGRAM-CEREBRAL N/A 12/27/2016    Performed by [facility] at [facility] OR 2ND FLR    BRAIN SURGERY  2011    angiogram/coiling    COLONOSCOPY N/A 3/26/2018    Performed by Sanjiv Duran MD at [facility] ENDO (4TH FLR)    ESOPHAGOGASTRODUODENOSCOPY (EGD) N/A 10/29/2015    Performed by Hao Hillman MD at [facility] ENDO (4TH FLR)         Social history:  Andre Padgett  reports that he has never smoked. He has never used smokeless tobacco. He reports that he drinks alcohol. He reports that he does not use drugs.    Family history:  Andre Padgett's family history includes Cancer in his father; Diabetes in his mother; DAVID disease in his mother; Hypertension in his mother; No Known Problems in his daughter and sister; Thyroid disease in his sister.         Objective:     Physical Exam   Constitutional: He is oriented to person, place, and time. He appears well-developed and well-nourished.   /74   Pulse 78   Ht 5' 10" (1.778 m)   Wt 90.9 kg (200 lb 8.1 oz)   BMI 28.77 kg/m²      HENT:   Head: Normocephalic and atraumatic.   Neck: No JVD present. Carotid bruit is not present.   Cardiovascular: Normal rate, regular rhythm, S1 normal and S2 normal. Exam reveals no gallop.   No murmur (w and w/o Valsalva and hand ) heard.  Pulses:       Radial pulses are 2+ on the right side, and 2+ on the left side.        Posterior tibial pulses are 2+ on the right side, and 2+ on the left side.   Pulmonary/Chest: Effort normal. He has no wheezes. He has no rales. Chest wall is not dull to percussion.   Abdominal: Soft. There is no splenomegaly or hepatomegaly. There is no tenderness.   Musculoskeletal:        Right lower leg: He exhibits no edema.        Left lower leg: He exhibits no edema.   Neurological: He is alert and oriented to person, place, and time. Gait normal.   Skin: Skin is warm and dry. No " bruising noted. No cyanosis. Nails show no clubbing.   Psychiatric: He has a normal mood and affect. His speech is normal and behavior is normal. Judgment and thought content normal. Cognition and memory are normal.         Lab Results   Component Value Date    CHOL 191 02/21/2018    CHOL 162 01/21/2017    CHOL 207 (H) 12/14/2016     Lab Results   Component Value Date    HDL 56 02/21/2018    HDL 48 01/21/2017    HDL 60 12/14/2016     Lab Results   Component Value Date    LDLCALC 114.8 02/21/2018    LDLCALC 92.6 01/21/2017    LDLCALC 130.8 12/14/2016     Lab Results   Component Value Date    TRIG 101 02/21/2018    TRIG 107 01/21/2017    TRIG 81 12/14/2016     Lab Results   Component Value Date    CHOLHDL 29.3 02/21/2018    CHOLHDL 29.6 01/21/2017    CHOLHDL 29.0 12/14/2016     Lab Results   Component Value Date    ALT 27 07/17/2019     Lab Results   Component Value Date    ALT 27 07/17/2019    AST 22 07/17/2019    ALKPHOS 59 07/17/2019    BILITOT 0.6 07/17/2019      Lab Results   Component Value Date    CREATININE 0.8 07/17/2019    BUN 10 07/17/2019     07/17/2019    K 4.1 07/17/2019     07/17/2019    CO2 28 07/17/2019         Assessment and Plan:     Palpitations  Mostly PVCs or PACs.  Reassure.  Avoid triggers.  For the more sustained episode that occurred while he was outdoors he should have a 30 day event monitor.  -     Cardiac event monitor; Future (non looping)    Pure hypercholesterolemia  At risk for coronary artery disease  Unrelated to the palpitations, I recommended a coronary artery calcium score.  -     CT Calcium Scoring Cardiac; Future; Expected date: 08/20/2019         Follow up if symptoms worsen or fail to improve.

## 2019-08-20 NOTE — LETTER
August 27, 2019      Hunter Dipo MD  [address]  [facility] [address]           [place] [facility]  [address]  [place] [address]  Phone: [phone]          Patient: Andre Padgett   MR Number: [id number]   YOB: 1970   Date of Visit: 8/20/2019       Dear Dr. Hunter Diop:    Thank you for referring Andre Padgett to me for evaluation. Attached you will find relevant portions of my assessment and plan of care.    If you have questions, please do not hesitate to call me. I look forward to following Andre Padgett along with you.    Sincerely,    Corey Mclean MD    Enclosure  CC:  No Recipients    If you would like to receive this communication electronically, please contact [email or web address] or [phone] to request more information on [employer] access.    For providers and/or their staff who would like to refer a patient to [facility], please contact us through our one-stop-shop provider referral line, [facility] , at [phone].    If you feel you have received this communication in error or would no longer like to receive these types of communications, please e-mail [email or web address]

## 2019-08-20 NOTE — PATIENT INSTRUCTIONS
Eat or drink one of these potassium containing foods:    OJ - 4oz (if not diabetic)  Bananas  Tomatoes  Fruit juices  (most fruit juices contain some potassium; check the label)  Vegetable juices:  low sodium V8* (6 oz or less)  Pedialyte  Sparkling Rush Powder Packs (but these also contain sodium)  Salt substitutes  OTC Potassium 3-4 tabs    ====================    Triggers for arrhythmias:    Caffeine  Alcohol  Decongestant medicines (cold and sinus meds: phenylephrine, pseudoephedrine)  Lack of sleep  Stress      =========================    Your cholesterol levels have varied considerably over the years. In 2103: total cholesterol was 269 with a LDL (c) of 186 mg/dl. The lowest on file is from 2017: total cholesterol was 162 and LDL (c) was 93 mg/dl    LDL - bad type - improves with diet and medications: typically statins; most other medications that lower LDL have not been proven to prevent heart attacks.  May not improve significantly with exercise alone.  Ideally less than 100 mg/dl.   But the lower the better. Statins (cholesterol lowering meds) lower LDL. If you have coronary artery disease or blockages anywhere else in the body, it should be well below 70 mg/dl.    HDL - good type - improves with exercise.  Ideally greater than 50 mg/dl. The proportion of HDL to the total cholesterol is more important than the absolute HDL.  This means a HDL of 45 out of a total cholesterol of 130 mg'dl is pretty good, but the same HDL out of a total of  200 mg/dl is not quite as good. A level of 30% or higher is ideal.    TGs (triglycerides) - also bad - can change very quickly and considerably with certain foods. Improve with diet, exercise and high dose fish oil.  In some cases a low carbohydrate diet will lower TGs better than a low fat diet.  Ideal range  mg/dl.    Sugar, fat and cholesterol in food:     A sensible diet that limits the intake of sugars, saturated (bad) fats and trans fats while increasing the  intake of unsaturated (good) fats and plant proteins is the basis of the current dietary recommendations.      We now recommend drastically reducing the intake of sugar. There is less emphasis on excluding all fat and more emphasis on the types of different fats.    Cholesterol in our food is generally present in relatively small amounts. New dietary guidelines are less obsessed with the amount of cholesterol. However please do not confuse this with the role of cholesterol in our blood and arteries. The liver converts certain foods into cholesterol.  It is this cholesterol and other fats that clogs up our arteries.       Most foods that are high in cholesterol are also high in saturated fat. But there is much more saturated fat than cholesterol in these foods. Researchers believe that the saturated fat content matters more than cholesterol. On the other hand, there are a handful of foods that are high in cholesterol but do not contain much saturated fat: eggs, shrimp, crab legs and crawfish are OK to eat in modest quantities as long as you do not deep concepcion them. So a few eggs a week are fine the white and the yolk, but you can eat as many egg whites as you want.      Saturated fat is the bad fat - you should limit your intake of this. Deep fried foods, meats and other animal fats are high in saturated fat. Cookies, donuts and most dessert and cakes are usually high in both saturated fat and sugar.       Unsaturated fat is the good fat. It contains the same number of calories as saturated fat but these fats do not get deposited in our arteries. The Mediterranean style diet encourages the intake of unsaturated fat - olive oil, avocado and unsalted nuts. So instead of baking a piece of fish, consider pan-frying it using olive oil.     You should eat a few servings of vegetables (and fruit as long as you are not diabetic) everyday. Substitute some plant proteins in place of meat: beans, lentils, quinoa and oatmeal. They  are lean proteins.     Do not use stick butter or stick margarine. Butter that comes in a tub is soft butter. It consists of 1/2 butter and 1/2 vegetable oil., either canola or olive oil. It is fine to use that.       Trans fats should definitely be avoided. Most foods that are labelled as containing 0 gms of trans fat may still contain several hundred milligrams of trans fat: creamer, margarine, dough, deep fried foods, ready made frosting, potato, corn and torilla chips, cakes, cookies, pie crusts and crackers containing shortening made with hydrogenated vegetable oil.

## 2019-08-23 ENCOUNTER — TELEPHONE (OUTPATIENT)
Dept: CARDIOLOGY | Facility: CLINIC | Age: 49
End: 2019-08-23

## 2019-08-23 ENCOUNTER — HOSPITAL ENCOUNTER (EMERGENCY)
Facility: HOSPITAL | Age: 49
Discharge: HOME | End: 2019-08-23
Attending: EMERGENCY MEDICINE
Payer: COMMERCIAL

## 2019-08-23 VITALS
SYSTOLIC BLOOD PRESSURE: 120 MMHG | HEART RATE: 74 BPM | OXYGEN SATURATION: 100 % | RESPIRATION RATE: 16 BRPM | BODY MASS INDEX: 28.35 KG/M2 | DIASTOLIC BLOOD PRESSURE: 78 MMHG | HEIGHT: 70 IN | WEIGHT: 198 LBS | TEMPERATURE: 99 F

## 2019-08-23 DIAGNOSIS — I25.84 CORONARY ARTERY DISEASE DUE TO CALCIFIED CORONARY LESION: Primary | ICD-10-CM

## 2019-08-23 DIAGNOSIS — R53.81 MALAISE: Primary | ICD-10-CM

## 2019-08-23 DIAGNOSIS — I49.9 IRREGULAR HEART RHYTHM: ICD-10-CM

## 2019-08-23 DIAGNOSIS — I25.10 CORONARY ARTERY DISEASE DUE TO CALCIFIED CORONARY LESION: Primary | ICD-10-CM

## 2019-08-23 LAB
ALBUMIN SERPL BCP-MCNC: 4.3 G/DL (ref 3.5–5.2)
ALP SERPL-CCNC: 65 U/L (ref 55–135)
ALT SERPL W/O P-5'-P-CCNC: 24 U/L (ref 10–44)
ANION GAP SERPL CALC-SCNC: 10 MMOL/L (ref 8–16)
AST SERPL-CCNC: 22 U/L (ref 10–40)
BASOPHILS # BLD AUTO: 0.02 K/UL (ref 0–0.2)
BASOPHILS NFR BLD: 0.3 % (ref 0–1.9)
BILIRUB SERPL-MCNC: 0.9 MG/DL (ref 0.1–1)
BUN SERPL-MCNC: 8 MG/DL (ref 6–20)
CALCIUM SERPL-MCNC: 9.9 MG/DL (ref 8.7–10.5)
CHLORIDE SERPL-SCNC: 100 MMOL/L (ref 95–110)
CK SERPL-CCNC: 125 U/L (ref 20–200)
CO2 SERPL-SCNC: 26 MMOL/L (ref 23–29)
CREAT SERPL-MCNC: 0.8 MG/DL (ref 0.5–1.4)
DIFFERENTIAL METHOD: ABNORMAL
EOSINOPHIL # BLD AUTO: 0 K/UL (ref 0–0.5)
EOSINOPHIL NFR BLD: 0.6 % (ref 0–8)
ERYTHROCYTE [DISTWIDTH] IN BLOOD BY AUTOMATED COUNT: 11 % (ref 11.5–14.5)
EST. GFR  (AFRICAN AMERICAN): >60 ML/MIN/1.73 M^2
EST. GFR  (NON AFRICAN AMERICAN): >60 ML/MIN/1.73 M^2
GLUCOSE SERPL-MCNC: 88 MG/DL (ref 70–110)
HCT VFR BLD AUTO: 44.2 % (ref 40–54)
HGB BLD-MCNC: 15.2 G/DL (ref 14–18)
IMM GRANULOCYTES # BLD AUTO: 0.02 K/UL (ref 0–0.04)
IMM GRANULOCYTES NFR BLD AUTO: 0.3 % (ref 0–0.5)
LYMPHOCYTES # BLD AUTO: 1.5 K/UL (ref 1–4.8)
LYMPHOCYTES NFR BLD: 21.3 % (ref 18–48)
MCH RBC QN AUTO: 32.3 PG (ref 27–31)
MCHC RBC AUTO-ENTMCNC: 34.4 G/DL (ref 32–36)
MCV RBC AUTO: 94 FL (ref 82–98)
MONOCYTES # BLD AUTO: 0.5 K/UL (ref 0.3–1)
MONOCYTES NFR BLD: 6.5 % (ref 4–15)
NEUTROPHILS # BLD AUTO: 4.9 K/UL (ref 1.8–7.7)
NEUTROPHILS NFR BLD: 71 % (ref 38–73)
NRBC BLD-RTO: 0 /100 WBC
PLATELET # BLD AUTO: 204 K/UL (ref 150–350)
PMV BLD AUTO: 10.2 FL (ref 9.2–12.9)
POTASSIUM SERPL-SCNC: 4 MMOL/L (ref 3.5–5.1)
PROT SERPL-MCNC: 7.6 G/DL (ref 6–8.4)
RBC # BLD AUTO: 4.71 M/UL (ref 4.6–6.2)
SODIUM SERPL-SCNC: 136 MMOL/L (ref 136–145)
TROPONIN I SERPL DL<=0.01 NG/ML-MCNC: <0.006 NG/ML (ref 0–0.03)
WBC # BLD AUTO: 6.9 K/UL (ref 3.9–12.7)

## 2019-08-23 PROCEDURE — 93010 EKG 12-LEAD: ICD-10-PCS | Mod: ,,, | Performed by: INTERNAL MEDICINE

## 2019-08-23 PROCEDURE — 84484 ASSAY OF TROPONIN QUANT: CPT

## 2019-08-23 PROCEDURE — 80053 COMPREHEN METABOLIC PANEL: CPT

## 2019-08-23 PROCEDURE — 85025 COMPLETE CBC W/AUTO DIFF WBC: CPT

## 2019-08-23 PROCEDURE — 25000003 PHARM REV CODE 250: Performed by: EMERGENCY MEDICINE

## 2019-08-23 PROCEDURE — 99284 EMERGENCY DEPT VISIT MOD MDM: CPT | Mod: 25

## 2019-08-23 PROCEDURE — 93010 ELECTROCARDIOGRAM REPORT: CPT | Mod: ,,, | Performed by: INTERNAL MEDICINE

## 2019-08-23 PROCEDURE — 99284 PR EMERGENCY DEPT VISIT,LEVEL IV: ICD-10-PCS | Mod: ,,, | Performed by: EMERGENCY MEDICINE

## 2019-08-23 PROCEDURE — 93005 ELECTROCARDIOGRAM TRACING: CPT

## 2019-08-23 PROCEDURE — 99284 EMERGENCY DEPT VISIT MOD MDM: CPT | Mod: ,,, | Performed by: EMERGENCY MEDICINE

## 2019-08-23 PROCEDURE — 82550 ASSAY OF CK (CPK): CPT

## 2019-08-23 RX ORDER — IBUPROFEN 400 MG/1
400 TABLET ORAL
Status: COMPLETED | OUTPATIENT
Start: 2019-08-23 | End: 2019-08-23

## 2019-08-23 RX ADMIN — IBUPROFEN 400 MG: 400 TABLET, FILM COATED ORAL at 06:08

## 2019-08-23 NOTE — ED PROVIDER NOTES
"Encounter Date: 8/23/2019    SCRIBE #1 NOTE: I, Deepa Stuart, am scribing for, and in the presence of,  Dr. Garvey. I have scribed the entire note.       History     Chief Complaint   Patient presents with    Irregular Heart Beat     seen by cardiology yesterday, having same complaints today, could not reach cardiology MD so came to ED, c/o fatigue and leg pain     Time patient was seen by the provider: 6:00 PM      The patient is a 48 y.o. male with medical history of anxiety, hyperlipidemia, brain aneurysm, fatty liver who presents to the ED with a complaint of palpitations. Patient seen by PCP 4 days ago for palpitations x several weeks and had Holter test which showed PACs and PVCs. He followed up with Cardiology 3 days ago and was recommended a cardiac event monitor and a coronary artery calcium score. He reports yesterday he felt tired, b/l leg pain, palpitations and, "very hot." He took a xanax recommended by his doctor. He states he woke up in the middle of the night sweating. He woke up at 5 am this morning and felt fine but, when he got to work at 8 am this morning he started feeling, "ill" again with palpitations, body aches, and fatigue. He also states "my eyes feel really hot." He denies chest pain, but endorses chest pressure. He states the chest pressure onset last night, subsided and returned this morning. He denies SOB. He denies recent fever, cough, abdominal pain, vomiting, diarrhea, leg swelling. No sick contact. No recent falls or traumas.    The history is provided by the patient and medical records.     Review of patient's allergies indicates:  No Known Allergies  Past Medical History:   Diagnosis Date    Aneurysm     Right sided filling anterior communicating aneurysm s/p coiling 2011    Anxiety     Colon adenoma: 3/18 repeat colonoscpy 2023 6/22/2018    Diverticulosis of large intestine without hemorrhage: see colonoscopy 3/18; sigmoid and descending colon 6/22/2018    Fatty liver: see " CT 3/18 6/22/2018    Generalized headaches     Hyperlipidemia     Umbilical hernia without obstruction and without gangrene: see CT 3/18 6/22/2018     Past Surgical History:   Procedure Laterality Date    ANGIOGRAM-CEREBRAL N/A 12/27/2016    Performed by [facility] Provider at [facility] OR 2ND FLR    BRAIN SURGERY  2011    angiogram/coiling    COLONOSCOPY N/A 3/26/2018    Performed by Sanjiv Duran MD at [facility] ENDO (4TH FLR)    ESOPHAGOGASTRODUODENOSCOPY (EGD) N/A 10/29/2015    Performed by Hao Hillman MD at [facility] ENDO (4TH FLR)     Family History   Problem Relation Age of Onset    DAVID disease Mother     Hypertension Mother     Diabetes Mother     Cancer Father         brain    Thyroid disease Sister     No Known Problems Daughter     No Known Problems Sister     Celiac disease Neg Hx     Cirrhosis Neg Hx     Colon polyps Neg Hx     Crohn's disease Neg Hx     Cystic fibrosis Neg Hx     Esophageal cancer Neg Hx     Hemochromatosis Neg Hx     Inflammatory bowel disease Neg Hx     Irritable bowel syndrome Neg Hx     Liver cancer Neg Hx     Liver disease Neg Hx     Rectal cancer Neg Hx     Stomach cancer Neg Hx     Ulcerative colitis Neg Hx     Alli's disease Neg Hx     Heart disease Neg Hx     Heart attack Neg Hx     Amblyopia Neg Hx     Blindness Neg Hx     Cataracts Neg Hx     Glaucoma Neg Hx     Macular degeneration Neg Hx     Retinal detachment Neg Hx     Strabismus Neg Hx     Colon cancer Neg Hx      Social History     Tobacco Use    Smoking status: Never Smoker    Smokeless tobacco: Never Used   Substance Use Topics    Alcohol use: Yes     Comment: ocassionally - twice a week    Drug use: No     Review of Systems  Constitutional: Fatigue. No Fever, No Chills,   Eyes: No Vision Changes  ENT/Mouth: No sore throat, No rhinorrhea  Cardiovascular: Palpitations, chest pressure.  No Chest Pain.  Respiratory:  No Cough, No SOB  Gastrointestinal:  No Nausea, No Vomiting,  No Diarrhea, No abdo pain.  Genitourinary:  No  pain, No dysuria   Musculoskeletal: Body aches. No Arthralgias, No Back Pain, No Neck Pain, No recent trauma.  Skin:  No skin Lesions  Neuro:  No Weakness, No Numbness, No Paresthesias, No Dizziness, No Headache     Physical Exam     Initial Vitals [08/23/19 1618]   BP Pulse Resp Temp SpO2   122/78 82 15 98.3 °F (36.8 °C) 98 %      MAP       --         Physical Exam    Nursing note and vitals reviewed.    Physical Exam:  GENERAL APPEARANCE: Well developed, well nourished, in no acute distress.  HENT: Throat is unremarkable. Normocephalic, atraumatic    EYES: Sclerae anicteric   NECK: Supple, no thyroid enlargement  CARDIOVASCULAR: Regular rate and rhythm without any murmurs, gallops, rubs.  LUNGS: Speaking in full sentences. Breathing comfortably. Auscultation of the lungs revealed normal breath sounds b/l  ABDOMEN: Soft and nontender, no masses, no rebound or guarding   NEUROLOGIC: Alert, interacting normally. No facial droop.   MSK: No lower extremity swelling or tenderness to palpation. Compartments are soft. He has no focal calf or thigh tenderness. Moving all four extremities.  Skin: Warm and dry. No visible rash on exposed areas of skin.    Psych: Mood and affect normal.      ED Course   Procedures  Labs Reviewed   CBC W/ AUTO DIFFERENTIAL - Abnormal; Notable for the following components:       Result Value    Mean Corpuscular Hemoglobin 32.3 (*)     RDW 11.0 (*)     All other components within normal limits   COMPREHENSIVE METABOLIC PANEL   TROPONIN I   CK          Imaging Results    None          Medical Decision Making:   History:   Old Medical Records: I decided to obtain old medical records.  Initial Assessment:   48 y.o. male with history of hyperlipidemia, brain aneurysm coming in with intermittent palpitations, generalized malaise and achyness. Not complaining of headache, not consistent with dangerous intracranial pathology. Exam is benign. Leg pain is  b/l and is not consistent with DVT or infection. On recent halter had PVCs and PACs. Will workup with set of labs, EKG, CPK and motrin for body aches.   Chest is clear there is no respiratory symptoms, there are no fevers no indication for chest x-ray at this time.  Recent TSH was normal.  If workup is unremarkable, this possibly may represent a viral illness.   Clinical Tests:   Lab Tests: Ordered and Reviewed  ED Management:  Update:  Labs are unremarkable. EKG is as noted.  Patient is observed in the emergency department for several hours and continues to be very well-appearing, no acute distress.  At this time this is not consistent with dangerous pathology.  Possible viral illness.  Will recommend patient follow up with his PMD/cardiologist for continued workup of his PVCs/PACs.  ED return instructions for any new, worsening or worrisome symptoms.  Patient verbalized understanding of return precautions and agrees with plan.    MDM Complexity Points:   Problem Points:  1.New problem, with no additional ED work-up planned (maximum of 1) - malaise, body aches, palpitations.    Data Points:  Review or order clinical lab tests, Review or order medicine test (PFTs, EKG, cardiac echo or catheterization), Independent review of image, tracing, or specimen, Decision to obtain old records (in the EHR) and Review and summarization of old records                Scribe Attestation:   Scribe #1: I performed the above scribed service and the documentation accurately describes the services I performed. I attest to the accuracy of the note.               Clinical Impression:       ICD-10-CM ICD-9-CM   1. Malaise R53.81 780.79   2. Irregular heart rhythm I49.9 427.9         Disposition:   Disposition: Discharged  Condition: Stable       Fletcher Garvey MD  08/24/19 1430

## 2019-08-23 NOTE — TELEPHONE ENCOUNTER
Pt asking for calcium score results. Pt states he is still waiting to receive event monitor. Pt states he continues to have palpitations, had sweats last night and also felt weak. Please advise.

## 2019-08-23 NOTE — ED NOTES
LOC: The patient is awake and alert; oriented x 3 and speaking appropriately.  APPEARANCE: Patient resting comfortably, patient is clean and well groomed  SKIN: warm and dry, normal skin turgor & moist mucus membranes, skin intact, no breakdown noted.  MUSCULOSKELETAL: Patient moving all extremities well, no obvious swelling or deformities noted  RESPIRATORY: Airway is open and patent,  respirations are spontaneous, normal effort and rate  CARDIAC: Patient has a normal rate, no peripheral edema noted, capillary refill < 3 seconds; complaints of irregular heart beat and heaviness in chest radiating to jaw.  ABDOMEN: Soft and non tender to palpation, no distention noted.

## 2019-08-23 NOTE — ED TRIAGE NOTES
"Onset 3 wks ago feeling weak, a little dizzy and intermittent irregular heart beat. Saw PCP and wore a holter monitor= "fine". Saw cards  On consult. Yesterday began feeling very weak and tired and was having more skipped heart beat. Now feels worse. Having  heaviness in sternal area radiating to jaw. Intermittent heaviness.  Not associated w/ any particular activity.  Denies blood in stool or syncope. Has been having night sweats for last few days. Denies n/v.   "

## 2019-08-24 NOTE — DISCHARGE INSTRUCTIONS
Your labs today were not remarkable.  Your EKG does not show any signs of dangerous arrhythmia.  It is unclear what is causing your symptoms but it a viral illness is possible.    Please follow-up with your primary care doctor and your cardiologist for continued management of your symptoms.    If you have any worsening symptoms such as worsening weakness, palpitations that are continuous, feel like going to pass out, uncontrolled pain, headache, dizziness, or any other new, worsening or worrisome symptoms please return to the emergency department for re-evaluation.    Please stay well hydrated. You can take ibuprofen or Tylenol for body aches.

## 2019-08-27 ENCOUNTER — TELEPHONE (OUTPATIENT)
Dept: CARDIOLOGY | Facility: CLINIC | Age: 49
End: 2019-08-27

## 2019-08-27 RX ORDER — ALPRAZOLAM 0.5 MG/1
TABLET ORAL
Qty: 30 TABLET | Refills: 0 | Status: SHIPPED | OUTPATIENT
Start: 2019-08-27 | End: 2019-11-14 | Stop reason: SDUPTHER

## 2019-08-27 NOTE — TELEPHONE ENCOUNTER
----- Message from Corey Mclean MD sent at 8/27/2019  7:59 AM CDT -----  The CT scan showed a fair bit of hardening of the arteries. He should have a stress test and take a statin. I will place the order. He needs a fup appt in 3 mo w me or the NP

## 2019-09-03 ENCOUNTER — CLINICAL SUPPORT (OUTPATIENT)
Dept: CARDIOLOGY | Facility: CLINIC | Age: 49
End: 2019-09-03
Attending: INTERNAL MEDICINE
Payer: COMMERCIAL

## 2019-09-03 VITALS — BODY MASS INDEX: 28.35 KG/M2 | HEIGHT: 70 IN | WEIGHT: 198 LBS

## 2019-09-03 DIAGNOSIS — I25.84 CORONARY ARTERY DISEASE DUE TO CALCIFIED CORONARY LESION: ICD-10-CM

## 2019-09-03 DIAGNOSIS — I25.10 CORONARY ARTERY DISEASE DUE TO CALCIFIED CORONARY LESION: ICD-10-CM

## 2019-09-03 LAB
ASCENDING AORTA: 2.91 CM
BSA FOR ECHO PROCEDURE: 2.11 M2
CV ECHO LV RWT: 0.33 CM
DOP CALC LVOT AREA: 3.8 CM2
DOP CALC LVOT DIAMETER: 2.19 CM
DOP CALC LVOT PEAK VEL: 0.94 M/S
DOP CALC LVOT STROKE VOLUME: 71.65 CM3
DOP CALCLVOT PEAK VEL VTI: 19.03 CM
E WAVE DECELERATION TIME: 187.24 MSEC
E/A RATIO: 1.26
E/E' RATIO: 8.38 M/S
ECHO LV POSTERIOR WALL: 0.8 CM (ref 0.6–1.1)
FRACTIONAL SHORTENING: 38 % (ref 28–44)
INTERVENTRICULAR SEPTUM: 0.9 CM (ref 0.6–1.1)
IVRT: 0.09 MSEC
LA MAJOR: 5.01 CM
LA MINOR: 5.02 CM
LA WIDTH: 3.99 CM
LEFT ATRIUM SIZE: 3.28 CM
LEFT ATRIUM VOLUME INDEX: 26.8 ML/M2
LEFT ATRIUM VOLUME: 55.79 CM3
LEFT INTERNAL DIMENSION IN SYSTOLE: 3.03 CM (ref 2.1–4)
LEFT VENTRICLE DIASTOLIC VOLUME INDEX: 60.6 ML/M2
LEFT VENTRICLE DIASTOLIC VOLUME: 125.95 ML
LEFT VENTRICLE MASS INDEX: 68 G/M2
LEFT VENTRICLE SYSTOLIC VOLUME INDEX: 17.3 ML/M2
LEFT VENTRICLE SYSTOLIC VOLUME: 35.86 ML
LEFT VENTRICULAR INTERNAL DIMENSION IN DIASTOLE: 4.9 CM (ref 3.5–6)
LEFT VENTRICULAR MASS: 141.91 G
LV LATERAL E/E' RATIO: 7.44 M/S
LV SEPTAL E/E' RATIO: 9.57 M/S
MV PEAK A VEL: 0.53 M/S
MV PEAK E VEL: 0.67 M/S
OHS CV CPX 85 PERCENT MAX PREDICTED HEART RATE MALE: 147
OHS CV CPX MAX PREDICTED HEART RATE: 173
OHS CV CPX PATIENT IS FEMALE: 0
OHS CV CPX PATIENT IS MALE: 1
PULM VEIN S/D RATIO: 1.81
PV PEAK D VEL: 0.27 M/S
PV PEAK S VEL: 0.49 M/S
RA MAJOR: 4.92 CM
RA PRESSURE: 3 MMHG
RA WIDTH: 2.93 CM
RIGHT VENTRICULAR END-DIASTOLIC DIMENSION: 3.24 CM
SINUS: 3.49 CM
STJ: 3.06 CM
TDI LATERAL: 0.09 M/S
TDI SEPTAL: 0.07 M/S
TDI: 0.08 M/S
TRICUSPID ANNULAR PLANE SYSTOLIC EXCURSION: 1.33 CM

## 2019-09-03 PROCEDURE — 93351 STRESS TTE COMPLETE: CPT | Mod: S$GLB,,, | Performed by: INTERNAL MEDICINE

## 2019-09-03 PROCEDURE — 99999 PR PBB SHADOW E&M-EST. PATIENT-LVL I: CPT | Mod: PBBFAC,,,

## 2019-09-03 PROCEDURE — 99999 PR PBB SHADOW E&M-EST. PATIENT-LVL I: ICD-10-PCS | Mod: PBBFAC,,,

## 2019-09-03 PROCEDURE — 93351 ECHOCARDIOGRAM STRESS TEST (CUPID ONLY): ICD-10-PCS | Mod: S$GLB,,, | Performed by: INTERNAL MEDICINE

## 2019-09-04 ENCOUNTER — TELEPHONE (OUTPATIENT)
Dept: CARDIOLOGY | Facility: CLINIC | Age: 49
End: 2019-09-04

## 2019-09-04 RX ORDER — NAPROXEN SODIUM 220 MG/1
81 TABLET, FILM COATED ORAL DAILY
Status: ON HOLD | COMMUNITY
End: 2020-09-22 | Stop reason: HOSPADM

## 2019-09-04 NOTE — TELEPHONE ENCOUNTER
----- Message from Corey Mclean MD sent at 9/3/2019  6:57 PM CDT -----  The CT scan (calcium score) was abnormal but the stress test was normal. Heart size and function was normal. That means that there are no significant blockages within the heart and there is no need for an angiogram. But you should take a statin and an aspirin to prevent the blockages (plaque) from getting worse.

## 2019-09-04 NOTE — TELEPHONE ENCOUNTER
"Pt states he continues to have "skipped beats". Pt asking if he needs to do anything for this. Please advise.  "

## 2019-09-15 ENCOUNTER — NURSE TRIAGE (OUTPATIENT)
Dept: ADMINISTRATIVE | Facility: CLINIC | Age: 49
End: 2019-09-15

## 2019-09-15 ENCOUNTER — OFFICE VISIT (OUTPATIENT)
Dept: URGENT CARE | Facility: CLINIC | Age: 49
End: 2019-09-15
Payer: COMMERCIAL

## 2019-09-15 VITALS
HEART RATE: 88 BPM | TEMPERATURE: 98 F | SYSTOLIC BLOOD PRESSURE: 133 MMHG | BODY MASS INDEX: 28.35 KG/M2 | DIASTOLIC BLOOD PRESSURE: 85 MMHG | HEIGHT: 70 IN | RESPIRATION RATE: 18 BRPM | WEIGHT: 198 LBS | OXYGEN SATURATION: 96 %

## 2019-09-15 DIAGNOSIS — J01.00 ACUTE MAXILLARY SINUSITIS, RECURRENCE NOT SPECIFIED: Primary | ICD-10-CM

## 2019-09-15 DIAGNOSIS — R52 BODY ACHES: ICD-10-CM

## 2019-09-15 LAB
CTP QC/QA: YES
FLUAV AG NPH QL: NEGATIVE
FLUBV AG NPH QL: NEGATIVE

## 2019-09-15 PROCEDURE — 87804 POCT INFLUENZA A/B: ICD-10-PCS | Mod: 59,QW,S$GLB, | Performed by: NURSE PRACTITIONER

## 2019-09-15 PROCEDURE — 3008F BODY MASS INDEX DOCD: CPT | Mod: CPTII,S$GLB,, | Performed by: NURSE PRACTITIONER

## 2019-09-15 PROCEDURE — 87804 INFLUENZA ASSAY W/OPTIC: CPT | Mod: QW,S$GLB,, | Performed by: NURSE PRACTITIONER

## 2019-09-15 PROCEDURE — 99214 OFFICE O/P EST MOD 30 MIN: CPT | Mod: S$GLB,,, | Performed by: NURSE PRACTITIONER

## 2019-09-15 PROCEDURE — 99214 PR OFFICE/OUTPT VISIT, EST, LEVL IV, 30-39 MIN: ICD-10-PCS | Mod: S$GLB,,, | Performed by: NURSE PRACTITIONER

## 2019-09-15 PROCEDURE — 3008F PR BODY MASS INDEX (BMI) DOCUMENTED: ICD-10-PCS | Mod: CPTII,S$GLB,, | Performed by: NURSE PRACTITIONER

## 2019-09-15 RX ORDER — AMOXICILLIN AND CLAVULANATE POTASSIUM 875; 125 MG/1; MG/1
1 TABLET, FILM COATED ORAL 2 TIMES DAILY
Qty: 20 TABLET | Refills: 0 | Status: SHIPPED | OUTPATIENT
Start: 2019-09-15 | End: 2019-09-25

## 2019-09-15 RX ORDER — FLUTICASONE PROPIONATE 50 MCG
2 SPRAY, SUSPENSION (ML) NASAL DAILY
Qty: 1 BOTTLE | Refills: 0 | Status: SHIPPED | OUTPATIENT
Start: 2019-09-15 | End: 2019-12-06

## 2019-09-15 NOTE — TELEPHONE ENCOUNTER
"Pressure in the chest and states that he is "struggling to breathe." Patient states that he has chest pressure that lasts more than 5 minutes at a time. Patient states that he felt confused and extremely agitated. Patient advised to call 911 and go to the ER immediately. Verbalized understanding.     Reason for Disposition   [1] Chest pain lasts > 5 minutes AND [2] described as crushing, pressure-like, or heavy    Additional Information   Negative: Severe difficulty breathing (e.g., struggling for each breath, speaks in single words)    Protocols used: ST CHEST PAIN-A-AH      "

## 2019-09-15 NOTE — PATIENT INSTRUCTIONS
Please drink plenty of fluids.  Please get plenty of rest.  Please return here or go to the Emergency Department for any concerns or worsening of condition.  If you were prescribed antibiotics, please take them to completion.  If you do not have Hypertension or any history of palpitations, it is ok to take over the counter Sudafed or Mucinex D or Allegra-D or Claritin-D or Zyrtec-D.  If you do take one of the above, it is ok to combine that with plain over the counter Mucinex or Allegra or Claritin or Zyrtec.  If for example you are taking Zyrtec -D, you can combine that with Mucinex, but not Mucinex-D.  If you are taking Mucinex-D, you can combine that with plain Allegra or Claritin or Zyrtec.   If you do have Hypertension or palpitations, it is safe to take Coricidin HBP for relief of sinus symptoms.  We recommend you take over the counter Flonase (Fluticasone) or another nasally inhaled steroid unless you are already taking one.  Nasal irrigation with a saline spray or Netti Pot like device per their directions is also recommended.  If not allergic, please take over the counter Tylenol (Acetaminophen) and/or Motrin (Ibuprofen) as directed for control of pain and/or fever.  Please follow up with your primary care doctor or specialist as needed.    If you  smoke, please stop smoking.  Sinusitis (Antibiotic Treatment)    The sinuses are air-filled spaces within the bones of the face. They connect to the inside of the nose. Sinusitis is an inflammation of the tissue lining the sinus cavity. Sinus inflammation can occur during a cold. It can also be due to allergies to pollens and other particles in the air. Sinusitis can cause symptoms of sinus congestion and fullness. A sinus infection causes fever, headache and facial pain. There is often green or yellow drainage from the nose or into the back of the throat (post-nasal drip). You have been given antibiotics to treat this condition.  Home care:  · Take the full  course of antibiotics as instructed. Do not stop taking them, even if you feel better.  · Drink plenty of water, hot tea, and other liquids. This may help thin mucus. It also may promote sinus drainage.  · Heat may help soothe painful areas of the face. Use a towel soaked in hot water. Or,  the shower and direct the hot spray onto your face. Using a vaporizer along with a menthol rub at night may also help.   · An expectorant containing guaifenesin may help thin the mucus and promote drainage from the sinuses.  · Over-the-counter decongestants may be used unless a similar medicine was prescribed. Nasal sprays work the fastest. Use one that contains phenylephrine or oxymetazoline. First blow the nose gently. Then use the spray. Do not use these medicines more often than directed on the label or symptoms may get worse. You may also use tablets containing pseudoephedrine. Avoid products that combine ingredients, because side effects may be increased. Read labels. You can also ask the pharmacist for help. (NOTE: Persons with high blood pressure should not use decongestants. They can raise blood pressure.)  · Over-the-counter antihistamines may help if allergies contributed to your sinusitis.    · Do not use nasal rinses or irrigation during an acute sinus infection, unless told to by your health care provider. Rinsing may spread the infection to other sinuses.  · Use acetaminophen or ibuprofen to control pain, unless another pain medicine was prescribed. (If you have chronic liver or kidney disease or ever had a stomach ulcer, talk with your doctor before using these medicines. Aspirin should never be used in anyone under 18 years of age who is ill with a fever. It may cause severe liver damage.)  · Don't smoke. This can worsen symptoms.  Follow-up care  Follow up with your healthcare provider or our staff if you are not improving within the next week.  When to seek medical advice  Call your healthcare provider  if any of these occur:  · Facial pain or headache becoming more severe  · Stiff neck  · Unusual drowsiness or confusion  · Swelling of the forehead or eyelids  · Vision problems, including blurred or double vision  · Fever of 100.4ºF (38ºC) or higher, or as directed by your healthcare provider  · Seizure  · Breathing problems  · Symptoms not resolving within 10 days  Date Last Reviewed: 4/13/2015  © 9213-4081 [employer] [address] [place] [address], [place] [id number]. All rights reserved. This information is not intended as a substitute for professional medical care. Always follow your healthcare professional's instructions.

## 2019-09-15 NOTE — PROGRESS NOTES
"Subjective:       Patient ID: Andre Padgett is a 49 y.o. male.    Vitals:  height is 5' 10" (1.778 m) and weight is 89.8 kg (198 lb). His tympanic temperature is 98.2 °F (36.8 °C). His blood pressure is 133/85 and his pulse is 88. His respiration is 18 and oxygen saturation is 96%.     Chief Complaint: Sinus Problem    Patient presents with c/o "chest congestion" but points to his nose for 1 week.  States that he's having sinus pressure with a sore throat and postnasal drip.  Denies actual chest pain or shortness of breath.  Reports chills but no measured fever.  States that he feels flu like.  Requesting an antibiotic.  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      Sinus Problem   This is a new problem. The problem has been gradually worsening since onset. There has been no fever. His pain is at a severity of 6/10. The pain is moderate. Associated symptoms include chills, congestion, coughing, headaches, sinus pressure and a sore throat. Pertinent negatives include no diaphoresis, ear pain, hoarse voice, neck pain, shortness of breath, sneezing or swollen glands. (Body aches, fatigue, ) Treatments tried: Nyquil. The treatment provided no relief.       Constitution: Positive for chills. Negative for sweating, fatigue and fever.   HENT: Positive for congestion, sinus pressure and sore throat. Negative for ear pain, sinus pain and voice change.    Neck: Negative for neck pain and painful lymph nodes.   Eyes: Negative for eye redness.   Respiratory: Positive for cough. Negative for chest tightness, sputum production, bloody sputum, COPD, shortness of breath, stridor, wheezing and asthma.    Gastrointestinal: Negative for nausea and vomiting.   Musculoskeletal: Negative for muscle ache.   Skin: Negative for rash.   Allergic/Immunologic: Negative for seasonal allergies, asthma and sneezing.   Neurological: Positive for headaches.   Hematologic/Lymphatic: " Negative for swollen lymph nodes.       Objective:      Physical Exam   Constitutional: He is oriented to person, place, and time. He appears well-developed and well-nourished. He is cooperative.  Non-toxic appearance. He does not appear ill. No distress.   HENT:   Head: Normocephalic and atraumatic.   Right Ear: Hearing, external ear and ear canal normal. A middle ear effusion is present.   Left Ear: Hearing, external ear and ear canal normal. A middle ear effusion is present.   Nose: Mucosal edema and rhinorrhea present. No nasal deformity. No epistaxis. Right sinus exhibits no maxillary sinus tenderness and no frontal sinus tenderness. Left sinus exhibits maxillary sinus tenderness. Left sinus exhibits no frontal sinus tenderness.   Mouth/Throat: Uvula is midline and mucous membranes are normal. No trismus in the jaw. Normal dentition. No uvula swelling. Posterior oropharyngeal erythema (postnasal drip) present. No oropharyngeal exudate or posterior oropharyngeal edema.   Serous fluid bilaterally   Eyes: Conjunctivae and lids are normal. No scleral icterus.   Sclera clear bilat   Neck: Trachea normal, full passive range of motion without pain and phonation normal. Neck supple.   Cardiovascular: Normal rate, regular rhythm, normal heart sounds, intact distal pulses and normal pulses.   Pulmonary/Chest: Effort normal and breath sounds normal. No respiratory distress. He has no wheezes.   Abdominal: Soft. Normal appearance and bowel sounds are normal. He exhibits no distension. There is no tenderness.   Musculoskeletal: Normal range of motion. He exhibits no edema or deformity.   Lymphadenopathy:     He has no cervical adenopathy.   Neurological: He is alert and oriented to person, place, and time. He exhibits normal muscle tone. Coordination normal.   Skin: Skin is warm, dry and intact. He is not diaphoretic. No pallor.   Psychiatric: He has a normal mood and affect. His speech is normal and behavior is normal.  Judgment and thought content normal. Cognition and memory are normal.   Nursing note and vitals reviewed.      Results for orders placed or performed in visit on 09/15/19   POCT Influenza A/B   Result Value Ref Range    Rapid Influenza A Ag Negative Negative    Rapid Influenza B Ag Negative Negative     Acceptable Yes      Assessment:       1. Acute maxillary sinusitis, recurrence not specified    2. Body aches        Plan:         Acute maxillary sinusitis, recurrence not specified  -     fluticasone propionate (FLONASE) 50 mcg/actuation nasal spray; 2 sprays (100 mcg total) by Each Nostril route once daily.  Dispense: 1 Bottle; Refill: 0  -     amoxicillin-clavulanate 875-125mg (AUGMENTIN) 875-125 mg per tablet; Take 1 tablet by mouth 2 (two) times daily. for 10 days  Dispense: 20 tablet; Refill: 0    Body aches  -     POCT Influenza A/B      Patient Instructions   Please drink plenty of fluids.  Please get plenty of rest.  Please return here or go to the Emergency Department for any concerns or worsening of condition.  If you were prescribed antibiotics, please take them to completion.  If you do not have Hypertension or any history of palpitations, it is ok to take over the counter Sudafed or Mucinex D or Allegra-D or Claritin-D or Zyrtec-D.  If you do take one of the above, it is ok to combine that with plain over the counter Mucinex or Allegra or Claritin or Zyrtec.  If for example you are taking Zyrtec -D, you can combine that with Mucinex, but not Mucinex-D.  If you are taking Mucinex-D, you can combine that with plain Allegra or Claritin or Zyrtec.   If you do have Hypertension or palpitations, it is safe to take Coricidin HBP for relief of sinus symptoms.  We recommend you take over the counter Flonase (Fluticasone) or another nasally inhaled steroid unless you are already taking one.  Nasal irrigation with a saline spray or Netti Pot like device per their directions is also recommended.  If  not allergic, please take over the counter Tylenol (Acetaminophen) and/or Motrin (Ibuprofen) as directed for control of pain and/or fever.  Please follow up with your primary care doctor or specialist as needed.    If you  smoke, please stop smoking.  Sinusitis (Antibiotic Treatment)    The sinuses are air-filled spaces within the bones of the face. They connect to the inside of the nose. Sinusitis is an inflammation of the tissue lining the sinus cavity. Sinus inflammation can occur during a cold. It can also be due to allergies to pollens and other particles in the air. Sinusitis can cause symptoms of sinus congestion and fullness. A sinus infection causes fever, headache and facial pain. There is often green or yellow drainage from the nose or into the back of the throat (post-nasal drip). You have been given antibiotics to treat this condition.  Home care:  · Take the full course of antibiotics as instructed. Do not stop taking them, even if you feel better.  · Drink plenty of water, hot tea, and other liquids. This may help thin mucus. It also may promote sinus drainage.  · Heat may help soothe painful areas of the face. Use a towel soaked in hot water. Or,  the shower and direct the hot spray onto your face. Using a vaporizer along with a menthol rub at night may also help.   · An expectorant containing guaifenesin may help thin the mucus and promote drainage from the sinuses.  · Over-the-counter decongestants may be used unless a similar medicine was prescribed. Nasal sprays work the fastest. Use one that contains phenylephrine or oxymetazoline. First blow the nose gently. Then use the spray. Do not use these medicines more often than directed on the label or symptoms may get worse. You may also use tablets containing pseudoephedrine. Avoid products that combine ingredients, because side effects may be increased. Read labels. You can also ask the pharmacist for help. (NOTE: Persons with high blood  pressure should not use decongestants. They can raise blood pressure.)  · Over-the-counter antihistamines may help if allergies contributed to your sinusitis.    · Do not use nasal rinses or irrigation during an acute sinus infection, unless told to by your health care provider. Rinsing may spread the infection to other sinuses.  · Use acetaminophen or ibuprofen to control pain, unless another pain medicine was prescribed. (If you have chronic liver or kidney disease or ever had a stomach ulcer, talk with your doctor before using these medicines. Aspirin should never be used in anyone under 18 years of age who is ill with a fever. It may cause severe liver damage.)  · Don't smoke. This can worsen symptoms.  Follow-up care  Follow up with your healthcare provider or our staff if you are not improving within the next week.  When to seek medical advice  Call your healthcare provider if any of these occur:  · Facial pain or headache becoming more severe  · Stiff neck  · Unusual drowsiness or confusion  · Swelling of the forehead or eyelids  · Vision problems, including blurred or double vision  · Fever of 100.4ºF (38ºC) or higher, or as directed by your healthcare provider  · Seizure  · Breathing problems  · Symptoms not resolving within 10 days  Date Last Reviewed: 4/13/2015  © 7218-6912 [employer] [address] [place] [address], [place] [address]. All rights reserved. This information is not intended as a substitute for professional medical care. Always follow your healthcare professional's instructions.

## 2019-10-02 NOTE — TELEPHONE ENCOUNTER
Ask pt if he is still having skipped beats.. Tell him that I generally would not treat w meds, but if this is bothersome he can try a low dose of metoprolol and OTC magnesium (mag oxide 400 mg once a day).

## 2019-10-03 ENCOUNTER — PATIENT MESSAGE (OUTPATIENT)
Dept: CARDIOLOGY | Facility: CLINIC | Age: 49
End: 2019-10-03

## 2019-10-03 NOTE — TELEPHONE ENCOUNTER
Pt states he continues to have skipped beats sometimes. Pt asking to have names of medications recommended by  sent to him via his soup[employer].

## 2019-10-15 ENCOUNTER — OFFICE VISIT (OUTPATIENT)
Dept: INTERNAL MEDICINE | Facility: CLINIC | Age: 49
End: 2019-10-15
Payer: COMMERCIAL

## 2019-10-15 ENCOUNTER — PATIENT OUTREACH (OUTPATIENT)
Dept: ADMINISTRATIVE | Facility: OTHER | Age: 49
End: 2019-10-15

## 2019-10-15 VITALS
SYSTOLIC BLOOD PRESSURE: 122 MMHG | BODY MASS INDEX: 27.9 KG/M2 | HEART RATE: 88 BPM | OXYGEN SATURATION: 94 % | WEIGHT: 194.44 LBS | DIASTOLIC BLOOD PRESSURE: 70 MMHG

## 2019-10-15 DIAGNOSIS — R93.1 ELEVATED CORONARY ARTERY CALCIUM SCORE: ICD-10-CM

## 2019-10-15 DIAGNOSIS — F41.9 ANXIETY: ICD-10-CM

## 2019-10-15 DIAGNOSIS — R23.1 COOL SKIN: ICD-10-CM

## 2019-10-15 DIAGNOSIS — I25.84 CORONARY ARTERY DISEASE DUE TO CALCIFIED CORONARY LESION: ICD-10-CM

## 2019-10-15 DIAGNOSIS — I25.10 CORONARY ARTERY DISEASE DUE TO CALCIFIED CORONARY LESION: ICD-10-CM

## 2019-10-15 DIAGNOSIS — R00.2 PALPITATION: ICD-10-CM

## 2019-10-15 DIAGNOSIS — M79.651 RIGHT THIGH PAIN: Primary | ICD-10-CM

## 2019-10-15 PROCEDURE — 3008F BODY MASS INDEX DOCD: CPT | Mod: CPTII,S$GLB,, | Performed by: PHYSICIAN ASSISTANT

## 2019-10-15 PROCEDURE — 99214 PR OFFICE/OUTPT VISIT, EST, LEVL IV, 30-39 MIN: ICD-10-PCS | Mod: S$GLB,,, | Performed by: PHYSICIAN ASSISTANT

## 2019-10-15 PROCEDURE — 99999 PR PBB SHADOW E&M-EST. PATIENT-LVL III: ICD-10-PCS | Mod: PBBFAC,,, | Performed by: PHYSICIAN ASSISTANT

## 2019-10-15 PROCEDURE — 99214 OFFICE O/P EST MOD 30 MIN: CPT | Mod: S$GLB,,, | Performed by: PHYSICIAN ASSISTANT

## 2019-10-15 PROCEDURE — 99999 PR PBB SHADOW E&M-EST. PATIENT-LVL III: CPT | Mod: PBBFAC,,, | Performed by: PHYSICIAN ASSISTANT

## 2019-10-15 PROCEDURE — 3008F PR BODY MASS INDEX (BMI) DOCUMENTED: ICD-10-PCS | Mod: CPTII,S$GLB,, | Performed by: PHYSICIAN ASSISTANT

## 2019-10-15 NOTE — PROGRESS NOTES
"Subjective:       Patient ID: Andre Padgett is a 49 y.o. male.    Chief Complaint: Leg Pain (right leg pain (inner thigh))    HPI     Established pt of Hunter Diop MD       C/o right inner thigh pain described as sharp/throbbing. He notes associated calf soreness as well. Pain is intermittent, brief, and notes a cold sensation of right lower leg that happens each time he experiences this thigh pain. He is ambulating well but does note at work he feels like he walks less 2/2 to pain. He denies any injury. No swelling, weakness, or skin color changes. He is concerned his symptoms are related to a vessel in his leg because he recently obtained a CT Calcium Score(494) that revealed calcific atherosclerosis/extensive plaque burden. Subsequent Stress test obtained by [facility] was negative.     Admits to increased anxiety and worry.     He also notes intermittent palpitations/"skipped beat" for past couple months. Eval'd by cardiology and recommends trial of Mg and Metoprolol, meds not started yet.      Past Medical History:   Diagnosis Date    Aneurysm     Right sided filling anterior communicating aneurysm s/p coiling 2011    Anxiety     Colon adenoma: 3/18 repeat colonoscpy 2023 6/22/2018    Diverticulosis of large intestine without hemorrhage: see colonoscopy 3/18; sigmoid and descending colon 6/22/2018    Fatty liver: see CT 3/18 6/22/2018    Generalized headaches     Hyperlipidemia     Umbilical hernia without obstruction and without gangrene: see CT 3/18 6/22/2018     Social History     Tobacco Use    Smoking status: Never Smoker    Smokeless tobacco: Never Used   Substance Use Topics    Alcohol use: Yes     Comment: ocassionally - twice a week    Drug use: No     Review of patient's allergies indicates:  No Known Allergies        Review of Systems   Constitutional: Negative for chills, fever and unexpected weight change.   Respiratory: Negative for cough and shortness of breath.    Cardiovascular: " Positive for palpitations. Negative for chest pain and leg swelling.   Gastrointestinal: Negative for abdominal pain, nausea and vomiting.   Musculoskeletal: Positive for myalgias.   Skin: Negative for color change, pallor and rash.        Coldness sensation to right lower leg, intermittent   Neurological: Negative for weakness, light-headedness and headaches.       Objective: /70   Pulse 88   Wt 88.2 kg (194 lb 7.1 oz)   SpO2 (!) 94%   BMI 27.90 kg/m²         Physical Exam   Constitutional: He appears well-developed and well-nourished. No distress.   HENT:   Head: Normocephalic and atraumatic.   Mouth/Throat: Oropharynx is clear and moist.   Cardiovascular: Normal rate, regular rhythm and intact distal pulses. Exam reveals no friction rub and no decreased pulses.   No murmur heard.  Pulmonary/Chest: Effort normal and breath sounds normal. He has no wheezes. He has no rales.   Abdominal: Soft. Bowel sounds are normal. There is no tenderness.   Musculoskeletal: He exhibits no edema.        Right upper leg: He exhibits tenderness. He exhibits no bony tenderness, no swelling, no edema and no deformity.        Legs:  Ambulating without difficulty  5/5 strength to BLE  No leg swelling  Negative raffaele's   Neurological: He is alert.   Skin: Skin is warm and dry. Capillary refill takes less than 2 seconds. No rash noted.   Psychiatric: He has a normal mood and affect.   Vitals reviewed.      Assessment:       1. Right thigh pain    2. Palpitation    3. Cool skin    4. Anxiety    5. Coronary artery disease due to calcified coronary lesion    6. Elevated coronary artery calcium score        Plan:         nAdre was seen today for leg pain.    Diagnoses and all orders for this visit:    Right thigh pain  Cool skin  Atypical symptoms  RLE pulses all intact  Further eval with [facility]  -     [facility] () Ankle Brachial Indices Resting; Future    Palpitation  Discussed rec as per Cardiology with low dose Metoprolol  Add  anxiety recs as below  -     Vascular Lab ([facility]) Ankle Brachial Indices Resting; Future    Anxiety  Discussed possibly starting SSRI, pt open but desires to discuss with Dr. Diop first    Coronary artery disease due to calcified coronary lesion  Elevated coronary artery calcium score  Followed by Cardiology  Continue statin and ASA    BRIANA ChungC

## 2019-10-18 ENCOUNTER — HOSPITAL ENCOUNTER (OUTPATIENT)
Dept: VASCULAR SURGERY | Facility: CLINIC | Age: 49
Discharge: HOME | End: 2019-10-18
Attending: PHYSICIAN ASSISTANT
Payer: COMMERCIAL

## 2019-10-18 DIAGNOSIS — R23.1 COOL SKIN: ICD-10-CM

## 2019-10-18 DIAGNOSIS — M79.651 RIGHT THIGH PAIN: ICD-10-CM

## 2019-10-18 PROCEDURE — 93923 PR NON-INVASIVE PHYSIOLOGIC STUDY EXTREMITY 3 LEVELS: ICD-10-PCS | Mod: S$GLB,,, | Performed by: SURGERY

## 2019-10-18 PROCEDURE — 93923 UPR/LXTR ART STDY 3+ LVLS: CPT | Mod: S$GLB,,, | Performed by: SURGERY

## 2019-11-14 DIAGNOSIS — I25.84 CORONARY ARTERY DISEASE DUE TO CALCIFIED CORONARY LESION: Primary | ICD-10-CM

## 2019-11-14 DIAGNOSIS — E78.00 PURE HYPERCHOLESTEROLEMIA: ICD-10-CM

## 2019-11-14 DIAGNOSIS — I25.10 CORONARY ARTERY DISEASE DUE TO CALCIFIED CORONARY LESION: Primary | ICD-10-CM

## 2019-11-14 RX ORDER — ALPRAZOLAM 0.5 MG/1
TABLET ORAL
Qty: 30 TABLET | Refills: 0 | Status: SHIPPED | OUTPATIENT
Start: 2019-11-14 | End: 2019-12-31

## 2019-12-02 ENCOUNTER — LAB VISIT (OUTPATIENT)
Dept: LAB | Facility: HOSPITAL | Age: 49
End: 2019-12-02
Attending: INTERNAL MEDICINE
Payer: COMMERCIAL

## 2019-12-02 DIAGNOSIS — I25.10 CORONARY ARTERY DISEASE DUE TO CALCIFIED CORONARY LESION: ICD-10-CM

## 2019-12-02 DIAGNOSIS — I25.84 CORONARY ARTERY DISEASE DUE TO CALCIFIED CORONARY LESION: ICD-10-CM

## 2019-12-02 DIAGNOSIS — E78.00 PURE HYPERCHOLESTEROLEMIA: ICD-10-CM

## 2019-12-02 LAB
ALBUMIN SERPL BCP-MCNC: 4.6 G/DL (ref 3.5–5.2)
ALP SERPL-CCNC: 66 U/L (ref 55–135)
ALT SERPL W/O P-5'-P-CCNC: 34 U/L (ref 10–44)
ANION GAP SERPL CALC-SCNC: 8 MMOL/L (ref 8–16)
AST SERPL-CCNC: 20 U/L (ref 10–40)
BILIRUB SERPL-MCNC: 0.8 MG/DL (ref 0.1–1)
BUN SERPL-MCNC: 10 MG/DL (ref 6–20)
CALCIUM SERPL-MCNC: 10.6 MG/DL (ref 8.7–10.5)
CHLORIDE SERPL-SCNC: 105 MMOL/L (ref 95–110)
CHOLEST SERPL-MCNC: 161 MG/DL (ref 120–199)
CHOLEST/HDLC SERPL: 2.9 {RATIO} (ref 2–5)
CO2 SERPL-SCNC: 30 MMOL/L (ref 23–29)
CREAT SERPL-MCNC: 0.9 MG/DL (ref 0.5–1.4)
EST. GFR  (AFRICAN AMERICAN): >60 ML/MIN/1.73 M^2
EST. GFR  (NON AFRICAN AMERICAN): >60 ML/MIN/1.73 M^2
GLUCOSE SERPL-MCNC: 105 MG/DL (ref 70–110)
HDLC SERPL-MCNC: 56 MG/DL (ref 40–75)
HDLC SERPL: 34.8 % (ref 20–50)
LDLC SERPL CALC-MCNC: 75.6 MG/DL (ref 63–159)
NONHDLC SERPL-MCNC: 105 MG/DL
POTASSIUM SERPL-SCNC: 4.6 MMOL/L (ref 3.5–5.1)
PROT SERPL-MCNC: 7.5 G/DL (ref 6–8.4)
SODIUM SERPL-SCNC: 143 MMOL/L (ref 136–145)
TRIGL SERPL-MCNC: 147 MG/DL (ref 30–150)

## 2019-12-02 PROCEDURE — 80061 LIPID PANEL: CPT

## 2019-12-02 PROCEDURE — 80053 COMPREHEN METABOLIC PANEL: CPT

## 2019-12-02 PROCEDURE — 36415 COLL VENOUS BLD VENIPUNCTURE: CPT

## 2019-12-04 ENCOUNTER — PATIENT OUTREACH (OUTPATIENT)
Dept: ADMINISTRATIVE | Facility: OTHER | Age: 49
End: 2019-12-04

## 2019-12-06 ENCOUNTER — OFFICE VISIT (OUTPATIENT)
Dept: CARDIOLOGY | Facility: CLINIC | Age: 49
End: 2019-12-06
Payer: COMMERCIAL

## 2019-12-06 VITALS
HEIGHT: 70 IN | WEIGHT: 197.06 LBS | DIASTOLIC BLOOD PRESSURE: 80 MMHG | SYSTOLIC BLOOD PRESSURE: 116 MMHG | HEART RATE: 84 BPM | BODY MASS INDEX: 28.21 KG/M2

## 2019-12-06 DIAGNOSIS — I25.10 CORONARY ARTERY DISEASE DUE TO CALCIFIED CORONARY LESION: Primary | ICD-10-CM

## 2019-12-06 DIAGNOSIS — I25.84 CORONARY ARTERY DISEASE DUE TO CALCIFIED CORONARY LESION: Primary | ICD-10-CM

## 2019-12-06 DIAGNOSIS — E78.00 PURE HYPERCHOLESTEROLEMIA: ICD-10-CM

## 2019-12-06 PROCEDURE — 99999 PR PBB SHADOW E&M-EST. PATIENT-LVL III: ICD-10-PCS | Mod: PBBFAC,,, | Performed by: INTERNAL MEDICINE

## 2019-12-06 PROCEDURE — 3008F PR BODY MASS INDEX (BMI) DOCUMENTED: ICD-10-PCS | Mod: CPTII,S$GLB,, | Performed by: INTERNAL MEDICINE

## 2019-12-06 PROCEDURE — 99999 PR PBB SHADOW E&M-EST. PATIENT-LVL III: CPT | Mod: PBBFAC,,, | Performed by: INTERNAL MEDICINE

## 2019-12-06 PROCEDURE — 99215 OFFICE O/P EST HI 40 MIN: CPT | Mod: S$GLB,,, | Performed by: INTERNAL MEDICINE

## 2019-12-06 PROCEDURE — 99215 PR OFFICE/OUTPT VISIT, EST, LEVL V, 40-54 MIN: ICD-10-PCS | Mod: S$GLB,,, | Performed by: INTERNAL MEDICINE

## 2019-12-06 PROCEDURE — 3008F BODY MASS INDEX DOCD: CPT | Mod: CPTII,S$GLB,, | Performed by: INTERNAL MEDICINE

## 2019-12-06 NOTE — PATIENT INSTRUCTIONS
You have fair amount of heart plaque within the blood vessels of the heart.    We do believe that some of the hardening in the blood vessel is from taking a statin.  Even though that sounds paradoxical, we believe that some of the hardening is due to removal of soft plaque by the statin.  The rest of it is due to with the presence of plaque.  Your stress test was normal so there is no reason to suspect that you have any significant blockages.  Continue your aspirin and statin ie atorvastatin.  Eat sensibly.  See the guidelines that I have attached.  Exercise regularly. Going up 2-3 flights of stairs several times a day is good exercise but you should also have a routine where you work out for 30-40 minutes at least 3 days a week.  Use plant based stannols to lower your LDL. The stannols are present in Cholest-Off or Benecol Chews available online or in pharmacies without a prescription.   Come back and see us in a year.    LDL - bad type - improves with diet and medications: typically statins; most other medications that lower LDL have not been proven to prevent heart attacks.  May not improve significantly with exercise alone.    The lower the better. Statins (cholesterol lowering meds) lower LDL. If you have coronary artery disease or blockages anywhere else in the body, it should be well below 70 mg/dl.    HDL - good type - improves with exercise.  Ideally greater than 50 mg/dl. The proportion of HDL to the total cholesterol is more important than the absolute HDL.  This means a HDL of 45 out of a total cholesterol of 130 mg'dl is pretty good, but the same HDL out of a total of  200 mg/dl is not quite as good. A level of 30% or higher is ideal.    TGs (triglycerides) - also bad - can change very quickly and considerably with certain foods. Improve with diet, exercise and high dose fish oil.  In some cases a low carbohydrate diet will lower TGs better than a low fat diet.  Ideal range  mg/dl.    Sugar, fat  and cholesterol in food:     A sensible diet that limits the intake of sugars, saturated (bad) fats and trans fats while increasing the intake of unsaturated (good) fats and plant proteins is the basis of the current dietary recommendations.      We now recommend drastically reducing the intake of sugar. There is less emphasis on excluding all fat and more emphasis on the types of different fats.    Cholesterol in our food is generally present in relatively small amounts. New dietary guidelines are less obsessed with the amount of cholesterol. However please do not confuse this with the role of cholesterol in our blood and arteries. The liver converts certain foods into cholesterol.  It is this cholesterol and other fats that clogs up our arteries.       Most foods that are high in cholesterol are also high in saturated fat. But there is much more saturated fat than cholesterol in these foods. Researchers believe that the saturated fat content matters more than cholesterol. On the other hand, there are a handful of foods that are high in cholesterol but do not contain much saturated fat: eggs, shrimp, crab legs and crawfish are OK to eat in modest quantities as long as you do not deep concepcion them. So a few eggs a week are fine the white and the yolk, but you can eat as many egg whites as you want.      Saturated fat is the bad fat - you should limit your intake of this. Deep fried foods, meats and other animal fats are high in saturated fat. Cookies, donuts and most dessert and cakes are usually high in both saturated fat and sugar.       Unsaturated fat is the good fat. It contains the same number of calories as saturated fat but these fats do not get deposited in our arteries. The Mediterranean style diet encourages the intake of unsaturated fat - olive oil, avocado and unsalted nuts. So instead of baking a piece of fish, consider pan-frying it using olive oil.     You should eat a few servings of vegetables (and  fruit as long as you are not diabetic) everyday. Substitute some plant proteins in place of meat: beans, lentils, quinoa and oatmeal. They are lean proteins.     Do not use stick butter or stick margarine. Butter that comes in a tub is soft butter. It consists of 1/2 butter and 1/2 vegetable oil., either canola or olive oil. It is fine to use that.       Trans fats should definitely be avoided. Most foods that are labelled as containing 0 gms of trans fat may still contain several hundred milligrams of trans fat: creamer, margarine, dough, deep fried foods, ready made frosting, potato, corn and torilla chips, cakes, cookies, pie crusts and crackers containing shortening made with hydrogenated vegetable oil.    My nurse will mail you a copy of your cholesterol reports just for you to see the changes over time.  You will see that a few years ago your cholesterol numbers were much higher.  Perhaps at that time you were not taking cholesterol medicine regularly.

## 2019-12-06 NOTE — PROGRESS NOTES
Subjective:   Patient ID:  Andre Padgett is a 49 y.o. male who presents for follow-up of Elevated coronary artery calcium score      Problem List:  CACS 494 - 2019  Hypercholesterolemia on a statin for >10 years  Palpitations    HPI:   Andre Padgett underwent a coronary artery calcium score earlier this year.  The score was 494.  A stress echocardiogram was negative for ischemia.  He does not report chest discomfort or shortness of breath.  States he has been taking a statin for more than 10 years.  However a few years ago his total cholesterol was 232-242 mg/dl, so suspect that he has not always been 100% compliant.  He is to exercise but has stopped.  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      Review of Systems   Constitution: Positive for weight loss. Negative for malaise/fatigue and weight gain.   Cardiovascular: Positive for irregular heartbeat. Negative for chest pain, dyspnea on exertion, leg swelling and palpitations.   Respiratory: Negative for cough and hemoptysis.    Hematologic/Lymphatic: Does not bruise/bleed easily.   Musculoskeletal: Positive for arthritis, back pain and joint pain. Negative for muscle cramps.   Gastrointestinal: Negative for abdominal pain, heartburn and melena.   Genitourinary: Negative for hematuria and nocturia.   Neurological: Positive for headaches. Negative for light-headedness and seizures.   Psychiatric/Behavioral: Negative for depression.       Current Outpatient Medications   Medication Sig    ALPRAZolam (XANAX) 0.5 MG tablet TAKE 1 TABLET BY MOUTH EVERY NIGHT AS NEEDED    ascorbic acid (VITAMIN C) 100 MG tablet Take 100 mg by mouth once daily.    aspirin 81 MG Chew Take 81 mg by mouth once daily.    atorvastatin (LIPITOR) 40 MG tablet Take 1 tablet (40 mg total) by mouth once daily.     No current facility-administered medications for this visit.        Social history:  Andre Padgett  reports that  "he has never smoked. He has never used smokeless tobacco. He reports that he drinks alcohol. He reports that he does not use drugs.      Objective:     Physical Exam   Constitutional: He is oriented to person, place, and time. He appears well-developed and well-nourished.   /80   Pulse 84   Ht 5' 10" (1.778 m)   Wt 89.4 kg (197 lb 1.5 oz)   BMI 28.28 kg/m²      HENT:   Head: Normocephalic.   Neck: No JVD present.   Cardiovascular: Normal rate, regular rhythm, S1 normal and S2 normal.   No murmur heard.  Pulses:       Radial pulses are 2+ on the right side, and 2+ on the left side.        Dorsalis pedis pulses are 2+ on the right side, and 2+ on the left side.   Pulmonary/Chest: Breath sounds normal. Chest wall is not dull to percussion.   Abdominal: Soft. He exhibits no mass. There is no splenomegaly or hepatomegaly.   Musculoskeletal:        Right lower leg: He exhibits no edema.        Left lower leg: He exhibits no edema.   Neurological: He is alert and oriented to person, place, and time. Gait normal.   Skin: No bruising noted. Nails show no clubbing.   Psychiatric: He has a normal mood and affect. His speech is normal and behavior is normal.           Lab Results   Component Value Date    CHOL 161 12/02/2019    HDL 56 12/02/2019    LDLCALC 75.6 12/02/2019    TRIG 147 12/02/2019    CHOLHDL 34.8 12/02/2019     Lab Results   Component Value Date     12/02/2019    CREATININE 0.9 12/02/2019    BUN 10 12/02/2019     12/02/2019    K 4.6 12/02/2019     12/02/2019    CO2 30 (H) 12/02/2019     Lab Results   Component Value Date    ALT 34 12/02/2019    AST 20 12/02/2019    ALKPHOS 66 12/02/2019    BILITOT 0.8 12/02/2019           Assessment and Plan:     Coronary artery disease due to calcified coronary lesion  Pure hypercholesterolemia  Cholesterol Education  LDL (c) goal < 70.  Continue atorvastatin.  Consider planned based statins.  -     Comprehensive metabolic panel; Future; Expected " date: 12/05/2020  -     Lipid panel; Future; Expected date: 12/06/2020       Total duration of face to face visit time 40 minutes.  Total time spent counseling greater than fifty percent of total visit time.  Counseling included discussion regarding imaging findings, diagnosis, possibilities, treatment options, risks and benefits.  The patient had many questions regarding the options and long-term effects.    Follow up in about 1 year (around 12/6/2020).

## 2019-12-26 ENCOUNTER — LAB VISIT (OUTPATIENT)
Dept: LAB | Facility: HOSPITAL | Age: 49
End: 2019-12-26
Attending: INTERNAL MEDICINE
Payer: COMMERCIAL

## 2019-12-26 ENCOUNTER — OFFICE VISIT (OUTPATIENT)
Dept: INTERNAL MEDICINE | Facility: CLINIC | Age: 49
End: 2019-12-26
Payer: COMMERCIAL

## 2019-12-26 VITALS
HEIGHT: 70 IN | HEART RATE: 81 BPM | BODY MASS INDEX: 27.42 KG/M2 | OXYGEN SATURATION: 97 % | DIASTOLIC BLOOD PRESSURE: 90 MMHG | WEIGHT: 191.56 LBS | SYSTOLIC BLOOD PRESSURE: 144 MMHG

## 2019-12-26 DIAGNOSIS — M79.604 BILATERAL LEG PAIN: ICD-10-CM

## 2019-12-26 DIAGNOSIS — M79.605 BILATERAL LEG PAIN: ICD-10-CM

## 2019-12-26 DIAGNOSIS — M79.605 BILATERAL LEG PAIN: Primary | ICD-10-CM

## 2019-12-26 DIAGNOSIS — M79.604 BILATERAL LEG PAIN: Primary | ICD-10-CM

## 2019-12-26 LAB
ALBUMIN SERPL BCP-MCNC: 4.6 G/DL (ref 3.5–5.2)
ALP SERPL-CCNC: 72 U/L (ref 55–135)
ALT SERPL W/O P-5'-P-CCNC: 41 U/L (ref 10–44)
ANION GAP SERPL CALC-SCNC: 9 MMOL/L (ref 8–16)
AST SERPL-CCNC: 30 U/L (ref 10–40)
BILIRUB SERPL-MCNC: 1 MG/DL (ref 0.1–1)
BUN SERPL-MCNC: 11 MG/DL (ref 6–20)
CA-I BLDV-SCNC: 1.18 MMOL/L (ref 1.06–1.42)
CALCIUM SERPL-MCNC: 9.7 MG/DL (ref 8.7–10.5)
CHLORIDE SERPL-SCNC: 103 MMOL/L (ref 95–110)
CK SERPL-CCNC: 92 U/L (ref 20–200)
CO2 SERPL-SCNC: 28 MMOL/L (ref 23–29)
CREAT SERPL-MCNC: 0.8 MG/DL (ref 0.5–1.4)
EST. GFR  (AFRICAN AMERICAN): >60 ML/MIN/1.73 M^2
EST. GFR  (NON AFRICAN AMERICAN): >60 ML/MIN/1.73 M^2
GLUCOSE SERPL-MCNC: 99 MG/DL (ref 70–110)
POTASSIUM SERPL-SCNC: 4.2 MMOL/L (ref 3.5–5.1)
PROT SERPL-MCNC: 7.8 G/DL (ref 6–8.4)
SODIUM SERPL-SCNC: 140 MMOL/L (ref 136–145)
VIT B12 SERPL-MCNC: 276 PG/ML (ref 210–950)

## 2019-12-26 PROCEDURE — 99214 OFFICE O/P EST MOD 30 MIN: CPT | Mod: S$GLB,,, | Performed by: INTERNAL MEDICINE

## 2019-12-26 PROCEDURE — 82550 ASSAY OF CK (CPK): CPT

## 2019-12-26 PROCEDURE — 99214 PR OFFICE/OUTPT VISIT, EST, LEVL IV, 30-39 MIN: ICD-10-PCS | Mod: S$GLB,,, | Performed by: INTERNAL MEDICINE

## 2019-12-26 PROCEDURE — 3008F BODY MASS INDEX DOCD: CPT | Mod: CPTII,S$GLB,, | Performed by: INTERNAL MEDICINE

## 2019-12-26 PROCEDURE — 99999 PR PBB SHADOW E&M-EST. PATIENT-LVL III: ICD-10-PCS | Mod: PBBFAC,,, | Performed by: INTERNAL MEDICINE

## 2019-12-26 PROCEDURE — 80053 COMPREHEN METABOLIC PANEL: CPT

## 2019-12-26 PROCEDURE — 3008F PR BODY MASS INDEX (BMI) DOCUMENTED: ICD-10-PCS | Mod: CPTII,S$GLB,, | Performed by: INTERNAL MEDICINE

## 2019-12-26 PROCEDURE — 82330 ASSAY OF CALCIUM: CPT

## 2019-12-26 PROCEDURE — 83921 ORGANIC ACID SINGLE QUANT: CPT

## 2019-12-26 PROCEDURE — 99999 PR PBB SHADOW E&M-EST. PATIENT-LVL III: CPT | Mod: PBBFAC,,, | Performed by: INTERNAL MEDICINE

## 2019-12-26 PROCEDURE — 82607 VITAMIN B-12: CPT

## 2019-12-26 RX ORDER — ACETAMINOPHEN 160 MG/5ML
200 SUSPENSION, ORAL (FINAL DOSE FORM) ORAL DAILY
COMMUNITY
Start: 2019-12-26 | End: 2020-08-10

## 2019-12-26 RX ORDER — GABAPENTIN 300 MG/1
300 CAPSULE ORAL 3 TIMES DAILY PRN
Qty: 60 CAPSULE | Refills: 2 | Status: SHIPPED | OUTPATIENT
Start: 2019-12-26 | End: 2020-04-27

## 2019-12-26 NOTE — PROGRESS NOTES
"INTERNAL MEDICINE CLINIC - SAME DAY APPOINTMENT  Progress Note    PRESENTING HISTORY     PCP: Hunter Diop MD  Chief Complaint/Reason for Visit:     Chief Complaint   Patient presents with    Leg Pain     History of Present Illness & ROS : Mr. Andre Padgett is a 49 y.o. male.      He complains of pain to bilateral lower extremities (below knees to mid foot).  Sharp, internal pain.  Pain in constant.    Mild tingling, feels like "bone hurting" him.  No weakness. Walking normally.    No exercises recently.  Increased stress - marital.      PAST HISTORY:     Past Medical History:   Diagnosis Date    Aneurysm     Right sided filling anterior communicating aneurysm s/p coiling 2011    Anxiety     Colon adenoma: 3/18 repeat colonoscpy 2023 6/22/2018    Diverticulosis of large intestine without hemorrhage: see colonoscopy 3/18; sigmoid and descending colon 6/22/2018    Fatty liver: see CT 3/18 6/22/2018    Generalized headaches     Hyperlipidemia     Umbilical hernia without obstruction and without gangrene: see CT 3/18 6/22/2018       Past Surgical History:   Procedure Laterality Date    BRAIN SURGERY  2011    angiogram/coiling    COLONOSCOPY N/A 3/26/2018    Procedure: COLONOSCOPY;  Surgeon: Sanjiv Duran MD;  Location: [facility] ([address] [place] [address]);  Service: Endoscopy;  Laterality: N/A;       Family History   Problem Relation Age of Onset    DAVID disease Mother     Hypertension Mother     Diabetes Mother     Cancer Father         brain    Thyroid disease Sister     No Known Problems Daughter     No Known Problems Sister     Celiac disease Neg Hx     Cirrhosis Neg Hx     Colon polyps Neg Hx     Crohn's disease Neg Hx     Cystic fibrosis Neg Hx     Esophageal cancer Neg Hx     Hemochromatosis Neg Hx     Inflammatory bowel disease Neg Hx     Irritable bowel syndrome Neg Hx     Liver cancer Neg Hx     Liver disease Neg Hx     Rectal cancer Neg Hx     Stomach cancer Neg Hx     Ulcerative " colitis Neg Hx     Alli's disease Neg Hx     Heart disease Neg Hx     Heart attack Neg Hx     Amblyopia Neg Hx     Blindness Neg Hx     Cataracts Neg Hx     Glaucoma Neg Hx     Macular degeneration Neg Hx     Retinal detachment Neg Hx     Strabismus Neg Hx     Colon cancer Neg Hx        Social History     Socioeconomic History    Marital status:      Spouse name: Not on file    Number of children: Not on file    Years of education: Not on file    Highest education level: Not on file   Occupational History    Not on file   Social Needs    Financial resource strain: Not on file    Food insecurity:     Worry: Not on file     Inability: Not on file    Transportation needs:     Medical: Not on file     Non-medical: Not on file   Tobacco Use    Smoking status: Never Smoker    Smokeless tobacco: Never Used   Substance and Sexual Activity    Alcohol use: Yes     Comment: ocassionally - twice a week    Drug use: No    Sexual activity: Not on file   Lifestyle    Physical activity:     Days per week: Not on file     Minutes per session: Not on file    Stress: Not on file   Relationships    Social connections:     Talks on phone: Not on file     Gets together: Not on file     Attends Confucianist service: Not on file     Active member of club or organization: Not on file     Attends meetings of clubs or organizations: Not on file     Relationship status: Not on file   Other Topics Concern    Not on file   Social History Narrative    Not on file       MEDICATIONS & ALLERGIES:     Current Outpatient Medications on File Prior to Visit   Medication Sig Dispense Refill    ALPRAZolam (XANAX) 0.5 MG tablet TAKE 1 TABLET BY MOUTH EVERY NIGHT AS NEEDED 30 tablet 0    ascorbic acid (VITAMIN C) 100 MG tablet Take 100 mg by mouth once daily.      aspirin 81 MG Chew Take 81 mg by mouth once daily.      atorvastatin (LIPITOR) 40 MG tablet Take 1 tablet (40 mg total) by mouth once daily. 90 tablet 3      No current facility-administered medications on file prior to visit.         Review of patient's allergies indicates:  No Known Allergies    Medications Reconciliation:   I have reconciled the patient's home medications with the patient/family. I have updated all changes.  Refer to After-Visit Medication List.    OBJECTIVE:     Vital Signs:  Vitals:    12/26/19 1332   BP: (!) 144/90   Pulse: 81     Wt Readings from Last 1 Encounters:   12/26/19 1332 86.9 kg (191 lb 9.3 oz)     Body mass index is 27.49 kg/m².     Physical Exam:  General: Well developed, well nourished. No distress.  HEENT: Head is normocephalic, atraumatic  Eyes: Clear conjunctiva.  Neck: Supple, symmetrical neck; trachea midline.  Lungs: Clear to auscultation bilaterally and normal respiratory effort.  Cardiovascular: Heart with regular rate and rhythm.    Extremities: No LE edema.  Skin: Skin color, texture, turgor normal. No rashes.  Musculoskeletal: Normal gait.   Neurologic: Normal strength and tone. No focal numbness or weakness.   Psychiatric: Normal affect. Alert.    Laboratory  Lab Results   Component Value Date    WBC 6.90 08/23/2019    HGB 15.2 08/23/2019    HCT 44.2 08/23/2019     08/23/2019    CHOL 161 12/02/2019    TRIG 147 12/02/2019    HDL 56 12/02/2019    ALT 34 12/02/2019    AST 20 12/02/2019     12/02/2019    K 4.6 12/02/2019     12/02/2019    CREATININE 0.9 12/02/2019    BUN 10 12/02/2019    CO2 30 (H) 12/02/2019    TSH 1.502 02/21/2019    INR 1.3 (H) 01/21/2017    HGBA1C 5.4 02/21/2019       ASSESSMENT & PLAN:     Bilateral leg pain  - Nonspecific pain, ? Bone ? Myalgia.    Normal exam.    Under at lot of stress lately.    On statin for many years.    Recent CMP showed slight increased calcium.    Low normal B12 in the past.    Plan:  -     Comprehensive metabolic panel; Future; Expected date: 12/26/2019  -     Calcium, ionized; Future; Expected date: 12/26/2019  -     CK; Future; Expected date:  12/26/2019  -     Vitamin B12; Future; Expected date: 12/26/2019  -     Methylmalonic acid, serum; Future; Expected date: 12/26/2019    Rx:  -     coenzyme Q10 200 mg capsule; Take 200 mg by mouth once daily. Take with cholesterol med.  -     gabapentin (NEURONTIN) 300 MG capsule; Take 1 capsule (300 mg total) by mouth 3 (three) times daily as needed.  Dispense: 60 capsule; Refill: 2      Scheduled Follow-up :  No future appointments.    After Visit Medication List :     Medication List           Accurate as of December 26, 2019  1:49 PM. If you have any questions, ask your nurse or doctor.               START taking these medications    coenzyme Q10 200 mg capsule  Take 200 mg by mouth once daily. Take with cholesterol med.  Started by:  Javy Burt MD     gabapentin 300 MG capsule  Commonly known as:  NEURONTIN  Take 1 capsule (300 mg total) by mouth 3 (three) times daily as needed.  Started by:  Javy Burt MD        CONTINUE taking these medications    ALPRAZolam 0.5 MG tablet  Commonly known as:  XANAX  TAKE 1 TABLET BY MOUTH EVERY NIGHT AS NEEDED     aspirin 81 MG Chew     atorvastatin 40 MG tablet  Commonly known as:  LIPITOR  Take 1 tablet (40 mg total) by mouth once daily.     Vitamin C 100 MG tablet  Generic drug:  ascorbic acid (vitamin C)           Where to Get Your Medications      These medications were sent to [id number] [address] [address]    Phone:  [phone]   · gabapentin 300 MG capsule     You can get these medications from any pharmacy    You don't need a prescription for these medications  · coenzyme Q10 200 mg capsule         Signing Physician:  Javy Burt MD

## 2019-12-31 RX ORDER — ALPRAZOLAM 0.5 MG/1
TABLET ORAL
Qty: 30 TABLET | Refills: 3 | Status: SHIPPED | OUTPATIENT
Start: 2019-12-31 | End: 2020-01-29

## 2020-01-02 LAB — METHYLMALONATE SERPL-SCNC: 0.27 UMOL/L

## 2020-01-09 PROBLEM — R07.81 RIB PAIN ON RIGHT SIDE: Status: RESOLVED | Noted: 2017-10-12 | Resolved: 2020-01-09

## 2020-01-09 PROBLEM — M62.81 MUSCLE WEAKNESS OF LOWER EXTREMITY: Status: RESOLVED | Noted: 2017-10-12 | Resolved: 2020-01-09

## 2020-01-09 PROBLEM — M53.86 DECREASED RANGE OF MOTION OF LUMBAR SPINE: Status: RESOLVED | Noted: 2017-10-12 | Resolved: 2020-01-09

## 2020-01-10 ENCOUNTER — LAB VISIT (OUTPATIENT)
Dept: LAB | Facility: HOSPITAL | Age: 50
End: 2020-01-10
Attending: INTERNAL MEDICINE
Payer: COMMERCIAL

## 2020-01-10 ENCOUNTER — OFFICE VISIT (OUTPATIENT)
Dept: INTERNAL MEDICINE | Facility: CLINIC | Age: 50
End: 2020-01-10
Payer: COMMERCIAL

## 2020-01-10 VITALS
SYSTOLIC BLOOD PRESSURE: 110 MMHG | WEIGHT: 201.5 LBS | DIASTOLIC BLOOD PRESSURE: 78 MMHG | BODY MASS INDEX: 28.85 KG/M2 | HEART RATE: 60 BPM | HEIGHT: 70 IN

## 2020-01-10 DIAGNOSIS — K62.5 RECTAL BLEEDING: Primary | ICD-10-CM

## 2020-01-10 DIAGNOSIS — K62.5 RECTAL BLEEDING: ICD-10-CM

## 2020-01-10 LAB — HGB BLD-MCNC: 15.3 G/DL (ref 14–18)

## 2020-01-10 PROCEDURE — 36415 COLL VENOUS BLD VENIPUNCTURE: CPT

## 2020-01-10 PROCEDURE — 85018 HEMOGLOBIN: CPT

## 2020-01-10 PROCEDURE — 99999 PR PBB SHADOW E&M-EST. PATIENT-LVL III: ICD-10-PCS | Mod: PBBFAC,,, | Performed by: INTERNAL MEDICINE

## 2020-01-10 PROCEDURE — 99214 PR OFFICE/OUTPT VISIT, EST, LEVL IV, 30-39 MIN: ICD-10-PCS | Mod: S$GLB,,, | Performed by: INTERNAL MEDICINE

## 2020-01-10 PROCEDURE — 3008F PR BODY MASS INDEX (BMI) DOCUMENTED: ICD-10-PCS | Mod: CPTII,S$GLB,, | Performed by: INTERNAL MEDICINE

## 2020-01-10 PROCEDURE — 99214 OFFICE O/P EST MOD 30 MIN: CPT | Mod: S$GLB,,, | Performed by: INTERNAL MEDICINE

## 2020-01-10 PROCEDURE — 3008F BODY MASS INDEX DOCD: CPT | Mod: CPTII,S$GLB,, | Performed by: INTERNAL MEDICINE

## 2020-01-10 PROCEDURE — 99999 PR PBB SHADOW E&M-EST. PATIENT-LVL III: CPT | Mod: PBBFAC,,, | Performed by: INTERNAL MEDICINE

## 2020-01-10 NOTE — PROGRESS NOTES
Andre Padgett presents today to urgent care for:  Rectal Bleeding (bright red / tissue and underwear)      Last colonoscopy 3/2018 showing 1 polyp, internal hemorrhoids and some diverticulosis. He's only seen the [facility] once yesterday morning with some spotting in his underwear. This morning it's normal with no blood.     Rectal Bleeding   This is a new problem. The current episode started yesterday. The problem has been resolved. Associated symptoms include a change in bowel habit (he has been straining with BM more than usual for the last couple of weeks. ). Pertinent negatives include no abdominal pain, anorexia, chest pain, congestion, coughing, fatigue, fever, nausea or vomiting. Nothing aggravates the symptoms. He has tried nothing for the symptoms.       Past medical, social, family and surgical history was reviewed and updated today as needed. See encounter for details.     Review of Systems   Constitutional: Negative for fatigue and fever.   HENT: Negative for congestion.    Respiratory: Negative for cough.    Cardiovascular: Negative for chest pain.   Gastrointestinal: Positive for blood in stool, change in bowel habit (he has been straining with BM more than usual for the last couple of weeks. ), constipation and hematochezia. Negative for abdominal pain, anorexia, diarrhea, melena, nausea and vomiting.   Genitourinary: Negative.        Vitals:    01/10/20 0838   BP: 110/78   Pulse: 60   Body mass index is 28.91 kg/m².   Physical Exam   Constitutional: He appears well-developed and well-nourished.   HENT:   Head: Normocephalic and atraumatic.   Cardiovascular: Normal rate.   Pulmonary/Chest: Effort normal.   Abdominal: Soft. Bowel sounds are normal. He exhibits no distension and no mass. There is no tenderness. There is no rebound and no guarding.        Assessment/plan:   1. Rectal bleeding - likely bleeding internal hemorrhoids.   Will check HGB today. If no anemia present, recommend metamucil fiber  therapy daily for 2 weeks. Discussed with patient results of last colonoscopy and that it's unlikely that this is anything more than the hemorrhoids acting up.   If it doesn't resolve/returns and becomes more concerning then I would recommend additional follow up with GI. Pt. Voiced understanding.   - Hemoglobin; Future  This was a 25 minute visit.  Greater than 50% of today's visit was time spent on counseling and coordination of care.

## 2020-01-10 NOTE — PATIENT INSTRUCTIONS
Understanding Hemorrhoids    Hemorrhoid tissues are cushions of blood vessels that swell slightly during bowel movements. Too much pressure on the anal canal can make these tissues remain enlarged, become inflamed, and cause symptoms. This can happen both inside and outside the anal canal.  Parts of the anal canal  The parts of the anal canal are:  · Internal hemorrhoid tissue is in the upper area of the anal canal.  · The rectum is the last several inches of the colon. This is where stool is stored prior to bowel movements.  · Anal sphincters are ring-shaped muscles that expand and contract to control the anal opening.  · External hemorrhoid tissue lies under the anal skin.  · The anus is the passage between the rectum and the outside of the body.  Normal hemorrhoid tissue  Hemorrhoid tissues play an important role in helping your body eliminate waste. Food passes from the stomach through the intestines. The waste (stool) then travels through the colon to the rectum. It is stored in the rectum until its ready to be passed from the anus. During bowel movements, hemorrhoids swell with blood and become slightly larger. This swelling helps protect and cushion the anal canal as stool passes from the body. Once the stool has passed, the tissues stop swelling and return to normal.  Problem hemorrhoids  Pressure due to straining or other factors can cause hemorrhoid tissues to remain swollen. When this happens to the hemorrhoid tissues in the anal canal theyre called internal hemorrhoids. Swollen tissues around the anal opening are called external hemorrhoids. Depending on the location, your symptoms can differ.  · Internal hemorrhoids often happen in clusters around the wall of the anal canal. They are usually painless. But they may prolapse (protrude out of the anus) due to straining or pressure from hard stool. After the bowel movement is over, they may then reduce (return inside the body). Internal hemorrhoids  often bleed. They can also discharge mucus.  ·   External hemorrhoids are located at the anal opening, just beneath the skin. These tissues rarely cause problems unless they thrombose (form a blood clot). When this happens, a hard, bluish lump may appear. A thrombosed hemorrhoid also causes sudden, severe pain. In time, the clot may go away on its own. This sometimes leaves a skin tag of tissue stretched by the clot.  Hemorrhoid symptoms  Hemorrhoid symptoms may include:  · Pain or a burning sensation  · Bleeding during bowel movements  · Protrusion of tissue from the anus  · Itching around the anus  Causes of hemorrhoids  Theres no single cause of hemorrhoids. Most often, though, they are caused by too much pressure on the anal canal. This can be due to:  · Chronic (ongoing) constipation  · Straining during bowel movements  · Sitting too long on the toilet  · Strenuous exercise or heavy lifting  · Pregnancy and childbirth  · Aging  · Diarrhea  Date Last Reviewed: 7/1/2016  © 5630-5555 [employer]. [address] [place] [address], [place] [address]. All rights reserved. This information is not intended as a substitute for professional medical care. Always follow your healthcare professional's instructions.

## 2020-01-13 ENCOUNTER — PATIENT MESSAGE (OUTPATIENT)
Dept: INTERNAL MEDICINE | Facility: CLINIC | Age: 50
End: 2020-01-13

## 2020-01-29 ENCOUNTER — OFFICE VISIT (OUTPATIENT)
Dept: INTERNAL MEDICINE | Facility: CLINIC | Age: 50
End: 2020-01-29
Payer: COMMERCIAL

## 2020-01-29 ENCOUNTER — IMMUNIZATION (OUTPATIENT)
Dept: INTERNAL MEDICINE | Facility: CLINIC | Age: 50
End: 2020-01-29
Payer: COMMERCIAL

## 2020-01-29 VITALS
WEIGHT: 194.25 LBS | BODY MASS INDEX: 27.87 KG/M2 | HEART RATE: 92 BPM | SYSTOLIC BLOOD PRESSURE: 106 MMHG | DIASTOLIC BLOOD PRESSURE: 72 MMHG | OXYGEN SATURATION: 95 %

## 2020-01-29 DIAGNOSIS — G47.00 INSOMNIA, UNSPECIFIED TYPE: Primary | ICD-10-CM

## 2020-01-29 DIAGNOSIS — K64.9 HEMORRHOIDS, UNSPECIFIED HEMORRHOID TYPE: ICD-10-CM

## 2020-01-29 DIAGNOSIS — E53.8 B12 DEFICIENCY: ICD-10-CM

## 2020-01-29 DIAGNOSIS — F41.9 ANXIETY: ICD-10-CM

## 2020-01-29 PROCEDURE — 99214 PR OFFICE/OUTPT VISIT, EST, LEVL IV, 30-39 MIN: ICD-10-PCS | Mod: 25,S$GLB,, | Performed by: INTERNAL MEDICINE

## 2020-01-29 PROCEDURE — 3008F BODY MASS INDEX DOCD: CPT | Mod: CPTII,S$GLB,, | Performed by: INTERNAL MEDICINE

## 2020-01-29 PROCEDURE — 90686 FLU VACCINE (QUAD) GREATER THAN OR EQUAL TO 3YO PRESERVATIVE FREE IM: ICD-10-PCS | Mod: S$GLB,,, | Performed by: INTERNAL MEDICINE

## 2020-01-29 PROCEDURE — 99999 PR PBB SHADOW E&M-EST. PATIENT-LVL III: CPT | Mod: PBBFAC,,, | Performed by: INTERNAL MEDICINE

## 2020-01-29 PROCEDURE — 90686 IIV4 VACC NO PRSV 0.5 ML IM: CPT | Mod: S$GLB,,, | Performed by: INTERNAL MEDICINE

## 2020-01-29 PROCEDURE — 99214 OFFICE O/P EST MOD 30 MIN: CPT | Mod: 25,S$GLB,, | Performed by: INTERNAL MEDICINE

## 2020-01-29 PROCEDURE — 3008F PR BODY MASS INDEX (BMI) DOCUMENTED: ICD-10-PCS | Mod: CPTII,S$GLB,, | Performed by: INTERNAL MEDICINE

## 2020-01-29 PROCEDURE — 90471 FLU VACCINE (QUAD) GREATER THAN OR EQUAL TO 3YO PRESERVATIVE FREE IM: ICD-10-PCS | Mod: S$GLB,,, | Performed by: INTERNAL MEDICINE

## 2020-01-29 PROCEDURE — 90471 IMMUNIZATION ADMIN: CPT | Mod: S$GLB,,, | Performed by: INTERNAL MEDICINE

## 2020-01-29 PROCEDURE — 99999 PR PBB SHADOW E&M-EST. PATIENT-LVL III: ICD-10-PCS | Mod: PBBFAC,,, | Performed by: INTERNAL MEDICINE

## 2020-01-29 RX ORDER — ALPRAZOLAM 1 MG/1
1 TABLET ORAL NIGHTLY PRN
Qty: 30 TABLET | Refills: 5 | Status: SHIPPED | OUTPATIENT
Start: 2020-01-29 | End: 2020-05-11

## 2020-01-29 NOTE — PROGRESS NOTES
Subjective:       Patient ID: Andre Padgett is a 49 y.o. male.    Chief Complaint: Follow-up    Mr. Padgett is a 49 year old man with a history of cerebral aneurysm, anxiety, and internal hemorrhoids who presents today with insomnia for the past three months. Mr. Padgett expressed that he has been going through some work and personal life stressors with his marriage, and as a result has been unable to sleep for longer than 2-3 hours each night. He has had trouble being productive at work. He has had decreased energy, fatigue, decreased concentration. Patient endorses headaches and occasional palpitations. He tried using melatonin for a few nights to help sleep, but states this was ineffective. He is currently taking 0.5 mg of xanax each night, but has also tried taking a second dose of xanax after a few hours stating that the first dose was often ineffective in helping him sleep. He denies cold/heat intolerance, urinary frequency, unexpected weight changes, loss of appetite, anhedonia, loss of interest in activities, guilt/hopelessness, SI/HI.   Mr. Padgett states that his hemorrhoid symptoms from his last visit have resolved. He has had no rectal pain or blood per stool. He would like to receive the flu shot today.        Follow-up   Associated symptoms include fatigue and headaches. Pertinent negatives include no abdominal pain, arthralgias, chest pain, chills, coughing, diaphoresis, fever, myalgias, nausea, numbness, vomiting or weakness.     Review of Systems   Constitutional: Positive for fatigue. Negative for activity change, appetite change, chills, diaphoresis, fever and unexpected weight change.   Eyes: Negative for photophobia and visual disturbance.   Respiratory: Negative for cough, chest tightness and shortness of breath.    Cardiovascular: Positive for palpitations. Negative for chest pain and leg swelling.   Gastrointestinal: Negative for abdominal pain, blood in stool, nausea and vomiting.    Endocrine: Negative for cold intolerance and heat intolerance.   Musculoskeletal: Negative for arthralgias and myalgias.   Neurological: Positive for headaches. Negative for dizziness, weakness, light-headedness and numbness.   Psychiatric/Behavioral: Positive for sleep disturbance. Negative for suicidal ideas.       Objective:      Physical Exam   Constitutional: He is oriented to person, place, and time. He appears well-developed and well-nourished.   Cardiovascular: Normal rate, regular rhythm, normal heart sounds and intact distal pulses.   Pulmonary/Chest: Effort normal and breath sounds normal.   Abdominal: Soft. Bowel sounds are normal.   Neurological: He is alert and oriented to person, place, and time.   Skin: Skin is warm and dry. Capillary refill takes less than 2 seconds.   Psychiatric: He has a normal mood and affect. His behavior is normal. Judgment and thought content normal.       Assessment:       1. Insomnia, unspecified type    2. Anxiety    3. B12 deficiency    4. Hemorrhoids, unspecified hemorrhoid type        Plan:       Insomnia, unspecified type    Anxiety  -     Vitamin D; Future; Expected date: 04/30/2020  -     Vitamin B12; Future; Expected date: 04/30/2020    B12 deficiency  -     Vitamin D; Future; Expected date: 04/30/2020  -     Vitamin B12; Future; Expected date: 04/30/2020    Hemorrhoids, unspecified hemorrhoid type  Comments:  No bleeding since UC visit. He will let us know of recurrence and consider CRS visit.     Other orders  -     ALPRAZolam (XANAX) 1 MG tablet; Take 1 tablet (1 mg total) by mouth nightly as needed for Anxiety.  Dispense: 30 tablet; Refill: 5      We had a long discussion with pt regarding options including adding a med more targeting sleep such as Hydroxyzine. He prefers to raise Xanax dose first and monitor a few weeks.     Also had him start OTC B12 1000 units and retest vitamins in a few months.     I hereby acknowledge that I am relying upon documentation  authored by a medical student working under my supervision and further I hereby attest that I have verified the student documentation or findings by personally re-performing the physical exam and medical decision making activities of the Evaluation and Management service to be billed.

## 2020-03-04 ENCOUNTER — PATIENT MESSAGE (OUTPATIENT)
Dept: INTERNAL MEDICINE | Facility: CLINIC | Age: 50
End: 2020-03-04

## 2020-03-04 RX ORDER — HYDROXYZINE HYDROCHLORIDE 25 MG/1
25 TABLET, FILM COATED ORAL NIGHTLY PRN
Qty: 30 TABLET | Refills: 6 | Status: SHIPPED | OUTPATIENT
Start: 2020-03-04 | End: 2020-08-03

## 2020-03-20 ENCOUNTER — NURSE TRIAGE (OUTPATIENT)
Dept: ADMINISTRATIVE | Facility: CLINIC | Age: 50
End: 2020-03-20

## 2020-03-20 NOTE — TELEPHONE ENCOUNTER
Reason for Disposition   Colds with no complications    Additional Information   Negative: Severe difficulty breathing (e.g., struggling for each breath, speaks in single words)   Negative: Very weak (can't stand)   Negative: Sounds like a life-threatening emergency to the triager   Negative: Runny nose is caused by pollen or other allergies   Negative: Cough is the main symptom   Negative: Sore throat is the main symptom   Negative: Patient sounds very sick or weak to the triager   Negative: Fever > 103 F (39.4 C)   Negative: Fever > 101 F (38.3 C) and over 60 years of age   Negative: Fever > 100.0 F (37.8 C) and has diabetes mellitus or a weak immune system (e.g., HIV positive, cancer chemotherapy, organ transplant, splenectomy, chronic steroids)   Negative: Fever > 100.0 F (37.8 C) and bedridden (e.g., nursing home patient, stroke, chronic illness, recovering from surgery)   Negative: Fever present > 3 days (72 hours)   Negative: Fever returns after gone for over 24 hours and symptoms worse or not improved   Negative: Sinus pain (not just congestion) and fever   Negative: Earache   Negative: Using nasal washes and pain medicine > 24 hours and sinus pain (lower forehead, cheekbone, or eye) persists   Negative: Nasal discharge present > 10 days   Negative: Sinus congestion (pressure, fullness) present > 10 days   Negative: Patient wants to be seen   Negative: Sore throat present > 5 days    Protocols used: COMMON COLD-A-OH  itchy throat and aches, coughed a couple times last pm, slept good. T98.7-99.4 , upper and lower back , and knee achy. Eyes watery. General mgr for hotel. 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 Feels similar to cold sx.WBC not very high. rec home care. rec fluids, vit c, zinc. Wash hands. Have mom self isolate and avoid contact with her while ill. Discussed covid 19 sx. Call back with questions.

## 2020-03-23 ENCOUNTER — TELEPHONE (OUTPATIENT)
Dept: INTERNAL MEDICINE | Facility: CLINIC | Age: 50
End: 2020-03-23

## 2020-03-23 ENCOUNTER — NURSE TRIAGE (OUTPATIENT)
Dept: ADMINISTRATIVE | Facility: CLINIC | Age: 50
End: 2020-03-23

## 2020-03-23 NOTE — TELEPHONE ENCOUNTER
----- Message from Ese Barnes sent at 3/23/2020  2:10 PM CDT -----  Contact: self [phone]  Would like to get medical advice.  Symptoms (please be specific):  Stuffy nose, dry throat and mouth, achy joints; no fever or cough  How long has patient had these symptoms:  3/21  Pharmacy name and phone #:  [id number] - [place], [place] - [address] [place] [address] [phone] (Phone)  [phone] (Fax)  Any drug allergies (copy from chart):   See chart   Would the patient rather a call back or a response via [facility]?:  Call back  Comments:

## 2020-03-23 NOTE — TELEPHONE ENCOUNTER
Spoke with pt, explained your message. Pt verbalized understanding.  Pt stated he has not been exposed to covid 19 that he know of    I explained to call back if symptoms worsen or for any shortness of breath go to ER.   I explained, to avoid ER unless an emergency.  Pt verbalized understanding.

## 2020-03-23 NOTE — TELEPHONE ENCOUNTER
The pt is calling for fever (100.8). [facility]. The pt was told to call back if he had any questions.     Reason for Disposition   Fever or feeling feverish within 14 Days of COVID-19 EXPOSURE    Additional Information   Negative: Severe difficulty breathing (e.g., struggling for each breath, speak in single words, bluish lips)   Negative: Sounds like a life-threatening emergency to the triager   Negative: Difficulty breathing (shortness of breath) occurs and onset > 14 days after COVID-19 EXPOSURE (Close Contact)   Negative: Cough occurs and onset > 14 days after COVID-19 EXPOSURE   Negative: Common cold symptoms and onset > 14 days after COVID-19 EXPOSURE   Negative: Difficulty breathing occurs within 14 days of COVID-19 EXPOSURE   Negative: Patient sounds very sick or weak to the triager   Negative: Cough occurs within 14 days of COVID-19 EXPOSURE    Protocols used: CORONAVIRUS (COVID-19) EXPOSURE-A-[place]

## 2020-03-23 NOTE — TELEPHONE ENCOUNTER
We prefer Tylenol for fever. Probably ok to take ibuprofen as well. Currently if not a Known exposure to a + COVID pt and if not an asthma, COPD, transplant, cancer etc pt, our testing criteria does not allow us to test. Certainnly for illness or other symptoms, if anything changes we would like to hear back or have pt be seen or seen at .   Also worsening to the point of SOB, may benefit from ER eval.

## 2020-03-23 NOTE — TELEPHONE ENCOUNTER
Spoke with pt, he stated he now has a temp 100.6  He denies cough and shortness of breath.     He has laryngitis, phlegm-not from his chest.  He has achy joints    He would like to be tested for covid 19, is not immunocompromised, hd or transplant.    He wants to know if he can take ibuprofen.    Please advise.

## 2020-03-27 ENCOUNTER — TELEPHONE (OUTPATIENT)
Dept: INTERNAL MEDICINE | Facility: CLINIC | Age: 50
End: 2020-03-27

## 2020-03-27 NOTE — TELEPHONE ENCOUNTER
----- Message from Lucy Britton sent at 3/27/2020  2:19 PM CDT -----  Contact: Patient  [phone]  Patient temperature is 99.6. Please call.  Video visit set up for monday

## 2020-03-27 NOTE — TELEPHONE ENCOUNTER
Called to speak with the pt about his/her symptoms;    Fever in the last 24 hours? 99.6, has been fluctuating   Been out of the country in the last 30 days? no  What countries have you been to?  What dates were you there?  When did you return, which day?  How long have you had symptoms for? x1 week   Been on a cruise ship or an airplane in the last 30 days?no   Has come in to contact with anyone that has any of the above, that he/she is aware of?no    Had any of the following symptoms;  Cough? no  Muscle Pain?no  S.O.B?no  Diarrhea? Yes, at the beginning    Rash?no  Emesis?no  Abdominal Pain?no  Red Eye?no  Weakness? no  Bruising or Bleeding?no  Joint Pain? Yes   Severe Headache?no      Pt wants to know what to do

## 2020-03-30 ENCOUNTER — PATIENT MESSAGE (OUTPATIENT)
Dept: INTERNAL MEDICINE | Facility: CLINIC | Age: 50
End: 2020-03-30

## 2020-03-30 ENCOUNTER — OFFICE VISIT (OUTPATIENT)
Dept: INTERNAL MEDICINE | Facility: CLINIC | Age: 50
End: 2020-03-30
Payer: COMMERCIAL

## 2020-03-30 ENCOUNTER — TELEPHONE (OUTPATIENT)
Dept: INTERNAL MEDICINE | Facility: CLINIC | Age: 50
End: 2020-03-30

## 2020-03-30 ENCOUNTER — HOSPITAL ENCOUNTER (OUTPATIENT)
Dept: RADIOLOGY | Facility: HOSPITAL | Age: 50
Discharge: HOME | End: 2020-03-30
Attending: INTERNAL MEDICINE
Payer: COMMERCIAL

## 2020-03-30 DIAGNOSIS — Z20.822 SUSPECTED COVID-19 VIRUS INFECTION: ICD-10-CM

## 2020-03-30 DIAGNOSIS — M54.6 BACK PAIN OF THORACOLUMBAR REGION: ICD-10-CM

## 2020-03-30 DIAGNOSIS — F41.9 ANXIETY: ICD-10-CM

## 2020-03-30 DIAGNOSIS — R05.9 COUGH: Primary | ICD-10-CM

## 2020-03-30 DIAGNOSIS — R50.9 FEVER, UNSPECIFIED FEVER CAUSE: ICD-10-CM

## 2020-03-30 DIAGNOSIS — R05.9 COUGH: ICD-10-CM

## 2020-03-30 DIAGNOSIS — M54.50 BACK PAIN OF THORACOLUMBAR REGION: ICD-10-CM

## 2020-03-30 PROCEDURE — 71046 X-RAY EXAM CHEST 2 VIEWS: CPT | Mod: TC

## 2020-03-30 PROCEDURE — 99214 PR OFFICE/OUTPT VISIT, EST, LEVL IV, 30-39 MIN: ICD-10-PCS | Mod: 95,,, | Performed by: INTERNAL MEDICINE

## 2020-03-30 PROCEDURE — 71046 XR CHEST PA AND LATERAL: ICD-10-PCS | Mod: 26,,, | Performed by: RADIOLOGY

## 2020-03-30 PROCEDURE — 99214 OFFICE O/P EST MOD 30 MIN: CPT | Mod: 95,,, | Performed by: INTERNAL MEDICINE

## 2020-03-30 PROCEDURE — 71046 X-RAY EXAM CHEST 2 VIEWS: CPT | Mod: 26,,, | Performed by: RADIOLOGY

## 2020-03-30 NOTE — PROGRESS NOTES
Subjective:       Patient ID: Andre Padgett is a 49 y.o. male.    Chief Complaint: Anxiety and Back Pain    The patient location is: Home  The chief complaint leading to consultation is: Anxiety  Visit type: Virtual visit with synchronous audio and video  Total time spent with patient: 20  Each patient to whom he or she provides medical services by telemedicine is:  (1) informed of the relationship between the physician and patient and the respective role of any other health care provider with respect to management of the patient; and (2) notified that he or she may decline to receive medical services by telemedicine and may withdraw from such care at any time.    Notes: No coughing in a few days. No fever for a few days. + back ache, fatigue.  Patient very worried that the back ache represents pneumonia.  I explained that with improving cough and fever that would not likely be the case but he would like to get a chest x-ray.  He also is concerned that he has worsening anxiety but stopped taking his anxiety medicine not wanting to takes meds.  He has been using Tylenol p.m. for sleep  Still waiting for COVID test result.   I explained we could have him use Aleve or ibuprofen for musculoskeletal pain and use the anxiety med instead of the Tylenol but he does not want to use an anti-inflammatory if he might have the corona virus.  He expects to get that result today.  He denies any GI or  complaints.  He says he is not sleeping well.  Positive anxiety.  He does remain on self-quarantine at home.  No shortness of breath.    Review of Systems   Constitutional: Positive for fever ( none in a few days). Negative for chills, fatigue and unexpected weight change.   HENT: Positive for congestion ( improving). Negative for nosebleeds and trouble swallowing.    Eyes: Negative for pain and visual disturbance.   Respiratory: Positive for cough (Improving). Negative for shortness of breath and wheezing.    Cardiovascular:  Negative for chest pain and palpitations.   Gastrointestinal: Negative for abdominal pain, constipation, diarrhea, nausea and vomiting.   Genitourinary: Negative for difficulty urinating, dysuria, frequency and hematuria.   Musculoskeletal: Positive for back pain and myalgias. Negative for neck pain.   Skin: Negative for rash.   Neurological: Negative for dizziness and headaches.   Hematological: Does not bruise/bleed easily.   Psychiatric/Behavioral: Positive for sleep disturbance. Negative for dysphoric mood and suicidal ideas. The patient is nervous/anxious.        Objective:      Physical Exam   Constitutional: He appears well-developed and well-nourished. No distress.   Patient appeared comfortable but anxious on the video visit.  No coughing, no difficulty breathing.  He was able to speak comfortably.   Pulmonary/Chest:   No coughing during visit   Psychiatric:   Anxious appearing male       Assessment:       1. Cough    2. Fever, unspecified fever cause    3. Suspected Covid-19 Virus Infection    4. Anxiety    5. Back pain of thoracolumbar region        Plan:       Andre was seen today for anxiety and back pain.    Diagnoses and all orders for this visit:    Cough  -     X-Ray Chest PA And Lateral; Future    Fever, unspecified fever cause  -     X-Ray Chest PA And Lateral; Future    Suspected Covid-19 Virus Infection  -     X-Ray Chest PA And Lateral; Future    Anxiety    Back pain of thoracolumbar region        continue to salt quarantine.  Review x-ray and corona test as soon as available.  Stop Tylenol and resume alprazolam at least until all tests are resulted.  This should help some up with patient's significant anxiety

## 2020-03-30 NOTE — TELEPHONE ENCOUNTER
----- Message from Judi Almonte sent at 3/30/2020 10:59 AM CDT -----  Contact: Patient [phone]  Asked if you could add a XRAY of the kidneys.    Please call and advise.    Thank You

## 2020-03-30 NOTE — TELEPHONE ENCOUNTER
----- Message from Hunter Diop MD sent at 3/30/2020 10:36 AM CDT -----  Please reach out to pt to schedule CXR today. He is expecting our call.

## 2020-03-30 NOTE — TELEPHONE ENCOUNTER
We do not do plain xrays of kidneys. If needed there is another test, like US or scan. I do not think indicated at this time based on his lack of symptoms related to urine

## 2020-04-03 RX ORDER — ATORVASTATIN CALCIUM 40 MG/1
TABLET, FILM COATED ORAL
Qty: 90 TABLET | Refills: 3 | Status: SHIPPED | OUTPATIENT
Start: 2020-04-03 | End: 2021-06-04

## 2020-04-06 ENCOUNTER — PATIENT MESSAGE (OUTPATIENT)
Dept: INTERNAL MEDICINE | Facility: CLINIC | Age: 50
End: 2020-04-06

## 2020-04-06 ENCOUNTER — OFFICE VISIT (OUTPATIENT)
Dept: INTERNAL MEDICINE | Facility: CLINIC | Age: 50
End: 2020-04-06
Payer: COMMERCIAL

## 2020-04-06 DIAGNOSIS — U07.1 COVID-19 VIRUS INFECTION: Primary | ICD-10-CM

## 2020-04-06 DIAGNOSIS — R05.9 COUGH: ICD-10-CM

## 2020-04-06 PROCEDURE — 99213 OFFICE O/P EST LOW 20 MIN: CPT | Mod: GT,,, | Performed by: INTERNAL MEDICINE

## 2020-04-06 PROCEDURE — 99213 PR OFFICE/OUTPT VISIT, EST, LEVL III, 20-29 MIN: ICD-10-PCS | Mod: GT,,, | Performed by: INTERNAL MEDICINE

## 2020-04-06 NOTE — LETTER
April 6, 2020      [facility]  [address]  [facility] [address]  Phone: [phone]  Fax: [phone]       Patient: Andre Padgett   YOB: 1970  Date of Visit: 04/06/2020    To Whom It May Concern:    Elvira Padgett  was at [facility] on 04/06/2020. He may return to work/school on 4/13/2020 with no restrictions. If you have any questions or concerns, or if I can be of further assistance, please do not hesitate to contact me.    Sincerely,       Hunter Diop MD

## 2020-04-06 NOTE — PROGRESS NOTES
Subjective:       Patient ID: Andre Padgett is a 49 y.o. male.    Chief Complaint: No chief complaint on file.    The patient location is: Home  The chief complaint leading to consultation is: Return to work.  Visit type: Virtual visit with synchronous audio and video  Total time spent with patient: 20 minutes  Each patient to whom he or she provides medical services by telemedicine is:  (1) informed of the relationship between the physician and patient and the respective role of any other health care provider with respect to management of the patient; and (2) notified that he or she may decline to receive medical services by telemedicine and may withdraw from such care at any time.    Notes:  Patient had a virtual visit to discuss clearance of symptoms and return to work.  His fever has been gone down for over 5 days but he still has a slight cough.  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  He has continued taking Mucinex.  He denies any GI or  complaints.  He otherwise is feeling well and has continued to self-quarantine.  We will tentatively plan on him returning to work next weekend and I will write a letter stating such.  He will let me know if anything changes during that time.    Review of Systems   Constitutional: Positive for fatigue. Negative for chills, fever and unexpected weight change.   HENT: Negative for nosebleeds and trouble swallowing.    Eyes: Negative for pain and visual disturbance.   Respiratory: Positive for cough (Improving). Negative for shortness of breath and wheezing.    Cardiovascular: Negative for chest pain and palpitations.   Gastrointestinal: Negative for abdominal pain, constipation, diarrhea, nausea and vomiting.   Genitourinary: Negative for difficulty urinating and hematuria.   Musculoskeletal: Negative for neck pain.   Skin: Negative for  rash.   Neurological: Negative for dizziness and headaches.   Hematological: Does not bruise/bleed easily.   Psychiatric/Behavioral: Negative for dysphoric mood, sleep disturbance and suicidal ideas.       Objective:      Physical Exam   Constitutional: He is oriented to person, place, and time.   Appears well on the video visit.  Coughing occasionally during our visit   HENT:   Head: Normocephalic and atraumatic.   Pulmonary/Chest: Effort normal. No respiratory distress.   Neurological: He is alert and oriented to person, place, and time.       Assessment:       1. COVID-19 virus infection    2. Cough        Plan:       Diagnoses and all orders for this visit:    COVID-19 virus infection    Cough        plan to return to work 04/13/2020.  Continue symptomatic treatment and let me know of any issues.

## 2020-04-16 ENCOUNTER — TELEPHONE (OUTPATIENT)
Dept: INTERNAL MEDICINE | Facility: CLINIC | Age: 50
End: 2020-04-16

## 2020-04-16 ENCOUNTER — NURSE TRIAGE (OUTPATIENT)
Dept: ADMINISTRATIVE | Facility: CLINIC | Age: 50
End: 2020-04-16

## 2020-04-16 DIAGNOSIS — U07.1 COVID-19 VIRUS DETECTED: ICD-10-CM

## 2020-04-16 NOTE — TELEPHONE ENCOUNTER
----- Message from Sumaya Soto sent at 4/15/2020  5:07 PM CDT -----  Pt can be reached at [phone]    Pt went to be tested to make sure the COVID was cleared. Pt results were positive for recent testing pt took test Thursday, of last week.      Please contact pt    Thank you!

## 2020-04-17 NOTE — TELEPHONE ENCOUNTER
Patient calling to ask if it was ok for 2 positive COVID patients to be in same house. Patient is >14 days after exposure, has been tested x2 still positive c no symptoms now. .  Other individual he lives with is positive c symptoms. Patient wanting to know if they can be around each other without getting a relapse. Instructed about anywhere care, patient was unable to get it downloaded, He will call his PCP  In am to speak about this matter.     Reason for Disposition   [1] Caller requesting NON-URGENT health information AND [2] PCP's office is the best resource    Additional Information   Negative: [1] Caller is not with the adult (patient) AND [2] reporting urgent symptoms   Negative: Lab result questions   Negative: Medication questions   Negative: Caller can't be reached by phone   Negative: Caller has already spoken to PCP or another triager   Negative: RN needs further essential information from caller in order to complete triage   Negative: Requesting regular office appointment    Protocols used: INFORMATION ONLY CALL-A-

## 2020-04-17 NOTE — TELEPHONE ENCOUNTER
The current guidelines are not to retest previously positive patients including medical professionals.  We are following the standard policy which is if no fever or symptoms, may return to work and certainly he fit that qualification.  Hopefully patient's are continuing to wash hands and others who have not been exposed her positive should be protecting themselves as well.  As I explained to him this is a new illness and the general recommendations are that patients are not contagious after 21 days but obviously that can vary.

## 2020-04-17 NOTE — TELEPHONE ENCOUNTER
Read pt message below.    Pt states he needs to speak with PCP.    He returned to work on the 13th. He was tested for a COVID a second time by his mother's PCP. HE tested positive a second time. He is concerned because he did return to work and possibly exposed three other people to COVID. He is upset because he was cleared to return to work without being retested.  He states he is isolated in his room. He has no symptoms

## 2020-04-17 NOTE — TELEPHONE ENCOUNTER
Spoke to pt informed of separation and distance for him and his roommate( they are contained in different areas of the home) , encourage to both wearing mask and  To wear gloves as needed. Sanitize home as needed, wash clothes and linen separately.use of mask and clothes when one is cooking and maintain sanitized area. Pt understands and has been doing most of these things. Pt will be contained  In the home, no outside visits. Pt is good.

## 2020-04-17 NOTE — TELEPHONE ENCOUNTER
Unfortunately we just do not know if it is possible to catch the virus the 2nd time.  We suspect it is not likely, however if someone is known positive and known to still be contagious based on a positive test they should isolate themselves even if someone else has had a virus.  It is just the safer thing to do

## 2020-04-22 ENCOUNTER — TELEPHONE (OUTPATIENT)
Dept: INTERNAL MEDICINE | Facility: CLINIC | Age: 50
End: 2020-04-22

## 2020-04-22 NOTE — TELEPHONE ENCOUNTER
----- Message from Anna Franco sent at 4/22/2020 10:32 AM CDT -----  Contact: self  Type:  Questions regarding labwork    Name of Caller:patient need to speak with nurse.   Patient has questions regarding labwork scheduled for today  When is the first available appointment  Symptoms:  Best Call Back Number:[phone]  Additional Information:

## 2020-04-22 NOTE — TELEPHONE ENCOUNTER
Called pt. Wants to know if he can catch it again because he thinks his mom has it. Also wants to know if the antibody test is open for the public

## 2020-04-22 NOTE — TELEPHONE ENCOUNTER
We do not think currently a patient can catch it again this quickly. And antibody hai re not open to the public.

## 2020-04-27 ENCOUNTER — OFFICE VISIT (OUTPATIENT)
Dept: INTERNAL MEDICINE | Facility: CLINIC | Age: 50
End: 2020-04-27
Payer: COMMERCIAL

## 2020-04-27 ENCOUNTER — TELEPHONE (OUTPATIENT)
Dept: INTERNAL MEDICINE | Facility: CLINIC | Age: 50
End: 2020-04-27

## 2020-04-27 ENCOUNTER — PATIENT MESSAGE (OUTPATIENT)
Dept: INTERNAL MEDICINE | Facility: CLINIC | Age: 50
End: 2020-04-27

## 2020-04-27 DIAGNOSIS — U07.1 COVID-19 VIRUS INFECTION: Primary | ICD-10-CM

## 2020-04-27 PROBLEM — E78.5 HYPERLIPIDEMIA: Status: ACTIVE | Noted: 2020-04-09

## 2020-04-27 PROBLEM — B34.9 VIRAL INFECTION, UNSPECIFIED: Status: ACTIVE | Noted: 2020-04-20

## 2020-04-27 PROCEDURE — 99213 PR OFFICE/OUTPT VISIT, EST, LEVL III, 20-29 MIN: ICD-10-PCS | Mod: 95,,, | Performed by: NURSE PRACTITIONER

## 2020-04-27 PROCEDURE — 99213 OFFICE O/P EST LOW 20 MIN: CPT | Mod: 95,,, | Performed by: NURSE PRACTITIONER

## 2020-04-27 RX ORDER — ZINC SULFATE 50(220)MG
CAPSULE ORAL
Status: ON HOLD | COMMUNITY
Start: 2020-04-15 | End: 2020-09-22 | Stop reason: HOSPADM

## 2020-04-27 RX ORDER — ASPIRIN 81 MG/1
TABLET ORAL
COMMUNITY
End: 2020-04-27

## 2020-04-27 NOTE — TELEPHONE ENCOUNTER
----- Message from Chely Flores sent at 4/27/2020  9:11 AM CDT -----  Contact: self /[phone]  Pt states he would like orders in for him to get tested again for COVID-19. Pt states he was positive the 2nd time. Pt states he shows no symptoms. Please call and advise

## 2020-04-27 NOTE — PROGRESS NOTES
The patient location is: home  The chief complaint leading to consultation is: COVID-19 clearance  Visit type: audiovisual  Total time spent with patient: 20 minutes  Each patient to whom he or she provides medical services by telemedicine is:  (1) informed of the relationship between the physician and patient and the respective role of any other health care provider with respect to management of the patient; and (2) notified that he or she may decline to receive medical services by telemedicine and may withdraw from such care at any time.    Notes: Pt of Dr. Diop, here virtually for COVID-19 clearance to leave his home. Was tested positive on 3/22 and 4/8. Had been self quarantining for over a month now, no symptoms. Concerned about reinfection due to his mother recently testing positive and living with him. Messaged PCP about this and inquired about antibody testing. Was advised by Dr. Diop that he was not likely to catch it again so quickly and that antibody testing was not available to the public at this time. Noted he was seen virtually for same reason on 4/20/20 and was told that patient's mother should self quarantine until fever free and symptom free for at least 3 days, 7 days after symptom onset. Advised that if symptoms last longer than 14 days, they should still wait until she is asymptomatic for at least 3 days.    Pt on 31st day since dx with COVID-19, states he is feeling good. Was tested a second time to ensure he wasn't still positive prior to going back to work. He went back to work on 4/13/20. Denies symptoms of COVID. States he and mother wearing gloves and mask and staying separate in home. Mother tested positive on 4/9/2020. Mother shows no signs other than a little cough. States his mother has been fine. Denies fever or SOB. Pt states he does not feel ill. He just wants to be retested to make sure it has cleared his body.    Review of Systems   Constitutional: Negative for chills and  fever.   HENT: Negative for congestion, ear pain, sinus pain and sore throat.    Respiratory: Negative for cough, hemoptysis, sputum production and shortness of breath.    Cardiovascular: Negative for chest pain and orthopnea.   Gastrointestinal: Negative for abdominal pain, constipation, diarrhea, nausea and vomiting.   Musculoskeletal: Negative for joint pain and myalgias.   Neurological: Negative for dizziness, sensory change, loss of consciousness, weakness and headaches.   Endo/Heme/Allergies: Negative for environmental allergies. Does not bruise/bleed easily.       Review of patient's allergies indicates:  No Known Allergies    Current Outpatient Medications:     ALPRAZolam (XANAX) 1 MG tablet, Take 1 tablet (1 mg total) by mouth nightly as needed for Anxiety., Disp: 30 tablet, Rfl: 5    ascorbic acid (VITAMIN C) 100 MG tablet, Take 100 mg by mouth once daily., Disp: , Rfl:     aspirin 81 MG Chew, Take 81 mg by mouth once daily., Disp: , Rfl:     atorvastatin (LIPITOR) 40 MG tablet, TAKE 1 TABLET(40 MG) BY MOUTH EVERY DAY, Disp: 90 tablet, Rfl: 3    coenzyme Q10 200 mg capsule, Take 200 mg by mouth once daily. Take with cholesterol med., Disp: , Rfl:     hydrOXYzine HCl (ATARAX) 25 MG tablet, Take 1 tablet (25 mg total) by mouth nightly as needed (Insomnia)., Disp: 30 tablet, Rfl: 6    zinc sulfate (ZINCATE) 220 (50) mg capsule, TK ONE C PO  QD, Disp: , Rfl:     Patient Active Problem List   Diagnosis    Anxiety    DJD (degenerative joint disease) of thoracic spine    DJD (degenerative joint disease) of cervical spine, mild    Palpitations    Pure hypercholesterolemia    Colon adenoma: 3/18 repeat colonoscopy 2023    Umbilical hernia without obstruction and without gangrene: see CT 3/18    Fatty liver: see CT 3/18    Diverticulosis of large intestine without hemorrhage: see colonoscopy 3/18; sigmoid and descending colon    COVID-19 virus infection    Viral infection, unspecified     Hyperlipidemia     Past Medical History:   Diagnosis Date    Aneurysm     Right sided filling anterior communicating aneurysm s/p coiling 2011    Anxiety     Cerebral aneurysm, nonruptured 12/27/2016    Colon adenoma: 3/18 repeat colonoscpy 2023 6/22/2018    Diverticulosis of large intestine without hemorrhage: see colonoscopy 3/18; sigmoid and descending colon 6/22/2018    Fatty liver: see CT 3/18 6/22/2018    Umbilical hernia without obstruction and without gangrene: see CT 3/18 6/22/2018     Past Surgical History:   Procedure Laterality Date    CEREBRAL ANGIOGRAM  2011    angiogram/coiling    COLONOSCOPY N/A 3/26/2018    Procedure: COLONOSCOPY;  Surgeon: Sanjiv Duran MD;  Location: [facility] ([address] [place] [address]);  Service: Endoscopy;  Laterality: N/A;     Social History     Socioeconomic History    Marital status:      Spouse name: Not on file    Number of children: Not on file    Years of education: Not on file    Highest education level: Not on file   Occupational History    Not on file   Social Needs    Financial resource strain: Not on file    Food insecurity:     Worry: Not on file     Inability: Not on file    Transportation needs:     Medical: Not on file     Non-medical: Not on file   Tobacco Use    Smoking status: Never Smoker    Smokeless tobacco: Never Used   Substance and Sexual Activity    Alcohol use: Yes     Comment: ocassionally - twice a week    Drug use: No    Sexual activity: Not on file   Lifestyle    Physical activity:     Days per week: Not on file     Minutes per session: Not on file    Stress: Not on file   Relationships    Social connections:     Talks on phone: Not on file     Gets together: Not on file     Attends Protestant service: Not on file     Active member of club or organization: Not on file     Attends meetings of clubs or organizations: Not on file     Relationship status: Not on file   Other Topics Concern    Not on file   Social History Narrative  "   Not on file     Family History   Problem Relation Age of Onset    DAVID disease Mother     Hypertension Mother     Diabetes Mother     Brain cancer Father     Thyroid disease Sister     No Known Problems Daughter     No Known Problems Sister     Cirrhosis Neg Hx     Colon polyps Neg Hx     Crohn's disease Neg Hx     Liver cancer Neg Hx     Stomach cancer Neg Hx     Ulcerative colitis Neg Hx     Heart attack Neg Hx     Colon cancer Neg Hx      PE: Pt is not in any distress during video chat with me.    Diagnoses and all orders for this visit:    COVID-19 virus infection  -     COVID-19 Routine Screening; Future    Will retest pt again through drive through and notify him of results    Explained that he and mother should stay  wearing masks and gloves, good hand washing    Check temperature twice a day, notify me if it becomes 100.4 or higher without taking Tylenol or fever reducers.     Try to obtain a pulse ox or you may download the mirta "Oximeter" for [employer]. Check pulse ox also twice a day and notify me of any readings < 92%  This is available through the [employer] store for $4.99!!!      [place] [facility]  Preventing the Spread of Coronavirus Disease 2019 in Homes and Residential Communities     Prevention steps for people with confirmed or suspected COVID-19 (including persons under investigation) who do not need to be hospitalized and people with confirmed COVID-19 who were hospitalized and determined to be medically stable to go home.     Your healthcare provider and public health staff will evaluate whether you can be cared for at home.  Stay home except to get medical care.  Separate yourself from other people and animals in your home  Call ahead before visiting your doctor.  Wear a facemask.  Cover your coughs and sneezes.  Clean your hands often.  Avoid sharing personal household items.  Clean all "high-touch" surfaces every day.  Monitor your symptoms. Seek " prompt medical attention if your illness is worsening (e.g., difficulty breathing). Before seeking care, call your healthcare provider.  If you have a medical emergency and need to call 911, notify the dispatch personnel that you have, or are being evaluated for COVID-19. If possible, put on a facemask before emergency medical services arrive.  Discontinuing home isolation. Call your provider about guidance to discontinue home isolation.     Recommended precautions for household members, intimate partners, and caregivers in a nonhealthcare setting of a patient with symptomatic laboratory-confirmed COVID-19 or a patient under investigation.  Household members, intimate partners, and caregivers in a nonhealthcare setting may have close contact with a person with symptomatic, laboratory-confirmed COVID-19 or a person under investigation. Close contacts should monitor their health; they should call their healthcare provider right away if they develop symptoms suggestive of COVID-19 (e.g., fever, cough, shortness of breath).     Close contacts should also follow these recommendations:  Make sure that you understand and can help the patient follow their healthcare provider's instructions for medication(s) and care. You should help the patient with basic needs in the home and provide support for getting groceries, prescriptions, and other personal needs.  Monitor the patient's symptoms. If the patient is getting sicker, call his or her healthcare provider and tell them that the patient has laboratory-confirmed COVID-19. This will help the healthcare provider's office take steps to keep other people in the office or waiting room from getting infected. Ask the healthcare provider to call the local or state health department for additional guidance. If the patient has a medical emergency and you need to call 911, notify the dispatch personnel that the patient has, or is being evaluated for COVID-19.  Household members should  "stay in another room or be  from the patient as much as possible. Household members should use a separate bedroom and bathroom, if available.  Prohibit visitors who do not have an essential need to be in the home.  Household members should care for any pets in the home. Do not handle pets or other animals while sick.  Make sure that shared spaces in the home have good air flow, such as by an air conditioner or an opened window, weather permitting.  Perform hand hygiene frequently. Wash your hands often with soap and water for at least 20 seconds or use an alcohol-based hand  that contains 60 to 95% alcohol, covering all surfaces of your hands and rubbing them together until they feel dry. Soap and water should be used preferentially if hands are visibly dirty.  Avoid touching your eyes, nose, and mouth with unwashed hands.  The patient should wear a facemask when you are around other people. If the patient is not able to wear a facemask (for example, because it causes trouble breathing), you, as the caregiver should wear a mask when you are in the same room as the patient.  Wear a disposable facemask and gloves when you touch or have contact with the patient's blood, stool, or body fluids, such as saliva, sputum, nasal mucus, vomit, urine.  Throw out disposable facemasks and gloves after using them. Do not reuse.  When removing personal protective equipment, first remove and dispose of gloves. Then, immediately clean your hands with soap and water or alcohol-based hand . Next, remove and dispose of facemask, and immediately clean your hands again with soap and water or alcohol-based hand .  Avoid sharing household items with the patient. You should not share dishes, drinking glasses, cups, eating utensils, towels, bedding, or other items. After the patient uses these items, you should wash them thoroughly (see below "Wash laundry thoroughly").  Clean all "high-touch" surfaces, " such as counters, tabletops, doorknobs, bathroom fixtures, toilets, phones, keyboards, tablets, and bedside tables, every day. Also, clean any surfaces that may have blood, stool, or body fluids on them.  Use a household cleaning spray or wipe, according to the label instructions. Labels contain instructions for safe and effective use of the cleaning product including precautions you should take when applying the product, such as wearing gloves and making sure you have good ventilation during use of the product.  Wash laundry thoroughly.  Immediately remove and wash clothes or bedding that have blood, stool, or body fluids on them.  Wear disposable gloves while handling soiled items and keep soiled items away from your body. Clean your hands (with soap and water or an alcohol-based hand ) immediately after removing your gloves.  Read and follow directions on labels of laundry or clothing items and detergent. In general, using a normal laundry detergent according to washing machine instructions and dry thoroughly using the warmest temperatures recommended on the clothing label.  Place all used disposable gloves, facemasks, and other contaminated items in a lined container before disposing of them with other household waste. Clean your hands (with soap and water or an alcohol-based hand ) immediately after handling these items. Soap and water should be used preferentially if hands are visibly dirty.  Discuss any additional questions with your state or local health department or healthcare provider. Check available hours when contacting your local health department.     For more information see CDC link below.    https://www.cdc.gov/coronavirus/2019-ncov/hcp/guidance-prevent-spread.html#precautions          If your symptoms worsen or if you have any other concerns, please contact [facility] at [phone].

## 2020-04-27 NOTE — PATIENT INSTRUCTIONS
"Will retest pt again through drive through and notify him of results    Explained that he and mother should stay  wearing masks and gloves, good hand washing    Check temperature twice a day, notify me if it becomes 100.4 or higher without taking Tylenol or fever reducers.     Try to obtain a pulse ox or you may download the mirta "Oximeter" for iPhone. Check pulse ox also twice a day and notify me of any readings < 92%  This is available through the [employer] store for $4.99!!!      [place] [facility]  Preventing the Spread of Coronavirus Disease 2019 in Homes and Residential Communities     Prevention steps for people with confirmed or suspected COVID-19 (including persons under investigation) who do not need to be hospitalized and people with confirmed COVID-19 who were hospitalized and determined to be medically stable to go home.     Your healthcare provider and public health staff will evaluate whether you can be cared for at home.  Stay home except to get medical care.  Separate yourself from other people and animals in your home  Call ahead before visiting your doctor.  Wear a facemask.  Cover your coughs and sneezes.  Clean your hands often.  Avoid sharing personal household items.  Clean all "high-touch" surfaces every day.  Monitor your symptoms. Seek prompt medical attention if your illness is worsening (e.g., difficulty breathing). Before seeking care, call your healthcare provider.  If you have a medical emergency and need to call 911, notify the dispatch personnel that you have, or are being evaluated for COVID-19. If possible, put on a facemask before emergency medical services arrive.  Discontinuing home isolation. Call your provider about guidance to discontinue home isolation.     Recommended precautions for household members, intimate partners, and caregivers in a nonhealthcare setting of a patient with symptomatic laboratory-confirmed COVID-19 or a patient under " investigation.  Household members, intimate partners, and caregivers in a nonhealthcare setting may have close contact with a person with symptomatic, laboratory-confirmed COVID-19 or a person under investigation. Close contacts should monitor their health; they should call their healthcare provider right away if they develop symptoms suggestive of COVID-19 (e.g., fever, cough, shortness of breath).     Close contacts should also follow these recommendations:  Make sure that you understand and can help the patient follow their healthcare provider's instructions for medication(s) and care. You should help the patient with basic needs in the home and provide support for getting groceries, prescriptions, and other personal needs.  Monitor the patient's symptoms. If the patient is getting sicker, call his or her healthcare provider and tell them that the patient has laboratory-confirmed COVID-19. This will help the healthcare provider's office take steps to keep other people in the office or waiting room from getting infected. Ask the healthcare provider to call the local or [facility] health department for additional guidance. If the patient has a medical emergency and you need to call 911, notify the dispatch personnel that the patient has, or is being evaluated for COVID-19.  Household members should stay in another room or be  from the patient as much as possible. Household members should use a separate bedroom and bathroom, if available.  Prohibit visitors who do not have an essential need to be in the home.  Household members should care for any pets in the home. Do not handle pets or other animals while sick.  Make sure that shared spaces in the home have good air flow, such as by an air conditioner or an opened window, weather permitting.  Perform hand hygiene frequently. Wash your hands often with soap and water for at least 20 seconds or use an alcohol-based hand  that contains 60 to 95% alcohol,  "covering all surfaces of your hands and rubbing them together until they feel dry. Soap and water should be used preferentially if hands are visibly dirty.  Avoid touching your eyes, nose, and mouth with unwashed hands.  The patient should wear a facemask when you are around other people. If the patient is not able to wear a facemask (for example, because it causes trouble breathing), you, as the caregiver should wear a mask when you are in the same room as the patient.  Wear a disposable facemask and gloves when you touch or have contact with the patient's blood, stool, or body fluids, such as saliva, sputum, nasal mucus, vomit, urine.  Throw out disposable facemasks and gloves after using them. Do not reuse.  When removing personal protective equipment, first remove and dispose of gloves. Then, immediately clean your hands with soap and water or alcohol-based hand . Next, remove and dispose of facemask, and immediately clean your hands again with soap and water or alcohol-based hand .  Avoid sharing household items with the patient. You should not share dishes, drinking glasses, cups, eating utensils, towels, bedding, or other items. After the patient uses these items, you should wash them thoroughly (see below "Wash laundry thoroughly").  Clean all "high-touch" surfaces, such as counters, tabletops, doorknobs, bathroom fixtures, toilets, phones, keyboards, tablets, and bedside tables, every day. Also, clean any surfaces that may have blood, stool, or body fluids on them.  Use a household cleaning spray or wipe, according to the label instructions. Labels contain instructions for safe and effective use of the cleaning product including precautions you should take when applying the product, such as wearing gloves and making sure you have good ventilation during use of the product.  Wash laundry thoroughly.  Immediately remove and wash clothes or bedding that have blood, stool, or body fluids on " them.  Wear disposable gloves while handling soiled items and keep soiled items away from your body. Clean your hands (with soap and water or an alcohol-based hand ) immediately after removing your gloves.  Read and follow directions on labels of laundry or clothing items and detergent. In general, using a normal laundry detergent according to washing machine instructions and dry thoroughly using the warmest temperatures recommended on the clothing label.  Place all used disposable gloves, facemasks, and other contaminated items in a lined container before disposing of them with other household waste. Clean your hands (with soap and water or an alcohol-based hand ) immediately after handling these items. Soap and water should be used preferentially if hands are visibly dirty.  Discuss any additional questions with your state or local health department or healthcare provider. Check available hours when contacting your local health department.     For more information see CDC link below.    https://www.cdc.gov/coronavirus/2019-ncov/hcp/guidance-prevent-spread.html#precautions          If your symptoms worsen or if you have any other concerns, please contact [facility] at [phone].

## 2020-04-27 NOTE — TELEPHONE ENCOUNTER
Left a message for the patient to call the office back.   To give us car information for drive thru testing today, car color, make and model needs to be added to appointment notes    Please Advise  Thank You

## 2020-04-28 ENCOUNTER — PATIENT MESSAGE (OUTPATIENT)
Dept: INTERNAL MEDICINE | Facility: CLINIC | Age: 50
End: 2020-04-28

## 2020-04-28 ENCOUNTER — TELEPHONE (OUTPATIENT)
Dept: INTERNAL MEDICINE | Facility: CLINIC | Age: 50
End: 2020-04-28

## 2020-04-28 ENCOUNTER — LAB VISIT (OUTPATIENT)
Dept: INTERNAL MEDICINE | Facility: CLINIC | Age: 50
End: 2020-04-28
Payer: COMMERCIAL

## 2020-04-28 DIAGNOSIS — U07.1 COVID-19 VIRUS INFECTION: ICD-10-CM

## 2020-04-28 LAB — SARS-COV-2 RNA RESP QL NAA+PROBE: DETECTED

## 2020-04-28 PROCEDURE — U0002 COVID-19 LAB TEST NON-CDC: HCPCS

## 2020-04-28 NOTE — PROGRESS NOTES
Your test is still positive. At this point it is hard to determine if this is reinfection or not as there is little that we know about how long a test will remain positive with this being so new, and you have had 2 other positive tests as we discussed. I am sorry I do not have any additional answers to this, the good thing is you are not symptomatic and fever free so the advice I would give is to continue wearing a mask, staying 6 feet away from others, and also practicing good hand hygiene. Right now the antibody test is not available to the public and honestly whenever it does become available, it would most likely show as you having immunity to the virus from previous infection/exposure and not offer any other information.

## 2020-04-28 NOTE — TELEPHONE ENCOUNTER
----- Message from Kaylene Childers DNP sent at 4/28/2020 11:14 AM CDT -----  Your test is still positive. At this point it is hard to determine if this is reinfection or not as there is little that we know about how long a test will remain positive with this being so new, and you have had 2 other positive tests as we discussed. I am sorry I do not have any additional answers to this, the good thing is you are not symptomatic and fever free so the advice I would give is to continue wearing a mask, staying 6 feet away from others, and also practicing good hand hygiene. Right now the antibody test is not available to the public and honestly whenever it does become available, it would most likely show as you having immunity to the virus from previous infection/exposure and not offer any other information.

## 2020-04-29 ENCOUNTER — OFFICE VISIT (OUTPATIENT)
Dept: INTERNAL MEDICINE | Facility: CLINIC | Age: 50
End: 2020-04-29
Payer: COMMERCIAL

## 2020-04-29 DIAGNOSIS — F41.9 ANXIETY: ICD-10-CM

## 2020-04-29 DIAGNOSIS — U07.1 COVID-19 VIRUS INFECTION: Primary | ICD-10-CM

## 2020-04-29 PROCEDURE — 99213 OFFICE O/P EST LOW 20 MIN: CPT | Mod: 95,,, | Performed by: INTERNAL MEDICINE

## 2020-04-29 PROCEDURE — 99213 PR OFFICE/OUTPT VISIT, EST, LEVL III, 20-29 MIN: ICD-10-PCS | Mod: 95,,, | Performed by: INTERNAL MEDICINE

## 2020-04-29 NOTE — PROGRESS NOTES
Subjective:       Patient ID: Andre Padgett is a 49 y.o. male.    Chief Complaint: COVID-19 Concerns    The patient location is:  Home  The chief complaint leading to consultation is:  COVID positive, questions about family members  Visit type: audiovisual  Total time spent with patient: 20 minutes  Each patient to whom he or she provides medical services by telemedicine is:  (1) informed of the relationship between the physician and patient and the respective role of any other health care provider with respect to management of the patient; and (2) notified that he or she may decline to receive medical services by telemedicine and may withdraw from such care at any time.    Notes:  Patient wanted to discuss his positive test result.  It has been over 30 days but he has had 2 additional test which continue to show positive.  A few other providers have explained to him that we do not know if that truly means he is contagious or if he possibly has proteins from the virus that cause a positive test but that he is negative from a contagious standpoint.  At this point his mom who has been living with him was also positive.  His daughter who lives with her mother has not been sick.  The daughter is 10 years old and wants to come visit but they have kept her away.  I said technically we had released him back to work wearing a mask a few weeks ago and as we have done with our employees if they were 2-3 weeks past the a positive test, no symptoms, no fever, they were allowed to return wearing a mask with good hand washing.  Since however he has continued to have positive test we are not certain what that means and explained he should continue to wear a mask and assumed that he is positive for 2 weeks.  I explained that if his daughter had to come over they should try to remain 6 ft apart, where mass, use good handwashing and keep things as clean as possible.  He expressed understanding.  We discussed antibodies at length  and I explained that that only showed exposure which we knew he was positive so that does not help us.  We do not know if it imposes immunity .  He says he feels great, expressed understanding and will likely return to work since we allow him to do so previously with those circumstances, masks and good hand hygiene    Review of Systems   Constitutional: Negative for chills, fatigue, fever and unexpected weight change.   HENT: Negative for nosebleeds and trouble swallowing.    Eyes: Negative for pain and visual disturbance.   Respiratory: Negative for cough, shortness of breath and wheezing.    Cardiovascular: Negative for chest pain and palpitations.   Gastrointestinal: Negative for abdominal pain, constipation, diarrhea, nausea and vomiting.   Genitourinary: Negative for difficulty urinating and hematuria.   Musculoskeletal: Negative for neck pain.   Skin: Negative for rash.   Neurological: Negative for dizziness and headaches.   Hematological: Does not bruise/bleed easily.   Psychiatric/Behavioral: Negative for dysphoric mood, sleep disturbance and suicidal ideas.       Objective:      Physical Exam   Constitutional: He is oriented to person, place, and time. He appears well-developed and well-nourished.   HENT:   Head: Normocephalic and atraumatic.   Pulmonary/Chest: No respiratory distress.   Neurological: He is alert and oriented to person, place, and time.   Psychiatric: His behavior is normal. Thought content normal.   Somewhat anxious male otherwise no acute distress       Assessment:       1. COVID-19 virus infection    2. Anxiety        Plan:       Andre was seen today for covid-19 concerns.    Diagnoses and all orders for this visit:    COVID-19 virus infection    Anxiety          Continue to wear a mask, good hand hygiene and continued to isolate when possible

## 2020-04-30 ENCOUNTER — PATIENT MESSAGE (OUTPATIENT)
Dept: INTERNAL MEDICINE | Facility: CLINIC | Age: 50
End: 2020-04-30

## 2020-05-01 ENCOUNTER — PATIENT MESSAGE (OUTPATIENT)
Dept: RESEARCH | Facility: HOSPITAL | Age: 50
End: 2020-05-01

## 2020-05-01 ENCOUNTER — RESEARCH ENCOUNTER (OUTPATIENT)
Dept: RESEARCH | Facility: HOSPITAL | Age: 50
End: 2020-05-01

## 2020-05-01 NOTE — PROGRESS NOTES
The Patient, Andre Padgett was called today regarding the patient's participation in (IRB #[id number] PI: Viktor).   The Verbal Informed Consent was read and discussed by the consenter. The following was discussed:   Types of specimens to be collected   All medical information released to researchers will be stripped of identifiers and no patient information will be given to anyone outside of this research project.    Participating in a research study is not the same as getting regular medical care and will not improve the patient's health. The purpose of a research study is to gather information.  Being in this study does not interfere with your regular medical care.   The patient does not have to participate in this study. If they do not join, their care at [facility] will not be affected.  The person granting permission was provided adequate time to ask questions regarding the scope and purpose of the study.  Permission was obtained by telephone.   The above statements were read by the person obtaining permission to the person granting permission and witnessed by Rossi Carson, who also confirmed the patient's consent to the study. The witness information was documented on the verbal consent form as well.  This Verbal Informed Consent process was conducted prior to initiation of any study procedures.

## 2020-05-05 ENCOUNTER — HOSPITAL ENCOUNTER (EMERGENCY)
Facility: OTHER | Age: 50
Discharge: HOME | End: 2020-05-05
Attending: EMERGENCY MEDICINE
Payer: COMMERCIAL

## 2020-05-05 VITALS
SYSTOLIC BLOOD PRESSURE: 116 MMHG | HEIGHT: 70 IN | DIASTOLIC BLOOD PRESSURE: 72 MMHG | WEIGHT: 177 LBS | HEART RATE: 77 BPM | OXYGEN SATURATION: 97 % | BODY MASS INDEX: 25.34 KG/M2 | RESPIRATION RATE: 21 BRPM

## 2020-05-05 DIAGNOSIS — R55 SYNCOPE: Primary | ICD-10-CM

## 2020-05-05 LAB
ALBUMIN SERPL BCP-MCNC: 3.3 G/DL (ref 3.5–5.2)
ALP SERPL-CCNC: 53 U/L (ref 55–135)
ALT SERPL W/O P-5'-P-CCNC: 21 U/L (ref 10–44)
ANION GAP SERPL CALC-SCNC: 8 MMOL/L (ref 8–16)
AST SERPL-CCNC: 18 U/L (ref 10–40)
BASOPHILS # BLD AUTO: 0.02 K/UL (ref 0–0.2)
BASOPHILS NFR BLD: 0.4 % (ref 0–1.9)
BILIRUB SERPL-MCNC: 0.9 MG/DL (ref 0.1–1)
BUN SERPL-MCNC: 9 MG/DL (ref 6–20)
CALCIUM SERPL-MCNC: 7.6 MG/DL (ref 8.7–10.5)
CHLORIDE SERPL-SCNC: 109 MMOL/L (ref 95–110)
CO2 SERPL-SCNC: 25 MMOL/L (ref 23–29)
CREAT SERPL-MCNC: 0.8 MG/DL (ref 0.5–1.4)
DIFFERENTIAL METHOD: ABNORMAL
EOSINOPHIL # BLD AUTO: 0.1 K/UL (ref 0–0.5)
EOSINOPHIL NFR BLD: 1 % (ref 0–8)
ERYTHROCYTE [DISTWIDTH] IN BLOOD BY AUTOMATED COUNT: 11.8 % (ref 11.5–14.5)
EST. GFR  (AFRICAN AMERICAN): >60 ML/MIN/1.73 M^2
EST. GFR  (NON AFRICAN AMERICAN): >60 ML/MIN/1.73 M^2
GLUCOSE SERPL-MCNC: 114 MG/DL (ref 70–110)
HCT VFR BLD AUTO: 37.2 % (ref 40–54)
HGB BLD-MCNC: 12.8 G/DL (ref 14–18)
IMM GRANULOCYTES # BLD AUTO: 0.02 K/UL (ref 0–0.04)
IMM GRANULOCYTES NFR BLD AUTO: 0.4 % (ref 0–0.5)
LYMPHOCYTES # BLD AUTO: 1.1 K/UL (ref 1–4.8)
LYMPHOCYTES NFR BLD: 22.6 % (ref 18–48)
MAGNESIUM SERPL-MCNC: 1.7 MG/DL (ref 1.6–2.6)
MCH RBC QN AUTO: 32.4 PG (ref 27–31)
MCHC RBC AUTO-ENTMCNC: 34.4 G/DL (ref 32–36)
MCV RBC AUTO: 94 FL (ref 82–98)
MONOCYTES # BLD AUTO: 0.4 K/UL (ref 0.3–1)
MONOCYTES NFR BLD: 7.4 % (ref 4–15)
NEUTROPHILS # BLD AUTO: 3.4 K/UL (ref 1.8–7.7)
NEUTROPHILS NFR BLD: 68.2 % (ref 38–73)
NRBC BLD-RTO: 0 /100 WBC
PLATELET # BLD AUTO: 145 K/UL (ref 150–350)
PMV BLD AUTO: 10 FL (ref 9.2–12.9)
POCT GLUCOSE: 112 MG/DL (ref 70–110)
POTASSIUM SERPL-SCNC: 3.5 MMOL/L (ref 3.5–5.1)
PROT SERPL-MCNC: 5.7 G/DL (ref 6–8.4)
RBC # BLD AUTO: 3.95 M/UL (ref 4.6–6.2)
SODIUM SERPL-SCNC: 142 MMOL/L (ref 136–145)
WBC # BLD AUTO: 5 K/UL (ref 3.9–12.7)

## 2020-05-05 PROCEDURE — 85025 COMPLETE CBC W/AUTO DIFF WBC: CPT

## 2020-05-05 PROCEDURE — 25000003 PHARM REV CODE 250: Performed by: EMERGENCY MEDICINE

## 2020-05-05 PROCEDURE — 93010 ELECTROCARDIOGRAM REPORT: CPT | Mod: ,,, | Performed by: INTERNAL MEDICINE

## 2020-05-05 PROCEDURE — 99284 EMERGENCY DEPT VISIT MOD MDM: CPT | Mod: 25

## 2020-05-05 PROCEDURE — 93010 EKG 12-LEAD: ICD-10-PCS | Mod: ,,, | Performed by: INTERNAL MEDICINE

## 2020-05-05 PROCEDURE — 83735 ASSAY OF MAGNESIUM: CPT

## 2020-05-05 PROCEDURE — 93005 ELECTROCARDIOGRAM TRACING: CPT

## 2020-05-05 PROCEDURE — 80053 COMPREHEN METABOLIC PANEL: CPT

## 2020-05-05 PROCEDURE — 82962 GLUCOSE BLOOD TEST: CPT

## 2020-05-05 PROCEDURE — 96360 HYDRATION IV INFUSION INIT: CPT

## 2020-05-05 RX ADMIN — SODIUM CHLORIDE 1000 ML: 0.9 INJECTION, SOLUTION INTRAVENOUS at 11:05

## 2020-05-05 NOTE — ED NOTES
loc while giving blood in clinical trials clinic. + covid. pt continued to lose consciousness after initial episode. diaphortetic with intial bp with systolic above 100 then 10 min after BP dropped to 80's/50's. IV started in clinic and 1L of NS started to infuse on pressure bag. Pt appears pale and diaphoretic.

## 2020-05-05 NOTE — ED NOTES
Pt ambulatory in room with steady gait and no dizziness or feeling lightheaded.  with ambulation and o2 sat 97% on RA

## 2020-05-05 NOTE — PLAN OF CARE
F - Cristin and Belief: Buddhist    I - Importance: Pt requested prayer support for healing from COVID and to see his daughter again. He hasn't been able to see her due to his COVID.     C - Community:     A - Address in Care:  Introduced and offered pastoral support after [employer] call. Pt was alert and oriented while in ER.  found out pt was COVID positive while in ER.  stood outside pt's ER 03 room and spoke briefly with patient. Offered prayer upon request and made patient aware of 's contact information if follow-up phone call was  needed.

## 2020-05-05 NOTE — ED PROVIDER NOTES
Encounter Date: 5/5/2020    SCRIBE #1 NOTE: I, Claudia Meredith, am scribing for, and in the presence of, Dr. Mares.       History     Chief Complaint   Patient presents with    Loss of Consciousness     loc while giving blood in clinical trials clinic. + covid. pt continued to lose consciousness after initial episode. diaphortetic with intial bp with systolic above 100 then 10 min after BP dropped to 80's/50's     This is a 49 y.o. male who presents s/p multiple syncopal episodes. He was diagnosed with COVID-19 approximately 6 weeks ago, still tested positive 1 week ago. He is part of a COVID-19 study and was at a clinical trial this morning. He states he is afraid of needles and when he was stuck with a needle, they could not get the IV. The needle was removed and he began having blurry vision and feeling lightheaded. He drank some juice and when they tried to stick him again, he lost consciousness. Per witnesses, he lost consciousness several times. Per medical record, his initial SBP was above 100 and his next blood pressure reading 10 minutes later dropped to 80s/50s. He notes that he ate two pop tarts and drank water this morning. He feels back to baseline in regards to his COVID-19 symptoms.  He denies hx of syncope.  This is the extent of the patient's complaints at this time.    The history is provided by the patient and medical records.     Review of patient's allergies indicates:  No Known Allergies  Past Medical History:   Diagnosis Date    Aneurysm     Right sided filling anterior communicating aneurysm s/p coiling 2011    Anxiety     Cerebral aneurysm, nonruptured 12/27/2016    Colon adenoma: 3/18 repeat colonoscpy 2023 6/22/2018    Diverticulosis of large intestine without hemorrhage: see colonoscopy 3/18; sigmoid and descending colon 6/22/2018    Fatty liver: see CT 3/18 6/22/2018    Umbilical hernia without obstruction and without gangrene: see CT 3/18 6/22/2018     Past Surgical History:    Procedure Laterality Date    CEREBRAL ANGIOGRAM  2011    angiogram/coiling    COLONOSCOPY N/A 3/26/2018    Procedure: COLONOSCOPY;  Surgeon: Sanjiv Duran MD;  Location: [facility] ([address] [place] [address]);  Service: Endoscopy;  Laterality: N/A;     Family History   Problem Relation Age of Onset    DAVID disease Mother     Hypertension Mother     Diabetes Mother     Brain cancer Father     Thyroid disease Sister     No Known Problems Daughter     No Known Problems Sister     Cirrhosis Neg Hx     Colon polyps Neg Hx     Crohn's disease Neg Hx     Liver cancer Neg Hx     Stomach cancer Neg Hx     Ulcerative colitis Neg Hx     Heart attack Neg Hx     Colon cancer Neg Hx      Social History     Tobacco Use    Smoking status: Never Smoker    Smokeless tobacco: Never Used   Substance Use Topics    Alcohol use: Yes     Comment: ocassionally - twice a week    Drug use: No     Review of Systems   Constitutional: Negative for fever.   HENT: Negative for congestion.    Eyes: Positive for visual disturbance.   Respiratory: Negative for cough and shortness of breath.    Cardiovascular: Negative for chest pain.   Gastrointestinal: Negative for diarrhea and vomiting.   Genitourinary: Negative for dysuria.   Musculoskeletal: Negative for myalgias.   Skin: Negative for rash.   Neurological: Positive for syncope. Negative for weakness.       Physical Exam     Initial Vitals   BP Pulse Resp Temp SpO2   05/05/20 1046 05/05/20 1046 05/05/20 1050 -- 05/05/20 1046   (!) 114/57 72 18  100 %      MAP       --                Physical Exam    Nursing note and vitals reviewed.  Constitutional: He appears well-developed and well-nourished. He is not diaphoretic. No distress.   HENT:   Head: Normocephalic and atraumatic.   Eyes: Conjunctivae and EOM are normal. Pupils are equal, round, and reactive to light. No scleral icterus.   Neck: Normal range of motion. Neck supple.   Cardiovascular: Normal rate, regular rhythm and normal heart  sounds.   Pulmonary/Chest: Breath sounds normal. No respiratory distress. He has no wheezes. He has no rales.   Abdominal: Soft. Bowel sounds are normal. He exhibits no distension. There is no tenderness. There is no rebound.   Musculoskeletal: Normal range of motion. He exhibits no edema or tenderness.   Neurological: He is alert and oriented to person, place, and time. No cranial nerve deficit.   Ambulatory with steady gait.     Skin: Skin is warm and dry.   Psychiatric: He has a normal mood and affect.         ED Course   Procedures  Labs Reviewed   COMPREHENSIVE METABOLIC PANEL - Abnormal; Notable for the following components:       Result Value    Glucose 114 (*)     Calcium 7.6 (*)     Total Protein 5.7 (*)     Albumin 3.3 (*)     Alkaline Phosphatase 53 (*)     All other components within normal limits   CBC W/ AUTO DIFFERENTIAL - Abnormal; Notable for the following components:    RBC 3.95 (*)     Hemoglobin 12.8 (*)     Hematocrit 37.2 (*)     Mean Corpuscular Hemoglobin 32.4 (*)     Platelets 145 (*)     All other components within normal limits   MAGNESIUM     EKG Readings: (Independently Interpreted)   1047: Rate of 70. Normal sinus rhythm. Normal intervals. Normal axis. No ST or ischemic changes.        Imaging Results    None          Medical Decision Making:   History:   Old Medical Records: I decided to obtain old medical records.  Old Records Summarized: other records and records from clinic visits.  Initial Assessment:   10:51AM:  Pt is a 50 y/o M who presents to ED with syncope episode.  He appears well, nontoxic. Pt likely had a vasovagal episode.  He is feeling better at this time. Will plan for labs, ivfs, will continue to follow and reassess.    Independently Interpreted Test(s):   I have ordered and independently interpreted EKG Reading(s) - see prior notes  Clinical Tests:   Lab Tests: Ordered and Reviewed  Medical Tests: Ordered and Reviewed    12:39 PM:  Pt doing well, feeling well. He was  able to ambulate with no issues.  His labs are unremarkable.  Given his reassuring work up, I do feel that pt can be managed as an outpatient.  I updated pt regarding results and I counseled pt regarding supportive care measures.  I have discussed with the pt ED return warnings and need for close PCP f/u.  Pt agreeable to plan and all questions answered.  I feel that pt is stable for discharge and management as an outpatient and no further intervention is needed at this time.  Pt is comfortable returning to the ED if needed.  Will DC home in stable condition.              Scribe Attestation:   Scribe #1: I performed the above scribed service and the documentation accurately describes the services I performed. I attest to the accuracy of the note.    Attending Attestation:           Physician Attestation for Scribe:  Physician Attestation Statement for Scribe #1: I, Dr. Mares, reviewed documentation, as scribed by Claudia Meredith in my presence, and it is both accurate and complete.                               Clinical Impression:     1. Syncope              ED Disposition Condition    Discharge Stable        ED Prescriptions     None        Follow-up Information     Follow up With Specialties Details Why Contact Info    Hunter Diop MD Internal Medicine   [address]  [place] [address]  [phone]                                       Daily Mares MD  05/05/20 0042

## 2020-05-05 NOTE — ED NOTES
Pt reports improvement in symptoms after fluid admin. Pt appears to have his skin color back to baseline and pt no longer diaphoretic.

## 2020-05-06 ENCOUNTER — TELEPHONE (OUTPATIENT)
Dept: INTERNAL MEDICINE | Facility: CLINIC | Age: 50
End: 2020-05-06

## 2020-05-06 NOTE — TELEPHONE ENCOUNTER
Telephoned patient([phone]) phone rang and rang----- Message from Stella Man sent at 5/6/2020  4:41 PM CDT -----  Contact: Self   Pt is requesting a call regarding back pain. Please advise.

## 2020-05-08 ENCOUNTER — TELEPHONE (OUTPATIENT)
Dept: INTERNAL MEDICINE | Facility: CLINIC | Age: 50
End: 2020-05-08

## 2020-05-08 NOTE — TELEPHONE ENCOUNTER
----- Message from Dane Paul sent at 5/8/2020  4:13 PM CDT -----  Contact: Self [phone]  Patient is returning a phone call.  Who left a message for the patient: Zaid  Does patient know what this is regarding: Missed call   Comments:

## 2020-05-09 ENCOUNTER — NURSE TRIAGE (OUTPATIENT)
Dept: ADMINISTRATIVE | Facility: CLINIC | Age: 50
End: 2020-05-09

## 2020-05-09 ENCOUNTER — OFFICE VISIT (OUTPATIENT)
Dept: URGENT CARE | Facility: CLINIC | Age: 50
End: 2020-05-09
Payer: COMMERCIAL

## 2020-05-09 VITALS
BODY MASS INDEX: 25.34 KG/M2 | TEMPERATURE: 97 F | WEIGHT: 177 LBS | SYSTOLIC BLOOD PRESSURE: 126 MMHG | DIASTOLIC BLOOD PRESSURE: 83 MMHG | HEART RATE: 71 BPM | HEIGHT: 70 IN | RESPIRATION RATE: 16 BRPM | OXYGEN SATURATION: 97 %

## 2020-05-09 DIAGNOSIS — M79.18 MYOFASCIAL MUSCLE PAIN: ICD-10-CM

## 2020-05-09 DIAGNOSIS — M54.6 THORACIC BACK PAIN, UNSPECIFIED BACK PAIN LATERALITY, UNSPECIFIED CHRONICITY: Primary | ICD-10-CM

## 2020-05-09 PROCEDURE — 71046 XR CHEST PA AND LATERAL: ICD-10-PCS | Mod: FY,S$GLB,, | Performed by: RADIOLOGY

## 2020-05-09 PROCEDURE — 96372 THER/PROPH/DIAG INJ SC/IM: CPT | Mod: S$GLB,,, | Performed by: PHYSICIAN ASSISTANT

## 2020-05-09 PROCEDURE — 71046 X-RAY EXAM CHEST 2 VIEWS: CPT | Mod: FY,S$GLB,, | Performed by: RADIOLOGY

## 2020-05-09 PROCEDURE — 96372 PR INJECTION,THERAP/PROPH/DIAG2ST, IM OR SUBCUT: ICD-10-PCS | Mod: S$GLB,,, | Performed by: PHYSICIAN ASSISTANT

## 2020-05-09 PROCEDURE — 99214 OFFICE O/P EST MOD 30 MIN: CPT | Mod: 25,S$GLB,, | Performed by: PHYSICIAN ASSISTANT

## 2020-05-09 PROCEDURE — 99214 PR OFFICE/OUTPT VISIT, EST, LEVL IV, 30-39 MIN: ICD-10-PCS | Mod: 25,S$GLB,, | Performed by: PHYSICIAN ASSISTANT

## 2020-05-09 RX ORDER — KETOROLAC TROMETHAMINE 30 MG/ML
60 INJECTION, SOLUTION INTRAMUSCULAR; INTRAVENOUS
Status: COMPLETED | OUTPATIENT
Start: 2020-05-09 | End: 2020-05-09

## 2020-05-09 RX ORDER — CYCLOBENZAPRINE HCL 5 MG
5 TABLET ORAL 3 TIMES DAILY PRN
Qty: 30 TABLET | Refills: 0 | Status: SHIPPED | OUTPATIENT
Start: 2020-05-09 | End: 2020-05-19

## 2020-05-09 RX ORDER — IBUPROFEN 800 MG/1
800 TABLET ORAL 3 TIMES DAILY PRN
Qty: 40 TABLET | Refills: 0 | Status: SHIPPED | OUTPATIENT
Start: 2020-05-09 | End: 2020-06-09 | Stop reason: ALTCHOICE

## 2020-05-09 RX ORDER — DICLOFENAC SODIUM 10 MG/G
2 GEL TOPICAL 3 TIMES DAILY PRN
Qty: 1 TUBE | Refills: 0 | Status: SHIPPED | OUTPATIENT
Start: 2020-05-09 | End: 2020-08-10

## 2020-05-09 RX ADMIN — KETOROLAC TROMETHAMINE 60 MG: 30 INJECTION, SOLUTION INTRAMUSCULAR; INTRAVENOUS at 02:05

## 2020-05-09 NOTE — TELEPHONE ENCOUNTER
Spoke with pt: has had high stress- COVID, loss of job, divorce, anxiety. No memory of trauma or heavy lifting or straining.     Back pain started Thursday in evening: no SOB, mild middle upper back pain. Intermittent pain. Went to bed and had pain and  woke with pain. Started in middle and now in upper. No CP. No chest burning.  Is able to do activities with no changes.  Yesterday was good during daytime but restarted in evening. Using heat- helps. Took tylenol- helped.    instructed pt can do virtual or UC/ED for evaluation. Pt verbalizes understanding.       Reason for Disposition   HIGH RISK patient (e.g., age > 64 years, diabetes, heart or lung disease, weak immune system)   High-risk adult (e.g., history of cancer, HIV, or IV drug abuse)    Additional Information   Negative: SEVERE difficulty breathing (e.g., struggling for each breath, speaks in single words)   Negative: Difficult to awaken or acting confused (e.g., disoriented, slurred speech)   Negative: Bluish (or gray) lips or face now   Negative: Shock suspected (e.g., cold/pale/clammy skin, too weak to stand, low BP, rapid pulse)   Negative: Sounds like a life-threatening emergency to the triager   Negative: MODERATE difficulty breathing (e.g., speaks in phrases, SOB even at rest, pulse 100-120)   Negative: Fever > 103 F (39.4 C)   Negative: [1] Fever > 101 F (38.3 C) AND [2] age > 60   Negative: Patient sounds very sick or weak to the triager   Negative: MILD difficulty breathing (e.g., minimal/no SOB at rest, SOB with walking, pulse <100)   Negative: Chest pain   Negative: [1] Fever > 100.0 F (37.8 C) AND [2] bedridden (e.g., nursing home patient, CVA, chronic illness, recovering from surgery)   Negative: SEVERE or constant chest pain (Exception: mild central chest pain, present only when coughing)   Negative: [1] Fever returns after gone for over 24 hours AND [2] symptoms worse or not improved   Negative: [1] Continuous (nonstop)  coughing interferes with work or school AND [2] no improvement using cough treatment per protocol   Negative: Fever present > 3 days (72 hours)   Negative: Cough present > 3 weeks   Negative: Passed out (i.e., lost consciousness, collapsed and was not responding)   Negative: Shock suspected (e.g., cold/pale/clammy skin, too weak to stand, low BP, rapid pulse)   Negative: Sounds like a life-threatening emergency to the triager   Negative: [1] SEVERE back pain (e.g., excruciating) AND [2] sudden onset AND [3] age > 60   Negative: [1] Unable to urinate (or only a few drops) > 4 hours AND     [2] bladder feels very full (e.g., palpable bladder or strong urge to urinate)   Negative: [1] Urinary or bowel incontinence (i.e., loss of bladder or bowel control) AND [2] new onset   Negative: Numbness in groin or rectal area (i.e., loss of sensation)   Negative: [1] SEVERE abdominal pain AND [2] present > 1 hour   Negative: [1] Abdominal pain AND [2] age > 60   Negative: Weakness of a leg or foot (e.g., unable to bear weight, dragging foot)   Negative: Unable to walk   Negative: Patient sounds very sick or weak to the triager   Negative: [1] SEVERE back pain (e.g., excruciating, unable to do any normal activities) AND [2] not improved 2 hours after pain medicine   Negative: [1] Pain radiates into the thigh or further down the leg AND [2] both legs   Negative: [1] Fever > 100.0 F (37.8 C) AND [2] flank pain (i.e., in side, below ribs and above hip)   Negative: [1] Pain or burning with urination AND [2] flank pain (i.e., in side, below ribs and above hip)   Negative: Numbness in a leg or foot (i.e., loss of sensation)   Negative: [1] Upper back pain AND [2] numbness in a arm or hand (i.e., loss of sensation)    Protocols used: CORONAVIRUS (COVID-19) DIAGNOSED OR [id number], BACK PAIN-A-AH

## 2020-05-09 NOTE — PATIENT INSTRUCTIONS
PLEASE READ YOUR DISCHARGE INSTRUCTIONS ENTIRELY AS IT CONTAINS IMPORTANT INFORMATION.  - no operating machinery with muscle relaxant as it may cause drowsiness.  - do not use OTC anti-inflammatory if taking prescribed (Rx) anti-inflammatory; start Rx inflammatory tomorrow since you were given concentrated anti-inflammatory shot today.  - continue heat/ice compression and muscle stretches.   - Radiographs reviewed with patient  - Tylenol or Ibuprofen as directed as needed for pain.  For Tylenol, do not exceed 4000 mg/ day. For ibuprofen, do not exceed 2400 mg/day.    - continue back stretches/exercises given  - if no improvement or worsening symptoms, recommend follow-up with orthopedics for further evaluation.  - discussed weight loss given obesity  -You must understand that you've received an Urgent Care treatment only and that you may be released before all your medical problems are known or treated. You, the patient, will arrange for follow up care as instructed. Please arrange follow up with your primary medical clinic within 2-5 days if your signs and symptoms have not resolved or worsen.   - Follow up with your PCP or specialty clinic as directed.  You can call [phone] to schedule an appointment with the appropriate provider.    - If your condition worsens or fails to improve we recommend that you receive another evaluation at the emergency room immediately or contact your primary medical clinic to discuss your concerns.      RED FLAGS/WARNING SYMPTOMS DISCUSSED WITH PATIENT THAT WOULD WARRANT EMERGENT MEDICAL ATTENTION. Patient aware and verbalized understanding.        Back Spasm (No Trauma)    Spasm of the back muscles can occur after a sudden forceful twisting or bending force (such as in a car accident), after a simple awkward movement, or after lifting something heavy with poor body positioning. In any case, muscle spasm adds to the pain. Sleeping in an awkward position or on a poor quality  mattress can also cause this. Some people respond to emotional stress by tensing the muscles of their back.  Pain that continues may need further evaluation or other types of treatment such as physical therapy.  You don't always need X-rays for the initial evaluation of back pain, unless you had a physical injury such as from a car accident or fall. If your pain continues and doesn't respond to medical treatment, X-rays and other tests may then be done.   Home care  · As soon as possible, start sitting or walking again to avoid problems from prolonged bed rest (muscle weakness, worsening back stiffness and pain, blood clots in the legs).  · When in bed, try to find a position of comfort. A firm mattress is best. Try lying flat on your back with pillows under your knees. You can also try lying on your side with your knees bent up toward your chest and a pillow between your knees.  · Avoid prolonged sitting, long car rides, or travel. This puts more stress on the lower back than standing or walking.   · During the first 24 to 72 hours after an injury or flare-up, apply an ice pack to the painful area for 20 minutes, then remove it for 20 minutes. Do this over a period of 60 to 90 minutes or several times a day. This will reduce swelling and pain. Always wrap ice packs in a thin towel.  · You can start with ice, then switch to heat. Heat (hot shower, hot bath, or heating pad) reduces pain, and works well for muscle spasms. Apply heat to the painful area for 20 minutes, then remove it for 20 minutes. Do this over a period of 60 to 90 minutes or several times a day. Do not sleep on a heating pad as it can burn or damage skin.  · Alternate ice and heat therapies.  · Be aware of safe lifting methods and do not lift anything over 15 pounds until all the pain is gone.  Gentle stretching will help your back heal faster. Do this simple routine 2 to 3 times a day until your back is feeling better.  · Lie on your back with your  knees bent and both feet on the ground  · Slowly raise your left knee to your chest as you flatten your lower back against the floor. Hold for 20 to 30 seconds.  · Relax and repeat the exercise with your right knee.  · Do 2 to 3 of these exercises for each leg.  · Repeat, hugging both knees to your chest at the same time.  · Do not bounce, but use a gentle pull.  Medicines  Talk to your doctor before using medicine, especially if you have other medical problems or are taking other medicines.  You may use acetaminophen or ibuprofen to control pain, unless your healthcare provider prescribed another pain medicine. If you have a chronic condition such as diabetes, liver or kidney disease, stomach ulcer, or gastrointestinal bleeding, or are taking blood thinners, talk with your healthcare provider before taking any medicines.  Be careful if you are given prescription pain medicine, narcotics, or medicine for muscle spasm. They can cause drowsiness, affect your coordination, reflexes, or judgment. Do not drive or operate heavy machinery when taking these medicines. Take pain medicine only as prescribed by your healthcare provider.  Follow-up care  Follow up with your doctor, or as advised. Physical therapy or further tests may be needed.  If X-rays were taken, they may be reviewed by a radiologist. You will be notified of any new findings that may affect your care.  Call 911  Seek emergency medical care if any of these occur:  · Trouble breathing  · Confusion  · Drowsiness or trouble awakening  · Fainting or loss of consciousness  · Rapid or very slow heart rate  · Loss of bowel or bladder control  When to seek medical advice  Call your healthcare provider right away if any of these occur:  · Pain becomes worse or spreads to your legs  · Weakness or numbness in one or both legs  · Numbness in the groin or genital area  · Unexplained fever over 100.4ºF (38.0ºC)  · Burning or pain when passing urine  Date Last Reviewed:  6/1/2016 © 2000-2017 [employer]. [address] [place] [address], [place] [address]. All rights reserved. This information is not intended as a substitute for professional medical care. Always follow your healthcare professional's instructions.        Back Care Tips    Caring for your back  These are things you can do to prevent a recurrence of acute back pain and to reduce symptoms from chronic back pain:  · Maintain a healthy weight. If you are overweight, losing weight will help most types of back pain.  · Exercise is an important part of recovery from most types of back pain. The muscles behind and in front of the spine support the back. This means strengthening both the back muscles and the abdominal muscles will provide better support for your spine.   · Swimming and brisk walking are good overall exercises to improve your fitness level.  · Practice safe lifting methods (below).  · Practice good posture when sitting, standing and walking. Avoid prolonged sitting. This puts more stress on the lower back than standing or walking.  · Wear quality shoes with sufficient arch support. Foot and ankle alignment can affect back symptoms. Women should avoid wearing high heels.  · Therapeutic massage can help relax the back muscles without stretching them.  · During the first 24 to 72 hours after an acute injury or flare-up of chronic back pain, apply an ice pack to the painful area for 20 minutes and then remove it for 20 minutes, over a period of 60 to 90 minutes, or several times a day. As a safety precaution, do not use a heating pad at bedtime. Sleeping on a heating pad can lead to skin burns or tissue damage.  · You can alternate ice and heat therapies.  Medications  Talk to your healthcare provider before using medicines, especially if you have other medical problems or are taking other medicines.  · You may use acetaminophen or ibuprofen to control pain, unless your healthcare provider prescribed other  pain medicine. If you have chronic conditions like diabetes, liver or kidney disease, stomach ulcers, or gastrointestinal bleeding, or are taking blood thinners, talk with your healthcare provider before taking any medicines.  · Be careful if you are given prescription pain medicines, narcotics, or medicine for muscle spasm. They can cause drowsiness, affect your coordination, reflexes, and judgment. Do not drive or operate heavy machinery while taking these types of medicines. Take prescription pain medicine only as prescribed by your healthcare provider.  Lumbar stretch  Here is a simple stretching exercise that will help relax muscle spasm and keep your back more limber. If exercise makes your back pain worse, dont do it.  · Lie on your back with your knees bent and both feet on the ground.  · Slowly raise your left knee to your chest as you flatten your lower back against the floor. Hold for 5 seconds.  · Relax and repeat the exercise with your right knee.  · Do 10 of these exercises for each leg.  Safe lifting method  · Dont bend over at the waist to lift an object off the floor.  Instead, bend your knees and hips in a squat.   · Keep your back and head upright  · Hold the object close to your body, directly in front of you.  · Straighten your legs to lift the object.   · Lower the object to the floor in the reverse fashion.  · If you must slide something across the floor, push it.  Posture tips  Sitting  Sit in chairs with straight backs or low-back support. Keep your knees lower than your hips, with your feet flat on the floor.  When driving, sit up straight. Adjust the seat forward so you are not leaning toward the steering wheel.  A small pillow or rolled towel behind your lower back may help if you are driving long distances.   Standing  When standing for long periods, shift most of your weight to one leg at a time. Alternate legs every few minutes.   Sleeping  The best way to sleep is on your side with  your knees bent. Put a low pillow under your head to support your neck in a neutral spine position. Avoid thick pillows that bend your neck to one side. Put a pillow between your legs to further relax your lower back. If you sleep on your back, put pillows under your knees to support your legs in a slightly flexed position. Use a firm mattress. If your mattress sags, replace it, or use a 1/2-inch plywood board under the mattress to add support.  Follow-up care  Follow up with your healthcare provider, or as advised.  If X-rays, a CT scan or an MRI scan were taken, they will be reviewed by a radiologist. You will be notified of any new findings that may affect your care.  Call 911  Seek emergency medical care if any of the following occur:  · Trouble breathing  · Confusion  · Very drowsy  · Fainting or loss of consciousness  · Rapid or very slow heart rate  · Loss of  bowel or bladder control  When to seek medical care  Call your healthcare provider if any of the following occur:  · Pain becomes worse or spreads to your arms or legs  · Weakness or numbness in one or both arms or legs  · Numbness in the groin area  Date Last Reviewed: 6/1/2016  © 7230-2772 [employer]. [address] [place] [address], [place] [address]. All rights reserved. This information is not intended as a substitute for professional medical care. Always follow your healthcare professional's instructions.

## 2020-05-09 NOTE — PROGRESS NOTES
"Subjective:       Patient ID: Andre Padgett is a 49 y.o. male.    Vitals:  height is 5' 10" (1.778 m) and weight is 80.3 kg (177 lb). His tympanic temperature is 96.8 °F (36 °C). His blood pressure is 126/83 and his pulse is 71. His respiration is 16 and oxygen saturation is 97%.     Chief Complaint: Mid-back Pain    49-year-old male with a history of hyperlipidemia, anxiety, on aspirin 81 mg who presents for evaluation of right paraspinal mid back muscular pain that started 2 days ago.  Pain improved significantly with heat packs > Tylenol.  Pain is aching and rated as 5/10.  Occasionally, Patient feels pain when he takes a deep breath for moves a certain way.. Denies any chest pain, shortness breath, cough, upper respiratory infection symptoms, nausea/vomiting/diarrhea, abdominal pain, fall, or trauma.  Denies any bladder/bowel incontinence, saddle anesthesia, paresthesia, weakness.  Of note, Patient tested COVID-19 positive March 18, 2020; he had mild symptoms and it resolved within 1-2 weeks of Test date.    Mid-back Pain   This is a new problem. Episode onset: 2 days. The problem occurs constantly. The problem has been gradually improving. Associated symptoms include myalgias. Pertinent negatives include no abdominal pain, arthralgias, chest pain, chills, congestion, coughing, diaphoresis, fatigue, fever, headaches, joint swelling, nausea, neck pain, numbness, rash, sore throat, swollen glands, vomiting or weakness. Exacerbated by: breathing. He has tried acetaminophen and heat for the symptoms. The treatment provided mild relief.       Constitution: Negative for chills, sweating, fatigue and fever.   HENT: Negative for ear pain, congestion, sinus pain, sinus pressure, sore throat and voice change.    Neck: Negative for neck pain and painful lymph nodes.   Cardiovascular: Negative for chest pain, leg swelling, palpitations and sob on exertion.   Eyes: Negative for eye redness.   Respiratory: Negative for " chest tightness, cough, sputum production, bloody sputum, COPD, shortness of breath, stridor, wheezing and asthma.    Gastrointestinal: Negative for abdominal pain, nausea and vomiting.   Genitourinary: Negative for dysuria, frequency, urgency, urine decreased, flank pain and bladder incontinence.   Musculoskeletal: Positive for back pain (Mid back ) and muscle ache. Negative for joint pain and joint swelling.   Skin: Negative for color change, pale and rash.   Allergic/Immunologic: Negative for seasonal allergies and asthma.   Neurological: Negative for facial drooping, speech difficulty, coordination disturbances, loss of balance, headaches, disorientation, altered mental status, loss of consciousness and numbness.   Hematologic/Lymphatic: Negative for swollen lymph nodes and easy bruising/bleeding. Does not bruise/bleed easily.   Psychiatric/Behavioral: Negative for altered mental status, disorientation, confusion and agitation.       Objective:      Physical Exam   Constitutional: He is oriented to person, place, and time. He appears well-developed and well-nourished. No distress.   HENT:   Head: Normocephalic and atraumatic.   Eyes: Pupils are equal, round, and reactive to light. Conjunctivae and EOM are normal.   Neck: Normal range of motion. Neck supple.   Cardiovascular: Normal rate, regular rhythm and normal heart sounds.   Pulmonary/Chest: Effort normal and breath sounds normal. No respiratory distress.   Abdominal: Soft. He exhibits no distension and no mass. There is no tenderness. There is no rebound and no guarding.   Musculoskeletal: Normal range of motion.   Spinal exam:   Moves all extremities with good strength 5/5  BUE- deltoid, triceps, biceps, wrist ext/flexion, hand , and interossei  BLE- hip flexion, knee ext/flexion, dorsiflexion, plantar flexion, EHL, ankle inversion, and ankle eversion    No drift or dysmetria.   Gait normal.  No difficulty with tandem gait.    Negative straight leg  raise, clonus, or Christa's bilaterally.    Sensation intact to light touch throughout.    Full back ROM; no pain with all ROM  Full neck ROM; no pain with all ROM.    Negative Lhermitte's or Spurling's    There is no tenderness to palpation of midline or paraspinal muscles.  There is no bony step-off or bony tenderness to palpation.         Neurological: He is alert and oriented to person, place, and time. He displays normal reflexes. No cranial nerve deficit or sensory deficit. He exhibits normal muscle tone. Coordination normal.   Skin: Skin is warm, dry and not diaphoretic. Capillary refill takes less than 2 seconds.        Psychiatric: He has a normal mood and affect.   Nursing note and vitals reviewed.    Xr Chest Pa And Lateral Result Date: 5/9/2020  EXAMINATION: XR CHEST PA AND LATERAL CLINICAL HISTORY: Pain in thoracic spine TECHNIQUE: PA and lateral views of the chest were performed. COMPARISON: None FINDINGS: The lungs are clear, with normal appearance of pulmonary vasculature and no pleural effusion or pneumothorax. The cardiac silhouette is normal in size. The hilar and mediastinal contours are unremarkable. Bones are intact.     No acute abnormality. Electronically signed by: Anu De Leon MD Date:    05/09/2020 Time:    14:15          Assessment:       1. Thoracic back pain, unspecified back pain laterality, unspecified chronicity    2. Myofascial muscle pain          49-year-old male who presents with myofascial back pain and strain.  Neurologically intact on exam.  No concern for cauda equina or disc herniation.  Chest x-ray with clear lungs and no spinal fractures or dislocation.  X-ray reviewed with patient.  Had significant improvement in pain with Toradol injection in clinic.  Will treat conservatively with stretches, muscle relaxants and anti-inflammatory. ED versus clinic precautions given.  Patient verbalized understanding and agreed with plan of care.    Plan:         Thoracic back pain,  unspecified back pain laterality, unspecified chronicity  -     XR CHEST PA AND LATERAL; Future; Expected date: 05/09/2020  -     ketorolac injection 60 mg  -     diclofenac sodium (VOLTAREN) 1 % Gel; Apply 2 g topically 3 (three) times daily as needed.  Dispense: 1 Tube; Refill: 0  -     cyclobenzaprine (FLEXERIL) 5 MG tablet; Take 1 tablet (5 mg total) by mouth 3 (three) times daily as needed for Muscle spasms.  Dispense: 30 tablet; Refill: 0  -     ibuprofen (ADVIL,MOTRIN) 800 MG tablet; Take 1 tablet (800 mg total) by mouth 3 (three) times daily as needed for Pain.  Dispense: 40 tablet; Refill: 0    Myofascial muscle pain  -     ketorolac injection 60 mg  -     diclofenac sodium (VOLTAREN) 1 % Gel; Apply 2 g topically 3 (three) times daily as needed.  Dispense: 1 Tube; Refill: 0  -     cyclobenzaprine (FLEXERIL) 5 MG tablet; Take 1 tablet (5 mg total) by mouth 3 (three) times daily as needed for Muscle spasms.  Dispense: 30 tablet; Refill: 0  -     ibuprofen (ADVIL,MOTRIN) 800 MG tablet; Take 1 tablet (800 mg total) by mouth 3 (three) times daily as needed for Pain.  Dispense: 40 tablet; Refill: 0           Patient Instructions   PLEASE READ YOUR DISCHARGE INSTRUCTIONS ENTIRELY AS IT CONTAINS IMPORTANT INFORMATION.  - no operating machinery with muscle relaxant as it may cause drowsiness.  - do not use OTC anti-inflammatory if taking prescribed (Rx) anti-inflammatory; start Rx inflammatory tomorrow since you were given concentrated anti-inflammatory shot today.  - continue heat/ice compression and muscle stretches.   - Radiographs reviewed with patient  - Tylenol or Ibuprofen as directed as needed for pain.  For Tylenol, do not exceed 4000 mg/ day. For ibuprofen, do not exceed 2400 mg/day.    - continue back stretches/exercises given  - if no improvement or worsening symptoms, recommend follow-up with orthopedics for further evaluation.  - discussed weight loss given obesity  -You must understand that you've  received an Urgent Care treatment only and that you may be released before all your medical problems are known or treated. You, the patient, will arrange for follow up care as instructed. Please arrange follow up with your primary medical clinic within 2-5 days if your signs and symptoms have not resolved or worsen.   - Follow up with your PCP or specialty clinic as directed.  You can call [phone] to schedule an appointment with the appropriate provider.    - If your condition worsens or fails to improve we recommend that you receive another evaluation at the emergency room immediately or contact your primary medical clinic to discuss your concerns.      RED FLAGS/WARNING SYMPTOMS DISCUSSED WITH PATIENT THAT WOULD WARRANT EMERGENT MEDICAL ATTENTION. Patient aware and verbalized understanding.        Back Spasm (No Trauma)    Spasm of the back muscles can occur after a sudden forceful twisting or bending force (such as in a car accident), after a simple awkward movement, or after lifting something heavy with poor body positioning. In any case, muscle spasm adds to the pain. Sleeping in an awkward position or on a poor quality mattress can also cause this. Some people respond to emotional stress by tensing the muscles of their back.  Pain that continues may need further evaluation or other types of treatment such as physical therapy.  You don't always need X-rays for the initial evaluation of back pain, unless you had a physical injury such as from a car accident or fall. If your pain continues and doesn't respond to medical treatment, X-rays and other tests may then be done.   Home care  · As soon as possible, start sitting or walking again to avoid problems from prolonged bed rest (muscle weakness, worsening back stiffness and pain, blood clots in the legs).  · When in bed, try to find a position of comfort. A firm mattress is best. Try lying flat on your back with pillows under your knees. You can also try  lying on your side with your knees bent up toward your chest and a pillow between your knees.  · Avoid prolonged sitting, long car rides, or travel. This puts more stress on the lower back than standing or walking.   · During the first 24 to 72 hours after an injury or flare-up, apply an ice pack to the painful area for 20 minutes, then remove it for 20 minutes. Do this over a period of 60 to 90 minutes or several times a day. This will reduce swelling and pain. Always wrap ice packs in a thin towel.  · You can start with ice, then switch to heat. Heat (hot shower, hot bath, or heating pad) reduces pain, and works well for muscle spasms. Apply heat to the painful area for 20 minutes, then remove it for 20 minutes. Do this over a period of 60 to 90 minutes or several times a day. Do not sleep on a heating pad as it can burn or damage skin.  · Alternate ice and heat therapies.  · Be aware of safe lifting methods and do not lift anything over 15 pounds until all the pain is gone.  Gentle stretching will help your back heal faster. Do this simple routine 2 to 3 times a day until your back is feeling better.  · Lie on your back with your knees bent and both feet on the ground  · Slowly raise your left knee to your chest as you flatten your lower back against the floor. Hold for 20 to 30 seconds.  · Relax and repeat the exercise with your right knee.  · Do 2 to 3 of these exercises for each leg.  · Repeat, hugging both knees to your chest at the same time.  · Do not bounce, but use a gentle pull.  Medicines  Talk to your doctor before using medicine, especially if you have other medical problems or are taking other medicines.  You may use acetaminophen or ibuprofen to control pain, unless your healthcare provider prescribed another pain medicine. If you have a chronic condition such as diabetes, liver or kidney disease, stomach ulcer, or gastrointestinal bleeding, or are taking blood thinners, talk with your healthcare  provider before taking any medicines.  Be careful if you are given prescription pain medicine, narcotics, or medicine for muscle spasm. They can cause drowsiness, affect your coordination, reflexes, or judgment. Do not drive or operate heavy machinery when taking these medicines. Take pain medicine only as prescribed by your healthcare provider.  Follow-up care  Follow up with your doctor, or as advised. Physical therapy or further tests may be needed.  If X-rays were taken, they may be reviewed by a radiologist. You will be notified of any new findings that may affect your care.  Call 911  Seek emergency medical care if any of these occur:  · Trouble breathing  · Confusion  · Drowsiness or trouble awakening  · Fainting or loss of consciousness  · Rapid or very slow heart rate  · Loss of bowel or bladder control  When to seek medical advice  Call your healthcare provider right away if any of these occur:  · Pain becomes worse or spreads to your legs  · Weakness or numbness in one or both legs  · Numbness in the groin or genital area  · Unexplained fever over 100.4ºF (38.0ºC)  · Burning or pain when passing urine  Date Last Reviewed: 6/1/2016  © 4819-0271 [employer]. [address] [place], [place] [id number]. All rights reserved. This information is not intended as a substitute for professional medical care. Always follow your healthcare professional's instructions.        Back Care Tips    Caring for your back  These are things you can do to prevent a recurrence of acute back pain and to reduce symptoms from chronic back pain:  · Maintain a healthy weight. If you are overweight, losing weight will help most types of back pain.  · Exercise is an important part of recovery from most types of back pain. The muscles behind and in front of the spine support the back. This means strengthening both the back muscles and the abdominal muscles will provide better support for your spine.   · Swimming and brisk  walking are good overall exercises to improve your fitness level.  · Practice safe lifting methods (below).  · Practice good posture when sitting, standing and walking. Avoid prolonged sitting. This puts more stress on the lower back than standing or walking.  · Wear quality shoes with sufficient arch support. Foot and ankle alignment can affect back symptoms. Women should avoid wearing high heels.  · Therapeutic massage can help relax the back muscles without stretching them.  · During the first 24 to 72 hours after an acute injury or flare-up of chronic back pain, apply an ice pack to the painful area for 20 minutes and then remove it for 20 minutes, over a period of 60 to 90 minutes, or several times a day. As a safety precaution, do not use a heating pad at bedtime. Sleeping on a heating pad can lead to skin burns or tissue damage.  · You can alternate ice and heat therapies.  Medications  Talk to your healthcare provider before using medicines, especially if you have other medical problems or are taking other medicines.  · You may use acetaminophen or ibuprofen to control pain, unless your healthcare provider prescribed other pain medicine. If you have chronic conditions like diabetes, liver or kidney disease, stomach ulcers, or gastrointestinal bleeding, or are taking blood thinners, talk with your healthcare provider before taking any medicines.  · Be careful if you are given prescription pain medicines, narcotics, or medicine for muscle spasm. They can cause drowsiness, affect your coordination, reflexes, and judgment. Do not drive or operate heavy machinery while taking these types of medicines. Take prescription pain medicine only as prescribed by your healthcare provider.  Lumbar stretch  Here is a simple stretching exercise that will help relax muscle spasm and keep your back more limber. If exercise makes your back pain worse, dont do it.  · Lie on your back with your knees bent and both feet on the  ground.  · Slowly raise your left knee to your chest as you flatten your lower back against the floor. Hold for 5 seconds.  · Relax and repeat the exercise with your right knee.  · Do 10 of these exercises for each leg.  Safe lifting method  · Dont bend over at the waist to lift an object off the floor.  Instead, bend your knees and hips in a squat.   · Keep your back and head upright  · Hold the object close to your body, directly in front of you.  · Straighten your legs to lift the object.   · Lower the object to the floor in the reverse fashion.  · If you must slide something across the floor, push it.  Posture tips  Sitting  Sit in chairs with straight backs or low-back support. Keep your knees lower than your hips, with your feet flat on the floor.  When driving, sit up straight. Adjust the seat forward so you are not leaning toward the steering wheel.  A small pillow or rolled towel behind your lower back may help if you are driving long distances.   Standing  When standing for long periods, shift most of your weight to one leg at a time. Alternate legs every few minutes.   Sleeping  The best way to sleep is on your side with your knees bent. Put a low pillow under your head to support your neck in a neutral spine position. Avoid thick pillows that bend your neck to one side. Put a pillow between your legs to further relax your lower back. If you sleep on your back, put pillows under your knees to support your legs in a slightly flexed position. Use a firm mattress. If your mattress sags, replace it, or use a 1/2-inch plywood board under the mattress to add support.  Follow-up care  Follow up with your healthcare provider, or as advised.  If X-rays, a CT scan or an MRI scan were taken, they will be reviewed by a radiologist. You will be notified of any new findings that may affect your care.  Call 911  Seek emergency medical care if any of the following occur:  · Trouble breathing  · Confusion  · Very  drowsy  · Fainting or loss of consciousness  · Rapid or very slow heart rate  · Loss of  bowel or bladder control  When to seek medical care  Call your healthcare provider if any of the following occur:  · Pain becomes worse or spreads to your arms or legs  · Weakness or numbness in one or both arms or legs  · Numbness in the groin area  Date Last Reviewed: 6/1/2016  © 6367-4978 [employer]. [address] [place] [address], [place] [address]. All rights reserved. This information is not intended as a substitute for professional medical care. Always follow your healthcare professional's instructions.

## 2020-05-11 ENCOUNTER — OFFICE VISIT (OUTPATIENT)
Dept: INTERNAL MEDICINE | Facility: CLINIC | Age: 50
End: 2020-05-11
Payer: COMMERCIAL

## 2020-05-11 ENCOUNTER — HOSPITAL ENCOUNTER (OUTPATIENT)
Dept: RADIOLOGY | Facility: HOSPITAL | Age: 50
Discharge: HOME | End: 2020-05-11
Attending: INTERNAL MEDICINE
Payer: COMMERCIAL

## 2020-05-11 ENCOUNTER — TELEPHONE (OUTPATIENT)
Dept: INTERNAL MEDICINE | Facility: CLINIC | Age: 50
End: 2020-05-11

## 2020-05-11 VITALS
BODY MASS INDEX: 24.8 KG/M2 | HEART RATE: 88 BPM | DIASTOLIC BLOOD PRESSURE: 65 MMHG | SYSTOLIC BLOOD PRESSURE: 131 MMHG | WEIGHT: 172.81 LBS | OXYGEN SATURATION: 96 %

## 2020-05-11 DIAGNOSIS — G44.89 CHRONIC MIXED HEADACHE SYNDROME: Primary | ICD-10-CM

## 2020-05-11 DIAGNOSIS — G44.89 CHRONIC MIXED HEADACHE SYNDROME: ICD-10-CM

## 2020-05-11 DIAGNOSIS — M54.50 BACK PAIN OF THORACOLUMBAR REGION: ICD-10-CM

## 2020-05-11 DIAGNOSIS — R20.0 NUMBNESS: ICD-10-CM

## 2020-05-11 DIAGNOSIS — M54.6 BACK PAIN OF THORACOLUMBAR REGION: ICD-10-CM

## 2020-05-11 DIAGNOSIS — U07.1 COVID-19 VIRUS INFECTION: ICD-10-CM

## 2020-05-11 DIAGNOSIS — Z86.79 HISTORY OF NONTRAUMATIC RUPTURE OF CEREBRAL ANEURYSM: ICD-10-CM

## 2020-05-11 DIAGNOSIS — F41.9 ANXIETY: ICD-10-CM

## 2020-05-11 PROCEDURE — 3008F PR BODY MASS INDEX (BMI) DOCUMENTED: ICD-10-PCS | Mod: CPTII,S$GLB,, | Performed by: INTERNAL MEDICINE

## 2020-05-11 PROCEDURE — 70450 CT HEAD/BRAIN W/O DYE: CPT | Mod: TC

## 2020-05-11 PROCEDURE — 70450 CT HEAD WITHOUT CONTRAST: ICD-10-PCS | Mod: 26,,, | Performed by: RADIOLOGY

## 2020-05-11 PROCEDURE — 99999 PR PBB SHADOW E&M-EST. PATIENT-LVL III: CPT | Mod: PBBFAC,,, | Performed by: INTERNAL MEDICINE

## 2020-05-11 PROCEDURE — 99999 PR PBB SHADOW E&M-EST. PATIENT-LVL III: ICD-10-PCS | Mod: PBBFAC,,, | Performed by: INTERNAL MEDICINE

## 2020-05-11 PROCEDURE — 3008F BODY MASS INDEX DOCD: CPT | Mod: CPTII,S$GLB,, | Performed by: INTERNAL MEDICINE

## 2020-05-11 PROCEDURE — 70450 CT HEAD/BRAIN W/O DYE: CPT | Mod: 26,,, | Performed by: RADIOLOGY

## 2020-05-11 PROCEDURE — 99214 PR OFFICE/OUTPT VISIT, EST, LEVL IV, 30-39 MIN: ICD-10-PCS | Mod: S$GLB,,, | Performed by: INTERNAL MEDICINE

## 2020-05-11 PROCEDURE — 99214 OFFICE O/P EST MOD 30 MIN: CPT | Mod: S$GLB,,, | Performed by: INTERNAL MEDICINE

## 2020-05-11 RX ORDER — FLUTICASONE PROPIONATE 50 MCG
2 SPRAY, SUSPENSION (ML) NASAL DAILY
Qty: 16 G | Refills: 12 | Status: SHIPPED | OUTPATIENT
Start: 2020-05-11 | End: 2020-06-10

## 2020-05-11 RX ORDER — ALPRAZOLAM 1 MG/1
1 TABLET ORAL 2 TIMES DAILY PRN
Qty: 60 TABLET | Refills: 2 | Status: ON HOLD | OUTPATIENT
Start: 2020-05-11 | End: 2020-09-22 | Stop reason: SDUPTHER

## 2020-05-11 NOTE — PROGRESS NOTES
Subjective:       Patient ID: Andre Padgett is a 49 y.o. male.    Chief Complaint: Sinus Problem and Back Pain    49-year-old male here for urgent care visit and follow-up for headaches and back pain.  Patient had a positive COVID test over a month ago and has done well.  He was back at work then unfortunately lost his job.  He has a known significant history of anxiety in tells me he has been seeing a therapist outside of here.  He also is getting a divorce.  He has had a history of cerebral aneurysm status post coiling and he has had some recurrent headaches symptoms and nasal congestion.  I explained that certainly this could be some sinus symptoms ft it has been over a year since he had a head scan and he would like to get that done again.  He is having some tingling in the lower extremities and some pressure around the eyes, both were symptoms he said he had the last time he had an aneurysm requiring repair.  He went to urgent care this weekend for back pain and they said his lungs were clear diagnosed him with musculoskeletal back pain.  He wanted me to check all of that.    Review of Systems   Constitutional: Negative for chills, fatigue, fever and unexpected weight change.   HENT: Negative for nosebleeds and trouble swallowing.    Eyes: Negative for pain and visual disturbance.   Respiratory: Negative for cough, shortness of breath and wheezing.    Cardiovascular: Negative for chest pain and palpitations.   Gastrointestinal: Negative for abdominal pain, constipation, diarrhea, nausea and vomiting.   Genitourinary: Negative for difficulty urinating and hematuria.   Musculoskeletal: Positive for back pain. Negative for neck pain.   Skin: Negative for rash.   Neurological: Positive for numbness and headaches. Negative for dizziness and facial asymmetry.   Hematological: Does not bruise/bleed easily.   Psychiatric/Behavioral: Negative for dysphoric mood, sleep disturbance and suicidal ideas.       Objective:       Physical Exam   Constitutional: He is oriented to person, place, and time. He appears well-developed and well-nourished. No distress.   HENT:   Head: Normocephalic and atraumatic.   Right Ear: External ear normal.   Left Ear: External ear normal.   Mouth/Throat: Oropharynx is clear and moist. No oropharyngeal exudate.   TM's clear, pharynx clear   Eyes: Pupils are equal, round, and reactive to light. Conjunctivae and EOM are normal. No scleral icterus.   Neck: Normal range of motion. Neck supple. No thyromegaly present.   No supraclavicular nodes palpated   Cardiovascular: Normal rate, regular rhythm and normal heart sounds.   No murmur heard.  Pulmonary/Chest: Effort normal and breath sounds normal. He has no wheezes.   Abdominal: Soft. Bowel sounds are normal. He exhibits no mass. There is no tenderness.   Musculoskeletal: He exhibits tenderness. He exhibits no edema.        Arms:  Muscular tenderness to palpation movement and manipulation of the upper back in shoulders.  No rash.   Lymphadenopathy:     He has no cervical adenopathy.   Neurological: He is alert and oriented to person, place, and time. He displays normal reflexes. No cranial nerve deficit. Coordination normal.   Skin: No rash noted. No erythema. No pallor.   Psychiatric: He has a normal mood and affect. His behavior is normal.       Assessment:       1. Chronic mixed headache syndrome    2. COVID-19 virus infection    3. Back pain of thoracolumbar region    4. Numbness    5. Anxiety    6. History of nontraumatic rupture of cerebral aneurysm        Plan:       Andre was seen today for sinus problem and back pain.    Diagnoses and all orders for this visit:    Chronic mixed headache syndrome  -     CT Head Without Contrast; Future    COVID-19 virus infection  -     CT Head Without Contrast; Future    Back pain of thoracolumbar region  -     CT Head Without Contrast; Future    Numbness  -     CT Head Without Contrast;  Future    Anxiety    History of nontraumatic rupture of cerebral aneurysm    Other orders  -     fluticasone propionate (FLONASE) 50 mcg/actuation nasal spray; 2 sprays (100 mcg total) by Each Nostril route once daily.  -     ALPRAZolam (XANAX) 1 MG tablet; Take 1 tablet (1 mg total) by mouth 2 (two) times daily as needed for Anxiety.        for the short term we will increase his anxiety medicine to twice daily.  Driving and drowsy precautions discussed.  Follow-up after scans

## 2020-05-11 NOTE — TELEPHONE ENCOUNTER
----- Message from Meaghan Kramer sent at 5/11/2020  9:36 AM CDT -----  Contact: pt  Above pt scheduled an ijn office visit with  today wanting to make sure it is ok to be seen in office pt is having sever headaches and weird sensations please advise pt his appt is at 230 today     thanks    Pt can be reached at [phone]

## 2020-05-12 ENCOUNTER — PATIENT MESSAGE (OUTPATIENT)
Dept: INTERNAL MEDICINE | Facility: CLINIC | Age: 50
End: 2020-05-12

## 2020-05-12 RX ORDER — AMOXICILLIN AND CLAVULANATE POTASSIUM 875; 125 MG/1; MG/1
1 TABLET, FILM COATED ORAL 2 TIMES DAILY
Qty: 20 TABLET | Refills: 0 | Status: SHIPPED | OUTPATIENT
Start: 2020-05-12 | End: 2020-05-22

## 2020-05-14 ENCOUNTER — TELEPHONE (OUTPATIENT)
Dept: INTERNAL MEDICINE | Facility: CLINIC | Age: 50
End: 2020-05-14

## 2020-05-14 ENCOUNTER — NURSE TRIAGE (OUTPATIENT)
Dept: ADMINISTRATIVE | Facility: CLINIC | Age: 50
End: 2020-05-14

## 2020-05-14 NOTE — TELEPHONE ENCOUNTER
Spoke to pt. This morning he started having ache joints and temp 99.1. No chest pain.  Slight shortness of breath because if his sinuses. He has not been around anyone sick or with COVID. He is taking the antibiotics you prescribed. Also he is worried he might have COVID again.

## 2020-05-14 NOTE — TELEPHONE ENCOUNTER
----- Message from Dane Paul sent at 5/14/2020  4:14 PM CDT -----  Contact: Self [phone]  Patient would like an call back form the nurse in regards to fever 99.1, patient states due to previous Covid-19 can he catch it again, please advise.

## 2020-05-14 NOTE — TELEPHONE ENCOUNTER
Have him monitor symptoms and take medication. Unlikely to get covid again this soon and since there is no treatment, I suggest he monitor the symptoms and let us know of any changes or worsening.

## 2020-05-15 NOTE — TELEPHONE ENCOUNTER
Pt calling he was dx with corona virus and now has sinus congestion. Pt was placed on antibiotics on wed so not quite 48 hours said that temp is 99.1 he is concerned about having the corona virus again. Pt told to do saline spray to nostrils and hot compresses to loosen up sinus congestion and if starts with fever about 100.4 to contact PCP or call us back. Continue taking antibiotics as prescribed      Reason for Disposition   [1] Taking antibiotic AND [2] nose still blocked    Additional Information   Negative: Severe difficulty breathing (e.g., struggling for each breath, speaks in single words)   Negative: Sounds like a life-threatening emergency to the triager   Negative: [1] Difficulty breathing AND [2] not from stuffy nose (e.g., not relieved by cleaning out the nose)   Negative: [1] SEVERE headache AND [2] fever   Negative: [1] Taking antibiotic > 24 hours AND [2] fever > 103 F (39.4 C)   Negative: [1] Redness or swelling on the cheek, forehead or around the eye AND [2] fever   Negative: Patient sounds very sick or weak to the triager   Negative: [1] SEVERE sinus pain AND [2] not improved 2 hours after pain medicine   Negative: [1] Redness or swelling on the cheek, forehead or around the eye AND [2] new since starting antibiotics   Negative: [1] Taking antibiotic > 48 hours (2 days) AND [2] fever persists   Negative: [1] Taking antibiotic > 72 hours (3 days) AND [2] sinus pain not improved   Negative: [1] Taking antibiotic < 48 hours AND [2] fever persists   Negative: [1] Taking antibiotic < 72 hours (3 days) AND [2] sinus pain not improved   Negative: [1] Taking antibiotic > 7 days AND [2] nasal discharge not improved    Protocols used: SINUS INFECTION ON ANTIBIOTIC FOLLOW-UP CALL-A-

## 2020-05-21 ENCOUNTER — PATIENT MESSAGE (OUTPATIENT)
Dept: INTERNAL MEDICINE | Facility: CLINIC | Age: 50
End: 2020-05-21

## 2020-05-21 ENCOUNTER — OFFICE VISIT (OUTPATIENT)
Dept: INTERNAL MEDICINE | Facility: CLINIC | Age: 50
End: 2020-05-21
Payer: COMMERCIAL

## 2020-05-21 ENCOUNTER — LAB VISIT (OUTPATIENT)
Dept: LAB | Facility: HOSPITAL | Age: 50
End: 2020-05-21
Attending: NURSE PRACTITIONER
Payer: COMMERCIAL

## 2020-05-21 DIAGNOSIS — R35.0 URINE FREQUENCY: Primary | ICD-10-CM

## 2020-05-21 DIAGNOSIS — R35.0 URINE FREQUENCY: ICD-10-CM

## 2020-05-21 PROCEDURE — 99213 PR OFFICE/OUTPT VISIT, EST, LEVL III, 20-29 MIN: ICD-10-PCS | Mod: 95,,, | Performed by: NURSE PRACTITIONER

## 2020-05-21 PROCEDURE — 81001 URINALYSIS AUTO W/SCOPE: CPT

## 2020-05-21 PROCEDURE — 87086 URINE CULTURE/COLONY COUNT: CPT

## 2020-05-21 PROCEDURE — 99213 OFFICE O/P EST LOW 20 MIN: CPT | Mod: 95,,, | Performed by: NURSE PRACTITIONER

## 2020-05-21 RX ORDER — ACETAMINOPHEN 325 MG/1
325 TABLET ORAL EVERY 6 HOURS PRN
Qty: 30 TABLET | Refills: 0 | Status: SHIPPED | OUTPATIENT
Start: 2020-05-21 | End: 2020-08-10

## 2020-05-22 ENCOUNTER — TELEPHONE (OUTPATIENT)
Dept: PRIMARY CARE CLINIC | Facility: CLINIC | Age: 50
End: 2020-05-22

## 2020-05-22 ENCOUNTER — TELEPHONE (OUTPATIENT)
Dept: INTERNAL MEDICINE | Facility: CLINIC | Age: 50
End: 2020-05-22

## 2020-05-22 ENCOUNTER — PATIENT MESSAGE (OUTPATIENT)
Dept: INTERNAL MEDICINE | Facility: CLINIC | Age: 50
End: 2020-05-22

## 2020-05-22 LAB
BACTERIA #/AREA URNS AUTO: NORMAL /HPF
BILIRUB UR QL STRIP: NEGATIVE
CLARITY UR REFRACT.AUTO: CLEAR
COLOR UR AUTO: YELLOW
GLUCOSE UR QL STRIP: NEGATIVE
HGB UR QL STRIP: NEGATIVE
KETONES UR QL STRIP: NEGATIVE
LEUKOCYTE ESTERASE UR QL STRIP: NEGATIVE
MICROSCOPIC COMMENT: NORMAL
NITRITE UR QL STRIP: NEGATIVE
NON-SQ EPI CELLS #/AREA URNS AUTO: <1 /HPF
PH UR STRIP: 6 [PH] (ref 5–8)
PROT UR QL STRIP: NEGATIVE
RBC #/AREA URNS AUTO: 1 /HPF (ref 0–4)
SP GR UR STRIP: 1.01 (ref 1–1.03)
SQUAMOUS #/AREA URNS AUTO: 1 /HPF
URN SPEC COLLECT METH UR: NORMAL
WBC #/AREA URNS AUTO: 1 /HPF (ref 0–5)

## 2020-05-22 NOTE — TELEPHONE ENCOUNTER
Spoke with patient.   Diagnosed with COVID 3/18  Mainly just having nasal congestion  Flonase helps.  Temp 98F  On last day of amoxicllin  Discussed repeat testing not medically recommended at this time (past has had 3 positive test since initial diagnosis)  Return to work letter already completed by Dr. Diop.   Continue symptomatic care.   Pt voiced understanding.

## 2020-05-22 NOTE — TELEPHONE ENCOUNTER
----- Message from Ivis Muniz sent at 5/22/2020 10:26 AM CDT -----  Contact: self [phone]  Type: Test Results    What test was performed? TARAS    Who ordered the test? Rosaura    When and where were the [facility] performed[facility]    Comments: patient would like a call back today.

## 2020-05-22 NOTE — TELEPHONE ENCOUNTER
----- Message from vIis Muniz sent at 5/22/2020 10:23 AM CDT -----  Contact: self [phone]  Patient is calling to ask if an order can be entered for the Covid testing thru the drive thru at [facility], he states he needs to be tested to ensure he's negative to return to work.    thanks

## 2020-05-22 NOTE — TELEPHONE ENCOUNTER
----- Message from Brianna Ray sent at 5/22/2020 11:37 AM CDT -----  Contact: [phone]  Patient is returning a phone call.  Who left a message for the patient: jaki  Does patient know what this is regarding:  jaki  Comments:

## 2020-05-22 NOTE — TELEPHONE ENCOUNTER
----- Message from Maddy Castro sent at 5/22/2020  1:25 PM CDT -----  Contact: Pt self Mobile [phone]  Patient would like a call back in regards to him saying that he spoke with someone on today about a COVID 19 test that he would like to take. Patient said that he was told that you would give him a call today at twelve O'clock and he would like a call back please.

## 2020-05-22 NOTE — TELEPHONE ENCOUNTER
Ivis JENNINGS Staff   Caller: self [phone] (Today, 10:23 AM)             Patient is calling to ask if an order can be entered for the Covid testing thru [facility], he states he needs to be tested to ensure he's negative to return to work.     thanks

## 2020-05-23 LAB — BACTERIA UR CULT: NO GROWTH

## 2020-05-23 NOTE — PROGRESS NOTES
Subjective:      Patient ID: Andre Padgett is a 49 y.o. male.    Chief Complaint: Urinary Frequency    The patient location is: home  The chief complaint leading to consultation is: urinary frequency, back pain, fatigue, and fever    Visit type: audiovisual    Face to Face time with patient: 10 minutes of total time spent on the encounter, which includes face to face time and non-face to face time preparing to see the patient (eg, review of tests), Obtaining and/or reviewing separately obtained history, Documenting clinical information in the electronic or other health record, Independently interpreting results (not separately reported) and communicating results to the patient/family/caregiver, or Care coordination (not separately reported).         Each patient to whom he or she provides medical services by telemedicine is:  (1) informed of the relationship between the physician and patient and the respective role of any other health care provider with respect to management of the patient; and (2) notified that he or she may decline to receive medical services by telemedicine and may withdraw from such care at any time.    Notes: Mr Powell is an established patient of Dr Diop. He is new to me.    He was diagnosed with Covid 19 in March and has had subsequent positive testing. He is concerned that he has a UTI today. He states that he has been febrile with a temp of 99.1. He also reports urinary frequency and back pain. He is still taking Augmentin. Explained to patient urine testing will likely be negative due to antibiotic treatment and fever in adults is 100.4F. Patient requests testing. Will order this today.    Back Pain   This is a new problem. The current episode started yesterday. The problem occurs daily. The problem is unchanged. The pain is present in the sacro-iliac. The quality of the pain is described as aching and burning. The pain is at a severity of 4/10. The pain is mild. The pain is the same  all the time. The symptoms are aggravated by sitting and standing. Stiffness is present all day. Associated symptoms include a fever and weight loss. Pertinent negatives include no chest pain, dysuria, headaches, leg pain, numbness, paresis, paresthesias, pelvic pain, perianal numbness, tingling or weakness. Risk factors include poor posture. He has tried NSAIDs, acupuncture and heat for the symptoms. The treatment provided mild relief.   Urinary Tract Infection    This is a new problem. The current episode started yesterday. The problem occurs intermittently. The problem has been unchanged. Associated symptoms include frequency and weight loss. Pertinent negatives include no behavior changes, chills, discharge, flank pain, hematuria, hesitancy, nausea, possible pregnancy, sweats, urgency, vomiting, bubble bath use, constipation, rash or withholding. He has tried acetaminophen and antibiotics for the symptoms. There is no history of catheterization, diabetes insipidus, diabetes mellitus, genitourinary reflux, hypertension, kidney stones, recurrent UTIs, a single kidney, STD, urinary stasis or a urological procedure.       Review of Systems   Constitutional: Positive for fever and weight loss. Negative for chills.   Cardiovascular: Negative for chest pain.   Gastrointestinal: Negative for constipation, nausea and vomiting.   Genitourinary: Positive for frequency. Negative for dysuria, flank pain, hematuria, hesitancy, pelvic pain and urgency.   Musculoskeletal: Positive for back pain.   Skin: Negative for rash.   Neurological: Negative for tingling, weakness, numbness, headaches and paresthesias.       Review of patient's allergies indicates:  No Known Allergies    Current Outpatient Medications   Medication Sig Dispense Refill    acetaminophen (TYLENOL) 325 MG tablet Take 1 tablet (325 mg total) by mouth every 6 (six) hours as needed for Pain. 30 tablet 0    ALPRAZolam (XANAX) 1 MG tablet Take 1 tablet (1 mg  total) by mouth 2 (two) times daily as needed for Anxiety. 60 tablet 2    amoxicillin-clavulanate 875-125mg (AUGMENTIN) 875-125 mg per tablet Take 1 tablet by mouth 2 (two) times daily. for 10 days 20 tablet 0    ascorbic acid (VITAMIN C) 100 MG tablet Take 100 mg by mouth once daily.      aspirin 81 MG Chew Take 81 mg by mouth once daily.      atorvastatin (LIPITOR) 40 MG tablet TAKE 1 TABLET(40 MG) BY MOUTH EVERY DAY 90 tablet 3    coenzyme Q10 200 mg capsule Take 200 mg by mouth once daily. Take with cholesterol med.      diclofenac sodium (VOLTAREN) 1 % Gel Apply 2 g topically 3 (three) times daily as needed. 1 Tube 0    fluticasone propionate (FLONASE) 50 mcg/actuation nasal spray 2 sprays (100 mcg total) by Each Nostril route once daily. 16 g 12    hydrOXYzine HCl (ATARAX) 25 MG tablet Take 1 tablet (25 mg total) by mouth nightly as needed (Insomnia). 30 tablet 6    ibuprofen (ADVIL,MOTRIN) 800 MG tablet Take 1 tablet (800 mg total) by mouth 3 (three) times daily as needed for Pain. 40 tablet 0    zinc sulfate (ZINCATE) 220 (50) mg capsule TK ONE C PO  QD       No current facility-administered medications for this visit.        Patient Active Problem List    Diagnosis Date Noted    History of nontraumatic rupture of cerebral aneurysm 05/11/2020    Viral infection, unspecified 04/20/2020    Hyperlipidemia 04/09/2020    COVID-19 virus infection 04/06/2020    Colon adenoma: 3/18 repeat colonoscopy 2023 06/22/2018    Umbilical hernia without obstruction and without gangrene: see CT 3/18 06/22/2018    Fatty liver: see CT 3/18 06/22/2018    Diverticulosis of large intestine without hemorrhage: see colonoscopy 3/18; sigmoid and descending colon 06/22/2018    Palpitations 10/17/2013    Pure hypercholesterolemia 10/17/2013    DJD (degenerative joint disease) of thoracic spine 06/20/2013    DJD (degenerative joint disease) of cervical spine, mild 06/20/2013    Anxiety 05/28/2013       Past Medical  History:   Diagnosis Date    Aneurysm     Right sided filling anterior communicating aneurysm s/p coiling 2011    Anxiety     Cerebral aneurysm, nonruptured 12/27/2016    Colon adenoma: 3/18 repeat colonoscpy 2023 6/22/2018    Diverticulosis of large intestine without hemorrhage: see colonoscopy 3/18; sigmoid and descending colon 6/22/2018    Fatty liver: see CT 3/18 6/22/2018    Umbilical hernia without obstruction and without gangrene: see CT 3/18 6/22/2018       Past Surgical History:   Procedure Laterality Date    CEREBRAL ANGIOGRAM  2011    angiogram/coiling    COLONOSCOPY N/A 3/26/2018    Procedure: COLONOSCOPY;  Surgeon: Sanjiv Duran MD;  Location: [facility] ([address] [place] [address]);  Service: Endoscopy;  Laterality: N/A;       Family History   Problem Relation Age of Onset    DAVID disease Mother     Hypertension Mother     Diabetes Mother     Brain cancer Father     Thyroid disease Sister     No Known Problems Daughter     No Known Problems Sister     Cirrhosis Neg Hx     Colon polyps Neg Hx     Crohn's disease Neg Hx     Liver cancer Neg Hx     Stomach cancer Neg Hx     Ulcerative colitis Neg Hx     Heart attack Neg Hx     Colon cancer Neg Hx          Objective:     Lab Results   Component Value Date    WBC 5.00 05/05/2020    HGB 12.8 (L) 05/05/2020    HCT 37.2 (L) 05/05/2020     (L) 05/05/2020    CHOL 161 12/02/2019    TRIG 147 12/02/2019    HDL 56 12/02/2019    ALT 21 05/05/2020    AST 18 05/05/2020     05/05/2020    K 3.5 05/05/2020     05/05/2020    CREATININE 0.8 05/05/2020    BUN 9 05/05/2020    CO2 25 05/05/2020    TSH 1.502 02/21/2019    INR 1.3 (H) 01/21/2017    HGBA1C 5.4 02/21/2019       There were no vitals filed for this visit.    There is no height or weight on file to calculate BMI.    Physical Exam   Constitutional: He is oriented to person, place, and time. He appears well-developed and well-nourished.   Pulmonary/Chest: Effort normal. No respiratory  distress.   Able to speak in complete sentences without difficulty   Musculoskeletal: Normal range of motion.   Neurological: He is alert and oriented to person, place, and time.     Assessment:     1. Urine frequency      Plan:     Andre was seen today for urinary frequency.    Diagnoses and all orders for this visit:    Urine frequency  -     URINALYSIS; Future  -     Urine culture; Future  -     acetaminophen (TYLENOL) 325 MG tablet; Take 1 tablet (325 mg total) by mouth every 6 (six) hours as needed for Pain.        Health Maintenance   Topic Date Due    TETANUS VACCINE  09/05/1989    Pneumococcal Vaccine (Medium Risk) (1 of 1 - PPSV23) 09/05/1990    Aspirin/Antiplatelet Therapy  05/11/2021    Colonoscopy  03/26/2023    Lipid Panel  12/02/2024       There are no Patient Instructions on file for this visit.        Answers for HPI/ROS submitted by the patient on 5/21/2020   Back pain  genital pain: No

## 2020-05-25 ENCOUNTER — NURSE TRIAGE (OUTPATIENT)
Dept: ADMINISTRATIVE | Facility: CLINIC | Age: 50
End: 2020-05-25

## 2020-05-25 ENCOUNTER — HOSPITAL ENCOUNTER (OUTPATIENT)
Dept: RADIOLOGY | Facility: HOSPITAL | Age: 50
Discharge: HOME | End: 2020-05-25
Attending: INTERNAL MEDICINE
Payer: COMMERCIAL

## 2020-05-25 ENCOUNTER — LAB VISIT (OUTPATIENT)
Dept: LAB | Facility: HOSPITAL | Age: 50
End: 2020-05-25
Attending: INTERNAL MEDICINE
Payer: COMMERCIAL

## 2020-05-25 ENCOUNTER — OFFICE VISIT (OUTPATIENT)
Dept: INTERNAL MEDICINE | Facility: CLINIC | Age: 50
End: 2020-05-25
Payer: COMMERCIAL

## 2020-05-25 VITALS
SYSTOLIC BLOOD PRESSURE: 110 MMHG | HEIGHT: 70 IN | DIASTOLIC BLOOD PRESSURE: 74 MMHG | WEIGHT: 168 LBS | BODY MASS INDEX: 24.05 KG/M2 | HEART RATE: 100 BPM | OXYGEN SATURATION: 98 %

## 2020-05-25 DIAGNOSIS — R63.4 WEIGHT LOSS: ICD-10-CM

## 2020-05-25 DIAGNOSIS — E83.51 HYPOCALCEMIA: ICD-10-CM

## 2020-05-25 DIAGNOSIS — R06.00 DYSPNEA, UNSPECIFIED TYPE: ICD-10-CM

## 2020-05-25 DIAGNOSIS — R50.9 FEVER, UNSPECIFIED FEVER CAUSE: ICD-10-CM

## 2020-05-25 DIAGNOSIS — Z86.39 HISTORY OF NON ANEMIC VITAMIN B12 DEFICIENCY: ICD-10-CM

## 2020-05-25 DIAGNOSIS — D64.9 ANEMIA, UNSPECIFIED TYPE: ICD-10-CM

## 2020-05-25 DIAGNOSIS — R50.9 FEVER, UNSPECIFIED FEVER CAUSE: Primary | ICD-10-CM

## 2020-05-25 LAB
BASOPHILS # BLD AUTO: 0.03 K/UL (ref 0–0.2)
BASOPHILS NFR BLD: 0.4 % (ref 0–1.9)
CRP SERPL-MCNC: 0.3 MG/L (ref 0–8.2)
DIFFERENTIAL METHOD: ABNORMAL
EOSINOPHIL # BLD AUTO: 0.1 K/UL (ref 0–0.5)
EOSINOPHIL NFR BLD: 1.1 % (ref 0–8)
ERYTHROCYTE [DISTWIDTH] IN BLOOD BY AUTOMATED COUNT: 11.5 % (ref 11.5–14.5)
HCT VFR BLD AUTO: 48.1 % (ref 40–54)
HGB BLD-MCNC: 15.7 G/DL (ref 14–18)
IMM GRANULOCYTES # BLD AUTO: 0.02 K/UL (ref 0–0.04)
IMM GRANULOCYTES NFR BLD AUTO: 0.2 % (ref 0–0.5)
LYMPHOCYTES # BLD AUTO: 1.7 K/UL (ref 1–4.8)
LYMPHOCYTES NFR BLD: 20 % (ref 18–48)
MCH RBC QN AUTO: 32.2 PG (ref 27–31)
MCHC RBC AUTO-ENTMCNC: 32.6 G/DL (ref 32–36)
MCV RBC AUTO: 99 FL (ref 82–98)
MONOCYTES # BLD AUTO: 0.5 K/UL (ref 0.3–1)
MONOCYTES NFR BLD: 5.4 % (ref 4–15)
NEUTROPHILS # BLD AUTO: 6.1 K/UL (ref 1.8–7.7)
NEUTROPHILS NFR BLD: 72.9 % (ref 38–73)
NRBC BLD-RTO: 0 /100 WBC
PLATELET # BLD AUTO: 187 K/UL (ref 150–350)
PMV BLD AUTO: 11.7 FL (ref 9.2–12.9)
RBC # BLD AUTO: 4.87 M/UL (ref 4.6–6.2)
TSH SERPL DL<=0.005 MIU/L-ACNC: 1.46 UIU/ML (ref 0.4–4)
WBC # BLD AUTO: 8.4 K/UL (ref 3.9–12.7)

## 2020-05-25 PROCEDURE — 36415 COLL VENOUS BLD VENIPUNCTURE: CPT

## 2020-05-25 PROCEDURE — 84443 ASSAY THYROID STIM HORMONE: CPT

## 2020-05-25 PROCEDURE — 71046 X-RAY EXAM CHEST 2 VIEWS: CPT | Mod: TC

## 2020-05-25 PROCEDURE — 82306 VITAMIN D 25 HYDROXY: CPT

## 2020-05-25 PROCEDURE — 99214 OFFICE O/P EST MOD 30 MIN: CPT | Mod: S$GLB,,, | Performed by: INTERNAL MEDICINE

## 2020-05-25 PROCEDURE — 99999 PR PBB SHADOW E&M-EST. PATIENT-LVL III: CPT | Mod: PBBFAC,,, | Performed by: INTERNAL MEDICINE

## 2020-05-25 PROCEDURE — 83921 ORGANIC ACID SINGLE QUANT: CPT

## 2020-05-25 PROCEDURE — 85025 COMPLETE CBC W/AUTO DIFF WBC: CPT

## 2020-05-25 PROCEDURE — 3008F PR BODY MASS INDEX (BMI) DOCUMENTED: ICD-10-PCS | Mod: CPTII,S$GLB,, | Performed by: INTERNAL MEDICINE

## 2020-05-25 PROCEDURE — 86140 C-REACTIVE PROTEIN: CPT

## 2020-05-25 PROCEDURE — 71046 XR CHEST PA AND LATERAL: ICD-10-PCS | Mod: 26,,, | Performed by: RADIOLOGY

## 2020-05-25 PROCEDURE — 99214 PR OFFICE/OUTPT VISIT, EST, LEVL IV, 30-39 MIN: ICD-10-PCS | Mod: S$GLB,,, | Performed by: INTERNAL MEDICINE

## 2020-05-25 PROCEDURE — 3008F BODY MASS INDEX DOCD: CPT | Mod: CPTII,S$GLB,, | Performed by: INTERNAL MEDICINE

## 2020-05-25 PROCEDURE — 71046 X-RAY EXAM CHEST 2 VIEWS: CPT | Mod: 26,,, | Performed by: RADIOLOGY

## 2020-05-25 PROCEDURE — 99999 PR PBB SHADOW E&M-EST. PATIENT-LVL III: ICD-10-PCS | Mod: PBBFAC,,, | Performed by: INTERNAL MEDICINE

## 2020-05-25 NOTE — TELEPHONE ENCOUNTER
Pt states he had covid in march. Pt states he completed augmentin for sinus issues on Saturday. Pt states Saturday and Sunday he had increased temp, highest temp=100.1. Pt states he continues with nasal congestion. Pt states when he inhales he has some chest pain. Pt advised per protocol, pt verbalizes understanding, and appointment made today.     Reason for Disposition   Taking antibiotic > 48 hours (2 days) and fever persists   All other patients with chest pain    Additional Information   Negative: Severe difficulty breathing (e.g., struggling for each breath, speaks in single words)   Negative: Sounds like a life-threatening emergency to the triager   Negative: Difficulty breathing and not from stuffy nose (e.g., not relieved by cleaning out the nose)   Negative: SEVERE headache and fever   Negative: Taking antibiotic > 24 hours and fever > 103 F (39.4 C)   Negative: Redness or swelling on the cheek, forehead or around the eye and fever   Negative: Patient sounds very sick or weak to the triager   Negative: SEVERE sinus pain and not improved 2 hours after pain medicine   Negative: Redness or swelling on the cheek, forehead or around the eye and new since starting antibiotics   Negative: Severe difficulty breathing (e.g., struggling for each breath, speaks in single words)   Negative: Passed out (i.e., fainted, collapsed and was not responding)   Negative: Chest pain lasting longer than 5 minutes and ANY of the following:* Over 50 years old* Over 30 years old and at least one cardiac risk factor (i.e., high blood pressure, diabetes, high cholesterol, obesity, smoker or strong family history of heart disease)* Pain is crushing, pressure-like, or heavy * Took nitroglycerin and chest pain was not relieved* History of heart disease (i.e., angina, heart attack, bypass surgery, angioplasty, CHF)   Negative: Visible sweat on face or sweat dripping down face   Negative: Sounds like a life-threatening  emergency to the triager   Negative: SEVERE chest pain   Negative: Pain also present in shoulder(s) or arm(s) or jaw   Negative: Difficulty breathing   Negative: Cocaine use within last 3 days   Negative: History of prior 'blood clot' in leg or lungs (i.e., deep vein thrombosis, pulmonary embolism)   Negative: Recent illness requiring prolonged bed rest (i.e., immobilization)   Negative: Major surgery in the past month   Negative: Hip or leg fracture in past 2 months (e.g, or had cast on leg or ankle)   Negative: Heart beating irregularly or very rapidly   Negative: Recent long-distance travel with prolonged time in car, bus, plane, or train (i.e., within past 2 weeks; 6 or more hours duration)   Negative: Chest pain lasting longer than 5 minutes   Negative: Intermittent chest pain and pain has been increasing in severity or frequency   Negative: Dizziness or lightheadedness   Negative: Coughing up blood   Negative: Patient sounds very sick or weak to the triager   Negative: Fever > 100.5 F (38.1 C)   Negative: Intermittent chest pains persist > 3 days    Protocols used: SINUS INFECTION ON ANTIBIOTIC FOLLOW-UP CALL-A-OH, CHEST PAIN-A-OH

## 2020-05-26 ENCOUNTER — OFFICE VISIT (OUTPATIENT)
Dept: INTERNAL MEDICINE | Facility: CLINIC | Age: 50
End: 2020-05-26
Payer: COMMERCIAL

## 2020-05-26 DIAGNOSIS — M54.6 BACK PAIN OF THORACOLUMBAR REGION: Primary | ICD-10-CM

## 2020-05-26 DIAGNOSIS — J32.9 SINUSITIS, UNSPECIFIED CHRONICITY, UNSPECIFIED LOCATION: ICD-10-CM

## 2020-05-26 DIAGNOSIS — U07.1 COVID-19 VIRUS INFECTION: ICD-10-CM

## 2020-05-26 DIAGNOSIS — M54.50 BACK PAIN OF THORACOLUMBAR REGION: Primary | ICD-10-CM

## 2020-05-26 LAB — 25(OH)D3+25(OH)D2 SERPL-MCNC: 34 NG/ML (ref 30–96)

## 2020-05-26 PROCEDURE — 99213 OFFICE O/P EST LOW 20 MIN: CPT | Mod: 95,,, | Performed by: INTERNAL MEDICINE

## 2020-05-26 PROCEDURE — 99213 PR OFFICE/OUTPT VISIT, EST, LEVL III, 20-29 MIN: ICD-10-PCS | Mod: 95,,, | Performed by: INTERNAL MEDICINE

## 2020-05-26 NOTE — PROGRESS NOTES
Subjective:       Patient ID: Andre Padgett is a 49 y.o. male.    Chief Complaint: Follow-up and Fever    The patient location is: Home  The chief complaint leading to consultation is: Fever follow up and back pain    Visit type: audiovisual    Face to Face time with patient: 15 minutes  20 minutes minutes of total time spent on the encounter, which includes face to face time and non-face to face time preparing to see the patient (eg, review of tests), Obtaining and/or reviewing separately obtained history, Documenting clinical information in the electronic or other health record, Independently interpreting results (not separately reported) and communicating results to the patient/family/caregiver, or Care coordination (not separately reported).         Each patient to whom he or she provides medical services by telemedicine is:  (1) informed of the relationship between the physician and patient and the respective role of any other health care provider with respect to management of the patient; and (2) notified that he or she may decline to receive medical services by telemedicine and may withdraw from such care at any time.    Notes:  Patient will known to me have visit yesterday with 1 of my partners.  Over the weekend he had a temperature of 101° on Saturday,  on Sunday and none yesterday or today.  He was having some mild nasal congestion and had been treated for sinus infection a week or 2 before with this was noted on head CT.  He denies any GI or  complaints but he also mentions that after being seated in a low lawn type chair for an hour a 2 yesterday when he stood up he felt a pinch or shooting type pain in the midback.  This seem to radiate around the back into the shoulders.  Sounded like he had a nerve that is pinched in triggered when he stood up.  No fevers or rash.  He has not treated it in any way.  Incidentally labs including sed rate blood count and chest x-ray from yesterday were  unremarkable  Back Pain   This is a new problem. The current episode started yesterday. The problem occurs every several days. The problem has been gradually worsening since onset. The pain is present in the lumbar spine. The quality of the pain is described as burning and shooting. The pain is at a severity of 4/10. The symptoms are aggravated by position. Associated symptoms include paresthesias, tingling and weight loss. Pertinent negatives include no abdominal pain, bladder incontinence, bowel incontinence, chest pain, dysuria, fever, headaches, paresis or perianal numbness. Risk factors include history of osteoporosis, poor posture and sedentary lifestyle. He has tried home exercises and walking for the symptoms. The treatment provided mild relief.     Review of Systems   Constitutional: Positive for fatigue (Improving since the weekend) and weight loss. Negative for chills, fever and unexpected weight change.   HENT: Negative for nosebleeds and trouble swallowing.    Eyes: Negative for pain and visual disturbance.   Respiratory: Negative for cough, shortness of breath and wheezing.    Cardiovascular: Negative for chest pain and palpitations.   Gastrointestinal: Negative for abdominal pain, bowel incontinence, constipation, diarrhea, nausea and vomiting.   Genitourinary: Negative for bladder incontinence, difficulty urinating, dysuria and hematuria.   Musculoskeletal: Positive for back pain. Negative for neck pain.   Skin: Negative for rash.   Neurological: Positive for tingling and paresthesias. Negative for dizziness and headaches.   Hematological: Does not bruise/bleed easily.   Psychiatric/Behavioral: Negative for dysphoric mood, sleep disturbance and suicidal ideas.       Objective:      Physical Exam   Constitutional: He is oriented to person, place, and time. He appears well-developed and well-nourished.   Pulmonary/Chest: No respiratory distress.   Neurological: He is alert and oriented to person,  place, and time.   Psychiatric: He has a normal mood and affect. His behavior is normal.       Assessment:       1. Back pain of thoracolumbar region    2. Sinusitis, unspecified chronicity, unspecified location    3. COVID-19 virus infection        Plan:       Andre was seen today for follow-up and fever.    Diagnoses and all orders for this visit:    Back pain of thoracolumbar region    Sinusitis, unspecified chronicity, unspecified location    COVID-19 virus infection        heat, anti-inflammatory to the back.  Monitor for any changes or worsening.  I suspect he had irritated or pinched nerve that should resolve.  No other labs or x-ray abnormalities or signs or symptoms that would be a cause for the fever over the weekend.  Continue to monitor  Answers for HPI/ROS submitted by the patient on 5/26/2020   Back pain  genital pain: No

## 2020-05-27 ENCOUNTER — OFFICE VISIT (OUTPATIENT)
Dept: INTERNAL MEDICINE | Facility: CLINIC | Age: 50
End: 2020-05-27
Payer: COMMERCIAL

## 2020-05-27 ENCOUNTER — HOSPITAL ENCOUNTER (OUTPATIENT)
Dept: RADIOLOGY | Facility: HOSPITAL | Age: 50
Discharge: HOME | End: 2020-05-27
Attending: FAMILY MEDICINE
Payer: COMMERCIAL

## 2020-05-27 ENCOUNTER — PATIENT MESSAGE (OUTPATIENT)
Dept: INTERNAL MEDICINE | Facility: CLINIC | Age: 50
End: 2020-05-27

## 2020-05-27 VITALS
HEIGHT: 70 IN | DIASTOLIC BLOOD PRESSURE: 82 MMHG | BODY MASS INDEX: 23.99 KG/M2 | OXYGEN SATURATION: 98 % | TEMPERATURE: 99 F | HEART RATE: 99 BPM | WEIGHT: 167.56 LBS | SYSTOLIC BLOOD PRESSURE: 124 MMHG

## 2020-05-27 DIAGNOSIS — S29.019A THORACIC MYOFASCIAL STRAIN, INITIAL ENCOUNTER: ICD-10-CM

## 2020-05-27 DIAGNOSIS — S16.1XXA ACUTE STRAIN OF NECK MUSCLE, INITIAL ENCOUNTER: ICD-10-CM

## 2020-05-27 DIAGNOSIS — S16.1XXA ACUTE STRAIN OF NECK MUSCLE, INITIAL ENCOUNTER: Primary | ICD-10-CM

## 2020-05-27 PROCEDURE — 99999 PR PBB SHADOW E&M-EST. PATIENT-LVL IV: ICD-10-PCS | Mod: PBBFAC,,, | Performed by: FAMILY MEDICINE

## 2020-05-27 PROCEDURE — 99213 OFFICE O/P EST LOW 20 MIN: CPT | Mod: S$GLB,,, | Performed by: FAMILY MEDICINE

## 2020-05-27 PROCEDURE — 99999 PR PBB SHADOW E&M-EST. PATIENT-LVL IV: CPT | Mod: PBBFAC,,, | Performed by: FAMILY MEDICINE

## 2020-05-27 PROCEDURE — 72040 XR CERVICAL SPINE 2 OR 3 VIEWS: ICD-10-PCS | Mod: 26,,, | Performed by: RADIOLOGY

## 2020-05-27 PROCEDURE — 72040 X-RAY EXAM NECK SPINE 2-3 VW: CPT | Mod: TC

## 2020-05-27 PROCEDURE — 72040 X-RAY EXAM NECK SPINE 2-3 VW: CPT | Mod: 26,,, | Performed by: RADIOLOGY

## 2020-05-27 PROCEDURE — 72070 XR THORACIC SPINE AP LATERAL: ICD-10-PCS | Mod: 26,,, | Performed by: RADIOLOGY

## 2020-05-27 PROCEDURE — 72070 X-RAY EXAM THORAC SPINE 2VWS: CPT | Mod: 26,,, | Performed by: RADIOLOGY

## 2020-05-27 PROCEDURE — 72070 X-RAY EXAM THORAC SPINE 2VWS: CPT | Mod: TC

## 2020-05-27 PROCEDURE — 3008F PR BODY MASS INDEX (BMI) DOCUMENTED: ICD-10-PCS | Mod: CPTII,S$GLB,, | Performed by: FAMILY MEDICINE

## 2020-05-27 PROCEDURE — 99213 PR OFFICE/OUTPT VISIT, EST, LEVL III, 20-29 MIN: ICD-10-PCS | Mod: S$GLB,,, | Performed by: FAMILY MEDICINE

## 2020-05-27 PROCEDURE — 3008F BODY MASS INDEX DOCD: CPT | Mod: CPTII,S$GLB,, | Performed by: FAMILY MEDICINE

## 2020-05-27 NOTE — PATIENT INSTRUCTIONS
Muscle Spasm  A muscle spasm (also called a cramp) is an involuntary muscle contraction. The muscle tightens quickly and strongly. A hard lump may form in the muscle. Muscle spasms are very painful. Read on to learn more about muscle spasms and how to treat and prevent them.    What causes muscles to spasm?  Often, the cause of a muscle spasm is not known. Muscle spasm is due to irritation of muscle fibers. Some things can make a muscle spasm more likely. These include:  · Injury  · Heavy exercise  · Overtired muscles  · A muscle held in one position for a long time  · Dehydration  · Low levels of certain minerals in the body  · Taking certain medications, such as diuretics or water pills  · Certain medical conditions, such as kidney failure or diabetes  · Being pregnant  Stopping a muscle spasm  Muscle spasms often come and go quickly. When a muscle goes into spasm, very gently stretch and massage the muscle. This may help calm the muscle fibers. Then rest the muscle.  Preventing muscle spasms  Although there is little or no evidence that staying hydrated, taking certain vitamins or minerals or stretching works to prevent cramps, these measures may help and have other benefits. Talk to your health care provider about steps to take to avoid muscle spasms. These may include:  · Drinking enough fluids to avoid dehydration, especially when you exercise.  · Taking vitamin or mineral supplements.  · Getting regular exercise.  · Stretching regularly, especially before exercise.  · Limit caffeine and smoking.  · Taking a prescription muscle relaxant.  When to call your doctor  Call your doctor if you have any of the following:  · Severe cramping  · Cramping that lasts a long time, does not go away with stretching, or keeps coming back  · Pain, tingling, or weakness in the arms or legs  · Pain that wakes you up at night   Date Last Reviewed: 9/1/2015  © 7062-4603 [employer]. [address] [place] [address], [facility]  [place] [address]. All rights reserved. This information is not intended as a substitute for professional medical care. Always follow your healthcare professional's instructions.

## 2020-05-27 NOTE — PROGRESS NOTES
Subjective:       Patient ID: Andre Padgett is a 49 y.o. male.    Chief Complaint:   Back Pain; Neck Pain (tyler); Shoulder Pain (tyler); and Arm Pain (tyler)    Back Pain   This is a new problem. Episode onset: 2 days ago. The problem occurs constantly. The problem is unchanged. Pain location: lower thoracic spine midline. Radiates to: posterior neck to base of skull and bilateral shoulders. The pain is moderate. The symptoms are aggravated by position (moving). Pertinent negatives include no chest pain.   Started when got up suddenly from a low chair.  Had a little numbness in arms and knees yesterday.  None today.  Has Rx for ibuprofen 800 mg tid and cyclobenzaprine 5 mg.  He took 1 cyclobenzaprine yesterday.  He is very concerned and is requesting x-rays.  Review of Systems   Respiratory: Negative for shortness of breath and wheezing.    Cardiovascular: Negative for chest pain and palpitations.   Musculoskeletal: Positive for back pain.     Current Outpatient Medications   Medication Sig    ALPRAZolam (XANAX) 1 MG tablet Take 1 tablet (1 mg total) by mouth 2 (two) times daily as needed for Anxiety.    ascorbic acid (VITAMIN C) 100 MG tablet Take 100 mg by mouth once daily.    aspirin 81 MG Chew Take 81 mg by mouth once daily.    coenzyme Q10 200 mg capsule Take 200 mg by mouth once daily. Take with cholesterol med.    diclofenac sodium (VOLTAREN) 1 % Gel Apply 2 g topically 3 (three) times daily as needed.    fluticasone propionate (FLONASE) 50 mcg/actuation nasal spray 2 sprays (100 mcg total) by Each Nostril route once daily.    hydrOXYzine HCl (ATARAX) 25 MG tablet Take 1 tablet (25 mg total) by mouth nightly as needed (Insomnia).    ibuprofen (ADVIL,MOTRIN) 800 MG tablet Take 1 tablet (800 mg total) by mouth 3 (three) times daily as needed for Pain.    zinc sulfate (ZINCATE) 220 (50) mg capsule TK ONE C PO  QD    acetaminophen (TYLENOL) 325 MG tablet Take 1 tablet (325 mg total) by mouth every 6 (six)  "hours as needed for Pain. (Patient not taking: Reported on 5/27/2020)    atorvastatin (LIPITOR) 40 MG tablet TAKE 1 TABLET(40 MG) BY MOUTH EVERY DAY     No current facility-administered medications for this visit.      Past Medical History:   Diagnosis Date    Aneurysm     Right sided filling anterior communicating aneurysm s/p coiling 2011    Anxiety     Cerebral aneurysm, nonruptured 12/27/2016    Colon adenoma: 3/18 repeat colonoscpy 2023 6/22/2018    Diverticulosis of large intestine without hemorrhage: see colonoscopy 3/18; sigmoid and descending colon 6/22/2018    Fatty liver: see CT 3/18 6/22/2018    Umbilical hernia without obstruction and without gangrene: see CT 3/18 6/22/2018     Family History   Problem Relation Age of Onset    DAVID disease Mother     Hypertension Mother     Diabetes Mother     Brain cancer Father     Thyroid disease Sister     No Known Problems Daughter     No Known Problems Sister     Cirrhosis Neg Hx     Colon polyps Neg Hx     Crohn's disease Neg Hx     Liver cancer Neg Hx     Stomach cancer Neg Hx     Ulcerative colitis Neg Hx     Heart attack Neg Hx     Colon cancer Neg Hx      Social History     Tobacco Use    Smoking status: Never Smoker    Smokeless tobacco: Never Used   Substance Use Topics    Alcohol use: Yes     Comment: ocassionally - twice a week    Drug use: No       Objective:      Vitals:    05/27/20 1131   BP: 124/82   BP Location: Right arm   Patient Position: Sitting   BP Method: Medium (Manual)   Pulse: 99   Temp: 98.5 °F (36.9 °C)   SpO2: 98%   Weight: 76 kg (167 lb 8.8 oz)   Height: 5' 10" (1.778 m)     Physical Exam   Constitutional: He is oriented to person, place, and time. He appears well-developed and well-nourished. No distress.   HENT:   Head: Normocephalic and atraumatic.   Eyes: Conjunctivae and EOM are normal.   Neck: Normal range of motion. Neck supple.   Cardiovascular: Normal rate, regular rhythm and normal heart sounds. Exam " reveals no gallop and no friction rub.   No murmur heard.  Pulmonary/Chest: Effort normal and breath sounds normal. He has no wheezes. He has no rales.   Musculoskeletal: He exhibits no deformity.   Bilateral traps:  TTP.  Spasm.  Hurts to turn head side to side and to flex neck.   Neurological: He is alert and oriented to person, place, and time.   Skin: Skin is warm and dry. He is not diaphoretic.   Psychiatric: He has a normal mood and affect. His behavior is normal.   Nursing note and vitals reviewed.           Assessment and Plan:     Acute strain of neck muscle, initial encounter  -     X-Ray Cervical Spine 2 or 3 Views; Future; Expected date: 05/27/2020    Thoracic myofascial strain, initial encounter  -     X-Ray Thoracic Spine AP Lateral; Future; Expected date: 05/27/2020    Take ibuprofen 800 mg tid and cyclobenzaprine 5 mg as Rx'd.  Heat to neck and back.     Follow up if symptoms worsen or fail to improve.      Caesar Montenegro MD

## 2020-05-29 LAB — METHYLMALONATE SERPL-SCNC: 0.24 UMOL/L

## 2020-06-01 ENCOUNTER — PATIENT MESSAGE (OUTPATIENT)
Dept: INTERNAL MEDICINE | Facility: CLINIC | Age: 50
End: 2020-06-01

## 2020-06-01 ENCOUNTER — NURSE TRIAGE (OUTPATIENT)
Dept: ADMINISTRATIVE | Facility: CLINIC | Age: 50
End: 2020-06-01

## 2020-06-01 DIAGNOSIS — J32.9 CHRONIC SINUSITIS, UNSPECIFIED LOCATION: Primary | ICD-10-CM

## 2020-06-02 ENCOUNTER — TELEPHONE (OUTPATIENT)
Dept: ADMINISTRATIVE | Facility: OTHER | Age: 50
End: 2020-06-02

## 2020-06-02 NOTE — TELEPHONE ENCOUNTER
Left voice message for patient to return call to schedule appointment from referral to ENT department.  Luly WEBB [phone]

## 2020-06-02 NOTE — TELEPHONE ENCOUNTER
"  Pt states her pinched a nerve a while back and was having back pain, now states both legs feel as though they are on fire, pain 5/10, also admits to scrotal pain that started at the same time, pt is very nervous, denies redness, swelling, SOB, fever, chest pain, advised him to go to ER because he sounds very concerned but he does not want to so that, he states he will watch it and go to pcp tomorrow if it continues, advised him to call back with any needs or concerns, caller agreed   Reason for Disposition   Leg pain    Additional Information   Negative: Looks like a broken bone or dislocated joint (e.g., crooked or deformed)   Negative: Sounds like a life-threatening emergency to the triager   Negative: Followed a leg injury   Negative: Leg swelling is main symptom   Negative: Chest pain   Negative: Difficulty breathing   Negative: Entire foot is cool or blue in comparison to other side   Negative: Unable to walk   Negative: [1] Red area or streak AND [2] fever   Negative: [1] Swollen joint AND [2] fever   Negative: [1] Cast on leg or ankle AND [2] now increased pain   Negative: Patient sounds very sick or weak to the triager   Negative: [1] SEVERE pain (e.g., excruciating, unable to do any normal activities) AND [2] not improved after 2 hours of pain medicine   Negative: [1] Thigh or calf pain AND [2] only 1 side AND [3] present > 1 hour   Negative: [1] Thigh, calf, or ankle swelling AND [2] only 1 side   Negative: [1] Thigh, calf, or ankle swelling AND [2] bilateral AND [3] 1 side is more swollen   Negative: [1] Red area or streak AND [2] large (> 2 in. or 5 cm)   Negative: History of prior "blood clot" in leg or lungs (i.e., deep vein thrombosis, pulmonary embolism)   Negative: History of inherited increased risk of blood clots (e.g., Factor 5 Leiden, Anti-thrombin 3, Protein C or Protein S deficiency, Prothrombin mutation)   Negative: Recent illness requiring prolonged bedrest (i.e., " "immobilization)   Negative: Hip or leg fracture in past 2 months (e.g, or had cast on leg or ankle)   Negative: Major surgery in the past two months   Negative: Cancer treatment in the past two months (or has cancer now)   Negative: Recent long-distance travel with prolonged time in car, bus, plane, or train (i.e., within past 2 weeks; 6 or  more hours duration)   Negative: [1] Painful rash AND [2] multiple small blisters grouped together (i.e., dermatomal distribution or "band" or "stripe")   Negative: Looks like a boil, infected sore, deep ulcer or other infected rash (spreading redness, pus)   Negative: [1] Localized rash is very painful AND [2] no fever   Negative: Numbness in a leg or foot (i.e., loss of sensation)   Negative: Localized pain, redness or hard lump along vein   Negative: [1] MODERATE pain (e.g., interferes with normal activities, limping) AND [2] present > 3 days   Negative: [1] Swollen joint AND [2] no fever or redness   Negative: [1] Leg pain which occurs after walking a certain distance AND [2] disappears with rest AND [3] age > 50   Negative: [1] Pain in front of the lower leg(s) (shins)     AND [2] occurs with running or jumping exercise  (e.g., jogging,  basketball)   Negative: [1] MILD pain (e.g., does not interfere with normal activities) AND [2] present > 7 days   Negative: Leg pain or muscle cramp is a chronic symptom (recurrent or ongoing AND present > 4 weeks)    Protocols used: LEG PAIN-A-AH      "

## 2020-06-03 ENCOUNTER — PATIENT OUTREACH (OUTPATIENT)
Dept: ADMINISTRATIVE | Facility: OTHER | Age: 50
End: 2020-06-03

## 2020-06-03 ENCOUNTER — OFFICE VISIT (OUTPATIENT)
Dept: INTERNAL MEDICINE | Facility: CLINIC | Age: 50
End: 2020-06-03
Payer: COMMERCIAL

## 2020-06-03 ENCOUNTER — LAB VISIT (OUTPATIENT)
Dept: LAB | Facility: HOSPITAL | Age: 50
End: 2020-06-03
Attending: INTERNAL MEDICINE
Payer: COMMERCIAL

## 2020-06-03 ENCOUNTER — LAB VISIT (OUTPATIENT)
Dept: SURGERY | Facility: CLINIC | Age: 50
End: 2020-06-03
Payer: COMMERCIAL

## 2020-06-03 VITALS
SYSTOLIC BLOOD PRESSURE: 134 MMHG | OXYGEN SATURATION: 97 % | BODY MASS INDEX: 23.93 KG/M2 | WEIGHT: 167.13 LBS | HEIGHT: 70 IN | HEART RATE: 96 BPM | DIASTOLIC BLOOD PRESSURE: 70 MMHG

## 2020-06-03 DIAGNOSIS — U07.1 COVID-19 VIRUS INFECTION: Primary | ICD-10-CM

## 2020-06-03 DIAGNOSIS — R30.0 DYSURIA: ICD-10-CM

## 2020-06-03 DIAGNOSIS — R30.0 DYSURIA: Primary | ICD-10-CM

## 2020-06-03 DIAGNOSIS — U07.1 COVID-19 VIRUS INFECTION: ICD-10-CM

## 2020-06-03 DIAGNOSIS — R20.0 BILATERAL LEG NUMBNESS: ICD-10-CM

## 2020-06-03 DIAGNOSIS — M54.50 BACK PAIN OF THORACOLUMBAR REGION: ICD-10-CM

## 2020-06-03 DIAGNOSIS — M54.6 BACK PAIN OF THORACOLUMBAR REGION: ICD-10-CM

## 2020-06-03 DIAGNOSIS — R10.9 ABDOMINAL PAIN, UNSPECIFIED ABDOMINAL LOCATION: ICD-10-CM

## 2020-06-03 LAB
BILIRUB UR QL STRIP: NEGATIVE
CLARITY UR REFRACT.AUTO: ABNORMAL
COLOR UR AUTO: ABNORMAL
GLUCOSE UR QL STRIP: NEGATIVE
HGB UR QL STRIP: NEGATIVE
KETONES UR QL STRIP: NEGATIVE
LEUKOCYTE ESTERASE UR QL STRIP: NEGATIVE
MICROSCOPIC COMMENT: NORMAL
NITRITE UR QL STRIP: NEGATIVE
PH UR STRIP: 6 [PH] (ref 5–8)
PROT UR QL STRIP: NEGATIVE
SP GR UR STRIP: 1.01 (ref 1–1.03)
SQUAMOUS #/AREA URNS AUTO: 0 /HPF
URN SPEC COLLECT METH UR: ABNORMAL
WBC #/AREA URNS AUTO: 0 /HPF (ref 0–5)

## 2020-06-03 PROCEDURE — U0003 INFECTIOUS AGENT DETECTION BY NUCLEIC ACID (DNA OR RNA); SEVERE ACUTE RESPIRATORY SYNDROME CORONAVIRUS 2 (SARS-COV-2) (CORONAVIRUS DISEASE [COVID-19]), AMPLIFIED PROBE TECHNIQUE, MAKING USE OF HIGH THROUGHPUT TECHNOLOGIES AS DESCRIBED BY CMS-2020-01-R: HCPCS

## 2020-06-03 PROCEDURE — 99214 OFFICE O/P EST MOD 30 MIN: CPT | Mod: S$GLB,,, | Performed by: INTERNAL MEDICINE

## 2020-06-03 PROCEDURE — 3008F PR BODY MASS INDEX (BMI) DOCUMENTED: ICD-10-PCS | Mod: CPTII,S$GLB,, | Performed by: INTERNAL MEDICINE

## 2020-06-03 PROCEDURE — 81001 URINALYSIS AUTO W/SCOPE: CPT

## 2020-06-03 PROCEDURE — 99999 PR PBB SHADOW E&M-EST. PATIENT-LVL IV: ICD-10-PCS | Mod: PBBFAC,,, | Performed by: INTERNAL MEDICINE

## 2020-06-03 PROCEDURE — 99999 PR PBB SHADOW E&M-EST. PATIENT-LVL IV: CPT | Mod: PBBFAC,,, | Performed by: INTERNAL MEDICINE

## 2020-06-03 PROCEDURE — 99214 PR OFFICE/OUTPT VISIT, EST, LEVL IV, 30-39 MIN: ICD-10-PCS | Mod: S$GLB,,, | Performed by: INTERNAL MEDICINE

## 2020-06-03 PROCEDURE — 87086 URINE CULTURE/COLONY COUNT: CPT

## 2020-06-03 PROCEDURE — 3008F BODY MASS INDEX DOCD: CPT | Mod: CPTII,S$GLB,, | Performed by: INTERNAL MEDICINE

## 2020-06-03 NOTE — PROGRESS NOTES
Subjective:       Patient ID: Andre Padgett is a 49 y.o. male.    Chief Complaint: Abdominal Pain (Since monday) and Constipation    Patient complains of abdominal pain constipation and continued back pain with tingling below the legs.  Patient is seeing ENT for his sinus symptoms in the coming couple of days.  Also of note is that he said he has been having some mild constipation.  He is not certain because but wondered if it could be dehydration or some other medication antibiotic etc..  He said on Monday he was driving and felt a pain from the center of his abdomen down into the scrotum any felt a tingling or burning from the knees down.  He has been having some back symptoms but that was getting better but this has persisted.  The significant pain improved somewhat but he still feels unusual sensation.  Seem to be worse when he had to strain to have a bowel movement or urinate    Review of Systems   Constitutional: Negative for chills, fatigue, fever and unexpected weight change.   HENT: Positive for sinus pressure. Negative for nosebleeds and trouble swallowing.    Eyes: Negative for pain and visual disturbance.   Respiratory: Negative for cough, shortness of breath and wheezing.    Cardiovascular: Negative for chest pain and palpitations.   Gastrointestinal: Positive for abdominal pain. Negative for constipation, diarrhea, nausea and vomiting.   Genitourinary: Positive for dysuria. Negative for difficulty urinating and hematuria.        Sensation of burning in the scrotum   Musculoskeletal: Positive for back pain. Negative for neck pain.   Skin: Negative for rash.   Neurological: Positive for numbness and headaches. Negative for dizziness.   Hematological: Does not bruise/bleed easily.   Psychiatric/Behavioral: Negative for dysphoric mood, sleep disturbance and suicidal ideas.       Objective:      Physical Exam   Constitutional: He is oriented to person, place, and time. He appears well-developed and  well-nourished.   Cardiovascular: Normal rate and regular rhythm.   Pulmonary/Chest: Effort normal and breath sounds normal.   Abdominal: Soft. Bowel sounds are normal. He exhibits no distension. There is no tenderness. There is no guarding.   Genitourinary: Rectum normal, prostate normal and penis normal. Rectal exam shows guaiac negative stool. No penile tenderness.   Neurological: He is alert and oriented to person, place, and time. He displays normal reflexes. No cranial nerve deficit. He exhibits normal muscle tone. Coordination normal.   Skin: Skin is warm and dry. No erythema.   Psychiatric: He has a normal mood and affect. His behavior is normal.       Assessment:       1. Dysuria    2. Abdominal pain, unspecified abdominal location    3. Back pain of thoracolumbar region    4. Bilateral leg numbness        Plan:       Andre was seen today for abdominal pain and constipation.    Diagnoses and all orders for this visit:    Dysuria  -     Urine culture; Future  -     Urinalysis; Future    Abdominal pain, unspecified abdominal location  -     Urine culture; Future  -     Urinalysis; Future    Back pain of thoracolumbar region  -     MRI Lumbar Spine Without Contrast; Future    Bilateral leg numbness  -     MRI Lumbar Spine Without Contrast; Future

## 2020-06-04 ENCOUNTER — PATIENT MESSAGE (OUTPATIENT)
Dept: INTERNAL MEDICINE | Facility: CLINIC | Age: 50
End: 2020-06-04

## 2020-06-04 LAB — SARS-COV-2 RNA RESP QL NAA+PROBE: DETECTED

## 2020-06-05 ENCOUNTER — TELEPHONE (OUTPATIENT)
Dept: INTERNAL MEDICINE | Facility: CLINIC | Age: 50
End: 2020-06-05

## 2020-06-05 ENCOUNTER — TELEPHONE (OUTPATIENT)
Dept: OTOLARYNGOLOGY | Facility: CLINIC | Age: 50
End: 2020-06-05

## 2020-06-05 LAB — BACTERIA UR CULT: NO GROWTH

## 2020-06-05 NOTE — TELEPHONE ENCOUNTER
Sumaya JENNINGS Staff             Authorization is pending with [facility] [place], patient is scheduled tomorrow.  Please advise if Medically Urgent or if patient will be rescheduled by your office.  Thanks Cici

## 2020-06-09 ENCOUNTER — PATIENT OUTREACH (OUTPATIENT)
Dept: ADMINISTRATIVE | Facility: OTHER | Age: 50
End: 2020-06-09

## 2020-06-09 ENCOUNTER — OFFICE VISIT (OUTPATIENT)
Dept: UROLOGY | Facility: CLINIC | Age: 50
End: 2020-06-09
Payer: COMMERCIAL

## 2020-06-09 VITALS
HEIGHT: 70 IN | BODY MASS INDEX: 23.2 KG/M2 | SYSTOLIC BLOOD PRESSURE: 123 MMHG | HEART RATE: 95 BPM | DIASTOLIC BLOOD PRESSURE: 88 MMHG | WEIGHT: 162.06 LBS

## 2020-06-09 DIAGNOSIS — N41.9 PROSTATITIS, UNSPECIFIED PROSTATITIS TYPE: ICD-10-CM

## 2020-06-09 DIAGNOSIS — N50.819 TESTICLE PAIN: Primary | ICD-10-CM

## 2020-06-09 PROCEDURE — 51798 PR MEAS,POST-VOID RES,US,NON-IMAGING: ICD-10-PCS | Mod: S$GLB,,, | Performed by: NURSE PRACTITIONER

## 2020-06-09 PROCEDURE — 99203 OFFICE O/P NEW LOW 30 MIN: CPT | Mod: 25,S$GLB,, | Performed by: NURSE PRACTITIONER

## 2020-06-09 PROCEDURE — 3008F PR BODY MASS INDEX (BMI) DOCUMENTED: ICD-10-PCS | Mod: CPTII,S$GLB,, | Performed by: NURSE PRACTITIONER

## 2020-06-09 PROCEDURE — 99999 PR PBB SHADOW E&M-EST. PATIENT-LVL III: CPT | Mod: PBBFAC,,, | Performed by: NURSE PRACTITIONER

## 2020-06-09 PROCEDURE — 99999 PR PBB SHADOW E&M-EST. PATIENT-LVL III: ICD-10-PCS | Mod: PBBFAC,,, | Performed by: NURSE PRACTITIONER

## 2020-06-09 PROCEDURE — 99203 PR OFFICE/OUTPT VISIT, NEW, LEVL III, 30-44 MIN: ICD-10-PCS | Mod: 25,S$GLB,, | Performed by: NURSE PRACTITIONER

## 2020-06-09 PROCEDURE — 81002 PR URINALYSIS NONAUTO W/O SCOPE: ICD-10-PCS | Mod: S$GLB,,, | Performed by: NURSE PRACTITIONER

## 2020-06-09 PROCEDURE — 81002 URINALYSIS NONAUTO W/O SCOPE: CPT | Mod: S$GLB,,, | Performed by: NURSE PRACTITIONER

## 2020-06-09 PROCEDURE — 51798 US URINE CAPACITY MEASURE: CPT | Mod: S$GLB,,, | Performed by: NURSE PRACTITIONER

## 2020-06-09 PROCEDURE — 3008F BODY MASS INDEX DOCD: CPT | Mod: CPTII,S$GLB,, | Performed by: NURSE PRACTITIONER

## 2020-06-09 RX ORDER — OXAPROZIN 600 MG/1
600 TABLET, FILM COATED ORAL 2 TIMES DAILY WITH MEALS
Qty: 28 TABLET | Refills: 0 | Status: SHIPPED | OUTPATIENT
Start: 2020-06-09 | End: 2020-06-23

## 2020-06-09 NOTE — PROGRESS NOTES
"CHIEF COMPLAINT:    Mr. Padgett is a 49 y.o. male presenting for dysuria, low abdominal pain, testicle/penile pain.  PRESENTING ILLNESS:    Andre Padgett is a 49 y.o. male who presents for dysuria, low abdominal pain, testicle/penile pain. This is his initial clinic visit.    Today patient presents to clinic for dysuria, low abdominal pain, testicle/penile pain that started ~ 8 days ago. He had microscopic UA and urine culture done by PCP, which both were negative. Pt reports he will have low abdominal pain, suprapubic area, that radiates down to penis and scrotum / bilateral testes. He described pain as burning, "hot" sensation. He reports dysuria, but not with all voids. Today in clinic, he is not having any pain or burning sensation. He is not sexually active. Denies frequency, urgency or incontinence. FOS strong. Has to strain on occasion. Denies intermittent or split stream. Denies swelling or redness to scrotum. He does have issues with constipation and pain does worsen with straining. He feels he empties his bladder.   He has chronic back pain and takes ibuprofen and a muscle relaxer for it. He does feel tingling sensation to BLE.    Lab Results   Component Value Date    LABURIN No growth 06/03/2020    LABURIN No growth 05/21/2020    LABURIN No significant growth 09/25/2017    LABURIN No growth 07/14/2017    LABURIN No growth 04/21/2015    LABURIN NO SIGNIFICANT GROWTH 08/13/2009    LABURIN  04/18/2006     MULTIPLE ORGANISMS ISOLATED. NONE IN PREDOMINANCE REPEAT IF CLINICALLY NECESSARY.         REVIEW OF SYSTEMS:    Review of Systems    Constitutional: Negative for fever and chills.   HENT: Negative for congestion.   Eyes: Negative for eye discharge.   Respiratory: Negative for shortness of breath.   Cardiovascular: Negative for chest pain.   Gastrointestinal: Positive constipation. Negative for nausea, vomiting.   Genitourinary:  See HPI  Neurological: Negative for dizziness.   Hematological: Does not " bruise/bleed easily.   Psychiatric/Behavioral: Negative for confusion.       PATIENT HISTORY:    Past Medical History:   Diagnosis Date    Aneurysm     Right sided filling anterior communicating aneurysm s/p coiling 2011    Anxiety     Cerebral aneurysm, nonruptured 12/27/2016    Colon adenoma: 3/18 repeat colonoscpy 2023 6/22/2018    Diverticulosis of large intestine without hemorrhage: see colonoscopy 3/18; sigmoid and descending colon 6/22/2018    Fatty liver: see CT 3/18 6/22/2018    Umbilical hernia without obstruction and without gangrene: see CT 3/18 6/22/2018       Past Surgical History:   Procedure Laterality Date    CEREBRAL ANGIOGRAM  2011    angiogram/coiling    COLONOSCOPY N/A 3/26/2018    Procedure: COLONOSCOPY;  Surgeon: Sanjiv Duran MD;  Location: [facility] ([address] [place] [address]);  Service: Endoscopy;  Laterality: N/A;       Family History   Problem Relation Age of Onset    DAVID disease Mother     Hypertension Mother     Diabetes Mother     Brain cancer Father     Thyroid disease Sister     No Known Problems Daughter     No Known Problems Sister     Cirrhosis Neg Hx     Colon polyps Neg Hx     Crohn's disease Neg Hx     Liver cancer Neg Hx     Stomach cancer Neg Hx     Ulcerative colitis Neg Hx     Heart attack Neg Hx     Colon cancer Neg Hx        Social History     Socioeconomic History    Marital status:      Spouse name: Not on file    Number of children: Not on file    Years of education: Not on file    Highest education level: Not on file   Occupational History    Not on file   Social Needs    Financial resource strain: Not hard at all    Food insecurity:     Worry: Never true     Inability: Never true    Transportation needs:     Medical: No     Non-medical: No   Tobacco Use    Smoking status: Never Smoker    Smokeless tobacco: Never Used   Substance and Sexual Activity    Alcohol use: Yes     Comment: ocassionally - twice a week    Drug use: No    Sexual  activity: Not on file   Lifestyle    Physical activity:     Days per week: 7 days     Minutes per session: 40 min    Stress: To some extent   Relationships    Social connections:     Talks on phone: More than three times a week     Gets together: Twice a week     Attends Alevism service: Not on file     Active member of club or organization: Yes     Attends meetings of clubs or organizations: 1 to 4 times per year     Relationship status:    Other Topics Concern    Not on file   Social History Narrative    Not on file       Allergies:  Patient has no known allergies.    Medications:    Current Outpatient Medications:     acetaminophen (TYLENOL) 325 MG tablet, Take 1 tablet (325 mg total) by mouth every 6 (six) hours as needed for Pain., Disp: 30 tablet, Rfl: 0    ALPRAZolam (XANAX) 1 MG tablet, Take 1 tablet (1 mg total) by mouth 2 (two) times daily as needed for Anxiety., Disp: 60 tablet, Rfl: 2    ascorbic acid (VITAMIN C) 100 MG tablet, Take 100 mg by mouth once daily., Disp: , Rfl:     aspirin 81 MG Chew, Take 81 mg by mouth once daily., Disp: , Rfl:     atorvastatin (LIPITOR) 40 MG tablet, TAKE 1 TABLET(40 MG) BY MOUTH EVERY DAY, Disp: 90 tablet, Rfl: 3    coenzyme Q10 200 mg capsule, Take 200 mg by mouth once daily. Take with cholesterol med., Disp: , Rfl:     fluticasone propionate (FLONASE) 50 mcg/actuation nasal spray, 2 sprays (100 mcg total) by Each Nostril route once daily., Disp: 16 g, Rfl: 12    hydrOXYzine HCl (ATARAX) 25 MG tablet, Take 1 tablet (25 mg total) by mouth nightly as needed (Insomnia)., Disp: 30 tablet, Rfl: 6    zinc sulfate (ZINCATE) 220 (50) mg capsule, TK ONE C PO  QD, Disp: , Rfl:     diclofenac sodium (VOLTAREN) 1 % Gel, Apply 2 g topically 3 (three) times daily as needed., Disp: 1 Tube, Rfl: 0    oxaprozin (DAYPRO) 600 mg tablet, Take 1 tablet (600 mg total) by mouth 2 (two) times daily with meals. for 14 days, Disp: 28 tablet, Rfl: 0    PHYSICAL  EXAMINATION:    Constitutional: He is oriented to person, place, and time. He appears well-developed and well-nourished.  He is in no apparent distress.    Neck: Normal ROM.     Cardiovascular: Normal rate.      Pulmonary/Chest: Effort normal. No respiratory distress.     Abdominal:  He exhibits no distension.  There is no CVA tenderness.     Lymphadenopathy:        Right: No supraclavicular adenopathy present.        Left: No supraclavicular adenopathy present.     Neurological: He is alert and oriented to person, place, and time.     Skin: Skin is warm and dry.     Extremities: Normal ROM    Psych: Cooperative with normal affect.    Genitourinary: The penis is uncircumcised with no evidence of phimosis or paraphimosis. The urethral meatus is normal. The testes, epididymides, and cord structures are normal in size and contour bilaterally. The scrotum is normal in size and contour. Denies tenderness to bilateral testes on exam.    The prostate is 25 g.  Prostate is smooth, non tender with no nodules noted.    Physical Exam      LABS:    U/a: sp grav 1.020, pH 5, negative (concentrated)    PVR: done in clinic with bladder scanner by Nurse Stefanie was 0 ml.    No results found for: PSA, PSADIAG, PSATOTAL, PSAFREE, PSAFREEPCT  Lab Results   Component Value Date    CREATININE 0.8 05/05/2020         IMPRESSION:    Encounter Diagnoses   Name Primary?    Testicle pain Yes    Prostatitis, unspecified prostatitis type          PLAN:  -Scrotal US. Pt requested although exam negative  -Discussed prostatitis in detail with patient  Since urine culture negative and afebrile, will treat with anti-inflammatory  Discussed side effects, indications, and MOA for daypro. Prescription sent to the pharmacy. Pt verbalized understanding. Take with food.  If no improvement will give course of bactrim.  Pt states he will not take ibuprofen for chronic back pain with daypro.   -Need to increase water intake to avoid concentrated urine.    -Avoid straining/constipation. Can take stool softener for constipation. F/u with PCP or GI regarding constipation  -Avoid Bladder Irritants: Tea, coffee, caffeine, alcohol, artificial sweeteners, citrus, spicy foods, acidic foods,chocolate, tomato-based foods, smoking  -Void every 2-3 hours regardless of urge  -RTC 4 weeks or worsening of symptoms    I spent 35 minutes with the patient of which more than half was spent in coordinating the patient's care as well as in direct consultation with the patient in regards to our treatment and plan.

## 2020-06-09 NOTE — LETTER
June 9, 2020      Hunter Diop MD  [address]  [place] [address]           [facility] - Urology 4th Floor  [address]  [place] [address]  Phone: [phone]          Patient: Andre Padgett   MR Number: [id number]   YOB: 1970   Date of Visit: 6/9/2020       Dear Dr. Hunter Diop:    Thank you for referring Andre Padgett to me for evaluation. Attached you will find relevant portions of my assessment and plan of care.    If you have questions, please do not hesitate to call me. I look forward to following Andre Padgett along with you.    Sincerely,    Missy Allen, PRABHU    Enclosure  CC:  No Recipients    If you would like to receive this communication electronically, please contact [email or web address] or [phone] to request more information on [employer] access.    For providers and/or their staff who would like to refer a patient to [facility], please contact us through our one-stop-shop provider referral line, [facility], at [phone].    If you feel you have received this communication in error or would no longer like to receive these types of communications, please e-mail [email or web address]

## 2020-06-09 NOTE — PATIENT INSTRUCTIONS
Prostatitis    The prostate gland is located deep inside the body at the base of the bladder. Prostatitis is an inflammation of the prostate gland. This can occur with or without infection. Most cases of prostatitis are long term (chronic). Most do not involve a bacterial infection.  · Chronic prostatitis is more common in older men. It is usually an inflammatory condition and not an infection. But, bacterial infection can also cause chronic prostatitis. It can cause pain in the rectum, urethra, bladder, or scrotum. It can also make you unable to fully empty the bladder.  You may urinate often, or have burning with urination. Prostatitis may also cause painful ejaculation and erectile dysfunction.  · Sudden onset (acute) prostatitis usually occurs in men younger than 35. It is from a bacterial infection. You may have severe symptoms such as fever, chills, muscle aches, and pain in the area between the scrotum and anus (perineum). You may have a hard time urinating, or have pain or burning when urinating. There may be blood or pus in the urine.  Your healthcare provider may do a culture test on prostate fluids or discharge from the penis. This will help determine if bacteria are the cause. Treatment can include antibiotics, anti-inflammatory medicine, prostate medicines, and stool softeners.  Home care  These guidelines will help you care for yourself at home:  · Rest at home until the fever is gone and you are feeling better.  · A hot sitz bath may offer some relief. Fill a tub with 6 inches of hot water. Allow the water to run so you can keep it hot for 10 to 15 minutes.  · Drink plenty of fluids. Do not drink alcohol or caffeine until all symptoms are gone.  · If your healthcare gives you an antibiotic, take it exactly as you are told. Take it until it is all gone.  · Constipation causes straining and pain. Avoid constipation by eating natural laxatives such as prunes, fresh fruits, and whole-grain cereals. If  needed, use a mild over-the-counter (OTC) laxative for constipation. An OTC stool softener may be used to keep the stools soft.  · If sex is uncomfortable or painful, avoid until symptoms get better.  · You may use OTC medicines for pain and fever, unless another medicine was given. If you have chronic liver or kidney disease, talk with your healthcare provider before using these medicines. Also talk with your provider if you've ever had a stomach ulcer or GI bleeding.  Follow-up care  Follow up with your healthcare provider, a urologist, or as advised to be sure you are responding to treatment. Your healthcare provider may want to see you after you finish your antibiotics to be sure the infection has cleared. If a culture was taken, you may call for the results as directed. A culture test can help your healthcare provider know if you are on the correct antibiotic.  Call 911  Call 911 if any of these occur:  · Weakness, dizziness, or fainting  When to seek medical advice  Call your healthcare provider right away if any of these occur:  · Fever of 100.4°F (38°C) or higher after 3 days of treatment, or as advised  · Unable to pass urine for 8 hours  · Pressure or pain in your bladder gets worse  · Painful swelling of the testicle or scrotum  Date Last Reviewed: 10/1/2016  © 5232-1079 [employer]. [address] [place] [address], [place] [address]. All rights reserved. This information is not intended as a substitute for professional medical care. Always follow your healthcare professional's instructions.    Need to increase water intake.   Void every 2-3 hours regardless of urge (while awake)  Avoid Bladder Irritants: Tea, coffee, caffeine, alcohol, artificial sweeteners, citrus, spicy foods, acidic foods,chocolate, tomato-based foods, smoking  Avoid constipation/straining

## 2020-06-10 ENCOUNTER — TELEPHONE (OUTPATIENT)
Dept: INTERNAL MEDICINE | Facility: CLINIC | Age: 50
End: 2020-06-10

## 2020-06-10 ENCOUNTER — NURSE TRIAGE (OUTPATIENT)
Dept: ADMINISTRATIVE | Facility: CLINIC | Age: 50
End: 2020-06-10

## 2020-06-10 DIAGNOSIS — G89.29 CHRONIC BILATERAL LOW BACK PAIN WITHOUT SCIATICA: ICD-10-CM

## 2020-06-10 DIAGNOSIS — M54.50 CHRONIC BILATERAL LOW BACK PAIN WITHOUT SCIATICA: ICD-10-CM

## 2020-06-10 DIAGNOSIS — F41.9 ANXIETY: Primary | ICD-10-CM

## 2020-06-10 NOTE — TELEPHONE ENCOUNTER
Sumaya JENNINGS Staff             Patient did not have test done as it was pending with [facility], they have now denied as it did not meet their criteria.  Appeal can be done by calling [phone] option 3 with case # [id number].  Thanks Cici

## 2020-06-10 NOTE — TELEPHONE ENCOUNTER
Pt states his back is better but not 100%.    He states he is still having some back and neck pain

## 2020-06-10 NOTE — TELEPHONE ENCOUNTER
Can we let pt know that his insurance denied his back scan. How is his back doing?   If still giving problems we can consider having him see a back specialist first. Let me know.

## 2020-06-10 NOTE — TELEPHONE ENCOUNTER
----- Message from Dalila Arenas sent at 6/10/2020  4:44 PM CDT -----  Contact: [phone]/self   Patient is requesting orders and abdominal ultra sound be sent to [facility]. He is having severe abdominal pain. Thanks

## 2020-06-10 NOTE — TELEPHONE ENCOUNTER
Spoke to pt and informed him that the back scan was denied.    He states his back is doing better.    He would like to a therapist. He is also ok with seeing a back speacialist

## 2020-06-10 NOTE — TELEPHONE ENCOUNTER
If his back is better why does he want to see a back specialist?  We can do it but I need to know what to put as the reason.

## 2020-06-10 NOTE — TELEPHONE ENCOUNTER
Reason for Disposition   Rectal pain    Additional Information   Negative: Abdomen pain is the main symptom and adult male   Negative: Abdomen pain is the main symptom and adult female   Negative: Rectal bleeding or blood in stool is the main symptom   Negative: Patient sounds very sick or weak to the triager   Negative: Constant abdominal pain lasting > 2 hours   Negative: Vomiting bile (green color)   Negative: Vomiting and abdomen looks much more swollen than usual   Negative: Rectal pain or fullness from fecal impaction (rectum full of stool) and NOT better after SITZ bath, suppository or enema   Negative: Abdomen is more swollen than usual   Negative: Last bowel movement (BM) > 4 days ago   Negative: Leaking stool   Negative: Intermittent mild abdominal pain and fever   Negative: Unable to have a bowel movement (BM) without manually removing stool (using finger to pull out stool or perform disimpaction)   Negative: Unable to have a bowel movement (BM) without using a laxative, suppository, or enema   Negative: Constipation persists > 1 week and no improvement after using CARE ADVICE   Negative: Weight loss greater than 10 pounds (5 kg) and not dieting   Negative: Pencil-like, narrow stools   Negative: Patient wants to be seen   Negative: Uses laxative (e.g., PEG / Miralax. milk of magnesia) or enema more than once a month   Negative: Constipation is a recurrent ongoing problem (i.e., < 3 BMs / week or straining > 25% of the time)   Negative: Minor bleeding from rectum (e.g., blood just on toilet paper, few drops, streaks on surface of normal formed BM) occurs more than twice   Negative: Mild constipation    Protocols used: CONSTIPATION-A-OH

## 2020-06-10 NOTE — TELEPHONE ENCOUNTER
Called Mr. Padgett to obtain more information about his symptoms. No answer. Left detailed VM (advised ED precautions)     Of note patient has appt scheduled with GI next week     Future Appointments   Date Time Provider Department Center   6/13/2020  2:00 PM [facility] OIC-US1 MASTER [facility]TR [facility]   6/16/2020 10:30 AM Santos Joseph MD [facility] GASTRO Shankar Whelan

## 2020-06-11 ENCOUNTER — TELEPHONE (OUTPATIENT)
Dept: INTERNAL MEDICINE | Facility: CLINIC | Age: 50
End: 2020-06-11

## 2020-06-11 ENCOUNTER — HOSPITAL ENCOUNTER (OUTPATIENT)
Dept: RADIOLOGY | Facility: HOSPITAL | Age: 50
Discharge: HOME | End: 2020-06-11
Attending: PHYSICIAN ASSISTANT
Payer: COMMERCIAL

## 2020-06-11 DIAGNOSIS — K59.00 CONSTIPATION, UNSPECIFIED CONSTIPATION TYPE: ICD-10-CM

## 2020-06-11 DIAGNOSIS — K59.00 CONSTIPATION, UNSPECIFIED CONSTIPATION TYPE: Primary | ICD-10-CM

## 2020-06-11 PROCEDURE — 74018 RADEX ABDOMEN 1 VIEW: CPT | Mod: TC

## 2020-06-11 PROCEDURE — 74018 XR ABDOMEN AP 1 VIEW: ICD-10-PCS | Mod: 26,,, | Performed by: RADIOLOGY

## 2020-06-11 PROCEDURE — 74018 RADEX ABDOMEN 1 VIEW: CPT | Mod: 26,,, | Performed by: RADIOLOGY

## 2020-06-11 NOTE — TELEPHONE ENCOUNTER
He wants to know if the xray you ordered will show his full abdomen. He wants to know if this will show any obstructions or appendicitis

## 2020-06-13 ENCOUNTER — PATIENT MESSAGE (OUTPATIENT)
Dept: INTERNAL MEDICINE | Facility: CLINIC | Age: 50
End: 2020-06-13

## 2020-06-13 DIAGNOSIS — G89.29 CHRONIC BILATERAL LOW BACK PAIN WITH SCIATICA, SCIATICA LATERALITY UNSPECIFIED: Primary | ICD-10-CM

## 2020-06-13 DIAGNOSIS — M54.40 CHRONIC BILATERAL LOW BACK PAIN WITH SCIATICA, SCIATICA LATERALITY UNSPECIFIED: Primary | ICD-10-CM

## 2020-06-15 ENCOUNTER — PATIENT OUTREACH (OUTPATIENT)
Dept: ADMINISTRATIVE | Facility: OTHER | Age: 50
End: 2020-06-15

## 2020-06-15 DIAGNOSIS — N41.9 PROSTATITIS, UNSPECIFIED PROSTATITIS TYPE: Primary | ICD-10-CM

## 2020-06-15 RX ORDER — SULFAMETHOXAZOLE AND TRIMETHOPRIM 800; 160 MG/1; MG/1
1 TABLET ORAL 2 TIMES DAILY
Qty: 28 TABLET | Refills: 0 | Status: SHIPPED | OUTPATIENT
Start: 2020-06-15 | End: 2020-06-29

## 2020-06-15 NOTE — PROGRESS NOTES
Pt continues to have symptoms of prostatitis. He was unable to tolerate Daypro. Ibuprofen is not helping symptoms. Bactrim sent to pharmacy.

## 2020-06-16 ENCOUNTER — OFFICE VISIT (OUTPATIENT)
Dept: GASTROENTEROLOGY | Facility: CLINIC | Age: 50
End: 2020-06-16
Payer: COMMERCIAL

## 2020-06-16 VITALS — WEIGHT: 162 LBS | BODY MASS INDEX: 23.24 KG/M2

## 2020-06-16 DIAGNOSIS — Z86.010 HISTORY OF COLON POLYPS: ICD-10-CM

## 2020-06-16 DIAGNOSIS — K58.1 IRRITABLE BOWEL SYNDROME WITH CONSTIPATION: Primary | ICD-10-CM

## 2020-06-16 PROCEDURE — 99999 PR PBB SHADOW E&M-EST. PATIENT-LVL III: CPT | Mod: PBBFAC,,, | Performed by: INTERNAL MEDICINE

## 2020-06-16 PROCEDURE — 99214 PR OFFICE/OUTPT VISIT, EST, LEVL IV, 30-39 MIN: ICD-10-PCS | Mod: S$GLB,,, | Performed by: INTERNAL MEDICINE

## 2020-06-16 PROCEDURE — 99999 PR PBB SHADOW E&M-EST. PATIENT-LVL III: ICD-10-PCS | Mod: PBBFAC,,, | Performed by: INTERNAL MEDICINE

## 2020-06-16 PROCEDURE — 3008F PR BODY MASS INDEX (BMI) DOCUMENTED: ICD-10-PCS | Mod: CPTII,S$GLB,, | Performed by: INTERNAL MEDICINE

## 2020-06-16 PROCEDURE — 99214 OFFICE O/P EST MOD 30 MIN: CPT | Mod: S$GLB,,, | Performed by: INTERNAL MEDICINE

## 2020-06-16 PROCEDURE — 3008F BODY MASS INDEX DOCD: CPT | Mod: CPTII,S$GLB,, | Performed by: INTERNAL MEDICINE

## 2020-06-16 NOTE — PROGRESS NOTES
Subjective:       Patient ID: Andre Padgett is a 49 y.o. male.    Chief Complaint: Constipation, Abdominal Pain, and belching    Patient here today to reestablish care with aforementioned complaints.  Patient seen previously by a nurse practitioner Diana as recently as 2018 for similar complaints.  At that time he was prescribed Bentyl as well as IBD guard.  He was subsequently lost to follow-up.    Today again patient reports constipation.  He reports having 1 bowel movement a day, over the past few months is been thinner in diameter and associated with abdominal pain.  He also reports incomplete voiding.  He has tried a multitude of over-the-counter therapies including Dulcolax, MiraLax, and enemas without durable relief.  He is currently taking Metamucil daily in addition to drinking adequate amount of water.  He has also modified diet, eating primarily a vegetarian.  He also has multiple stressors at home, including a divorce/separation.    In addition patient reports chronic belching.  He denies use of carbonated beverages artificial sweeteners.  Denies alcohol use or drinking of straws.  He does report significant dairy intake.    Endoscopic history reviewed:  - Colonoscopy 03/2018.  2 cm adenoma removed from transverse colon Diverticulosis.  Hemorrhoids.  Repeat in 5 years  - EGD 10/2015 normal    Review of Systems   Constitutional: Negative for chills, fever and unexpected weight change.   HENT: Negative for congestion and trouble swallowing.    Respiratory: Negative for cough and shortness of breath.    Cardiovascular: Negative for chest pain and palpitations.   Gastrointestinal: Positive for abdominal distention, abdominal pain and constipation. Negative for blood in stool.         The following portions of the patient's history were reviewed and updated as appropriate: allergies, current medications, past family history, past medical history, past social history, past surgical history and problem  list.    Objective:      Physical Exam  Vitals signs and nursing note reviewed.   Constitutional:       Appearance: He is well-developed.   HENT:      Head: Normocephalic and atraumatic.   Eyes:      General: No scleral icterus.     Pupils: Pupils are equal, round, and reactive to light.   Cardiovascular:      Rate and Rhythm: Normal rate and regular rhythm.      Heart sounds: Normal heart sounds.   Pulmonary:      Effort: Pulmonary effort is normal. No respiratory distress.      Breath sounds: Normal breath sounds.   Abdominal:      General: Bowel sounds are normal. There is no distension.      Palpations: Abdomen is soft.      Tenderness: There is abdominal tenderness (Right upper quadrant).   Musculoskeletal: Normal range of motion.   Neurological:      Mental Status: He is alert and oriented to person, place, and time.      Comments: No asterixis   Psychiatric:         Behavior: Behavior normal.           Pertinent labs and imaging studies reviewed    Assessment:       1. Irritable bowel syndrome with constipation    2. History of colon polyps        Plan:       Place on trial of Linzess  Lifestyle modifications    28 minutes were spent in coordination of patient's care, record review and counseling.  More than 50% of the time was face-to-face.    (Portions of this note were dictated using voice recognition software and may contain dictation related errors in spelling/grammar/syntax not found on text review)

## 2020-06-18 ENCOUNTER — CLINICAL SUPPORT (OUTPATIENT)
Dept: REHABILITATION | Facility: HOSPITAL | Age: 50
End: 2020-06-18
Attending: INTERNAL MEDICINE
Payer: COMMERCIAL

## 2020-06-18 DIAGNOSIS — M54.6 CHRONIC BILATERAL THORACIC BACK PAIN: ICD-10-CM

## 2020-06-18 DIAGNOSIS — M54.40 CHRONIC BILATERAL LOW BACK PAIN WITH SCIATICA, SCIATICA LATERALITY UNSPECIFIED: ICD-10-CM

## 2020-06-18 DIAGNOSIS — R29.3 POOR POSTURE: ICD-10-CM

## 2020-06-18 DIAGNOSIS — G89.29 CHRONIC BILATERAL THORACIC BACK PAIN: ICD-10-CM

## 2020-06-18 DIAGNOSIS — M54.42 ACUTE BILATERAL LOW BACK PAIN WITH LEFT-SIDED SCIATICA: ICD-10-CM

## 2020-06-18 DIAGNOSIS — R26.89 DECREASED SPINAL MOBILITY: ICD-10-CM

## 2020-06-18 DIAGNOSIS — G89.29 CHRONIC BILATERAL LOW BACK PAIN WITH SCIATICA, SCIATICA LATERALITY UNSPECIFIED: ICD-10-CM

## 2020-06-18 DIAGNOSIS — R53.1 DECREASED STRENGTH: Primary | ICD-10-CM

## 2020-06-18 PROCEDURE — 97162 PT EVAL MOD COMPLEX 30 MIN: CPT | Mod: PO

## 2020-06-18 PROCEDURE — 97110 THERAPEUTIC EXERCISES: CPT | Mod: PO

## 2020-06-18 NOTE — PLAN OF CARE
[facility]  Physical Therapy Initial Evaluation    Date: 6/18/2020   Name: Andre Padgett  Clinic Number: [id number]    Therapy Diagnosis:   Encounter Diagnoses   Name Primary?    Chronic bilateral low back pain with sciatica, sciatica laterality unspecified     Chronic bilateral thoracic back pain     Poor posture     Decreased strength Yes    Decreased spinal mobility      Physician: Hunter Diop MD    Physician Orders: PT Eval and Treat   Medical Diagnosis from Referral:   Lumbago with sciatica, unspecified side   G89.29 (ICD-10-CM) - Other chronic pain     Evaluation Date: 6/18/2020  Authorization Period Expiration: 12/31/20  Plan of Care Expiration: 8/14/20  Visit # / Visits authorized: 1/ 20    Time In: 10:20 AM  Time Out: 11:00 AM  Total Appointment Time (timed & untimed codes): 40 minutes    Precautions: Standard, no heavy lifting    Subjective   Date of onset: 3 weeks  History of current condition - Andre reports:  Was watching TV sitting in a low chair and he got up quickly, he felt a pop in middle of his back. Pt then went to stretch and his symptoms got worse. Pt states he also has neck pain and low back pain.  He thinks low back pain is due to prostatitis. He gets occasional pain in left leg. Pt states left leg symptoms have been going on for less than 3 weeks.     Medical History:   Past Medical History:   Diagnosis Date    Aneurysm     Right sided filling anterior communicating aneurysm s/p coiling 2011    Anxiety     Cerebral aneurysm, nonruptured 12/27/2016    Colon adenoma: 3/18 repeat colonoscpy 2023 6/22/2018    Diverticulosis of large intestine without hemorrhage: see colonoscopy 3/18; sigmoid and descending colon 6/22/2018    Fatty liver: see CT 3/18 6/22/2018    Umbilical hernia without obstruction and without gangrene: see CT 3/18 6/22/2018       Surgical History:   Andre Padgett  has a past surgical history that includes Colonoscopy (N/A,  "3/26/2018) and Cerebral angiogram (2011).    Medications:   Andre has a current medication list which includes the following prescription(s): acetaminophen, alprazolam, ascorbic acid (vitamin c), aspirin, atorvastatin, coenzyme q10, diclofenac sodium, hydroxyzine hcl, linaclotide, oxaprozin, sulfamethoxazole-trimethoprim 800-160mg, and zinc sulfate.    Allergies:   Review of patient's allergies indicates:  No Known Allergies     Imaging, bone scan films: DJD.  No acute process seen.    Prior Therapy: yes for his back  Social History: pt  lives alone- has stairs but rarely uses them  Occupation: pt is not working currently  Prior Level of Function: did not have back, neck pain, or pain going down LLE. Could walk indefinitely  Current Level of Function: pt gets symptoms with prolonged sitting, lying flat, can't lift anything heavy. Pt can walk 4 blocks before pain becomes an issue    Pain:  Current 3/10 neck and back, worst 7/10, best 3/10   Location: middle of thoracic back (below shoulder blades, low back, and neck)   Description: feels inflamed  Aggravating Factors: Sitting, Standing, Laying, Walking and Night Time  Easing Factors: heating pad    Pts goals: to see if I can relieve the pain. "I want to get back where I was."    Objective     Observation: pt received amb independently    Posture:  Rounded shoulders, forward head    Lumbar Range of Motion:    Degrees Pain   Flexion 1/2 range   no        Extension WFL   Yes, left side of back        Left Side Bending WFL pull        Right Side Bending WFL pull        Left rotation   1/2 range yes        Right Rotation   1/2 range yes             Lower Extremity Strength  Right LE  Left LE    Knee extension: 5/5 Knee extension: 5/5   Knee flexion: 5/5* Knee flexion: 5/5*   Hip flexion: 5/5 Hip flexion: 5/5   Hip extension:  5/5 Hip extension: 4/5   Hip abduction: 5/5 Hip abduction: 5/5   Hip adduction: 5/5 Hip adduction 5/5   Ankle dorsiflexion: 5/5 Ankle " "dorsiflexion: 5/5   Ankle plantarflexion: 5/5 Ankle plantarflexion: 5/5   Hip ER and IR is 5/5 on RLE; Hip ER is 5/5 on LLE and IR is 4/5      Special Tests:  -Bridge Test: + for mid to lower back pain      Neuro Dynamic Testing:    Sciatic nerve:      SLR: R = -     L = - (reports "heat sensation" in his left foot)    Joint Mobility: hypomobile    Sensation: light touch intact    Flexibility: tight iliopsoas B, mild tightness in B hamstrings       Limitation/Restriction for FOTO Lumbar spine Survey    Therapist reviewed FOTO scores for Andre Padgett on 6/18/2020.   FOTO documents entered into [employer] - see Media section.    Limitation Score: 42%         TREATMENT   Treatment Time In: 10:52 AM  Treatment Time Out: 11:00 AM  Total Treatment time (time-based codes) separate from Evaluation: 8 minutes    Andre received therapeutic exercises to develop strength, endurance, flexibility, posture and core stabilization for 8 minutes including:    diffiuclty coordinating post pelvic tilt, so it was stopped  Single knee to chest, 2 x 30s/LE  Lower trunk rotation 10 reps B, 5s holds  Scapular retraction 10 x 3s    Home Exercises and Patient Education Provided    Education provided:   - scheduling, plan of care, HEP, exercise technique, that we will need an order for neck pain to evaluate his neck symptoms in the future    Written Home Exercises Provided: yes.  Exercises were reviewed and Andre was able to demonstrate them prior to the end of the session.  Andre demonstrated good  understanding of the education provided.     See EMR under Patient Instructions for exercises provided 6/18/2020.    Assessment   Andre is a 49 y.o. male referred to outpatient Physical Therapy with a medical diagnosis of   Lumbago with sciatica, unspecified side   G89.29 (ICD-10-CM) - Other chronic pain   Pt presents with neck, mid back, and low back pain, decreased strength, poor posture, decreased ROM/decreased spinal mobility, impaired gait, " decreased endurance, impaired functional mobility, and decreased activity tolerance.   PT explained the pt will neef MD referral to evaluate neck pain. No sciatic symptoms elicited today, but pt reports L sciatic symptoms. Mid back symptoms likely due to muscular strain. Pt will benefit from PT with emphasis on postural awareness, core stability, and spinal ROM exercises.     Consider including piriformis stretch, back stretches with ball, and open books at next session    Pt prognosis is Good.   Pt will benefit from skilled outpatient Physical Therapy to address the deficits stated above and in the chart below, provide pt/family education, and to maximize pt's level of independence.     Plan of care discussed with patient: Yes  Pt's spiritual, cultural and educational needs considered and patient is agreeable to the plan of care and goals as stated below:     Anticipated Barriers for therapy: number of symptoms    Medical Necessity is demonstrated by the following  History  Co-morbidities and personal factors that may impact the plan of care Co-morbidities:   chronic pain, prostatitis     Personal Factors:   attitudes     moderate   Examination  Body Structures and Functions, activity limitations and participation restrictions that may impact the plan of care Body Regions:   back  lower extremities  trunk    Body Systems:    ROM  strength  gait  transfers  transitions  motor control  posture, activity tolerance    Participation Restrictions:   None anticipated    Activity limitations:   Learning and applying knowledge  no deficits    General Tasks and Commands  no deficits    Communication  no deficits    Mobility  lifting and carrying objects  walking  driving (bike, car, motorcycle)    Self care  dressing    Domestic Life  shopping  doing house work (cleaning house, washing dishes, laundry)    Interactions/Relationships  no deficits    Life Areas  no deficits    Community and Social Life  community  life  recreation and leisure         high   Clinical Presentation evolving clinical presentation with changing clinical characteristics moderate   Decision Making/ Complexity Score: moderate     Goals:  Short Term Goals: 4 weeks   1. Pt will tolerate HEP for improved strength, functional mobility, ROM, posture, and endurance. (progressing, not met)  2. Pt will report reduced back pain to </= 5/10 at worst for improved functional mobility and ability to participate in work activities/ADL's. (progressing, not met)  3. Pt will increase spinal AROM in all directions to >/= 3/4 range for improved functional mobility. (progressing, not met)  4. Pt will demo >/= 4+/5 strength in BLE's for improved functional mobility, endurance, and posture. (progressing, not met)  5. Pt will report improved ability to walk for >/=8 blocks without increase in pain/symptoms for improved functional mobility (progressing, not met)  6. Pt will be independent with postural awareness/precautions for improved function (progressing, not met)    Long Term Goals: 8 weeks   1. Pt will be I with updated HEP for improved functional mobility, posture, strength, and endurance. (progressing, not met)  2. Pt will report reduced back pain to </= 3/10 at worst for improved functional mobility and ability to participate in work activities/ADL's. (progressing, not met)  3. Pt will increase spinal AROM in all directions to WNL for improved functional mobility. (progressing, not met)  4. Pt will demo >/= 5/5 strength in BLE's and posterior thoracic musculature for improved functional mobility, endurance, and posture. (progressing, not met)  5. Pt will demo/report increased ability to stand for walk to 45 min without pain/symptoms for improved functional mobility (progressing, not met)    Plan   Plan of care Certification: 6/18/2020 to 8/14/20.    Outpatient Physical Therapy 2 times weekly for 8 weeks to include the following interventions: Gait Training, Manual  Therapy, Moist Heat/ Ice, Neuromuscular Re-ed, Patient Education, Self Care, Therapeutic Activites, Therapeutic Exercise, Ultrasound and modalities and dry needling as appropriate/indicated.     Aubrie Goode, PT

## 2020-06-19 ENCOUNTER — TELEPHONE (OUTPATIENT)
Dept: INTERNAL MEDICINE | Facility: CLINIC | Age: 50
End: 2020-06-19

## 2020-06-19 DIAGNOSIS — M54.2 NECK PAIN: Primary | ICD-10-CM

## 2020-06-19 PROBLEM — M54.6 CHRONIC BILATERAL THORACIC BACK PAIN: Status: ACTIVE | Noted: 2020-06-19

## 2020-06-19 PROBLEM — R26.89 DECREASED SPINAL MOBILITY: Status: ACTIVE | Noted: 2020-06-19

## 2020-06-19 PROBLEM — R29.3 POOR POSTURE: Status: ACTIVE | Noted: 2020-06-19

## 2020-06-19 PROBLEM — G89.29 CHRONIC BILATERAL LOW BACK PAIN WITHOUT SCIATICA: Status: ACTIVE | Noted: 2020-06-19

## 2020-06-19 PROBLEM — M54.42 BILATERAL LOW BACK PAIN WITH LEFT-SIDED SCIATICA: Status: ACTIVE | Noted: 2020-06-19

## 2020-06-19 PROBLEM — M54.50 CHRONIC BILATERAL LOW BACK PAIN WITHOUT SCIATICA: Status: ACTIVE | Noted: 2020-06-19

## 2020-06-19 PROBLEM — G89.29 CHRONIC BILATERAL THORACIC BACK PAIN: Status: ACTIVE | Noted: 2020-06-19

## 2020-06-19 PROBLEM — R53.1 DECREASED STRENGTH: Status: ACTIVE | Noted: 2020-06-19

## 2020-06-19 NOTE — TELEPHONE ENCOUNTER
----- Message from Aubrie Goode, PT sent at 6/19/2020  2:21 PM CDT -----  Good Afternoon Dr. Diop,    I evaluated Mr. Padgett for his low and mid back pain yesterday, but he was also complaining of neck pain. Could you please place a PT referral/order for his neck pain, and I will evaluate it on a future session. Please reach out with any questions/concerns. Thank you.    Kind Regards,  TARYN PowersT

## 2020-06-23 ENCOUNTER — CLINICAL SUPPORT (OUTPATIENT)
Dept: REHABILITATION | Facility: HOSPITAL | Age: 50
End: 2020-06-23
Attending: INTERNAL MEDICINE
Payer: COMMERCIAL

## 2020-06-23 DIAGNOSIS — R29.3 POOR POSTURE: ICD-10-CM

## 2020-06-23 DIAGNOSIS — R53.1 DECREASED STRENGTH: ICD-10-CM

## 2020-06-23 DIAGNOSIS — R26.89 DECREASED SPINAL MOBILITY: ICD-10-CM

## 2020-06-23 DIAGNOSIS — G89.29 CHRONIC BILATERAL LOW BACK PAIN WITHOUT SCIATICA: ICD-10-CM

## 2020-06-23 DIAGNOSIS — M54.2 NECK PAIN: ICD-10-CM

## 2020-06-23 DIAGNOSIS — M54.50 CHRONIC BILATERAL LOW BACK PAIN WITHOUT SCIATICA: ICD-10-CM

## 2020-06-23 DIAGNOSIS — G89.29 CHRONIC BILATERAL THORACIC BACK PAIN: ICD-10-CM

## 2020-06-23 DIAGNOSIS — M54.6 CHRONIC BILATERAL THORACIC BACK PAIN: ICD-10-CM

## 2020-06-23 PROCEDURE — 97110 THERAPEUTIC EXERCISES: CPT | Mod: PO,CQ

## 2020-06-23 NOTE — PROGRESS NOTES
Physical Therapy Treatment Note     Name: Andre PEDRO Minford  Clinic Number: [id number]    Therapy Diagnosis:   Encounter Diagnosis   Name Primary?    Neck pain      Physician: Hunter Diop MD    Visit Date: 6/23/2020    Physician Orders: PT Eval and Treat   Medical Diagnosis from Referral:   Lumbago with sciatica, unspecified side   G89.29 (ICD-10-CM) - Other chronic pain      Evaluation Date: 6/18/2020  Authorization Period Expiration: 12/31/20  Plan of Care Expiration: 8/14/20  Visit # / Visits authorized: 2/ 20       Time In: 1220  Time Out: 1300  Total Billable Time: 40 minutes    Precautions: Standard, no heavy lifting    Subjective     Pt reports: that he felt some sciatic symptoms down LLE a few days ago but it subsided.    He was compliant with home exercise program.  Response to previous treatment: no adverse effects  Functional change: none    Pain: 5/10  Location: bilateral mid and upper back pain      Objective     Andre received therapeutic exercises to develop strength, ROM, flexibility, posture and core stabilization for 40 minutes including:    DKTC with physio ball 15x with 5 sec hold  SKTC 2x30 B  LTR 15x with 3 sec hold B  PPT with TA bracing 15x with 5 sec holds  PPT with marching 3x30 sec  Dead bugs with PPT/TA bracing 2x8   Piriformis stretch 2x45 sec  L hip flexor stretch 2x45 sec   Bridges 2x10 with 5 sec hold  Open Book B 10x with 5 sec hold  Side-lying upper trunk rotation with hands clasped together 10x  Seated scapular retractions 15x with 5 sec holds  Rows orange band 2x10   pallof press with orange TB 2x10 with 5 sec hold        Home Exercises Provided and Patient Education Provided     Education provided: pt was educated on all therapeutic exercises initiated during today's tx visit.     Written Home Exercises Provided: Patient instructed to cont prior HEP.  Exercises were reviewed and Andre was able to demonstrate them prior to the end of the session.  Andre demonstrated good   understanding of the education provided.     See EMR under Patient Instructions for exercises provided 6/18/20.    Assessment   Pt tolerated tx fairly well but could not perform certain stretches or therapeutic activities on L side due to protatitis  Pt required verbal cues to decrease exercise angelique and increase eccentric control. Tx focused on core stabilization, BLE/hip flexibility, and spine mobility. Will progress as tolerated.    Andre is progressing well towards his goals.   Pt prognosis is Good.     Pt will continue to benefit from skilled outpatient physical therapy to address the deficits listed in the problem list box on initial evaluation, provide pt/family education and to maximize pt's level of independence in the home and community environment.     Pt's spiritual, cultural and educational needs considered and pt agreeable to plan of care and goals.     Anticipated barriers to physical therapy: number of symptoms    Goals:     Short Term Goals: 4 weeks   1. Pt will tolerate HEP for improved strength, functional mobility, ROM, posture, and endurance. (progressing, not met)  2. Pt will report reduced back pain to </= 5/10 at worst for improved functional mobility and ability to participate in work activities/ADL's. (progressing, not met)  3. Pt will increase spinal AROM in all directions to >/= 3/4 range for improved functional mobility. (progressing, not met)  4. Pt will demo >/= 4+/5 strength in BLE's for improved functional mobility, endurance, and posture. (progressing, not met)  5. Pt will report improved ability to walk for >/=8 blocks without increase in pain/symptoms for improved functional mobility (progressing, not met)  6. Pt will be independent with postural awareness/precautions for improved function (progressing, not met)     Long Term Goals: 8 weeks   1. Pt will be I with updated HEP for improved functional mobility, posture, strength, and endurance. (progressing, not met)  2. Pt will  report reduced back pain to </= 3/10 at worst for improved functional mobility and ability to participate in work activities/ADL's. (progressing, not met)  3. Pt will increase spinal AROM in all directions to WNL for improved functional mobility. (progressing, not met)  4. Pt will demo >/= 5/5 strength in BLE's and posterior thoracic musculature for improved functional mobility, endurance, and posture. (progressing, not met)  5. Pt will demo/report increased ability to stand for walk to 45 min without pain/symptoms for improved functional mobility (progressing, not met)      Plan   Continue POC established by PT.       Glen Silva, PTA

## 2020-06-25 ENCOUNTER — HOSPITAL ENCOUNTER (OUTPATIENT)
Dept: RADIOLOGY | Facility: HOSPITAL | Age: 50
Discharge: HOME | End: 2020-06-25
Attending: NURSE PRACTITIONER
Payer: COMMERCIAL

## 2020-06-25 DIAGNOSIS — N50.819 TESTICLE PAIN: ICD-10-CM

## 2020-06-25 DIAGNOSIS — N50.819 TESTICLE PAIN: Primary | ICD-10-CM

## 2020-06-25 PROCEDURE — 76870 US EXAM SCROTUM: CPT | Mod: 26,,, | Performed by: RADIOLOGY

## 2020-06-25 PROCEDURE — 76770 US EXAM ABDO BACK WALL COMP: CPT | Mod: TC

## 2020-06-25 PROCEDURE — 76770 US RETROPERITONEAL COMPLETE: ICD-10-PCS | Mod: 26,,, | Performed by: RADIOLOGY

## 2020-06-25 PROCEDURE — 76870 US SCROTUM AND TESTICLES: ICD-10-PCS | Mod: 26,,, | Performed by: RADIOLOGY

## 2020-06-25 PROCEDURE — 76870 US EXAM SCROTUM: CPT | Mod: TC

## 2020-06-25 PROCEDURE — 76770 US EXAM ABDO BACK WALL COMP: CPT | Mod: 26,,, | Performed by: RADIOLOGY

## 2020-06-25 NOTE — PROGRESS NOTES
Pt continues to have testicle and low abdominal pain. He is scheduled for US today. Urine culture order placed. Pt would like to have cysto done for continued pain. Will schedule once US results are completed.

## 2020-06-29 ENCOUNTER — PATIENT MESSAGE (OUTPATIENT)
Dept: INTERNAL MEDICINE | Facility: CLINIC | Age: 50
End: 2020-06-29

## 2020-06-29 NOTE — TELEPHONE ENCOUNTER
Patient is asking for his blood type, I saw A POS on his type and screen and wanted to make sure. Thank you.

## 2020-07-06 ENCOUNTER — PATIENT OUTREACH (OUTPATIENT)
Dept: ADMINISTRATIVE | Facility: OTHER | Age: 50
End: 2020-07-06

## 2020-07-07 ENCOUNTER — OFFICE VISIT (OUTPATIENT)
Dept: UROLOGY | Facility: CLINIC | Age: 50
End: 2020-07-07
Payer: COMMERCIAL

## 2020-07-07 VITALS
HEIGHT: 70 IN | SYSTOLIC BLOOD PRESSURE: 114 MMHG | HEART RATE: 84 BPM | BODY MASS INDEX: 23.23 KG/M2 | DIASTOLIC BLOOD PRESSURE: 75 MMHG | WEIGHT: 162.25 LBS

## 2020-07-07 DIAGNOSIS — N40.0 BENIGN NON-NODULAR PROSTATIC HYPERPLASIA WITHOUT LOWER URINARY TRACT SYMPTOMS: Primary | ICD-10-CM

## 2020-07-07 PROCEDURE — 3008F BODY MASS INDEX DOCD: CPT | Mod: CPTII,S$GLB,, | Performed by: UROLOGY

## 2020-07-07 PROCEDURE — 99214 OFFICE O/P EST MOD 30 MIN: CPT | Mod: S$GLB,,, | Performed by: UROLOGY

## 2020-07-07 PROCEDURE — 99214 PR OFFICE/OUTPT VISIT, EST, LEVL IV, 30-39 MIN: ICD-10-PCS | Mod: S$GLB,,, | Performed by: UROLOGY

## 2020-07-07 PROCEDURE — 3008F PR BODY MASS INDEX (BMI) DOCUMENTED: ICD-10-PCS | Mod: CPTII,S$GLB,, | Performed by: UROLOGY

## 2020-07-07 PROCEDURE — 99999 PR PBB SHADOW E&M-EST. PATIENT-LVL III: ICD-10-PCS | Mod: PBBFAC,,, | Performed by: UROLOGY

## 2020-07-07 PROCEDURE — 99999 PR PBB SHADOW E&M-EST. PATIENT-LVL III: CPT | Mod: PBBFAC,,, | Performed by: UROLOGY

## 2020-07-07 NOTE — PROGRESS NOTES
Subjective:       Patient ID: Andre Padgett is a 49 y.o. male.    Chief Complaint: Testicle Pain (lower abdominal spasms, penis and scrotum is hot )    HPI the patient is here for vague lower abdominal Le complaints.  He states that he voids his penis and scrotum feels warm.  He is not having fanny dysuria.  His urine is clear.  No fever chills nausea vomiting.  He has had a negative ultrasound of the kidneys and his ultrasound of the scrotum done by Missy demonstrated small left-sided varicocele and cyst.  He was treated with 2 weeks of antibiotics in the past.  No flank pain but he does have a history of diverticulosis and constipation for which he is taking corrective action    Past Medical History:   Diagnosis Date    Aneurysm     Right sided filling anterior communicating aneurysm s/p coiling 2011    Anxiety     Cerebral aneurysm, nonruptured 12/27/2016    Colon adenoma: 3/18 repeat colonoscpy 2023 6/22/2018    Diverticulosis of large intestine without hemorrhage: see colonoscopy 3/18; sigmoid and descending colon 6/22/2018    Fatty liver: see CT 3/18 6/22/2018    Umbilical hernia without obstruction and without gangrene: see CT 3/18 6/22/2018       Past Surgical History:   Procedure Laterality Date    CEREBRAL ANGIOGRAM  2011    angiogram/coiling    COLONOSCOPY N/A 3/26/2018    Procedure: COLONOSCOPY;  Surgeon: Sanjiv Duran MD;  Location: [facility] ([address] [place] [address]);  Service: Endoscopy;  Laterality: N/A;       Family History   Problem Relation Age of Onset    DAVID disease Mother     Hypertension Mother     Diabetes Mother     Brain cancer Father     Thyroid disease Sister     No Known Problems Daughter     No Known Problems Sister     Cirrhosis Neg Hx     Colon polyps Neg Hx     Crohn's disease Neg Hx     Liver cancer Neg Hx     Stomach cancer Neg Hx     Ulcerative colitis Neg Hx     Heart attack Neg Hx     Colon cancer Neg Hx        Social History     Socioeconomic History    Marital  status: Legally      Spouse name: Not on file    Number of children: Not on file    Years of education: Not on file    Highest education level: Not on file   Occupational History    Not on file   Social Needs    Financial resource strain: Not hard at all    Food insecurity     Worry: Never true     Inability: Never true    Transportation needs     Medical: No     Non-medical: No   Tobacco Use    Smoking status: Never Smoker    Smokeless tobacco: Never Used   Substance and Sexual Activity    Alcohol use: Yes     Comment: ocassionally - twice a week    Drug use: No    Sexual activity: Not on file   Lifestyle    Physical activity     Days per week: 7 days     Minutes per session: 40 min    Stress: To some extent   Relationships    Social connections     Talks on phone: More than three times a week     Gets together: Twice a week     Attends Voodoo service: Not on file     Active member of club or organization: Yes     Attends meetings of clubs or organizations: 1 to 4 times per year     Relationship status:    Other Topics Concern    Not on file   Social History Narrative    Not on file       Allergies:  Patient has no known allergies.    Medications:    Current Outpatient Medications:     ALPRAZolam (XANAX) 1 MG tablet, Take 1 tablet (1 mg total) by mouth 2 (two) times daily as needed for Anxiety., Disp: 60 tablet, Rfl: 2    ascorbic acid (VITAMIN C) 100 MG tablet, Take 100 mg by mouth once daily., Disp: , Rfl:     aspirin 81 MG Chew, Take 81 mg by mouth once daily., Disp: , Rfl:     atorvastatin (LIPITOR) 40 MG tablet, TAKE 1 TABLET(40 MG) BY MOUTH EVERY DAY, Disp: 90 tablet, Rfl: 3    coenzyme Q10 200 mg capsule, Take 200 mg by mouth once daily. Take with cholesterol med., Disp: , Rfl:     zinc sulfate (ZINCATE) 220 (50) mg capsule, TK ONE C PO  QD, Disp: , Rfl:     acetaminophen (TYLENOL) 325 MG tablet, Take 1 tablet (325 mg total) by mouth every 6 (six) hours as needed  for Pain. (Patient not taking: Reported on 6/16/2020), Disp: 30 tablet, Rfl: 0    diclofenac sodium (VOLTAREN) 1 % Gel, Apply 2 g topically 3 (three) times daily as needed., Disp: 1 Tube, Rfl: 0    hydrOXYzine HCl (ATARAX) 25 MG tablet, Take 1 tablet (25 mg total) by mouth nightly as needed (Insomnia)., Disp: 30 tablet, Rfl: 6    linaCLOtide (LINZESS) 145 mcg Cap capsule, Take 1 capsule (145 mcg total) by mouth once daily., Disp: 30 capsule, Rfl: 3    Review of Systems   Constitutional: Negative for activity change, appetite change, chills, diaphoresis, fatigue, fever and unexpected weight change.   HENT: Negative for congestion, dental problem, hearing loss, mouth sores, postnasal drip, rhinorrhea, sinus pressure and trouble swallowing.    Eyes: Negative for pain, discharge and itching.   Respiratory: Negative for apnea, cough, choking, chest tightness, shortness of breath and wheezing.    Cardiovascular: Negative for chest pain, palpitations and leg swelling.   Gastrointestinal: Negative for abdominal distention, abdominal pain, anal bleeding, blood in stool, constipation, diarrhea, nausea, rectal pain and vomiting.   Endocrine: Negative for polydipsia and polyuria.   Genitourinary: Negative for decreased urine volume, difficulty urinating, discharge, dysuria, enuresis, flank pain, frequency, genital sores, hematuria, penile pain, penile swelling, scrotal swelling, testicular pain and urgency.   Musculoskeletal: Negative for arthralgias, back pain and myalgias.   Skin: Negative for color change, rash and wound.   Neurological: Negative for dizziness, syncope, speech difficulty, light-headedness and headaches.   Hematological: Negative for adenopathy. Does not bruise/bleed easily.   Psychiatric/Behavioral: Negative for behavioral problems, confusion, hallucinations and sleep disturbance.       Objective:      Physical Exam   Constitutional: He appears well-developed.   HENT:   Head: Normocephalic.    Cardiovascular: Normal rate.    Pulmonary/Chest: Effort normal.   Abdominal: Soft.   Genitourinary:    Prostate normal.      Genitourinary Comments: Testes unremarkable.  I am unable to palpate a left-sided varicocele.  No hernias are palpable.  Abdomen is soft nontender no masses.  EPS is totally clear     Neurological: He is alert.   Skin: Skin is warm.         Assessment:       1. Benign non-nodular prostatic hyperplasia without lower urinary tract symptoms        Plan:       Andre was seen today for testicle pain.    Diagnoses and all orders for this visit:    Benign non-nodular prostatic hyperplasia without lower urinary tract symptoms  -     Prostate Specific Antigen, Diagnostic; Future        I tried to reassure him.  Patient is going through a divorce and has been for a load of forward.  I talked to him about stress management.  He is also having some clear complaints of discomfort down his legs.  An MRI scan was ordered but not done due to his insurance.  He is seeing a back specialist and.  He has also seen a chiropractor and a physical therapist.  I will see him back in 2 months with a PSA.  I do not think his symptoms urological in nature.  If he still having symptoms are more than appetite was cystoscopy do not think that is indicated at this time

## 2020-07-25 ENCOUNTER — NURSE TRIAGE (OUTPATIENT)
Dept: ADMINISTRATIVE | Facility: CLINIC | Age: 50
End: 2020-07-25

## 2020-07-25 NOTE — TELEPHONE ENCOUNTER
"Diagnosed recently with non-bacterial prostatitis., denies change in urinary s/s. Denies fevers. Pt reports feeling sluggish past few days and has not slept well. Pt reports right-sided abd pain that started a few weeks ago after issues with constipation, one episode of vomiting today "it looked like bile'. Pt states he feels full even when he hasn't eaten in a while. Pt states lots of "gargling in stomach after eating when he is lying down flat". Pt reports abd pain got worse after having normal BM last night. Pt currently reporting 5/10 abd pain. Pt also taking probiotics, now taking acidophilus x 3 days and wondering if that is related. ER precautions given. Agreed to OAC. Advised to stop acidophilus to see if symptoms improve. VU    Reason for Disposition   [1] MILD-MODERATE pain AND [2] constant AND [3] present > 2 hours    Additional Information   Negative: Shock suspected (e.g., cold/pale/clammy skin, too weak to stand, low BP, rapid pulse)   Negative: Difficult to awaken or acting confused (e.g., disoriented, slurred speech)   Negative: Passed out (i.e., lost consciousness, collapsed and was not responding)   Negative: Sounds like a life-threatening emergency to the triager   Negative: Chest pain   Negative: Pain is mainly in upper abdomen  (if needed ask: "is it mainly above the belly button?")   Negative: Followed an abdomen (stomach) injury   Negative: [1] SEVERE pain (e.g., excruciating) AND [2] present > 1 hour   Negative: [1] SEVERE pain AND [2] age > 60   Negative: [1] Vomiting AND [2] contains red blood or black ("coffee ground") material  (Exception: few red streaks in vomit that only happened once)   Negative: Blood in bowel movements  (Exception: Blood on surface of BM with constipation)   Negative: Black or tarry bowel movements  (Exception: chronic-unchanged  black-grey bowel movements AND is taking iron pills or Pepto-bismol)   Negative: [1] Unable to urinate (or only a few drops) " > 4 hours AND [2] bladder feels very full (e.g., palpable bladder or strong urge to urinate)   Negative: [1] Pain in the scrotum or testicle AND [2] present > 1 hour   Negative: Patient sounds very sick or weak to the triager    Protocols used: ABDOMINAL PAIN - MALE-A-AH

## 2020-08-03 ENCOUNTER — OFFICE VISIT (OUTPATIENT)
Dept: INTERNAL MEDICINE | Facility: CLINIC | Age: 50
End: 2020-08-03
Payer: COMMERCIAL

## 2020-08-03 VITALS
OXYGEN SATURATION: 98 % | DIASTOLIC BLOOD PRESSURE: 72 MMHG | BODY MASS INDEX: 23.99 KG/M2 | HEIGHT: 70 IN | WEIGHT: 167.56 LBS | SYSTOLIC BLOOD PRESSURE: 100 MMHG | HEART RATE: 86 BPM

## 2020-08-03 DIAGNOSIS — R14.2 BELCHING: ICD-10-CM

## 2020-08-03 DIAGNOSIS — K30 ACID INDIGESTION: Primary | ICD-10-CM

## 2020-08-03 DIAGNOSIS — R63.4 WEIGHT LOSS: ICD-10-CM

## 2020-08-03 PROCEDURE — 99999 PR PBB SHADOW E&M-EST. PATIENT-LVL IV: ICD-10-PCS | Mod: PBBFAC,,, | Performed by: INTERNAL MEDICINE

## 2020-08-03 PROCEDURE — 99999 PR PBB SHADOW E&M-EST. PATIENT-LVL IV: CPT | Mod: PBBFAC,,, | Performed by: INTERNAL MEDICINE

## 2020-08-03 PROCEDURE — 3008F PR BODY MASS INDEX (BMI) DOCUMENTED: ICD-10-PCS | Mod: CPTII,S$GLB,, | Performed by: INTERNAL MEDICINE

## 2020-08-03 PROCEDURE — 99214 OFFICE O/P EST MOD 30 MIN: CPT | Mod: S$GLB,,, | Performed by: INTERNAL MEDICINE

## 2020-08-03 PROCEDURE — 99214 PR OFFICE/OUTPT VISIT, EST, LEVL IV, 30-39 MIN: ICD-10-PCS | Mod: S$GLB,,, | Performed by: INTERNAL MEDICINE

## 2020-08-03 PROCEDURE — 3008F BODY MASS INDEX DOCD: CPT | Mod: CPTII,S$GLB,, | Performed by: INTERNAL MEDICINE

## 2020-08-03 RX ORDER — PANTOPRAZOLE SODIUM 40 MG/1
40 TABLET, DELAYED RELEASE ORAL DAILY
Qty: 30 TABLET | Refills: 11 | Status: SHIPPED | OUTPATIENT
Start: 2020-08-03 | End: 2021-03-05

## 2020-08-03 NOTE — PROGRESS NOTES
Subjective:       Patient ID: Andre Padgett is a 49 y.o. male.    Chief Complaint: Abdominal Pain    She complains of abdominal pain for several weeks or longer.  He said he had constipation back 2 months ago and saw gastro but that resolved.  He also had prostatitis treated by urology and that is getting better.  He is having increased belching and indigestion.  He tried a medication with short-term relief from an acid but he has not taken anything longer term.  No on else at home has been ill.  He admits he is under lot of stress as he lost his job is going through a divorce.  He had COVID a few months ago.  He has lost weight but thinks it relates more to job loss and divorce rather than abdominal pain and decreased appetite.    Abdominal Pain  This is a new problem. The current episode started more than 1 month ago. The onset quality is undetermined. The problem occurs daily. The most recent episode lasted 3 hours. The pain is located in the RLQ. The pain is at a severity of 5/10. The pain is moderate. The quality of the pain is sharp. The abdominal pain does not radiate. Associated symptoms include belching, dysuria, frequency and nausea. Pertinent negatives include no arthralgias, constipation, diarrhea, fever, headaches, hematuria, myalgias or vomiting. The pain is aggravated by certain positions. The pain is relieved by nothing. He has tried acetaminophen and antacids for the symptoms. The treatment provided no relief. Prior diagnostic workup includes CT scan. There is no history of abdominal surgery, colon cancer, Crohn's disease, gallstones, GERD, irritable bowel syndrome, pancreatitis, PUD or ulcerative colitis. Patient's medical history does not include kidney stones and UTI.     Review of Systems   Constitutional: Negative for chills, fatigue, fever and unexpected weight change.   HENT: Negative for nosebleeds and trouble swallowing.    Eyes: Negative for pain and visual disturbance.   Respiratory:  Negative for cough, shortness of breath and wheezing.    Cardiovascular: Negative for chest pain and palpitations.   Gastrointestinal: Positive for abdominal pain and nausea. Negative for constipation, diarrhea and vomiting.        Belching   Genitourinary: Positive for dysuria and frequency. Negative for difficulty urinating and hematuria.   Musculoskeletal: Negative for arthralgias, myalgias and neck pain.   Integumentary:  Negative for rash.   Neurological: Negative for dizziness and headaches.   Hematological: Does not bruise/bleed easily.   Psychiatric/Behavioral: Negative for dysphoric mood, sleep disturbance and suicidal ideas.         Objective:      Physical Exam  Constitutional:       General: He is not in acute distress.     Appearance: He is well-developed.   HENT:      Head: Normocephalic and atraumatic.      Mouth/Throat:      Pharynx: No oropharyngeal exudate.   Eyes:      General: No scleral icterus.     Conjunctiva/sclera: Conjunctivae normal.      Pupils: Pupils are equal, round, and reactive to light.   Neck:      Musculoskeletal: Normal range of motion and neck supple.      Thyroid: No thyromegaly.      Comments: No supraclavicular nodes palpated  Cardiovascular:      Rate and Rhythm: Normal rate and regular rhythm.      Heart sounds: Normal heart sounds. No murmur.   Pulmonary:      Effort: Pulmonary effort is normal.      Breath sounds: Normal breath sounds. No wheezing.   Abdominal:      General: Bowel sounds are normal.      Palpations: Abdomen is soft. There is no mass.      Tenderness: There is abdominal tenderness.          Comments: Tender right upper quadrant to epigastrium.  Not severe.  No masses or organomegaly   Lymphadenopathy:      Cervical: No cervical adenopathy.   Skin:     Coloration: Skin is not pale.   Neurological:      Mental Status: He is alert and oriented to person, place, and time.         Assessment:       1. Acid indigestion    2. Belching    3. Weight loss         Plan:       Andre was seen today for abdominal pain.    Diagnoses and all orders for this visit:    Acid indigestion  -     pantoprazole (PROTONIX) 40 MG tablet; Take 1 tablet (40 mg total) by mouth once daily.    Belching  -     pantoprazole (PROTONIX) 40 MG tablet; Take 1 tablet (40 mg total) by mouth once daily.    Weight loss  -     pantoprazole (PROTONIX) 40 MG tablet; Take 1 tablet (40 mg total) by mouth once daily.        notify me in 5-7 days if not improving at which time we will have him back to see gastroenterology.  If doing well see me in 4-6 weeks for weight check and reassess.

## 2020-08-07 NOTE — PROGRESS NOTES
Subjective:      Patient ID: Andre Padgett is a 49 y.o. male.    Chief Complaint: Low-back Pain and Mid-back Pain    Mr Padgett is a 50 yo male sent in consultation by Dr. Diop for evaluation of back pain.  He has seen multiple providers over the years for thoracic back pain.  He saw Dr. Rai in 2014, he saw Madiha laws in 2018 for lower and upper back.  2 weeks ago he was getting up from a chair and felt a pain in the center of the back and the pain went to the neck and the lower back.  The pain has been slowly getting better.  The pain is more in the middle of the back and the neck.  He tried to get an MRI but the insurance did not approve it.  The pain is below the shoulder blade.  He felt like the pain go up to the neck.  He has neck pain at night with thick pillow, and feels better with then pillow.  He tried chiropractor with massage that made it worse.  He feels like the upper back pain sometimes triggers leg pain. The pain is worse in the middle of the back lying on floor, pain after sitting for an hour.  He does not have pain looking down, or turning side to side.  The pain is a throbbing and achy pain.  The pain is 4/10 now, worst 6/10 lying on floor, best 2/10 walking.  He takes advil 400 once a day, he takes a warm bath and heat and cold.  He is starting back with PT tomorrow.  The leg tingling could be any position, he does not feel like there is a position     X-ray lumbar 2018  Lumbar sagittal alignment is within normal limits with neutral positioning.  There is no significant listhesis with flexion extension positioning.  The lumbar vertebral body heights and contours are within normal limits without evidence for acute fracture.  Further evaluation as warranted clinically.     IMPRESSION:      Please see above    X-ray thoracic 5/27/20  Two views: Alignment is normal.  There is mild-moderate DJD.  No fracture dislocation bone destruction seen.     Impression:     DJD.  No acute process  seen.    X-ray cervical 5/27/2020  No evidence of acute fracture, subluxation or bone destruction.  Vertebral body heights, disc spaces and sagittal alignment obtained.  Odontoid is intact.  No significant prevertebral soft tissue swelling.  Coils in the region of the anterior communicating artery incidentally noted.     Impression:     No acute radiographic findings.    Past Medical History:  No date: Aneurysm      Comment:  Right sided filling anterior communicating aneurysm s/p                coiling 2011  No date: Anxiety  12/27/2016: Cerebral aneurysm, nonruptured  6/22/2018: Colon adenoma: 3/18 repeat colonoscpy 2023 6/22/2018: Diverticulosis of large intestine without hemorrhage: see   colonoscopy 3/18; sigmoid and descending colon  6/22/2018: Fatty liver: see CT 3/18  6/22/2018: Umbilical hernia without obstruction and without gangrene:   see CT 3/18    Past Surgical History:  2011: CEREBRAL ANGIOGRAM      Comment:  angiogram/coiling  3/26/2018: COLONOSCOPY; N/A      Comment:  Procedure: COLONOSCOPY;  Surgeon: Sanjiv Duran MD;                Location: [facility] ([address] [place] [address]);  Service: Endoscopy;                 Laterality: N/A;    Review of patient's family history indicates:  Problem: DAVID disease      Relation: Mother          Age of Onset: (Not Specified)  Problem: Hypertension      Relation: Mother          Age of Onset: (Not Specified)  Problem: Diabetes      Relation: Mother          Age of Onset: (Not Specified)  Problem: Brain cancer      Relation: Father          Age of Onset: (Not Specified)  Problem: Thyroid disease      Relation: Sister          Age of Onset: (Not Specified)  Problem: No Known Problems      Relation: Daughter          Age of Onset: (Not Specified)  Problem: No Known Problems      Relation: Sister          Age of Onset: (Not Specified)  Problem: Cirrhosis      Relation: Neg Hx          Age of Onset: (Not Specified)  Problem: Colon polyps      Relation: Neg Hx          Age of  Onset: (Not Specified)  Problem: Crohn's disease      Relation: Neg Hx          Age of Onset: (Not Specified)  Problem: Liver cancer      Relation: Neg Hx          Age of Onset: (Not Specified)  Problem: Stomach cancer      Relation: Neg Hx          Age of Onset: (Not Specified)  Problem: Ulcerative colitis      Relation: Neg Hx          Age of Onset: (Not Specified)  Problem: Heart attack      Relation: Neg Hx          Age of Onset: (Not Specified)  Problem: Colon cancer      Relation: Neg Hx          Age of Onset: (Not Specified)      Social History    Socioeconomic History      Marital status: Legally       Spouse name: Not on file      Number of children: Not on file      Years of education: Not on file      Highest education level: Not on file    Occupational History      Not on file    Social Needs      Financial resource strain: Not hard at all      Food insecurity        Worry: Never true        Inability: Never true      Transportation needs        Medical: No        Non-medical: No    Tobacco Use      Smoking status: Never Smoker      Smokeless tobacco: Never Used    Substance and Sexual Activity      Alcohol use: Yes        Comment: ocassionally - twice a week      Drug use: No      Sexual activity: Not on file    Lifestyle      Physical activity        Days per week: 7 days        Minutes per session: 40 min      Stress: To some extent    Relationships      Social connections        Talks on phone: More than three times a week        Gets together: Twice a week        Attends Jainism service: Not on file        Active member of club or organization: Yes        Attends meetings of clubs or organizations: 1 to 4 times per year        Relationship status:     Other Topics      Concerns:        Not on file    Social History Narrative      Not on file      Current Outpatient Medications:  acetaminophen (TYLENOL) 325 MG tablet, Take 1 tablet (325 mg total) by mouth every 6 (six) hours as  needed for Pain. (Patient not taking: Reported on 6/16/2020), Disp: 30 tablet, Rfl: 0  ALPRAZolam (XANAX) 0.5 MG tablet, TAKE 1 TABLET BY MOUTH EVERY NIGHT AS NEEDED, Disp: 30 tablet, Rfl: 2  ALPRAZolam (XANAX) 1 MG tablet, Take 1 tablet (1 mg total) by mouth 2 (two) times daily as needed for Anxiety., Disp: 60 tablet, Rfl: 2  ascorbic acid (VITAMIN C) 100 MG tablet, Take 100 mg by mouth once daily., Disp: , Rfl:   aspirin 81 MG Chew, Take 81 mg by mouth once daily., Disp: , Rfl:   atorvastatin (LIPITOR) 40 MG tablet, TAKE 1 TABLET(40 MG) BY MOUTH EVERY DAY, Disp: 90 tablet, Rfl: 3  coenzyme Q10 200 mg capsule, Take 200 mg by mouth once daily. Take with cholesterol med. (Patient not taking: Reported on 8/3/2020), Disp: , Rfl:   diclofenac sodium (VOLTAREN) 1 % Gel, Apply 2 g topically 3 (three) times daily as needed., Disp: 1 Tube, Rfl: 0  pantoprazole (PROTONIX) 40 MG tablet, Take 1 tablet (40 mg total) by mouth once daily., Disp: 30 tablet, Rfl: 11  zinc sulfate (ZINCATE) 220 (50) mg capsule, TK ONE C PO  QD, Disp: , Rfl:     No current facility-administered medications for this visit.       Review of patient's allergies indicates:  No Known Allergies        Review of Systems   Constitution: Negative for weight gain and weight loss.   Cardiovascular: Negative for chest pain.   Respiratory: Negative for shortness of breath.    Musculoskeletal: Positive for back pain (mid back and lower back) and neck pain. Negative for joint pain and joint swelling.   Gastrointestinal: Negative for abdominal pain, bowel incontinence, nausea and vomiting.   Genitourinary: Negative for bladder incontinence.   Neurological: Positive for paresthesias (both legs sometimes). Negative for numbness.         Objective:        General: Andre is well-developed, well-nourished, appears stated age, in no acute distress, alert and oriented to time, place and person.     General    Vitals reviewed.  Constitutional: He is oriented to person,  place, and time. He appears well-developed and well-nourished.   HENT:   Head: Normocephalic and atraumatic.   Pulmonary/Chest: Effort normal.   Neurological: He is alert and oriented to person, place, and time.   Psychiatric: He has a normal mood and affect. His behavior is normal. Judgment and thought content normal.     General Musculoskeletal Exam   Gait: normal     Right Ankle/Foot Exam     Tests   Heel Walk: able to perform  Tiptoe Walk: able to perform    Left Ankle/Foot Exam     Tests   Heel Walk: able to perform  Tiptoe Walk: able to perform  Back (L-Spine & T-Spine) / Neck (C-Spine) Exam     Tenderness Right paramedian tenderness of the Lower T-Spine. Left paramedian tenderness of the Lower T-Spine.     Back (L-Spine & T-Spine) Range of Motion   Extension: 20   Flexion: 90   Lateral bend right: 20   Lateral bend left: 20   Rotation right: 40   Rotation left: 40     Neck (C-Spine) Range of Motion   Flexion:     40  Extension: 40    Spinal Sensation   Right Side Sensation  C-Spine Level: normal   L-Spine Level: normal  S-Spine Level: normal  Left Side Sensation  C-Spine Level: normal  L-Spine Level: normal  S-Spine Level: normal    Back (L-Spine & T-Spine) Tests   Right Side Tests  Straight leg raise:      Sitting SLR: > 70 degrees      Left Side Tests  Straight leg raise:     Sitting SLR: > 70 degrees          Other He has no scoliosis .  Spinal Kyphosis:  present      Muscle Strength   Right Upper Extremity   Biceps: 5/5/5   Deltoid:  5/5  Triceps:  5/5  Wrist extension: 5/5/5   Finger Flexors:  5/5  Left Upper Extremity  Biceps: 5/5/5   Deltoid:  5/5  Triceps:  5/5  Wrist extension: 5/5/5   Finger Flexors:  5/5  Right Lower Extremity   Hip Flexion: 5/5   Quadriceps:  5/5   Anterior tibial:  5/5/5  EHL:  5/5  Left Lower Extremity   Hip Flexion: 5/5   Quadriceps:  5/5   Anterior tibial:  5/5/5   EHL:  5/5    Reflexes     Left Side  Biceps:  2+  Triceps:  2+  Brachioradialis:  2+  Quadriceps:  2+  Achilles:   2+  Left Guillen's Sign:  Absent  Babinski Sign:  absent    Right Side   Biceps:  2+  Triceps:  2+  Brachioradialis:  2+  Quadriceps:  2+  Achilles:  2+  Right Guillen's Sign:  absent  Babinski Sign:  absent    Vascular Exam     Right Pulses        Carotid:                  2+    Left Pulses        Carotid:                  2+              Assessment:       1. Neck pain    2. Chronic bilateral thoracic back pain    3. Muscle spasm           Plan:       Orders Placed This Encounter    Ambulatory referral/consult to Physical/Occupational Therapy    diclofenac (VOLTAREN) 75 MG EC tablet    methocarbamoL (ROBAXIN) 500 MG Tab     1. We discussed back pain and the nature of back pain.  We discussed that it is not one thing that causes the pain but an accumulation of multiple things that we do.  He asks about stress.  We discussed that it seems like muscle.  He has had these issues in the past.  We discussed stress can effect the pain.  He complains of full back pain, but mainly focused midback and neck today  2. We discussed posture sitting and the importance of trying to sit better.  We discussed his posture and slight kyphosis.  We discussed trying to hold himself better.  We discussed alarms and a curve in the lower back.  We discussed getting head over spine  3. We discussed the benefits of therapy and exercise and continuing to move.  4. Diclofenac 75mg PO bid x 10 days then as needed  5. Robaxin 500mg 1-2 at night  6. PT for back and core strengthening, cervical retractions, posture training and pec stretching and scapula stabilization and [facility] starting tomorrow  7. RTC 3 months    More than 50% of the total time  of 45 minutes was spent face to face in counseling on diagnosis and treatment options. I also counseled patient  on common and most usual side effect of prescribed medications.  I reviewed Primary care , and other specialty's notes to better coordinate patient's care. All questions were answered,  and patient voiced understanding.     A consultation note will be sent to Dr. Diop through epic.  Thanks for the consult    Follow-up: Follow up in about 3 months (around 11/10/2020). If there are any questions prior to this, the patient was instructed to contact the office.

## 2020-08-08 ENCOUNTER — PATIENT OUTREACH (OUTPATIENT)
Dept: ADMINISTRATIVE | Facility: OTHER | Age: 50
End: 2020-08-08

## 2020-08-09 NOTE — PROGRESS NOTES
Chart reviewed.   Immunizations: Triggered Imm Registry     Orders placed: n/a  Upcoming appts to satisfy THEL[facility] topics: n/a

## 2020-08-10 ENCOUNTER — OFFICE VISIT (OUTPATIENT)
Dept: SPINE | Facility: CLINIC | Age: 50
End: 2020-08-10
Attending: PHYSICAL MEDICINE & REHABILITATION
Payer: COMMERCIAL

## 2020-08-10 VITALS
BODY MASS INDEX: 23.99 KG/M2 | HEIGHT: 70 IN | SYSTOLIC BLOOD PRESSURE: 117 MMHG | WEIGHT: 167.56 LBS | DIASTOLIC BLOOD PRESSURE: 75 MMHG | HEART RATE: 84 BPM

## 2020-08-10 DIAGNOSIS — M62.838 MUSCLE SPASM: ICD-10-CM

## 2020-08-10 DIAGNOSIS — M54.6 CHRONIC BILATERAL THORACIC BACK PAIN: ICD-10-CM

## 2020-08-10 DIAGNOSIS — G89.29 CHRONIC BILATERAL THORACIC BACK PAIN: ICD-10-CM

## 2020-08-10 DIAGNOSIS — M54.2 NECK PAIN: Primary | ICD-10-CM

## 2020-08-10 PROCEDURE — 99999 PR PBB SHADOW E&M-EST. PATIENT-LVL IV: ICD-10-PCS | Mod: PBBFAC,,, | Performed by: PHYSICAL MEDICINE & REHABILITATION

## 2020-08-10 PROCEDURE — 99999 PR PBB SHADOW E&M-EST. PATIENT-LVL IV: CPT | Mod: PBBFAC,,, | Performed by: PHYSICAL MEDICINE & REHABILITATION

## 2020-08-10 PROCEDURE — 3008F PR BODY MASS INDEX (BMI) DOCUMENTED: ICD-10-PCS | Mod: CPTII,S$GLB,, | Performed by: PHYSICAL MEDICINE & REHABILITATION

## 2020-08-10 PROCEDURE — 99204 PR OFFICE/OUTPT VISIT, NEW, LEVL IV, 45-59 MIN: ICD-10-PCS | Mod: S$GLB,,, | Performed by: PHYSICAL MEDICINE & REHABILITATION

## 2020-08-10 PROCEDURE — 99204 OFFICE O/P NEW MOD 45 MIN: CPT | Mod: S$GLB,,, | Performed by: PHYSICAL MEDICINE & REHABILITATION

## 2020-08-10 PROCEDURE — 3008F BODY MASS INDEX DOCD: CPT | Mod: CPTII,S$GLB,, | Performed by: PHYSICAL MEDICINE & REHABILITATION

## 2020-08-10 RX ORDER — DICLOFENAC SODIUM 75 MG/1
75 TABLET, DELAYED RELEASE ORAL 2 TIMES DAILY
Qty: 60 TABLET | Refills: 2 | Status: ON HOLD | OUTPATIENT
Start: 2020-08-10 | End: 2020-09-22 | Stop reason: HOSPADM

## 2020-08-10 RX ORDER — METHOCARBAMOL 500 MG/1
500-1000 TABLET, FILM COATED ORAL NIGHTLY PRN
Qty: 60 TABLET | Refills: 2 | Status: ON HOLD | OUTPATIENT
Start: 2020-08-10 | End: 2020-09-22 | Stop reason: HOSPADM

## 2020-08-10 NOTE — LETTER
August 10, 2020      Hunter Diop MD  [address]  [facility] [address]           [facility] [address], [address]  [facility] [address]  Phone: [phone]  Fax: [phone]          Patient: Andre Padgett   MR Number: [id number]   YOB: 1970   Date of Visit: 8/10/2020       Dear Dr. Hunter Diop:    Thank you for referring Andre Padgett to me for evaluation. Attached you will find relevant portions of my assessment and plan of care.    If you have questions, please do not hesitate to call me. I look forward to following Andre Padgett along with you.    Sincerely,    Rosemarie Negrete MD    Enclosure  CC:  No Recipients    If you would like to receive this communication electronically, please contact [email or web address] or [phone] to request more information on [employer] Link access.    For providers and/or their staff who would like to refer a patient to [facility], please contact us through our one-stop-shop provider referral line, [facility], at [phone].    If you feel you have received this communication in error or would no longer like to receive these types of communications, please e-mail [email or web address]

## 2020-08-11 ENCOUNTER — CLINICAL SUPPORT (OUTPATIENT)
Dept: REHABILITATION | Facility: HOSPITAL | Age: 50
End: 2020-08-11
Attending: INTERNAL MEDICINE
Payer: COMMERCIAL

## 2020-08-11 DIAGNOSIS — G89.29 CHRONIC BILATERAL LOW BACK PAIN WITH LEFT-SIDED SCIATICA: ICD-10-CM

## 2020-08-11 DIAGNOSIS — M54.50 CHRONIC BILATERAL LOW BACK PAIN WITHOUT SCIATICA: ICD-10-CM

## 2020-08-11 DIAGNOSIS — M54.42 CHRONIC BILATERAL LOW BACK PAIN WITH LEFT-SIDED SCIATICA: ICD-10-CM

## 2020-08-11 DIAGNOSIS — G89.29 CHRONIC BILATERAL THORACIC BACK PAIN: ICD-10-CM

## 2020-08-11 DIAGNOSIS — R53.1 DECREASED STRENGTH: ICD-10-CM

## 2020-08-11 DIAGNOSIS — G89.29 CHRONIC BILATERAL LOW BACK PAIN WITHOUT SCIATICA: ICD-10-CM

## 2020-08-11 DIAGNOSIS — M54.6 CHRONIC BILATERAL THORACIC BACK PAIN: ICD-10-CM

## 2020-08-11 DIAGNOSIS — R26.89 DECREASED SPINAL MOBILITY: ICD-10-CM

## 2020-08-11 DIAGNOSIS — R29.3 POOR POSTURE: ICD-10-CM

## 2020-08-11 PROCEDURE — 97110 THERAPEUTIC EXERCISES: CPT | Mod: PO

## 2020-08-11 NOTE — PROGRESS NOTES
Physical Therapy Treatment Note     Name: Andre PEDRO Padgett  Clinic Number: [id number]    Therapy Diagnosis:   Encounter Diagnoses   Name Primary?    Chronic bilateral thoracic back pain     Poor posture     Chronic bilateral low back pain without sciatica     Decreased strength     Decreased spinal mobility     Chronic bilateral low back pain with left-sided sciatica      Physician: Hunter Diop MD    Visit Date: 8/11/2020    Physician Orders: PT Eval and Treat   Medical Diagnosis from Referral:   Lumbago with sciatica, unspecified side   G89.29 (ICD-10-CM) - Other chronic pain      Evaluation Date: 6/18/2020  Authorization Period Expiration: 12/31/20  Plan of Care Expiration: 8/14/20  Visit # / Visits authorized: 3/ 20       Time In: 10:01 AM  Time Out: 10:46 AM  Total Billable Time: 40 minutes    Precautions: Standard, no heavy lifting    Subjective     Pt reports: he had to take a break from therapy last week because he had a lot going on, but now he's ready to work on this. Pt endorses pain through entire back.    He was compliant with home exercise program.  Response to previous treatment: no adverse effects  Functional change: none    Pain:4- 5/10  Location: bilateral mid and upper back pain      Objective     Andre received therapeutic exercises to develop strength, ROM, flexibility, posture and core stabilization for 40 minutes including:  Upright bike, Level 4, 5 min  Posterior pelvic tilt  20x with 5 sec holds  Core activation with PIlates ring, 20 reps, 3s holds  Hip ABD isometric 2 x 10 reps, Pialtes ring  Supine Heel taps from 90/90 position, 10 reps B  Bridges with green band, 2x10 with 5 sec hold  LTR with orange swiss ball under LE'15x with 3 sec hold B   hip flexor stretch off table, 2x30 sec/LE   Open Book B 10x with 5 sec hold  Thread the needle, 5x/side, 5s holds  Half kneeling open book, 10/side  Half kneeling diagonal chop with orange band, 10/side    Not performed today:  REDTC with  physio ball 15x with 5 sec hold  SKTC 2x30 B  PPT with marching 3x30 sec  Dead bugs with PPT/TA bracing 2x8   Piriformis stretch 2x45 sec  Side-lying upper trunk rotation with hands clasped together 10x  Seated scapular retractions 15x with 5 sec holds  Rows orange band 2x10   pallof press with orange TB 2x10 with 5 sec hold        Home Exercises Provided and Patient Education Provided     Education provided: pt was educated on all therapeutic exercises initiated during today's tx visit.     Written Home Exercises Provided: yes.  Exercises were reviewed and Andre was able to demonstrate them prior to the end of the session.  Andre demonstrated good  understanding of the education provided.     See EMR under Patient Instructions for exercises provided 8/11/20.    Assessment   Pt tolerated tx fairly well and was able to complete exercises without increase in symptoms. Pt needs occasional cues for technique. Pt's attendance to PT has been inconsistent, so PT emphasizes importance of regular attendance for max benefit. Pt states thoracic rotation exercises feel the best.  Pt required verbal cues to decrease exercise angelique and increase eccentric control. Tx focused on core stabilization, BLE/hip flexibility, and spine mobility. Will progress as tolerated.    Andre is progressing well towards his goals.   Pt prognosis is Good.     Pt will continue to benefit from skilled outpatient physical therapy to address the deficits listed in the problem list box on initial evaluation, provide pt/family education and to maximize pt's level of independence in the home and community environment.     Pt's spiritual, cultural and educational needs considered and pt agreeable to plan of care and goals.     Anticipated barriers to physical therapy: number of symptoms    Goals:     Short Term Goals: 4 weeks   1. Pt will tolerate HEP for improved strength, functional mobility, ROM, posture, and endurance. (progressing, not met)  2. Pt  will report reduced back pain to </= 5/10 at worst for improved functional mobility and ability to participate in work activities/ADL's. (progressing, not met)  3. Pt will increase spinal AROM in all directions to >/= 3/4 range for improved functional mobility. (progressing, not met)  4. Pt will demo >/= 4+/5 strength in BLE's for improved functional mobility, endurance, and posture. (progressing, not met)  5. Pt will report improved ability to walk for >/=8 blocks without increase in pain/symptoms for improved functional mobility (progressing, not met)  6. Pt will be independent with postural awareness/precautions for improved function (progressing, not met)     Long Term Goals: 8 weeks   1. Pt will be I with updated HEP for improved functional mobility, posture, strength, and endurance. (progressing, not met)  2. Pt will report reduced back pain to </= 3/10 at worst for improved functional mobility and ability to participate in work activities/ADL's. (progressing, not met)  3. Pt will increase spinal AROM in all directions to WNL for improved functional mobility. (progressing, not met)  4. Pt will demo >/= 5/5 strength in BLE's and posterior thoracic musculature for improved functional mobility, endurance, and posture. (progressing, not met)  5. Pt will demo/report increased ability to stand for walk to 45 min without pain/symptoms for improved functional mobility (progressing, not met)      Plan   Continue POC established by PT.       Aubrie Goode, PT

## 2020-08-13 ENCOUNTER — CLINICAL SUPPORT (OUTPATIENT)
Dept: REHABILITATION | Facility: HOSPITAL | Age: 50
End: 2020-08-13
Attending: INTERNAL MEDICINE
Payer: COMMERCIAL

## 2020-08-13 DIAGNOSIS — M54.6 CHRONIC BILATERAL THORACIC BACK PAIN: ICD-10-CM

## 2020-08-13 DIAGNOSIS — R26.89 DECREASED SPINAL MOBILITY: ICD-10-CM

## 2020-08-13 DIAGNOSIS — R53.1 DECREASED STRENGTH: ICD-10-CM

## 2020-08-13 DIAGNOSIS — M54.50 CHRONIC BILATERAL LOW BACK PAIN WITHOUT SCIATICA: ICD-10-CM

## 2020-08-13 DIAGNOSIS — R29.3 POOR POSTURE: ICD-10-CM

## 2020-08-13 DIAGNOSIS — G89.29 CHRONIC BILATERAL LOW BACK PAIN WITHOUT SCIATICA: ICD-10-CM

## 2020-08-13 DIAGNOSIS — G89.29 CHRONIC BILATERAL THORACIC BACK PAIN: ICD-10-CM

## 2020-08-13 PROCEDURE — 97110 THERAPEUTIC EXERCISES: CPT | Mod: PO,CQ

## 2020-08-13 NOTE — PROGRESS NOTES
Physical Therapy Treatment Note     Name: Andre Padgett  Clinic Number: [id number]    Therapy Diagnosis:   No diagnosis found.  Physician: Hunter Diop MD    Visit Date: 8/13/2020    Physician Orders: PT Eval and Treat   Medical Diagnosis from Referral:   Lumbago with sciatica, unspecified side   G89.29 (ICD-10-CM) - Other chronic pain      Evaluation Date: 6/18/2020  Authorization Period Expiration: 12/31/20  Plan of Care Expiration: 8/14/20  Visit # / Visits authorized: 4/ 20       Time In: 10:25 AM  Time Out: 11:07 AM  Total Billable Time: 42 minutes    Precautions: Standard, no heavy lifting    Subjective     Pt reports: Pt endorses tightness through the entire entire back.    He was compliant with home exercise program.  Response to previous treatment: no adverse effects  Functional change: none    Pain:5/10  Location: bilateral mid and upper back pain      Objective     Andre received therapeutic exercises to develop strength, ROM, flexibility, posture and core stabilization for 42 minutes including:  Upright bike, Level 2, 5 min    Posterior pelvic tilt 15x with 5 sec holds  Posterior pelvic tilt with pushing on orange physio ball 15x with 5 sec holds  Core activation with PIlates ring, 20 reps, 5s holds  Hip ABD isometric 2 x 10 reps, Pialtes ring  Supine Heel taps from 90/90 position, 10 reps B  Supine alternating BLE extensions, 3x15 sec   Bridges with blue band, 2x12 with 5 sec hold  LTR with orange swiss ball under LE'15x with 3 sec hold B  hip flexor stretch off table, 2x1 min/LE   Open Book B 10x with 5 sec hold  Thread the needle, 5x/side, 5s holds  Half kneeling open book, 10/side  Half kneeling diagonal chop with 10 lbs kettle, 10/side  Stability roll outs with blue physio ball x10 with 3 sec hold    Not performed today:  DKTC with physio ball 15x with 5 sec hold  SKTC 2x30 B  PPT with marching 3x30 sec  Dead bugs with PPT/TA bracing 2x8   Piriformis stretch 2x45 sec  Side-lying upper trunk  rotation with hands clasped together 10x  Seated scapular retractions 15x with 5 sec holds  Rows orange band 2x10   pallof press with orange TB 2x10 with 5 sec hold        Home Exercises Provided and Patient Education Provided     Education provided: pt was educated on all therapeutic exercises performed during today's tx visit.     Written Home Exercises Provided: HEP provided during a previous visit.   Exercises were reviewed and Andre was able to demonstrate them prior to the end of the session.  Andre demonstrated good  understanding of the education provided.     See EMR under Patient Instructions for exercises provided 8/11/20.    Assessment   Pt tolerated tx well and was able to complete exercises without increase in symptoms. Continued performing exercises focusing on thoracic and lumbar mobility and core stabilization. Pt required verbal cues to decrease exercise angelique and increase eccentric control. Tx focused on core stabilization, BLE/hip flexibility, and spine mobility. Will progress as tolerated.    Andre is progressing well towards his goals.   Pt prognosis is Good.     Pt will continue to benefit from skilled outpatient physical therapy to address the deficits listed in the problem list box on initial evaluation, provide pt/family education and to maximize pt's level of independence in the home and community environment.     Pt's spiritual, cultural and educational needs considered and pt agreeable to plan of care and goals.     Anticipated barriers to physical therapy: number of symptoms    Goals:     Short Term Goals: 4 weeks   1. Pt will tolerate HEP for improved strength, functional mobility, ROM, posture, and endurance. (progressing, not met)  2. Pt will report reduced back pain to </= 5/10 at worst for improved functional mobility and ability to participate in work activities/ADL's. (progressing, not met)  3. Pt will increase spinal AROM in all directions to >/= 3/4 range for improved  functional mobility. (progressing, not met)  4. Pt will demo >/= 4+/5 strength in BLE's for improved functional mobility, endurance, and posture. (progressing, not met)  5. Pt will report improved ability to walk for >/=8 blocks without increase in pain/symptoms for improved functional mobility (progressing, not met)  6. Pt will be independent with postural awareness/precautions for improved function (progressing, not met)     Long Term Goals: 8 weeks   1. Pt will be I with updated HEP for improved functional mobility, posture, strength, and endurance. (progressing, not met)  2. Pt will report reduced back pain to </= 3/10 at worst for improved functional mobility and ability to participate in work activities/ADL's. (progressing, not met)  3. Pt will increase spinal AROM in all directions to WNL for improved functional mobility. (progressing, not met)  4. Pt will demo >/= 5/5 strength in BLE's and posterior thoracic musculature for improved functional mobility, endurance, and posture. (progressing, not met)  5. Pt will demo/report increased ability to stand for walk to 45 min without pain/symptoms for improved functional mobility (progressing, not met)      Plan   Continue POC established by PT, including: thoracic/lumbar mobility and core stabilization.        Glen Silva, PTA

## 2020-08-18 ENCOUNTER — CLINICAL SUPPORT (OUTPATIENT)
Dept: REHABILITATION | Facility: HOSPITAL | Age: 50
End: 2020-08-18
Attending: INTERNAL MEDICINE
Payer: COMMERCIAL

## 2020-08-18 DIAGNOSIS — G89.29 CHRONIC BILATERAL LOW BACK PAIN WITHOUT SCIATICA: ICD-10-CM

## 2020-08-18 DIAGNOSIS — M54.6 CHRONIC BILATERAL THORACIC BACK PAIN: ICD-10-CM

## 2020-08-18 DIAGNOSIS — G89.29 CHRONIC BILATERAL LOW BACK PAIN WITH LEFT-SIDED SCIATICA: ICD-10-CM

## 2020-08-18 DIAGNOSIS — M54.42 CHRONIC BILATERAL LOW BACK PAIN WITH LEFT-SIDED SCIATICA: ICD-10-CM

## 2020-08-18 DIAGNOSIS — G89.29 CHRONIC BILATERAL THORACIC BACK PAIN: ICD-10-CM

## 2020-08-18 DIAGNOSIS — R26.89 DECREASED SPINAL MOBILITY: ICD-10-CM

## 2020-08-18 DIAGNOSIS — R53.1 DECREASED STRENGTH: ICD-10-CM

## 2020-08-18 DIAGNOSIS — M54.50 CHRONIC BILATERAL LOW BACK PAIN WITHOUT SCIATICA: ICD-10-CM

## 2020-08-18 DIAGNOSIS — R29.3 POOR POSTURE: ICD-10-CM

## 2020-08-18 PROCEDURE — 97110 THERAPEUTIC EXERCISES: CPT | Mod: PO

## 2020-08-18 NOTE — PLAN OF CARE
Outpatient Therapy Updated Plan of Care     Visit Date: 8/18/2020  Name: Andre Padgett  Clinic Number: [id number]    Therapy Diagnosis:   Encounter Diagnoses   Name Primary?    Chronic bilateral thoracic back pain     Poor posture     Chronic bilateral low back pain without sciatica     Decreased strength     Decreased spinal mobility     Chronic bilateral low back pain with left-sided sciatica      Physician: Hunter Diop MD    Physician Orders: PT Eval and Treat   Medical Diagnosis from Referral:   Lumbago with sciatica, unspecified side   G89.29 (ICD-10-CM) - Other chronic pain    Chronic bilateral thoracic back pain    Evaluation Date: 6/18/2020      Total Visits Received: 5  Cancelled Visits: unknown  No Show Visits: unknown    Current Certification Period:  6/18/20 to 8/14/20  Precautions:  Standard, no heavy lifting  Visits from Evaluation Date:  4      Subjective     Update: See today's treatment note    Objective     Update: Spinal Range of Motion:     Degrees Pain   Flexion 1/2 range    no         Extension WFL    Yes, left side of back         Left Side Bending WFL pull         Right Side Bending WFL pull         Left rotation    1/2 range yes         Right Rotation    2/3 range yes               Lower Extremity Strength  Right LE   Left LE     Knee extension: 5/5 Knee extension: 5/5   Knee flexion: 5/5* Knee flexion: 5/5*   Hip flexion: 5/5 Hip flexion: 5/5   Hip extension:  5/5 Hip extension: 4+/5   Hip abduction: 5/5 Hip abduction: 5/5   Hip adduction: 5/5 Hip adduction 5/5   Ankle dorsiflexion: 5/5 Ankle dorsiflexion: 5/5   Ankle plantarflexion: 5/5 Ankle plantarflexion: 5/5   Hip ER 5/5 and IR is 4/5 on RLE; Hip ER is 5/5 on LLE and IR is 4+/5        Posterior thoracic Strength  (R) UE  (L) UE    Lower Trap 5/5 Lower Trap 4/5   Middle Trap 4/5 Middle Trap 4/5   Rhomboids 4-/5 Rhomboids 5/5     Increased muscle tightness noted to thoracic paraspinals. Pt with thoracic  hypomobility.        Assessment     Update: Pt tolerated tx well and was able to complete exercises without increase in symptoms. Pt endorses less back pain, but has continued tightness t/o back, mostly noted in thoracic spine. Pt demo's mild increase in strength as well as an improvement in walking tolerance/endurance. Tested posterior thoracic strength today as well, and he presents with some weakness in this musculature. Pt has met goals as noted below. He will benefit from continued therapy to address remaining/update goals and remaining deficits. Will emphasize postural and core strengthening. Pt also responds well to rotational exercises, so will include/expand more rotational exercises as appropriate Extending POC to 9/25/20. Will progress as tolerated.    Previous Short Term Goals Status:   Met partially- see today's note  New Short Term Goals Status:   N/A  Long Term Goal Status:   continue per initial plan of care with addition of following goal  Reasons for Recertification of Therapy:   Pt is making good progress and will benefit from continued PT to address remaining deficits in lumbar and thoracic mobility, as well as postural and core strength.    Plan     Updated Certification Period: 8/18/2020 to 9/25/20  Recommended Treatment Plan: 2 times per week for 5 weeks: Manual Therapy, Moist Heat/ Ice, Neuromuscular Re-ed, Orthotic Management and Training, Patient Education, Self Care, Therapeutic Activites, Therapeutic Exercise and modalities as appropriate  Other Recommendations: none    Aubrie Goode, PT  8/18/2020      I CERTIFY THE NEED FOR THESE SERVICES FURNISHED UNDER THIS PLAN OF TREATMENT AND WHILE UNDER MY CARE    Physician's comments:        Physician's Signature: ___________________________________________________

## 2020-08-18 NOTE — PROGRESS NOTES
Physical Therapy Treatment Note     Name: Andre Padgett  Clinic Number: [id number]    Therapy Diagnosis:   Encounter Diagnoses   Name Primary?    Chronic bilateral thoracic back pain     Poor posture     Chronic bilateral low back pain without sciatica     Decreased strength     Decreased spinal mobility     Chronic bilateral low back pain with left-sided sciatica      Physician: Hunter Diop MD    Visit Date: 8/18/2020    Physician Orders: PT Eval and Treat   Medical Diagnosis from Referral:   Lumbago with sciatica, unspecified side   G89.29 (ICD-10-CM) - Other chronic pain      Evaluation Date: 6/18/2020  Authorization Period Expiration: 12/31/20  Updated Plan of Care Expiration: 9/25/20  Visit # / Visits authorized:5/ 20       Time In: 10:03AM  Time Out: 10:46 AM  Total Billable Time: 38 minutes    Precautions: Standard, no heavy lifting    Subjective     Pt reports: Pt endorses tight/tense through the entire entire back.  Pt states he notices most symptoms with lying flat. Pt states he hasn't felt comfortable returning to play basketball.   He was compliant with home exercise program.  Response to previous treatment: no adverse effects  Functional change: pt can walk for 1 mile before pain is an issue. Pt can go bike riding as well.    Pain:4/10  Location: bilateral mid and upper back pain      Objective     Andre received therapeutic exercises to develop strength, ROM, flexibility, posture and core stabilization for 38 minutes including:    Please refer to Plan of Care objective section for updated objective measures    Upright bike, Level 3, 6 min    Thread the needle, 5x/side, 5s holds  Cat/camel, 5-7 reps, max cues for technique  Reverse clam, 2 x 10 reps/side  Open Book B 15x with 5 sec hold, orange band  Bridge to overhead UE flexion with 5#, kettlebell, 2 x 10 reps  Horizontal ABD orange band over half foam roll, 2 x 10 reps        Not performed today:  Posterior pelvic tilt 15x with 5 sec  holds  Posterior pelvic tilt with pushing on orange physio ball 15x with 5 sec holds  Core activation with PIlates ring, 20 reps, 5s holds  Hip ABD isometric 2 x 10 reps, Pialtes ring  Supine Heel taps from 90/90 position, 10 reps B  Supine alternating BLE extensions, 3x15 sec   Bridges with blue band, 2x12 with 5 sec hold  LTR with orange swiss ball under LE'15x with 3 sec hold B  hip flexor stretch off table, 2x1 min/LE   Half kneeling open book, 10/side  Half kneeling diagonal chop with 10 lbs kettle, 10/side  Stability roll outs with blue physio ball x10 with 3 sec hold  pallof press with orange TB 2x10 with 5 sec hold        Home Exercises Provided and Patient Education Provided     Education provided: pt was educated on all therapeutic exercises performed during today's tx visit.     Written Home Exercises Provided: HEP provided during a previous visit.   Exercises were reviewed and Andre was able to demonstrate them prior to the end of the session.  Andre demonstrated good  understanding of the education provided.     See EMR under Patient Instructions for exercises provided 8/18/20.    Assessment   Pt tolerated tx well and was able to complete exercises without increase in symptoms. Pt endorses less back pain, but has continued tightness t/o back, mostly noted in thoracic spine. Pt demo's mild increase in strength as well as an improvement in walking tolerance/endurance. Tested posterior thoracic strength today as well, and he presents with some weakness in this musculature. Pt's spinal mobility remains limited.  Pt has met goals as noted below. He will benefit from continued therapy to address remaining/update goals and remaining deficits. Will emphasize postural and core strengthening. Pt also responds well to rotational exercises, so will include/expand more rotational exercises as appropriate Extending POC to 9/25/20. Will progress as tolerated.    Andre is progressing well towards his goals.   Pt  prognosis is Good.     Pt will continue to benefit from skilled outpatient physical therapy to address the deficits listed in the problem list box on initial evaluation, provide pt/family education and to maximize pt's level of independence in the home and community environment.     Pt's spiritual, cultural and educational needs considered and pt agreeable to plan of care and goals.     Anticipated barriers to physical therapy: number of symptoms    Goals:     Short Term Goals: 4 weeks   1. Pt will tolerate HEP for improved strength, functional mobility, ROM, posture, and endurance. (Met, 8/18)  2. Pt will report reduced back pain to </= 5/10 at worst for improved functional mobility and ability to participate in work activities/ADL's. (progressing, not met)  3. Pt will increase spinal AROM in all directions to >/= 3/4 range for improved functional mobility. (progressing, not met)  4. Pt will demo >/= 4+/5 strength in BLE's for improved functional mobility, endurance, and posture. (progressing, not met)  5. Pt will report improved ability to walk for >/=8 blocks without increase in pain/symptoms for improved functional mobility (Met, 8/18)  6. Pt will be independent with postural awareness/precautions for improved function (progressing, not met)     Long Term Goals: 8 weeks   1. Pt will be I with updated HEP for improved functional mobility, posture, strength, and endurance. (progressing, not met)  2. Pt will report reduced back pain to </= 3/10 at worst for improved functional mobility and ability to participate in work activities/ADL's. (progressing, not met)  3. Pt will increase spinal AROM in all directions to WNL for improved functional mobility. (progressing, not met)  4. Pt will demo >/= 5/5 strength in BLE's and posterior thoracic musculature for improved functional mobility, endurance, and posture. (progressing, not met)  5. Pt will demo/report increased ability to stand for walk to 45 min without  pain/symptoms for improved functional mobility (progressing, not met)    New goal (8/18/20)  6. Pt will demo 5/5 strength in posterior thoracic musculature for improved functional mobility, endurance, and posture. (progressing, not met)      Plan   Continue POC established by PT, including: thoracic/lumbar mobility and core stabilization.        Aubrie Goode, PT

## 2020-08-20 ENCOUNTER — PATIENT MESSAGE (OUTPATIENT)
Dept: INTERNAL MEDICINE | Facility: CLINIC | Age: 50
End: 2020-08-20

## 2020-08-20 ENCOUNTER — CLINICAL SUPPORT (OUTPATIENT)
Dept: REHABILITATION | Facility: HOSPITAL | Age: 50
End: 2020-08-20
Attending: INTERNAL MEDICINE
Payer: COMMERCIAL

## 2020-08-20 DIAGNOSIS — K42.9 UMBILICAL HERNIA WITHOUT OBSTRUCTION AND WITHOUT GANGRENE: Primary | ICD-10-CM

## 2020-08-20 DIAGNOSIS — G89.29 CHRONIC BILATERAL THORACIC BACK PAIN: ICD-10-CM

## 2020-08-20 DIAGNOSIS — M54.6 CHRONIC BILATERAL THORACIC BACK PAIN: ICD-10-CM

## 2020-08-20 DIAGNOSIS — G89.29 CHRONIC BILATERAL LOW BACK PAIN WITHOUT SCIATICA: ICD-10-CM

## 2020-08-20 DIAGNOSIS — M54.50 CHRONIC BILATERAL LOW BACK PAIN WITHOUT SCIATICA: ICD-10-CM

## 2020-08-20 DIAGNOSIS — R29.3 POOR POSTURE: ICD-10-CM

## 2020-08-20 DIAGNOSIS — R26.89 DECREASED SPINAL MOBILITY: ICD-10-CM

## 2020-08-20 DIAGNOSIS — R53.1 DECREASED STRENGTH: ICD-10-CM

## 2020-08-20 PROCEDURE — 97110 THERAPEUTIC EXERCISES: CPT | Mod: PO,CQ

## 2020-08-20 NOTE — PROGRESS NOTES
Physical Therapy Treatment Note     Name: Andre Padgett  Clinic Number: [id number]    Therapy Diagnosis:   No diagnosis found.  Physician: Hunter Diop MD    Visit Date: 8/20/2020    Physician Orders: PT Eval and Treat   Medical Diagnosis from Referral:   Lumbago with sciatica, unspecified side   G89.29 (ICD-10-CM) - Other chronic pain      Evaluation Date: 6/18/2020  Authorization Period Expiration: 12/31/20  Updated Plan of Care Expiration: 9/25/20  Visit # / Visits authorized:6/ 20       Time In: 10:10 AM  Time Out: 10:50 AM  Total Billable Time: 40 minutes    Precautions: Standard, no heavy lifting    Subjective     Pt reports: that he had min pain yesterday and awoke with no pain today.   He was compliant with home exercise program.  Response to previous treatment: no adverse effects  Functional change: pt can walk for 1 mile before pain is an issue. Pt can go bike riding as well.    Pain:0/10  Location: n/a    Objective     Andre received therapeutic exercises to develop strength, ROM, flexibility, posture and core stabilization for 38 minutes including:    Please refer to Plan of Care objective section for updated objective measures    Upright bike, Level 3, 5 min    Thread the needle, 5x/side, 5s holds  Cat/camel, 5-7 reps, max cues for technique  Reverse clam, 2 x 10 reps/side  Open Book B 15x with 5 sec hold, orange band  Bridge to overhead UE flexion with 5#, kettlebell, 2 x 10 reps  Unilateral Horizontal ABD with 2lbs over half foam roll, 2 x 10 reps  Stability roll outs with blue physio ball 2x10 with 5 sec hold  Half kneeling diagonal chop with 10 lbs kettle, 10/side  D1 flexion 7# cable cross in tall kneeling, 2 x 10 reps  pallof press in tall kneeling with green TB 2x10 with 5 sec hold    Not performed today:  Horizontal ABD orange band over half foam roll, 2 x 10 reps  Posterior pelvic tilt 15x with 5 sec holds  Posterior pelvic tilt with pushing on orange physio ball 15x with 5 sec  holds  Core activation with PIlates ring, 20 reps, 5s holds  Hip ABD isometric 2 x 10 reps, Pialtes ring  Supine Heel taps from 90/90 position, 10 reps B  Supine alternating BLE extensions, 3x15 sec   Bridges with blue band, 2x12 with 5 sec hold  LTR with orange swiss ball under LE'15x with 3 sec hold B  hip flexor stretch off table, 2x1 min/LE   Half kneeling open book, 10/side        Home Exercises Provided and Patient Education Provided     Education provided: pt was educated on all therapeutic exercises performed during today's tx visit.     Written Home Exercises Provided: HEP provided during a previous visit.   Exercises were reviewed and Andre was able to demonstrate them prior to the end of the session.  Andre demonstrated good  understanding of the education provided.     See EMR under Patient Instructions for exercises provided 8/18/20.    Assessment   Andre endorsed only min back pain yesterday and presented with no pain upon arrival. Pt continued to tolerate tx well and was able to complete exercises without increase in symptoms. Pt, however, continues to note thoracic spine tightness. Pt could benefit from continuing thoracic mobility exercises and posterior thoracic strength exercises. Will emphasize postural and core strengthening. Pt also responds well to rotational exercises, so will include/expand more rotational exercises as appropriate. Will progress as tolerated.    Andre is progressing well towards his goals.   Pt prognosis is Good.     Pt will continue to benefit from skilled outpatient physical therapy to address the deficits listed in the problem list box on initial evaluation, provide pt/family education and to maximize pt's level of independence in the home and community environment.     Pt's spiritual, cultural and educational needs considered and pt agreeable to plan of care and goals.     Anticipated barriers to physical therapy: number of symptoms    Goals:     Short Term Goals: 4  weeks   1. Pt will tolerate HEP for improved strength, functional mobility, ROM, posture, and endurance. (Met, 8/18)  2. Pt will report reduced back pain to </= 5/10 at worst for improved functional mobility and ability to participate in work activities/ADL's. (progressing, not met)  3. Pt will increase spinal AROM in all directions to >/= 3/4 range for improved functional mobility. (progressing, not met)  4. Pt will demo >/= 4+/5 strength in BLE's for improved functional mobility, endurance, and posture. (progressing, not met)  5. Pt will report improved ability to walk for >/=8 blocks without increase in pain/symptoms for improved functional mobility (Met, 8/18)  6. Pt will be independent with postural awareness/precautions for improved function (progressing, not met)     Long Term Goals: 8 weeks   1. Pt will be I with updated HEP for improved functional mobility, posture, strength, and endurance. (progressing, not met)  2. Pt will report reduced back pain to </= 3/10 at worst for improved functional mobility and ability to participate in work activities/ADL's. (progressing, not met)  3. Pt will increase spinal AROM in all directions to WNL for improved functional mobility. (progressing, not met)  4. Pt will demo >/= 5/5 strength in BLE's and posterior thoracic musculature for improved functional mobility, endurance, and posture. (progressing, not met)  5. Pt will demo/report increased ability to stand for walk to 45 min without pain/symptoms for improved functional mobility (progressing, not met)    New goal (8/18/20)  6. Pt will demo 5/5 strength in posterior thoracic musculature for improved functional mobility, endurance, and posture. (progressing, not met)      Plan   Continue POC established by PT, including: thoracic/lumbar mobility and core stabilization.        Glen Silva, PTA

## 2020-08-27 ENCOUNTER — CLINICAL SUPPORT (OUTPATIENT)
Dept: REHABILITATION | Facility: HOSPITAL | Age: 50
End: 2020-08-27
Attending: INTERNAL MEDICINE
Payer: COMMERCIAL

## 2020-08-27 DIAGNOSIS — M54.50 CHRONIC BILATERAL LOW BACK PAIN WITHOUT SCIATICA: ICD-10-CM

## 2020-08-27 DIAGNOSIS — G89.29 CHRONIC BILATERAL LOW BACK PAIN WITHOUT SCIATICA: ICD-10-CM

## 2020-08-27 DIAGNOSIS — R29.3 POOR POSTURE: ICD-10-CM

## 2020-08-27 DIAGNOSIS — G89.29 CHRONIC BILATERAL THORACIC BACK PAIN: ICD-10-CM

## 2020-08-27 DIAGNOSIS — M54.6 CHRONIC BILATERAL THORACIC BACK PAIN: ICD-10-CM

## 2020-08-27 DIAGNOSIS — R26.89 DECREASED SPINAL MOBILITY: ICD-10-CM

## 2020-08-27 DIAGNOSIS — R53.1 DECREASED STRENGTH: ICD-10-CM

## 2020-08-27 DIAGNOSIS — M54.42 BILATERAL LOW BACK PAIN WITH LEFT-SIDED SCIATICA, UNSPECIFIED CHRONICITY: ICD-10-CM

## 2020-08-27 PROCEDURE — 97110 THERAPEUTIC EXERCISES: CPT | Mod: PO

## 2020-08-27 NOTE — PROGRESS NOTES
Physical Therapy Treatment Note     Name: Andre Padgett  Clinic Number: [id number]    Therapy Diagnosis:   Encounter Diagnoses   Name Primary?    Chronic bilateral thoracic back pain     Poor posture     Chronic bilateral low back pain without sciatica     Decreased strength     Decreased spinal mobility     Bilateral low back pain with left-sided sciatica, unspecified chronicity      Physician: Hunter Diop MD    Visit Date: 8/27/2020    Physician Orders: PT Eval and Treat   Medical Diagnosis from Referral:   Lumbago with sciatica, unspecified side   G89.29 (ICD-10-CM) - Other chronic pain      Evaluation Date: 6/18/2020  Authorization Period Expiration: 12/31/20  Updated Plan of Care Expiration: 9/25/20  Visit # / Visits authorized:7/ 20       Time In: 11:27 AM (late arrival)  Time Out: 12:00 PM  Total Billable Time: 33 minutes    Precautions: Standard, no heavy lifting    Subjective     Pt reports: Minimal pain, just tightness in his mid back. States he's late because the bad weather makes him lazy.  He was compliant with home exercise program.  Response to previous treatment: no adverse effects  Functional change: none stated    Pain:0/10  Location: n/a    Objective     Andre received therapeutic exercises to develop strength, ROM, flexibility, posture and core stabilization for 33 minutes including:      Upright bike, Level 3, 5 min -not performed today  UBE, level 1.5, standing, 3min fwd and 3 min geetha  pallof press in tall kneeling with green TB 2x12 with 5 sec hold  Half kneeling diagonal chop with green band, 2 x 12 reps/side  Kettlebell halos, x 10 reps B, standing c/ core engaged  Standing open books, orange band, 15/side  Thread the needle, 5x/side, 5s holds  Thoracic rotation in quadruped, hand behind back, 5/side  Thoracic side-bending, 5 x 5s holds  Reverse clam, 2 x 12 reps/side, 1 #  Modified dead bug with orange swiss ball, 10 reps B          Not performed today:  Horizontal ABD  orange band over half foam roll, 2 x 10 reps  Posterior pelvic tilt 15x with 5 sec holds  Posterior pelvic tilt with pushing on orange physio ball 15x with 5 sec holds  Core activation with PIlates ring, 20 reps, 5s holds  Hip ABD isometric 2 x 10 reps, Pialtes ring  Supine Heel taps from 90/90 position, 10 reps B  Supine alternating BLE extensions, 3x15 sec   Bridges with blue band, 2x12 with 5 sec hold  LTR with orange swiss ball under LE'15x with 3 sec hold B  hip flexor stretch off table, 2x1 min/LE   Half kneeling open book, 10/side        Home Exercises Provided and Patient Education Provided     Education provided: pt was educated on all therapeutic exercises performed during today's tx visit.     Written Home Exercises Provided: HEP provided during a previous visit.   Exercises were reviewed and Andre was able to demonstrate them prior to the end of the session.  Andre demonstrated good  understanding of the education provided.     See EMR under Patient Instructions for exercises provided 8/18/20.    Assessment   Andre endorsed only tightness today, not pain. Session slightly limited due to late arrival. Pt continued to tolerate tx well and was able to complete exercises without increase in symptoms. Pt, however, continues to note thoracic spine tightness. Pt could benefit from continuing thoracic mobility exercises and posterior thoracic strength exercises. Will emphasize postural and core strengthening. Pt also responds well to rotational exercises, so will include/expand more rotational exercises as appropriate. Will progress as tolerated.    Andre is progressing well towards his goals.   Pt prognosis is Good.     Pt will continue to benefit from skilled outpatient physical therapy to address the deficits listed in the problem list box on initial evaluation, provide pt/family education and to maximize pt's level of independence in the home and community environment.     Pt's spiritual, cultural and  educational needs considered and pt agreeable to plan of care and goals.     Anticipated barriers to physical therapy: number of symptoms    Goals:     Short Term Goals: 4 weeks   1. Pt will tolerate HEP for improved strength, functional mobility, ROM, posture, and endurance. (Met, 8/18)  2. Pt will report reduced back pain to </= 5/10 at worst for improved functional mobility and ability to participate in work activities/ADL's. (progressing, not met)  3. Pt will increase spinal AROM in all directions to >/= 3/4 range for improved functional mobility. (progressing, not met)  4. Pt will demo >/= 4+/5 strength in BLE's for improved functional mobility, endurance, and posture. (progressing, not met)  5. Pt will report improved ability to walk for >/=8 blocks without increase in pain/symptoms for improved functional mobility (Met, 8/18)  6. Pt will be independent with postural awareness/precautions for improved function (progressing, not met)     Long Term Goals: 8 weeks   1. Pt will be I with updated HEP for improved functional mobility, posture, strength, and endurance. (progressing, not met)  2. Pt will report reduced back pain to </= 3/10 at worst for improved functional mobility and ability to participate in work activities/ADL's. (progressing, not met)  3. Pt will increase spinal AROM in all directions to WNL for improved functional mobility. (progressing, not met)  4. Pt will demo >/= 5/5 strength in BLE's and posterior thoracic musculature for improved functional mobility, endurance, and posture. (progressing, not met)  5. Pt will demo/report increased ability to stand for walk to 45 min without pain/symptoms for improved functional mobility (progressing, not met)    New goal (8/18/20)  6. Pt will demo 5/5 strength in posterior thoracic musculature for improved functional mobility, endurance, and posture. (progressing, not met)      Plan   Continue POC established by PT, including: thoracic/lumbar mobility and  core stabilization.        Aubrie Goode, PT

## 2020-08-31 ENCOUNTER — OFFICE VISIT (OUTPATIENT)
Dept: SURGERY | Facility: CLINIC | Age: 50
End: 2020-08-31
Payer: COMMERCIAL

## 2020-08-31 VITALS
SYSTOLIC BLOOD PRESSURE: 131 MMHG | HEART RATE: 86 BPM | BODY MASS INDEX: 24.73 KG/M2 | DIASTOLIC BLOOD PRESSURE: 79 MMHG | TEMPERATURE: 98 F | HEIGHT: 70 IN | WEIGHT: 172.75 LBS

## 2020-08-31 DIAGNOSIS — K42.9 UMBILICAL HERNIA WITHOUT OBSTRUCTION AND WITHOUT GANGRENE: ICD-10-CM

## 2020-08-31 PROCEDURE — 99999 PR PBB SHADOW E&M-EST. PATIENT-LVL III: CPT | Mod: PBBFAC,,, | Performed by: SURGERY

## 2020-08-31 PROCEDURE — 99999 PR PBB SHADOW E&M-EST. PATIENT-LVL III: ICD-10-PCS | Mod: PBBFAC,,, | Performed by: SURGERY

## 2020-08-31 PROCEDURE — 3008F BODY MASS INDEX DOCD: CPT | Mod: CPTII,S$GLB,, | Performed by: SURGERY

## 2020-08-31 PROCEDURE — 99214 OFFICE O/P EST MOD 30 MIN: CPT | Mod: S$GLB,,, | Performed by: SURGERY

## 2020-08-31 PROCEDURE — 3008F PR BODY MASS INDEX (BMI) DOCUMENTED: ICD-10-PCS | Mod: CPTII,S$GLB,, | Performed by: SURGERY

## 2020-08-31 PROCEDURE — 99214 PR OFFICE/OUTPT VISIT, EST, LEVL IV, 30-39 MIN: ICD-10-PCS | Mod: S$GLB,,, | Performed by: SURGERY

## 2020-08-31 NOTE — PROGRESS NOTES
[facility]  General Surgery  History & Physical      Subjective:     Chief Complaint/Reason for Admission: Umbilical hernia    History of Present Illness:  Andre Padgett is a 49 y.o. male who was referred for evaluation of small umbilical hernia, first seen on CT in 2018. Patient states that he was unaware that he had a hernia and he is mostly concerned about his recent bloating and intermittent constipation. He had a colonoscopy in 2018 and is recommended to have a repeat cscope in 2023. No blood in stool, no weight loss. He also describes a vague and shifting occasional abdominal pain, which is not associated with heavy lifting or straining. CT scan in 2018 showed a small fat containing umbilical hernia.   Patient is currently under a lot of stress with a recent divorce and other social stressors.     Current Outpatient Medications on File Prior to Visit   Medication Sig    ALPRAZolam (XANAX) 1 MG tablet Take 1 tablet (1 mg total) by mouth 2 (two) times daily as needed for Anxiety.    ascorbic acid (VITAMIN C) 100 MG tablet Take 100 mg by mouth once daily.    aspirin 81 MG Chew Take 81 mg by mouth once daily.    atorvastatin (LIPITOR) 40 MG tablet TAKE 1 TABLET(40 MG) BY MOUTH EVERY DAY    diclofenac (VOLTAREN) 75 MG EC tablet Take 1 tablet (75 mg total) by mouth 2 (two) times daily.    methocarbamoL (ROBAXIN) 500 MG Tab Take 1-2 tablets (500-1,000 mg total) by mouth nightly as needed.    pantoprazole (PROTONIX) 40 MG tablet Take 1 tablet (40 mg total) by mouth once daily.    zinc sulfate (ZINCATE) 220 (50) mg capsule TK ONE C PO  QD     No current facility-administered medications on file prior to visit.        Review of patient's allergies indicates:  No Known Allergies    Past Medical History:   Diagnosis Date    Aneurysm     Right sided filling anterior communicating aneurysm s/p coiling 2011    Anxiety     Cerebral aneurysm, nonruptured 12/27/2016    Colon adenoma: 3/18  repeat colonoscpy 2023 6/22/2018    Diverticulosis of large intestine without hemorrhage: see colonoscopy 3/18; sigmoid and descending colon 6/22/2018    Fatty liver: see CT 3/18 6/22/2018    Umbilical hernia without obstruction and without gangrene: see CT 3/18 6/22/2018     Past Surgical History:   Procedure Laterality Date    CEREBRAL ANGIOGRAM  2011    angiogram/coiling    COLONOSCOPY N/A 3/26/2018    Procedure: COLONOSCOPY;  Surgeon: Sanjiv Duran MD;  Location: [facility] ([address] [place] [address]);  Service: Endoscopy;  Laterality: N/A;     Family History     Problem Relation (Age of Onset)    Brain cancer Father    Diabetes Mother    DAVID disease Mother    Hypertension Mother    No Known Problems Daughter, Sister    Thyroid disease Sister        Tobacco Use    Smoking status: Never Smoker    Smokeless tobacco: Never Used   Substance and Sexual Activity    Alcohol use: Yes     Comment: ocassionally - twice a week    Drug use: No    Sexual activity: Not on file     Review of Systems   Constitutional: Negative for activity change, chills and fever.   Respiratory: Negative for cough and shortness of breath.    Cardiovascular: Negative for chest pain and palpitations.   Gastrointestinal: Positive for constipation. Negative for abdominal distention, abdominal pain, diarrhea, nausea and vomiting.   Musculoskeletal: Negative for arthralgias and myalgias.   Neurological: Negative for dizziness and headaches.   Psychiatric/Behavioral: Negative for agitation and confusion.     Objective:     Vital Signs (Most Recent):  Temp: 98.2 °F (36.8 °C) (08/31/20 1025)  Pulse: 86 (08/31/20 1025)  BP: 131/79 (08/31/20 1025) Vital Signs (24h Range):  [unfilled]     Weight: 78.4 kg (172 lb 11.7 oz)  Body mass index is 24.78 kg/m².    Physical Exam  Constitutional:       General: He is not in acute distress.     Appearance: He is well-developed.   Cardiovascular:      Rate and Rhythm: Normal rate and regular rhythm.   Pulmonary:       Effort: Pulmonary effort is normal. No respiratory distress.   Abdominal:      General: There is no distension.      Palpations: Abdomen is soft.      Tenderness: There is no abdominal tenderness.      Comments: <1cm reducible umbilical hernia   Skin:     General: Skin is warm and dry.   Neurological:      Mental Status: He is alert and oriented to person, place, and time.   Psychiatric:         Behavior: Behavior normal.         Assessment/Plan:     Discussed with patient that his very small hernia is not the cause of his GI symptoms, such as bloating and belching.   We discussed that his hernia will not go away on it's own and when he is ready to have it repaired, he will call to schedule an appointment    Bee Mooney MD  General Surgery  [facility]

## 2020-08-31 NOTE — LETTER
September 2, 2020      Hunter Diop MD  [address]  [facility] [address]           [facility]  [address]  [facility] [address]  Phone: [phone]          Patient: Andre Padgett   MR Number: [id number]   YOB: 1970   Date of Visit: 8/31/2020       Dear Dr. Hunter Diop:    Thank you for referring Andre Padgett to me for evaluation. Attached you will find relevant portions of my assessment and plan of care.    If you have questions, please do not hesitate to call me. I look forward to following Andre Padgett along with you.    Sincerely,    Joey Shin MD    Enclosure  CC:  No Recipients    If you would like to receive this communication electronically, please contact [email or web address] or [phone] to request more information on [employer] access.    For providers and/or their staff who would like to refer a patient to [facility], please contact us through our one-stop-shop provider referral line, [facility], at [phone].    If you feel you have received this communication in error or would no longer like to receive these types of communications, please e-mail [email or web address]

## 2020-09-02 NOTE — PROGRESS NOTES
Subjective:       Patient ID: Andre Padgett is a 49 y.o. male.    Chief Complaint: Follow-up    Patient comes in for follow-up of visit with me a few weeks ago.  I treated him with pantoprazole for but I thought was dyspepsia/acid reflux.  He said it work for the 1st week to 10 days but then he stopped the medication.  I explained that I told him if it worked he would need to stay on it for a long period time but he says he does not want to do that.  He saw a gastro doctor in 1 of our outside facilities but now would like to see someone here.  He denies any blood in the stool but says the caliber of his stool is changed.  He says he is still having the upper GI symptoms and now has tenderness at his umbilical hernia.  The surgeon offered surgery but said it was up to the patient when it was symptomatic.  He says he thinks he needs to get it done.    He had COVID a few months ago and still believes the affects of in her lingering.    Abdominal Pain  The problem has been unchanged. The pain is located in the generalized abdominal region. The pain is at a severity of 5/10. The pain is moderate. The quality of the pain is dull. The abdominal pain radiates to the LLQ, LUQ, RLQ, RUQ, epigastric region, periumbilical region and suprapubic region. Associated symptoms include arthralgias, belching and flatus. Pertinent negatives include no constipation, diarrhea, fever, headaches, hematuria, nausea or vomiting. Nothing aggravates the pain. The pain is relieved by nothing. He has tried acetaminophen and antacids for the symptoms. The treatment provided no relief. Prior diagnostic workup includes ultrasound. There is no history of abdominal surgery, colon cancer, Crohn's disease, gallstones, GERD, irritable bowel syndrome, pancreatitis, PUD or ulcerative colitis. Patient's medical history does not include kidney stones and UTI.     Review of Systems   Constitutional: Negative for chills, fatigue, fever and unexpected weight  change.   HENT: Negative for nosebleeds and trouble swallowing.    Eyes: Negative for pain and visual disturbance.   Respiratory: Negative for cough, shortness of breath and wheezing.    Cardiovascular: Negative for chest pain and palpitations.   Gastrointestinal: Positive for abdominal pain, change in bowel habit, flatus and change in bowel habit. Negative for constipation, diarrhea, nausea and vomiting.   Genitourinary: Negative for difficulty urinating and hematuria.   Musculoskeletal: Positive for arthralgias. Negative for neck pain.   Integumentary:  Negative for rash.   Neurological: Negative for dizziness and headaches.   Hematological: Does not bruise/bleed easily.   Psychiatric/Behavioral: Negative for dysphoric mood, sleep disturbance and suicidal ideas.         Objective:      Physical Exam  Constitutional:       General: He is not in acute distress.     Appearance: He is well-developed.   HENT:      Head: Normocephalic and atraumatic.      Mouth/Throat:      Pharynx: No oropharyngeal exudate.   Eyes:      General: No scleral icterus.     Conjunctiva/sclera: Conjunctivae normal.      Pupils: Pupils are equal, round, and reactive to light.   Neck:      Musculoskeletal: Normal range of motion and neck supple.      Thyroid: No thyromegaly.      Comments: No supraclavicular nodes palpated  Cardiovascular:      Rate and Rhythm: Normal rate and regular rhythm.      Heart sounds: Normal heart sounds. No murmur.   Pulmonary:      Effort: Pulmonary effort is normal.      Breath sounds: Normal breath sounds. No wheezing.   Abdominal:      General: Bowel sounds are normal.      Palpations: Abdomen is soft. There is no mass.      Tenderness: There is abdominal tenderness ( mild, upper abdomen).      Hernia: A hernia (Umbilical, mildly tender) is present.   Lymphadenopathy:      Cervical: No cervical adenopathy.   Skin:     Coloration: Skin is not pale.   Neurological:      Mental Status: He is alert and oriented to  person, place, and time.         Assessment:       1. Abdominal pain, unspecified abdominal location    2. Anxiety    3. History of nontraumatic rupture of cerebral aneurysm    4. Chronic bilateral low back pain without sciatica    5. Osteoarthritis of cervical spine, unspecified spinal osteoarthritis complication status    6. Hyperlipidemia, unspecified hyperlipidemia type    7. Palpitations    8. Change in stool caliber        Plan:       Andre was seen today for follow-up.    Diagnoses and all orders for this visit:    Abdominal pain, unspecified abdominal location  -     Ambulatory referral/consult to Gastroenterology; Future    Anxiety    History of nontraumatic rupture of cerebral aneurysm    Chronic bilateral low back pain without sciatica    Osteoarthritis of cervical spine, unspecified spinal osteoarthritis complication status    Hyperlipidemia, unspecified hyperlipidemia type    Palpitations    Change in stool caliber  -     Ambulatory referral/consult to Gastroenterology; Future        follow-up after gastro review

## 2020-09-04 ENCOUNTER — OFFICE VISIT (OUTPATIENT)
Dept: INTERNAL MEDICINE | Facility: CLINIC | Age: 50
End: 2020-09-04
Payer: MEDICAID

## 2020-09-04 VITALS
SYSTOLIC BLOOD PRESSURE: 126 MMHG | BODY MASS INDEX: 24.74 KG/M2 | DIASTOLIC BLOOD PRESSURE: 68 MMHG | WEIGHT: 172.38 LBS | OXYGEN SATURATION: 98 % | HEART RATE: 94 BPM

## 2020-09-04 DIAGNOSIS — R10.9 ABDOMINAL PAIN, UNSPECIFIED ABDOMINAL LOCATION: Primary | ICD-10-CM

## 2020-09-04 DIAGNOSIS — M47.812 OSTEOARTHRITIS OF CERVICAL SPINE, UNSPECIFIED SPINAL OSTEOARTHRITIS COMPLICATION STATUS: ICD-10-CM

## 2020-09-04 DIAGNOSIS — R19.5 CHANGE IN STOOL CALIBER: ICD-10-CM

## 2020-09-04 DIAGNOSIS — G89.29 CHRONIC BILATERAL LOW BACK PAIN WITHOUT SCIATICA: ICD-10-CM

## 2020-09-04 DIAGNOSIS — R00.2 PALPITATIONS: ICD-10-CM

## 2020-09-04 DIAGNOSIS — M54.50 CHRONIC BILATERAL LOW BACK PAIN WITHOUT SCIATICA: ICD-10-CM

## 2020-09-04 DIAGNOSIS — Z86.79 HISTORY OF NONTRAUMATIC RUPTURE OF CEREBRAL ANEURYSM: ICD-10-CM

## 2020-09-04 DIAGNOSIS — E78.5 HYPERLIPIDEMIA, UNSPECIFIED HYPERLIPIDEMIA TYPE: ICD-10-CM

## 2020-09-04 DIAGNOSIS — F41.9 ANXIETY: ICD-10-CM

## 2020-09-04 PROCEDURE — 99214 OFFICE O/P EST MOD 30 MIN: CPT | Mod: PBBFAC | Performed by: INTERNAL MEDICINE

## 2020-09-04 PROCEDURE — 99999 PR PBB SHADOW E&M-EST. PATIENT-LVL IV: CPT | Mod: PBBFAC,,, | Performed by: INTERNAL MEDICINE

## 2020-09-04 PROCEDURE — 99999 PR PBB SHADOW E&M-EST. PATIENT-LVL IV: ICD-10-PCS | Mod: PBBFAC,,, | Performed by: INTERNAL MEDICINE

## 2020-09-04 PROCEDURE — 99213 OFFICE O/P EST LOW 20 MIN: CPT | Mod: S$PBB,,, | Performed by: INTERNAL MEDICINE

## 2020-09-04 PROCEDURE — 99213 PR OFFICE/OUTPT VISIT, EST, LEVL III, 20-29 MIN: ICD-10-PCS | Mod: S$PBB,,, | Performed by: INTERNAL MEDICINE

## 2020-09-08 ENCOUNTER — LAB VISIT (OUTPATIENT)
Dept: LAB | Facility: HOSPITAL | Age: 50
End: 2020-09-08
Payer: MEDICAID

## 2020-09-08 DIAGNOSIS — N40.0 BENIGN NON-NODULAR PROSTATIC HYPERPLASIA WITHOUT LOWER URINARY TRACT SYMPTOMS: ICD-10-CM

## 2020-09-08 LAB — COMPLEXED PSA SERPL-MCNC: 0.97 NG/ML (ref 0–4)

## 2020-09-08 PROCEDURE — 36415 COLL VENOUS BLD VENIPUNCTURE: CPT

## 2020-09-08 PROCEDURE — 84153 ASSAY OF PSA TOTAL: CPT

## 2020-09-09 ENCOUNTER — CLINICAL SUPPORT (OUTPATIENT)
Dept: REHABILITATION | Facility: HOSPITAL | Age: 50
End: 2020-09-09
Attending: INTERNAL MEDICINE
Payer: MEDICAID

## 2020-09-09 DIAGNOSIS — M54.50 CHRONIC BILATERAL LOW BACK PAIN WITHOUT SCIATICA: ICD-10-CM

## 2020-09-09 DIAGNOSIS — M54.6 CHRONIC BILATERAL THORACIC BACK PAIN: ICD-10-CM

## 2020-09-09 DIAGNOSIS — G89.29 CHRONIC BILATERAL THORACIC BACK PAIN: ICD-10-CM

## 2020-09-09 DIAGNOSIS — G89.29 CHRONIC BILATERAL LOW BACK PAIN WITHOUT SCIATICA: ICD-10-CM

## 2020-09-09 DIAGNOSIS — R53.1 DECREASED STRENGTH: ICD-10-CM

## 2020-09-09 DIAGNOSIS — M54.42 BILATERAL LOW BACK PAIN WITH LEFT-SIDED SCIATICA, UNSPECIFIED CHRONICITY: ICD-10-CM

## 2020-09-09 DIAGNOSIS — R26.89 DECREASED SPINAL MOBILITY: ICD-10-CM

## 2020-09-09 DIAGNOSIS — R29.3 POOR POSTURE: ICD-10-CM

## 2020-09-09 PROCEDURE — 97110 THERAPEUTIC EXERCISES: CPT | Mod: PO

## 2020-09-09 NOTE — PROGRESS NOTES
Physical Therapy Treatment Note     Name: Andre Padgett  Clinic Number: [id number]    Therapy Diagnosis:   Encounter Diagnoses   Name Primary?    Chronic bilateral thoracic back pain     Poor posture     Chronic bilateral low back pain without sciatica     Decreased strength     Decreased spinal mobility     Bilateral low back pain with left-sided sciatica, unspecified chronicity      Physician: Hunter Diop MD    Visit Date: 9/9/2020    Physician Orders: PT Eval and Treat   Medical Diagnosis from Referral:   Lumbago with sciatica, unspecified side   G89.29 (ICD-10-CM) - Other chronic pain      Evaluation Date: 6/18/2020  Authorization Period Expiration: 12/31/20  Updated Plan of Care Expiration: 9/25/20  Visit # / Visits authorized:8/ 20       Time In: 10:19AM (late arrival)  Time Out: 11:00 AM  Total Billable Time: 35 minutes    Precautions: Standard, no heavy lifting    Subjective     Pt reports: Pain in low back today, less in mid back. Denies radicular symtpmos  He was compliant with home exercise program.  Response to previous treatment: no adverse effects  Functional change: none stated    Pain:5/10  Location: low abck    Objective     Andre received therapeutic exercises to develop strength, ROM, flexibility, posture and core stabilization for 35 minutes including:      UBE, level 1.5, standing, 3min fwd and 3 min back  pallof press in tall kneeling with green TB 2x12/side  with 5 sec hold  Half kneeling row, 2 x 12, blue band  Half kneeling shoulder ext, blue band, 2 x 12 reps  Standing open books, orange band, 15/side  Half kneeling diagonal chop with green band, 2 x 12 reps/side  Shuttle:  -2 x 10 double leg press, 3 bands  -2 x 10 double leg press with ABD, blue band, 3 bands  -UE pull down, 2 x 10 reps with single leg in 90/90, bodyweight    Kettlebell halos, x 10 reps B, standing c/ core engaged    Thread the needle, 10x/side, 5s holds  Quadruped alt UE reach x 10 reps B and then alt LE  ext, 10/LE  Child's pose, 3 ways, 45s each  Supine spinal twist, 45s/side                Not performed today:  Horizontal ABD orange band over half foam roll, 2 x 10 reps  Posterior pelvic tilt 15x with 5 sec holds  Posterior pelvic tilt with pushing on orange physio ball 15x with 5 sec holds  Core activation with PIlates ring, 20 reps, 5s holds  Hip ABD isometric 2 x 10 reps, Pialtes ring  Supine Heel taps from 90/90 position, 10 reps B  Supine alternating BLE extensions, 3x15 sec   Bridges with blue band, 2x12 with 5 sec hold  LTR with orange swiss ball under LE'15x with 3 sec hold B  hip flexor stretch off table, 2x1 min/LE   Half kneeling open book, 10/side  Thoracic rotation in quadruped, hand behind back, 5/side  Thoracic side-bending, 5 x 5s holds  Reverse clam, 2 x 12 reps/side, 1 #  Modified dead bug with orange swiss ball, 10 reps B        Home Exercises Provided and Patient Education Provided     Education provided: pt was educated on all therapeutic exercises performed during today's tx visit.     Written Home Exercises Provided: HEP provided during a previous visit.   Exercises were reviewed and Andre was able to demonstrate them prior to the end of the session.  Andre demonstrated good  understanding of the education provided.     See EMR under Patient Instructions for exercises provided 8/18/20.    Assessment   Andre tolerated session fairly well. Pt reports low back pain today, but he was able to progress to Shuttle exercises and increased resistance with band. Pt endorses that supine spinal twist feels good. Will expand rotational exercises as appropriate. Will progress as tolerated.    Andre is progressing well towards his goals.   Pt prognosis is Good.     Pt will continue to benefit from skilled outpatient physical therapy to address the deficits listed in the problem list box on initial evaluation, provide pt/family education and to maximize pt's level of independence in the home and  community environment.     Pt's spiritual, cultural and educational needs considered and pt agreeable to plan of care and goals.     Anticipated barriers to physical therapy: number of symptoms    Goals:     Short Term Goals: 4 weeks   1. Pt will tolerate HEP for improved strength, functional mobility, ROM, posture, and endurance. (Met, 8/18)  2. Pt will report reduced back pain to </= 5/10 at worst for improved functional mobility and ability to participate in work activities/ADL's. (progressing, not met)  3. Pt will increase spinal AROM in all directions to >/= 3/4 range for improved functional mobility. (progressing, not met)  4. Pt will demo >/= 4+/5 strength in BLE's for improved functional mobility, endurance, and posture. (progressing, not met)  5. Pt will report improved ability to walk for >/=8 blocks without increase in pain/symptoms for improved functional mobility (Met, 8/18)  6. Pt will be independent with postural awareness/precautions for improved function (progressing, not met)     Long Term Goals: 8 weeks   1. Pt will be I with updated HEP for improved functional mobility, posture, strength, and endurance. (progressing, not met)  2. Pt will report reduced back pain to </= 3/10 at worst for improved functional mobility and ability to participate in work activities/ADL's. (progressing, not met)  3. Pt will increase spinal AROM in all directions to WNL for improved functional mobility. (progressing, not met)  4. Pt will demo >/= 5/5 strength in BLE's and posterior thoracic musculature for improved functional mobility, endurance, and posture. (progressing, not met)  5. Pt will demo/report increased ability to stand for walk to 45 min without pain/symptoms for improved functional mobility (progressing, not met)    New goal (8/18/20)  6. Pt will demo 5/5 strength in posterior thoracic musculature for improved functional mobility, endurance, and posture. (progressing, not met)      Plan   Continue POC  established by PT, including: thoracic/lumbar mobility and core stabilization.        Aubrie Goode, PT

## 2020-09-11 ENCOUNTER — CLINICAL SUPPORT (OUTPATIENT)
Dept: REHABILITATION | Facility: HOSPITAL | Age: 50
End: 2020-09-11
Attending: INTERNAL MEDICINE
Payer: MEDICAID

## 2020-09-11 DIAGNOSIS — M54.50 CHRONIC BILATERAL LOW BACK PAIN WITHOUT SCIATICA: ICD-10-CM

## 2020-09-11 DIAGNOSIS — R29.3 POOR POSTURE: ICD-10-CM

## 2020-09-11 DIAGNOSIS — M54.42 BILATERAL LOW BACK PAIN WITH LEFT-SIDED SCIATICA, UNSPECIFIED CHRONICITY: ICD-10-CM

## 2020-09-11 DIAGNOSIS — R53.1 DECREASED STRENGTH: ICD-10-CM

## 2020-09-11 DIAGNOSIS — G89.29 CHRONIC BILATERAL THORACIC BACK PAIN: ICD-10-CM

## 2020-09-11 DIAGNOSIS — G89.29 CHRONIC BILATERAL LOW BACK PAIN WITHOUT SCIATICA: ICD-10-CM

## 2020-09-11 DIAGNOSIS — R26.89 DECREASED SPINAL MOBILITY: ICD-10-CM

## 2020-09-11 DIAGNOSIS — M54.6 CHRONIC BILATERAL THORACIC BACK PAIN: ICD-10-CM

## 2020-09-11 PROCEDURE — 97110 THERAPEUTIC EXERCISES: CPT | Mod: PO

## 2020-09-11 NOTE — PROGRESS NOTES
Physical Therapy Treatment Note     Name: Andre Padgett  Clinic Number: [id number]    Therapy Diagnosis:   Encounter Diagnoses   Name Primary?    Chronic bilateral thoracic back pain     Poor posture     Chronic bilateral low back pain without sciatica     Decreased strength     Decreased spinal mobility     Bilateral low back pain with left-sided sciatica, unspecified chronicity      Physician: Hunter Diop MD    Visit Date: 9/11/2020    Physician Orders: PT Eval and Treat   Medical Diagnosis from Referral:   Lumbago with sciatica, unspecified side   G89.29 (ICD-10-CM) - Other chronic pain      Evaluation Date: 6/18/2020  Authorization Period Expiration: 12/31/20  Updated Plan of Care Expiration: 9/25/20  Visit # / Visits authorized:9/ 20       Time In: 12:45 PM (late arrival)  Time Out: 1:15 PM  Total Billable Time: 24 minutes    Precautions: Standard, no heavy lifting    Subjective     Pt reports: Pain in mid left back today.  Denies radicular symptoms  He was compliant with home exercise program.  Response to previous treatment: no adverse effects  Functional change: none stated    Pain:5/10  Location: low back    Objective     Andre received therapeutic exercises to develop strength, ROM, flexibility, posture and core stabilization for 24 minutes including:      UBE, level 1.5, standing, 3min fwd and 3 min back  pallof press in tall kneeling with green TB 2x12/side  with 5 sec hold  Apex and arrow, 2 x 10 reps, orange band, mod cues for technique  Thread the needle, 5x/side, 5s holds  Quadruped side bend, 10 reps B  Quadruped alt UE reach x 10 reps B and then alt LE ext, 10/LE no resistance  Quadruped LE ext, 10 reps orange band  Quadruped UE ext, 10 reps each, orange band  Child's pose 2 x 1 min  Supine spinal twist, 30s/side      Not performed today:  Half kneeling row, 2 x 12, blue band  Half kneeling shoulder ext, blue band, 2 x 12 reps  Standing open books, orange band, 15/side  Half kneeling  diagonal chop with green band, 2 x 12 reps/side  Shuttle:  -2 x 10 double leg press, 3 bands  -2 x 10 double leg press with ABD, blue band, 3 bands  -UE pull down, 2 x 10 reps with single leg in 90/90, bodyweight  Kettlebell halos, x 10 reps B, standing c/ core engagedHorizontal ABD orange band over half foam roll, 2 x 10 reps  Posterior pelvic tilt 15x with 5 sec holds  Posterior pelvic tilt with pushing on orange physio ball 15x with 5 sec holds  Core activation with PIlates ring, 20 reps, 5s holds  Hip ABD isometric 2 x 10 reps, Pialtes ring  Supine Heel taps from 90/90 position, 10 reps B  Supine alternating BLE extensions, 3x15 sec   Bridges with blue band, 2x12 with 5 sec hold  LTR with orange swiss ball under LE'15x with 3 sec hold B  hip flexor stretch off table, 2x1 min/LE   Modified dead bug with orange swiss ball, 10 reps B        Home Exercises Provided and Patient Education Provided     Education provided: pt was educated on all therapeutic exercises performed during today's tx visit.     Written Home Exercises Provided: HEP provided during a previous visit.   Exercises were reviewed and Andre was able to demonstrate them prior to the end of the session.  Andre demonstrated good  understanding of the education provided.     See EMR under Patient Instructions for exercises provided 8/18/20.    Assessment   Andre tolerated session fairly well. Pt reports mid back pain today, and exercises focused on quadruped position. Pt able to progress exercises, but he needs moderate cues for technique. session limited due to late arrival . Will expand rotational exercises as appropriate. Will progress as tolerated.    Andre is progressing well towards his goals.   Pt prognosis is Good.     Pt will continue to benefit from skilled outpatient physical therapy to address the deficits listed in the problem list box on initial evaluation, provide pt/family education and to maximize pt's level of independence in the  home and community environment.     Pt's spiritual, cultural and educational needs considered and pt agreeable to plan of care and goals.     Anticipated barriers to physical therapy: number of symptoms    Goals:     Short Term Goals: 4 weeks   1. Pt will tolerate HEP for improved strength, functional mobility, ROM, posture, and endurance. (Met, 8/18)  2. Pt will report reduced back pain to </= 5/10 at worst for improved functional mobility and ability to participate in work activities/ADL's. (progressing, not met)  3. Pt will increase spinal AROM in all directions to >/= 3/4 range for improved functional mobility. (progressing, not met)  4. Pt will demo >/= 4+/5 strength in BLE's for improved functional mobility, endurance, and posture. (progressing, not met)  5. Pt will report improved ability to walk for >/=8 blocks without increase in pain/symptoms for improved functional mobility (Met, 8/18)  6. Pt will be independent with postural awareness/precautions for improved function (progressing, not met)     Long Term Goals: 8 weeks   1. Pt will be I with updated HEP for improved functional mobility, posture, strength, and endurance. (progressing, not met)  2. Pt will report reduced back pain to </= 3/10 at worst for improved functional mobility and ability to participate in work activities/ADL's. (progressing, not met)  3. Pt will increase spinal AROM in all directions to WNL for improved functional mobility. (progressing, not met)  4. Pt will demo >/= 5/5 strength in BLE's and posterior thoracic musculature for improved functional mobility, endurance, and posture. (progressing, not met)  5. Pt will demo/report increased ability to stand for walk to 45 min without pain/symptoms for improved functional mobility (progressing, not met)    New goal (8/18/20)  6. Pt will demo 5/5 strength in posterior thoracic musculature for improved functional mobility, endurance, and posture. (progressing, not met)      Plan   Continue  POC established by PT, including: thoracic/lumbar mobility and core stabilization.        Aubrie Goode, PT

## 2020-09-13 ENCOUNTER — HOSPITAL ENCOUNTER (INPATIENT)
Facility: HOSPITAL | Age: 50
LOS: 10 days | Discharge: NURSING, REHAB OR LONG-TERM CARE | DRG: 510 | End: 2020-09-23
Attending: EMERGENCY MEDICINE | Admitting: HOSPITALIST
Payer: MEDICAID

## 2020-09-13 DIAGNOSIS — S52.532A CLOSED COLLES' FRACTURE OF LEFT RADIUS, INITIAL ENCOUNTER: ICD-10-CM

## 2020-09-13 DIAGNOSIS — M25.531 BILATERAL WRIST PAIN: ICD-10-CM

## 2020-09-13 DIAGNOSIS — I60.9 SUBARACHNOID HEMORRHAGE: Primary | ICD-10-CM

## 2020-09-13 DIAGNOSIS — N41.0 ACUTE PROSTATITIS: ICD-10-CM

## 2020-09-13 DIAGNOSIS — S22.31XD CLOSED FRACTURE OF ONE RIB OF RIGHT SIDE WITH ROUTINE HEALING: ICD-10-CM

## 2020-09-13 DIAGNOSIS — R00.0 TACHYCARDIA: ICD-10-CM

## 2020-09-13 DIAGNOSIS — S52.531D CLOSED COLLES' FRACTURE OF RIGHT RADIUS WITH ROUTINE HEALING: ICD-10-CM

## 2020-09-13 DIAGNOSIS — R53.81 DEBILITY: ICD-10-CM

## 2020-09-13 DIAGNOSIS — S52.591A OTHER CLOSED FRACTURE OF DISTAL END OF RIGHT RADIUS, INITIAL ENCOUNTER: ICD-10-CM

## 2020-09-13 DIAGNOSIS — R00.2 PALPITATIONS: ICD-10-CM

## 2020-09-13 DIAGNOSIS — M25.532 BILATERAL WRIST PAIN: ICD-10-CM

## 2020-09-13 DIAGNOSIS — S52.532D CLOSED COLLES' FRACTURE OF LEFT RADIUS WITH ROUTINE HEALING: ICD-10-CM

## 2020-09-13 LAB
ALBUMIN SERPL BCP-MCNC: 4.1 G/DL (ref 3.5–5.2)
ALP SERPL-CCNC: 65 U/L (ref 55–135)
ALT SERPL W/O P-5'-P-CCNC: 36 U/L (ref 10–44)
ANION GAP SERPL CALC-SCNC: 14 MMOL/L (ref 8–16)
AST SERPL-CCNC: 30 U/L (ref 10–40)
BASOPHILS # BLD AUTO: 0.03 K/UL (ref 0–0.2)
BASOPHILS NFR BLD: 0.4 % (ref 0–1.9)
BILIRUB SERPL-MCNC: 0.5 MG/DL (ref 0.1–1)
BUN SERPL-MCNC: 12 MG/DL (ref 6–20)
CALCIUM SERPL-MCNC: 9.4 MG/DL (ref 8.7–10.5)
CHLORIDE SERPL-SCNC: 104 MMOL/L (ref 95–110)
CO2 SERPL-SCNC: 24 MMOL/L (ref 23–29)
CREAT SERPL-MCNC: 0.9 MG/DL (ref 0.5–1.4)
DIFFERENTIAL METHOD: ABNORMAL
EOSINOPHIL # BLD AUTO: 0.2 K/UL (ref 0–0.5)
EOSINOPHIL NFR BLD: 2 % (ref 0–8)
ERYTHROCYTE [DISTWIDTH] IN BLOOD BY AUTOMATED COUNT: 11.2 % (ref 11.5–14.5)
EST. GFR  (AFRICAN AMERICAN): >60 ML/MIN/1.73 M^2
EST. GFR  (NON AFRICAN AMERICAN): >60 ML/MIN/1.73 M^2
GLUCOSE SERPL-MCNC: 140 MG/DL (ref 70–110)
HCT VFR BLD AUTO: 44.1 % (ref 40–54)
HGB BLD-MCNC: 15.1 G/DL (ref 14–18)
IMM GRANULOCYTES # BLD AUTO: 0.04 K/UL (ref 0–0.04)
IMM GRANULOCYTES NFR BLD AUTO: 0.5 % (ref 0–0.5)
INR PPP: 1.1 (ref 0.8–1.2)
LYMPHOCYTES # BLD AUTO: 2.1 K/UL (ref 1–4.8)
LYMPHOCYTES NFR BLD: 24.4 % (ref 18–48)
MCH RBC QN AUTO: 33.4 PG (ref 27–31)
MCHC RBC AUTO-ENTMCNC: 34.2 G/DL (ref 32–36)
MCV RBC AUTO: 98 FL (ref 82–98)
MONOCYTES # BLD AUTO: 0.5 K/UL (ref 0.3–1)
MONOCYTES NFR BLD: 6 % (ref 4–15)
NEUTROPHILS # BLD AUTO: 5.7 K/UL (ref 1.8–7.7)
NEUTROPHILS NFR BLD: 66.7 % (ref 38–73)
NRBC BLD-RTO: 0 /100 WBC
PLATELET # BLD AUTO: 185 K/UL (ref 150–350)
PMV BLD AUTO: 10.5 FL (ref 9.2–12.9)
POTASSIUM SERPL-SCNC: 4 MMOL/L (ref 3.5–5.1)
PROT SERPL-MCNC: 7.2 G/DL (ref 6–8.4)
PROTHROMBIN TIME: 11.6 SEC (ref 9–12.5)
RBC # BLD AUTO: 4.52 M/UL (ref 4.6–6.2)
SODIUM SERPL-SCNC: 142 MMOL/L (ref 136–145)
WBC # BLD AUTO: 8.5 K/UL (ref 3.9–12.7)

## 2020-09-13 PROCEDURE — 99223 PR INITIAL HOSPITAL CARE,LEVL III: ICD-10-PCS | Mod: ,,, | Performed by: HOSPITALIST

## 2020-09-13 PROCEDURE — 85025 COMPLETE CBC W/AUTO DIFF WBC: CPT

## 2020-09-13 PROCEDURE — 99284 EMERGENCY DEPT VISIT MOD MDM: CPT | Mod: ,,, | Performed by: EMERGENCY MEDICINE

## 2020-09-13 PROCEDURE — 99285 EMERGENCY DEPT VISIT HI MDM: CPT | Mod: 25

## 2020-09-13 PROCEDURE — 51798 US URINE CAPACITY MEASURE: CPT

## 2020-09-13 PROCEDURE — 96375 TX/PRO/DX INJ NEW DRUG ADDON: CPT

## 2020-09-13 PROCEDURE — 99284 PR EMERGENCY DEPT VISIT,LEVEL IV: ICD-10-PCS | Mod: ,,, | Performed by: EMERGENCY MEDICINE

## 2020-09-13 PROCEDURE — P9612 CATHETERIZE FOR URINE SPEC: HCPCS

## 2020-09-13 PROCEDURE — 85610 PROTHROMBIN TIME: CPT

## 2020-09-13 PROCEDURE — 12000002 HC ACUTE/MED SURGE SEMI-PRIVATE ROOM

## 2020-09-13 PROCEDURE — 96374 THER/PROPH/DIAG INJ IV PUSH: CPT

## 2020-09-13 PROCEDURE — 63600175 PHARM REV CODE 636 W HCPCS: Performed by: EMERGENCY MEDICINE

## 2020-09-13 PROCEDURE — 99223 1ST HOSP IP/OBS HIGH 75: CPT | Mod: ,,, | Performed by: HOSPITALIST

## 2020-09-13 PROCEDURE — 80053 COMPREHEN METABOLIC PANEL: CPT

## 2020-09-13 RX ORDER — ATORVASTATIN CALCIUM 20 MG/1
40 TABLET, FILM COATED ORAL DAILY
Status: DISCONTINUED | OUTPATIENT
Start: 2020-09-14 | End: 2020-09-23 | Stop reason: HOSPADM

## 2020-09-13 RX ORDER — IBUPROFEN 200 MG
16 TABLET ORAL
Status: DISCONTINUED | OUTPATIENT
Start: 2020-09-14 | End: 2020-09-23 | Stop reason: HOSPADM

## 2020-09-13 RX ORDER — PROCHLORPERAZINE EDISYLATE 5 MG/ML
5 INJECTION INTRAMUSCULAR; INTRAVENOUS EVERY 6 HOURS PRN
Status: DISCONTINUED | OUTPATIENT
Start: 2020-09-14 | End: 2020-09-23 | Stop reason: HOSPADM

## 2020-09-13 RX ORDER — IPRATROPIUM BROMIDE AND ALBUTEROL SULFATE 2.5; .5 MG/3ML; MG/3ML
3 SOLUTION RESPIRATORY (INHALATION) EVERY 6 HOURS PRN
Status: DISCONTINUED | OUTPATIENT
Start: 2020-09-14 | End: 2020-09-23 | Stop reason: HOSPADM

## 2020-09-13 RX ORDER — METHOCARBAMOL 500 MG/1
500 TABLET, FILM COATED ORAL NIGHTLY PRN
Status: DISCONTINUED | OUTPATIENT
Start: 2020-09-14 | End: 2020-09-20

## 2020-09-13 RX ORDER — LEVETIRACETAM 5 MG/ML
500 INJECTION INTRAVASCULAR EVERY 12 HOURS
Status: DISCONTINUED | OUTPATIENT
Start: 2020-09-13 | End: 2020-09-15

## 2020-09-13 RX ORDER — OXYCODONE HYDROCHLORIDE 5 MG/1
5 TABLET ORAL EVERY 6 HOURS PRN
Status: DISCONTINUED | OUTPATIENT
Start: 2020-09-14 | End: 2020-09-15

## 2020-09-13 RX ORDER — ONDANSETRON 2 MG/ML
4 INJECTION INTRAMUSCULAR; INTRAVENOUS EVERY 8 HOURS PRN
Status: DISCONTINUED | OUTPATIENT
Start: 2020-09-14 | End: 2020-09-23 | Stop reason: HOSPADM

## 2020-09-13 RX ORDER — OXYCODONE HYDROCHLORIDE 10 MG/1
10 TABLET ORAL EVERY 6 HOURS PRN
Status: DISCONTINUED | OUTPATIENT
Start: 2020-09-14 | End: 2020-09-15

## 2020-09-13 RX ORDER — ACETAMINOPHEN 325 MG/1
650 TABLET ORAL EVERY 4 HOURS PRN
Status: DISCONTINUED | OUTPATIENT
Start: 2020-09-14 | End: 2020-09-23 | Stop reason: HOSPADM

## 2020-09-13 RX ORDER — ACETAMINOPHEN 500 MG
1000 TABLET ORAL EVERY 8 HOURS
Status: DISPENSED | OUTPATIENT
Start: 2020-09-14 | End: 2020-09-15

## 2020-09-13 RX ORDER — SODIUM CHLORIDE 0.9 % (FLUSH) 0.9 %
10 SYRINGE (ML) INJECTION
Status: DISCONTINUED | OUTPATIENT
Start: 2020-09-14 | End: 2020-09-23 | Stop reason: HOSPADM

## 2020-09-13 RX ORDER — TALC
6 POWDER (GRAM) TOPICAL NIGHTLY PRN
Status: DISCONTINUED | OUTPATIENT
Start: 2020-09-14 | End: 2020-09-23 | Stop reason: HOSPADM

## 2020-09-13 RX ORDER — ONDANSETRON 2 MG/ML
4 INJECTION INTRAMUSCULAR; INTRAVENOUS
Status: COMPLETED | OUTPATIENT
Start: 2020-09-13 | End: 2020-09-13

## 2020-09-13 RX ORDER — PANTOPRAZOLE SODIUM 40 MG/1
40 TABLET, DELAYED RELEASE ORAL DAILY
Status: DISCONTINUED | OUTPATIENT
Start: 2020-09-14 | End: 2020-09-23 | Stop reason: HOSPADM

## 2020-09-13 RX ORDER — ASCORBIC ACID 250 MG
250 TABLET ORAL DAILY
Status: DISCONTINUED | OUTPATIENT
Start: 2020-09-14 | End: 2020-09-23 | Stop reason: HOSPADM

## 2020-09-13 RX ORDER — LIDOCAINE HYDROCHLORIDE 10 MG/ML
20 INJECTION INFILTRATION; PERINEURAL ONCE
Status: DISCONTINUED | OUTPATIENT
Start: 2020-09-13 | End: 2020-09-18

## 2020-09-13 RX ORDER — MORPHINE SULFATE 4 MG/ML
4 INJECTION, SOLUTION INTRAMUSCULAR; INTRAVENOUS
Status: COMPLETED | OUTPATIENT
Start: 2020-09-13 | End: 2020-09-13

## 2020-09-13 RX ORDER — IBUPROFEN 200 MG
24 TABLET ORAL
Status: DISCONTINUED | OUTPATIENT
Start: 2020-09-14 | End: 2020-09-23 | Stop reason: HOSPADM

## 2020-09-13 RX ADMIN — MORPHINE SULFATE 4 MG: 4 INJECTION INTRAVENOUS at 09:09

## 2020-09-13 RX ADMIN — ONDANSETRON 4 MG: 2 INJECTION INTRAMUSCULAR; INTRAVENOUS at 09:09

## 2020-09-14 ENCOUNTER — ANESTHESIA (OUTPATIENT)
Dept: SURGERY | Facility: HOSPITAL | Age: 50
DRG: 510 | End: 2020-09-14
Payer: MEDICAID

## 2020-09-14 ENCOUNTER — ANESTHESIA EVENT (OUTPATIENT)
Dept: SURGERY | Facility: HOSPITAL | Age: 50
DRG: 510 | End: 2020-09-14
Payer: MEDICAID

## 2020-09-14 ENCOUNTER — PATIENT OUTREACH (OUTPATIENT)
Dept: ADMINISTRATIVE | Facility: OTHER | Age: 50
End: 2020-09-14

## 2020-09-14 DIAGNOSIS — Z86.79 HISTORY OF NONTRAUMATIC RUPTURE OF CEREBRAL ANEURYSM: Primary | ICD-10-CM

## 2020-09-14 PROBLEM — S52.502A CLOSED FRACTURE OF DISTAL END OF LEFT RADIUS: Status: ACTIVE | Noted: 2020-09-14

## 2020-09-14 PROBLEM — R93.5 ABNORMAL X-RAY OF ABDOMEN: Status: ACTIVE | Noted: 2020-09-14

## 2020-09-14 PROBLEM — S52.501A CLOSED FRACTURE OF DISTAL END OF RIGHT RADIUS: Status: ACTIVE | Noted: 2020-09-14

## 2020-09-14 LAB
ALBUMIN SERPL BCP-MCNC: 3.9 G/DL (ref 3.5–5.2)
ALP SERPL-CCNC: 60 U/L (ref 55–135)
ALT SERPL W/O P-5'-P-CCNC: 33 U/L (ref 10–44)
ANION GAP SERPL CALC-SCNC: 12 MMOL/L (ref 8–16)
AST SERPL-CCNC: 31 U/L (ref 10–40)
BASOPHILS # BLD AUTO: 0.02 K/UL (ref 0–0.2)
BASOPHILS NFR BLD: 0.2 % (ref 0–1.9)
BILIRUB SERPL-MCNC: 0.7 MG/DL (ref 0.1–1)
BUN SERPL-MCNC: 11 MG/DL (ref 6–20)
CALCIUM SERPL-MCNC: 9.1 MG/DL (ref 8.7–10.5)
CHLORIDE SERPL-SCNC: 105 MMOL/L (ref 95–110)
CO2 SERPL-SCNC: 23 MMOL/L (ref 23–29)
CREAT SERPL-MCNC: 0.8 MG/DL (ref 0.5–1.4)
DIFFERENTIAL METHOD: ABNORMAL
EOSINOPHIL # BLD AUTO: 0 K/UL (ref 0–0.5)
EOSINOPHIL NFR BLD: 0.1 % (ref 0–8)
ERYTHROCYTE [DISTWIDTH] IN BLOOD BY AUTOMATED COUNT: 11.5 % (ref 11.5–14.5)
EST. GFR  (AFRICAN AMERICAN): >60 ML/MIN/1.73 M^2
EST. GFR  (NON AFRICAN AMERICAN): >60 ML/MIN/1.73 M^2
GLUCOSE SERPL-MCNC: 121 MG/DL (ref 70–110)
HCT VFR BLD AUTO: 41.6 % (ref 40–54)
HGB BLD-MCNC: 14.4 G/DL (ref 14–18)
IMM GRANULOCYTES # BLD AUTO: 0.07 K/UL (ref 0–0.04)
IMM GRANULOCYTES NFR BLD AUTO: 0.5 % (ref 0–0.5)
LYMPHOCYTES # BLD AUTO: 1.2 K/UL (ref 1–4.8)
LYMPHOCYTES NFR BLD: 9 % (ref 18–48)
MCH RBC QN AUTO: 33.5 PG (ref 27–31)
MCHC RBC AUTO-ENTMCNC: 34.6 G/DL (ref 32–36)
MCV RBC AUTO: 97 FL (ref 82–98)
MONOCYTES # BLD AUTO: 0.9 K/UL (ref 0.3–1)
MONOCYTES NFR BLD: 7.1 % (ref 4–15)
NEUTROPHILS # BLD AUTO: 10.6 K/UL (ref 1.8–7.7)
NEUTROPHILS NFR BLD: 83.1 % (ref 38–73)
NRBC BLD-RTO: 0 /100 WBC
PLATELET # BLD AUTO: 158 K/UL (ref 150–350)
PLATELET BLD QL SMEAR: ABNORMAL
PMV BLD AUTO: 11.5 FL (ref 9.2–12.9)
POTASSIUM SERPL-SCNC: 4.5 MMOL/L (ref 3.5–5.1)
PROT SERPL-MCNC: 6.8 G/DL (ref 6–8.4)
RBC # BLD AUTO: 4.3 M/UL (ref 4.6–6.2)
SODIUM SERPL-SCNC: 140 MMOL/L (ref 136–145)
WBC # BLD AUTO: 12.74 K/UL (ref 3.9–12.7)

## 2020-09-14 PROCEDURE — 37000009 HC ANESTHESIA EA ADD 15 MINS: Performed by: ORTHOPAEDIC SURGERY

## 2020-09-14 PROCEDURE — 25000003 PHARM REV CODE 250: Performed by: HOSPITALIST

## 2020-09-14 PROCEDURE — D9220A PRA ANESTHESIA: ICD-10-PCS | Mod: ANES,,, | Performed by: ANESTHESIOLOGY

## 2020-09-14 PROCEDURE — 99232 SBSQ HOSP IP/OBS MODERATE 35: CPT | Mod: 57,,, | Performed by: ORTHOPAEDIC SURGERY

## 2020-09-14 PROCEDURE — 71000015 HC POSTOP RECOV 1ST HR: Performed by: ORTHOPAEDIC SURGERY

## 2020-09-14 PROCEDURE — 63600175 PHARM REV CODE 636 W HCPCS: Performed by: HOSPITALIST

## 2020-09-14 PROCEDURE — 71000016 HC POSTOP RECOV ADDL HR: Performed by: ORTHOPAEDIC SURGERY

## 2020-09-14 PROCEDURE — 99232 SBSQ HOSP IP/OBS MODERATE 35: CPT | Mod: ,,, | Performed by: NEUROLOGICAL SURGERY

## 2020-09-14 PROCEDURE — 36415 COLL VENOUS BLD VENIPUNCTURE: CPT

## 2020-09-14 PROCEDURE — 27000221 HC OXYGEN, UP TO 24 HOURS

## 2020-09-14 PROCEDURE — C1713 ANCHOR/SCREW BN/BN,TIS/BN: HCPCS | Performed by: ORTHOPAEDIC SURGERY

## 2020-09-14 PROCEDURE — 63600175 PHARM REV CODE 636 W HCPCS: Performed by: ORTHOPAEDIC SURGERY

## 2020-09-14 PROCEDURE — C1769 GUIDE WIRE: HCPCS | Performed by: ORTHOPAEDIC SURGERY

## 2020-09-14 PROCEDURE — 80053 COMPREHEN METABOLIC PANEL: CPT

## 2020-09-14 PROCEDURE — 63600175 PHARM REV CODE 636 W HCPCS: Performed by: STUDENT IN AN ORGANIZED HEALTH CARE EDUCATION/TRAINING PROGRAM

## 2020-09-14 PROCEDURE — 25609 OPTX DST RD XART FX/EP SEP3+: CPT | Mod: 22,50,, | Performed by: ORTHOPAEDIC SURGERY

## 2020-09-14 PROCEDURE — 25609 PR OPEN RX DISTAL RADIUS FX, INTRA-ARTICULAR, 3+ FRAG: ICD-10-PCS | Mod: 22,50,, | Performed by: ORTHOPAEDIC SURGERY

## 2020-09-14 PROCEDURE — 94761 N-INVAS EAR/PLS OXIMETRY MLT: CPT

## 2020-09-14 PROCEDURE — 76942 ECHO GUIDE FOR BIOPSY: CPT | Mod: 26,,, | Performed by: ANESTHESIOLOGY

## 2020-09-14 PROCEDURE — D9220A PRA ANESTHESIA: ICD-10-PCS | Mod: CRNA,,, | Performed by: STUDENT IN AN ORGANIZED HEALTH CARE EDUCATION/TRAINING PROGRAM

## 2020-09-14 PROCEDURE — 25000003 PHARM REV CODE 250: Performed by: ANESTHESIOLOGY

## 2020-09-14 PROCEDURE — 99232 PR SUBSEQUENT HOSPITAL CARE,LEVL II: ICD-10-PCS | Mod: 57,,, | Performed by: ORTHOPAEDIC SURGERY

## 2020-09-14 PROCEDURE — 64415 NJX AA&/STRD BRCH PLXS IMG: CPT | Performed by: STUDENT IN AN ORGANIZED HEALTH CARE EDUCATION/TRAINING PROGRAM

## 2020-09-14 PROCEDURE — D9220A PRA ANESTHESIA: Mod: ANES,,, | Performed by: ANESTHESIOLOGY

## 2020-09-14 PROCEDURE — 99232 PR SUBSEQUENT HOSPITAL CARE,LEVL II: ICD-10-PCS | Mod: ,,, | Performed by: HOSPITALIST

## 2020-09-14 PROCEDURE — 99232 PR SUBSEQUENT HOSPITAL CARE,LEVL II: ICD-10-PCS | Mod: ,,, | Performed by: NEUROLOGICAL SURGERY

## 2020-09-14 PROCEDURE — 93005 ELECTROCARDIOGRAM TRACING: CPT

## 2020-09-14 PROCEDURE — 76942 RIGHT SUPRACLAVICULAR BRACHIAL PLEXUS SINGLE INJECTION BLOCK: ICD-10-PCS | Mod: 26,,, | Performed by: ANESTHESIOLOGY

## 2020-09-14 PROCEDURE — 93010 ELECTROCARDIOGRAM REPORT: CPT | Mod: ,,, | Performed by: INTERNAL MEDICINE

## 2020-09-14 PROCEDURE — 99499 NO LOS: ICD-10-PCS | Mod: ,,, | Performed by: NEUROLOGICAL SURGERY

## 2020-09-14 PROCEDURE — 63600175 PHARM REV CODE 636 W HCPCS: Performed by: ANESTHESIOLOGY

## 2020-09-14 PROCEDURE — 85025 COMPLETE CBC W/AUTO DIFF WBC: CPT

## 2020-09-14 PROCEDURE — 76942 ECHO GUIDE FOR BIOPSY: CPT | Performed by: STUDENT IN AN ORGANIZED HEALTH CARE EDUCATION/TRAINING PROGRAM

## 2020-09-14 PROCEDURE — 37000008 HC ANESTHESIA 1ST 15 MINUTES: Performed by: ORTHOPAEDIC SURGERY

## 2020-09-14 PROCEDURE — 36000710: Performed by: ORTHOPAEDIC SURGERY

## 2020-09-14 PROCEDURE — 27201423 OPTIME MED/SURG SUP & DEVICES STERILE SUPPLY: Performed by: ORTHOPAEDIC SURGERY

## 2020-09-14 PROCEDURE — 25000003 PHARM REV CODE 250: Performed by: PHYSICIAN ASSISTANT

## 2020-09-14 PROCEDURE — 27200750 HC INSULATED NEEDLE/ STIMUPLEX: Performed by: ANESTHESIOLOGY

## 2020-09-14 PROCEDURE — 99232 SBSQ HOSP IP/OBS MODERATE 35: CPT | Mod: ,,, | Performed by: HOSPITALIST

## 2020-09-14 PROCEDURE — 99499 UNLISTED E&M SERVICE: CPT | Mod: ,,, | Performed by: NEUROLOGICAL SURGERY

## 2020-09-14 PROCEDURE — 71000033 HC RECOVERY, INTIAL HOUR: Performed by: ORTHOPAEDIC SURGERY

## 2020-09-14 PROCEDURE — 64415 RIGHT SUPRACLAVICULAR BRACHIAL PLEXUS SINGLE INJECTION BLOCK: ICD-10-PCS | Mod: 59,RT,, | Performed by: ANESTHESIOLOGY

## 2020-09-14 PROCEDURE — 36000711: Performed by: ORTHOPAEDIC SURGERY

## 2020-09-14 PROCEDURE — 11000001 HC ACUTE MED/SURG PRIVATE ROOM

## 2020-09-14 PROCEDURE — 64415 NJX AA&/STRD BRCH PLXS IMG: CPT | Mod: 59,RT,, | Performed by: ANESTHESIOLOGY

## 2020-09-14 PROCEDURE — 25000003 PHARM REV CODE 250: Performed by: STUDENT IN AN ORGANIZED HEALTH CARE EDUCATION/TRAINING PROGRAM

## 2020-09-14 PROCEDURE — D9220A PRA ANESTHESIA: Mod: CRNA,,, | Performed by: STUDENT IN AN ORGANIZED HEALTH CARE EDUCATION/TRAINING PROGRAM

## 2020-09-14 PROCEDURE — 93010 EKG 12-LEAD: ICD-10-PCS | Mod: ,,, | Performed by: INTERNAL MEDICINE

## 2020-09-14 DEVICE — SCREW LOCKING 2.4 X 18MM: Type: IMPLANTABLE DEVICE | Site: WRIST | Status: FUNCTIONAL

## 2020-09-14 DEVICE — IMPLANTABLE DEVICE: Type: IMPLANTABLE DEVICE | Site: WRIST | Status: FUNCTIONAL

## 2020-09-14 DEVICE — SCREW LOCKING 2.4 X 20MM: Type: IMPLANTABLE DEVICE | Site: WRIST | Status: FUNCTIONAL

## 2020-09-14 DEVICE — SCREW STRDRV REC T8 2.7X14 SS: Type: IMPLANTABLE DEVICE | Site: WRIST | Status: FUNCTIONAL

## 2020-09-14 DEVICE — SCREW CORTEX 2.7 X 16MM: Type: IMPLANTABLE DEVICE | Site: WRIST | Status: FUNCTIONAL

## 2020-09-14 DEVICE — SCREW CORTEX 2.7X18MM: Type: IMPLANTABLE DEVICE | Site: WRIST | Status: FUNCTIONAL

## 2020-09-14 DEVICE — SCREW LOCKING 2.4 X 16MM: Type: IMPLANTABLE DEVICE | Site: WRIST | Status: FUNCTIONAL

## 2020-09-14 RX ORDER — CEFAZOLIN SODIUM 1 G/3ML
2 INJECTION, POWDER, FOR SOLUTION INTRAMUSCULAR; INTRAVENOUS
Status: DISCONTINUED | OUTPATIENT
Start: 2020-09-14 | End: 2020-09-14 | Stop reason: HOSPADM

## 2020-09-14 RX ORDER — MAG HYDROX/ALUMINUM HYD/SIMETH 200-200-20
30 SUSPENSION, ORAL (FINAL DOSE FORM) ORAL
Status: DISCONTINUED | OUTPATIENT
Start: 2020-09-14 | End: 2020-09-23 | Stop reason: HOSPADM

## 2020-09-14 RX ORDER — CLINDAMYCIN PHOSPHATE 900 MG/50ML
INJECTION, SOLUTION INTRAVENOUS
Status: DISCONTINUED | OUTPATIENT
Start: 2020-09-14 | End: 2020-09-14

## 2020-09-14 RX ORDER — HYDROMORPHONE HYDROCHLORIDE 1 MG/ML
2 INJECTION, SOLUTION INTRAMUSCULAR; INTRAVENOUS; SUBCUTANEOUS ONCE
Status: COMPLETED | OUTPATIENT
Start: 2020-09-14 | End: 2020-09-14

## 2020-09-14 RX ORDER — ACETAMINOPHEN 10 MG/ML
INJECTION, SOLUTION INTRAVENOUS
Status: DISCONTINUED | OUTPATIENT
Start: 2020-09-14 | End: 2020-09-14

## 2020-09-14 RX ORDER — MUPIROCIN 20 MG/G
OINTMENT TOPICAL
Status: DISCONTINUED | OUTPATIENT
Start: 2020-09-14 | End: 2020-09-14 | Stop reason: HOSPADM

## 2020-09-14 RX ORDER — SODIUM CHLORIDE 0.9 % (FLUSH) 0.9 %
10 SYRINGE (ML) INJECTION
Status: DISCONTINUED | OUTPATIENT
Start: 2020-09-14 | End: 2020-09-14 | Stop reason: HOSPADM

## 2020-09-14 RX ORDER — EPHEDRINE SULFATE 50 MG/ML
INJECTION, SOLUTION INTRAVENOUS
Status: DISCONTINUED | OUTPATIENT
Start: 2020-09-14 | End: 2020-09-14

## 2020-09-14 RX ORDER — PROPOFOL 10 MG/ML
VIAL (ML) INTRAVENOUS
Status: DISCONTINUED | OUTPATIENT
Start: 2020-09-14 | End: 2020-09-14

## 2020-09-14 RX ORDER — MORPHINE SULFATE 2 MG/ML
4 INJECTION, SOLUTION INTRAMUSCULAR; INTRAVENOUS
Status: DISCONTINUED | OUTPATIENT
Start: 2020-09-14 | End: 2020-09-14

## 2020-09-14 RX ORDER — DIPHENHYDRAMINE HCL 25 MG
25 CAPSULE ORAL EVERY 6 HOURS PRN
Status: DISCONTINUED | OUTPATIENT
Start: 2020-09-14 | End: 2020-09-23 | Stop reason: HOSPADM

## 2020-09-14 RX ORDER — FENTANYL CITRATE 50 UG/ML
25 INJECTION, SOLUTION INTRAMUSCULAR; INTRAVENOUS EVERY 5 MIN PRN
Status: DISCONTINUED | OUTPATIENT
Start: 2020-09-14 | End: 2020-09-14 | Stop reason: HOSPADM

## 2020-09-14 RX ORDER — CEFAZOLIN SODIUM 1 G/3ML
INJECTION, POWDER, FOR SOLUTION INTRAMUSCULAR; INTRAVENOUS
Status: DISCONTINUED | OUTPATIENT
Start: 2020-09-14 | End: 2020-09-14

## 2020-09-14 RX ORDER — VANCOMYCIN HYDROCHLORIDE 1 G/20ML
INJECTION, POWDER, LYOPHILIZED, FOR SOLUTION INTRAVENOUS
Status: DISCONTINUED | OUTPATIENT
Start: 2020-09-14 | End: 2020-09-14 | Stop reason: HOSPADM

## 2020-09-14 RX ORDER — LIDOCAINE HCL/PF 100 MG/5ML
SYRINGE (ML) INTRAVENOUS
Status: DISCONTINUED | OUTPATIENT
Start: 2020-09-14 | End: 2020-09-14

## 2020-09-14 RX ORDER — ONDANSETRON 2 MG/ML
INJECTION INTRAMUSCULAR; INTRAVENOUS
Status: DISCONTINUED | OUTPATIENT
Start: 2020-09-14 | End: 2020-09-14

## 2020-09-14 RX ORDER — MIDAZOLAM HYDROCHLORIDE 1 MG/ML
0.5 INJECTION INTRAMUSCULAR; INTRAVENOUS
Status: DISCONTINUED | OUTPATIENT
Start: 2020-09-14 | End: 2020-09-14 | Stop reason: HOSPADM

## 2020-09-14 RX ORDER — CEFAZOLIN SODIUM 1 G/3ML
2 INJECTION, POWDER, FOR SOLUTION INTRAMUSCULAR; INTRAVENOUS
Status: DISCONTINUED | OUTPATIENT
Start: 2020-09-14 | End: 2020-09-15

## 2020-09-14 RX ORDER — SODIUM CHLORIDE 0.9 % (FLUSH) 0.9 %
10 SYRINGE (ML) INJECTION
Status: DISCONTINUED | OUTPATIENT
Start: 2020-09-14 | End: 2020-09-23 | Stop reason: HOSPADM

## 2020-09-14 RX ORDER — ROCURONIUM BROMIDE 10 MG/ML
INJECTION, SOLUTION INTRAVENOUS
Status: DISCONTINUED | OUTPATIENT
Start: 2020-09-14 | End: 2020-09-14

## 2020-09-14 RX ORDER — PHENYLEPHRINE HYDROCHLORIDE 10 MG/ML
INJECTION INTRAVENOUS
Status: DISCONTINUED | OUTPATIENT
Start: 2020-09-14 | End: 2020-09-14

## 2020-09-14 RX ORDER — HYDROMORPHONE HYDROCHLORIDE 1 MG/ML
0.2 INJECTION, SOLUTION INTRAMUSCULAR; INTRAVENOUS; SUBCUTANEOUS EVERY 5 MIN PRN
Status: DISCONTINUED | OUTPATIENT
Start: 2020-09-14 | End: 2020-09-14 | Stop reason: HOSPADM

## 2020-09-14 RX ORDER — ALPRAZOLAM 0.5 MG/1
0.5 TABLET ORAL 2 TIMES DAILY PRN
Status: DISCONTINUED | OUTPATIENT
Start: 2020-09-14 | End: 2020-09-23 | Stop reason: HOSPADM

## 2020-09-14 RX ORDER — ALPRAZOLAM 1 MG/1
1 TABLET ORAL ONCE
Status: COMPLETED | OUTPATIENT
Start: 2020-09-14 | End: 2020-09-14

## 2020-09-14 RX ORDER — LIDOCAINE HYDROCHLORIDE 10 MG/ML
20 INJECTION INFILTRATION; PERINEURAL ONCE
Status: DISCONTINUED | OUTPATIENT
Start: 2020-09-14 | End: 2020-09-18

## 2020-09-14 RX ORDER — SODIUM CHLORIDE 9 MG/ML
INJECTION, SOLUTION INTRAVENOUS CONTINUOUS
Status: DISCONTINUED | OUTPATIENT
Start: 2020-09-14 | End: 2020-09-15

## 2020-09-14 RX ORDER — FENTANYL CITRATE 50 UG/ML
INJECTION, SOLUTION INTRAMUSCULAR; INTRAVENOUS
Status: DISCONTINUED | OUTPATIENT
Start: 2020-09-14 | End: 2020-09-14

## 2020-09-14 RX ORDER — NEOSTIGMINE METHYLSULFATE 0.5 MG/ML
INJECTION, SOLUTION INTRAVENOUS
Status: DISCONTINUED | OUTPATIENT
Start: 2020-09-14 | End: 2020-09-14

## 2020-09-14 RX ORDER — HYDROMORPHONE HYDROCHLORIDE 1 MG/ML
1 INJECTION, SOLUTION INTRAMUSCULAR; INTRAVENOUS; SUBCUTANEOUS EVERY 4 HOURS PRN
Status: DISCONTINUED | OUTPATIENT
Start: 2020-09-14 | End: 2020-09-15

## 2020-09-14 RX ADMIN — SODIUM CHLORIDE 0.25 MCG/KG/MIN: 9 INJECTION, SOLUTION INTRAVENOUS at 10:09

## 2020-09-14 RX ADMIN — MIDAZOLAM HYDROCHLORIDE 4 MG: 1 INJECTION, SOLUTION INTRAMUSCULAR; INTRAVENOUS at 09:09

## 2020-09-14 RX ADMIN — PHENYLEPHRINE HYDROCHLORIDE 100 MCG: 10 INJECTION INTRAVENOUS at 10:09

## 2020-09-14 RX ADMIN — CLINDAMYCIN PHOSPHATE 900 MG: 18 INJECTION, SOLUTION INTRAVENOUS at 10:09

## 2020-09-14 RX ADMIN — ACETAMINOPHEN 1000 MG: 500 TABLET ORAL at 09:09

## 2020-09-14 RX ADMIN — PROPOFOL 200 MG: 10 INJECTION, EMULSION INTRAVENOUS at 09:09

## 2020-09-14 RX ADMIN — CEFAZOLIN 2 G: 330 INJECTION, POWDER, FOR SOLUTION INTRAMUSCULAR; INTRAVENOUS at 10:09

## 2020-09-14 RX ADMIN — EPHEDRINE SULFATE 5 MG: 50 INJECTION INTRAVENOUS at 12:09

## 2020-09-14 RX ADMIN — SODIUM CHLORIDE, SODIUM GLUCONATE, SODIUM ACETATE, POTASSIUM CHLORIDE, MAGNESIUM CHLORIDE, SODIUM PHOSPHATE, DIBASIC, AND POTASSIUM PHOSPHATE: .53; .5; .37; .037; .03; .012; .00082 INJECTION, SOLUTION INTRAVENOUS at 01:09

## 2020-09-14 RX ADMIN — MORPHINE SULFATE 4 MG: 2 INJECTION, SOLUTION INTRAMUSCULAR; INTRAVENOUS at 01:09

## 2020-09-14 RX ADMIN — FENTANYL CITRATE 100 MCG: 50 INJECTION, SOLUTION INTRAMUSCULAR; INTRAVENOUS at 09:09

## 2020-09-14 RX ADMIN — EPHEDRINE SULFATE 10 MG: 50 INJECTION INTRAVENOUS at 10:09

## 2020-09-14 RX ADMIN — SODIUM CHLORIDE, SODIUM GLUCONATE, SODIUM ACETATE, POTASSIUM CHLORIDE, MAGNESIUM CHLORIDE, SODIUM PHOSPHATE, DIBASIC, AND POTASSIUM PHOSPHATE: .53; .5; .37; .037; .03; .012; .00082 INJECTION, SOLUTION INTRAVENOUS at 11:09

## 2020-09-14 RX ADMIN — OXYCODONE HYDROCHLORIDE 10 MG: 10 TABLET ORAL at 06:09

## 2020-09-14 RX ADMIN — OXYCODONE HYDROCHLORIDE 10 MG: 10 TABLET ORAL at 07:09

## 2020-09-14 RX ADMIN — ACETAMINOPHEN 1000 MG: 10 INJECTION, SOLUTION INTRAVENOUS at 10:09

## 2020-09-14 RX ADMIN — ROCURONIUM BROMIDE 20 MG: 10 INJECTION, SOLUTION INTRAVENOUS at 01:09

## 2020-09-14 RX ADMIN — ALPRAZOLAM 0.5 MG: 0.5 TABLET ORAL at 04:09

## 2020-09-14 RX ADMIN — MORPHINE SULFATE 4 MG: 2 INJECTION, SOLUTION INTRAMUSCULAR; INTRAVENOUS at 04:09

## 2020-09-14 RX ADMIN — CEFAZOLIN 2 G: 1 INJECTION, POWDER, FOR SOLUTION INTRAMUSCULAR; INTRAVENOUS at 08:09

## 2020-09-14 RX ADMIN — PHENYLEPHRINE HYDROCHLORIDE 100 MCG: 10 INJECTION INTRAVENOUS at 09:09

## 2020-09-14 RX ADMIN — ROCURONIUM BROMIDE 10 MG: 10 INJECTION, SOLUTION INTRAVENOUS at 10:09

## 2020-09-14 RX ADMIN — LEVETIRACETAM 500 MG: 5 INJECTION INTRAVENOUS at 04:09

## 2020-09-14 RX ADMIN — SODIUM CHLORIDE: 0.9 INJECTION, SOLUTION INTRAVENOUS at 06:09

## 2020-09-14 RX ADMIN — FENTANYL CITRATE 50 MCG: 50 INJECTION, SOLUTION INTRAMUSCULAR; INTRAVENOUS at 01:09

## 2020-09-14 RX ADMIN — HYDROMORPHONE HYDROCHLORIDE 2 MG: 1 INJECTION, SOLUTION INTRAMUSCULAR; INTRAVENOUS; SUBCUTANEOUS at 06:09

## 2020-09-14 RX ADMIN — OXYCODONE 5 MG: 5 TABLET ORAL at 05:09

## 2020-09-14 RX ADMIN — PHENYLEPHRINE HYDROCHLORIDE 200 MCG: 10 INJECTION INTRAVENOUS at 10:09

## 2020-09-14 RX ADMIN — LIDOCAINE HYDROCHLORIDE 100 MG: 20 INJECTION, SOLUTION INTRAVENOUS at 09:09

## 2020-09-14 RX ADMIN — ROCURONIUM BROMIDE 10 MG: 10 INJECTION, SOLUTION INTRAVENOUS at 11:09

## 2020-09-14 RX ADMIN — PROMETHAZINE HYDROCHLORIDE 6.25 MG: 25 INJECTION INTRAMUSCULAR; INTRAVENOUS at 05:09

## 2020-09-14 RX ADMIN — LEVETIRACETAM 500 MG: 5 INJECTION INTRAVENOUS at 08:09

## 2020-09-14 RX ADMIN — ALPRAZOLAM 1 MG: 1 TABLET ORAL at 11:09

## 2020-09-14 RX ADMIN — EPHEDRINE SULFATE 10 MG: 50 INJECTION INTRAVENOUS at 11:09

## 2020-09-14 RX ADMIN — CEFAZOLIN 2 G: 330 INJECTION, POWDER, FOR SOLUTION INTRAMUSCULAR; INTRAVENOUS at 01:09

## 2020-09-14 RX ADMIN — MORPHINE SULFATE 4 MG: 2 INJECTION, SOLUTION INTRAMUSCULAR; INTRAVENOUS at 05:09

## 2020-09-14 RX ADMIN — ROCURONIUM BROMIDE 50 MG: 10 INJECTION, SOLUTION INTRAVENOUS at 09:09

## 2020-09-14 RX ADMIN — ONDANSETRON 4 MG: 2 INJECTION INTRAMUSCULAR; INTRAVENOUS at 03:09

## 2020-09-14 RX ADMIN — SODIUM CHLORIDE: 0.9 INJECTION, SOLUTION INTRAVENOUS at 09:09

## 2020-09-14 RX ADMIN — EPHEDRINE SULFATE 10 MG: 50 INJECTION INTRAVENOUS at 12:09

## 2020-09-14 RX ADMIN — ONDANSETRON 4 MG: 2 INJECTION, SOLUTION INTRAMUSCULAR; INTRAVENOUS at 10:09

## 2020-09-14 RX ADMIN — NEOSTIGMINE METHYLSULFATE 4 MG: 0.5 INJECTION INTRAVENOUS at 02:09

## 2020-09-14 RX ADMIN — PHENYLEPHRINE HYDROCHLORIDE 100 MCG: 10 INJECTION INTRAVENOUS at 12:09

## 2020-09-14 RX ADMIN — ACETAMINOPHEN 1000 MG: 500 TABLET ORAL at 05:09

## 2020-09-14 RX ADMIN — OXYCODONE HYDROCHLORIDE 10 MG: 10 TABLET ORAL at 11:09

## 2020-09-14 RX ADMIN — ONDANSETRON 4 MG: 2 INJECTION, SOLUTION INTRAMUSCULAR; INTRAVENOUS at 02:09

## 2020-09-14 NOTE — ED NOTES
Bed: [facility]  Expected date:   Expected time:   Means of arrival:   Comments:

## 2020-09-14 NOTE — CONSULTS
[facility]  Orthopedics  Consult Note    Patient Name: Andre Padgett  MRN: [id number]  Admission Date: 9/13/2020  Hospital Length of Stay: 1 days  Attending Provider: Ricardo Thompson MD  Primary Care Provider: Hunter Diop MD       Inpatient consult to Orthopedic Surgery  Consult performed by: Azeem Holland MD  Consult ordered by: Ricardo Thompson MD        Subjective:     Principal Problem:Closed fracture of distal end of right radius    Chief Complaint:   Chief Complaint   Patient presents with    Fall     Fall after standing on stool, bilateral wrist fractures. Obvious defromities. -LOC        HPI: Andre Padgett is a 50 y.o. male PMH HLD and nontraumatic rupture of cerebral aneurysm s/p coiling (2016) who presents to the ED after sustaining a fall earlier today while standing on a stool. The patient reports that there was a lizard in his house high up on the wall. He stood on a stool in an attempt to swat at the lizard, but he fell off the stool, falling onto both arms outstretched and striking the R side of his head on a table. Fall was roughly 3 ft. He denies LOC.  Pt had immediate pain to both wrists with noted deformities and called EMS. Pt. Denies any preceding symptoms such as lightheadedness/dizziness, SOB, or chest pain. He is RHD and works as a .     Past Medical History:   Diagnosis Date    Aneurysm     Right sided filling anterior communicating aneurysm s/p coiling 2011    Anxiety     Cerebral aneurysm, nonruptured 12/27/2016    Colon adenoma: 3/18 repeat colonoscpy 2023 6/22/2018    Diverticulosis of large intestine without hemorrhage: see colonoscopy 3/18; sigmoid and descending colon 6/22/2018    Fatty liver: see CT 3/18 6/22/2018    Umbilical hernia without obstruction and without gangrene: see CT 3/18 6/22/2018       Past Surgical History:   Procedure Laterality Date    CEREBRAL ANGIOGRAM  2011    angiogram/coiling    COLONOSCOPY N/A  3/26/2018    Procedure: COLONOSCOPY;  Surgeon: Sanjiv Duran MD;  Location: [facility] ([address] [place] [address]);  Service: Endoscopy;  Laterality: N/A;       Review of patient's allergies indicates:  No Known Allergies    Current Facility-Administered Medications   Medication    acetaminophen tablet 1,000 mg    acetaminophen tablet 650 mg    albuterol-ipratropium 2.5 mg-0.5 mg/3 mL nebulizer solution 3 mL    ascorbic acid (vitamin C) tablet 250 mg    atorvastatin tablet 40 mg    glucose chewable tablet 16 g    glucose chewable tablet 24 g    levETIRAcetam in NaCl (iso-os) IVPB 500 mg    lidocaine HCL 10 mg/ml (1%) injection 20 mL    melatonin tablet 6 mg    methocarbamoL tablet 500 mg    ondansetron injection 4 mg    oxyCODONE immediate release tablet 5 mg    oxyCODONE immediate release tablet Tab 10 mg    pantoprazole EC tablet 40 mg    prochlorperazine injection Soln 5 mg    sodium chloride 0.9% flush 10 mL     Current Outpatient Medications   Medication Sig    ALPRAZolam (XANAX) 1 MG tablet Take 1 tablet (1 mg total) by mouth 2 (two) times daily as needed for Anxiety.    ascorbic acid (VITAMIN C) 100 MG tablet Take 100 mg by mouth once daily.    aspirin 81 MG Chew Take 81 mg by mouth once daily.    atorvastatin (LIPITOR) 40 MG tablet TAKE 1 TABLET(40 MG) BY MOUTH EVERY DAY    diclofenac (VOLTAREN) 75 MG EC tablet Take 1 tablet (75 mg total) by mouth 2 (two) times daily.    methocarbamoL (ROBAXIN) 500 MG Tab Take 1-2 tablets (500-1,000 mg total) by mouth nightly as needed.    pantoprazole (PROTONIX) 40 MG tablet Take 1 tablet (40 mg total) by mouth once daily.    zinc sulfate (ZINCATE) 220 (50) mg capsule TK ONE C PO  QD     Family History     Problem Relation (Age of Onset)    Brain cancer Father    Diabetes Mother    DAVID disease Mother    Hypertension Mother    No Known Problems Daughter, Sister    Thyroid disease Sister        Tobacco Use    Smoking status: Never Smoker    Smokeless tobacco:  Never Used   Substance and Sexual Activity    Alcohol use: Yes     Comment: ocassionally - twice a week    Drug use: No    Sexual activity: Not on file     ROS per ED 9/14/20  Objective:     Vital Signs (Most Recent):  Temp: 98.3 °F (36.8 °C) (09/13/20 2106)  Pulse: 98 (09/13/20 2106)  Resp: 18 (09/13/20 2130)  BP: (!) 137/90 (09/13/20 2106)  SpO2: 100 % (09/13/20 2106) Vital Signs (24h Range):  Temp:  [98.3 °F (36.8 °C)] 98.3 °F (36.8 °C)  Pulse:  [98] 98  Resp:  [18-20] 18  SpO2:  [100 %] 100 %  BP: (137)/(90) 137/90           There is no height or weight on file to calculate BMI.    Ortho/SPM Exam     Vitals: Afebrile.  Vital signs stable.  General: No acute distress.  Cardio: Regular rate.  Chest: No increased work of breathing.    Right Upper Extremity    -Skin intact, obvious deformity and swelling at the wrist  - TTP about the wrist  -Compartments soft and compressible  -ROM full (shoulder/elbow)  -SILT M/R/U  -Motor intact Ain/PIN/U/M  -Brisk cap refill  -Warm well perfused extremities  -2+ Radial palpable    Left Upper Extremity    -Skin intact, obvious deformity and swelling at the wrist  - TTP about the wrist  -Compartments soft and compressible  -ROM full (shoulder/elbow)  -SILT M/R/U  -Motor intact Ain/PIN/U/M  -Brisk cap refill  -Warm well perfused extremities  -2+ Radial palpable    Right Lower Extremity Exam    - Skin intact, no deformity, no ecchymoses, no edema  - NTTP  - Compartments soft and compressible  - ROM full (eversion,inversion,plantar/dorsiflexion)  - TA/EHL/Gastroc/FHL assessed in isolation without deficit  - SILT throughout  - DP and PT palpated  2+  - Capillary Refill <3s      Left Lower Extremity Exam    - Skin intact, no deformity, no ecchymoses, no edema  - NTTP  - Compartments soft and compressible  - ROM full (eversion,inversion,plantar/dorsiflexion)  - TA/EHL/Gastroc/FHL assessed in isolation without deficit  - SILT throughout  - DP and PT palpated  2+  - Capillary Refill  <3s    All other joints (shoulder/elbow/wrist/hip/knee/ankle) were examined and had full ROM and were non-tender to palpation.      Significant Labs:   BMP:   Recent Labs   Lab 09/13/20 2117   *      K 4.0      CO2 24   BUN 12   CREATININE 0.9   CALCIUM 9.4     CBC:   Recent Labs   Lab 09/13/20 2117   WBC 8.50   HGB 15.1   HCT 44.1        CMP:   Recent Labs   Lab 09/13/20 2117      K 4.0      CO2 24   *   BUN 12   CREATININE 0.9   CALCIUM 9.4   PROT 7.2   ALBUMIN 4.1   BILITOT 0.5   ALKPHOS 65   AST 30   ALT 36   ANIONGAP 14   EGFRNONAA >60.0     All pertinent labs within the past 24 hours have been reviewed.    Significant Imaging: I have reviewed all pertinent imaging results/findings.     Bilateral comminuted intraarticular DRFs.    Assessment/Plan:     * Closed fracture of distal end of right radius  Andre Padgett is a 50 y.o. male with bilateral closed distal radius fractures, closed, NVI.  Diagnosis discussed in detail with patient. Pt is being admitted for monitoring of subarachnoid hemorrhage. He will likely need surgical intervention for these fractures. He understands this and would be amenable to surgery in the future. Consents signed at bedside all questions answered. Closed reduced and splinted in ED. MATTHEW HERRERA. Will discuss with staff.     Subarachnoid hemorrhage being managed by Neurosurgery.     Procedure note:  After time out was performed and patient ID, side, and site were verified, the area was sterilly prepped in the standard fashion. A 22-gauge needle was introduced into the fracture site without complication with aspiration of hematoma for confirmation. 10cc of 1% lidocaine was then injected to the fracture site without difficulty. After adequate analgesia, the fracture was closed reduced under c-arm guidance and adequate reduction was obtained. A sugar tong splint was applied. The same procedure was followed for the contralateral side and then  post-reduction films were obtained which verified maintenance of the reduction. The patient tolerated the procedure well with no complications. Blood loss was minimal.      Closed fracture of distal end of left radius  See right DRF        Azeem Holland MD  Orthopedics  [facility]

## 2020-09-14 NOTE — TRANSFER OF CARE
"Anesthesia Transfer of Care Note    Patient: Andre Padgett    Procedure(s) Performed: Procedure(s) (LRB):  ORIF, FRACTURE, RADIUS OR ULNA, synthes, right, large C arm at a diagonal from the rear of the room, ancef, velcro wrist splint (Bilateral)    Patient location: PACU    Anesthesia Type: general    Transport from OR: Transported from OR on 6-10 L/min O2 by face mask with adequate spontaneous ventilation    Post pain: adequate analgesia    Post assessment: no apparent anesthetic complications and tolerated procedure well    Post vital signs: stable    Level of consciousness: responds to stimulation and sedated    Nausea/Vomiting: no nausea/vomiting    Complications: none    Transfer of care protocol was followed      Last vitals:   Visit Vitals  /66 (BP Location: Right leg, Patient Position: Lying)   Pulse 103   Temp 36.6 °C (97.9 °F) (Temporal)   Resp 18   Ht 5' 10" (1.778 m)   Wt 78.2 kg (172 lb 6.4 oz)   SpO2 100%   BMI 24.74 kg/m²     "

## 2020-09-14 NOTE — CONSULTS
[facility]  Neurosurgery  Consult Note    Consults  Subjective:     Chief Complaint/Reason for Admission: tSAH    History of Present Illness: Andre Padgett is a 51 yo male with a PMH of ruptured acomm aneurysm s/p coil '11 on ASA 81 who presents after a fall while standing on a stool with impact to his bilateral hands with impact to his right temple. He had no LOC and sustained bilateral radial fractures initially fixated by orthopedics in the ER. He sustained a punctate R temporal tSAH during his fall and CTH with concern for punctate L sylvian sulcal tSAH.  He has no weakness, numbness, or tingling to his arms or legs and is neurologically intact. History of A Comm coil and has not had recent angiogram or survailance for treated aneurysm. No vomiting or severe headache, no neck pain. Neurosurgery consulted due to SAH. No blood thinning agents other than ASA 81 daily.     (Not in a hospital admission)      Review of patient's allergies indicates:  No Known Allergies    Past Medical History:   Diagnosis Date    Aneurysm     Right sided filling anterior communicating aneurysm s/p coiling 2011    Anxiety     Cerebral aneurysm, nonruptured 12/27/2016    Colon adenoma: 3/18 repeat colonoscpy 2023 6/22/2018    Diverticulosis of large intestine without hemorrhage: see colonoscopy 3/18; sigmoid and descending colon 6/22/2018    Fatty liver: see CT 3/18 6/22/2018    Umbilical hernia without obstruction and without gangrene: see CT 3/18 6/22/2018     Past Surgical History:   Procedure Laterality Date    CEREBRAL ANGIOGRAM  2011    angiogram/coiling    COLONOSCOPY N/A 3/26/2018    Procedure: COLONOSCOPY;  Surgeon: Sanjiv Duran MD;  Location: [facility] ([address] [place] [address]);  Service: Endoscopy;  Laterality: N/A;     Family History     Problem Relation (Age of Onset)    Brain cancer Father    Diabetes Mother    DAVID disease Mother    Hypertension Mother    No Known Problems Daughter, Sister    Thyroid  disease Sister        Tobacco Use    Smoking status: Never Smoker    Smokeless tobacco: Never Used   Substance and Sexual Activity    Alcohol use: Yes     Comment: ocassionally - twice a week    Drug use: No    Sexual activity: Not on file     Review of Systems   Constitutional: Negative for activity change and appetite change.   HENT: Negative for congestion and dental problem.    Eyes: Negative for discharge and itching.   Respiratory: Negative for apnea and chest tightness.    Cardiovascular: Negative for chest pain and leg swelling.   Gastrointestinal: Negative for abdominal distention and abdominal pain.   Endocrine: Negative for cold intolerance and heat intolerance.   Genitourinary: Negative for difficulty urinating and dysuria.   Musculoskeletal: Negative for arthralgias and back pain.   Allergic/Immunologic: Negative for environmental allergies and food allergies.   Neurological: Negative for dizziness, tremors, seizures, syncope, facial asymmetry, speech difficulty, weakness and numbness.   Hematological: Negative for adenopathy. Does not bruise/bleed easily.   Psychiatric/Behavioral: Negative for agitation and behavioral problems.     Objective:        There is no height or weight on file to calculate BMI.  Vital Signs (Most Recent):  Temp: 98.3 °F (36.8 °C) (09/13/20 2106)  Pulse: 96 (09/13/20 2241)  Resp: 16 (09/13/20 2241)  BP: 132/83 (09/13/20 2241)  SpO2: 97 % (09/13/20 2241) Vital Signs (24h Range):  Temp:  [98.3 °F (36.8 °C)] 98.3 °F (36.8 °C)  Pulse:  [96-98] 96  Resp:  [16-20] 16  SpO2:  [97 %-100 %] 97 %  BP: (132-137)/(83-90) 132/83                          Neurosurgery Physical Exam  General: well developed, well nourished, no distress.   Head: normocephalic, right temporal abrasion  Neurologic:   GCS: Eyes: 4/ Verbal: 5/ Motor: 6  Mental Status: Awake, Alert, Oriented x 3  Cranial nerves: PERRL, EOMI, face symmetric, tongue midline, shoulder shrug equal.  Moves upper extremities  antigravity with good tone, limited ROM due to bilateral UE casting.   Sensory: intact to light touch throughout  Motor Strength:Moves all extremities antigravity  DTR: 2+ symmetrically throughout.  Pulmonary: normal respirations, no signs of respiratory distress  Abdomen: soft, non-distended, not tender to palpation  Full ROM of cervical spine without midline neck tenderness to palpation.                     Significant Labs:  Recent Labs   Lab 09/13/20 2117   *      K 4.0      CO2 24   BUN 12   CREATININE 0.9   CALCIUM 9.4     Recent Labs   Lab 09/13/20 2117   WBC 8.50   HGB 15.1   HCT 44.1        Recent Labs   Lab 09/13/20 2117   INR 1.1     Microbiology Results (last 7 days)     ** No results found for the last 168 hours. **        All pertinent labs from the last 24 hours have been reviewed.    Significant Diagnostics:  I have reviewed all pertinent imaging results/findings within the past 24 hours.    Assessment/Plan:     Subarachnoid hemorrhage  49 yo male with PMH of Acomm aneurysm s/p coil (2011) who sustained bilateral UE and head trauma while falling from a stool earlier today without LOC; non-focal neurologic exam. CT head with acomm coil artifact with tiny L sylvian sulcal and R temporal tSAH.     --Admit to hospital medicine, floor status  --q4 hour neurochecks  --s/p cast immobilization and reduction of radial fractures; no neurosurgical contra-indication to orthopedic surgery intervention; no plan for heparinization during surgery  --No need for cervical collar, full ROM of neck without neck tenderness to palpation.   --repeat imaging at 6 hour interval; recommend MRI with MRA contrasted scan to further survey AComm aneurysm as patient has not had recent interval follow up  --No acute nsgy intervention  --Start keppra 500 mg bid for small subarachnoid hemorrhage  --Hold ASA 81mg for intracranial hemorrhage  --SBP < 140  --Will continue to follow, call neurosurgery for any  decline in neuro exam  D/w attending staff, Dr. Valente          Thank you for your consult. I will follow-up with patient. Please contact us if you have any additional questions.    Tony Joiner MD  Neurosurgery  [facility]

## 2020-09-14 NOTE — ASSESSMENT & PLAN NOTE
Andre Padgett is a 50 y.o. male with bilateral closed distal radius fractures, closed, NVI.  Diagnosis discussed in detail with patient. Pt is being admitted for monitoring of subarachnoid hemorrhage. He will likely need surgical intervention for these fractures. He understands this and would be amenable to surgery in the future. Consents signed at bedside all questions answered. Closed reduced and splinted in ED. NWMATEUS HERRERA. Will discuss with staff.       Procedure note:  After time out was performed and patient ID, side, and site were verified, the area was sterilly prepped in the standard fashion. A 22-gauge needle was introduced into the fracture site without complication with aspiration of hematoma for confirmation. 10cc of 1% lidocaine was then injected to the fracture site without difficulty. After adequate analgesia, the fracture was closed reduced under c-arm guidance and adequate reduction was obtained. A sugar tong splint was applied. The same procedure was followed for the contralateral side and then post-reduction films were obtained which verified maintenance of the reduction. The patient tolerated the procedure well with no complications. Blood loss was minimal.

## 2020-09-14 NOTE — ANESTHESIA POSTPROCEDURE EVALUATION
Anesthesia Post Evaluation    Patient: Andre Padgett    Procedure(s) Performed: Procedure(s) (LRB):  ORIF, FRACTURE, RADIUS OR ULNA, synthes, right, large C arm at a diagonal from the rear of the room, ancef, velcro wrist splint (Bilateral)    Final Anesthesia Type: general    Patient location during evaluation: PACU  Patient participation: Yes- Able to Participate  Level of consciousness: awake and alert and oriented  Post-procedure vital signs: reviewed and stable  Pain management: adequate  Airway patency: patent    PONV status at discharge: No PONV  Anesthetic complications: no      Cardiovascular status: hemodynamically stable  Respiratory status: unassisted, spontaneous ventilation and room air  Hydration status: euvolemic  Follow-up not needed.          Vitals Value Taken Time   /63 09/14/20 1701   Temp 36.7 °C (98 °F) 09/14/20 1700   Pulse 104 09/14/20 1705   Resp 18 09/14/20 1726   SpO2 100 % 09/14/20 1705   Vitals shown include unvalidated device data.      Event Time   Out of Recovery 09/14/2020 15:45:00         Pain/Aamir Score: Pain Rating Prior to Med Admin: 10 (9/14/2020  5:26 PM)  Pain Rating Post Med Admin: 0 (9/14/2020  9:30 AM)  Aamir Score: 9 (9/14/2020  5:00 PM)

## 2020-09-14 NOTE — SUBJECTIVE & OBJECTIVE
Past Medical History:   Diagnosis Date    Aneurysm     Right sided filling anterior communicating aneurysm s/p coiling 2011    Anxiety     Cerebral aneurysm, nonruptured 12/27/2016    Colon adenoma: 3/18 repeat colonoscpy 2023 6/22/2018    Diverticulosis of large intestine without hemorrhage: see colonoscopy 3/18; sigmoid and descending colon 6/22/2018    Fatty liver: see CT 3/18 6/22/2018    Umbilical hernia without obstruction and without gangrene: see CT 3/18 6/22/2018       Past Surgical History:   Procedure Laterality Date    CEREBRAL ANGIOGRAM  2011    angiogram/coiling    COLONOSCOPY N/A 3/26/2018    Procedure: COLONOSCOPY;  Surgeon: Sanjiv Duran MD;  Location: [address]);  Service: Endoscopy;  Laterality: N/A;       Review of patient's allergies indicates:  No Known Allergies    Current Facility-Administered Medications   Medication    acetaminophen tablet 1,000 mg    acetaminophen tablet 650 mg    albuterol-ipratropium 2.5 mg-0.5 mg/3 mL nebulizer solution 3 mL    ascorbic acid (vitamin C) tablet 250 mg    atorvastatin tablet 40 mg    glucose chewable tablet 16 g    glucose chewable tablet 24 g    levETIRAcetam in NaCl (iso-os) IVPB 500 mg    lidocaine HCL 10 mg/ml (1%) injection 20 mL    melatonin tablet 6 mg    methocarbamoL tablet 500 mg    ondansetron injection 4 mg    oxyCODONE immediate release tablet 5 mg    oxyCODONE immediate release tablet Tab 10 mg    pantoprazole EC tablet 40 mg    prochlorperazine injection Soln 5 mg    sodium chloride 0.9% flush 10 mL     Current Outpatient Medications   Medication Sig    ALPRAZolam (XANAX) 1 MG tablet Take 1 tablet (1 mg total) by mouth 2 (two) times daily as needed for Anxiety.    ascorbic acid (VITAMIN C) 100 MG tablet Take 100 mg by mouth once daily.    aspirin 81 MG Chew Take 81 mg by mouth once daily.    atorvastatin (LIPITOR) 40 MG tablet TAKE 1 TABLET(40 MG) BY MOUTH EVERY DAY    diclofenac (VOLTAREN) 75 MG  EC tablet Take 1 tablet (75 mg total) by mouth 2 (two) times daily.    methocarbamoL (ROBAXIN) 500 MG Tab Take 1-2 tablets (500-1,000 mg total) by mouth nightly as needed.    pantoprazole (PROTONIX) 40 MG tablet Take 1 tablet (40 mg total) by mouth once daily.    zinc sulfate (ZINCATE) 220 (50) mg capsule TK ONE C PO  QD     Family History     Problem Relation (Age of Onset)    Brain cancer Father    Diabetes Mother    DAVID disease Mother    Hypertension Mother    No Known Problems Daughter, Sister    Thyroid disease Sister        Tobacco Use    Smoking status: Never Smoker    Smokeless tobacco: Never Used   Substance and Sexual Activity    Alcohol use: Yes     Comment: ocassionally - twice a week    Drug use: No    Sexual activity: Not on file     ROS per ED 9/14/20  Objective:     Vital Signs (Most Recent):  Temp: 98.3 °F (36.8 °C) (09/13/20 2106)  Pulse: 98 (09/13/20 2106)  Resp: 18 (09/13/20 2130)  BP: (!) 137/90 (09/13/20 2106)  SpO2: 100 % (09/13/20 2106) Vital Signs (24h Range):  Temp:  [98.3 °F (36.8 °C)] 98.3 °F (36.8 °C)  Pulse:  [98] 98  Resp:  [18-20] 18  SpO2:  [100 %] 100 %  BP: (137)/(90) 137/90           There is no height or weight on file to calculate BMI.    Ortho/SPM Exam     Vitals: Afebrile.  Vital signs stable.  General: No acute distress.  Cardio: Regular rate.  Chest: No increased work of breathing.    Right Upper Extremity    -Skin intact, obvious deformity and swelling at the wrist  - TTP about the wrist  -Compartments soft and compressible  -ROM full (shoulder/elbow)  -SILT M/R/U  -Motor intact Ain/PIN/U/M  -Brisk cap refill  -Warm well perfused extremities  -2+ Radial palpable    Left Upper Extremity    -Skin intact, obvious deformity and swelling at the wrist  - TTP about the wrist  -Compartments soft and compressible  -ROM full (shoulder/elbow)  -SILT M/R/U  -Motor intact Ain/PIN/U/M  -Brisk cap refill  -Warm well perfused extremities  -2+ Radial palpable    Right Lower  Extremity Exam    - Skin intact, no deformity, no ecchymoses, no edema  - NTTP  - Compartments soft and compressible  - ROM full (eversion,inversion,plantar/dorsiflexion)  - TA/EHL/Gastroc/FHL assessed in isolation without deficit  - SILT throughout  - DP and PT palpated  2+  - Capillary Refill <3s      Left Lower Extremity Exam    - Skin intact, no deformity, no ecchymoses, no edema  - NTTP  - Compartments soft and compressible  - ROM full (eversion,inversion,plantar/dorsiflexion)  - TA/EHL/Gastroc/FHL assessed in isolation without deficit  - SILT throughout  - DP and PT palpated  2+  - Capillary Refill <3s    All other joints (shoulder/elbow/wrist/hip/knee/ankle) were examined and had full ROM and were non-tender to palpation.      Significant Labs:   BMP:   Recent Labs   Lab 09/13/20 2117   *      K 4.0      CO2 24   BUN 12   CREATININE 0.9   CALCIUM 9.4     CBC:   Recent Labs   Lab 09/13/20 2117   WBC 8.50   HGB 15.1   HCT 44.1        CMP:   Recent Labs   Lab 09/13/20 2117      K 4.0      CO2 24   *   BUN 12   CREATININE 0.9   CALCIUM 9.4   PROT 7.2   ALBUMIN 4.1   BILITOT 0.5   ALKPHOS 65   AST 30   ALT 36   ANIONGAP 14   EGFRNONAA >60.0     All pertinent labs within the past 24 hours have been reviewed.    Significant Imaging: I have reviewed all pertinent imaging results/findings.     Bilateral comminuted intraarticular DRFs.

## 2020-09-14 NOTE — ED TRIAGE NOTES
Andre Padgett, a 50 y.o. male presents to the ED w/ complaint of wrist pain & deformity    Triage note:  Chief Complaint   Patient presents with    Fall     Fall after standing on stool, bilateral wrist fractures. Obvious defromities. -LOC     Review of patient's allergies indicates:  No Known Allergies  Past Medical History:   Diagnosis Date    Aneurysm     Right sided filling anterior communicating aneurysm s/p coiling 2011    Anxiety     Cerebral aneurysm, nonruptured 12/27/2016    Colon adenoma: 3/18 repeat colonoscpy 2023 6/22/2018    Diverticulosis of large intestine without hemorrhage: see colonoscopy 3/18; sigmoid and descending colon 6/22/2018    Fatty liver: see CT 3/18 6/22/2018    Umbilical hernia without obstruction and without gangrene: see CT 3/18 6/22/2018         Adult Physical Assessment  LOC: Andre Padgett, 50 y.o. male verified via two identifiers.  The patient is awake, alert, oriented and speaking appropriately at this time.  APPEARANCE: Patient resting comfortably and appears to be in no acute distress at this time. Patient is clean and well groomed, patient's clothing is properly fastened.  SKIN:The skin is warm and dry, color consistent with ethnicity, patient has normal skin turgor and moist mucus membranes, skin intact, no breakdown or brusing noted.  MUSCULOSKELETAL: Swelling and deformities noted to bilateral wrists. Splint in place by EMS  RESPIRATORY: Airway is open and patent, respirations are spontaneous, patient has a normal effort and rate, no accessory muscle use noted.  CARDIAC: Patient has a normal rate and rhythm, no periphreal edema noted in any extremity, capillary refill < 3 seconds in all extremities  ABDOMEN: Soft and non tender to palpation, no abdominal distention noted. Bowel sounds present in all four quadrants.  NEUROLOGIC: Eyes open spontaneously, behavior appropriate to situation, follows commands, facial expression symmetrical, bilateral hand grasp  equal and even, purposeful motor response noted, normal sensation in all extremities when touched with a finger.

## 2020-09-14 NOTE — ANESTHESIA PROCEDURE NOTES
Intubation  Performed by: Betsy Jacobs CRNA  Authorized by: Savage Angelo MD     Intubation:     Induction:  Intravenous    Intubated:  Postinduction    Mask Ventilation:  Easy mask    Attempts:  1    Attempted By:  CRNA    Method of Intubation:  Direct    Blade:  Megha 3    Laryngeal View Grade: Grade I - full view of chords      Difficult Airway Encountered?: No      Complications:  None    Airway Device:  Oral endotracheal tube    Airway Device Size:  7.5    Style/Cuff Inflation:  Cuffed    Inflation Amount (mL):  7    Tube secured:  23    Secured at:  The lips    Placement Verified By:  Capnometry    Complicating Factors:  None    Findings Post-Intubation:  BS equal bilateral and atraumatic/condition of teeth unchanged

## 2020-09-14 NOTE — H&P
Hospital Medicine  History and Physical Exam    Team: [facility] MED A Gonzalo Hillman MD  Admit Date: 9/13/2020  Principal Problem:  Closed fracture of distal end of right radius   Patient information was obtained from patient, past medical records and ER records.   Primary care Physician: Hunter Diop MD  Code status: Full Code    HPI: 51 yo M with PMHx HLD and nontraumatic rupture of cerebral aneurysm s/p coiling (2016) who presents to the ED after sustaining a fall earlier today while standing on a stool. The patient reports that there was a lizard in his house high up on the wall. He stood on a stool in an attempt to swat at the lizard, but he fell off the stool, falling onto both arms outstretched and striking the R side of his head on a table. Pt. Had immediate pain to both wrists with noted deformities and called EMS. Pt. Denies any preceding symptoms such as lightheadedness/dizziness, SOB, or chest pain.    Hemoglobin A1C   Date Value Ref Range Status   02/21/2019 5.4 4.0 - 5.6 % Final     Comment:     ADA Screening Guidelines:  5.7-6.4%  Consistent with prediabetes  >or=6.5%  Consistent with diabetes  High levels of fetal hemoglobin interfere with the HbA1C  assay. Heterozygous hemoglobin variants (HbS, HgC, etc)do  not significantly interfere with this assay.   However, presence of multiple variants may affect accuracy.     12/17/2018 5.3 4.0 - 5.6 % Final     Comment:     ADA Screening Guidelines:  5.7-6.4%  Consistent with prediabetes  >or=6.5%  Consistent with diabetes  High levels of fetal hemoglobin interfere with the HbA1C  assay. Heterozygous hemoglobin variants (HbS, HgC, etc)do  not significantly interfere with this assay.   However, presence of multiple variants may affect accuracy.     02/21/2018 5.2 4.0 - 5.6 % Final     Comment:     According to ADA guidelines, hemoglobin A1c <7.0% represents  optimal control in non-pregnant diabetic patients. Different  metrics may apply to specific  patient populations.   Standards of Medical Care in Diabetes-2016.  For the purpose of screening for the presence of diabetes:  <5.7%     Consistent with the absence of diabetes  5.7-6.4%  Consistent with increasing risk for diabetes   (prediabetes)  >or=6.5%  Consistent with diabetes  Currently, no consensus exists for use of hemoglobin A1c  for diagnosis of diabetes for children.  This Hemoglobin A1c assay has significant interference with fetal   hemoglobin   (HbF). The results are invalid for patients with abnormal amounts of   HbF,   including those with known Hereditary Persistence   of Fetal Hemoglobin. Heterozygous hemoglobin variants (HbAS, HbAC,   HbAD, HbAE, HbA2) do not significantly interfere with this assay;   however, presence of multiple variants in a sample may impact the %   interference.         Past Medical History: Patient has a past medical history of Aneurysm, Anxiety, Cerebral aneurysm, nonruptured (12/27/2016), Colon adenoma: 3/18 repeat colonoscpy 2023 (6/22/2018), Diverticulosis of large intestine without hemorrhage: see colonoscopy 3/18; sigmoid and descending colon (6/22/2018), Fatty liver: see CT 3/18 (6/22/2018), and Umbilical hernia without obstruction and without gangrene: see CT 3/18 (6/22/2018).    Past Surgical History: Patient has a past surgical history that includes Colonoscopy (N/A, 3/26/2018) and Cerebral angiogram (2011).    Social History: Patient reports that he has never smoked. He has never used smokeless tobacco. He reports current alcohol use. He reports that he does not use drugs.    Family History: family history includes Brain cancer in his father; Diabetes in his mother; DAVID disease in his mother; Hypertension in his mother; No Known Problems in his daughter and sister; Thyroid disease in his sister.    Medications: reviewed     Allergies: Patient has No Known Allergies.    ROS  Pain Scale: 0 /10   Constitutional: no fever or chills  Respiratory: no cough or  shortness of breath  Cardiovascular: no chest pain or palpitations  Gastrointestinal: no nausea or vomiting, no abdominal pain or change in bowel habits  Genitourinary: no hematuria or dysuria  Integument/Breast: no rash or pruritis  Hematologic/Lymphatic: no easy bruising or lymphadenopathy  Musculoskeletal: Positive for B/L arm pain  Neurological: no seizures or tremors  Behavioral/Psych: no depression or anxiety    PEx  Temp:  [98.3 °F (36.8 °C)]   Pulse:  [98]   Resp:  [18-20]   BP: (137)/(90)   SpO2:  [100 %]   There is no height or weight on file to calculate BMI.   No intake or output data in the 24 hours ending 09/13/20 2336    General appearance: pt. In mild distress from pain  Mental status: Alert and oriented x 3  HEENT:  conjunctivae/corneas clear, PERRL  Neck: supple, thyroid not enlarged  Pulm:   normal respiratory effort, CTA B, no c/w/r  Card: RRR, S1, S2 normal, no murmur, click, rub or gallop  Abd: soft, NT, ND, BS present; no masses, no organomegaly  Ext: Wrists immobilized B/L per ortho  Pulses: 2+, symmetric  Skin: color, texture, turgor normal. No rashes or lesions  Neuro: CN II-XII grossly intact, no focal numbness or weakness, normal strength and tone     Recent Results (from the past 24 hour(s))   CBC auto differential    Collection Time: 09/13/20  9:17 PM   Result Value Ref Range    WBC 8.50 3.90 - 12.70 K/uL    RBC 4.52 (L) 4.60 - 6.20 M/uL    Hemoglobin 15.1 14.0 - 18.0 g/dL    Hematocrit 44.1 40.0 - 54.0 %    Mean Corpuscular Volume 98 82 - 98 fL    Mean Corpuscular Hemoglobin 33.4 (H) 27.0 - 31.0 pg    Mean Corpuscular Hemoglobin Conc 34.2 32.0 - 36.0 g/dL    RDW 11.2 (L) 11.5 - 14.5 %    Platelets 185 150 - 350 K/uL    MPV 10.5 9.2 - 12.9 fL    Immature Granulocytes 0.5 0.0 - 0.5 %    Gran # (ANC) 5.7 1.8 - 7.7 K/uL    Immature Grans (Abs) 0.04 0.00 - 0.04 K/uL    Lymph # 2.1 1.0 - 4.8 K/uL    Mono # 0.5 0.3 - 1.0 K/uL    Eos # 0.2 0.0 - 0.5 K/uL    Baso # 0.03 0.00 - 0.20 K/uL    nRBC  0 0 /100 WBC    Gran% 66.7 38.0 - 73.0 %    Lymph% 24.4 18.0 - 48.0 %    Mono% 6.0 4.0 - 15.0 %    Eosinophil% 2.0 0.0 - 8.0 %    Basophil% 0.4 0.0 - 1.9 %    Differential Method Automated    Comprehensive metabolic panel    Collection Time: 09/13/20  9:17 PM   Result Value Ref Range    Sodium 142 136 - 145 mmol/L    Potassium 4.0 3.5 - 5.1 mmol/L    Chloride 104 95 - 110 mmol/L    CO2 24 23 - 29 mmol/L    Glucose 140 (H) 70 - 110 mg/dL    BUN, Bld 12 6 - 20 mg/dL    Creatinine 0.9 0.5 - 1.4 mg/dL    Calcium 9.4 8.7 - 10.5 mg/dL    Total Protein 7.2 6.0 - 8.4 g/dL    Albumin 4.1 3.5 - 5.2 g/dL    Total Bilirubin 0.5 0.1 - 1.0 mg/dL    Alkaline Phosphatase 65 55 - 135 U/L    AST 30 10 - 40 U/L    ALT 36 10 - 44 U/L    Anion Gap 14 8 - 16 mmol/L    eGFR if African American >60.0 >60 mL/min/1.73 m^2    eGFR if non African American >60.0 >60 mL/min/1.73 m^2   Protime-INR    Collection Time: 09/13/20  9:17 PM   Result Value Ref Range    Prothrombin Time 11.6 9.0 - 12.5 sec    INR 1.1 0.8 - 1.2       No results for input(s): POCTGLUCOSE in the last 168 hours.    Active Hospital Problems    Diagnosis  POA    Subarachnoid hemorrhage [I60.9]  Yes      Resolved Hospital Problems   No resolved problems to display.         Assessment and Plan:  Acute subarachnoid hemorrhage  Hx of Cerebral Aneurysm  -Noted on Head CT after trauma. Neurosurgery consulted and recommend conservative management, serial CT scans. Continue to monitor, neurochecks Q4. Hold home ASA, SCDs only for DVT ppx  -Keppra given for seizure ppx    B/L Distal Radial Fractures  -Orthopedic surgery consulted. F/u recommendations regarding need for surgery  -Pain control with tylenol scheduled, opiates PRN  -PT/OT after surgery    .Preoperative Cardiac evaluation  Cardiovascular Risk Assessment:  Non-emergent surgery.  No active cardiac problems (such as unstable angina, decompensated heart failure, significant uncontrolled arrhythmias or severe valvular  "disease).  Intermediate risk surgery.  Functional Status: He IS able to climb a flight of stairs (> 4 METS) with no CP or SOB  His revised cardiac risk index is 0.     1 pt Each: Ischemic Heart Disease, Cerebrovascular Disease,                     CHF, DM, Creatinine > 2           Other Issues: Subarachnoid Hemorrhage, f/u neurosurgery official recommendations      HLD  -Continue statin    Abnormal X-ray of Abdomen  -Pt. With "Appearance consistent with free air under the left hemidiaphragm versus air within the stomach". Pt. Has chronic abdominal pain, but no acute worsening. Abdominal exam benign. Low suspicion for perforation, suspect air within stomach. Monitor pt. With daily exams. Plan for further investigation only if clinical status chages    DVT PPx: HARVEYs    Gonzalo Hillman MD  Hospital Medicine Staff  [phone] pager    "

## 2020-09-14 NOTE — SUBJECTIVE & OBJECTIVE
(Not in a hospital admission)      Review of patient's allergies indicates:  No Known Allergies    Past Medical History:   Diagnosis Date    Aneurysm     Right sided filling anterior communicating aneurysm s/p coiling 2011    Anxiety     Cerebral aneurysm, nonruptured 12/27/2016    Colon adenoma: 3/18 repeat colonoscpy 2023 6/22/2018    Diverticulosis of large intestine without hemorrhage: see colonoscopy 3/18; sigmoid and descending colon 6/22/2018    Fatty liver: see CT 3/18 6/22/2018    Umbilical hernia without obstruction and without gangrene: see CT 3/18 6/22/2018     Past Surgical History:   Procedure Laterality Date    CEREBRAL ANGIOGRAM  2011    angiogram/coiling    COLONOSCOPY N/A 3/26/2018    Procedure: COLONOSCOPY;  Surgeon: Sanjiv Duran MD;  Location: [facility] ([address] [place] [address]);  Service: Endoscopy;  Laterality: N/A;     Family History     Problem Relation (Age of Onset)    Brain cancer Father    Diabetes Mother    DAVID disease Mother    Hypertension Mother    No Known Problems Daughter, Sister    Thyroid disease Sister        Tobacco Use    Smoking status: Never Smoker    Smokeless tobacco: Never Used   Substance and Sexual Activity    Alcohol use: Yes     Comment: ocassionally - twice a week    Drug use: No    Sexual activity: Not on file     Review of Systems   Constitutional: Negative for activity change and appetite change.   HENT: Negative for congestion and dental problem.    Eyes: Negative for discharge and itching.   Respiratory: Negative for apnea and chest tightness.    Cardiovascular: Negative for chest pain and leg swelling.   Gastrointestinal: Negative for abdominal distention and abdominal pain.   Endocrine: Negative for cold intolerance and heat intolerance.   Genitourinary: Negative for difficulty urinating and dysuria.   Musculoskeletal: Negative for arthralgias and back pain.   Allergic/Immunologic: Negative for environmental allergies and food allergies.   Neurological:  Negative for dizziness, tremors, seizures, syncope, facial asymmetry, speech difficulty, weakness and numbness.   Hematological: Negative for adenopathy. Does not bruise/bleed easily.   Psychiatric/Behavioral: Negative for agitation and behavioral problems.     Objective:        There is no height or weight on file to calculate BMI.  Vital Signs (Most Recent):  Temp: 98.3 °F (36.8 °C) (09/13/20 2106)  Pulse: 96 (09/13/20 2241)  Resp: 16 (09/13/20 2241)  BP: 132/83 (09/13/20 2241)  SpO2: 97 % (09/13/20 2241) Vital Signs (24h Range):  Temp:  [98.3 °F (36.8 °C)] 98.3 °F (36.8 °C)  Pulse:  [96-98] 96  Resp:  [16-20] 16  SpO2:  [97 %-100 %] 97 %  BP: (132-137)/(83-90) 132/83                          Neurosurgery Physical Exam  General: well developed, well nourished, no distress.   Head: normocephalic, right temporal abrasion  Neurologic:   GCS: Eyes: 4/ Verbal: 5/ Motor: 6  Mental Status: Awake, Alert, Oriented x 3  Cranial nerves: PERRL, EOMI, face symmetric, tongue midline, shoulder shrug equal.  Moves upper extremities antigravity with good tone, limited ROM due to bilateral UE casting.   Sensory: intact to light touch throughout  Motor Strength:Moves all extremities antigravity  DTR: 2+ symmetrically throughout.  Pulmonary: normal respirations, no signs of respiratory distress  Abdomen: soft, non-distended, not tender to palpation                         Significant Labs:  Recent Labs   Lab 09/13/20 2117   *      K 4.0      CO2 24   BUN 12   CREATININE 0.9   CALCIUM 9.4     Recent Labs   Lab 09/13/20 2117   WBC 8.50   HGB 15.1   HCT 44.1        Recent Labs   Lab 09/13/20 2117   INR 1.1     Microbiology Results (last 7 days)     ** No results found for the last 168 hours. **        All pertinent labs from the last 24 hours have been reviewed.    Significant Diagnostics:  I have reviewed all pertinent imaging results/findings within the past 24 hours.

## 2020-09-14 NOTE — ED PROVIDER NOTES
Encounter Date: 9/13/2020       History     Chief Complaint   Patient presents with    Fall     Fall after standing on stool, bilateral wrist fractures. Obvious defromities. -LOC     50-year-old male was standing on a stool when he fell onto both outstretched hands.  Had immediate pain, swelling, and deformities to both wrists.  Patient hit his right temple on a table.  He denies hitting his neck or loss of consciousness.  Patient has recently been worked up for his prostate and stomach.  Brought in by EMS.  Patient denies nausea, vomiting, diarrhea, fever, cough, shortness of breath, chest pain, abdominal pain, or dysuria.  A ten point review of systems was completed and is negative except as documented above.  Patient denies any other acute medical complaint.  The patients available PMH, PSH, Social History, medications, allergies, and triage vital signs were reviewed just prior to their medical evaluation.        Review of patient's allergies indicates:  No Known Allergies  Past Medical History:   Diagnosis Date    Aneurysm     Right sided filling anterior communicating aneurysm s/p coiling 2011    Anxiety     Cerebral aneurysm, nonruptured 12/27/2016    Colon adenoma: 3/18 repeat colonoscpy 2023 6/22/2018    Diverticulosis of large intestine without hemorrhage: see colonoscopy 3/18; sigmoid and descending colon 6/22/2018    Fatty liver: see CT 3/18 6/22/2018    Umbilical hernia without obstruction and without gangrene: see CT 3/18 6/22/2018     Past Surgical History:   Procedure Laterality Date    CEREBRAL ANGIOGRAM  2011    angiogram/coiling    COLONOSCOPY N/A 3/26/2018    Procedure: COLONOSCOPY;  Surgeon: Sanjiv Duran MD;  Location: [address];  Service: Endoscopy;  Laterality: N/A;     Family History   Problem Relation Age of Onset    DAVID disease Mother     Hypertension Mother     Diabetes Mother     Brain cancer Father     Thyroid disease Sister     No Known Problems Daughter      No Known Problems Sister     Cirrhosis Neg Hx     Colon polyps Neg Hx     Crohn's disease Neg Hx     Liver cancer Neg Hx     Stomach cancer Neg Hx     Ulcerative colitis Neg Hx     Heart attack Neg Hx     Colon cancer Neg Hx      Social History     Tobacco Use    Smoking status: Never Smoker    Smokeless tobacco: Never Used   Substance Use Topics    Alcohol use: Yes     Comment: ocassionally - twice a week    Drug use: No     Review of Systems   Constitutional: Negative for fever.   HENT: Negative for sore throat.    Eyes: Negative for visual disturbance.   Respiratory: Negative for cough and shortness of breath.    Cardiovascular: Negative for chest pain.   Gastrointestinal: Negative for abdominal pain, diarrhea, nausea and vomiting.   Genitourinary: Negative for dysuria.   Musculoskeletal: Positive for arthralgias and joint swelling. Negative for neck pain.   Skin: Negative for rash and wound.   Allergic/Immunologic: Negative for immunocompromised state.   Neurological: Positive for headaches. Negative for syncope.   Psychiatric/Behavioral: Negative for confusion.       Physical Exam     Initial Vitals [09/13/20 2106]   BP Pulse Resp Temp SpO2   (!) 137/90 98 20 98.3 °F (36.8 °C) 100 %      MAP       --         Physical Exam    Nursing note and vitals reviewed.  Constitutional: He appears well-developed and well-nourished. He is not diaphoretic. No distress.   HENT:   Head: Normocephalic and atraumatic.   Nose: Nose normal.   Eyes: Conjunctivae are normal. Right eye exhibits no discharge. Left eye exhibits no discharge.   Neck: Normal range of motion. Neck supple.   Cardiovascular: Normal rate, regular rhythm, normal heart sounds and intact distal pulses. Exam reveals no gallop and no friction rub.    No murmur heard.  Pulmonary/Chest: Breath sounds normal. No respiratory distress. He has no wheezes. He has no rhonchi. He has no rales.   Abdominal: Soft. He exhibits no distension. There is no  abdominal tenderness. There is no rebound and no guarding.   Musculoskeletal: Tenderness and edema present.      Comments: Bilateral pain and swelling to distal radius with deformity   Neurological: He is alert and oriented to person, place, and time. GCS score is 15. GCS eye subscore is 4. GCS verbal subscore is 5. GCS motor subscore is 6.   Skin: Skin is warm and dry. No rash noted. No erythema.   Psychiatric: He has a normal mood and affect. His behavior is normal. Judgment and thought content normal.         ED Course   Procedures  Labs Reviewed   CBC W/ AUTO DIFFERENTIAL - Abnormal; Notable for the following components:       Result Value    RBC 4.52 (*)     Mean Corpuscular Hemoglobin 33.4 (*)     RDW 11.2 (*)     All other components within normal limits   COMPREHENSIVE METABOLIC PANEL - Abnormal; Notable for the following components:    Glucose 140 (*)     All other components within normal limits   PROTIME-INR   URINALYSIS, REFLEX TO URINE CULTURE          Imaging Results           CT Head Without Contrast (Final result)  Result time 09/13/20 22:20:08    Final result by Suleiman Blake MD (09/13/20 22:20:08)                 Impression:      Acute subarachnoid hemorrhage noted, as discussed above.    The findings were reported to the ER physician at the time of dictation.    This report was flagged in Epic as abnormal.      Electronically signed by: Suleiman Blake  Date:    09/13/2020  Time:    22:20             Narrative:    EXAMINATION:  CT HEAD WITHOUT CONTRAST    CLINICAL HISTORY:  Headache, post traumatic;    TECHNIQUE:  Low dose axial images were obtained through the head.  Coronal and sagittal reformations were also performed. Contrast was not administered.    COMPARISON:  May 11, 2020    FINDINGS:  Postprocedural change consistent with prior aneurysm coiling noted, there is associated artifact present.  This appears similar to the prior exam.  There is subtle suggestion of mild subarachnoid  hemorrhage, there is suggestion of mild subarachnoid hemorrhage layering along the posterior margin of the sylvian fissure on the left, and subtle suggestion of subarachnoid hemorrhage along the peripheral right temporal lobe, additional areas are suggested although less conspicuous.  The ventricular system and sulcal pattern, parenchymal attenuation character and CSF spaces at the skull base otherwise appear stable.  There is no hydrocephalus.    The visualized orbits appear intact.  Mild paranasal sinus mucosal thickening is noted with small areas of suspected mucous retention cysts of the maxillary antra.  The mastoid air cells appear well aerated.  Mild opacity along the external auditory canal on the right may relate to a small amount of cerumen.  The visualized osseous structures appear intact.                                X-Ray Wrist Complete Bilateral (Final result)  Result time 09/13/20 22:21:24    Final result by Martinez Silverman MD (09/13/20 22:21:24)                 Impression:      Acute fractures of the distal radial metaphysis bilaterally with extension to the articular surface. Mild comminution and displacement bilaterally. Recommend orthopedic follow-up.    This report was flagged in Epic as abnormal.      Electronically signed by: Martinez Silverman  Date:    09/13/2020  Time:    22:21             Narrative:    EXAMINATION:  XR WRIST COMPLETE 3 VIEWS BILATERAL    CLINICAL HISTORY:  Pain in right wrist    TECHNIQUE:  PA, lateral, and oblique views of both wrists were performed.    COMPARISON:  None    FINDINGS:  Multiple simultaneous studies.    Acute fracture of the right distal radial metaphysis with extension to the articular surface. Mild comminution and dorsal displacement.    Acute fracture of the distal left radial metaphysis with articular extension with comminution and displacement. Mild dorsal displacement of the wrist.    No fractures in the carpals or metacarpals.                                 X-Ray Hand 2 View Bilat (Final result)  Result time 09/13/20 22:17:43    Final result by Martinez Silverman MD (09/13/20 22:17:43)                 Impression:      Acute fractures of the distal radial metaphysis bilaterally with extension to the articular surface.  Mild comminution and displacement bilaterally.  Recommend orthopedic follow-up.    This report was flagged in Epic as abnormal.      Electronically signed by: Martinez Silverman  Date:    09/13/2020  Time:    22:17             Narrative:    EXAMINATION:  XR HAND 2 VIEW BILAT    CLINICAL HISTORY:  Pain in right wrist    TECHNIQUE:  AP and lateral views of the right hand and left hand.    COMPARISON:  None    FINDINGS:  Multiple simultaneous studies.    Acute fracture of the right distal radial metaphysis with extension to the articular surface.  Mild comminution and dorsal displacement.    Acute fracture of the distal left radial metaphysis with articular extension with comminution and displacement.  Mild dorsal displacement of the wrist.    No fractures in the carpals or metacarpals.                                X-Ray Chest 1 View (Final result)  Result time 09/13/20 22:15:07    Final result by Judson Arenas DO (09/13/20 22:15:07)                 Impression:      1. No acute cardiopulmonary abnormality.  2. Appearance consistent with free air under the left hemidiaphragm versus air within the stomach.  Recommend clinical correlation, and if indicated, dedicated imaging of the abdomen should be considered.  This report was flagged in Epic as abnormal.      Electronically signed by: Judson Arenas  Date:    09/13/2020  Time:    22:15             Narrative:    EXAMINATION:  XR CHEST 1 VIEW    CLINICAL HISTORY:  Chest wall pain.    TECHNIQUE:  Single frontal view of the chest was performed.    COMPARISON:  Multiple prior radiographs of the chest, most recent from 05/25/2020.    FINDINGS:  There is an appearance of possible free air under the left  hemidiaphragm versus intraluminal air within the stomach.  The lungs are well expanded and clear. No focal opacities are seen. The pleural spaces are clear.  The cardiac silhouette is unremarkable.  The visualized osseous structures demonstrate degenerative changes.                                 Medical Decision Making:   History:   Old Medical Records: I decided to obtain old medical records.  Clinical Tests:   Lab Tests: Ordered and Reviewed  Radiological Study: Ordered and Reviewed  ED Management:  50-year-old male presents with bilateral FOOSH.  Vitals normal.  Physical exam as above.  X-rays with bilateral radial head fractures.  CT with SDH.  Labs unremarkable.  Will consult Ortho and NS.  Turned over to Dr. Mayo pending their recs.  Expect admission.  Did bedside teaching.  All questions answered.  Patient acknowledges understanding.                             Clinical Impression:   Final diagnoses:  [M25.531, M25.532] Bilateral wrist pain  [I60.9] Subarachnoid hemorrhage (Primary)      Disposition:   Disposition: Other  Condition: Stable             Level of Complexity:  High, level 5.             Ricardo Thompson MD  09/13/20 [id number]

## 2020-09-14 NOTE — NURSING
Patient received from OR. VSS. Patient in pain 10/10. 4mg Morphine given. Phenergan given for nausea. After Morphine Pain still 10/10. Will continue to monitor patient. Report received from CHAIM Salas in OR.

## 2020-09-14 NOTE — PLAN OF CARE
Patient is off unit in surgery.   Discharge assessment information obtained from patient's ex-wife by phone.   Patient lives alone in a 2-story raised home with 4-5 steps to entrance.   Independent with ADL and did  not use medical equipment prior to admit.        09/14/20 1050   Discharge Assessment   Assessment Type Discharge Planning Assessment   Confirmed/corrected address and phone number on facesheet? Yes   Assessment information obtained from? Other  (ex-spouse)   Prior to hospitilization cognitive status: Alert/Oriented   Prior to hospitalization functional status: Independent   Current cognitive status: Unable to Assess   Facility Arrived From: home   Lives With alone   Able to Return to Prior Arrangements yes   Is patient able to care for self after discharge? Unable to determine at this time (comments)   Who are your caregiver(s) and their phone number(s)? self   Patient's perception of discharge disposition home or selfcare   Readmission Within the Last 30 Days no previous admission in last 30 days   Patient currently being followed by outpatient case management? No   Patient currently receives any other outside agency services? No   Equipment Currently Used at Home none   Do you have any problems affording any of your prescribed medications? TBD   Is the patient taking medications as prescribed? yes   Does the patient have transportation home? Yes   Transportation Anticipated family or friend will provide   Does the patient receive services at the [facility]mad[facility] Clinic? No   Discharge Plan A Home   Discharge Plan B Home Health   DME Needed Upon Discharge    (TBD)   Patient/Family in Agreement with Plan yes     Hunter Diop MD  [address] [facility] [address]    [id number] - [place], [place] - [address] [place] [address] AT [address] [address]  Phone: [phone] Fax: [phone]    Extended Emergency Contact  Information  Primary Emergency Contact: Anjelica Arroyo  Address: [address] [place] [address] [place]  Home Phone: [phone]  Mobile Phone: [phone]  Relation: Spouse

## 2020-09-14 NOTE — ANESTHESIA PROCEDURE NOTES
Right Supraclavicular Brachial Plexus Single Injection Block    Patient location during procedure: pre-op   Block not for primary anesthetic.  Reason for block: at surgeon's request and post-op pain management   Post-op Pain Location: right forearm  Start time: 9/14/2020 9:16 AM  Timeout: 9/14/2020 9:15 AM   End time: 9/14/2020 9:30 AM    Staffing  Authorizing Provider: Anjelica Ruelas MD  Performing Provider: Anjelica Smiley MD    Preanesthetic Checklist  Completed: patient identified, site marked, surgical consent, pre-op evaluation, timeout performed, IV checked, risks and benefits discussed and monitors and equipment checked  Peripheral Block  Patient position: supine  Prep: ChloraPrep  Patient monitoring: heart rate, cardiac monitor, continuous pulse ox, continuous capnometry and frequent blood pressure checks  Block type: supraclavicular  Laterality: right  Injection technique: single shot  Needle  Needle type: Stimuplex   Needle gauge: 22 G  Needle length: 2 in  Needle localization: anatomical landmarks and ultrasound guidance   -ultrasound image captured on disc.  Assessment  Injection assessment: negative aspiration, negative parasthesia and local visualized surrounding nerve  Paresthesia pain: none  Heart rate change: no  Slow fractionated injection: yes  Additional Notes  VSS.  [facility] RN monitoring vitals throughout procedure.  Patient tolerated procedure well.   Right supraclavicular single shot injection performed. 30 cc's of 0.5% bupivacaine + additives injected.

## 2020-09-14 NOTE — HPI
Andre Padgett is a 49 yo male with a PMH of ruptured acomm aneurysm s/p coil '11 on ASA 81 who presents after a fall while standing on a stool with impact to his bilateral hands with impact to his right temple. He had no LOC and sustained bilateral radial fractures initially fixated by orthopedics in the ER. He sustained a punctate R temporal tSAH during his fall and CTH with concern for punctate L sylvian sulcal tSAH.  He has no weakness, numbness, or tingling to his arms or legs and is neurologically intact. History of A Comm coil and has not had recent angiogram or survailance for treated aneurysm. No vomiting or severe headache, no neck pain. Neurosurgery consulted due to SAH. No blood thinning agents other than ASA 81 daily.

## 2020-09-14 NOTE — PROGRESS NOTES
LINKS immunization registry updated  Care Everywhere updated  Health Maintenance updated  Chart reviewed for overdue [facility] ([facility]) health maintenance testing (CRS, Breast Ca, Diabetic Eye Exam)   Orders entered:N/A

## 2020-09-14 NOTE — PROGRESS NOTES
Report called to Brooke RUCKER, pt made ready for transport to [facility] bed per transporter, AA&O, denies c/o pain,c/o intermittent nausea, right arm with numbness r/t to block, left arm with moderate ROM, splints to bilat arms intact,cap refil to fingers <3sec, eliza po meds and water, VSS,resp unlabored,stable at present.

## 2020-09-14 NOTE — HPI
Andre Padgett is a 50 y.o. male PMH HLD and nontraumatic rupture of cerebral aneurysm s/p coiling (2016) who presents to the ED after sustaining a fall earlier today while standing on a stool. The patient reports that there was a lizard in his house high up on the wall. He stood on a stool in an attempt to swat at the lizard, but he fell off the stool, falling onto both arms outstretched and striking the R side of his head on a table. Fall was roughly 3 ft. He denies LOC.  Pt had immediate pain to both wrists with noted deformities and called EMS. Pt. Denies any preceding symptoms such as lightheadedness/dizziness, SOB, or chest pain. He is RHD and works as a .

## 2020-09-14 NOTE — ASSESSMENT & PLAN NOTE
49 yo male with PMH of Acomm aneurysm s/p coil (2011) who sustained bilateral UE and head trauma while falling from a stool earlier today without LOC; non-focal neurologic exam. CT head with acomm coil artifact with tiny L sylvian sulcal and R temporal tSAH.     --Admit to hospital medicine, floor status  --q4 hour neurochecks  --s/p cast immobilization and reduction of radial fractures; no neurosurgical contra-indication to orthopedic surgery intervention  --No need for cervical collar, full ROM of neck without neck tenderness to palpation.   --repeat imaging at 6 hour interval; recommend MRI with MRA contrasted scan to further survey AComm aneurysm as patient has not had recent interval follow up  --No acute nsgy intervention  --Start keppra 500 mg bid for small subarachnoid hemorrhage  --SBP < 160  --Will continue to follow  D/w attending staff, Dr. Valente

## 2020-09-14 NOTE — PROGRESS NOTES
Care Update:    MRI/MRI brain reviewed. Trace L sylvian fissure/R temporal SAH appears stable with no interval increase from prior CTH. Acomm aneurysm coiling construct visualized with no evidence of recurrence and no active flow, unable to determine if there may be any residual filling on scan w/o contrast.     Plan:  - No neurosurgical intervention indicated at this time  - Plan for f/u outpatient in NSGY clinic in 4-6 weeks with repeat MRA w/ contrast, we will schedule this  - Recommend Keppra 500mg BID x 7 days for seizure ppx s/p SAH  - Okay to resume ASA 81 mg daily  - Okay for DVT ppx from NSGY standpoint  - Thank you for your consult. NSGY will sign off. Please call with any questions, concerns, or decline in neurological exam.    Discussed with attending staff Dr. Ambrosio Dumont, PA-C  Neurosurgery  [facility]

## 2020-09-14 NOTE — OP NOTE
OP NOTE    DOS:  09/14/2020    Preop Dx: Comminuted bilateral intra-articular distal radius fractures    Postop Dx: Comminuted bilateral intra-articular distal radius fractures    Procedure: Open reduction internal fixation of comminuted intra-articular bilateral distal radius fractures, 3 or more parts each - 39686.50    Surgeon: Hao Thorne M.D.    Asst:  Azeem Hilliard M.D    Anesthesia: GETA    EBL:  20cc    IVF:  3000cc crystalloid    Implants: Synhtes wide DR volar plate bilateral    Specimens: None    Findings: Severe comminution right greater than left.  Stable DRUJ.    Dispo:  To PACU extubated/stable       Indications for Procedure:      50-year-old male fell from a stool on bilateral outstretched hands resulting in bilateral comminuted intra-articular distal radius fractures, right worse than left.  Patient also had a small subdural hemorrhage which is stable.  I am taking in the operating room for bilateral fixation.  The risks, benefits and alternatives to surgery were discussed at length the patient prior to going the operating room.  Informed consent was obtained.    I am pending a 0.22 modifier to this case given the severe comminution of the fractures and the extra time and work involved in their fixation.    Procedure in Detail:    Patient was identified in preoperative holding area the sites were marked.  Regional analgesia was administered.  Patient was wheeled to the operating room and placed on the operating table in supine position.  General endotracheal anesthesia was induced and preoperative antibiotics were administered.  A nonsterile tourniquet was placed on the right upper extremity in the right upper extremity was removed from its splint and prepped and draped sterile fashion.  A time-out was undertaken to confirm patient, side, site, surgery, surgeon and the administration of preoperative antibiotics.  All agreed we proceeded.  The arm was exsanguinated the tourniquet was  raised.    I made a standard flexor carpi radialis approach to the distal radius.  I released the brachioradialis off the radial styloid.  The patient comminution on the volar surface on the ulnar side.  There is also large dorsal ulnar block.  I pulled length alignment to get the overall articular block out to length and then placed a K-wire from the radial styloid out the ulnar side of the shaft.  I then went dorsal and placed Kapandji pins to lever the dorsal ulnar cortex into a better position.  I then put and dental pick on the most ulnar volar portion and then drove a K-wire from radial to ulnar to hold this in position.    I selected a wide distal radius volar plate and placed it the appropriate position as distal as I could go toward the watershed line.  I then secured the plate to the shaft the bone with a K-wire through a locking tower.  I tilted the plate up on the shaft and then placed a single bicortical screw in most ulnar hole pulling the plate down to bone.  I then placed raft ring screws in locked fashion next to this.  I do this for another 3 holes.  The cup pain G pins were holding the dorsal cortex intact fairly well as did the bicortical screw.    I then lever did the plate down to the shaft the bone after removing the K-wire in the radial styloid so that I could return volar tilt.  I then placed a screw in the oblong hole.  There was still a gap between the metaphysis in the plate at this point I put a screw alongside the rim of the plate to suck the plate down to bone in that area.  Then placed the proximal row screws on the ulnar side and then switched out the cortical screws for locking screws with good pain G pin still in place.  I then placed the radial styloid screws.  At this point the distal screw the shaft which was not a whole was fairly loose.  It had done its job pull the plate down to bone long enough for me to put proximal row screws in to secure this position.  I placed another  screw in the shaft followed by screw the center hole for a total of 3 and then removed the distal screw.  It helps position well.    At this point there was still a loose volar ulnar fragment.  I drilled a hole this and passed a 0 Vicryl suture through the hole and out a hole that I drilled into the metaphysis on the volar aspect and tied this over a plate bridge securing it.  I then stressed the DRUJ which was fairly stable.    The tourniquet was let down hemostasis was obtained electrocautery after confirming good position of hardware and fractures.  The wound was copiously irrigated with normal saline solution.  I placed vancomycin powder deep and then closed the subcutaneous tissue with inverted 3-0 Vicryl suture, and the skin with 3-0 Monocryl suture and Dermabond.  An Aquacel dressing was placed followed by a compression sleeve and a Velcro wrist splint for circumferential stability.    At this point the drapes were all broken down to the back table remained sterile.  Flipped the patient around and placed a nonsterile tourniquet on the left upper extremity and then prepped and draped this in sterile fashion.  The arm was exsanguinated the tourniquet was raised.  Antibiotics were redosed prior to raising the tourniquet.    I made over flexor carpi radialis incision the skin subcutaneous tissues.  I released the brachioradialis off the radial styloid.  I then elevated the pronator quadratus in an L shape of the distal radius.  At this point I encountered the fracture which was also intra-articular with a larger dorsal ulnar fragment and not as much comminution on the volar surface.  I pulled length alignment to get the overall best it could be and placed a 1.8 mm K-wire from the radial styloid across to the ulnar side of the shaft.  I then again placed 2 Kapandji pins dorsally to lever the dorsal ulnar fragment into good position.  I then selected an other wide distal radius volar plate and placed the  appropriate position on the shaft and fixed with a K-wire.  I fixed it distally with K-wires well.  At this point plate was in good position but I still had the dorsal cortex invaginating underneath the volar cortex at the joint surface in its central portion.  I drilled a hole in the metaphysis volarly and then bent a 1.8 mm K-wire and used the blunt end as a tamp to tamp this up in good position and hold the joint surface reduced.  I then placed a bicortical screw in the most ulnar hole securing that dorsal cortex in a reduced position.  In this case in order to secure the plate better I also placed a bicortical screw in the 2nd most radial hole the distal row to flap the plate down the bone.  I the plate off of the bone the shaft while I was doing this.  I then placed a raft ring lock screws in the remaining 2 positions that were not the radial styloid.    I then removed the K-wire from the radial styloid and lever the plate down to bone and fixed this with 2 cortical screws and removed the K-wire locking tower.  At that point I would read stored volar tilt and had good radial height and inclination.  I then placed the proximal ulnar distal screw as well as the 2 radial styloid screws.  Then switched out the 2 bicortical screws for unicortical locking screws.  At this point removed the Kapandji pin and took final films showing good restoration of radial height, inclination and volar tilt a good hardware placement.  The DRUJ was stable to stress.    The tourniquet was let down hemostasis was obtained electrocautery.  The wounds were copiously irrigated normal saline solution.  Vancomycin powder was placed deep and the subcutaneous tissues closed with inverted 3-0 Vicryl suture and the skin with 3-0 Monocryl suture and Dermabond.  An Aquacel dressing was placed followed by a compression sleeve and a Velcro wrist splint to gain good circumferential hold around the wrist.    All instrument and sponge counts were  reported correct in the case.  There no complications.  The patient was extubated, awakened and taken to recovery room stable condition.    Plan the patient:    ADLs be very difficult for the patient.  I feel that the right wrist is at more risk for having complications with loss of reduction and will encourage him to gently use his left wrist for ADLs.  I am going to keep him in the hospital see how he functions but he may need skilled nursing for a few weeks for basic ADLs.    Hao Thorne MD

## 2020-09-14 NOTE — ANESTHESIA PREPROCEDURE EVALUATION
09/14/2020  Andre Padgett is a 50 y.o., male.    Anesthesia Evaluation    I have reviewed the Patient Summary Reports.    I have reviewed the Nursing Notes. I have reviewed the NPO Status.   I have reviewed the Medications.     Review of Systems  Anesthesia Hx:  No problems with previous Anesthesia  History of prior surgery of interest to airway management or planning: Previous anesthesia: General Denies Family Hx of Anesthesia complications.   Denies Personal Hx of Anesthesia complications.   Social:  Non-Smoker    Pulmonary:  Pulmonary Normal    Hepatic/GI:   Liver Disease,    Musculoskeletal:   Arthritis     Neurological:   S/p SOTO aneurysm coiling   Endocrine:  Endocrine Normal        Physical Exam  General:  Well nourished    Airway/Jaw/Neck:  Airway Findings: Mouth Opening: Normal Tongue: Normal  General Airway Assessment: Adult  Mallampati: I  TM Distance: Normal, at least 6 cm  Jaw/Neck Findings:  Neck ROM: Normal ROM      Dental:  Dental Findings: In tact   Chest/Lungs:  Chest/Lungs Findings: Clear to auscultation, Normal Respiratory Rate     Heart/Vascular:  Heart Findings: Rate: Normal  Rhythm: Regular Rhythm  Sounds: Normal        Mental Status:  Mental Status Findings:  Cooperative, Alert and Oriented         Anesthesia Plan  Type of Anesthesia, risks & benefits discussed:  Anesthesia Type:  general  Patient's Preference:   Intra-op Monitoring Plan: standard ASA monitors  Intra-op Monitoring Plan Comments:   Post Op Pain Control Plan: multimodal analgesia, IV/PO Opioids PRN, per primary service following discharge from PACU and peripheral nerve block  Post Op Pain Control Plan Comments:   Induction:   IV  Beta Blocker:  Patient is not currently on a Beta-Blocker (No further documentation required).       Informed Consent: Patient understands risks and agrees with Anesthesia plan.  Questions  answered. Anesthesia consent signed with patient.  ASA Score: 2     Day of Surgery Review of History & Physical:    H&P update referred to the surgeon.         Ready For Surgery From Anesthesia Perspective.

## 2020-09-15 LAB
ALBUMIN SERPL BCP-MCNC: 3.2 G/DL (ref 3.5–5.2)
ALBUMIN SERPL BCP-MCNC: 3.3 G/DL (ref 3.5–5.2)
ALP SERPL-CCNC: 50 U/L (ref 55–135)
ALP SERPL-CCNC: 50 U/L (ref 55–135)
ALT SERPL W/O P-5'-P-CCNC: 22 U/L (ref 10–44)
ALT SERPL W/O P-5'-P-CCNC: 25 U/L (ref 10–44)
ANION GAP SERPL CALC-SCNC: 10 MMOL/L (ref 8–16)
ANION GAP SERPL CALC-SCNC: 9 MMOL/L (ref 8–16)
AST SERPL-CCNC: 23 U/L (ref 10–40)
AST SERPL-CCNC: 32 U/L (ref 10–40)
BACTERIA #/AREA URNS AUTO: ABNORMAL /HPF
BASOPHILS # BLD AUTO: 0.02 K/UL (ref 0–0.2)
BASOPHILS # BLD AUTO: 0.02 K/UL (ref 0–0.2)
BASOPHILS NFR BLD: 0.2 % (ref 0–1.9)
BASOPHILS NFR BLD: 0.3 % (ref 0–1.9)
BILIRUB SERPL-MCNC: 0.8 MG/DL (ref 0.1–1)
BILIRUB SERPL-MCNC: 1 MG/DL (ref 0.1–1)
BILIRUB UR QL STRIP: NEGATIVE
BUN SERPL-MCNC: 7 MG/DL (ref 6–20)
BUN SERPL-MCNC: 8 MG/DL (ref 6–20)
CALCIUM SERPL-MCNC: 8.2 MG/DL (ref 8.7–10.5)
CALCIUM SERPL-MCNC: 8.4 MG/DL (ref 8.7–10.5)
CHLORIDE SERPL-SCNC: 101 MMOL/L (ref 95–110)
CHLORIDE SERPL-SCNC: 104 MMOL/L (ref 95–110)
CLARITY UR REFRACT.AUTO: CLEAR
CO2 SERPL-SCNC: 24 MMOL/L (ref 23–29)
CO2 SERPL-SCNC: 27 MMOL/L (ref 23–29)
COLOR UR AUTO: YELLOW
CREAT SERPL-MCNC: 0.8 MG/DL (ref 0.5–1.4)
CREAT SERPL-MCNC: 0.8 MG/DL (ref 0.5–1.4)
DIFFERENTIAL METHOD: ABNORMAL
DIFFERENTIAL METHOD: ABNORMAL
EOSINOPHIL # BLD AUTO: 0 K/UL (ref 0–0.5)
EOSINOPHIL # BLD AUTO: 0 K/UL (ref 0–0.5)
EOSINOPHIL NFR BLD: 0.3 % (ref 0–8)
EOSINOPHIL NFR BLD: 0.3 % (ref 0–8)
ERYTHROCYTE [DISTWIDTH] IN BLOOD BY AUTOMATED COUNT: 11.7 % (ref 11.5–14.5)
ERYTHROCYTE [DISTWIDTH] IN BLOOD BY AUTOMATED COUNT: 11.8 % (ref 11.5–14.5)
EST. GFR  (AFRICAN AMERICAN): >60 ML/MIN/1.73 M^2
EST. GFR  (AFRICAN AMERICAN): >60 ML/MIN/1.73 M^2
EST. GFR  (NON AFRICAN AMERICAN): >60 ML/MIN/1.73 M^2
EST. GFR  (NON AFRICAN AMERICAN): >60 ML/MIN/1.73 M^2
GLUCOSE SERPL-MCNC: 113 MG/DL (ref 70–110)
GLUCOSE SERPL-MCNC: 124 MG/DL (ref 70–110)
GLUCOSE UR QL STRIP: NEGATIVE
HCT VFR BLD AUTO: 37.3 % (ref 40–54)
HCT VFR BLD AUTO: 39 % (ref 40–54)
HGB BLD-MCNC: 12.3 G/DL (ref 14–18)
HGB BLD-MCNC: 12.9 G/DL (ref 14–18)
HGB UR QL STRIP: ABNORMAL
IMM GRANULOCYTES # BLD AUTO: 0.02 K/UL (ref 0–0.04)
IMM GRANULOCYTES # BLD AUTO: 0.03 K/UL (ref 0–0.04)
IMM GRANULOCYTES NFR BLD AUTO: 0.3 % (ref 0–0.5)
IMM GRANULOCYTES NFR BLD AUTO: 0.3 % (ref 0–0.5)
KETONES UR QL STRIP: NEGATIVE
LACTATE SERPL-SCNC: 1.8 MMOL/L (ref 0.5–2.2)
LACTATE SERPL-SCNC: 2.8 MMOL/L (ref 0.5–2.2)
LEUKOCYTE ESTERASE UR QL STRIP: ABNORMAL
LYMPHOCYTES # BLD AUTO: 0.6 K/UL (ref 1–4.8)
LYMPHOCYTES # BLD AUTO: 1.2 K/UL (ref 1–4.8)
LYMPHOCYTES NFR BLD: 11.7 % (ref 18–48)
LYMPHOCYTES NFR BLD: 8.3 % (ref 18–48)
MAGNESIUM SERPL-MCNC: 1.8 MG/DL (ref 1.6–2.6)
MCH RBC QN AUTO: 32.9 PG (ref 27–31)
MCH RBC QN AUTO: 32.9 PG (ref 27–31)
MCHC RBC AUTO-ENTMCNC: 33 G/DL (ref 32–36)
MCHC RBC AUTO-ENTMCNC: 33.1 G/DL (ref 32–36)
MCV RBC AUTO: 100 FL (ref 82–98)
MCV RBC AUTO: 100 FL (ref 82–98)
MICROSCOPIC COMMENT: ABNORMAL
MONOCYTES # BLD AUTO: 0.4 K/UL (ref 0.3–1)
MONOCYTES # BLD AUTO: 0.7 K/UL (ref 0.3–1)
MONOCYTES NFR BLD: 5.8 % (ref 4–15)
MONOCYTES NFR BLD: 6.5 % (ref 4–15)
NEUTROPHILS # BLD AUTO: 5.8 K/UL (ref 1.8–7.7)
NEUTROPHILS # BLD AUTO: 8.3 K/UL (ref 1.8–7.7)
NEUTROPHILS NFR BLD: 81 % (ref 38–73)
NEUTROPHILS NFR BLD: 85 % (ref 38–73)
NITRITE UR QL STRIP: NEGATIVE
NRBC BLD-RTO: 0 /100 WBC
NRBC BLD-RTO: 0 /100 WBC
PH UR STRIP: 6 [PH] (ref 5–8)
PLATELET # BLD AUTO: 126 K/UL (ref 150–350)
PLATELET # BLD AUTO: 131 K/UL (ref 150–350)
PLATELET BLD QL SMEAR: ABNORMAL
PMV BLD AUTO: 10.5 FL (ref 9.2–12.9)
PMV BLD AUTO: 11.2 FL (ref 9.2–12.9)
POCT GLUCOSE: 110 MG/DL (ref 70–110)
POTASSIUM SERPL-SCNC: 4 MMOL/L (ref 3.5–5.1)
POTASSIUM SERPL-SCNC: 4.1 MMOL/L (ref 3.5–5.1)
PROCALCITONIN SERPL IA-MCNC: 0.04 NG/ML
PROT SERPL-MCNC: 6 G/DL (ref 6–8.4)
PROT SERPL-MCNC: 6.3 G/DL (ref 6–8.4)
PROT UR QL STRIP: NEGATIVE
RBC # BLD AUTO: 3.74 M/UL (ref 4.6–6.2)
RBC # BLD AUTO: 3.92 M/UL (ref 4.6–6.2)
RBC #/AREA URNS AUTO: 21 /HPF (ref 0–4)
SMUDGE CELLS BLD QL SMEAR: PRESENT
SODIUM SERPL-SCNC: 137 MMOL/L (ref 136–145)
SODIUM SERPL-SCNC: 138 MMOL/L (ref 136–145)
SP GR UR STRIP: 1.02 (ref 1–1.03)
SQUAMOUS #/AREA URNS AUTO: 0 /HPF
URN SPEC COLLECT METH UR: ABNORMAL
WBC # BLD AUTO: 10.23 K/UL (ref 3.9–12.7)
WBC # BLD AUTO: 6.84 K/UL (ref 3.9–12.7)
WBC #/AREA URNS AUTO: 50 /HPF (ref 0–5)

## 2020-09-15 PROCEDURE — 63600175 PHARM REV CODE 636 W HCPCS: Performed by: STUDENT IN AN ORGANIZED HEALTH CARE EDUCATION/TRAINING PROGRAM

## 2020-09-15 PROCEDURE — 84145 PROCALCITONIN (PCT): CPT

## 2020-09-15 PROCEDURE — 99232 SBSQ HOSP IP/OBS MODERATE 35: CPT | Mod: ,,, | Performed by: HOSPITALIST

## 2020-09-15 PROCEDURE — 87040 BLOOD CULTURE FOR BACTERIA: CPT

## 2020-09-15 PROCEDURE — 25000003 PHARM REV CODE 250: Performed by: STUDENT IN AN ORGANIZED HEALTH CARE EDUCATION/TRAINING PROGRAM

## 2020-09-15 PROCEDURE — 25000003 PHARM REV CODE 250: Performed by: HOSPITALIST

## 2020-09-15 PROCEDURE — 81001 URINALYSIS AUTO W/SCOPE: CPT

## 2020-09-15 PROCEDURE — 80053 COMPREHEN METABOLIC PANEL: CPT | Mod: 91

## 2020-09-15 PROCEDURE — 83605 ASSAY OF LACTIC ACID: CPT

## 2020-09-15 PROCEDURE — 97165 OT EVAL LOW COMPLEX 30 MIN: CPT

## 2020-09-15 PROCEDURE — 83605 ASSAY OF LACTIC ACID: CPT | Mod: 91

## 2020-09-15 PROCEDURE — 83735 ASSAY OF MAGNESIUM: CPT

## 2020-09-15 PROCEDURE — 80053 COMPREHEN METABOLIC PANEL: CPT

## 2020-09-15 PROCEDURE — 97530 THERAPEUTIC ACTIVITIES: CPT

## 2020-09-15 PROCEDURE — 87086 URINE CULTURE/COLONY COUNT: CPT

## 2020-09-15 PROCEDURE — 99232 PR SUBSEQUENT HOSPITAL CARE,LEVL II: ICD-10-PCS | Mod: ,,, | Performed by: HOSPITALIST

## 2020-09-15 PROCEDURE — 85025 COMPLETE CBC W/AUTO DIFF WBC: CPT | Mod: 91

## 2020-09-15 PROCEDURE — 97535 SELF CARE MNGMENT TRAINING: CPT

## 2020-09-15 PROCEDURE — 97162 PT EVAL MOD COMPLEX 30 MIN: CPT

## 2020-09-15 PROCEDURE — 25000003 PHARM REV CODE 250: Performed by: PHYSICIAN ASSISTANT

## 2020-09-15 PROCEDURE — 63600175 PHARM REV CODE 636 W HCPCS: Performed by: PHYSICIAN ASSISTANT

## 2020-09-15 PROCEDURE — 11000001 HC ACUTE MED/SURG PRIVATE ROOM

## 2020-09-15 PROCEDURE — 63600175 PHARM REV CODE 636 W HCPCS: Performed by: HOSPITALIST

## 2020-09-15 PROCEDURE — 36415 COLL VENOUS BLD VENIPUNCTURE: CPT

## 2020-09-15 PROCEDURE — 97116 GAIT TRAINING THERAPY: CPT

## 2020-09-15 RX ORDER — HYDROMORPHONE HYDROCHLORIDE 1 MG/ML
1.5 INJECTION, SOLUTION INTRAMUSCULAR; INTRAVENOUS; SUBCUTANEOUS EVERY 4 HOURS PRN
Status: DISCONTINUED | OUTPATIENT
Start: 2020-09-15 | End: 2020-09-23 | Stop reason: HOSPADM

## 2020-09-15 RX ORDER — TAMSULOSIN HYDROCHLORIDE 0.4 MG/1
0.4 CAPSULE ORAL DAILY
Status: DISCONTINUED | OUTPATIENT
Start: 2020-09-15 | End: 2020-09-23 | Stop reason: HOSPADM

## 2020-09-15 RX ORDER — SODIUM CHLORIDE 9 MG/ML
1000 INJECTION, SOLUTION INTRAVENOUS CONTINUOUS
Status: DISCONTINUED | OUTPATIENT
Start: 2020-09-15 | End: 2020-09-15

## 2020-09-15 RX ORDER — CARVEDILOL 12.5 MG/1
12.5 TABLET ORAL 2 TIMES DAILY
Status: DISCONTINUED | OUTPATIENT
Start: 2020-09-15 | End: 2020-09-23 | Stop reason: HOSPADM

## 2020-09-15 RX ORDER — HYDRALAZINE HYDROCHLORIDE 25 MG/1
25 TABLET, FILM COATED ORAL EVERY 8 HOURS PRN
Status: DISCONTINUED | OUTPATIENT
Start: 2020-09-15 | End: 2020-09-23 | Stop reason: HOSPADM

## 2020-09-15 RX ORDER — OXYCODONE HYDROCHLORIDE 5 MG/1
5 TABLET ORAL EVERY 4 HOURS PRN
Status: DISCONTINUED | OUTPATIENT
Start: 2020-09-15 | End: 2020-09-23 | Stop reason: HOSPADM

## 2020-09-15 RX ORDER — ACETAMINOPHEN 500 MG
1000 TABLET ORAL EVERY 8 HOURS
Status: DISCONTINUED | OUTPATIENT
Start: 2020-09-15 | End: 2020-09-16

## 2020-09-15 RX ORDER — LEVETIRACETAM 500 MG/1
500 TABLET ORAL 2 TIMES DAILY
Status: COMPLETED | OUTPATIENT
Start: 2020-09-15 | End: 2020-09-20

## 2020-09-15 RX ORDER — CEFTRIAXONE 1 G/1
1 INJECTION, POWDER, FOR SOLUTION INTRAMUSCULAR; INTRAVENOUS
Status: DISCONTINUED | OUTPATIENT
Start: 2020-09-15 | End: 2020-09-16

## 2020-09-15 RX ORDER — OXYCODONE HYDROCHLORIDE 10 MG/1
10 TABLET ORAL EVERY 4 HOURS PRN
Status: DISCONTINUED | OUTPATIENT
Start: 2020-09-15 | End: 2020-09-23 | Stop reason: HOSPADM

## 2020-09-15 RX ADMIN — ALUMINUM HYDROXIDE, MAGNESIUM HYDROXIDE, AND SIMETHICONE 30 ML: 200; 200; 20 SUSPENSION ORAL at 10:09

## 2020-09-15 RX ADMIN — HYDROMORPHONE HYDROCHLORIDE 1 MG: 1 INJECTION, SOLUTION INTRAMUSCULAR; INTRAVENOUS; SUBCUTANEOUS at 02:09

## 2020-09-15 RX ADMIN — DOCUSATE SODIUM 50 MG: 50 CAPSULE, LIQUID FILLED ORAL at 08:09

## 2020-09-15 RX ADMIN — ALUMINUM HYDROXIDE, MAGNESIUM HYDROXIDE, AND SIMETHICONE 30 ML: 200; 200; 20 SUSPENSION ORAL at 08:09

## 2020-09-15 RX ADMIN — ATORVASTATIN CALCIUM 40 MG: 20 TABLET, FILM COATED ORAL at 08:09

## 2020-09-15 RX ADMIN — OXYCODONE 5 MG: 5 TABLET ORAL at 08:09

## 2020-09-15 RX ADMIN — OXYCODONE HYDROCHLORIDE 10 MG: 10 TABLET ORAL at 09:09

## 2020-09-15 RX ADMIN — ACETAMINOPHEN 650 MG: 325 TABLET ORAL at 11:09

## 2020-09-15 RX ADMIN — ACETAMINOPHEN 650 MG: 325 TABLET ORAL at 12:09

## 2020-09-15 RX ADMIN — HYDROMORPHONE HYDROCHLORIDE 1.5 MG: 1 INJECTION, SOLUTION INTRAMUSCULAR; INTRAVENOUS; SUBCUTANEOUS at 06:09

## 2020-09-15 RX ADMIN — CARVEDILOL 12.5 MG: 12.5 TABLET, FILM COATED ORAL at 01:09

## 2020-09-15 RX ADMIN — ACETAMINOPHEN 650 MG: 325 TABLET ORAL at 08:09

## 2020-09-15 RX ADMIN — CARVEDILOL 12.5 MG: 12.5 TABLET, FILM COATED ORAL at 08:09

## 2020-09-15 RX ADMIN — OXYCODONE HYDROCHLORIDE 10 MG: 10 TABLET ORAL at 05:09

## 2020-09-15 RX ADMIN — CEFTRIAXONE SODIUM 1 G: 1 INJECTION, POWDER, FOR SOLUTION INTRAMUSCULAR; INTRAVENOUS at 12:09

## 2020-09-15 RX ADMIN — ACETAMINOPHEN 1000 MG: 500 TABLET ORAL at 09:09

## 2020-09-15 RX ADMIN — HYDROMORPHONE HYDROCHLORIDE 1 MG: 1 INJECTION, SOLUTION INTRAMUSCULAR; INTRAVENOUS; SUBCUTANEOUS at 05:09

## 2020-09-15 RX ADMIN — ACETAMINOPHEN 650 MG: 325 TABLET ORAL at 03:09

## 2020-09-15 RX ADMIN — LEVETIRACETAM 500 MG: 5 INJECTION INTRAVENOUS at 08:09

## 2020-09-15 RX ADMIN — ALPRAZOLAM 0.5 MG: 0.5 TABLET ORAL at 05:09

## 2020-09-15 RX ADMIN — SODIUM CHLORIDE 500 ML: 0.9 INJECTION, SOLUTION INTRAVENOUS at 12:09

## 2020-09-15 RX ADMIN — LEVETIRACETAM 500 MG: 500 TABLET, FILM COATED ORAL at 08:09

## 2020-09-15 RX ADMIN — OXYCODONE HYDROCHLORIDE 10 MG: 10 TABLET ORAL at 12:09

## 2020-09-15 RX ADMIN — OXYCODONE 5 MG: 5 TABLET ORAL at 02:09

## 2020-09-15 RX ADMIN — OXYCODONE 5 MG: 5 TABLET ORAL at 12:09

## 2020-09-15 RX ADMIN — TAMSULOSIN HYDROCHLORIDE 0.4 MG: 0.4 CAPSULE ORAL at 01:09

## 2020-09-15 RX ADMIN — ALUMINUM HYDROXIDE, MAGNESIUM HYDROXIDE, AND SIMETHICONE 30 ML: 200; 200; 20 SUSPENSION ORAL at 04:09

## 2020-09-15 RX ADMIN — OXYCODONE 5 MG: 5 TABLET ORAL at 11:09

## 2020-09-15 RX ADMIN — CEFAZOLIN 2 G: 1 INJECTION, POWDER, FOR SOLUTION INTRAMUSCULAR; INTRAVENOUS at 04:09

## 2020-09-15 RX ADMIN — PANTOPRAZOLE SODIUM 40 MG: 40 TABLET, DELAYED RELEASE ORAL at 08:09

## 2020-09-15 RX ADMIN — OXYCODONE HYDROCHLORIDE 10 MG: 10 TABLET ORAL at 03:09

## 2020-09-15 RX ADMIN — Medication 250 MG: at 08:09

## 2020-09-15 RX ADMIN — OXYCODONE HYDROCHLORIDE 10 MG: 10 TABLET ORAL at 04:09

## 2020-09-15 RX ADMIN — HYDROMORPHONE HYDROCHLORIDE 1.5 MG: 1 INJECTION, SOLUTION INTRAMUSCULAR; INTRAVENOUS; SUBCUTANEOUS at 08:09

## 2020-09-15 NOTE — NURSING
Spoke with Idalia Jurado, informed her of pt condition. Stated she will be down shortly to see pt. Pt informed. Will continue to monitor.

## 2020-09-15 NOTE — SUBJECTIVE & OBJECTIVE
"Principal Problem:Closed fracture of distal end of right radius    Principal Orthopedic Problem: Same    Interval History: Pt seen and examined at bedside. Rapid called overnight due to anxiety. Pain overnight better after splint loosening. No numbness or tingling. Pain better controlled this AM.     Review of patient's allergies indicates:  No Known Allergies    Current Facility-Administered Medications   Medication    acetaminophen tablet 1,000 mg    acetaminophen tablet 650 mg    albuterol-ipratropium 2.5 mg-0.5 mg/3 mL nebulizer solution 3 mL    ALPRAZolam tablet 0.5 mg    aluminum-magnesium hydroxide-simethicone 200-200-20 mg/5 mL suspension 30 mL    ascorbic acid (vitamin C) tablet 250 mg    atorvastatin tablet 40 mg    ceFAZolin injection 2 g    cefTRIAXone injection 1 g    diphenhydrAMINE capsule 25 mg    docusate sodium capsule 50 mg    glucose chewable tablet 16 g    glucose chewable tablet 24 g    HYDROmorphone injection 1.5 mg    levETIRAcetam in NaCl (iso-os) IVPB 500 mg    lidocaine HCL 10 mg/ml (1%) injection 20 mL    lidocaine HCL 10 mg/ml (1%) injection 20 mL    melatonin tablet 6 mg    methocarbamoL tablet 500 mg    ondansetron injection 4 mg    oxyCODONE immediate release tablet 5 mg    oxyCODONE immediate release tablet Tab 10 mg    pantoprazole EC tablet 40 mg    prochlorperazine injection Soln 5 mg    sodium chloride 0.9% flush 10 mL    sodium chloride 0.9% flush 10 mL     Objective:     Vital Signs (Most Recent):  Temp: 99.9 °F (37.7 °C) (09/15/20 1046)  Pulse: (!) 117 (09/15/20 0816)  Resp: 18 (09/15/20 1001)  BP: (!) 140/62 (09/15/20 0816)  SpO2: 96 % (09/15/20 0816) Vital Signs (24h Range):  Temp:  [97.9 °F (36.6 °C)-102.9 °F (39.4 °C)] 99.9 °F (37.7 °C)  Pulse:  [] 117  Resp:  [12-20] 18  SpO2:  [90 %-100 %] 96 %  BP: (104-148)/(56-82) 140/62     Weight: 78.2 kg (172 lb 6.4 oz)  Height: 5' 10" (177.8 cm)  Body mass index is 24.74 kg/m².      Intake/Output " Summary (Last 24 hours) at 9/15/2020 1050  Last data filed at 9/15/2020 0534  Gross per 24 hour   Intake 1500 ml   Output 2175 ml   Net -675 ml       Ortho/SPM Exam     Physical Exam:  NAD, A/O x 3.  Dressing c/d/i, brace in place  No focal motor or sensory deficits noted.  Drain: None      Significant Labs:   BMP:   Recent Labs   Lab 09/15/20  0027 09/15/20  0835   * 113*    137   K 4.1 4.0    101   CO2 24 27   BUN 7 8   CREATININE 0.8 0.8   CALCIUM 8.2* 8.4*   MG 1.8  --      CBC:   Recent Labs   Lab 09/14/20  0643 09/15/20  0027 09/15/20  0835   WBC 12.74* 6.84 10.23   HGB 14.4 12.3* 12.9*   HCT 41.6 37.3* 39.0*    131* 126*     CMP:   Recent Labs   Lab 09/14/20 0643 09/15/20  0027 09/15/20  0835    138 137   K 4.5 4.1 4.0    104 101   CO2 23 24 27   * 124* 113*   BUN 11 7 8   CREATININE 0.8 0.8 0.8   CALCIUM 9.1 8.2* 8.4*   PROT 6.8 6.0 6.3   ALBUMIN 3.9 3.3* 3.2*   BILITOT 0.7 1.0 0.8   ALKPHOS 60 50* 50*   AST 31 23 32   ALT 33 25 22   ANIONGAP 12 10 9   EGFRNONAA >60.0 >60.0 >60.0     All pertinent labs within the past 24 hours have been reviewed.      Significant Imaging: I have reviewed all pertinent imaging results/findings.   No new imaging.

## 2020-09-15 NOTE — PLAN OF CARE
Problem: Occupational Therapy Goal  Goal: Occupational Therapy Goal  Description: Goals to be met by: 10-14-20    Patient will increase functional independence with ADLs by performing:    Feeding with Modified [place].  UE Dressing with Stand-by Assistance.  LE Dressing with Stand-by Assistance.  Grooming while standing at sink with Stand-by Assistance.  Toileting from toilet with Stand-by Assistance for hygiene and clothing management.   Toilet transfer to toilet with Stand-by Assistance.    Outcome: Ongoing, Progressing    OT evaluation with goals established. Patient NWB BUE. Currently requiring assistance for ADLs secondary to bilateral ORIF of wrist. Patient with currently limited motion of bilateral fingers secondary to swelling and pain. Patient is RHD.    Apolonia Hunter, OT  9/15/2020

## 2020-09-15 NOTE — PROGRESS NOTES
[facility]  Orthopedics  Progress Note    Patient Name: Andre Padgett  MRN: [id number]  Admission Date: 9/13/2020  Hospital Length of Stay: 2 days  Attending Provider: No att. providers found  Primary Care Provider: Hunter Diop MD  Follow-up For: Procedure(s) (LRB):  ORIF, FRACTURE, RADIUS OR ULNA, synthes, right, large C arm at a diagonal from the rear of the room, ancef, velcro wrist splint (Bilateral)    Post-Operative Day: 1 Day Post-Op  Subjective:     Principal Problem:Closed fracture of distal end of right radius    Principal Orthopedic Problem: Same    Interval History: Pt seen and examined at bedside. Rapid called overnight due to anxiety. Pain overnight better after splint loosening. No numbness or tingling. Pain better controlled this AM.     Review of patient's allergies indicates:  No Known Allergies    Current Facility-Administered Medications   Medication    acetaminophen tablet 1,000 mg    acetaminophen tablet 650 mg    albuterol-ipratropium 2.5 mg-0.5 mg/3 mL nebulizer solution 3 mL    ALPRAZolam tablet 0.5 mg    aluminum-magnesium hydroxide-simethicone 200-200-20 mg/5 mL suspension 30 mL    ascorbic acid (vitamin C) tablet 250 mg    atorvastatin tablet 40 mg    ceFAZolin injection 2 g    cefTRIAXone injection 1 g    diphenhydrAMINE capsule 25 mg    docusate sodium capsule 50 mg    glucose chewable tablet 16 g    glucose chewable tablet 24 g    HYDROmorphone injection 1.5 mg    levETIRAcetam in NaCl (iso-os) IVPB 500 mg    lidocaine HCL 10 mg/ml (1%) injection 20 mL    lidocaine HCL 10 mg/ml (1%) injection 20 mL    melatonin tablet 6 mg    methocarbamoL tablet 500 mg    ondansetron injection 4 mg    oxyCODONE immediate release tablet 5 mg    oxyCODONE immediate release tablet Tab 10 mg    pantoprazole EC tablet 40 mg    prochlorperazine injection Soln 5 mg    sodium chloride 0.9% flush 10 mL    sodium chloride 0.9% flush 10 mL     Objective:  "    Vital Signs (Most Recent):  Temp: 99.9 °F (37.7 °C) (09/15/20 1046)  Pulse: (!) 117 (09/15/20 0816)  Resp: 18 (09/15/20 1001)  BP: (!) 140/62 (09/15/20 0816)  SpO2: 96 % (09/15/20 0816) Vital Signs (24h Range):  Temp:  [97.9 °F (36.6 °C)-102.9 °F (39.4 °C)] 99.9 °F (37.7 °C)  Pulse:  [] 117  Resp:  [12-20] 18  SpO2:  [90 %-100 %] 96 %  BP: (104-148)/(56-82) 140/62     Weight: 78.2 kg (172 lb 6.4 oz)  Height: 5' 10" (177.8 cm)  Body mass index is 24.74 kg/m².      Intake/Output Summary (Last 24 hours) at 9/15/2020 1050  Last data filed at 9/15/2020 0534  Gross per 24 hour   Intake 1500 ml   Output 2175 ml   Net -675 ml       Ortho/SPM Exam     Physical Exam:  NAD, A/O x 3.  Dressing c/d/i, brace in place  No focal motor or sensory deficits noted.  Drain: None      Significant Labs:   BMP:   Recent Labs   Lab 09/15/20  0027 09/15/20  0835   * 113*    137   K 4.1 4.0    101   CO2 24 27   BUN 7 8   CREATININE 0.8 0.8   CALCIUM 8.2* 8.4*   MG 1.8  --      CBC:   Recent Labs   Lab 09/14/20  0643 09/15/20  0027 09/15/20  0835   WBC 12.74* 6.84 10.23   HGB 14.4 12.3* 12.9*   HCT 41.6 37.3* 39.0*    131* 126*     CMP:   Recent Labs   Lab 09/14/20  0643 09/15/20  0027 09/15/20  0835    138 137   K 4.5 4.1 4.0    104 101   CO2 23 24 27   * 124* 113*   BUN 11 7 8   CREATININE 0.8 0.8 0.8   CALCIUM 9.1 8.2* 8.4*   PROT 6.8 6.0 6.3   ALBUMIN 3.9 3.3* 3.2*   BILITOT 0.7 1.0 0.8   ALKPHOS 60 50* 50*   AST 31 23 32   ALT 33 25 22   ANIONGAP 12 10 9   EGFRNONAA >60.0 >60.0 >60.0     All pertinent labs within the past 24 hours have been reviewed.      Significant Imaging: I have reviewed all pertinent imaging results/findings.   No new imaging.     Assessment/Plan:     * Closed fracture of distal end of right radius  Andre Padgett is a 50 y.o. male with bilateral DRF and subarachnoid hemmorhage now s/p Bilateral distal radius ORIF.       - Weight bearing status: NWB BUE  - Pain " control: Multimodal   - Elevate BUE  - Antibiotics: Periop ancef  - DVT Prophylaxis: SCD's at all times when not ambulating.  - PT/OT  - Lines/Drains: None  - Dispo: Per PT/OT        Closed fracture of distal end of left radius  See right DRF          Azeem Holland MD  Orthopedics  [facility]

## 2020-09-15 NOTE — CONSULTS
Inpatient consult to Physical Medicine Rehab  Consult performed by: Kirsty Linda NP  Consult ordered by: Gemma Oakley MD  Reason for consult: assess rehab needs        Reviewed patient history and current admission.  Rehab team following.  Full consult to follow.    BROOKLYN Lazcano, FNP-C  Physical Medicine & Rehabilitation   09/15/2020

## 2020-09-15 NOTE — NURSING
Pt oral temp 102.9. Prn tylenol administered @ 0048. Roxie Jurado secured chatted and notified. Informed nurse UTI detected from urine sample this nurse sent to lab. Started on rocephin, administered per orders. Will continue to monitor.

## 2020-09-15 NOTE — NURSING
Pt c/o 10/10 pain to bilateral wrist. Administered prn xanax 0.5mg for anxiety @ 0557 and prn dilaudid 1mg administered @ 0559. Pt voiced wanting prn pain medication dosage to increase due to medication not fully relieving pain. Voiced concerns to Roxie Jurado via secure chat. Instructed to give her a call if medication is not effective before 0700 and informed this nurse day team will have to change medications dosage. Explained to pt and pt verbalized understanding. Will inform oncoming nurse and will continue to monitor.

## 2020-09-15 NOTE — NURSING
Pt informed nurse of 9/10 pain to bilateral wrist @ 0150. Prn dilaudid administered per request @ 0200. Ortho paged @ 0207 and informed that pt states splints feel tight, noted swelling to bilateral hands. Ortho instructed nurse to loosen up straps. Straps loosen, pt verbalized relief after. BUE elevated on pillows. Will continue to monitor.

## 2020-09-15 NOTE — PLAN OF CARE
Notified by RN that patient was having shakes/chills.  Temp 99.4F, HR 123bpm, SpO2 on RA 89%, .  Glu unremarkable.  Patient seen and examined.  Chills/shakes intermittent.  He denies SOB, chest pain, vomiting, abdominal pain.  Pulse ox changed to ear for more accurate reading, SpO2 97% on 3L NC.  CXR, CBC, CMP, Mg, blood cultures, lactic acid, procalcitonin ordered.  ECG shows sinus tach.  Patient notes that he has a h/o anxiety.  Will give home Xanax 1mg and continue to monitor.    Roxie Jurado, [facility], PA-C  [facility] Medicine Department  Staff:  Dr. Gandhi    ADDENDUM:  Patient spiked fever of 102.9F.  UA positive.  Lactic acid 2.8.  Patient to received Tylenol, IVFs, and Rocephin.

## 2020-09-15 NOTE — PLAN OF CARE
09/15/20 0829   Post-Acute Status   Post-Acute Authorization Other   Other Status No Post-Acute Service Needs

## 2020-09-15 NOTE — ASSESSMENT & PLAN NOTE
Andre Padgett is a 50 y.o. male with bilateral DRF and subarachnoid hemmorhage now s/p Bilateral distal radius ORIF.       - Weight bearing status: NWB BUE  - Pain control: Multimodal   - Elevate BUE  - Antibiotics: Periop ancef  - DVT Prophylaxis: SCD's at all times when not ambulating.  - PT/OT  - Lines/Drains: None  - Dispo: Per PT/OT

## 2020-09-15 NOTE — HOSPITAL COURSE
9/15: BM & sit to stand CGA. Feed Gray. Ambulated 22 ft CGA. SLP evals pending.   9/16: Per SLP, without additional acute SLP needs at this time. BM setup-SBA. Sit to stand SBA. Toilet set up for clothing management. Ambulated 624 ft SBA.

## 2020-09-15 NOTE — HPI
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  Patient presented to [facility] on 9/13 s/p fall with head and RUE trauma while standing on a stool after chasing a lizard on the wall.  CTH revealed SAH. Xrays revealed b/l distal radial fractures. Ortho and NSGY consulted. Now s/p Bilateral distal radius ORIF. NWB BUE. No acute neurosurgical intervention indicated at this time. Hospital course further complicated by fever (102.9) and UTI (on rocephin).     Functional History: Patient lives alone in a 2 story home with 4-5 steps to enter.  Lives on 1st floor. Prior to admission, (I) with ADLs and mobility. DME: none.

## 2020-09-15 NOTE — PLAN OF CARE
Lainaal completed and POC established     Tony Peterson, PT, DPT  9/15/2020     Problem: Physical Therapy Goal  Goal: Physical Therapy Goal  Description: Goals to be met by: 10/14/2020    Patient will increase functional independence with mobility by performin. Supine to sit with Modified [place]  2. Sit to supine with Modified [place]  3. Sit to stand transfer with Modified [place]  4. Gait  x 100 feet with Supervision using LRAD  5. Lower extremity exercise program x15 reps, with independence     Outcome: Ongoing, Progressing

## 2020-09-15 NOTE — ASSESSMENT & PLAN NOTE
-seen on imaging s/p fall  -per NSGY, Trace L sylvian fissure/R temporal SAH appears stable with no interval increase from prior CTH. Acomm aneurysm coiling construct visualized with no evidence of recurrence and no active flow    See hospital course for functional, cognitive/speech/language, and nutrition/swallow status.      Recommendations  -  Monitor sleep disturbances and establish consistent sleep-wake cycle  -  Environmental modifications to limit agitation/confusion   -  Reorient patient to person, place, time, and situation on each encounter  -  Avoid restraints  -  May benefit from 24/7 supervision  -  Avoid/limit medications that can worsen delirium (benzodiazepines, antihistamines, anticholinergics, hypnotics, opiates)  -  Encourage mobility, OOB in chair, and early ambulation as appropriate  -  PT/OT evaluate and treat  -  SLP speech and cognitive evaluate and treat  -  Monitor for bowel and bladder dysfunction  -  Monitor for and prevent skin breakdown and pressure ulcers  · Early mobility, repositioning/weight shifting every 20-30 minutes when sitting, turn patient every 2 hours, proper mattress/overlay and chair cushioning, pressure relief/heel protector boots

## 2020-09-15 NOTE — PT/OT/SLP EVAL
"Occupational Therapy   Evaluation    Name: Andre Padgett  MRN: [id number]  Admitting Diagnosis:  Closed fracture of distal end of right radius 1 Day Post-Op    Recommendations:     Discharge Recommendations: nursing facility, skilled(may progress to home depending on progress and if he finds help at home level)  Discharge Equipment Recommendations:  (TBD)  Barriers to discharge:  Decreased caregiver support    Assessment:     Andre Padgett is a 50 y.o. male with a medical diagnosis of Closed fracture of distal end of right radius. Patient is s/p bilateral wrist ORIF. Patient is in bilateral wrist cock up splints, NWB BUE and no range of motion of bilateral wrist. Patient is RHD. He currently has limited motion of bilateral fingers secondary to pain and swelling. He requires assistance at this time for ADLs. He reported he has no one at home to provide any type of assistance. Patient would benefit from skilled OT needs to increase independence with ADLs and ADL transfers while adhering to ortho precautions.  Performance deficits affecting function: weakness, impaired self care skills, decreased coordination, decreased upper extremity function, pain, decreased ROM, impaired coordination, impaired fine motor, impaired skin, edema, impaired muscle length, impaired joint extensibility, orthopedic precautions.      Rehab Prognosis: Good; patient would benefit from acute skilled OT services to address these deficits and reach maximum level of function.       Plan:     Patient to be seen 5 x/week to address the above listed problems via self-care/home management, therapeutic activities, therapeutic exercises  · Plan of Care Expires: 10/15/20  · Plan of Care Reviewed with: patient    Subjective     Chief Complaint: "Pain in bilateral wrist"   Patient/Family Comments/goals: "get back to independent level"    Occupational Profile:  Living Environment: lives alone (currently going through divorce) in 2 level home with 4-5 " steps, remains on first level of home, walk in shower   Previous level of function: independent and ADLs  Roles and Routines: currently laid off from , has 10 year old daughter who stays with him on Tues/Thurs and every other weekend  Equipment Used at Home:  none  Assistance upon Discharge: patient reported he has no assistance at home level, he stated he wants his insurance to provide a caregiver     Pain/Comfort:  · Pain Rating 1: 9/10  · Location - Side 1: Bilateral  · Location - Orientation 1: generalized  · Location 1: wrist  · Pain Addressed 1: Pre-medicate for activity, Reposition, Distraction    Patients cultural, spiritual, Pentecostalism conflicts given the current situation: no    Objective:     Communicated with: RN prior to session.  Patient found supine with peripheral IV upon OT entry to room.    General Precautions: Standard, fall   Orthopedic Precautions:RUE non weight bearing, LUE non weight bearing   Braces: UE brace(bilateral wrist cock up splints)     Occupational Performance:    Bed Mobility:    · Patient completed Scooting/Bridging with contact guard assistance  · Patient completed Supine to Sit with contact guard assistance  · Patient completed Sit to Supine with contact guard assistance    Functional Mobility/Transfers:  · Patient completed Sit <> Stand Transfer with contact guard assistance  with  no assistive device   · Functional Mobility: patient ambulated in room with contact guard assistance with no assistive device     Activities of Daily Living:  · Feeding:  minimum assistance with max verbal cues and visual cues for techniques for eating, patient completed task sitting edge of bed ([place] toast, eggs, peaches), patient was provided built up handles on fork/spoon, he had difficulty eating with built up utensils with max verbal/visual cues and eventually started eating with fingers      Cognitive/Visual Perceptual:  Cognitive/Psychosocial Skills:     -       Oriented to:  "Person, Place, Time and situation for the first visit, second visit patient did not recall therapist and kept saying "the therapist is coming back"   -       Follows Commands/attention:Follows multistep  commands    Physical Exam:  Edema:  swelling noted in bilateral fingers  Dominant hand:    -       RHD  Upper Extremity Range of Motion:     -       Right Upper Extremity: full motion of shoulder/elbow: limited motion of fingers, unable to full extend at MCP/PIP/DIP and unable to fully flex  -       Left Upper Extremity: full motion of shoulder/elbow: limited motion of fingers, unable to full extend at MCP/PIP/DIP and unable to fully flex    AMPAC 6 Click ADL:  AMPAC Total Score: 13    Treatment & Education:  -Patient educated on moving bilateral fingers to reduce swelling and increase functional independence   -Patient educated on sitting edge of bed or in chair for all feeding task   -Patient educated on importance of elevating bilateral arms while in bed to reduce swelling   -Patient educated on importance of moving bilateral elbows/shoulders to keep motion   -Patient educated on ortho precautions of bilateral wrist   -Patient educated on role of OT and plan of care   Education:    Patient left supine with all lines intact, call button in reach and friend present    GOALS:   Multidisciplinary Problems     Occupational Therapy Goals        Problem: Occupational Therapy Goal    Goal Priority Disciplines Outcome Interventions   Occupational Therapy Goal     OT, PT/OT Ongoing, Progressing    Description: Goals to be met by: 10-14-20    Patient will increase functional independence with ADLs by performing:    Feeding with Modified [place].  UE Dressing with Stand-by Assistance.  LE Dressing with Stand-by Assistance.  Grooming while standing at sink with Stand-by Assistance.  Toileting from toilet with Stand-by Assistance for hygiene and clothing management.   Toilet transfer to toilet with Stand-by Assistance.   "                   History:     Past Medical History:   Diagnosis Date    Aneurysm     Right sided filling anterior communicating aneurysm s/p coiling 2011    Anxiety     Cerebral aneurysm, nonruptured 12/27/2016    Colon adenoma: 3/18 repeat colonoscpy 2023 6/22/2018    Diverticulosis of large intestine without hemorrhage: see colonoscopy 3/18; sigmoid and descending colon 6/22/2018    Fatty liver: see CT 3/18 6/22/2018    Umbilical hernia without obstruction and without gangrene: see CT 3/18 6/22/2018       Past Surgical History:   Procedure Laterality Date    CEREBRAL ANGIOGRAM  2011    angiogram/coiling    COLONOSCOPY N/A 3/26/2018    Procedure: COLONOSCOPY;  Surgeon: Sanjiv Duran MD;  Location: [facility] (4TH FLR);  Service: Endoscopy;  Laterality: N/A;    ORIF FOREARM FRACTURE Bilateral 9/14/2020    Procedure: ORIF, FRACTURE, RADIUS OR ULNA, synthes, right, large C arm at a diagonal from the rear of the room, ancef, velcro wrist splint;  Surgeon: Hao Thorne MD;  [facility];  Service: Orthopedics;  Laterality: Bilateral;       Time Tracking:     OT Date of Treatment: 09/15/20  OT Start Time: 0845(456)  OT Stop Time: 0911(926)  OT Total Time (min): 26 min for first visit and 10 minutes for second visit for total of 36 minutes    Billable Minutes:Evaluation 13  Self Care/Home Management 23    Apolonia Hunter OT  9/15/2020

## 2020-09-15 NOTE — NURSING
Pt called nurse into room due to having uncontrollable shakes/chills. Pt noticeably shaking in bed. Vitals obtained, /67 (96), P 123, R 20, Oral temp 99.4, O2 sats 89% on RA. Resp contacted @ 2302 to obtain more accurate reading due to splints to BUE. Team A paged and notified. Spoke with Rosemarie Chacon MD @ 2240, ordered STAT EKG. Respiratory in room @ 2305 doing EKG.

## 2020-09-15 NOTE — CARE UPDATE
"RAPID RESPONSE NURSE PROACTIVE ROUNDING NOTE     Time of Visit: 2345    Admit Date: 2020  LOS: 2  Code Status: Full Code   Date of Visit: 09/15/2020  : 1970  Age: 50 y.o.  Sex: male  Race: White  Bed: 606/606 A:   MRN: [id number]  Was the patient discharged from an ICU this admission? no   Was the patient discharged from a PACU within last 24 hours?  no  Did the patient receive conscious sedation/general anesthesia in last 24 hours?  no  Was the patient in the ED within the past 24 hours?  no  Was the patient started on NIPPV within the past 24 hours?  no  Attending Physician: Ant Fair MD  Primary Service: [facility]    ASSESSMENT     Notified by bedside RN via phone call.  Reason for alert: Chills/Shakes    Diagnosis: Closed fracture of distal end of right radius    Abnormal Vital Signs: BP (!) 118/58 (BP Location: Right leg, Patient Position: Lying)   Pulse (!) 125   Temp (!) 100.8 °F (38.2 °C) (Oral)   Resp 16   Ht 5' 10" (1.778 m)   Wt 78.2 kg (172 lb 6.4 oz)   SpO2 97%   BMI 24.74 kg/m²      Clinical Issues: Chills/Shakes    Patient  has a past medical history of Aneurysm, Anxiety, Cerebral aneurysm, nonruptured, Colon adenoma: 3/18 repeat colonoscpy , Diverticulosis of large intestine without hemorrhage: see colonoscopy 3/18; sigmoid and descending colon, Fatty liver: see CT 3/18, and Umbilical hernia without obstruction and without gangrene: see CT 3/18.      Called to bedside, patient with intermittent chills and shakes.  Patient O2 89-90% on Room air.  SpO2 97% on 3L NC.  KAYY Velásquez at bedside. CXR, CBC, CMP, Mg, blood cultures, lactic acid, procalcitonin ordered.  ECG shows sinus tach.  Patient states he is feeling anxious, has history of anxiety. Home xanax ordered and administered.      INTERVENTIONS/ RECOMMENDATIONS     Obtain labs as ordered. Continue vital signs per unit protocol.      Discussed plan of care with Brooke RUCKER.    PHYSICIAN ESCALATION     Yes/No  " yes    Orders received and case discussed with KAYY Velásquez.    Disposition: Remain in room 606.    FOLLOW-UP     Call back the Rapid Response Nurse, Azeem De La Rosa RN at [phone] for additional questions or concerns.

## 2020-09-15 NOTE — PT/OT/SLP EVAL
Physical Therapy Evaluation    Patient Name:  Andre Padgett   MRN:  [id number]    Recommendations:     Discharge Recommendations:  (SNF vs. HH (needs increase support at discharge))   Discharge Equipment Recommendations: none   Barriers to discharge: Decreased caregiver support    Assessment:     Andre Padgett is a 50 y.o. male admitted with a medical diagnosis of Closed fracture of distal end of right radius.  He presents with the following impairments/functional limitations:  weakness, impaired endurance, decreased upper extremity function, orthopedic precautions, impaired self care skills, impaired functional mobilty, pain. Pt presents with impairments in functional mobility and self care d/t BUE NWB status. Pt reports he is currently in the process of a divorce and following discharge will have no assistance from friends or family. Pt performed all mobility(bed, Sit-to-Stand, and gait) on this date CGA and will likely progress quickly to SBA/Mod I. Pt is currently limited by inability to use BUE as he has NWB orders and unable to attempt ROM. Pt requires significant assist for set-up during self care and feeding tasks. D/t increased needs and no caregiver support pt is not safe to discharge home alone without daily HH or hired caregivers.     Rehab Prognosis: Good; patient would benefit from acute skilled PT services to address these deficits and reach maximum level of function.    Recent Surgery: Procedure(s) (LRB):  ORIF, FRACTURE, RADIUS OR ULNA, synthes, right, large C arm at a diagonal from the rear of the room, ancef, velcro wrist splint (Bilateral) 1 Day Post-Op    Plan:     During this hospitalization, patient to be seen 3 x/week to address the identified rehab impairments via gait training, therapeutic activities, therapeutic exercises, neuromuscular re-education and progress toward the following goals:    · Plan of Care Expires:  10/14/20    Subjective     Chief Complaint: limited BUE use  "  Patient/Family Comments/goals: "I'll listen and do anything you guys tell me. I'm an independent jm and want to get back to that."  Pain/Comfort:  · Pain Rating 1: 9/10  · Location - Side 1: Bilateral  · Location - Orientation 1: generalized  · Location 1: wrist    Patients cultural, spiritual, Taoist conflicts given the current situation: no    Living Environment:  Pt lives alone in a 2 story home with 4 HEATHER and resides on the first floor.   Prior to admission, patients level of function was IND.  Equipment used at home: none.  DME owned (not currently used): none.  Upon discharge, patient will have assistance from no one.    Objective:     Communicated with NSG prior to session.  Patient found HOB elevated with peripheral IV  upon PT entry to room.    General Precautions: Standard, fall   Orthopedic Precautions:RUE non weight bearing, LUE non weight bearing   Braces: UE brace     Exams:  · Cognitive Exam:  Patient is oriented to Person, Place, Time and Situation  · RLE ROM: WFL  · RLE Strength: WFL  · LLE ROM: WFL  · LLE Strength: WFL    Functional Mobility:  · Bed Mobility:     · Scooting: contact guard assistance  · Supine to Sit: contact guard assistance  · Sit to Supine: contact guard assistance  · Transfers:     · Sit to Stand:  contact guard assistance with hand-held assist  · Gait: 22ft CGA without AD   · Balance: CGA    Therapeutic Activities and Exercises:   - EOB Sitting: approx. 8mins SBA   - Pt educated on:   -PT roles, expectations, and POC    -Safety with mobility   -Benefits of OOB activities to increase strength and functional mobility    -Performing ther ex for increasing LE ROM and strength   -Discharge recommendations     AM-PAC 6 CLICK MOBILITY  Total Score:18     Patient left HOB elevated with all lines intact and call button in reach.    GOALS:   Multidisciplinary Problems     Physical Therapy Goals        Problem: Physical Therapy Goal    Goal Priority Disciplines Outcome Goal " Variances Interventions   Physical Therapy Goal     PT, PT/OT Ongoing, Progressing     Description: Goals to be met by: 10/14/2020    Patient will increase functional independence with mobility by performin. Supine to sit with Modified [place]  2. Sit to supine with Modified [place]  3. Sit to stand transfer with Modified [place]  4. Gait  x 100 feet with Supervision using LRAD  5. Lower extremity exercise program x15 reps, with independence                      History:     Past Medical History:   Diagnosis Date    Aneurysm     Right sided filling anterior communicating aneurysm s/p coiling     Anxiety     Cerebral aneurysm, nonruptured 2016    Colon adenoma: 3/18 repeat colonoscpy 2018    Diverticulosis of large intestine without hemorrhage: see colonoscopy 3/18; sigmoid and descending colon 2018    Fatty liver: see CT 3/18 2018    Umbilical hernia without obstruction and without gangrene: see CT 3/18 2018       Past Surgical History:   Procedure Laterality Date    CEREBRAL ANGIOGRAM      angiogram/coiling    COLONOSCOPY N/A 3/26/2018    Procedure: COLONOSCOPY;  Surgeon: Sanjiv Duran MD;  Location: [facility] (4TH FLR);  Service: Endoscopy;  Laterality: N/A;    ORIF FOREARM FRACTURE Bilateral 2020    Procedure: ORIF, FRACTURE, RADIUS OR ULNA, synthes, right, large C arm at a diagonal from the rear of the room, ancef, velcro wrist splint;  Surgeon: Hao Thorne MD;  Location: [facility] OR 2ND FLR;  Service: Orthopedics;  Laterality: Bilateral;       Time Tracking:     PT Received On: 09/15/20  PT Start Time: 0840     PT Stop Time: 09  PT Total Time (min): 31 min     Billable Minutes: Evaluation 8, Gait Training 10 and Therapeutic Activity 13      Brice Peterson, PT  09/15/2020

## 2020-09-16 ENCOUNTER — TELEPHONE (OUTPATIENT)
Dept: NEUROSURGERY | Facility: CLINIC | Age: 50
End: 2020-09-16

## 2020-09-16 PROBLEM — R53.81 DEBILITY: Status: ACTIVE | Noted: 2020-09-16

## 2020-09-16 LAB
ALBUMIN SERPL BCP-MCNC: 3 G/DL (ref 3.5–5.2)
ALP SERPL-CCNC: 51 U/L (ref 55–135)
ALT SERPL W/O P-5'-P-CCNC: 16 U/L (ref 10–44)
ANION GAP SERPL CALC-SCNC: 9 MMOL/L (ref 8–16)
AST SERPL-CCNC: 26 U/L (ref 10–40)
BACTERIA UR CULT: NO GROWTH
BASOPHILS # BLD AUTO: 0.01 K/UL (ref 0–0.2)
BASOPHILS NFR BLD: 0.1 % (ref 0–1.9)
BILIRUB SERPL-MCNC: 0.6 MG/DL (ref 0.1–1)
BUN SERPL-MCNC: 9 MG/DL (ref 6–20)
CALCIUM SERPL-MCNC: 8.4 MG/DL (ref 8.7–10.5)
CEA SERPL-MCNC: <1 NG/ML (ref 0–5)
CHLORIDE SERPL-SCNC: 102 MMOL/L (ref 95–110)
CO2 SERPL-SCNC: 28 MMOL/L (ref 23–29)
CREAT SERPL-MCNC: 0.8 MG/DL (ref 0.5–1.4)
DIFFERENTIAL METHOD: ABNORMAL
EOSINOPHIL # BLD AUTO: 0.2 K/UL (ref 0–0.5)
EOSINOPHIL NFR BLD: 2 % (ref 0–8)
ERYTHROCYTE [DISTWIDTH] IN BLOOD BY AUTOMATED COUNT: 11.6 % (ref 11.5–14.5)
EST. GFR  (AFRICAN AMERICAN): >60 ML/MIN/1.73 M^2
EST. GFR  (NON AFRICAN AMERICAN): >60 ML/MIN/1.73 M^2
GLUCOSE SERPL-MCNC: 109 MG/DL (ref 70–110)
HCT VFR BLD AUTO: 34.9 % (ref 40–54)
HGB BLD-MCNC: 11.4 G/DL (ref 14–18)
IMM GRANULOCYTES # BLD AUTO: 0.03 K/UL (ref 0–0.04)
IMM GRANULOCYTES NFR BLD AUTO: 0.4 % (ref 0–0.5)
LYMPHOCYTES # BLD AUTO: 1.2 K/UL (ref 1–4.8)
LYMPHOCYTES NFR BLD: 14.7 % (ref 18–48)
MCH RBC QN AUTO: 32.7 PG (ref 27–31)
MCHC RBC AUTO-ENTMCNC: 32.7 G/DL (ref 32–36)
MCV RBC AUTO: 100 FL (ref 82–98)
MONOCYTES # BLD AUTO: 0.7 K/UL (ref 0.3–1)
MONOCYTES NFR BLD: 7.9 % (ref 4–15)
NEUTROPHILS # BLD AUTO: 6.3 K/UL (ref 1.8–7.7)
NEUTROPHILS NFR BLD: 74.9 % (ref 38–73)
NRBC BLD-RTO: 0 /100 WBC
PLATELET # BLD AUTO: 128 K/UL (ref 150–350)
PMV BLD AUTO: 11.6 FL (ref 9.2–12.9)
POTASSIUM SERPL-SCNC: 4.1 MMOL/L (ref 3.5–5.1)
PROT SERPL-MCNC: 6.1 G/DL (ref 6–8.4)
RBC # BLD AUTO: 3.49 M/UL (ref 4.6–6.2)
SODIUM SERPL-SCNC: 139 MMOL/L (ref 136–145)
WBC # BLD AUTO: 8.39 K/UL (ref 3.9–12.7)

## 2020-09-16 PROCEDURE — 63600175 PHARM REV CODE 636 W HCPCS: Performed by: PHYSICIAN ASSISTANT

## 2020-09-16 PROCEDURE — 94799 UNLISTED PULMONARY SVC/PX: CPT

## 2020-09-16 PROCEDURE — 25000003 PHARM REV CODE 250: Performed by: HOSPITALIST

## 2020-09-16 PROCEDURE — 97535 SELF CARE MNGMENT TRAINING: CPT

## 2020-09-16 PROCEDURE — 80053 COMPREHEN METABOLIC PANEL: CPT

## 2020-09-16 PROCEDURE — 82378 CARCINOEMBRYONIC ANTIGEN: CPT

## 2020-09-16 PROCEDURE — 92523 SPEECH SOUND LANG COMPREHEN: CPT

## 2020-09-16 PROCEDURE — 36415 COLL VENOUS BLD VENIPUNCTURE: CPT

## 2020-09-16 PROCEDURE — 25000003 PHARM REV CODE 250: Performed by: STUDENT IN AN ORGANIZED HEALTH CARE EDUCATION/TRAINING PROGRAM

## 2020-09-16 PROCEDURE — 99232 SBSQ HOSP IP/OBS MODERATE 35: CPT | Mod: ,,, | Performed by: HOSPITALIST

## 2020-09-16 PROCEDURE — 85025 COMPLETE CBC W/AUTO DIFF WBC: CPT

## 2020-09-16 PROCEDURE — 99232 SBSQ HOSP IP/OBS MODERATE 35: CPT | Mod: ,,, | Performed by: NURSE PRACTITIONER

## 2020-09-16 PROCEDURE — 63600175 PHARM REV CODE 636 W HCPCS: Performed by: HOSPITALIST

## 2020-09-16 PROCEDURE — 94761 N-INVAS EAR/PLS OXIMETRY MLT: CPT

## 2020-09-16 PROCEDURE — 11000001 HC ACUTE MED/SURG PRIVATE ROOM

## 2020-09-16 PROCEDURE — 27000221 HC OXYGEN, UP TO 24 HOURS

## 2020-09-16 PROCEDURE — 99900035 HC TECH TIME PER 15 MIN (STAT)

## 2020-09-16 PROCEDURE — 99232 PR SUBSEQUENT HOSPITAL CARE,LEVL II: ICD-10-PCS | Mod: ,,, | Performed by: NURSE PRACTITIONER

## 2020-09-16 PROCEDURE — 97116 GAIT TRAINING THERAPY: CPT

## 2020-09-16 PROCEDURE — 97530 THERAPEUTIC ACTIVITIES: CPT

## 2020-09-16 PROCEDURE — 99232 PR SUBSEQUENT HOSPITAL CARE,LEVL II: ICD-10-PCS | Mod: ,,, | Performed by: HOSPITALIST

## 2020-09-16 RX ADMIN — OXYCODONE HYDROCHLORIDE 10 MG: 10 TABLET ORAL at 04:09

## 2020-09-16 RX ADMIN — OXYCODONE 5 MG: 5 TABLET ORAL at 07:09

## 2020-09-16 RX ADMIN — ALUMINUM HYDROXIDE, MAGNESIUM HYDROXIDE, AND SIMETHICONE 30 ML: 200; 200; 20 SUSPENSION ORAL at 08:09

## 2020-09-16 RX ADMIN — HYDROMORPHONE HYDROCHLORIDE 1.5 MG: 1 INJECTION, SOLUTION INTRAMUSCULAR; INTRAVENOUS; SUBCUTANEOUS at 12:09

## 2020-09-16 RX ADMIN — PIPERACILLIN SODIUM AND TAZOBACTAM SODIUM 4.5 G: 4; .5 INJECTION, POWDER, LYOPHILIZED, FOR SOLUTION INTRAVENOUS at 11:09

## 2020-09-16 RX ADMIN — TAMSULOSIN HYDROCHLORIDE 0.4 MG: 0.4 CAPSULE ORAL at 08:09

## 2020-09-16 RX ADMIN — ACETAMINOPHEN 1000 MG: 500 TABLET ORAL at 05:09

## 2020-09-16 RX ADMIN — ALUMINUM HYDROXIDE, MAGNESIUM HYDROXIDE, AND SIMETHICONE 30 ML: 200; 200; 20 SUSPENSION ORAL at 10:09

## 2020-09-16 RX ADMIN — ACETAMINOPHEN 650 MG: 325 TABLET ORAL at 08:09

## 2020-09-16 RX ADMIN — CARVEDILOL 12.5 MG: 12.5 TABLET, FILM COATED ORAL at 08:09

## 2020-09-16 RX ADMIN — ALUMINUM HYDROXIDE, MAGNESIUM HYDROXIDE, AND SIMETHICONE 30 ML: 200; 200; 20 SUSPENSION ORAL at 06:09

## 2020-09-16 RX ADMIN — DOCUSATE SODIUM 50 MG: 50 CAPSULE, LIQUID FILLED ORAL at 08:09

## 2020-09-16 RX ADMIN — CEFTRIAXONE SODIUM 1 G: 1 INJECTION, POWDER, FOR SOLUTION INTRAMUSCULAR; INTRAVENOUS at 12:09

## 2020-09-16 RX ADMIN — Medication 250 MG: at 08:09

## 2020-09-16 RX ADMIN — OXYCODONE HYDROCHLORIDE 10 MG: 10 TABLET ORAL at 08:09

## 2020-09-16 RX ADMIN — HYDROMORPHONE HYDROCHLORIDE 1.5 MG: 1 INJECTION, SOLUTION INTRAMUSCULAR; INTRAVENOUS; SUBCUTANEOUS at 02:09

## 2020-09-16 RX ADMIN — OXYCODONE HYDROCHLORIDE 10 MG: 10 TABLET ORAL at 01:09

## 2020-09-16 RX ADMIN — LEVETIRACETAM 500 MG: 500 TABLET, FILM COATED ORAL at 08:09

## 2020-09-16 RX ADMIN — OXYCODONE 5 MG: 5 TABLET ORAL at 03:09

## 2020-09-16 RX ADMIN — PANTOPRAZOLE SODIUM 40 MG: 40 TABLET, DELAYED RELEASE ORAL at 08:09

## 2020-09-16 RX ADMIN — ATORVASTATIN CALCIUM 40 MG: 20 TABLET, FILM COATED ORAL at 08:09

## 2020-09-16 RX ADMIN — OXYCODONE 5 MG: 5 TABLET ORAL at 11:09

## 2020-09-16 NOTE — CARE UPDATE
Rapid Response Nurse Chart Check     Chart check completed, abnormal VS noted. Temp noted to be 101.7 this AM, scheduled 1g tylenol given. Treating UTI with rocephin. Please call [phone] for further concerns or assistance.

## 2020-09-16 NOTE — ASSESSMENT & PLAN NOTE
Andre Padgett is a 50 y.o. male with bilateral DRF and subarachnoid hemmorhage now s/p Bilateral distal radius ORIF.       - Weight bearing status: NWB BUE can do ROM of elbow  - Pain control: Multimodal   - Elevate BUE  - Antibiotics: Rocephin for UTI  - DVT Prophylaxis: SCD's at all times when not ambulating.  - PT/OT  - Lines/Drains: None  - Dispo: Per PT/OT

## 2020-09-16 NOTE — PROGRESS NOTES
[facility]  Orthopedics  Progress Note    Attg Note:  Patient seen and examined.  I agree with the resident's assessment and plan.  Doing well pain controlled.  We discussed options with the patient.  He has bilateral distal radius fractures which functionally would be very difficult.  I think he may benefit from skilled nursing for a couple weeks until he figures out how to perform his ADLs with minimal stress on the wrists.    Hao Thorne MD      Patient Name: Andre Padgett  MRN: [id number]  Admission Date: 9/13/2020  Hospital Length of Stay: 3 days  Attending Provider: Gemma Oakley MD  Primary Care Provider: Hunter Diop MD  Follow-up For: Procedure(s) (LRB):  ORIF, FRACTURE, RADIUS OR ULNA, synthes, right, large C arm at a diagonal from the rear of the room, ancef, velcro wrist splint (Bilateral)    Post-Operative Day: 2 Days Post-Op  Subjective:     Principal Problem:Closed fracture of distal end of right radius    Principal Orthopedic Problem: Same    Interval History: Pt seen and examined at bedside. Rapid called for temp of 101.7 and 1 g of tylenol given.  Overall pain significantly improving in bilateral wrists.  NVI.    Review of patient's allergies indicates:  No Known Allergies    Current Facility-Administered Medications   Medication    acetaminophen tablet 1,000 mg    acetaminophen tablet 650 mg    albuterol-ipratropium 2.5 mg-0.5 mg/3 mL nebulizer solution 3 mL    ALPRAZolam tablet 0.5 mg    aluminum-magnesium hydroxide-simethicone 200-200-20 mg/5 mL suspension 30 mL    ascorbic acid (vitamin C) tablet 250 mg    atorvastatin tablet 40 mg    carvediloL tablet 12.5 mg    cefTRIAXone injection 1 g    diphenhydrAMINE capsule 25 mg    docusate sodium capsule 50 mg    glucose chewable tablet 16 g    glucose chewable tablet 24 g    hydrALAZINE tablet 25 mg    HYDROmorphone injection 1.5 mg    levETIRAcetam tablet 500 mg    lidocaine HCL 10 mg/ml (1%) injection  "20 mL    lidocaine HCL 10 mg/ml (1%) injection 20 mL    melatonin tablet 6 mg    methocarbamoL tablet 500 mg    ondansetron injection 4 mg    oxyCODONE immediate release tablet 5 mg    oxyCODONE immediate release tablet Tab 10 mg    pantoprazole EC tablet 40 mg    prochlorperazine injection Soln 5 mg    sodium chloride 0.9% flush 10 mL    sodium chloride 0.9% flush 10 mL    tamsulosin 24 hr capsule 0.4 mg     Objective:     Vital Signs (Most Recent):  Temp: 100 °F (37.8 °C) (09/16/20 0740)  Pulse: 99 (09/16/20 0740)  Resp: 18 (09/16/20 0740)  BP: (!) 117/58 (09/16/20 0740)  SpO2: 96 % (09/16/20 0740) Vital Signs (24h Range):  Temp:  [98.2 °F (36.8 °C)-101.7 °F (38.7 °C)] 100 °F (37.8 °C)  Pulse:  [] 99  Resp:  [16-22] 18  SpO2:  [88 %-96 %] 96 %  BP: (115-185)/() 117/58     Weight: 78.2 kg (172 lb 6.4 oz)  Height: 5' 10" (177.8 cm)  Body mass index is 24.74 kg/m².      Intake/Output Summary (Last 24 hours) at 9/16/2020 0832  Last data filed at 9/15/2020 1800  Gross per 24 hour   Intake --   Output 1600 ml   Net -1600 ml       Ortho/SPM Exam       Physical Exam:  NAD, A/O x 3.  Dressing c/d/i, brace in place  No focal motor or sensory deficits noted.  Drain: None      Significant Labs:   BMP:   Recent Labs   Lab 09/15/20  0027  09/16/20  0317   *   < > 109      < > 139   K 4.1   < > 4.1      < > 102   CO2 24   < > 28   BUN 7   < > 9   CREATININE 0.8   < > 0.8   CALCIUM 8.2*   < > 8.4*   MG 1.8  --   --     < > = values in this interval not displayed.     CBC:   Recent Labs   Lab 09/15/20  0027 09/15/20  0835 09/16/20 0317   WBC 6.84 10.23 8.39   HGB 12.3* 12.9* 11.4*   HCT 37.3* 39.0* 34.9*   * 126* 128*     CMP:   Recent Labs   Lab 09/15/20  0027 09/15/20  0835 09/16/20 0317    137 139   K 4.1 4.0 4.1    101 102   CO2 24 27 28   * 113* 109   BUN 7 8 9   CREATININE 0.8 0.8 0.8   CALCIUM 8.2* 8.4* 8.4*   PROT 6.0 6.3 6.1   ALBUMIN 3.3* 3.2* 3.0* "   BILITOT 1.0 0.8 0.6   ALKPHOS 50* 50* 51*   AST 23 32 26   ALT 25 22 16   ANIONGAP 10 9 9   EGFRNONAA >60.0 >60.0 >60.0     All pertinent labs within the past 24 hours have been reviewed.      Significant Imaging: I have reviewed all pertinent imaging results/findings.   No new imaging.     Assessment/Plan:     * Closed fracture of distal end of right radius  Andre Padgett is a 50 y.o. male with bilateral DRF and subarachnoid hemmorhage now s/p Bilateral distal radius ORIF.       - Weight bearing status: NWB BUE can do ROM of elbow  - Pain control: Multimodal   - Elevate BUE  - Antibiotics: Rocephin for UTI  - DVT Prophylaxis: SCD's at all times when not ambulating.  - PT/OT  - Lines/Drains: None  - Dispo: Per PT/OT        Closed fracture of distal end of left radius  See right DRF          Azeem Hilliard MD  Orthopedics  [facility]

## 2020-09-16 NOTE — PLAN OF CARE
Speech/ language/ cognitive evaluation complete. Pt appears to be at cognitive-linguistic baseline. No additional acute SLP needs at this time. Please re-consult should changes occur.     SERA Diaz, CCC-SLP  [phone]  9/16/2020

## 2020-09-16 NOTE — CARE UPDATE
Rapid Response Nurse Chart Check     Chart check completed, , 88% sats noted. bedside RNSedrick contacted. RN to recheck vitals. No concerns verbalized at this time. Instructed to call [phone] for further concerns or assistance.

## 2020-09-16 NOTE — PROGRESS NOTES
Progress Note  [facility] Medicine      Admit Date: 9/13/2020   [facility] [place] [facility]OSP MED A    SUBJECTIVE:     Follow-up For:  Closed fracture of distal end of right radius     Interval history/ROS: Ortho repair ORIF bilateral wrist. Pain better controlled today. Feels that urination easier with flomax. + fever yesterday- suspect urinary infection versus prostatitis from symptoms.     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 The patient reports that there was a lizard in his house high up on the wall. He stood on a stool in an attempt to swat at the lizard, but he fell off the stool, falling onto both arms outstretched and striking the R side of his head on a table. Pt. Had immediate pain to both wrists with noted deformities and called EMS. Pt. Denies any preceding symptoms such as lightheadedness/dizziness,  OBJECTIVE:     Body mass index is 24.74 kg/m².    Vital Signs Range (Last 24H):  Temp:  [99.2 °F (37.3 °C)-101.7 °F (38.7 °C)]   Pulse:  []   Resp:  [16-22]   BP: (115-130)/(58-69)   SpO2:  [88 %-96 %]     I & O (Last 24H):    Intake/Output Summary (Last 24 hours) at 9/16/2020 1541  Last data filed at 9/15/2020 1800  Gross per 24 hour   Intake --   Output 600 ml   Net -600 ml        Physical Exam:  General appearance: pt. In mild distress from pain  Mental status: Alert and oriented x 3  HEENT:  conjunctivae/corneas clear, PERRL  Neck: supple, thyroid not enlarged  Pulm:   normal respiratory effort, CTA B, no c/w/r  Card: RRR, S1, S2 normal, no murmur, click, rub or gallop  Abd: soft, NT, ND, BS present; no masses, no organomegaly  Ext: Wrists immobilized B/L per ortho  Pulses: 2+, symmetric  Skin: color, texture, turgor normal. No rashes or lesions  Neuro: CN II-XII grossly intact, no focal numbness or weakness, normal strength and tone     Recent Labs   Lab 09/15/20  0027  09/15/20  0835 09/16/20  0317    137 139   K 4.1 4.0 4.1    101 102   CO2 24 27 28   BUN 7 8 9   CREATININE 0.8 0.8 0.8   * 113* 109   CALCIUM 8.2* 8.4* 8.4*   MG 1.8  --   --      Recent Labs   Lab 09/13/20  2117  09/15/20  0027 09/15/20  0835 09/16/20  0317   ALKPHOS 65   < > 50* 50* 51*   ALT 36   < > 25 22 16   AST 30   < > 23 32 26   ALBUMIN 4.1   < > 3.3* 3.2* 3.0*   PROT 7.2   < > 6.0 6.3 6.1   BILITOT 0.5   < > 1.0 0.8 0.6   INR 1.1  --   --   --   --     < > = values in this interval not displayed.       Recent Labs   Lab 09/15/20  0027 09/15/20  0835 09/16/20 0317   WBC 6.84 10.23 8.39   HGB 12.3* 12.9* 11.4*   HCT 37.3* 39.0* 34.9*   * 126* 128*   GRAN 85.0*  5.8 81.0*  8.3* 74.9*  6.3   LYMPH 8.3*  0.6* 11.7*  1.2 14.7*  1.2   MONO 5.8  0.4 6.5  0.7 7.9  0.7       Recent Labs   Lab 09/14/20  2336   POCTGLUCOSE 110       No results for input(s): TROPONINI in the last 168 hours.    Diagnostic Results:  Labs: Reviewed    aluminum-magnesium hydroxide-simethicone, 30 mL, Oral, QID (AC & HS)  ascorbic acid (vitamin C), 250 mg, Oral, Daily  atorvastatin, 40 mg, Oral, Daily  carvediloL, 12.5 mg, Oral, BID  docusate sodium, 50 mg, Oral, BID  levETIRAcetam, 500 mg, Oral, BID  lidocaine HCL 10 mg/ml (1%), 20 mL, Intradermal, Once  lidocaine HCL 10 mg/ml (1%), 20 mL, Intradermal, Once  pantoprazole, 40 mg, Oral, Daily  piperacillin-tazobactam (ZOSYN) IVPB, 4.5 g, Intravenous, Q8H  tamsulosin, 0.4 mg, Oral, Daily        As Needed acetaminophen, albuterol-ipratropium, ALPRAZolam, diphenhydrAMINE, glucose, glucose, hydrALAZINE, HYDROmorphone, melatonin, methocarbamoL, ondansetron, oxyCODONE, oxyCODONE, prochlorperazine, sodium chloride 0.9%, sodium chloride 0.9%    ASSESSMENT/PLAN:     Assessment: Andre Padgett is a 50 y.o. male here with:     Active Hospital Problems    Diagnosis  POA    *Closed fracture of distal end of right radius [S52.501A]  Yes    Debility [R53.81]  Unknown     Abnormal x-ray of abdomen [R93.5]  Unknown    Closed fracture of distal end of left radius [S52.502A]  Yes    Subarachnoid hemorrhage [I60.9]  Yes    Hyperlipidemia [E78.5]  Yes      Resolved Hospital Problems   No resolved problems to display.        Plan:     Acute subarachnoid hemorrhage  Hx of Cerebral Aneurysm  -Noted on Head CT after trauma. Neurosurgery consulted and recommend conservative management, serial CT scans. Continue to monitor, neurochecks Q4. Hold home ASA, SCDs only for DVT ppx  -Keppra given for seizure ppx       B/L Distal Radial Fractures  -Orthopedic surgery consulted. F/u recommendations following surgery  -Pain control with tylenol scheduled, opiates PRN  -PT/OT after surgery     UTI continue zosyn, patient describing prostatitis type symptoms, renal ultrasound no pyelonephritis. Pending cultures. Blood and CXR normal. Ortho feels that surgery site clean and intact.     Placement Pending: PT recs but rehab most likely. Patient with minimal support at home. Medicaid.       Gemma Oakley MD

## 2020-09-16 NOTE — NURSING
Patients temp this morning is 100.1. Tylenol and Oxycodone given. Pain 8/10 to wrist. O2 is at 0.5 L NC. HR tachy @ 122. Metoprolol given. B/P Normal. Will titrate down for O2 above 95%. Patient states he is sleepy but doesn't feel any symptoms from fever. No BM, Stool softener given. Continuing with pain management. Will continue to monitor patient.

## 2020-09-16 NOTE — PROGRESS NOTES
Progress Note  Hospital Medicine      Admit Date: 9/13/2020   [facility] [place] [facility] MED A    SUBJECTIVE:     Follow-up For:  Closed fracture of distal end of right radius     Interval history/ROS: Ortho repair bilateral wrist.     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 The patient reports that there was a lizard in his house high up on the wall. He stood on a stool in an attempt to swat at the lizard, but he fell off the stool, falling onto both arms outstretched and striking the R side of his head on a table. Pt. Had immediate pain to both wrists with noted deformities and called EMS. Pt. Denies any preceding symptoms such as lightheadedness/dizziness,  OBJECTIVE:     Body mass index is 24.74 kg/m².    Vital Signs Range (Last 24H):  Temp:  [98.2 °F (36.8 °C)-102.9 °F (39.4 °C)]   Pulse:  [102-125]   Resp:  [16-20]   BP: (115-185)/()   SpO2:  [90 %-97 %]     I & O (Last 24H):    Intake/Output Summary (Last 24 hours) at 9/15/2020 2108  Last data filed at 9/15/2020 1800  Gross per 24 hour   Intake --   Output 2400 ml   Net -2400 ml        Physical Exam:  General appearance: pt. In mild distress from pain  Mental status: Alert and oriented x 3  HEENT:  conjunctivae/corneas clear, PERRL  Neck: supple, thyroid not enlarged  Pulm:   normal respiratory effort, CTA B, no c/w/r  Card: RRR, S1, S2 normal, no murmur, click, rub or gallop  Abd: soft, NT, ND, BS present; no masses, no organomegaly  Ext: Wrists immobilized B/L per ortho  Pulses: 2+, symmetric  Skin: color, texture, turgor normal. No rashes or lesions  Neuro: CN II-XII grossly intact, no focal numbness or weakness, normal strength and tone     Recent Labs   Lab 09/14/20  0643 09/15/20  0027 09/15/20  0835    138 137   K 4.5 4.1 4.0    104 101   CO2 23 24 27   BUN 11 7 8   CREATININE 0.8 0.8 0.8   * 124* 113*    CALCIUM 9.1 8.2* 8.4*   MG  --  1.8  --      Recent Labs   Lab 09/13/20  2117 09/14/20  0643 09/15/20  0027 09/15/20  0835   ALKPHOS 65 60 50* 50*   ALT 36 33 25 22   AST 30 31 23 32   ALBUMIN 4.1 3.9 3.3* 3.2*   PROT 7.2 6.8 6.0 6.3   BILITOT 0.5 0.7 1.0 0.8   INR 1.1  --   --   --        Recent Labs   Lab 09/14/20  0643 09/15/20  0027 09/15/20  0835   WBC 12.74* 6.84 10.23   HGB 14.4 12.3* 12.9*   HCT 41.6 37.3* 39.0*    131* 126*   GRAN 83.1*  10.6* 85.0*  5.8 81.0*  8.3*   LYMPH 9.0*  1.2 8.3*  0.6* 11.7*  1.2   MONO 7.1  0.9 5.8  0.4 6.5  0.7       Recent Labs   Lab 09/14/20  2336   POCTGLUCOSE 110       No results for input(s): TROPONINI in the last 168 hours.    Diagnostic Results:  Labs: Reviewed    acetaminophen, 1,000 mg, Oral, Q8H  aluminum-magnesium hydroxide-simethicone, 30 mL, Oral, QID (AC & HS)  ascorbic acid (vitamin C), 250 mg, Oral, Daily  atorvastatin, 40 mg, Oral, Daily  carvediloL, 12.5 mg, Oral, BID  cefTRIAXone (ROCEPHIN) IVPB, 1 g, Intravenous, Q24H  docusate sodium, 50 mg, Oral, BID  levETIRAcetam, 500 mg, Oral, BID  lidocaine HCL 10 mg/ml (1%), 20 mL, Intradermal, Once  lidocaine HCL 10 mg/ml (1%), 20 mL, Intradermal, Once  pantoprazole, 40 mg, Oral, Daily  tamsulosin, 0.4 mg, Oral, Daily        As Needed acetaminophen, albuterol-ipratropium, ALPRAZolam, diphenhydrAMINE, glucose, glucose, hydrALAZINE, HYDROmorphone, melatonin, methocarbamoL, ondansetron, oxyCODONE, oxyCODONE, prochlorperazine, sodium chloride 0.9%, sodium chloride 0.9%    ASSESSMENT/PLAN:     Assessment: Andre Padgett is a 50 y.o. male here with:     Active Hospital Problems    Diagnosis  POA    *Closed fracture of distal end of right radius [S52.501A]  Yes    Abnormal x-ray of abdomen [R93.5]  Unknown    Closed fracture of distal end of left radius [S52.502A]  Yes    Subarachnoid hemorrhage [I60.9]  Yes    Hyperlipidemia [E78.5]  Yes      Resolved Hospital Problems   No resolved problems to  display.        Plan:     Acute subarachnoid hemorrhage  Hx of Cerebral Aneurysm  -Noted on Head CT after trauma. Neurosurgery consulted and recommend conservative management, serial CT scans. Continue to monitor, neurochecks Q4. Hold home ASA, SCDs only for DVT ppx  -Keppra given for seizure ppx  -      B/L Distal Radial Fractures  -Orthopedic surgery consulted. F/u recommendations following surgery  -Pain control with tylenol scheduled, opiates PRN  -PT/OT after surgery     UTI continue rocephin, patient describing prostatitis type symptoms, renal ultrasound no pyelonephritis.     Placement Pending: PT karla Oakley MD

## 2020-09-16 NOTE — PROGRESS NOTES
Progress Note  Hospital Medicine      Ad[place] Date: 9/13/2020   [facility] [place] [place]    SUBJECTIVE:     Follow-up For:  Closed fracture of distal end of right radius     Interval history/ROS: Ortho repair bilateral wrist.     HPI: 51 yo M with PMHx HLD and nontraumatic rupture of cerebral aneurysm s/p coiling (2016) who presents to the ED after sustaining a fall earlier today while standing on a stool. The patient reports that there was a lizard in his house high up on the wall. He stood on a stool in an attempt to swat at the lizard, but he fell off the stool, falling onto both arms outstretched and striking the R side of his head on a table. Pt. Had immediate pain to both wrists with noted deformities and called EMS. Pt. Denies any preceding symptoms such as lightheadedness/dizziness,  OBJECTIVE:     Body mass index is 24.74 kg/m².    Vital Signs Range (Last 24H):  Temp:  [98.2 °F (36.8 °C)-102.9 °F (39.4 °C)]   Pulse:  [102-125]   Resp:  [16-20]   BP: (115-185)/()   SpO2:  [90 %-97 %]     I & O (Last 24H):    Intake/Output Summary (Last 24 hours) at 9/15/2020 2105  Last data filed at 9/15/2020 1800  Gross per 24 hour   Intake --   Output 2400 ml   Net -2400 ml        Physical Exam:  General appearance: pt. In mild distress from pain  Mental status: Alert and oriented x 3  HEENT:  conjunctivae/corneas clear, PERRL  Neck: supple, thyroid not enlarged  Pulm:   normal respiratory effort, CTA B, no c/w/r  Card: RRR, S1, S2 normal, no murmur, click, rub or gallop  Abd: soft, NT, ND, BS present; no masses, no organomegaly  Ext: Wrists immobilized B/L per ortho  Pulses: 2+, symmetric  Skin: color, texture, turgor normal. No rashes or lesions  Neuro: CN II-XII grossly intact, no focal numbness or weakness, normal strength and tone     Recent Labs   Lab 09/14/20  0643 09/15/20  0027 09/15/20  0835    138 137   K 4.5 4.1 4.0    104 101   CO2 23 24 27   BUN 11 7 8   CREATININE 0.8 0.8 0.8   * 124* 113*    CALCIUM 9.1 8.2* 8.4*   MG  --  1.8  --      Recent Labs   Lab 09/13/20  2117 09/14/20  0643 09/15/20  0027 09/15/20  0835   ALKPHOS 65 60 50* 50*   ALT 36 33 25 22   AST 30 31 23 32   ALBUMIN 4.1 3.9 3.3* 3.2*   PROT 7.2 6.8 6.0 6.3   BILITOT 0.5 0.7 1.0 0.8   INR 1.1  --   --   --        Recent Labs   Lab 09/14/20  0643 09/15/20  0027 09/15/20  0835   WBC 12.74* 6.84 10.23   HGB 14.4 12.3* 12.9*   HCT 41.6 37.3* 39.0*    131* 126*   GRAN 83.1*  10.6* 85.0*  5.8 81.0*  8.3*   LYMPH 9.0*  1.2 8.3*  0.6* 11.7*  1.2   MONO 7.1  0.9 5.8  0.4 6.5  0.7       Recent Labs   Lab 09/14/20  2336   POCTGLUCOSE 110       No results for input(s): TROPONINI in the last 168 hours.    Diagnostic Results:  Labs: Reviewed    acetaminophen, 1,000 mg, Oral, Q8H  aluminum-magnesium hydroxide-simethicone, 30 mL, Oral, QID (AC & HS)  ascorbic acid (vitamin C), 250 mg, Oral, Daily  atorvastatin, 40 mg, Oral, Daily  carvediloL, 12.5 mg, Oral, BID  cefTRIAXone (ROCEPHIN) IVPB, 1 g, Intravenous, Q24H  docusate sodium, 50 mg, Oral, BID  levETIRAcetam, 500 mg, Oral, BID  lidocaine HCL 10 mg/ml (1%), 20 mL, Intradermal, Once  lidocaine HCL 10 mg/ml (1%), 20 mL, Intradermal, Once  pantoprazole, 40 mg, Oral, Daily  tamsulosin, 0.4 mg, Oral, Daily        As Needed acetaminophen, albuterol-ipratropium, ALPRAZolam, diphenhydrAMINE, glucose, glucose, hydrALAZINE, HYDROmorphone, melatonin, methocarbamoL, ondansetron, oxyCODONE, oxyCODONE, prochlorperazine, sodium chloride 0.9%, sodium chloride 0.9%    ASSESSMENT/PLAN:     Assessment: Andre Padgett is a 50 y.o. male here with:     Active Hospital Problems    Diagnosis  POA    *Closed fracture of distal end of right radius [S52.501A]  Yes    Abnormal x-ray of abdomen [R93.5]  Unknown    Closed fracture of distal end of left radius [S52.502A]  Yes    Subarachnoid hemorrhage [I60.9]  Yes    Hyperlipidemia [E78.5]  Yes      Resolved Hospital Problems   No resolved problems to  display.        Plan:     Acute subarachnoid hemorrhage  Hx of Cerebral Aneurysm  -Noted on Head CT after trauma. Neurosurgery consulted and recommend conservative management, serial CT scans. Continue to monitor, neurochecks Q4. Hold home ASA, SCDs only for DVT ppx  -Keppra given for seizure ppx  -      B/L Distal Radial Fractures  -Orthopedic surgery consulted. F/u recommendations following surgery  -Pain control with tylenol scheduled, opiates PRN  -PT/OT after surgery     UTI continue rocephin, patient describing prostatitis type symptoms, renal ultrasound no pyelonephritis.     Placement Pending: PT karla Oakley MD

## 2020-09-16 NOTE — PLAN OF CARE
Problem: Occupational Therapy Goal  Goal: Occupational Therapy Goal  Description: Goals to be met by: 10-14-20    Patient will increase functional independence with ADLs by performing:    Feeding with Modified [place].  UE Dressing with Stand-by Assistance.  LE Dressing with Stand-by Assistance.  Grooming while standing at sink with Stand-by Assistance.  Toileting from toilet with Stand-by Assistance for hygiene and clothing management.   Toilet transfer to toilet with Stand-by Assistance.    Outcome: Ongoing, Progressing     OT goals remain appropriate.    Joey Cannon, OT   09/16/2020

## 2020-09-16 NOTE — CARE UPDATE
Patient found on room air with 88-89% sats. Patient placed back on 0.5 liter nasal cannula with sats increasing to 94%. No distress noted.   na

## 2020-09-16 NOTE — PLAN OF CARE
Patient AAOx3, free from falls, no SOB, team notified of sepsis alert. Patient given Prn for pain. Pt started on 1L o2 for at sat of 88% that increased to 95. Scheduled tylenol given for high temp. Will continue to monitor.

## 2020-09-16 NOTE — PLAN OF CARE
09/16/20 0831   Post-Acute Status   Post-Acute Authorization Placement   Post-Acute Placement Status Referrals Sent     Sw reached out to Rehab and informed Pt stable to d/c if bed and auth can happen today.

## 2020-09-16 NOTE — ASSESSMENT & PLAN NOTE
-fever with urinary tract symptoms-->on rocephin IV    See hospital course for functional status.      Recommendations  -  Encourage mobility, OOB in chair, and early ambulation as appropriate  -  PT/OT evaluate and treat  -  Pain management  -  DVT prophylaxis if appropriate   -  Monitor for and prevent skin breakdown and pressure ulcers  · Early mobility, repositioning/weight shifting every 20-30 minutes when sitting, turn patient every 2 hours, proper mattress/overlay and chair cushioning, pressure relief/heel protector boots

## 2020-09-16 NOTE — SUBJECTIVE & OBJECTIVE
Past Medical History:   Diagnosis Date    Aneurysm     Right sided filling anterior communicating aneurysm s/p coiling 2011    Anxiety     Cerebral aneurysm, nonruptured 12/27/2016    Colon adenoma: 3/18 repeat colonoscpy 2023 6/22/2018    Diverticulosis of large intestine without hemorrhage: see colonoscopy 3/18; sigmoid and descending colon 6/22/2018    Fatty liver: see CT 3/18 6/22/2018    Umbilical hernia without obstruction and without gangrene: see CT 3/18 6/22/2018     Past Surgical History:   Procedure Laterality Date    CEREBRAL ANGIOGRAM  2011    angiogram/coiling    COLONOSCOPY N/A 3/26/2018    Procedure: COLONOSCOPY;  Surgeon: Sanjiv Duran MD;  Location: [facility] (4TH FLR);  Service: Endoscopy;  Laterality: N/A;    ORIF FOREARM FRACTURE Bilateral 9/14/2020    Procedure: ORIF, FRACTURE, RADIUS OR ULNA, synthes, right, large C arm at a diagonal from the rear of the room, ancef, velcro wrist splint;  Surgeon: Hao Thorne MD;  [facility];  Service: Orthopedics;  Laterality: Bilateral;     Review of patient's allergies indicates:  No Known Allergies    Scheduled Medications:    acetaminophen  1,000 mg Oral Q8H    aluminum-magnesium hydroxide-simethicone  30 mL Oral QID (AC & HS)    ascorbic acid (vitamin C)  250 mg Oral Daily    atorvastatin  40 mg Oral Daily    carvediloL  12.5 mg Oral BID    cefTRIAXone (ROCEPHIN) IVPB  1 g Intravenous Q24H    docusate sodium  50 mg Oral BID    levETIRAcetam  500 mg Oral BID    lidocaine HCL 10 mg/ml (1%)  20 mL Intradermal Once    lidocaine HCL 10 mg/ml (1%)  20 mL Intradermal Once    pantoprazole  40 mg Oral Daily    tamsulosin  0.4 mg Oral Daily       PRN Medications: acetaminophen, albuterol-ipratropium, ALPRAZolam, diphenhydrAMINE, glucose, glucose, hydrALAZINE, HYDROmorphone, melatonin, methocarbamoL, ondansetron, oxyCODONE, oxyCODONE, prochlorperazine, sodium chloride 0.9%, sodium chloride 0.9%    Family History      Problem Relation (Age of Onset)    Brain cancer Father    Diabetes Mother    DAVID disease Mother    Hypertension Mother    No Known Problems Daughter, Sister    Thyroid disease Sister        Tobacco Use    Smoking status: Never Smoker    Smokeless tobacco: Never Used   Substance and Sexual Activity    Alcohol use: Yes     Comment: ocassionally - twice a week    Drug use: No    Sexual activity: Not on file     Review of Systems   Constitutional: Positive for activity change and fever. Negative for fatigue.   HENT: Negative for trouble swallowing and voice change.    Eyes: Negative for photophobia and visual disturbance.   Respiratory: Negative for cough and shortness of breath.    Cardiovascular: Negative for chest pain and palpitations.   Gastrointestinal: Negative for nausea and vomiting.   Genitourinary: Positive for difficulty urinating. Negative for flank pain.   Musculoskeletal: Positive for arthralgias and gait problem.   Skin: Negative for color change and rash.   Neurological: Positive for weakness. Negative for facial asymmetry, speech difficulty and numbness.   Psychiatric/Behavioral: Negative for agitation and confusion.     Objective:     Vital Signs (Most Recent):  Temp: 100 °F (37.8 °C) (09/16/20 0845)  Pulse: 99 (09/16/20 0740)  Resp: 18 (09/16/20 0844)  BP: (!) 117/58 (09/16/20 0740)  SpO2: 96 % (09/16/20 0740)    Vital Signs (24h Range):  Temp:  [98.2 °F (36.8 °C)-101.7 °F (38.7 °C)] 100 °F (37.8 °C)  Pulse:  [] 99  Resp:  [16-22] 18  SpO2:  [88 %-96 %] 96 %  BP: (115-185)/() 117/58     Body mass index is 24.74 kg/m².    Physical Exam  Constitutional:       General: He is not in acute distress.     Appearance: He is well-developed.   HENT:      Head: Normocephalic and atraumatic.   Eyes:      General:         Right eye: No discharge.         Left eye: No discharge.   Neck:      Musculoskeletal: Neck supple.   Pulmonary:      Effort: Pulmonary effort is normal. No respiratory  distress.   Abdominal:      General: There is no distension.      Palpations: Abdomen is soft.   Musculoskeletal:         General: No deformity.      Comments: B/l UE wrist/hand velcro braces in place   Skin:     General: Skin is warm and dry.   Neurological:      Mental Status: He is alert and oriented to person, place, and time.      Cranial Nerves: No dysarthria or facial asymmetry.      Motor: Weakness (BUE) present.      Comments: Follows commands    Psychiatric:         Behavior: Behavior normal.         Cognition and Memory: Cognition is not impaired.       NEUROLOGICAL EXAMINATION:     MENTAL STATUS   Oriented to person, place, and time.       Diagnostic Results:   Labs: Reviewed  X-Ray: Reviewed  CT: Reviewed  MRI: Reviewed

## 2020-09-16 NOTE — PLAN OF CARE
Problem: Fall Injury Risk  Goal: Absence of Fall and Fall-Related Injury  Outcome: Ongoing, Progressing     Problem: Adult Inpatient Plan of Care  Goal: Plan of Care Review  Outcome: Ongoing, Progressing  Goal: Patient-Specific Goal (Individualization)  Outcome: Ongoing, Progressing  Goal: Absence of Hospital-Acquired Illness or Injury  Outcome: Ongoing, Progressing  Goal: Optimal Comfort and Wellbeing  Outcome: Ongoing, Progressing  Goal: Readiness for Transition of Care  Outcome: Ongoing, Progressing  Goal: Rounds/Family Conference  Outcome: Ongoing, Progressing     Problem: Infection  Goal: Infection Symptom Resolution  Outcome: Ongoing, Progressing     Problem: Skin Injury Risk Increased  Goal: Skin Health and Integrity  Outcome: Ongoing, Progressing    Continue monitoring pt for Fever. B/P has been stable throughout the day. PT worked with patient and walked down the hallway. Pain management. IV antibiotics. Will continue to monitor patient.

## 2020-09-16 NOTE — TELEPHONE ENCOUNTER
----- Message from Carolann Dumont PA-C sent at 9/14/2020  1:07 PM CDT -----  Hello,    Please schedule patient for follow-up with Dr. Valente or myself in about 6 weeks with MRA w/wo contrast (ordered).    Thank you,  Kelly

## 2020-09-16 NOTE — PT/OT/SLP EVAL
"Speech Language Pathology Evaluation  Cognitive Communication/ Discharge Summary    Patient Name:  Andre Padgett   MRN:  [id number]   606/606 A    Admitting Diagnosis: Closed fracture of distal end of right radius    Recommendations:     Recommendations:                General Recommendations:  Follow-up not indicated  General Precautions: Standard, fall  Communication strategies:  go to room if call light pushed    History:     Past Medical History:   Diagnosis Date    Aneurysm     Right sided filling anterior communicating aneurysm s/p coiling 2011    Anxiety     Cerebral aneurysm, nonruptured 12/27/2016    Colon adenoma: 3/18 repeat colonoscpy 2023 6/22/2018    Diverticulosis of large intestine without hemorrhage: see colonoscopy 3/18; sigmoid and descending colon 6/22/2018    Fatty liver: see CT 3/18 6/22/2018    Umbilical hernia without obstruction and without gangrene: see CT 3/18 6/22/2018       Past Surgical History:   Procedure Laterality Date    CEREBRAL ANGIOGRAM  2011    angiogram/coiling    COLONOSCOPY N/A 3/26/2018    Procedure: COLONOSCOPY;  Surgeon: Sanjiv Duran MD;  Location: [facility] (4TH FLR);  Service: Endoscopy;  Laterality: N/A;    ORIF FOREARM FRACTURE Bilateral 9/14/2020    Procedure: ORIF, FRACTURE, RADIUS OR ULNA, synthes, right, large C arm at a diagonal from the rear of the room, ancef, velcro wrist splint;  Surgeon: Hao Thorne MD;  Location: [facility] OR [address] [place] [address];  Service: Orthopedics;  Laterality: Bilateral;       Social History: Per pt, lives alone. Drives. Responsible for his finances.     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       Subjective     "I got COVID in March."     Pain/Comfort:  · Pain Rating 1: 0/10  · Pain Rating Post-Intervention 1: 0/10    Objective:   Cognitive Status:    · Orientation WFL ind'ly  · Answered 3/3 long term memory q's ind'ly  · During immediate memory task recalled series " of 5, 1-digit numbers ind'ly and series of 5 unrelated words given repetition  · Answered 4/4 basic problem solving q's ind'ly   · Sequenced series of 4 words according to given attribute ind'ly  · Generated comparison/ contrast between 2 similar, common objects ind'ly  · Appeared WFL in spontaneous conversation with clinician      Receptive Language:   · Answered 7/7 complex y/n q's ind'ly  · Followed 4/4 complex directions ind'ly  · Appeared WFL in spontaneous conversation with clinician     Pragmatics:    · WFL    Expressive Language:  · Answered 6/6 responsive naming q's ind'ly  · Appeared WFL in spontaneous conversation with clinician       Motor Speech:  · WFL    Voice:   · WFL    Treatment:   Pt awake and alert upon entry. Education provided re: role of SLP and SLP POC. Encouragement provided to request SLP re-consult should he experience any cognitive-linguistic changes. Pt verbalized understanding of education provided and agreement with SLP POC. No further questions.     Assessment:   Andre Padgett is a 50 y.o. male without additional acute SLP needs at this time. Please re-consult should changes occur.     Goals:   Multidisciplinary Problems     SLP Goals     Not on file          Multidisciplinary Problems (Resolved)        Problem: SLP Goal    Goal Priority Disciplines Outcome   SLP Goal   (Resolved)     SLP Met                   Plan:   · Plan of Care reviewed with:  patient   · SLP Follow-Up:  No       Discharge recommendations:  Discharge Facility/Level of Care Needs: other (see comments)(Pending PT/OT)     Time Tracking:   SLP Treatment Date:   09/16/20  Speech Start Time:  1103  Speech Stop Time:  1113     Speech Total Time (min):  10 min    Billable Minutes: Eval 10     SERA Diaz, CCC-SLP  [phone]  9/16/2020

## 2020-09-16 NOTE — SUBJECTIVE & OBJECTIVE
"Principal Problem:Closed fracture of distal end of right radius    Principal Orthopedic Problem: Same    Interval History: Pt seen and examined at bedside. Rapid called for temp of 101.7 and 1 g of tylenol given.  Overall pain significantly improving in bilateral wrists.  NVI.    Review of patient's allergies indicates:  No Known Allergies    Current Facility-Administered Medications   Medication    acetaminophen tablet 1,000 mg    acetaminophen tablet 650 mg    albuterol-ipratropium 2.5 mg-0.5 mg/3 mL nebulizer solution 3 mL    ALPRAZolam tablet 0.5 mg    aluminum-magnesium hydroxide-simethicone 200-200-20 mg/5 mL suspension 30 mL    ascorbic acid (vitamin C) tablet 250 mg    atorvastatin tablet 40 mg    carvediloL tablet 12.5 mg    cefTRIAXone injection 1 g    diphenhydrAMINE capsule 25 mg    docusate sodium capsule 50 mg    glucose chewable tablet 16 g    glucose chewable tablet 24 g    hydrALAZINE tablet 25 mg    HYDROmorphone injection 1.5 mg    levETIRAcetam tablet 500 mg    lidocaine HCL 10 mg/ml (1%) injection 20 mL    lidocaine HCL 10 mg/ml (1%) injection 20 mL    melatonin tablet 6 mg    methocarbamoL tablet 500 mg    ondansetron injection 4 mg    oxyCODONE immediate release tablet 5 mg    oxyCODONE immediate release tablet Tab 10 mg    pantoprazole EC tablet 40 mg    prochlorperazine injection Soln 5 mg    sodium chloride 0.9% flush 10 mL    sodium chloride 0.9% flush 10 mL    tamsulosin 24 hr capsule 0.4 mg     Objective:     Vital Signs (Most Recent):  Temp: 100 °F (37.8 °C) (09/16/20 0740)  Pulse: 99 (09/16/20 0740)  Resp: 18 (09/16/20 0740)  BP: (!) 117/58 (09/16/20 0740)  SpO2: 96 % (09/16/20 0740) Vital Signs (24h Range):  Temp:  [98.2 °F (36.8 °C)-101.7 °F (38.7 °C)] 100 °F (37.8 °C)  Pulse:  [] 99  Resp:  [16-22] 18  SpO2:  [88 %-96 %] 96 %  BP: (115-185)/() 117/58     Weight: 78.2 kg (172 lb 6.4 oz)  Height: 5' 10" (177.8 cm)  Body mass index is 24.74 " kg/m².      Intake/Output Summary (Last 24 hours) at 9/16/2020 0832  Last data filed at 9/15/2020 1800  Gross per 24 hour   Intake --   Output 1600 ml   Net -1600 ml       Ortho/SPM Exam       Physical Exam:  NAD, A/O x 3.  Dressing c/d/i, brace in place  No focal motor or sensory deficits noted.  Drain: None      Significant Labs:   BMP:   Recent Labs   Lab 09/15/20  0027  09/16/20  0317   *   < > 109      < > 139   K 4.1   < > 4.1      < > 102   CO2 24   < > 28   BUN 7   < > 9   CREATININE 0.8   < > 0.8   CALCIUM 8.2*   < > 8.4*   MG 1.8  --   --     < > = values in this interval not displayed.     CBC:   Recent Labs   Lab 09/15/20  0027 09/15/20  0835 09/16/20 0317   WBC 6.84 10.23 8.39   HGB 12.3* 12.9* 11.4*   HCT 37.3* 39.0* 34.9*   * 126* 128*     CMP:   Recent Labs   Lab 09/15/20  0027 09/15/20  0835 09/16/20 0317    137 139   K 4.1 4.0 4.1    101 102   CO2 24 27 28   * 113* 109   BUN 7 8 9   CREATININE 0.8 0.8 0.8   CALCIUM 8.2* 8.4* 8.4*   PROT 6.0 6.3 6.1   ALBUMIN 3.3* 3.2* 3.0*   BILITOT 1.0 0.8 0.6   ALKPHOS 50* 50* 51*   AST 23 32 26   ALT 25 22 16   ANIONGAP 10 9 9   EGFRNONAA >60.0 >60.0 >60.0     All pertinent labs within the past 24 hours have been reviewed.      Significant Imaging: I have reviewed all pertinent imaging results/findings.   No new imaging.

## 2020-09-16 NOTE — PT/OT/SLP PROGRESS
"Occupational Therapy   Treatment    Name: Andre Padgett  MRN: [id number]  Admitting Diagnosis:  Closed fracture of distal end of right radius  2 Days Post-Op    Recommendations:     Discharge Recommendations: nursing facility, skilled  Discharge Equipment Recommendations:  none  Barriers to discharge:  Decreased caregiver support(Pt lives alone.)    Assessment:     Andre Padgett is a 50 y.o. male with a medical diagnosis of Closed fracture of distal end of right radius.  Pt with good participation and activity tolerance during session.  His impaired BUE function continues to limit his ability to perform ADLs independently.  Due to this and due to his living alone, skilled nursing facility recommended at d/c for maximal pt gains in functional independence and for pt safety upon returning home.  Pt may progress to HH depending on his level of function and whether or not he has increased support at d/c.  He presents with the following deficits. Performance deficits affecting function are weakness, impaired endurance, impaired self care skills, impaired functional mobilty, edema, decreased upper extremity function.     Rehab Prognosis:  Good; patient would benefit from acute skilled OT services to address these deficits and reach maximum level of function.       Plan:     Patient to be seen 5 x/week to address the above listed problems via self-care/home management, therapeutic activities, therapeutic exercises  · Plan of Care Expires: 10/15/20  · Plan of Care Reviewed with: patient    Subjective   "I really want to work out my fingers."   Pain/Comfort:  · Pain Rating 1: other (see comments)(Pt did not rate.)  · Location - Side 1: Bilateral  · Location - Orientation 1: generalized  · Location 1: wrist  · Pain Addressed 1: Pre-medicate for activity, Reposition, Distraction  · Pain Rating Post-Intervention 1: other (see comments)(same)  · Pain Rating 2: other (see comments)(Pt did not rate.)  · Location - Side 2: " Right  · Location - Orientation 2: posterior  · Location 2: shoulder  · Pain Addressed 2: Reposition, Distraction  · Pain Rating Post-Intervention 2: other (see comments)(same)    Objective:     Communicated with: prior to session.  Patient found sleeping with  HOB elevated with oxygen upon OT entry to room.    General Precautions: Standard, fall   Orthopedic Precautions:RUE non weight bearing, LUE non weight bearing   Braces: UE brace(B wrists/forearms)     Occupational Performance:     Bed Mobility:    · Patient completed Rolling/Turning to Right with setup assistance of removing linens   · Patient completed Scooting/Bridging anteriorly to EOB with stand by assistance  · Patient completed Supine to Sit with stand by assistance, requiring cues not to use his UE to push himself into upright position     Functional Mobility/Transfers:  · Patient completed Sit <> Stand Transfer from EOB x 2 trials and from chair in hallway x 1 trial with stand by assistance  with  no assistive device   · Patient completed Bed <> Chair Transfer using Step Transfer technique with stand by assistance with no assistive device  · Functional Mobility: Pt ambulated a functional household distance from EOB, down the hallway, and back to EOB with SBA with no AD.  Pt with no LOB but required one seated rest break at the end of the hallway.     Activities of Daily Living:  · Grooming: contact guard assistance to brush his teeth and wash his face while standing at bathroom sink.       [facility] 6 Click ADL: 14    Treatment & Education:  - Pt performed the following non-resistive BUE AROM therapeutic exercises while seated in hallway chair: shoulder flexion, composite and isolated digit flexion/extension, and brachioradialis curls.    - Pt edu on role of OT, POC, safety when performing self care tasks, benefit of performing OOB activity, and safety when performing functional transfers and mobility.  - White board updated  - Self care tasks completed--  as noted above     -Pt educated on his BUE NWB and no wrist ROM precautions.     Patient left up in chair with all lines intact and call button in reachEducation:      GOALS:   Multidisciplinary Problems     Occupational Therapy Goals        Problem: Occupational Therapy Goal    Goal Priority Disciplines Outcome Interventions   Occupational Therapy Goal     OT, PT/OT Ongoing, Progressing    Description: Goals to be met by: 10-14-20    Patient will increase functional independence with ADLs by performing:    Feeding with Modified [place].  UE Dressing with Stand-by Assistance.  LE Dressing with Stand-by Assistance.  Grooming while standing at sink with Stand-by Assistance.  Toileting from toilet with Stand-by Assistance for hygiene and clothing management.   Toilet transfer to toilet with Stand-by Assistance.                     Time Tracking:     OT Date of Treatment: 09/16/20  OT Start Time: 1523  OT Stop Time: 1548  OT Total Time (min): 25 min    Billable Minutes:Self Care/Home Management 8 min.  Therapeutic Activity 15 min.    Joey Cannon, OT  9/16/2020

## 2020-09-16 NOTE — CONSULTS
[facility]  Physical Medicine & Rehab  Consult Note    Patient Name: Andre Padgett  MRN: [id number]  Admission Date: 9/13/2020  Hospital Length of Stay: 3 days  Attending Physician: Gemma Oakley MD     Inpatient consult to Physical Medicine & Rehabilitation  Consult performed by: Kirsty Linda NP  Consult requested by:  Gemma Oakley MD    Reason for Consult:  assess rehabilitation needs  Consults  Subjective:     Principal Problem: Closed fracture of distal end of right radius    HPI: Per chart review, Angi Padgett is a 50-year-old male with PMHx of nontraumatic rupture of cerebral aneurysm s/p coiling (2016), HLD, & previous COVID-19 infection.  Patient presented to [facility] on 9/13 s/p fall with head and RUE trauma while standing on a stool after chasing a lizard on the wall.  CTH revealed SAH. Xrays revealed b/l distal radial fractures. Ortho and NSGY consulted. Now s/p Bilateral distal radius ORIF. NWB BUE. No acute neurosurgical intervention indicated at this time. Hospital course further complicated by fever (102.9) and UTI (on rocephin).     Functional History: Patient lives alone in a 2 story home with 4-5 steps to enter.  Lives on 1st floor. Prior to admission, (I) with ADLs and mobility. DME: none.     Hospital Course: 9/15: BM & sit to stand CGA. Feed Gray. Ambulated 22 ft CGA. SLP evals pending.     Past Medical History:   Diagnosis Date    Aneurysm     Right sided filling anterior communicating aneurysm s/p coiling 2011    Anxiety     Cerebral aneurysm, nonruptured 12/27/2016    Colon adenoma: 3/18 repeat colonoscpy 2023 6/22/2018    Diverticulosis of large intestine without hemorrhage: see colonoscopy 3/18; sigmoid and descending colon 6/22/2018    Fatty liver: see CT 3/18 6/22/2018    Umbilical hernia without obstruction and without gangrene: see CT 3/18 6/22/2018     Past Surgical History:   Procedure Laterality Date    CEREBRAL ANGIOGRAM  2011    angiogram/coiling     COLONOSCOPY N/A 3/26/2018    Procedure: COLONOSCOPY;  Surgeon: Sanjiv Duran MD;  Location: [facility] (4TH FLR);  Service: Endoscopy;  Laterality: N/A;    ORIF FOREARM FRACTURE Bilateral 9/14/2020    Procedure: ORIF, FRACTURE, RADIUS OR ULNA, synthes, right, large C arm at a diagonal from the rear of the room, ancef, velcro wrist splint;  Surgeon: Hao Thorne MD;  Location: [facility] OR 2ND FLR;  Service: Orthopedics;  Laterality: Bilateral;     Review of patient's allergies indicates:  No Known Allergies    Scheduled Medications:    acetaminophen  1,000 mg Oral Q8H    aluminum-magnesium hydroxide-simethicone  30 mL Oral QID (AC & HS)    ascorbic acid (vitamin C)  250 mg Oral Daily    atorvastatin  40 mg Oral Daily    carvediloL  12.5 mg Oral BID    cefTRIAXone (ROCEPHIN) IVPB  1 g Intravenous Q24H    docusate sodium  50 mg Oral BID    levETIRAcetam  500 mg Oral BID    lidocaine HCL 10 mg/ml (1%)  20 mL Intradermal Once    lidocaine HCL 10 mg/ml (1%)  20 mL Intradermal Once    pantoprazole  40 mg Oral Daily    tamsulosin  0.4 mg Oral Daily       PRN Medications: acetaminophen, albuterol-ipratropium, ALPRAZolam, diphenhydrAMINE, glucose, glucose, hydrALAZINE, HYDROmorphone, melatonin, methocarbamoL, ondansetron, oxyCODONE, oxyCODONE, prochlorperazine, sodium chloride 0.9%, sodium chloride 0.9%    Family History     Problem Relation (Age of Onset)    Brain cancer Father    Diabetes Mother    DAVID disease Mother    Hypertension Mother    No Known Problems Daughter, Sister    Thyroid disease Sister        Tobacco Use    Smoking status: Never Smoker    Smokeless tobacco: Never Used   Substance and Sexual Activity    Alcohol use: Yes     Comment: ocassionally - twice a week    Drug use: No    Sexual activity: Not on file     Review of Systems   Constitutional: Positive for activity change and fever. Negative for fatigue.   HENT: Negative for trouble swallowing and voice change.    Eyes: Negative  for photophobia and visual disturbance.   Respiratory: Negative for cough and shortness of breath.    Cardiovascular: Negative for chest pain and palpitations.   Gastrointestinal: Negative for nausea and vomiting.   Genitourinary: Positive for difficulty urinating. Negative for flank pain.   Musculoskeletal: Positive for arthralgias and gait problem.   Skin: Negative for color change and rash.   Neurological: Positive for weakness. Negative for facial asymmetry, speech difficulty and numbness.   Psychiatric/Behavioral: Negative for agitation and confusion.     Objective:     Vital Signs (Most Recent):  Temp: 100 °F (37.8 °C) (09/16/20 0845)  Pulse: 99 (09/16/20 0740)  Resp: 18 (09/16/20 0844)  BP: (!) 117/58 (09/16/20 0740)  SpO2: 96 % (09/16/20 0740)    Vital Signs (24h Range):  Temp:  [98.2 °F (36.8 °C)-101.7 °F (38.7 °C)] 100 °F (37.8 °C)  Pulse:  [] 99  Resp:  [16-22] 18  SpO2:  [88 %-96 %] 96 %  BP: (115-185)/() 117/58     Body mass index is 24.74 kg/m².    Physical Exam  Constitutional:       General: He is not in acute distress.     Appearance: He is well-developed.   HENT:      Head: Normocephalic and atraumatic.   Eyes:      General:         Right eye: No discharge.         Left eye: No discharge.   Neck:      Musculoskeletal: Neck supple.   Pulmonary:      Effort: Pulmonary effort is normal. No respiratory distress.   Abdominal:      General: There is no distension.      Palpations: Abdomen is soft.   Musculoskeletal:         General: No deformity.      Comments: B/l UE wrist/hand velcro braces in place   Skin:     General: Skin is warm and dry.   Neurological:      Mental Status: He is alert and oriented to person, place, and time.      Cranial Nerves: No dysarthria or facial asymmetry.      Motor: Weakness (BUE) present.      Comments: Follows commands    Psychiatric:         Behavior: Behavior normal.         Cognition and Memory: Cognition is not impaired.       Diagnostic Results:   Labs:  Reviewed  X-Ray: Reviewed  CT: Reviewed  MRI: Reviewed    Assessment/Plan:     * Closed fracture of distal end of right radius  -s/p fall with trauma   -ortho consulted  -s/p Bilateral distal radius ORIF 9/14  -NWB BUE can do ROM of elbow per ortho, braces in place     Debility       -PT/OT/SLP  -fever with urinary tract symptoms-->on rocephin IV      Closed fracture of distal end of left radius  -see Closed fracture of distal end of right radius    Subarachnoid hemorrhage  -seen on imaging s/p fall  -per NSGY, Trace L sylvian fissure/R temporal SAH appears stable with no interval increase from prior CTH. Acomm aneurysm coiling construct visualized with no evidence of recurrence and no active flow    See hospital course for functional, cognitive/speech/language, and nutrition/swallow status.      Recommendations  -  Monitor sleep disturbances and establish consistent sleep-wake cycle  -  Environmental modifications to limit agitation/confusion   -  Reorient patient to person, place, time, and situation on each encounter  -  Avoid restraints  -  May benefit from 24/7 supervision  -  Avoid/limit medications that can worsen delirium (benzodiazepines, antihistamines, anticholinergics, hypnotics, opiates)  -  Encourage mobility, OOB in chair, and early ambulation as appropriate  -  PT/OT evaluate and treat  -  SLP speech and cognitive evaluate and treat  -  Monitor for bowel and bladder dysfunction  -  Monitor for and prevent skin breakdown and pressure ulcers  · Early mobility, repositioning/weight shifting every 20-30 minutes when sitting, turn patient every 2 hours, proper mattress/overlay and chair cushioning, pressure relief/heel protector boots    Participating with therapy. Will follow progress and discuss with PM&R staff for post acute care recommendation.      Thank you for your consult.     Kirsty Linda NP  Department of Physical Medicine & Rehab  [facility]

## 2020-09-16 NOTE — PT/OT/SLP PROGRESS
Physical Therapy Treatment    Patient Name:  Andre Padgett   MRN:  [id number]    Recommendations:     Discharge Recommendations:  (SNF vs. HH (needs increase support at discharge))   Discharge Equipment Recommendations: none   Barriers to discharge: Decreased caregiver support    Assessment:     Andre Padgett is a 50 y.o. male admitted with a medical diagnosis of Closed fracture of distal end of right radius.  He presents with the following impairments/functional limitations:  weakness, impaired endurance, decreased upper extremity function, impaired self care skills, impaired functional mobilty, edema. Pt tolerated session well on this date reporting decreased pain in bilat wrist(6/10). Pt performed all mobility with set-up/SBA limited primarily by BUE NWB status. Pt would benefit from continued skilled acute PT 2x/wk to improve functional mobility. Pt encouraged to continue ambulating and safe to mobilize with nursing.      Rehab Prognosis: Good; patient would benefit from acute skilled PT services to address these deficits and reach maximum level of function.    Recent Surgery: Procedure(s) (LRB):  ORIF, FRACTURE, RADIUS OR ULNA, synthes, right, large C arm at a diagonal from the rear of the room, ancef, velcro wrist splint (Bilateral) 2 Days Post-Op    Plan:     During this hospitalization, patient to be seen 2 x/week to address the identified rehab impairments via gait training, therapeutic activities, therapeutic exercises, neuromuscular re-education and progress toward the following goals:    · Plan of Care Expires:  10/14/20    Subjective     Chief Complaint: BUE swelling and impaired self care abilities   Patient/Family Comments/goals: Pt pleasant and willing to participate with therapy on this date.    Pain/Comfort:  · Pain Rating 1: 6/10  · Location - Side 1: Bilateral  · Location 1: wrist      Objective:     Communicated with NSG prior to session.  Patient found supine with peripheral IV upon PT entry  to room.     General Precautions: Standard, fall   Orthopedic Precautions:RUE non weight bearing, LUE non weight bearing   Braces:       Functional Mobility:  · Bed Mobility:     · Rolling Right: Set-up Assistance(Linen removal)  · Scooting: stand by assistance  · Supine to Sit: stand by assistance  · Transfers:     · Sit to Stand:  stand by assistance with no AD  · Toilet Transfer: Set-up Assistance(Clothing management) with  no AD  using  Step Transfer  · Gait: 112ft, 200ft, 312ft SBA without AD. Pt initially demonstrating mild unsteadiness but increasing stability as amb distance increased   · Balance: SBA      AM-PAC 6 CLICK MOBILITY  Turning over in bed (including adjusting bedclothes, sheets and blankets)?: 3  Sitting down on and standing up from a chair with arms (e.g., wheelchair, bedside commode, etc.): 3  Moving from lying on back to sitting on the side of the bed?: 3  Moving to and from a bed to a chair (including a wheelchair)?: 3  Need to walk in hospital room?: 3  Climbing 3-5 steps with a railing?: 3  Basic Mobility Total Score: 18       Therapeutic Activities and Exercises:   - Sit-to-Stand x3 trials SBA without AD  - Pt educated on:   -PT roles, expectations, and POC    -Safety with mobility   -Benefits of OOB activities to increase strength and functional mobility    -Performing ther ex for increasing LE ROM and strength   -Discharge recommendations     Patient left up in chair with call button in reach..    GOALS:   Multidisciplinary Problems     Physical Therapy Goals        Problem: Physical Therapy Goal    Goal Priority Disciplines Outcome Goal Variances Interventions   Physical Therapy Goal     PT, PT/OT Ongoing, Progressing     Description: Goals to be met by: 10/14/2020    Patient will increase functional independence with mobility by performin. Supine to sit with Modified [place]  2. Sit to supine with Modified [place]  3. Sit to stand transfer with Modified  [place]  4. Gait  x 100 feet with Supervision using LRAD  5. Lower extremity exercise program x15 reps, with independence                      Time Tracking:     PT Received On: 09/16/20  PT Start Time: 1003     PT Stop Time: 1031  PT Total Time (min): 28 min     Billable Minutes: Gait Training 28    Treatment Type: Treatment  PT/PTA: PT           Brice Peterson, PT  09/16/2020

## 2020-09-17 PROBLEM — S52.531D CLOSED COLLES' FRACTURE OF RIGHT RADIUS WITH ROUTINE HEALING: Status: ACTIVE | Noted: 2020-09-14

## 2020-09-17 PROBLEM — S52.532D CLOSED COLLES' FRACTURE OF LEFT RADIUS WITH ROUTINE HEALING: Status: ACTIVE | Noted: 2020-09-14

## 2020-09-17 PROBLEM — R93.5 ABNORMAL X-RAY OF ABDOMEN: Status: RESOLVED | Noted: 2020-09-14 | Resolved: 2020-09-17

## 2020-09-17 LAB
ALBUMIN SERPL BCP-MCNC: 2.9 G/DL (ref 3.5–5.2)
ALP SERPL-CCNC: 57 U/L (ref 55–135)
ALT SERPL W/O P-5'-P-CCNC: 16 U/L (ref 10–44)
ANION GAP SERPL CALC-SCNC: 7 MMOL/L (ref 8–16)
AST SERPL-CCNC: 27 U/L (ref 10–40)
BASOPHILS # BLD AUTO: 0.01 K/UL (ref 0–0.2)
BASOPHILS NFR BLD: 0.1 % (ref 0–1.9)
BILIRUB SERPL-MCNC: 0.8 MG/DL (ref 0.1–1)
BUN SERPL-MCNC: 8 MG/DL (ref 6–20)
CALCIUM SERPL-MCNC: 8.7 MG/DL (ref 8.7–10.5)
CHLORIDE SERPL-SCNC: 103 MMOL/L (ref 95–110)
CO2 SERPL-SCNC: 29 MMOL/L (ref 23–29)
CREAT SERPL-MCNC: 0.7 MG/DL (ref 0.5–1.4)
DIFFERENTIAL METHOD: ABNORMAL
EOSINOPHIL # BLD AUTO: 0.2 K/UL (ref 0–0.5)
EOSINOPHIL NFR BLD: 1.7 % (ref 0–8)
ERYTHROCYTE [DISTWIDTH] IN BLOOD BY AUTOMATED COUNT: 11.3 % (ref 11.5–14.5)
EST. GFR  (AFRICAN AMERICAN): >60 ML/MIN/1.73 M^2
EST. GFR  (NON AFRICAN AMERICAN): >60 ML/MIN/1.73 M^2
GLUCOSE SERPL-MCNC: 115 MG/DL (ref 70–110)
HCT VFR BLD AUTO: 32.6 % (ref 40–54)
HGB BLD-MCNC: 11 G/DL (ref 14–18)
IMM GRANULOCYTES # BLD AUTO: 0.03 K/UL (ref 0–0.04)
IMM GRANULOCYTES NFR BLD AUTO: 0.3 % (ref 0–0.5)
LYMPHOCYTES # BLD AUTO: 1 K/UL (ref 1–4.8)
LYMPHOCYTES NFR BLD: 11.6 % (ref 18–48)
MCH RBC QN AUTO: 33.3 PG (ref 27–31)
MCHC RBC AUTO-ENTMCNC: 33.7 G/DL (ref 32–36)
MCV RBC AUTO: 99 FL (ref 82–98)
MONOCYTES # BLD AUTO: 0.5 K/UL (ref 0.3–1)
MONOCYTES NFR BLD: 5.1 % (ref 4–15)
NEUTROPHILS # BLD AUTO: 7.2 K/UL (ref 1.8–7.7)
NEUTROPHILS NFR BLD: 81.2 % (ref 38–73)
NRBC BLD-RTO: 0 /100 WBC
PLATELET # BLD AUTO: 151 K/UL (ref 150–350)
PMV BLD AUTO: 11 FL (ref 9.2–12.9)
POTASSIUM SERPL-SCNC: 3.9 MMOL/L (ref 3.5–5.1)
PROT SERPL-MCNC: 6.3 G/DL (ref 6–8.4)
RBC # BLD AUTO: 3.3 M/UL (ref 4.6–6.2)
SODIUM SERPL-SCNC: 139 MMOL/L (ref 136–145)
WBC # BLD AUTO: 8.83 K/UL (ref 3.9–12.7)

## 2020-09-17 PROCEDURE — 99232 SBSQ HOSP IP/OBS MODERATE 35: CPT | Mod: ,,, | Performed by: NURSE PRACTITIONER

## 2020-09-17 PROCEDURE — 85025 COMPLETE CBC W/AUTO DIFF WBC: CPT

## 2020-09-17 PROCEDURE — 80053 COMPREHEN METABOLIC PANEL: CPT

## 2020-09-17 PROCEDURE — 25000003 PHARM REV CODE 250: Performed by: HOSPITALIST

## 2020-09-17 PROCEDURE — 25000003 PHARM REV CODE 250: Performed by: STUDENT IN AN ORGANIZED HEALTH CARE EDUCATION/TRAINING PROGRAM

## 2020-09-17 PROCEDURE — 36415 COLL VENOUS BLD VENIPUNCTURE: CPT

## 2020-09-17 PROCEDURE — 94761 N-INVAS EAR/PLS OXIMETRY MLT: CPT

## 2020-09-17 PROCEDURE — 97535 SELF CARE MNGMENT TRAINING: CPT

## 2020-09-17 PROCEDURE — 99232 PR SUBSEQUENT HOSPITAL CARE,LEVL II: ICD-10-PCS | Mod: ,,, | Performed by: NURSE PRACTITIONER

## 2020-09-17 PROCEDURE — 99231 PR SUBSEQUENT HOSPITAL CARE,LEVL I: ICD-10-PCS | Mod: ,,, | Performed by: HOSPITALIST

## 2020-09-17 PROCEDURE — 99231 SBSQ HOSP IP/OBS SF/LOW 25: CPT | Mod: ,,, | Performed by: HOSPITALIST

## 2020-09-17 PROCEDURE — 63600175 PHARM REV CODE 636 W HCPCS: Performed by: HOSPITALIST

## 2020-09-17 PROCEDURE — 11000001 HC ACUTE MED/SURG PRIVATE ROOM

## 2020-09-17 RX ORDER — POLYETHYLENE GLYCOL 3350 17 G/17G
17 POWDER, FOR SOLUTION ORAL DAILY
Status: DISCONTINUED | OUTPATIENT
Start: 2020-09-17 | End: 2020-09-23 | Stop reason: HOSPADM

## 2020-09-17 RX ORDER — LACTULOSE 10 G/15ML
20 SOLUTION ORAL ONCE
Status: COMPLETED | OUTPATIENT
Start: 2020-09-17 | End: 2020-09-17

## 2020-09-17 RX ORDER — OXYCODONE HYDROCHLORIDE 10 MG/1
10 TABLET ORAL
Status: DISCONTINUED | OUTPATIENT
Start: 2020-09-17 | End: 2020-09-22

## 2020-09-17 RX ORDER — AMOXICILLIN 250 MG
1 CAPSULE ORAL 2 TIMES DAILY
Status: DISCONTINUED | OUTPATIENT
Start: 2020-09-17 | End: 2020-09-23 | Stop reason: HOSPADM

## 2020-09-17 RX ORDER — BISACODYL 10 MG
10 SUPPOSITORY, RECTAL RECTAL DAILY PRN
Status: DISCONTINUED | OUTPATIENT
Start: 2020-09-17 | End: 2020-09-23 | Stop reason: HOSPADM

## 2020-09-17 RX ADMIN — LACTULOSE 20 G: 20 SOLUTION ORAL at 09:09

## 2020-09-17 RX ADMIN — HYDROMORPHONE HYDROCHLORIDE 1.5 MG: 1 INJECTION, SOLUTION INTRAMUSCULAR; INTRAVENOUS; SUBCUTANEOUS at 05:09

## 2020-09-17 RX ADMIN — ALUMINUM HYDROXIDE, MAGNESIUM HYDROXIDE, AND SIMETHICONE 30 ML: 200; 200; 20 SUSPENSION ORAL at 06:09

## 2020-09-17 RX ADMIN — LEVETIRACETAM 500 MG: 500 TABLET, FILM COATED ORAL at 09:09

## 2020-09-17 RX ADMIN — OXYCODONE HYDROCHLORIDE 10 MG: 10 TABLET ORAL at 08:09

## 2020-09-17 RX ADMIN — OXYCODONE HYDROCHLORIDE 10 MG: 10 TABLET ORAL at 10:09

## 2020-09-17 RX ADMIN — DOCUSATE SODIUM 50MG AND SENNOSIDES 8.6MG 1 TABLET: 8.6; 5 TABLET, FILM COATED ORAL at 09:09

## 2020-09-17 RX ADMIN — Medication 250 MG: at 09:09

## 2020-09-17 RX ADMIN — ALUMINUM HYDROXIDE, MAGNESIUM HYDROXIDE, AND SIMETHICONE 30 ML: 200; 200; 20 SUSPENSION ORAL at 09:09

## 2020-09-17 RX ADMIN — PANTOPRAZOLE SODIUM 40 MG: 40 TABLET, DELAYED RELEASE ORAL at 09:09

## 2020-09-17 RX ADMIN — OXYCODONE HYDROCHLORIDE 10 MG: 10 TABLET ORAL at 12:09

## 2020-09-17 RX ADMIN — OXYCODONE HYDROCHLORIDE 10 MG: 10 TABLET ORAL at 04:09

## 2020-09-17 RX ADMIN — PIPERACILLIN SODIUM AND TAZOBACTAM SODIUM 4.5 G: 4; .5 INJECTION, POWDER, LYOPHILIZED, FOR SOLUTION INTRAVENOUS at 11:09

## 2020-09-17 RX ADMIN — CARVEDILOL 12.5 MG: 12.5 TABLET, FILM COATED ORAL at 09:09

## 2020-09-17 RX ADMIN — ATORVASTATIN CALCIUM 40 MG: 20 TABLET, FILM COATED ORAL at 09:09

## 2020-09-17 RX ADMIN — PIPERACILLIN SODIUM AND TAZOBACTAM SODIUM 4.5 G: 4; .5 INJECTION, POWDER, LYOPHILIZED, FOR SOLUTION INTRAVENOUS at 04:09

## 2020-09-17 RX ADMIN — POLYETHYLENE GLYCOL 3350 17 G: 17 POWDER, FOR SOLUTION ORAL at 09:09

## 2020-09-17 RX ADMIN — OXYCODONE HYDROCHLORIDE 10 MG: 10 TABLET ORAL at 05:09

## 2020-09-17 RX ADMIN — ALUMINUM HYDROXIDE, MAGNESIUM HYDROXIDE, AND SIMETHICONE 30 ML: 200; 200; 20 SUSPENSION ORAL at 05:09

## 2020-09-17 RX ADMIN — ALUMINUM HYDROXIDE, MAGNESIUM HYDROXIDE, AND SIMETHICONE 30 ML: 200; 200; 20 SUSPENSION ORAL at 01:09

## 2020-09-17 RX ADMIN — TAMSULOSIN HYDROCHLORIDE 0.4 MG: 0.4 CAPSULE ORAL at 09:09

## 2020-09-17 RX ADMIN — PIPERACILLIN SODIUM AND TAZOBACTAM SODIUM 4.5 G: 4; .5 INJECTION, POWDER, LYOPHILIZED, FOR SOLUTION INTRAVENOUS at 08:09

## 2020-09-17 NOTE — PLAN OF CARE
09/17/20 0844   Post-Acute Status   Post-Acute Authorization Placement   Post-Acute Placement Status Referrals Sent

## 2020-09-17 NOTE — PROGRESS NOTES
[facility]  Physical Medicine & Rehab  Progress Note    Patient Name: Andre Padgett  MRN: [id number]  Admission Date: 9/13/2020  Length of Stay: 4 days  Attending Physician: Elia Nelson MD    Subjective:     Principal Problem:Closed fracture of distal end of right radius    Hospital Course:   9/15: BM & sit to stand CGA. Feed Gray. Ambulated 22 ft CGA. SLP evals pending.   9/16: Per SLP, without additional acute SLP needs at this time. BM setup-SBA. Sit to stand SBA. Toilet set up for clothing management. Ambulated 624 ft SBA.     Interval History 9/17/2020:  Patient is seen for follow-up PM&R evaluation and recommendations: Participating w/ therapy.    HPI, Past Medical, Family, and Social History remains the same as documented in the initial encounter.    Scheduled Medications:    aluminum-magnesium hydroxide-simethicone  30 mL Oral QID (AC & HS)    ascorbic acid (vitamin C)  250 mg Oral Daily    atorvastatin  40 mg Oral Daily    carvediloL  12.5 mg Oral BID    lactulose  20 g Oral Once    levETIRAcetam  500 mg Oral BID    lidocaine HCL 10 mg/ml (1%)  20 mL Intradermal Once    lidocaine HCL 10 mg/ml (1%)  20 mL Intradermal Once    pantoprazole  40 mg Oral Daily    piperacillin-tazobactam (ZOSYN) IVPB  4.5 g Intravenous Q8H    polyethylene glycol  17 g Oral Daily    senna-docusate 8.6-50 mg  1 tablet Oral BID    tamsulosin  0.4 mg Oral Daily       Diagnostic Results: Labs: Reviewed    PRN Medications: acetaminophen, albuterol-ipratropium, ALPRAZolam, bisacodyL, diphenhydrAMINE, glucose, glucose, hydrALAZINE, HYDROmorphone, melatonin, methocarbamoL, ondansetron, oxyCODONE, oxyCODONE, prochlorperazine, sodium chloride 0.9%, sodium chloride 0.9%    Review of Systems   Constitutional: Positive for activity change. Negative for fever (improved).   HENT: Negative for trouble swallowing.    Respiratory: Negative for cough and shortness of breath.    Cardiovascular: Negative for chest  pain and palpitations.   Musculoskeletal: Positive for arthralgias and gait problem.   Neurological: Positive for weakness. Negative for speech difficulty.   Psychiatric/Behavioral: Negative for confusion.     Objective:     Vital Signs (Most Recent):  Temp: 99.2 °F (37.3 °C) (09/17/20 0608)  Pulse: 94 (09/17/20 0608)  Resp: 16 (09/17/20 0809)  BP: (!) 126/58 (09/17/20 0608)  SpO2: 98 % (09/17/20 0608)    Vital Signs (24h Range):  Temp:  [98.7 °F (37.1 °C)-99.7 °F (37.6 °C)] 99.2 °F (37.3 °C)  Pulse:  [84-94] 94  Resp:  [16-18] 16  SpO2:  [92 %-98 %] 98 %  BP: (122-134)/(58-62) 126/58     Physical Exam  Constitutional:       General: He is not in acute distress.     Appearance: He is well-developed.   HENT:      Head: Normocephalic and atraumatic.   Eyes:      General:         Right eye: No discharge.         Left eye: No discharge.   Neck:      Musculoskeletal: Neck supple.   Pulmonary:      Effort: Pulmonary effort is normal. No respiratory distress.   Abdominal:      General: There is no distension.      Palpations: Abdomen is soft.   Musculoskeletal:         General: No deformity.      Comments: B/l UE wrist/hand velcro braces in place   Skin:     General: Skin is warm and dry.   Neurological:      Mental Status: He is alert and oriented to person, place, and time.      Cranial Nerves: No dysarthria or facial asymmetry.      Motor: Weakness (BUE) present.      Comments: Follows commands    Psychiatric:         Behavior: Behavior normal.         Cognition and Memory: Cognition is not impaired.     Assessment/Plan:      * Closed fracture of distal end of right radius  -s/p fall with trauma   -ortho consulted  -s/p Bilateral distal radius ORIF  -NWB BUE can do ROM of elbow    Debility  -fever with urinary tract symptoms-->on rocephin IV    See hospital course for functional status.      Recommendations  -  Encourage mobility, OOB in chair, and early ambulation as appropriate  -  PT/OT evaluate and treat  -  Pain  management  -  DVT prophylaxis if appropriate   -  Monitor for and prevent skin breakdown and pressure ulcers  · Early mobility, repositioning/weight shifting every 20-30 minutes when sitting, turn patient every 2 hours, proper mattress/overlay and chair cushioning, pressure relief/heel protector boots    Closed fracture of distal end of left radius  -see Closed fracture of distal end of right radius    Subarachnoid hemorrhage  -seen on imaging s/p fall  -per NSGY, Trace L sylvian fissure/R temporal SAH appears stable with no interval increase from prior CTH. 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

## 2020-09-17 NOTE — SUBJECTIVE & OBJECTIVE
Interval History 9/17/2020:  Patient is seen for follow-up PM&R evaluation and recommendations: Participating w/ therapy.    HPI, Past Medical, Family, and Social History remains the same as documented in the initial encounter.    Scheduled Medications:    aluminum-magnesium hydroxide-simethicone  30 mL Oral QID (AC & HS)    ascorbic acid (vitamin C)  250 mg Oral Daily    atorvastatin  40 mg Oral Daily    carvediloL  12.5 mg Oral BID    lactulose  20 g Oral Once    levETIRAcetam  500 mg Oral BID    lidocaine HCL 10 mg/ml (1%)  20 mL Intradermal Once    lidocaine HCL 10 mg/ml (1%)  20 mL Intradermal Once    pantoprazole  40 mg Oral Daily    piperacillin-tazobactam (ZOSYN) IVPB  4.5 g Intravenous Q8H    polyethylene glycol  17 g Oral Daily    senna-docusate 8.6-50 mg  1 tablet Oral BID    tamsulosin  0.4 mg Oral Daily       Diagnostic Results: Labs: Reviewed    PRN Medications: acetaminophen, albuterol-ipratropium, ALPRAZolam, bisacodyL, diphenhydrAMINE, glucose, glucose, hydrALAZINE, HYDROmorphone, melatonin, methocarbamoL, ondansetron, oxyCODONE, oxyCODONE, prochlorperazine, sodium chloride 0.9%, sodium chloride 0.9%    Review of Systems   Constitutional: Positive for activity change. Negative for fever (improved).   HENT: Negative for trouble swallowing.    Respiratory: Negative for cough and shortness of breath.    Cardiovascular: Negative for chest pain and palpitations.   Musculoskeletal: Positive for arthralgias and gait problem.   Neurological: Positive for weakness. Negative for speech difficulty.   Psychiatric/Behavioral: Negative for confusion.     Objective:     Vital Signs (Most Recent):  Temp: 99.2 °F (37.3 °C) (09/17/20 0608)  Pulse: 94 (09/17/20 0608)  Resp: 16 (09/17/20 0809)  BP: (!) 126/58 (09/17/20 0608)  SpO2: 98 % (09/17/20 0608)    Vital Signs (24h Range):  Temp:  [98.7 °F (37.1 °C)-99.7 °F (37.6 °C)] 99.2 °F (37.3 °C)  Pulse:  [84-94] 94  Resp:  [16-18] 16  SpO2:  [92 %-98 %] 98  %  BP: (122-134)/(58-62) 126/58     Physical Exam  Constitutional:       General: He is not in acute distress.     Appearance: He is well-developed.   HENT:      Head: Normocephalic and atraumatic.   Eyes:      General:         Right eye: No discharge.         Left eye: No discharge.   Neck:      Musculoskeletal: Neck supple.   Pulmonary:      Effort: Pulmonary effort is normal. No respiratory distress.   Abdominal:      General: There is no distension.      Palpations: Abdomen is soft.   Musculoskeletal:         General: No deformity.      Comments: B/l UE wrist/hand velcro braces in place   Skin:     General: Skin is warm and dry.   Neurological:      Mental Status: He is alert and oriented to person, place, and time.      Cranial Nerves: No dysarthria or facial asymmetry.      Motor: Weakness (BUE) present.      Comments: Follows commands    Psychiatric:         Behavior: Behavior normal.         Cognition and Memory: Cognition is not impaired.       NEUROLOGICAL EXAMINATION:     MENTAL STATUS   Oriented to person, place, and time.

## 2020-09-17 NOTE — ASSESSMENT & PLAN NOTE
-seen on imaging s/p fall  -per NSGY, Trace L sylvian fissure/R temporal SAH appears stable with no interval increase from prior CTH. 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

## 2020-09-17 NOTE — PLAN OF CARE
Problem: Occupational Therapy Goal  Goal: Occupational Therapy Goal  Description: Goals to be met by: 10-14-20    Patient will increase functional independence with ADLs by performing:    Feeding with Modified [place].  UE Dressing with Stand-by Assistance.  LE Dressing with Stand-by Assistance.  Grooming while standing at sink with Stand-by Assistance.  Toileting from toilet with Stand-by Assistance for hygiene and clothing management.   Toilet transfer to toilet with Stand-by Assistance. Met    Outcome: Ongoing, Progressing   Patient's goals are appropriate.   . CHANTE Liu  9/17/2020

## 2020-09-17 NOTE — PLAN OF CARE
Patient has mobility deficits due to two fractured wrists.He participates in his plan of care with a cheerful and helpful attitude.

## 2020-09-17 NOTE — PT/OT/SLP PROGRESS
Occupational Therapy   Treatment    Name: Andre Padgett  MRN: [id number]  Admitting Diagnosis:  Closed fracture of distal end of right radius  3 Days Post-Op    Recommendations:     Discharge Recommendations: nursing facility, skilled  Discharge Equipment Recommendations:  none  Barriers to discharge:  Decreased caregiver support    Assessment:     Andre Padgett is a 50 y.o. male with a medical diagnosis of Closed fracture of distal end of right radius.  Performance deficits affecting function are weakness, impaired endurance, impaired self care skills, impaired functional mobilty, gait instability, impaired balance, decreased upper extremity function, edema. Patient extremely limited with ADL tasks due to orthopedic precautions. Patient would benefit from continued skilled acute OT 3x/wk to improve functional mobility, increase independence with ADLs, and address established goals. Recommending SNF once medically appropriate for discharge to increase maximal independence, reduce burden of care, and ensure safety.     Rehab Prognosis:  Good; patient would benefit from acute skilled OT services to address these deficits and reach maximum level of function.       Plan:     Patient to be seen 3 x/week to address the above listed problems via self-care/home management, therapeutic activities, therapeutic exercises  · Plan of Care Expires: 10/15/20  · Plan of Care Reviewed with: patient    Subjective     Pain/Comfort:  · Pain Rating 1: (patient did not rate)  · Location - Side 1: Bilateral  · Location - Orientation 1: generalized  · Location 1: wrist  · Pain Addressed 1: Reposition, Distraction, Pre-medicate for activity  · Pain Rating Post-Intervention 1: (patient did not rate)    Objective:     Communicated with: NSWILLIS prior to session.  Patient found supine with oxygen upon OT entry to room.    General Precautions: Standard, fall   Orthopedic Precautions:RUE non weight bearing, LUE non weight bearing(No wrist ROM)    Braces: UE brace(B wrist/forearm)     Occupational Performance:     Bed Mobility:    · Patient completed Rolling/Turning to Left with  stand by assistance  · Patient completed Rolling/Turning to Right with stand by assistance  · Patient completed Scooting/Bridging with stand by assistance to EOB sitting EOB  · Patient completed Supine to Sit with stand by assistance  · Patient completed Sit to Supine with stand by assistance     Functional Mobility/Transfers:  · Patient completed Sit <> Stand Transfer with stand by assistance  with  no assistive device   · Patient completed Toilet Transfer bed<>toilet with functional ambulation technique with stand by assistance with  no AD  · Functional Mobility: Patient ambulated bed<>sink with functional ambulation technique with SBA with no AD    Activities of Daily Living:  · Grooming: CGA oral care and washing face standing at sink. Patient needed verbal cues for technique in order to be more independent (such as placing toothpaste directly in mouth instead of on toothbrush)  · Upper Body Dressing: maximal assistance [place] and donning back gown 2x  · Lower Body Dressing: total assistance  Patient is unable to doff and don socks at this time  · Toileting: maximal assistance Patient attempted to wipe buttocks, but needs assistance to wipe thoroughly. This therapist and patient attempted to problem solve different techniques in order for patient to wipe. Attempted squatting different ways and propping a LE on shower bench, but patient was unable to successfully perform task independently. Need further practice to figure out way to perform toileting hygiene with current situation/orthopedic precautions long term.     [facility] 6 Click ADL: 14    Treatment & Education:  Role of OT and POC  Safety  ADL retraining  Functional mobility training    Patient left HOB elevated with call button in reach and all needs met. Education:      GOALS:   Multidisciplinary Problems     Occupational  Therapy Goals        Problem: Occupational Therapy Goal    Goal Priority Disciplines Outcome Interventions   Occupational Therapy Goal     OT, PT/OT Ongoing, Progressing    Description: Goals to be met by: 10-14-20    Patient will increase functional independence with ADLs by performing:    Feeding with Modified [place].  UE Dressing with Stand-by Assistance.  LE Dressing with Stand-by Assistance.  Grooming while standing at sink with Stand-by Assistance.  Toileting from toilet with Stand-by Assistance for hygiene and clothing management.   Toilet transfer to toilet with Stand-by Assistance. Met                     Time Tracking:     OT Date of Treatment: 09/17/20  OT Start Time: 1149  OT Stop Time: 1230  OT Total Time (min): 41 min    Billable Minutes:Self Care/Home Management 41    CHANTE Liu  9/17/2020

## 2020-09-17 NOTE — PLAN OF CARE
Pt awaiting review from [facility]. Pt made aware of plan of care and is in agreement. Will cont to follow .     09/17/20 1253   Discharge Reassessment   Assessment Type Discharge Planning Reassessment   Provided patient/caregiver education on the expected discharge date and the discharge plan Yes   Do you have any problems affording any of your prescribed medications? No   Discharge Plan A Rehab   Discharge Plan B Home;Home Health   DME Needed Upon Discharge  none   Anticipated Discharge Disposition Rehab   Can the patient/caregiver answer the patient profile reliably? Yes, cognitively intact   How does the patient rate their overall health at the present time? Fair   Describe the patient's ability to walk at the present time. Minor restrictions or changes   How often would a person be available to care for the patient? Infrequently   Number of comorbid conditions (as recorded on the chart) Two   During the past month, has the patient often been bothered by feeling down, depressed or hopeless? No   During the past month, has the patient often been bothered by little interest or pleasure in doing things? No   Post-Acute Status   Post-Acute Authorization Placement   Post-Acute Placement Status Awaiting Internal Medical Clearance   Discharge Delays None known at this time   Aleshia Matta, MSN  Case Management  Ext [phone]

## 2020-09-17 NOTE — ASSESSMENT & PLAN NOTE
-s/p fall with trauma   -ortho consulted  -s/p Bilateral distal radius ORIF  -NWB BUE can do ROM of elbow

## 2020-09-18 LAB
ALBUMIN SERPL BCP-MCNC: 3 G/DL (ref 3.5–5.2)
ALP SERPL-CCNC: 58 U/L (ref 55–135)
ALT SERPL W/O P-5'-P-CCNC: 18 U/L (ref 10–44)
ANION GAP SERPL CALC-SCNC: 11 MMOL/L (ref 8–16)
AST SERPL-CCNC: 27 U/L (ref 10–40)
BASOPHILS # BLD AUTO: 0.02 K/UL (ref 0–0.2)
BASOPHILS NFR BLD: 0.3 % (ref 0–1.9)
BILIRUB SERPL-MCNC: 0.8 MG/DL (ref 0.1–1)
BUN SERPL-MCNC: 10 MG/DL (ref 6–20)
CALCIUM SERPL-MCNC: 9.3 MG/DL (ref 8.7–10.5)
CHLORIDE SERPL-SCNC: 101 MMOL/L (ref 95–110)
CO2 SERPL-SCNC: 26 MMOL/L (ref 23–29)
CREAT SERPL-MCNC: 0.7 MG/DL (ref 0.5–1.4)
DIFFERENTIAL METHOD: ABNORMAL
EOSINOPHIL # BLD AUTO: 0.3 K/UL (ref 0–0.5)
EOSINOPHIL NFR BLD: 3.8 % (ref 0–8)
ERYTHROCYTE [DISTWIDTH] IN BLOOD BY AUTOMATED COUNT: 11.3 % (ref 11.5–14.5)
EST. GFR  (AFRICAN AMERICAN): >60 ML/MIN/1.73 M^2
EST. GFR  (NON AFRICAN AMERICAN): >60 ML/MIN/1.73 M^2
GLUCOSE SERPL-MCNC: 107 MG/DL (ref 70–110)
HCT VFR BLD AUTO: 34.5 % (ref 40–54)
HGB BLD-MCNC: 11.6 G/DL (ref 14–18)
IMM GRANULOCYTES # BLD AUTO: 0.01 K/UL (ref 0–0.04)
IMM GRANULOCYTES NFR BLD AUTO: 0.2 % (ref 0–0.5)
LYMPHOCYTES # BLD AUTO: 1.3 K/UL (ref 1–4.8)
LYMPHOCYTES NFR BLD: 19.3 % (ref 18–48)
MCH RBC QN AUTO: 33 PG (ref 27–31)
MCHC RBC AUTO-ENTMCNC: 33.6 G/DL (ref 32–36)
MCV RBC AUTO: 98 FL (ref 82–98)
MONOCYTES # BLD AUTO: 0.5 K/UL (ref 0.3–1)
MONOCYTES NFR BLD: 7.2 % (ref 4–15)
NEUTROPHILS # BLD AUTO: 4.5 K/UL (ref 1.8–7.7)
NEUTROPHILS NFR BLD: 69.2 % (ref 38–73)
NRBC BLD-RTO: 0 /100 WBC
PLATELET # BLD AUTO: 182 K/UL (ref 150–350)
PLATELET BLD QL SMEAR: ABNORMAL
PMV BLD AUTO: 10.2 FL (ref 9.2–12.9)
POTASSIUM SERPL-SCNC: 4.1 MMOL/L (ref 3.5–5.1)
PROT SERPL-MCNC: 6.8 G/DL (ref 6–8.4)
RBC # BLD AUTO: 3.51 M/UL (ref 4.6–6.2)
SODIUM SERPL-SCNC: 138 MMOL/L (ref 136–145)
WBC # BLD AUTO: 6.54 K/UL (ref 3.9–12.7)

## 2020-09-18 PROCEDURE — 25000003 PHARM REV CODE 250: Performed by: HOSPITALIST

## 2020-09-18 PROCEDURE — 94761 N-INVAS EAR/PLS OXIMETRY MLT: CPT

## 2020-09-18 PROCEDURE — 25000003 PHARM REV CODE 250: Performed by: STUDENT IN AN ORGANIZED HEALTH CARE EDUCATION/TRAINING PROGRAM

## 2020-09-18 PROCEDURE — 11000001 HC ACUTE MED/SURG PRIVATE ROOM

## 2020-09-18 PROCEDURE — 85025 COMPLETE CBC W/AUTO DIFF WBC: CPT

## 2020-09-18 PROCEDURE — 36415 COLL VENOUS BLD VENIPUNCTURE: CPT

## 2020-09-18 PROCEDURE — 97535 SELF CARE MNGMENT TRAINING: CPT

## 2020-09-18 PROCEDURE — 99232 PR SUBSEQUENT HOSPITAL CARE,LEVL II: ICD-10-PCS | Mod: ,,, | Performed by: HOSPITALIST

## 2020-09-18 PROCEDURE — 99232 SBSQ HOSP IP/OBS MODERATE 35: CPT | Mod: ,,, | Performed by: HOSPITALIST

## 2020-09-18 PROCEDURE — 97110 THERAPEUTIC EXERCISES: CPT

## 2020-09-18 PROCEDURE — 63600175 PHARM REV CODE 636 W HCPCS: Performed by: HOSPITALIST

## 2020-09-18 PROCEDURE — 80053 COMPREHEN METABOLIC PANEL: CPT

## 2020-09-18 RX ADMIN — LEVETIRACETAM 500 MG: 500 TABLET, FILM COATED ORAL at 09:09

## 2020-09-18 RX ADMIN — OXYCODONE 5 MG: 5 TABLET ORAL at 09:09

## 2020-09-18 RX ADMIN — PIPERACILLIN SODIUM AND TAZOBACTAM SODIUM 4.5 G: 4; .5 INJECTION, POWDER, LYOPHILIZED, FOR SOLUTION INTRAVENOUS at 03:09

## 2020-09-18 RX ADMIN — ATORVASTATIN CALCIUM 40 MG: 20 TABLET, FILM COATED ORAL at 09:09

## 2020-09-18 RX ADMIN — OXYCODONE HYDROCHLORIDE 10 MG: 10 TABLET ORAL at 07:09

## 2020-09-18 RX ADMIN — OXYCODONE HYDROCHLORIDE 10 MG: 10 TABLET ORAL at 03:09

## 2020-09-18 RX ADMIN — PIPERACILLIN SODIUM AND TAZOBACTAM SODIUM 4.5 G: 4; .5 INJECTION, POWDER, LYOPHILIZED, FOR SOLUTION INTRAVENOUS at 09:09

## 2020-09-18 RX ADMIN — ALPRAZOLAM 0.5 MG: 0.5 TABLET ORAL at 06:09

## 2020-09-18 RX ADMIN — PANTOPRAZOLE SODIUM 40 MG: 40 TABLET, DELAYED RELEASE ORAL at 09:09

## 2020-09-18 RX ADMIN — OXYCODONE HYDROCHLORIDE 10 MG: 10 TABLET ORAL at 02:09

## 2020-09-18 RX ADMIN — Medication 250 MG: at 09:09

## 2020-09-18 RX ADMIN — DOCUSATE SODIUM 50MG AND SENNOSIDES 8.6MG 1 TABLET: 8.6; 5 TABLET, FILM COATED ORAL at 08:09

## 2020-09-18 RX ADMIN — CARVEDILOL 12.5 MG: 12.5 TABLET, FILM COATED ORAL at 08:09

## 2020-09-18 RX ADMIN — ALUMINUM HYDROXIDE, MAGNESIUM HYDROXIDE, AND SIMETHICONE 30 ML: 200; 200; 20 SUSPENSION ORAL at 08:09

## 2020-09-18 RX ADMIN — ALUMINUM HYDROXIDE, MAGNESIUM HYDROXIDE, AND SIMETHICONE 30 ML: 200; 200; 20 SUSPENSION ORAL at 02:09

## 2020-09-18 RX ADMIN — ALUMINUM HYDROXIDE, MAGNESIUM HYDROXIDE, AND SIMETHICONE 30 ML: 200; 200; 20 SUSPENSION ORAL at 06:09

## 2020-09-18 RX ADMIN — OXYCODONE HYDROCHLORIDE 10 MG: 10 TABLET ORAL at 11:09

## 2020-09-18 RX ADMIN — CARVEDILOL 12.5 MG: 12.5 TABLET, FILM COATED ORAL at 09:09

## 2020-09-18 RX ADMIN — TAMSULOSIN HYDROCHLORIDE 0.4 MG: 0.4 CAPSULE ORAL at 09:09

## 2020-09-18 RX ADMIN — LEVETIRACETAM 500 MG: 500 TABLET, FILM COATED ORAL at 08:09

## 2020-09-18 NOTE — PLAN OF CARE
CM received msg in [facility] there are no medicaid beds available today for admission.  Klarissa will update in [facility] and cont to follow pt.  Aleshia Matta, MSN  Case Management  Ext [phone]

## 2020-09-18 NOTE — PROGRESS NOTES
[facility] Hospital Medicine  Progress Note    Patient Name: Andre Padgett  MRN: [id number]  Patient Class: IP- Inpatient   Admission Date: 9/13/2020  Length of Stay: 4 days  Attending Physician: Elia Nelson MD  Primary Care Provider: Hunter Diop MD    [facility] Medicine Team: [facility] Elia Nelson MD    Subjective:     Principal Problem:Closed fracture of distal end of right radius    Interval History: patient with c/o of blue discoloration on his thumbs and feeling that he has less movement in his fingers than he did pre-surgery, suspect this is bruising, no col extremities not c/w with cyanosis.     Concerned when functional capacity of hands will improve.     Constipation relieved with starting bowel regimen and lactulose    Review of Systems   Constitutional: Negative for fatigue and fever.   Respiratory: Negative for cough, choking, chest tightness and shortness of breath.    Cardiovascular: Negative for chest pain and leg swelling.   Genitourinary: Negative for dysuria.   Musculoskeletal:        Wrist pain bilatearlly      Objective:     Vital Signs (Most Recent):  Temp: 99 °F (37.2 °C) (09/17/20 1952)  Pulse: 80 (09/17/20 1952)  Resp: 18 (09/17/20 1952)  BP: 134/73 (09/17/20 1952)  SpO2: 96 % (09/17/20 2043) Vital Signs (24h Range):  Temp:  [97.8 °F (36.6 °C)-99.7 °F (37.6 °C)] 99 °F (37.2 °C)  Pulse:  [80-94] 80  Resp:  [16-18] 18  SpO2:  [94 %-98 %] 96 %  BP: (120-178)/() 134/73   Intake/[place]:  This Shift:  No intake/output data recorded.    Net I/O past 24h:   No intake or output data in the 24 hours ending 09/17/20 2045      Weight: 78.2 kg (172 lb 6.4 oz)  Body mass index is 24.74 kg/m².  Physical Exam  Constitutional:       Appearance: Normal appearance.   HENT:      Head: Atraumatic.   Eyes:      General: No scleral icterus.  Cardiovascular:      Rate and Rhythm: Normal rate and regular rhythm.      Heart sounds: No murmur.   Pulmonary:      Effort:  Pulmonary effort is normal. No respiratory distress.      Breath sounds: Normal breath sounds. No wheezing.   Abdominal:      General: Abdomen is flat. Bowel sounds are normal. There is no distension.      Palpations: Abdomen is soft. There is no mass.      Tenderness: There is no abdominal tenderness.   Musculoskeletal:      Comments: Bilateral wrist splints, fingers are swollen, minimal ROM in splint, thumbs with bruising over dorsum bilaterally    Skin:     General: Skin is warm and dry.   Neurological:      General: No focal deficit present.      Mental Status: He is alert.           Significant Labs:   CBC:   Recent Labs   Lab 09/16/20 0317 09/17/20 0438   WBC 8.39 8.83   HGB 11.4* 11.0*   HCT 34.9* 32.6*   * 151     CMP:   Recent Labs   Lab 09/16/20 0317 09/17/20 0438    139   K 4.1 3.9    103   CO2 28 29    115*   BUN 9 8   CREATININE 0.8 0.7   CALCIUM 8.4* 8.7   PROT 6.1 6.3   ALBUMIN 3.0* 2.9*   BILITOT 0.6 0.8   ALKPHOS 51* 57   AST 26 27   ALT 16 16   ANIONGAP 9 7*   EGFRNONAA >60.0 >60.0       Significant Imaging: I have reviewed all pertinent imaging results/findings within the past 24 hours.    MEDICATIONS  Scheduled Meds:   aluminum-magnesium hydroxide-simethicone  30 mL Oral QID (AC & HS)    ascorbic acid (vitamin C)  250 mg Oral Daily    atorvastatin  40 mg Oral Daily    carvediloL  12.5 mg Oral BID    levETIRAcetam  500 mg Oral BID    lidocaine HCL 10 mg/ml (1%)  20 mL Intradermal Once    lidocaine HCL 10 mg/ml (1%)  20 mL Intradermal Once    pantoprazole  40 mg Oral Daily    piperacillin-tazobactam (ZOSYN) IVPB  4.5 g Intravenous Q8H    polyethylene glycol  17 g Oral Daily    senna-docusate 8.6-50 mg  1 tablet Oral BID    tamsulosin  0.4 mg Oral Daily                             Continuous Infusions:  PRN Meds:.acetaminophen, albuterol-ipratropium, ALPRAZolam, bisacodyL, diphenhydrAMINE, glucose, glucose, hydrALAZINE, HYDROmorphone, melatonin,  methocarbamoL, ondansetron, oxyCODONE, oxyCODONE, prochlorperazine, sodium chloride 0.9%, sodium chloride 0.9%    Assessment/Plan:     Overview/Hospital Course:     Acute subarachnoid hemorrhage_Hx of Cerebral Aneurysm  -Noted on Head CT after trauma. Neurosurgery consulted and recommend conservative management, serial CT scans. Continue to monitor, neurochecks Q4. Hold home ASA, SCDs only for DVT ppx  -Keppra given for seizure ppx        B/L Distal Radial Fractures  -Orthopedic surgery consulted. S/p ORIF of bilateral wrists  -Pain control with tylenol scheduled, opiates PRN  -PT/OT after surgery  -requesting Oxycodone overnight, has consistent bouts of severe pain that awake pt @ 1am-2am, will schedule oxycodone at 2300     Acute Cystitis vs Prostatitis  -patient with pyuria on recent UA and reported to be reporting -describing prostatitis type symptoms, renal ultrasound no pyelonephritis.   -Blood cultures negative, Urine culture shows no growth.   -Started on Flomax   -patient has had intermittent fevers, CXR reported stable pt is on broad spectrum Zosyn, denying acute urinary symptoms today - last fever was approx 24-36 hrs ago.   -report that with repeat fevers Ortho feels that surgery site clean and intact.      Essential Hypertension  -coreg 12.5mg BID        Placement Pending: PT recs but rehab most likely. Patient with minimal support at home. Medicaid.          Code Status: Full Code    Active Hospital Problems    Diagnosis  POA    *Closed fracture of distal end of right radius [S52.501A]  Yes    Debility [R53.81]  Unknown    Abnormal x-ray of abdomen [R93.5]  Unknown    Closed fracture of distal end of left radius [S52.502A]  Yes    Subarachnoid hemorrhage [I60.9]  Yes    Hyperlipidemia [E78.5]  Yes      Resolved Hospital Problems   No resolved problems to display.     VTE Risk Mitigation (From admission, onward)         Ordered     IP VTE HIGH RISK PATIENT  Once      09/14/20 0816     Place  sequential compression device  Until discontinued      09/14/20 0816     Place BEAR hose  Until discontinued      09/14/20 0816     Place sequential compression device  Until discontinued      09/13/20 3651                        Elia Nelson M.D.  Attending Physician  [facility] Medicine Dept.  Pager: [phone]  [facility]  [phone]

## 2020-09-18 NOTE — ADDENDUM NOTE
Addendum  created 09/18/20 0953 by Anjelica Smiley MD    Clinical Note Signed, Intraprocedure Blocks edited

## 2020-09-18 NOTE — PLAN OF CARE
Pt AAOx4, ambulates independently.  Patient reports constant moderate-severe pain in bilat upper extremities; prn oxycodone given when reqeusted and appropriate.  Patient states he is concerned about sensation of stiffness in hands, as well as small bruises on thumbs.  Requests advice on exercises to address stiffness.  Extremities warm, patient able to move digits on command, pulse apparent via doppler in bilat thumbs.  VS stable on RA.  Patient declines SCDs.  No falls or injuries, no major events.  Will continue to monitor.

## 2020-09-18 NOTE — SUBJECTIVE & OBJECTIVE
Principal Problem:Closed Colles' fracture of right radius with routine healing    Principal Orthopedic Problem: Same    Interval History: Pt seen and examined at bedside.  Overall pain significantly improving in bilateral wrists. PMR recs rehab. Endorses moving fingers better, but swelling is his main issue and was unaware of elevation to improve his swelling.     Review of patient's allergies indicates:  No Known Allergies    Current Facility-Administered Medications   Medication    acetaminophen tablet 650 mg    albuterol-ipratropium 2.5 mg-0.5 mg/3 mL nebulizer solution 3 mL    ALPRAZolam tablet 0.5 mg    aluminum-magnesium hydroxide-simethicone 200-200-20 mg/5 mL suspension 30 mL    ascorbic acid (vitamin C) tablet 250 mg    atorvastatin tablet 40 mg    bisacodyL suppository 10 mg    carvediloL tablet 12.5 mg    diphenhydrAMINE capsule 25 mg    glucose chewable tablet 16 g    glucose chewable tablet 24 g    hydrALAZINE tablet 25 mg    HYDROmorphone injection 1.5 mg    levETIRAcetam tablet 500 mg    lidocaine HCL 10 mg/ml (1%) injection 20 mL    lidocaine HCL 10 mg/ml (1%) injection 20 mL    melatonin tablet 6 mg    methocarbamoL tablet 500 mg    ondansetron injection 4 mg    oxyCODONE immediate release tablet 5 mg    oxyCODONE immediate release tablet Tab 10 mg    oxyCODONE immediate release tablet Tab 10 mg    pantoprazole EC tablet 40 mg    piperacillin-tazobactam 4.5 g in sodium chloride 0.9% 100 mL IVPB (ready to mix system)    polyethylene glycol packet 17 g    prochlorperazine injection Soln 5 mg    senna-docusate 8.6-50 mg per tablet 1 tablet    sodium chloride 0.9% flush 10 mL    sodium chloride 0.9% flush 10 mL    tamsulosin 24 hr capsule 0.4 mg     Objective:     Vital Signs (Most Recent):  Temp: 98.5 °F (36.9 °C) (09/18/20 0311)  Pulse: 86 (09/18/20 0311)  Resp: 16 (09/18/20 0311)  BP: 122/63 (09/18/20 0311)  SpO2: (!) 93 % (09/18/20 0311) Vital Signs (24h Range):  Temp:   "[97.8 °F (36.6 °C)-99.2 °F (37.3 °C)] 98.5 °F (36.9 °C)  Pulse:  [80-94] 86  Resp:  [16-18] 16  SpO2:  [93 %-98 %] 93 %  BP: (120-178)/() 122/63     Weight: 78.2 kg (172 lb 6.4 oz)  Height: 5' 10" (177.8 cm)  Body mass index is 24.74 kg/m².    No intake or output data in the 24 hours ending 09/18/20 0607    Ortho/SPM Exam       Physical Exam:  NAD, A/O x 3.  Dressing c/d/i, brace in place  No focal motor or sensory deficits noted.  Drain: None      Significant Labs:   BMP:   Recent Labs   Lab 09/17/20  0438   *      K 3.9      CO2 29   BUN 8   CREATININE 0.7   CALCIUM 8.7     CBC:   Recent Labs   Lab 09/17/20  0438   WBC 8.83   HGB 11.0*   HCT 32.6*        CMP:   Recent Labs   Lab 09/17/20  0438      K 3.9      CO2 29   *   BUN 8   CREATININE 0.7   CALCIUM 8.7   PROT 6.3   ALBUMIN 2.9*   BILITOT 0.8   ALKPHOS 57   AST 27   ALT 16   ANIONGAP 7*   EGFRNONAA >60.0     All pertinent labs within the past 24 hours have been reviewed.      Significant Imaging: I have reviewed all pertinent imaging results/findings.   No new imaging.   "

## 2020-09-18 NOTE — ASSESSMENT & PLAN NOTE
Andre Padgett is a 50 y.o. male with bilateral DRF and subarachnoid hemmorhage now s/p Bilateral distal radius ORIF.       - Weight bearing status: NWB BUE can do ROM of elbow  - Pain control: Multimodal   - Aggressive elevation to BUE  - Antibiotics: Levofloxacin for prostatitis  - DVT Prophylaxis: SCD's at all times when not ambulating.  - PT/OT  - Lines/Drains: None  - Dispo: Per PT/OT, recs rehab

## 2020-09-18 NOTE — PT/OT/SLP PROGRESS
Physical Therapy      Patient Name:  Andre Padgett   MRN:  [id number]    Patient not seen today but still being followed by physical therapy. Will follow-up next scheduled visit.    Evi Ross, PT

## 2020-09-18 NOTE — PT/OT/SLP PROGRESS
Occupational Therapy   Treatment    Name: Andre Padgett  MRN: [id number]  Admitting Diagnosis:  Closed Colles' fracture of right radius with routine healing  4 Days Post-Op    Recommendations:     Discharge Recommendations: nursing facility, skilled  Discharge Equipment Recommendations:  none  Barriers to discharge:  Decreased caregiver support    Assessment:     Andre Padgett is a 50 y.o. male with a medical diagnosis of Closed Colles' fracture of right radius with routine healing. Patient with slight improvement with bilateral finger motion and reduced swelling. He was able to completed exercises for finger range of motion without difficulty. He required contact guard assistance for toilet task with max verbal cuing for techniques to completed pericare. Patient completed eating at set-up with built up handles.  Performance deficits affecting function are impaired self care skills, impaired functional mobilty, decreased coordination, decreased upper extremity function, pain, decreased ROM, impaired coordination, impaired fine motor, orthopedic precautions.     Rehab Prognosis:  Good; patient would benefit from acute skilled OT services to address these deficits and reach maximum level of function.       Plan:     Patient to be seen 5 x/week to address the above listed problems via therapeutic exercises, therapeutic activities, self-care/home management  · Plan of Care Expires: 10/15/20  · Plan of Care Reviewed with: patient    Subjective     Patient reported he was getting a bidet at home.    Pain/Comfort:  · Pain Rating 1: 7/10  · Location - Side 1: Bilateral  · Location - Orientation 1: generalized  · Location 1: wrist  · Pain Addressed 1: Pre-medicate for activity, Reposition, Distraction    Objective:     Communicated with: RN prior to session.  Patient found supine with (no active lines) upon OT entry to room.    General Precautions: Standard, fall   Orthopedic Precautions:RUE non weight bearing, LUE non  weight bearing   Braces: UE brace(wrist cock up splints)     Occupational Performance:     Bed Mobility:    · Patient completed Supine to Sit with supervision  · Patient completed Sit to Supine with supervision     Functional Mobility/Transfers:  · Patient completed Sit <> Stand Transfer with stand by assistance  with  no assistive device   · Patient completed Toilet Transfer Step Transfer technique with stand by assistance with  no AD  · Functional Mobility: patient ambulated in room with stand by assistance and no AD     Activities of Daily Living:  · Feeding:  set-up with built up knife/fork (coban wrapped), patient able to cut softer foods, required assistance opening juice   · Toileting: contact guard assistance completed on toilet, required max verbal cuing for wiping       AMPAC 6 Click ADL: 16    Treatment & Education:  -Thera-ex: sitting edge of bed: 10 reps    -bilateral fist pumps    -bilateral finger isolation motions   -bilateral thumb flexion/extension at MCP/IP joints    -bilateral table top at MCP within range of motion    -bilateral hooks at PIP/DIPs     -Patient educated on toilet techniques  -Patient educated on role of OT and plan of care        Patient left up in chair with all lines intact and call button in reachEducation:      GOALS:   Multidisciplinary Problems     Occupational Therapy Goals        Problem: Occupational Therapy Goal    Goal Priority Disciplines Outcome Interventions   Occupational Therapy Goal     OT, PT/OT Ongoing, Progressing    Description: Goals to be met by: 10-14-20    Patient will increase functional independence with ADLs by performing:    Feeding with Modified [place].  UE Dressing with Stand-by Assistance.  LE Dressing with Stand-by Assistance.  Grooming while standing at sink with Stand-by Assistance.  Toileting from toilet with Stand-by Assistance for hygiene and clothing management.   Toilet transfer to toilet with Stand-by Assistance. Met                      Time Tracking:     OT Date of Treatment: 09/18/20  OT Start Time: 0835  OT Stop Time: 0915  OT Total Time (min): 40 min    Billable Minutes:Self Care/Home Management 25  Therapeutic Exercise 15    Apolonia Hunter OT  9/18/2020

## 2020-09-18 NOTE — PLAN OF CARE
Problem: Occupational Therapy Goal  Goal: Occupational Therapy Goal  Description: Goals to be met by: 10-14-20    Patient will increase functional independence with ADLs by performing:    Feeding with Modified [place].  UE Dressing with Stand-by Assistance.  LE Dressing with Stand-by Assistance.  Grooming while standing at sink with Stand-by Assistance.  Toileting from toilet with Stand-by Assistance for hygiene and clothing management.   Toilet transfer to toilet with Stand-by Assistance. Met    Outcome: Ongoing, Progressing    OT goals remain     Apolonia Hunter OT  9/18/2020

## 2020-09-19 PROBLEM — N41.0 ACUTE PROSTATITIS: Status: ACTIVE | Noted: 2020-09-19

## 2020-09-19 PROCEDURE — 25000003 PHARM REV CODE 250: Performed by: HOSPITALIST

## 2020-09-19 PROCEDURE — 99233 PR SUBSEQUENT HOSPITAL CARE,LEVL III: ICD-10-PCS | Mod: 95,,, | Performed by: INTERNAL MEDICINE

## 2020-09-19 PROCEDURE — 25000003 PHARM REV CODE 250: Performed by: STUDENT IN AN ORGANIZED HEALTH CARE EDUCATION/TRAINING PROGRAM

## 2020-09-19 PROCEDURE — 63600175 PHARM REV CODE 636 W HCPCS: Performed by: HOSPITALIST

## 2020-09-19 PROCEDURE — 11000001 HC ACUTE MED/SURG PRIVATE ROOM

## 2020-09-19 PROCEDURE — 99233 SBSQ HOSP IP/OBS HIGH 50: CPT | Mod: 95,,, | Performed by: INTERNAL MEDICINE

## 2020-09-19 RX ORDER — LIDOCAINE 50 MG/G
2 PATCH TOPICAL
Status: DISCONTINUED | OUTPATIENT
Start: 2020-09-19 | End: 2020-09-23 | Stop reason: HOSPADM

## 2020-09-19 RX ADMIN — PIPERACILLIN SODIUM AND TAZOBACTAM SODIUM 4.5 G: 4; .5 INJECTION, POWDER, LYOPHILIZED, FOR SOLUTION INTRAVENOUS at 12:09

## 2020-09-19 RX ADMIN — LEVOFLOXACIN 750 MG: 500 TABLET, FILM COATED ORAL at 08:09

## 2020-09-19 RX ADMIN — ONDANSETRON 4 MG: 2 INJECTION INTRAMUSCULAR; INTRAVENOUS at 09:09

## 2020-09-19 RX ADMIN — OXYCODONE 5 MG: 5 TABLET ORAL at 02:09

## 2020-09-19 RX ADMIN — ATORVASTATIN CALCIUM 40 MG: 20 TABLET, FILM COATED ORAL at 08:09

## 2020-09-19 RX ADMIN — PANTOPRAZOLE SODIUM 40 MG: 40 TABLET, DELAYED RELEASE ORAL at 08:09

## 2020-09-19 RX ADMIN — ALPRAZOLAM 0.5 MG: 0.5 TABLET ORAL at 05:09

## 2020-09-19 RX ADMIN — ALUMINUM HYDROXIDE, MAGNESIUM HYDROXIDE, AND SIMETHICONE 30 ML: 200; 200; 20 SUSPENSION ORAL at 07:09

## 2020-09-19 RX ADMIN — OXYCODONE HYDROCHLORIDE 10 MG: 10 TABLET ORAL at 10:09

## 2020-09-19 RX ADMIN — OXYCODONE HYDROCHLORIDE 10 MG: 10 TABLET ORAL at 11:09

## 2020-09-19 RX ADMIN — ALUMINUM HYDROXIDE, MAGNESIUM HYDROXIDE, AND SIMETHICONE 30 ML: 200; 200; 20 SUSPENSION ORAL at 05:09

## 2020-09-19 RX ADMIN — CARVEDILOL 12.5 MG: 12.5 TABLET, FILM COATED ORAL at 08:09

## 2020-09-19 RX ADMIN — LEVETIRACETAM 500 MG: 500 TABLET, FILM COATED ORAL at 09:09

## 2020-09-19 RX ADMIN — ALPRAZOLAM 0.5 MG: 0.5 TABLET ORAL at 01:09

## 2020-09-19 RX ADMIN — CARVEDILOL 12.5 MG: 12.5 TABLET, FILM COATED ORAL at 09:09

## 2020-09-19 RX ADMIN — OXYCODONE 5 MG: 5 TABLET ORAL at 08:09

## 2020-09-19 RX ADMIN — LIDOCAINE 2 PATCH: 50 PATCH TOPICAL at 02:09

## 2020-09-19 RX ADMIN — TAMSULOSIN HYDROCHLORIDE 0.4 MG: 0.4 CAPSULE ORAL at 08:09

## 2020-09-19 RX ADMIN — MELATONIN TAB 3 MG 6 MG: 3 TAB at 09:09

## 2020-09-19 RX ADMIN — OXYCODONE HYDROCHLORIDE 10 MG: 10 TABLET ORAL at 07:09

## 2020-09-19 RX ADMIN — OXYCODONE HYDROCHLORIDE 10 MG: 10 TABLET ORAL at 03:09

## 2020-09-19 RX ADMIN — ALUMINUM HYDROXIDE, MAGNESIUM HYDROXIDE, AND SIMETHICONE 30 ML: 200; 200; 20 SUSPENSION ORAL at 09:09

## 2020-09-19 RX ADMIN — DOCUSATE SODIUM 50MG AND SENNOSIDES 8.6MG 1 TABLET: 8.6; 5 TABLET, FILM COATED ORAL at 09:09

## 2020-09-19 RX ADMIN — HYDROMORPHONE HYDROCHLORIDE 1.5 MG: 1 INJECTION, SOLUTION INTRAMUSCULAR; INTRAVENOUS; SUBCUTANEOUS at 05:09

## 2020-09-19 RX ADMIN — Medication 250 MG: at 08:09

## 2020-09-19 RX ADMIN — HYDROMORPHONE HYDROCHLORIDE 1.5 MG: 1 INJECTION, SOLUTION INTRAMUSCULAR; INTRAVENOUS; SUBCUTANEOUS at 09:09

## 2020-09-19 RX ADMIN — OXYCODONE HYDROCHLORIDE 10 MG: 10 TABLET ORAL at 04:09

## 2020-09-19 NOTE — CONSULTS
[facility] Hospital Medicine  Telemedicine Consult Note    Patient Name: Andre Padgett  MRN: [id number]  Admission Date: 9/13/2020  Hospital Length of Stay: 5 days  Attending Physician: Elia Nelson MD   Primary Care Provider: Hunter Diop MD           Andre Padgett has been accepted for transfer to [facility] and will be followed through telemedicine services beginning 09/19/20 at 7 AM.        Devi Vargas MD  Department of Hospital Medicine   [facility]

## 2020-09-19 NOTE — PROGRESS NOTES
[facility] Hospital Medicine  Telemedicine Progress Note    Patient Name: Andre Padgett  MRN: [id number]  Patient Class: IP- Inpatient   Admission Date: 9/13/2020  Length of Stay: 6 days  Attending Physician: Devi Vargas MD  Primary Care Provider: Hunter Diop MD    [facility] Medicine Team: [facility] VIRTUAL TEAM 10 Devi Vargas MD  Virtual Telemedicine Progress Note  Start time: 0940  Chief complaint: Closed Colles' fracture of right radius with routine healing  The patient location is: [facility]/6 A  The patient arrived at: 9/13/2020  9:11 PM  Present with the patient at the time of the telemed/virtual assessment: telepresenter   End time:  0947  Total time spent with patient: 7 min  I have assessed findings virtually using a telemedicine platform and with assistance of the bedside nurse or telemedicine presenter.  The attending portion of this evaluation, treatment, and documentation was performed per Devi Vargas MD via audiovisual.    Patient was transferred to the telemedicine service on: 9/19/2020    Subjective:     Admission CC:   Chief Complaint   Patient presents with    Fall     Fall after standing on stool, bilateral wrist fractures. Obvious defromities. -LOC     Follow up visit for: Closed Colles' fracture of right radius with routine healing    Interval History / Events Overnight:   The patient is able to provide adequate history. Additional history was obtained from chart review. No significant events reported by Nursing.  Patient complains of back pain. Symptoms have been unchanged since yesterday. Associated symptoms include: fatigue. Symptoms are stable. Alleviating factors include: nothing.     Data reviewed 9/19/2020: Lab test(s) reviewed: H&H stable  I have assessed findings virtually using a telemedicine platform and with assistance of the bedside nurse or telemedicine presenter.      Review of Systems   Constitutional: Negative for fever.   Respiratory:  Negative for shortness of breath.    Musculoskeletal: Positive for back pain.     Objective:     Vital Signs (Most Recent):  Temp: 98.7 °F (37.1 °C) (09/19/20 1212)  Pulse: 86 (09/19/20 1212)  Resp: 18 (09/19/20 1425)  BP: 135/76 (09/19/20 1212)  SpO2: (!) 93 % (09/19/20 0843) Vital Signs (24h Range):  Temp:  [97 °F (36.1 °C)-98.7 °F (37.1 °C)] 98.7 °F (37.1 °C)  Pulse:  [] 86  Resp:  [16-19] 18  SpO2:  [88 %-96 %] 93 %  BP: (107-147)/(57-76) 135/76     Weight: 78.2 kg (172 lb 6.4 oz)  Body mass index is 24.74 kg/m².  No intake or output data in the 24 hours ending 09/19/20 1551   Physical Exam  Constitutional:       General: He is not in acute distress.     Appearance: Normal appearance. He is not diaphoretic.   Eyes:      General: Lids are normal. No scleral icterus.        Right eye: No discharge.         Left eye: No discharge.      Conjunctiva/sclera: Conjunctivae normal.   Neck:      Trachea: Phonation normal.   Cardiovascular:      Rate and Rhythm: Normal rate.   Pulmonary:      Effort: Pulmonary effort is normal. No tachypnea, accessory muscle usage or respiratory distress.   Abdominal:      General: There is no distension.   Musculoskeletal:      Right wrist: He exhibits swelling.      Left wrist: He exhibits swelling.   Neurological:      Mental Status: He is alert and oriented to person, place, and time. He is not disoriented.   Psychiatric:         Attention and Perception: Attention normal.         Mood and Affect: Affect normal.         Speech: Speech normal.         Behavior: Behavior is cooperative.         Significant Labs:   Recent Labs   Lab 09/16/20 0317 09/17/20  0438 09/18/20  0721   WBC 8.39 8.83 6.54   HGB 11.4* 11.0* 11.6*   HCT 34.9* 32.6* 34.5*   * 151 182     Recent Labs   Lab 09/16/20 0317 09/17/20  0438 09/18/20  0721   GRAN 74.9*  6.3 81.2*  7.2 69.2  4.5   LYMPH 14.7*  1.2 11.6*  1.0 19.3  1.3   MONO 7.9  0.7 5.1  0.5 7.2  0.5   EOS 0.2 0.2 0.3     Recent Labs    Lab 09/15/20  0027  09/16/20 0317 09/17/20  0438 09/18/20  0721      < > 139 139 138   K 4.1   < > 4.1 3.9 4.1      < > 102 103 101   CO2 24   < > 28 29 26   BUN 7   < > 9 8 10   CREATININE 0.8   < > 0.8 0.7 0.7   *   < > 109 115* 107   CALCIUM 8.2*   < > 8.4* 8.7 9.3   ALBUMIN 3.3*   < > 3.0* 2.9* 3.0*   MG 1.8  --   --   --   --     < > = values in this interval not displayed.     Recent Labs   Lab 09/13/20 2117 09/16/20 0317 09/17/20 0438 09/18/20  0721   ALKPHOS 65   < > 51* 57 58   ALT 36   < > 16 16 18   AST 30   < > 26 27 27   PROT 7.2   < > 6.1 6.3 6.8   BILITOT 0.5   < > 0.6 0.8 0.8   INR 1.1  --   --   --   --     < > = values in this interval not displayed.     Recent Labs   Lab 09/15/20  0027 09/15/20  0835   PROCAL 0.04  --    LACTATE 2.8* 1.8     Results for orders placed or performed in visit on 05/25/20   Vitamin D   Result Value Ref Range    Vit D, 25-Hydroxy 34 30 - 96 ng/mL     SARS-CoV2 (COVID-19) Qualitative PCR (no units)   Date Value   06/03/2020 Detected (A)   04/28/2020 Detected (A)       ECG Results    None         X-Ray Chest PA And Lateral  Narrative: EXAMINATION:  XR CHEST PA AND LATERAL    CLINICAL HISTORY:  temp. sepsis alert;    TECHNIQUE:  PA and lateral views of the chest were performed.    COMPARISON:  September 14, 2020    FINDINGS:  Two views of the chest are submitted.  The cardiomediastinal silhouette appears stable.  There is mild diminished depth of inspiration, atelectatic changes are noted.  There is no evidence for confluent infiltrate or consolidation, significant pleural effusion or pneumothorax.  The osseous structures demonstrate chronic change, there appears to be an acute rib fracture involving the posterior right 3rd rib for which clinical and historical correlation is needed.  Impression: Diminished depth of inspiration, atelectatic change and chronic change.    Right 3rd rib fracture appears acute, clinical and historical correlation is  needed.    Electronically signed by: Suleiman Blake  Date:    09/16/2020  Time:    05:59        Assessment/Plan:      Active Diagnoses:    Diagnosis Date Noted POA    PRINCIPAL PROBLEM:  Closed Colles' fracture of right radius with routine healing [S52.531D] 09/14/2020 Not Applicable    Acute prostatitis [N41.0] 09/19/2020 Yes    Debility [R53.81] 09/16/2020 Yes    Closed Colles' fracture of left radius with routine healing [S52.532D] 09/14/2020 Not Applicable    Subarachnoid hemorrhage [I60.9] 09/13/2020 Yes    Hyperlipidemia [E78.5] 04/09/2020 Yes      Problems Resolved During this Admission:    Diagnosis Date Noted Date Resolved POA    Abnormal x-ray of abdomen [R93.5] 09/14/2020 09/17/2020 Yes       Overview / ICU Course:    Andre Padgett is a 50 y.o. male admitted for Closed Colles' fracture of right radius with routine healing.  Patient with hx of previous cerebral aneurysm, prior COIVD infection now resolved, presenting following fall from ladder with outstretched hands resulting in bilateral colles fractures and head trauma s/p Small SAH . S/p repair ORIF of bilateral wrists with Ortho. Patient needs to remain in wrist braces until further notice. Neurosurgery feels the SAH is very small and will self resolve. Keppra x 7 days (stop date Sunday 9/20)  Patient has had intermittent Fevers suspicious for UTI versus prostatitis and was started on Zosyn but his urine culture returned negative, de-escalated to oral empmiric therapy, he reports on history prior history of possible prostatitis, due to stable clinical status now, no repeat fevers de-escalate to Oral levofloxacin for total 14-day course.      Inpatient Medications Prescribed for Management of Current Problems:     Scheduled Meds:    aluminum-magnesium hydroxide-simethicone  30 mL Oral QID (AC & HS)    ascorbic acid (vitamin C)  250 mg Oral Daily    atorvastatin  40 mg Oral Daily    carvediloL  12.5 mg Oral BID    levETIRAcetam  500 mg  Oral BID    levoFLOXacin  750 mg Oral Daily    lidocaine  2 patch Transdermal Q24H    oxyCODONE  10 mg Oral Q24H    pantoprazole  40 mg Oral Daily    polyethylene glycol  17 g Oral Daily    senna-docusate 8.6-50 mg  1 tablet Oral BID    tamsulosin  0.4 mg Oral Daily     Continuous Infusions:   As Needed: acetaminophen, albuterol-ipratropium, ALPRAZolam, bisacodyL, diphenhydrAMINE, glucose, glucose, hydrALAZINE, HYDROmorphone, melatonin, methocarbamoL, ondansetron, oxyCODONE, oxyCODONE, prochlorperazine, sodium chloride 0.9%, sodium chloride 0.9%    Assessment and Plan by Problem    Acute subarachnoid hemorrhage_Hx of Cerebral Aneurysm  -Noted on Head CT after trauma. Neurosurgery consulted and recommend conservative management, serial CT scans. Continue to monitor, neurochecks Q4. Hold home ASA, SCDs only for DVT ppx  -Keppra given for seizure ppx     B/L Distal Radial Fractures  -Orthopedic surgery consulted. S/p ORIF of bilateral wrists  -Pain control with Tylenol scheduled, opiates PRN  -PT/OT after surgery  - schedule oxycodone at 2300 to prevent breakthrough pain occurring in early AM              >after 1st night with this reported symptoms better controlled.   -reinforced with nursing to have more significant elevation of extremities, per verbal recs from orthopedics needs almost hanging stockinette type position to aid in reducing post-op inflammation.      Acute Prostatitis-presumed  -patient with pyuria on recent UA and reported to be reporting -describing prostatitis type symptoms, renal ultrasound negative for pyelonephritis, Blood cultures negative, Urine culture shows no growth.   -Started on Flomax   -patient has had intermittent fevers, CXR reported stable pt is on broad spectrum Zosyn, denying acute urinary symptoms. Change IV Zosyn to PO levofloxacin with plan for 14 days course.   -report that with repeat fevers Ortho feels that surgery site clean and intact.      Essential  Hypertension  -Coreg 12.5mg BID    Diet: Diet Adult Regular (IDDSI Level 7)  GI Prophylaxis: Indicated due to multiple minor risk factors  Significant LDAs:   IV Access Type: Peripheral  Urinary Catheter Indication if present: Patient Does Not Have Urinary Catheter  Other Lines/Tubes/Drains:    HIGH RISK CONDITION(S):   Patient has an abrupt change in neurologic status: SAH  Patient is currently receiving parenteral controlled substances: Dilaudid     Goals of Care:    Previous admission:  5/5/20  Likely prognosis:  Good  Code Status: Full Code  Comfort Only: No  Hospice: No  Goals at discharge: remain at home, with physician follow-up    Discharge Planning   BRAYAN: 9/18/2020     Code Status: Full Code   Is the patient medically ready for discharge?:     Reason for patient still in hospital (select all that apply): Treatment and Pending disposition  Discharge Plan A: Rehab   Discharge Delays: None known at this time    VTE Risk Mitigation (From admission, onward)         Ordered     IP VTE HIGH RISK PATIENT  Once      09/14/20 0816     Place sequential compression device  Until discontinued      09/14/20 0816     Place BEAR hose  Until discontinued      09/14/20 0816     Place sequential compression device  Until discontinued      09/13/20 0446                   Devi Vargas MD  Department of Hospital Medicine   [facility]

## 2020-09-19 NOTE — PROGRESS NOTES
[facility]  Orthopedics  Progress Note    Patient Name: Andre Padgett  MRN: [id number]  Admission Date: 9/13/2020  Hospital Length of Stay: 6 days  Attending Provider: Devi Vargas MD  Primary Care Provider: Hunter Diop MD  Follow-up For: Procedure(s) (LRB):  ORIF, FRACTURE, RADIUS OR ULNA, synthes, right, large C arm at a diagonal from the rear of the room, ancef, velcro wrist splint (Bilateral)    Post-Operative Day: 5 Days Post-Op  Subjective:     Principal Problem:Closed Colles' fracture of right radius with routine healing    Principal Orthopedic Problem: Same    Interval History: Pt seen and examined at bedside.  Overall pain significantly improving in bilateral wrists. PMR recs rehab. Endorses moving fingers better, but swelling is his main issue and was unaware of elevation to improve his swelling.     Review of patient's allergies indicates:  No Known Allergies    Current Facility-Administered Medications   Medication    acetaminophen tablet 650 mg    albuterol-ipratropium 2.5 mg-0.5 mg/3 mL nebulizer solution 3 mL    ALPRAZolam tablet 0.5 mg    aluminum-magnesium hydroxide-simethicone 200-200-20 mg/5 mL suspension 30 mL    ascorbic acid (vitamin C) tablet 250 mg    atorvastatin tablet 40 mg    bisacodyL suppository 10 mg    carvediloL tablet 12.5 mg    diphenhydrAMINE capsule 25 mg    glucose chewable tablet 16 g    glucose chewable tablet 24 g    hydrALAZINE tablet 25 mg    HYDROmorphone injection 1.5 mg    levETIRAcetam tablet 500 mg    lidocaine HCL 10 mg/ml (1%) injection 20 mL    lidocaine HCL 10 mg/ml (1%) injection 20 mL    melatonin tablet 6 mg    methocarbamoL tablet 500 mg    ondansetron injection 4 mg    oxyCODONE immediate release tablet 5 mg    oxyCODONE immediate release tablet Tab 10 mg    oxyCODONE immediate release tablet Tab 10 mg    pantoprazole EC tablet 40 mg    piperacillin-tazobactam 4.5 g in sodium chloride 0.9% 100 mL IVPB  "(ready to mix system)    polyethylene glycol packet 17 g    prochlorperazine injection Soln 5 mg    senna-docusate 8.6-50 mg per tablet 1 tablet    sodium chloride 0.9% flush 10 mL    sodium chloride 0.9% flush 10 mL    tamsulosin 24 hr capsule 0.4 mg     Objective:     Vital Signs (Most Recent):  Temp: 98.5 °F (36.9 °C) (09/18/20 0311)  Pulse: 86 (09/18/20 0311)  Resp: 16 (09/18/20 0311)  BP: 122/63 (09/18/20 0311)  SpO2: (!) 93 % (09/18/20 0311) Vital Signs (24h Range):  Temp:  [97.8 °F (36.6 °C)-99.2 °F (37.3 °C)] 98.5 °F (36.9 °C)  Pulse:  [80-94] 86  Resp:  [16-18] 16  SpO2:  [93 %-98 %] 93 %  BP: (120-178)/() 122/63     Weight: 78.2 kg (172 lb 6.4 oz)  Height: 5' 10" (177.8 cm)  Body mass index is 24.74 kg/m².    No intake or output data in the 24 hours ending 09/18/20 0607    Ortho/SPM Exam       Physical Exam:  NAD, A/O x 3.  Dressing c/d/i, brace in place  No focal motor or sensory deficits noted.  Drain: None      Significant Labs:   BMP:   Recent Labs   Lab 09/17/20  0438   *      K 3.9      CO2 29   BUN 8   CREATININE 0.7   CALCIUM 8.7     CBC:   Recent Labs   Lab 09/17/20 0438   WBC 8.83   HGB 11.0*   HCT 32.6*        CMP:   Recent Labs   Lab 09/17/20 0438      K 3.9      CO2 29   *   BUN 8   CREATININE 0.7   CALCIUM 8.7   PROT 6.3   ALBUMIN 2.9*   BILITOT 0.8   ALKPHOS 57   AST 27   ALT 16   ANIONGAP 7*   EGFRNONAA >60.0     All pertinent labs within the past 24 hours have been reviewed.      Significant Imaging: I have reviewed all pertinent imaging results/findings.   No new imaging.     Assessment/Plan:     * Closed Colles' fracture of right radius with routine healing  Andre Padgett is a 50 y.o. male with bilateral DRF and subarachnoid hemmorhage now s/p Bilateral distal radius ORIF.       - Weight bearing status: NWB BUE can do ROM of elbow  - Pain control: Multimodal   - Aggressive elevation to BUE  - Antibiotics: Levofloxacin for " prostatitis  - DVT Prophylaxis: SCD's at all times when not ambulating.  - PT/OT  - Lines/Drains: None  - Dispo: Per PT/OT, recs rehab        Closed Colles' fracture of left radius with routine healing  See right DRF          Cholo Lopez MD  Orthopedics  [facility]

## 2020-09-19 NOTE — PLAN OF CARE
Cm sent e-mail to disability office to assist this pt, will cont to follow.    Aleshia Matta, MSN  Case Management  Ext [phone]

## 2020-09-19 NOTE — PROGRESS NOTES
[facility] Hospital Medicine  Progress Note    Patient Name: Andre Padgett  MRN: [id number]  Patient Class: IP- Inpatient   Admission Date: 9/13/2020  Length of Stay: 6 days  Attending Physician: Elia Nelson MD  Primary Care Provider: Hunter Diop MD    [facility] Medicine Team: [facility] MED A Elia Nelson MD    Subjective:     Principal Problem:Closed Colles' fracture of right radius with routine healing    Interval History:   Patient doing alright today, reported that use of scheduled oxycodone @ 23:00 helped control breakthrough pain overnight, reports he slept well other wise.     He is asking if wrist splints/immobilizers can have straps loosened, spontaneously appears to be moving fingers better but informed pt loosening device may result in increased movement of his wrist which may be adverse for healing s/p operative treatmetn he underwent.  He remains concerned about bruising over the dorsum of his thumbs, that is not consistent with cyanosis on examinaction.     Patient appears to have now taking social stressors on top of this acute traumatic accident that has left him newly debilitated for someone of normal functional status prior to this injury.  Comments on frustration that patient is in the midst of going through a divorce and the length of time that current injury is likely going to resulting in debility and time away from addressing his ongoing social issues.  Allowed patient to vent about his frustrations during visit today.     Contacted Orthopedic surgery on-call this evening to follow-up on some questions his toes the last 2 days, ultimately they note that patient likely needs more aggressive elevation of extremities to help reduce swelling in the discomfort he has experienced postoperatively.  They will round on patient over the weekend, relayed this to the nurse relayed to the patient.    Patient has complained about having early a.m. lab sticks his labs have  remained stable and patient does not have any other acute medical condition to warrant frequent lab draws, will space out to every Monday and Thursday at this time    Review of Systems   Constitutional: Negative for fatigue and fever.   Respiratory: Negative for cough, choking, chest tightness and shortness of breath.    Cardiovascular: Negative for chest pain and leg swelling.   Genitourinary: Negative for dysuria.   Musculoskeletal:        Wrist pain bilatearlly      Objective:     Vital Signs (Most Recent):  Temp: 98.7 °F (37.1 °C) (09/18/20 2043)  Pulse: 87 (09/18/20 2043)  Resp: 18 (09/18/20 2307)  BP: 136/63 (09/18/20 2043)  SpO2: 95 % (09/18/20 2100) Vital Signs (24h Range):  Temp:  [98 °F (36.7 °C)-99.2 °F (37.3 °C)] 98.7 °F (37.1 °C)  Pulse:  [] 87  Resp:  [16-18] 18  SpO2:  [91 %-97 %] 95 %  BP: (107-136)/(57-70) 136/63   Intake/[place]:  This Shift:  No intake/output data recorded.    Net I/O past 24h:   No intake or output data in the 24 hours ending 09/19/20 0003      Weight: 78.2 kg (172 lb 6.4 oz)  Body mass index is 24.74 kg/m².  Physical Exam  Constitutional:       Appearance: Normal appearance.   HENT:      Head: Atraumatic.   Eyes:      General: No scleral icterus.  Cardiovascular:      Rate and Rhythm: Normal rate and regular rhythm.      Heart sounds: No murmur.   Pulmonary:      Effort: Pulmonary effort is normal. No respiratory distress.      Breath sounds: Normal breath sounds. No wheezing.   Abdominal:      General: Abdomen is flat. Bowel sounds are normal. There is no distension.      Palpations: Abdomen is soft. There is no mass.      Tenderness: There is no abdominal tenderness.   Musculoskeletal:      Comments: Bilateral wrist splints, fingers are swollen, minimal ROM in splint, thumbs with bruising over dorsum bilaterally    Skin:     General: Skin is warm and dry.   Neurological:      General: No focal deficit present.      Mental Status: He is alert.           Significant Labs:    CBC:   Recent Labs   Lab 09/17/20  0438 09/18/20  0721   WBC 8.83 6.54   HGB 11.0* 11.6*   HCT 32.6* 34.5*    182     CMP:   Recent Labs   Lab 09/17/20  0438 09/18/20  0721    138   K 3.9 4.1    101   CO2 29 26   * 107   BUN 8 10   CREATININE 0.7 0.7   CALCIUM 8.7 9.3   PROT 6.3 6.8   ALBUMIN 2.9* 3.0*   BILITOT 0.8 0.8   ALKPHOS 57 58   AST 27 27   ALT 16 18   ANIONGAP 7* 11   EGFRNONAA >60.0 >60.0       Significant Imaging: I have reviewed all pertinent imaging results/findings within the past 24 hours.    MEDICATIONS  Scheduled Meds:   aluminum-magnesium hydroxide-simethicone  30 mL Oral QID (AC & HS)    ascorbic acid (vitamin C)  250 mg Oral Daily    atorvastatin  40 mg Oral Daily    carvediloL  12.5 mg Oral BID    levETIRAcetam  500 mg Oral BID    oxyCODONE  10 mg Oral Q24H    pantoprazole  40 mg Oral Daily    piperacillin-tazobactam (ZOSYN) IVPB  4.5 g Intravenous Q8H    polyethylene glycol  17 g Oral Daily    senna-docusate 8.6-50 mg  1 tablet Oral BID    tamsulosin  0.4 mg Oral Daily                             Continuous Infusions:  PRN Meds:.acetaminophen, albuterol-ipratropium, ALPRAZolam, bisacodyL, diphenhydrAMINE, glucose, glucose, hydrALAZINE, HYDROmorphone, melatonin, methocarbamoL, ondansetron, oxyCODONE, oxyCODONE, prochlorperazine, sodium chloride 0.9%, sodium chloride 0.9%    Assessment/Plan:     Overview/Hospital Course:  51 yo male hx of previous cerebral aneurysm, prior COIVD infection now resolved presenting following fall from ladder with outstretched hands resulting in bilateral colles fractures and head trauma s/p Small . S/p repair ORIF of bilateral wrists with ortho. Patient needs to remain in wrist braces until further notice. Neurosurgery feels the SAH is very small and will self resolve. Keppra x 7 days (stop date Sunday 9/20)    Patient has had intermittent Fever-s suspicious for UTI versus prostatitis continue, was started on Zosyn but his  urine culture returned negative, will de-escalate to oral empmiric therapy, he reports on history prior history of possible prostatitis, due to stable clinical status now, no repeat fevers de-escalate to Oral levofloxacin for total 14day course.        Acute subarachnoid hemorrhage_Hx of Cerebral Aneurysm  -Noted on Head CT after trauma. Neurosurgery consulted and recommend conservative management, serial CT scans. Continue to monitor, neurochecks Q4. Hold home ASA, SCDs only for DVT ppx  -Keppra given for seizure ppx        B/L Distal Radial Fractures  -Orthopedic surgery consulted. S/p ORIF of bilateral wrists  -Pain control with tylenol scheduled, opiates PRN  -PT/OT after surgery  - will schedule oxycodone at 2300 to prevent breakthrough pain occurring in early AM   >after 1st night with this reported symptoms better controlled.   -reinforced with nursing to have more significant elevation of extremities, per verbal recs from orthopedics needs almost hanging stockinette type position to aid in reducing post-op inflammation.      Acute Prostatitis-presumed  -patient with pyuria on recent UA and reported to be reporting -describing prostatitis type symptoms, renal ultrasound negative for pyelonephritis, Blood cultures negative, Urine culture shows no growth.   -Started on Flomax   -patient has had intermittent fevers, CXR reported stable pt is on broad spectrum Zosyn, denying acute urinary symptoms today - last fever was approx >48hrs hrs ago. Taper IV zosyn to PO levofloxacin with plan for today 14 days course.     >Today is Day 5 of 14 of overall Antibiotic therapy.   -report that with repeat fevers Ortho feels that surgery site clean and intact.      Essential Hypertension  -coreg 12.5mg BID        Placement Pending: PT recs but rehab most likely. Patient with minimal support at home. Medicaid.          Code Status: Full Code    Active Hospital Problems    Diagnosis  POA    *Closed Colles' fracture of right  radius with routine healing [V02.143V]  Not Applicable     Priority: 1 - High    Closed Colles' fracture of left radius with routine healing [M32.771D]  Not Applicable     Priority: 1 - High    Subarachnoid hemorrhage [I60.9]  Yes     Priority: 2     Acute prostatitis [N41.0]  Yes     Priority: 3     Hyperlipidemia [E78.5]  Yes     Priority: 5     Debility [R53.81]  Yes     Priority: 6       Resolved Hospital Problems    Diagnosis Date Resolved POA    Abnormal x-ray of abdomen [R93.5] 09/17/2020 Yes     Priority: 4      VTE Risk Mitigation (From admission, onward)         Ordered     IP VTE HIGH RISK PATIENT  Once      09/14/20 0816     Place sequential compression device  Until discontinued      09/14/20 0816     Place BEAR hose  Until discontinued      09/14/20 0816     Place sequential compression device  Until discontinued      09/13/20 2343                        Elia Nelson M.D.  Attending Physician  [facility] Medicine Dept.  Pager: [phone]  [facility]  [id number]

## 2020-09-20 PROBLEM — S22.31XD CLOSED FRACTURE OF ONE RIB OF RIGHT SIDE WITH ROUTINE HEALING: Status: ACTIVE | Noted: 2020-09-20

## 2020-09-20 LAB
BACTERIA BLD CULT: NORMAL
BACTERIA BLD CULT: NORMAL

## 2020-09-20 PROCEDURE — 97116 GAIT TRAINING THERAPY: CPT | Mod: CQ

## 2020-09-20 PROCEDURE — 97110 THERAPEUTIC EXERCISES: CPT | Mod: CQ

## 2020-09-20 PROCEDURE — 63600175 PHARM REV CODE 636 W HCPCS: Performed by: HOSPITALIST

## 2020-09-20 PROCEDURE — 25000003 PHARM REV CODE 250: Performed by: STUDENT IN AN ORGANIZED HEALTH CARE EDUCATION/TRAINING PROGRAM

## 2020-09-20 PROCEDURE — 11000001 HC ACUTE MED/SURG PRIVATE ROOM

## 2020-09-20 PROCEDURE — 25000003 PHARM REV CODE 250: Performed by: HOSPITALIST

## 2020-09-20 PROCEDURE — 99233 PR SUBSEQUENT HOSPITAL CARE,LEVL III: ICD-10-PCS | Mod: 95,,, | Performed by: INTERNAL MEDICINE

## 2020-09-20 PROCEDURE — 25000003 PHARM REV CODE 250: Performed by: INTERNAL MEDICINE

## 2020-09-20 PROCEDURE — 99233 SBSQ HOSP IP/OBS HIGH 50: CPT | Mod: 95,,, | Performed by: INTERNAL MEDICINE

## 2020-09-20 RX ORDER — METHOCARBAMOL 500 MG/1
500 TABLET, FILM COATED ORAL 3 TIMES DAILY
Status: DISCONTINUED | OUTPATIENT
Start: 2020-09-20 | End: 2020-09-22

## 2020-09-20 RX ORDER — METHOCARBAMOL 500 MG/1
500 TABLET, FILM COATED ORAL 3 TIMES DAILY
Status: DISCONTINUED | OUTPATIENT
Start: 2020-09-20 | End: 2020-09-20

## 2020-09-20 RX ADMIN — LEVETIRACETAM 500 MG: 500 TABLET, FILM COATED ORAL at 08:09

## 2020-09-20 RX ADMIN — CARVEDILOL 12.5 MG: 12.5 TABLET, FILM COATED ORAL at 08:09

## 2020-09-20 RX ADMIN — OXYCODONE HYDROCHLORIDE 10 MG: 10 TABLET ORAL at 08:09

## 2020-09-20 RX ADMIN — TAMSULOSIN HYDROCHLORIDE 0.4 MG: 0.4 CAPSULE ORAL at 08:09

## 2020-09-20 RX ADMIN — OXYCODONE HYDROCHLORIDE 10 MG: 10 TABLET ORAL at 03:09

## 2020-09-20 RX ADMIN — HYDROMORPHONE HYDROCHLORIDE 1.5 MG: 1 INJECTION, SOLUTION INTRAMUSCULAR; INTRAVENOUS; SUBCUTANEOUS at 05:09

## 2020-09-20 RX ADMIN — OXYCODONE HYDROCHLORIDE 10 MG: 10 TABLET ORAL at 10:09

## 2020-09-20 RX ADMIN — Medication 250 MG: at 08:09

## 2020-09-20 RX ADMIN — PANTOPRAZOLE SODIUM 40 MG: 40 TABLET, DELAYED RELEASE ORAL at 09:09

## 2020-09-20 RX ADMIN — METHOCARBAMOL 500 MG: 500 TABLET ORAL at 08:09

## 2020-09-20 RX ADMIN — ALUMINUM HYDROXIDE, MAGNESIUM HYDROXIDE, AND SIMETHICONE 30 ML: 200; 200; 20 SUSPENSION ORAL at 03:09

## 2020-09-20 RX ADMIN — LEVOFLOXACIN 750 MG: 500 TABLET, FILM COATED ORAL at 08:09

## 2020-09-20 RX ADMIN — ALUMINUM HYDROXIDE, MAGNESIUM HYDROXIDE, AND SIMETHICONE 30 ML: 200; 200; 20 SUSPENSION ORAL at 10:09

## 2020-09-20 RX ADMIN — ALUMINUM HYDROXIDE, MAGNESIUM HYDROXIDE, AND SIMETHICONE 30 ML: 200; 200; 20 SUSPENSION ORAL at 06:09

## 2020-09-20 RX ADMIN — OXYCODONE 5 MG: 5 TABLET ORAL at 12:09

## 2020-09-20 RX ADMIN — HYDROMORPHONE HYDROCHLORIDE 1.5 MG: 1 INJECTION, SOLUTION INTRAMUSCULAR; INTRAVENOUS; SUBCUTANEOUS at 10:09

## 2020-09-20 RX ADMIN — ALUMINUM HYDROXIDE, MAGNESIUM HYDROXIDE, AND SIMETHICONE 30 ML: 200; 200; 20 SUSPENSION ORAL at 08:09

## 2020-09-20 RX ADMIN — OXYCODONE 5 MG: 5 TABLET ORAL at 02:09

## 2020-09-20 RX ADMIN — DOCUSATE SODIUM 50MG AND SENNOSIDES 8.6MG 1 TABLET: 8.6; 5 TABLET, FILM COATED ORAL at 08:09

## 2020-09-20 RX ADMIN — ATORVASTATIN CALCIUM 40 MG: 20 TABLET, FILM COATED ORAL at 08:09

## 2020-09-20 RX ADMIN — METHOCARBAMOL 500 MG: 500 TABLET ORAL at 02:09

## 2020-09-20 RX ADMIN — HYDROMORPHONE HYDROCHLORIDE 1.5 MG: 1 INJECTION, SOLUTION INTRAMUSCULAR; INTRAVENOUS; SUBCUTANEOUS at 11:09

## 2020-09-20 NOTE — PT/OT/SLP PROGRESS
Physical Therapy Treatment    Patient Name:  Andre Padgett   MRN:  [id number]    Recommendations:     Discharge Recommendations:  nursing facility, skilled   Discharge Equipment Recommendations: none   Barriers to discharge: Decreased caregiver support    Assessment:     Andre Padgett is a 50 y.o. male admitted with a medical diagnosis of Closed Colles' fracture of right radius with routine healing.  He presents with the following impairments/functional limitations:  weakness, impaired endurance, impaired self care skills, impaired functional mobilty, gait instability, impaired balance, decreased upper extremity function, decreased ROM, pain, orthopedic precautions. Pt tolerated session well on this date reporting decreased pain in bilat wrist(6/10).Patient showed improved stability during gait training, but min loss of balance noted while performing there ex in standing.    Rehab Prognosis: Good; patient would benefit from acute skilled PT services to address these deficits and reach maximum level of function.    Recent Surgery: Procedure(s) (LRB):  ORIF, FRACTURE, RADIUS OR ULNA, synthes, right, large C arm at a diagonal from the rear of the room, ancef, velcro wrist splint (Bilateral) 6 Days Post-Op    Plan:     During this hospitalization, patient to be seen 2 x/week to address the identified rehab impairments via gait training, therapeutic activities, therapeutic exercises, neuromuscular re-education and progress toward the following goals:    · Plan of Care Expires:  10/14/20    Subjective     Chief Complaint: BUE swelling and impaired self care abilities   Patient/Family Comments/goals: Pt pleasant and willing to participate with therapy on this date.    Pain/Comfort:  Pain Rating 1: 5/10  Location - Side 1: Bilateral  Location - Orientation 1: generalized  Location 1: wrist  Pain Addressed 1: Pre-medicate for activity, Reposition, Distraction  Pain Rating Post-Intervention 1: 5/10      Objective:      Communicated with NSG prior to session.  Patient found supine with   upon PT entry to room.     General Precautions: Standard, fall   Orthopedic Precautions:RUE non weight bearing, LUE non weight bearing   Braces: UE brace     Functional Mobility:  · Bed Mobility:     · Rolling Right: Set-up Assistance(Linen removal)  · Scooting: stand by assistance  · Supine to Sit: stand by assistance  · Transfers:     · Sit to Stand:  stand by assistance with no AD  · Toilet Transfer: Set-up Assistance(Clothing management) with  no AD  using  Step Transfer  · Gait: ~400 ft with no A.D. with SBA  · Balance: SBA      AM-PAC 6 CLICK MOBILITY  Turning over in bed (including adjusting bedclothes, sheets and blankets)?: 4  Sitting down on and standing up from a chair with arms (e.g., wheelchair, bedside commode, etc.): 4  Moving from lying on back to sitting on the side of the bed?: 4  Moving to and from a bed to a chair (including a wheelchair)?: 4  Need to walk in hospital room?: 3  Climbing 3-5 steps with a railing?: 3  Basic Mobility Total Score: 22       Therapeutic Activities and Exercises:   - Sit-to-Stand x3 trials SBA without AD. Donned a second gown. There ex in standing with no support: MINI SQUATS,KNEE CURLS,  HIP FLEX AND HEEL/TOE RAISES 2X12 REPS.    Patient left up in chair with call button in reach..    GOALS:   Multidisciplinary Problems     Physical Therapy Goals        Problem: Physical Therapy Goal    Goal Priority Disciplines Outcome Goal Variances Interventions   Physical Therapy Goal     PT, PT/OT Ongoing, Progressing     Description: Goals to be met by: 10/14/2020    Patient will increase functional independence with mobility by performin. Supine to sit with Modified [place]  2. Sit to supine with Modified [place]  3. Sit to stand transfer with Modified [place]  4. Gait  x 100 feet with Supervision using LRAD  5. Lower extremity exercise program x15 reps, with independence                       Time Tracking:     PT Received On: 09/20/20  PT Start Time: 0940     PT Stop Time: 1005  PT Total Time (min): 25 min     Billable Minutes: Gait Training 13 and Therapeutic Activities 12    Treatment Type: Treatment  PT/PTA: PTA     PTA Visit Number: 1     Gautam Abbott, PTA  09/20/2020

## 2020-09-20 NOTE — PROGRESS NOTES
[facility] Hospital Medicine  Telemedicine Progress Note    Patient Name: Andre Padgett  MRN: [id number]  Patient Class: IP- Inpatient   Admission Date: 9/13/2020  Length of Stay: 7 days  Attending Physician: Devi Vargas MD  Primary Care Provider: Hunter Diop MD    [facility] Medicine Team: [facility] [place] [facility] VIRTUAL TEAM 10 Devi Vargas MD  Virtual Telemedicine Progress Note  Start time: 1009  Chief complaint: Closed Colles' fracture of right radius with routine healing  The patient location is: [facility]/6 A  The patient arrived at: 9/13/2020  9:11 PM  Present with the patient at the time of the telemed/virtual assessment: telepresenter   End time:  1021  Total time spent with patient: 12 min  I have assessed findings virtually using a telemedicine platform and with assistance of the bedside nurse or telemedicine presenter.  The attending portion of this evaluation, treatment, and documentation was performed per Devi Vargas MD via audiovisual.    Patient was transferred to the telemedicine service on: 9/19/2020    Subjective:     Admission CC:   Chief Complaint   Patient presents with    Fall     Fall after standing on stool, bilateral wrist fractures. Obvious defromities. -LOC     Follow up visit for: Closed Colles' fracture of right radius with routine healing    Interval History / Events Overnight:   The patient is able to provide adequate history. Additional history was obtained from chart review. No significant events reported by Nursing.  Patient complains of back and R shoulder pain. Symptoms have been unchanged since yesterday. Associated symptoms include: fatigue. Symptoms are stable. Alleviating factors include: nothing.     Data reviewed 9/20/2020: Lab test(s) reviewed: H&H stable  I have assessed findings virtually using a telemedicine platform and with assistance of the bedside nurse or telemedicine presenter.      Review of Systems   Constitutional: Negative for fever.    Respiratory: Negative for shortness of breath.    Musculoskeletal: Positive for back pain.     Objective:     Vital Signs (Most Recent):  Temp: 98.8 °F (37.1 °C) (09/20/20 1628)  Pulse: 87 (09/20/20 1628)  Resp: 18 (09/20/20 1727)  BP: 114/65 (09/20/20 1628)  SpO2: 95 % (09/20/20 1628) Vital Signs (24h Range):  Temp:  [97.3 °F (36.3 °C)-98.8 °F (37.1 °C)] 98.8 °F (37.1 °C)  Pulse:  [80-88] 87  Resp:  [16-18] 18  SpO2:  [93 %-96 %] 95 %  BP: (114-143)/(65-77) 114/65     Weight: 78.2 kg (172 lb 6.4 oz)  Body mass index is 24.74 kg/m².  No intake or output data in the 24 hours ending 09/20/20 1814   Physical Exam  Constitutional:       General: He is not in acute distress.     Appearance: Normal appearance. He is not diaphoretic.   Eyes:      General: Lids are normal. No scleral icterus.        Right eye: No discharge.         Left eye: No discharge.      Conjunctiva/sclera: Conjunctivae normal.   Neck:      Trachea: Phonation normal.   Cardiovascular:      Rate and Rhythm: Normal rate.   Pulmonary:      Effort: Pulmonary effort is normal. No tachypnea, accessory muscle usage or respiratory distress.   Abdominal:      General: There is no distension.   Musculoskeletal:      Right wrist: He exhibits swelling.      Left wrist: He exhibits swelling.   Neurological:      Mental Status: He is alert and oriented to person, place, and time. He is not disoriented.   Psychiatric:         Attention and Perception: Attention normal.         Mood and Affect: Affect normal.         Speech: Speech normal.         Behavior: Behavior is cooperative.         Significant Labs:   Recent Labs   Lab 09/16/20 0317 09/17/20  0438 09/18/20  0721   WBC 8.39 8.83 6.54   HGB 11.4* 11.0* 11.6*   HCT 34.9* 32.6* 34.5*   * 151 182     Recent Labs   Lab 09/16/20 0317 09/17/20  0438 09/18/20  0721   GRAN 74.9*  6.3 81.2*  7.2 69.2  4.5   LYMPH 14.7*  1.2 11.6*  1.0 19.3  1.3   MONO 7.9  0.7 5.1  0.5 7.2  0.5   EOS 0.2 0.2 0.3      Recent Labs   Lab 09/15/20  0027  09/16/20 0317 09/17/20  0438 09/18/20  0721      < > 139 139 138   K 4.1   < > 4.1 3.9 4.1      < > 102 103 101   CO2 24   < > 28 29 26   BUN 7   < > 9 8 10   CREATININE 0.8   < > 0.8 0.7 0.7   *   < > 109 115* 107   CALCIUM 8.2*   < > 8.4* 8.7 9.3   ALBUMIN 3.3*   < > 3.0* 2.9* 3.0*   MG 1.8  --   --   --   --     < > = values in this interval not displayed.     Recent Labs   Lab 09/13/20 2117 09/16/20 0317 09/17/20 0438 09/18/20  0721   ALKPHOS 65   < > 51* 57 58   ALT 36   < > 16 16 18   AST 30   < > 26 27 27   PROT 7.2   < > 6.1 6.3 6.8   BILITOT 0.5   < > 0.6 0.8 0.8   INR 1.1  --   --   --   --     < > = values in this interval not displayed.     Recent Labs   Lab 09/15/20  0027 09/15/20  0835   PROCAL 0.04  --    LACTATE 2.8* 1.8     Results for orders placed or performed in visit on 05/25/20   Vitamin D   Result Value Ref Range    Vit D, 25-Hydroxy 34 30 - 96 ng/mL     SARS-CoV2 (COVID-19) Qualitative PCR (no units)   Date Value   06/03/2020 Detected (A)   04/28/2020 Detected (A)       ECG Results    None         X-Ray Chest PA And Lateral  Narrative: EXAMINATION:  XR CHEST PA AND LATERAL    CLINICAL HISTORY:  temp. sepsis alert;    TECHNIQUE:  PA and lateral views of the chest were performed.    COMPARISON:  September 14, 2020    FINDINGS:  Two views of the chest are submitted.  The cardiomediastinal silhouette appears stable.  There is mild diminished depth of inspiration, atelectatic changes are noted.  There is no evidence for confluent infiltrate or consolidation, significant pleural effusion or pneumothorax.  The osseous structures demonstrate chronic change, there appears to be an acute rib fracture involving the posterior right 3rd rib for which clinical and historical correlation is needed.  Impression: Diminished depth of inspiration, atelectatic change and chronic change.    Right 3rd rib fracture appears acute, clinical and historical  correlation is needed.    Electronically signed by: Suleiman Blake  Date:    09/16/2020  Time:    05:59        Assessment/Plan:      Active Diagnoses:    Diagnosis Date Noted POA    PRINCIPAL PROBLEM:  Closed Colles' fracture of right radius with routine healing [S52.531D] 09/14/2020 Not Applicable    Closed fracture of one rib of right side with routine healing [S22.31XD] 09/20/2020 Not Applicable    Acute prostatitis [N41.0] 09/19/2020 Yes    Debility [R53.81] 09/16/2020 Yes    Closed Colles' fracture of left radius with routine healing [S52.532D] 09/14/2020 Not Applicable    Subarachnoid hemorrhage [I60.9] 09/13/2020 Yes    Hyperlipidemia [E78.5] 04/09/2020 Yes      Problems Resolved During this Admission:    Diagnosis Date Noted Date Resolved POA    Abnormal x-ray of abdomen [R93.5] 09/14/2020 09/17/2020 Yes       Overview / ICU Course:    Andre Padgett is a 50 y.o. male admitted for Closed Colles' fracture of right radius with routine healing.  Patient with hx of previous cerebral aneurysm, prior COIVD infection now resolved, presenting following fall from ladder with outstretched hands resulting in bilateral colles fractures and head trauma s/p Small SAH . S/p repair ORIF of bilateral wrists with Ortho. Patient needs to remain in wrist braces until further notice. Neurosurgery feels the SAH is very small and will self resolve. Keppra x 7 days (stop date Sunday 9/20)  Patient has had intermittent Fevers suspicious for UTI versus prostatitis and was started on Zosyn but his urine culture returned negative, de-escalated to oral empmiric therapy, he reports on history prior history of possible prostatitis, due to stable clinical status now, no repeat fevers de-escalate to Oral levofloxacin for total 14-day course.      Inpatient Medications Prescribed for Management of Current Problems:     Scheduled Meds:    aluminum-magnesium hydroxide-simethicone  30 mL Oral QID (AC & HS)    ascorbic acid (vitamin  C)  250 mg Oral Daily    atorvastatin  40 mg Oral Daily    carvediloL  12.5 mg Oral BID    levETIRAcetam  500 mg Oral BID    levoFLOXacin  750 mg Oral Daily    lidocaine  2 patch Transdermal Q24H    methocarbamoL  500 mg Oral TID    oxyCODONE  10 mg Oral Q24H    pantoprazole  40 mg Oral Daily    polyethylene glycol  17 g Oral Daily    senna-docusate 8.6-50 mg  1 tablet Oral BID    tamsulosin  0.4 mg Oral Daily     Continuous Infusions:   As Needed: acetaminophen, albuterol-ipratropium, ALPRAZolam, bisacodyL, diphenhydrAMINE, glucose, glucose, hydrALAZINE, HYDROmorphone, melatonin, ondansetron, oxyCODONE, oxyCODONE, prochlorperazine, sodium chloride 0.9%, sodium chloride 0.9%    Assessment and Plan by Problem    Acute subarachnoid hemorrhage_Hx of Cerebral Aneurysm  -Noted on Head CT after trauma. Neurosurgery consulted and recommend conservative management, serial CT scans. Continue to monitor, neurochecks Q4. Hold home ASA, SCDs only for DVT ppx  -Keppra given for seizure ppx     B/L Distal Radial Fractures  Rib fracture  -Orthopedic surgery consulted. S/p ORIF of bilateral wrists  -Pain control with Tylenol scheduled, opiates PRN  -PT/OT after surgery  - schedule oxycodone at 2300 to prevent breakthrough pain occurring in early AM              >after 1st night with this reported symptoms better controlled.   -reinforced with nursing to have more significant elevation of extremities, per verbal recs from orthopedics needs almost hanging stockinette type position to aid in reducing post-op inflammation.   - continue elevation; trial of stockinet      Acute Prostatitis-presumed  -patient with pyuria on recent UA and reported to be reporting -describing prostatitis type symptoms, renal ultrasound negative for pyelonephritis, Blood cultures negative, Urine culture shows no growth.   -Started on Flomax   -patient has had intermittent fevers, CXR reported stable pt is on broad spectrum Zosyn, denying acute  urinary symptoms. Change IV Zosyn to PO levofloxacin with plan for 14 days course.   -report that with repeat fevers Ortho feels that surgery site clean and intact.      Essential Hypertension  -Coreg 12.5mg BID    Diet: Diet Adult Regular (IDDSI Level 7)  GI Prophylaxis: Indicated due to multiple minor risk factors  Significant LDAs:   IV Access Type: Peripheral  Urinary Catheter Indication if present: Patient Does Not Have Urinary Catheter  Other Lines/Tubes/Drains:    HIGH RISK CONDITION(S):   Patient has an abrupt change in neurologic status: SAH  Patient is currently receiving parenteral controlled substances: Dilaudid     Goals of Care:    Previous admission:  5/5/20  Likely prognosis:  Good  Code Status: Full Code  Comfort Only: No  Hospice: No  Goals at discharge: remain at home, with physician follow-up    Discharge Planning   BRAYAN: 9/22/2020     Code Status: Full Code   Is the patient medically ready for discharge?:     Reason for patient still in hospital (select all that apply): Treatment and Pending disposition  Discharge Plan A: Rehab   Discharge Delays: None known at this time    VTE Risk Mitigation (From admission, onward)         Ordered     IP VTE HIGH RISK PATIENT  Once      09/14/20 0816     Place sequential compression device  Until discontinued      09/14/20 0816     Place BEAR hose  Until discontinued      09/14/20 0816     Place sequential compression device  Until discontinued      09/13/20 3808                   Devi Vargas MD  Department of Hospital Medicine   [facility]

## 2020-09-20 NOTE — NURSING
Patient needs to ambulate 2x'a daily and elevate both upper extremities every day for at least 1 hour per MD. Will continue to monitor patient.

## 2020-09-21 LAB
ALBUMIN SERPL BCP-MCNC: 3.2 G/DL (ref 3.5–5.2)
ANION GAP SERPL CALC-SCNC: 8 MMOL/L (ref 8–16)
BASOPHILS # BLD AUTO: 0.03 K/UL (ref 0–0.2)
BASOPHILS NFR BLD: 0.4 % (ref 0–1.9)
BUN SERPL-MCNC: 14 MG/DL (ref 6–20)
CALCIUM SERPL-MCNC: 9.7 MG/DL (ref 8.7–10.5)
CHLORIDE SERPL-SCNC: 100 MMOL/L (ref 95–110)
CO2 SERPL-SCNC: 27 MMOL/L (ref 23–29)
CREAT SERPL-MCNC: 0.7 MG/DL (ref 0.5–1.4)
DIFFERENTIAL METHOD: ABNORMAL
EOSINOPHIL # BLD AUTO: 0.2 K/UL (ref 0–0.5)
EOSINOPHIL NFR BLD: 2.2 % (ref 0–8)
ERYTHROCYTE [DISTWIDTH] IN BLOOD BY AUTOMATED COUNT: 11.3 % (ref 11.5–14.5)
EST. GFR  (AFRICAN AMERICAN): >60 ML/MIN/1.73 M^2
EST. GFR  (NON AFRICAN AMERICAN): >60 ML/MIN/1.73 M^2
GLUCOSE SERPL-MCNC: 103 MG/DL (ref 70–110)
HCT VFR BLD AUTO: 37.6 % (ref 40–54)
HGB BLD-MCNC: 12.7 G/DL (ref 14–18)
IMM GRANULOCYTES # BLD AUTO: 0.05 K/UL (ref 0–0.04)
IMM GRANULOCYTES NFR BLD AUTO: 0.6 % (ref 0–0.5)
LYMPHOCYTES # BLD AUTO: 1.2 K/UL (ref 1–4.8)
LYMPHOCYTES NFR BLD: 15.8 % (ref 18–48)
MAGNESIUM SERPL-MCNC: 2 MG/DL (ref 1.6–2.6)
MCH RBC QN AUTO: 32.5 PG (ref 27–31)
MCHC RBC AUTO-ENTMCNC: 33.8 G/DL (ref 32–36)
MCV RBC AUTO: 96 FL (ref 82–98)
MONOCYTES # BLD AUTO: 0.5 K/UL (ref 0.3–1)
MONOCYTES NFR BLD: 5.9 % (ref 4–15)
NEUTROPHILS # BLD AUTO: 5.8 K/UL (ref 1.8–7.7)
NEUTROPHILS NFR BLD: 75.1 % (ref 38–73)
NRBC BLD-RTO: 0 /100 WBC
PHOSPHATE SERPL-MCNC: 3.6 MG/DL (ref 2.7–4.5)
PLATELET # BLD AUTO: 281 K/UL (ref 150–350)
PMV BLD AUTO: 9.7 FL (ref 9.2–12.9)
POTASSIUM SERPL-SCNC: 3.9 MMOL/L (ref 3.5–5.1)
RBC # BLD AUTO: 3.91 M/UL (ref 4.6–6.2)
SODIUM SERPL-SCNC: 135 MMOL/L (ref 136–145)
WBC # BLD AUTO: 7.77 K/UL (ref 3.9–12.7)

## 2020-09-21 PROCEDURE — 94761 N-INVAS EAR/PLS OXIMETRY MLT: CPT

## 2020-09-21 PROCEDURE — 25000003 PHARM REV CODE 250: Performed by: HOSPITALIST

## 2020-09-21 PROCEDURE — 11000001 HC ACUTE MED/SURG PRIVATE ROOM

## 2020-09-21 PROCEDURE — 83735 ASSAY OF MAGNESIUM: CPT

## 2020-09-21 PROCEDURE — 99233 PR SUBSEQUENT HOSPITAL CARE,LEVL III: ICD-10-PCS | Mod: 95,,, | Performed by: INTERNAL MEDICINE

## 2020-09-21 PROCEDURE — 80069 RENAL FUNCTION PANEL: CPT

## 2020-09-21 PROCEDURE — 97535 SELF CARE MNGMENT TRAINING: CPT

## 2020-09-21 PROCEDURE — 36415 COLL VENOUS BLD VENIPUNCTURE: CPT

## 2020-09-21 PROCEDURE — 85025 COMPLETE CBC W/AUTO DIFF WBC: CPT

## 2020-09-21 PROCEDURE — 99233 SBSQ HOSP IP/OBS HIGH 50: CPT | Mod: 95,,, | Performed by: INTERNAL MEDICINE

## 2020-09-21 PROCEDURE — 63600175 PHARM REV CODE 636 W HCPCS: Performed by: HOSPITALIST

## 2020-09-21 PROCEDURE — 25000003 PHARM REV CODE 250: Performed by: INTERNAL MEDICINE

## 2020-09-21 RX ADMIN — HYDROMORPHONE HYDROCHLORIDE 1.5 MG: 1 INJECTION, SOLUTION INTRAMUSCULAR; INTRAVENOUS; SUBCUTANEOUS at 04:09

## 2020-09-21 RX ADMIN — Medication 250 MG: at 09:09

## 2020-09-21 RX ADMIN — OXYCODONE HYDROCHLORIDE 10 MG: 10 TABLET ORAL at 02:09

## 2020-09-21 RX ADMIN — OXYCODONE HYDROCHLORIDE 10 MG: 10 TABLET ORAL at 06:09

## 2020-09-21 RX ADMIN — CARVEDILOL 12.5 MG: 12.5 TABLET, FILM COATED ORAL at 09:09

## 2020-09-21 RX ADMIN — OXYCODONE 5 MG: 5 TABLET ORAL at 08:09

## 2020-09-21 RX ADMIN — ATORVASTATIN CALCIUM 40 MG: 20 TABLET, FILM COATED ORAL at 09:09

## 2020-09-21 RX ADMIN — HYDROMORPHONE HYDROCHLORIDE 1.5 MG: 1 INJECTION, SOLUTION INTRAMUSCULAR; INTRAVENOUS; SUBCUTANEOUS at 09:09

## 2020-09-21 RX ADMIN — METHOCARBAMOL 500 MG: 500 TABLET ORAL at 08:09

## 2020-09-21 RX ADMIN — LEVOFLOXACIN 750 MG: 500 TABLET, FILM COATED ORAL at 09:09

## 2020-09-21 RX ADMIN — HYDROMORPHONE HYDROCHLORIDE 1.5 MG: 1 INJECTION, SOLUTION INTRAMUSCULAR; INTRAVENOUS; SUBCUTANEOUS at 08:09

## 2020-09-21 RX ADMIN — METHOCARBAMOL 500 MG: 500 TABLET ORAL at 03:09

## 2020-09-21 RX ADMIN — TAMSULOSIN HYDROCHLORIDE 0.4 MG: 0.4 CAPSULE ORAL at 09:09

## 2020-09-21 RX ADMIN — PANTOPRAZOLE SODIUM 40 MG: 40 TABLET, DELAYED RELEASE ORAL at 09:09

## 2020-09-21 RX ADMIN — OXYCODONE HYDROCHLORIDE 10 MG: 10 TABLET ORAL at 10:09

## 2020-09-21 RX ADMIN — METHOCARBAMOL 500 MG: 500 TABLET ORAL at 09:09

## 2020-09-21 RX ADMIN — CARVEDILOL 12.5 MG: 12.5 TABLET, FILM COATED ORAL at 08:09

## 2020-09-21 RX ADMIN — LIDOCAINE 2 PATCH: 50 PATCH TOPICAL at 03:09

## 2020-09-21 NOTE — PROGRESS NOTES
[facility] Hospital Medicine  Telemedicine Progress Note    Patient Name: Andre Padgett  MRN: [id number]  Patient Class: IP- Inpatient   Admission Date: 9/13/2020  Length of Stay: 8 days  Attending Physician: Devi Vargas MD  Primary Care Provider: Hunter Diop MD    [facility] Medicine Team: [facility] VIRTUAL TEAM 10 Devi Vargas MD  Virtual Telemedicine Progress Note  Start time: 1133  Chief complaint: Closed Colles' fracture of right radius with routine healing  The patient location is: [facility]/6 A  The patient arrived at: 9/13/2020  9:11 PM  Present with the patient at the time of the telemed/virtual assessment: telepresenter   End time:  1148  Total time spent with patient: 15 min  I have assessed findings virtually using a telemedicine platform and with assistance of the bedside nurse or telemedicine presenter.  The attending portion of this evaluation, treatment, and documentation was performed per Devi Vargas MD via audiovisual.    Patient was transferred to the telemedicine service on: 9/19/2020    Subjective:     Admission CC:   Chief Complaint   Patient presents with    Fall     Fall after standing on stool, bilateral wrist fractures. Obvious defromities. -LOC     Follow up visit for: Closed Colles' fracture of right radius with routine healing    Interval History / Events Overnight:   The patient is able to provide adequate history. Additional history was obtained from chart review. No significant events reported by Nursing.  Patient complains of back and R shoulder pain. Symptoms have been unchanged since yesterday. Associated symptoms include: fatigue. Symptoms are stable. Alleviating factors include: nothing.     Data reviewed 9/21/2020: Lab test(s) reviewed: H&H stable  I have assessed findings virtually using a telemedicine platform and with assistance of the bedside nurse or telemedicine presenter.      Review of Systems   Constitutional: Negative for fever.    Respiratory: Negative for shortness of breath.    Musculoskeletal: Positive for back pain.     Objective:     Vital Signs (Most Recent):  Temp: 99.3 °F (37.4 °C) (09/21/20 1702)  Pulse: 88 (09/21/20 1702)  Resp: 20 (09/21/20 1702)  BP: 124/70 (09/21/20 1702)  SpO2: 95 % (09/21/20 1702) Vital Signs (24h Range):  Temp:  [97.4 °F (36.3 °C)-100.3 °F (37.9 °C)] 99.3 °F (37.4 °C)  Pulse:  [71-88] 88  Resp:  [14-20] 20  SpO2:  [90 %-96 %] 95 %  BP: (124-144)/(62-72) 124/70     Weight: 78.2 kg (172 lb 6.4 oz)  Body mass index is 24.74 kg/m².  No intake or output data in the 24 hours ending 09/21/20 1806   Physical Exam  Constitutional:       General: He is not in acute distress.     Appearance: Normal appearance. He is not diaphoretic.   Eyes:      General: Lids are normal. No scleral icterus.        Right eye: No discharge.         Left eye: No discharge.      Conjunctiva/sclera: Conjunctivae normal.   Neck:      Trachea: Phonation normal.   Cardiovascular:      Rate and Rhythm: Normal rate.   Pulmonary:      Effort: Pulmonary effort is normal. No tachypnea, accessory muscle usage or respiratory distress.   Abdominal:      General: There is no distension.   Musculoskeletal:      Right wrist: He exhibits swelling.      Left wrist: He exhibits swelling.   Neurological:      Mental Status: He is alert and oriented to person, place, and time. He is not disoriented.   Psychiatric:         Attention and Perception: Attention normal.         Mood and Affect: Affect normal.         Speech: Speech normal.         Behavior: Behavior is cooperative.         Significant Labs:   Recent Labs   Lab 09/17/20  0438 09/18/20  0721 09/21/20  0803   WBC 8.83 6.54 7.77   HGB 11.0* 11.6* 12.7*   HCT 32.6* 34.5* 37.6*    182 281     Recent Labs   Lab 09/17/20  0438 09/18/20  0721 09/21/20  0803   GRAN 81.2*  7.2 69.2  4.5 75.1*  5.8   LYMPH 11.6*  1.0 19.3  1.3 15.8*  1.2   MONO 5.1  0.5 7.2  0.5 5.9  0.5   EOS 0.2 0.3 0.2      Recent Labs   Lab 09/15/20  0027  09/17/20  0438 09/18/20  0721 09/21/20  0803      < > 139 138 135*   K 4.1   < > 3.9 4.1 3.9      < > 103 101 100   CO2 24   < > 29 26 27   BUN 7   < > 8 10 14   CREATININE 0.8   < > 0.7 0.7 0.7   *   < > 115* 107 103   CALCIUM 8.2*   < > 8.7 9.3 9.7   ALBUMIN 3.3*   < > 2.9* 3.0* 3.2*   MG 1.8  --   --   --  2.0   PHOS  --   --   --   --  3.6    < > = values in this interval not displayed.     Recent Labs   Lab 09/16/20 0317 09/17/20 0438 09/18/20  0721   ALKPHOS 51* 57 58   ALT 16 16 18   AST 26 27 27   PROT 6.1 6.3 6.8   BILITOT 0.6 0.8 0.8     Recent Labs   Lab 09/15/20  0027 09/15/20  0835   PROCAL 0.04  --    LACTATE 2.8* 1.8     Results for orders placed or performed in visit on 05/25/20   Vitamin D   Result Value Ref Range    Vit D, 25-Hydroxy 34 30 - 96 ng/mL     SARS-CoV2 (COVID-19) Qualitative PCR (no units)   Date Value   06/03/2020 Detected (A)   04/28/2020 Detected (A)       ECG Results    None         X-Ray Chest PA And Lateral  Narrative: EXAMINATION:  XR CHEST PA AND LATERAL    CLINICAL HISTORY:  temp. sepsis alert;    TECHNIQUE:  PA and lateral views of the chest were performed.    COMPARISON:  September 14, 2020    FINDINGS:  Two views of the chest are submitted.  The cardiomediastinal silhouette appears stable.  There is mild diminished depth of inspiration, atelectatic changes are noted.  There is no evidence for confluent infiltrate or consolidation, significant pleural effusion or pneumothorax.  The osseous structures demonstrate chronic change, there appears to be an acute rib fracture involving the posterior right 3rd rib for which clinical and historical correlation is needed.  Impression: Diminished depth of inspiration, atelectatic change and chronic change.    Right 3rd rib fracture appears acute, clinical and historical correlation is needed.    Electronically signed by: Suleiman  Hayden  Date:    09/16/2020  Time:    05:59        Assessment/Plan:      Active Diagnoses:    Diagnosis Date Noted POA    PRINCIPAL PROBLEM:  Closed Colles' fracture of right radius with routine healing [S52.531D] 09/14/2020 Not Applicable    Closed fracture of one rib of right side with routine healing [S22.31XD] 09/20/2020 Not Applicable    Acute prostatitis [N41.0] 09/19/2020 Yes    Debility [R53.81] 09/16/2020 Yes    Closed Colles' fracture of left radius with routine healing [S52.532D] 09/14/2020 Not Applicable    Subarachnoid hemorrhage [I60.9] 09/13/2020 Yes    Hyperlipidemia [E78.5] 04/09/2020 Yes      Problems Resolved During this Admission:    Diagnosis Date Noted Date Resolved POA    Abnormal x-ray of abdomen [R93.5] 09/14/2020 09/17/2020 Yes       Overview / ICU Course:    Andre Padgett is a 50 y.o. male admitted for Closed Colles' fracture of right radius with routine healing.  Patient with hx of previous cerebral aneurysm, prior COIVD infection now resolved, presenting following fall from ladder with outstretched hands resulting in bilateral colles fractures and head trauma s/p Small SAH . S/p repair ORIF of bilateral wrists with Ortho. Patient needs to remain in wrist braces until further notice. Neurosurgery feels the SAH is very small and will self resolve. Keppra x 7 days (stop date Sunday 9/20)  Patient has had intermittent Fevers suspicious for UTI versus prostatitis and was started on Zosyn but his urine culture returned negative, de-escalated to oral empmiric therapy, he reports on history prior history of possible prostatitis, due to stable clinical status now, no repeat fevers de-escalate to Oral levofloxacin for total 14-day course.      Inpatient Medications Prescribed for Management of Current Problems:     Scheduled Meds:    aluminum-magnesium hydroxide-simethicone  30 mL Oral QID (AC & HS)    ascorbic acid (vitamin C)  250 mg Oral Daily    atorvastatin  40 mg Oral Daily     carvediloL  12.5 mg Oral BID    levoFLOXacin  750 mg Oral Daily    lidocaine  2 patch Transdermal Q24H    methocarbamoL  500 mg Oral TID    oxyCODONE  10 mg Oral Q24H    pantoprazole  40 mg Oral Daily    polyethylene glycol  17 g Oral Daily    senna-docusate 8.6-50 mg  1 tablet Oral BID    tamsulosin  0.4 mg Oral Daily     Continuous Infusions:   As Needed: acetaminophen, albuterol-ipratropium, ALPRAZolam, bisacodyL, diphenhydrAMINE, glucose, glucose, hydrALAZINE, HYDROmorphone, melatonin, ondansetron, oxyCODONE, oxyCODONE, prochlorperazine, sodium chloride 0.9%, sodium chloride 0.9%    Assessment and Plan by Problem    Acute subarachnoid hemorrhage_Hx of Cerebral Aneurysm  -Noted on Head CT after trauma. Neurosurgery consulted and recommend conservative management, serial CT scans. Continue to monitor, neurochecks Q4. Hold home ASA, SCDs only for DVT ppx  -Keppra given for seizure ppx     B/L Distal Radial Fractures  Rib fracture  -Orthopedic surgery consulted. S/p ORIF of bilateral wrists  -Pain control with Tylenol scheduled, opiates PRN  -PT/OT after surgery  - schedule oxycodone at 2300 to prevent breakthrough pain occurring in early AM              >after 1st night with this reported symptoms better controlled.   -reinforced with nursing to have more significant elevation of extremities, per verbal recs from orthopedics needs almost hanging stockinette type position to aid in reducing post-op inflammation.   - continue elevation; trial of stockinet for elevation     Acute Prostatitis-presumed  -patient with pyuria on recent UA and reported to be reporting -describing prostatitis type symptoms, renal ultrasound negative for pyelonephritis, Blood cultures negative, Urine culture shows no growth.   -Started on Flomax   -patient has had intermittent fevers, CXR reported stable pt is on broad spectrum Zosyn, denying acute urinary symptoms. Change IV Zosyn to PO levofloxacin with plan for 14 days course.    -report that with repeat fevers Ortho feels that surgery site clean and intact.      Essential Hypertension  -Coreg 12.5mg BID    Diet: Diet Adult Regular (IDDSI Level 7)  GI Prophylaxis: Indicated due to multiple minor risk factors  Significant LDAs:   IV Access Type: Peripheral  Urinary Catheter Indication if present: Patient Does Not Have Urinary Catheter  Other Lines/Tubes/Drains:    HIGH RISK CONDITION(S):   Patient has an abrupt change in neurologic status: SAH  Patient is currently receiving parenteral controlled substances: Dilaudid     Goals of Care:    Previous admission:  5/5/20  Likely prognosis:  Good  Code Status: Full Code  Comfort Only: No  Hospice: No  Goals at discharge: remain at home, with physician follow-up    Discharge Planning   BRAYAN: 9/22/2020     Code Status: Full Code   Is the patient medically ready for discharge?: Yes    Reason for patient still in hospital (select all that apply): Treatment and Pending disposition  Discharge Plan A: Rehab   Discharge Delays: None known at this time    VTE Risk Mitigation (From admission, onward)         Ordered     IP VTE HIGH RISK PATIENT  Once      09/14/20 0816     Place sequential compression device  Until discontinued      09/14/20 0816     Place BEAR hose  Until discontinued      09/14/20 0816     Place sequential compression device  Until discontinued      09/13/20 [id number]                   Devi aVrgas MD  Department of Hospital Medicine   [facility]

## 2020-09-21 NOTE — PLAN OF CARE
"   09/21/20 0847   Post-Acute Status   Post-Acute Authorization Placement   Post-Acute Placement Status Referrals Sent     Sw sent 5 more IRF facilities and will send SNF referrals out as well. Sw sent out 4 SNF referrals as well, Sw to follow. [facility] accepted Pt and will submit to insurance for [facility]. [facility] "can not accept insurance and skill Pt." Sw to follow.   "

## 2020-09-21 NOTE — PT/OT/SLP PROGRESS
Occupational Therapy   Treatment    Name: Andre Padgett  MRN: [id number]  Admitting Diagnosis:  Closed Colles' fracture of right radius with routine healing  7 Days Post-Op    Recommendations:     Discharge Recommendations: nursing facility, skilled  Discharge Equipment Recommendations:  none  Barriers to discharge:  Decreased caregiver support    Assessment:     Andre Padgett is a 50 y.o. male with a medical diagnosis of Closed Colles' fracture of right radius with routine healing. Performance deficits affecting function are weakness, impaired endurance, impaired self care skills, impaired functional mobilty, gait instability, impaired balance, decreased upper extremity function, edema. Patient would benefit from continued skilled acute OT 3x/wk to improve functional mobility, increase independence with ADLs, and address established goals. Recommending SNF once medically appropriate for discharge to increase maximal independence, reduce burden of care, and ensure safety.     Rehab Prognosis:  Good; patient would benefit from acute skilled OT services to address these deficits and reach maximum level of function.       Plan:     Patient to be seen 3 x/week to address the above listed problems via therapeutic exercises, therapeutic activities, self-care/home management  · Plan of Care Expires: 10/15/20  · Plan of Care Reviewed with: patient    Subjective     Pain/Comfort:  · Pain Rating 1: (patient did not rate)  · Location - Side 1: Bilateral  · Location - Orientation 1: generalized  · Location 1: wrist  · Pain Addressed 1: Pre-medicate for activity, Reposition, Distraction  · Pain Rating Post-Intervention 1: (patient did not rate)    Objective:     Communicated with: NSG prior to session.  Patient found HOB elevated with oxygen upon OT entry to room.    General Precautions: Standard, fall   Orthopedic Precautions:RUE non weight bearing, LUE non weight bearing   Braces:   UE brace(B wrist/forearm)     Occupational  Performance:     Bed Mobility:    · Patient completed Rolling/Turning to Left with  supervision  · Patient completed Supine to Sit with supervision  · Patient completed Sit to Supine with supervision     Functional Mobility/Transfers:  · Patient completed Sit <> Stand Transfer with stand by assistance  with  no assistive device   · Functional Mobility: Patient ambulated bed<>sink in bathroom with SBA with no AD    Activities of Daily Living:  · Grooming: modified independence oral care and washing face standing at sink   · Lower Body Dressing: stand by assistance Donning and doffing shorts only without buckling pants or zipping them. Patient educated on wearing easy pants (elastic waist band) and slip on shoes (with backs) in order to increase functional independence with LE dressing.   · UE dressing: not performed, but patient educated on wearing pullover shirts and not button down shirts in order to increase functional independence with UE dressing at this time.   · Toileting: not performed, but did discuss ideas for different techniques or utilizing a bidet at home.     [facility] 6 Click ADL: 18    Treatment & Education:  Role of OT and POC  Safety  ADL retraining  Functional mobility training    Patient left HOB elevated with call button in reach and all needs met. Education:      GOALS:   Multidisciplinary Problems     Occupational Therapy Goals        Problem: Occupational Therapy Goal    Goal Priority Disciplines Outcome Interventions   Occupational Therapy Goal     OT, PT/OT Ongoing, Progressing    Description: Goals to be met by: 10-14-20    Patient will increase functional independence with ADLs by performing:    Feeding with Modified [place].  UE Dressing with Stand-by Assistance.  LE Dressing with Stand-by Assistance. Met with shorts only without buckling them or zipping them  Grooming while standing at sink with Stand-by Assistance. Met  Toileting from toilet with Stand-by Assistance for hygiene and  clothing management.   Toilet transfer to toilet with Stand-by Assistance. Met                     Time Tracking:     OT Date of Treatment: 09/21/20  OT Start Time: 1226  OT Stop Time: 1251  OT Total Time (min): 25 min    Billable Minutes:Self Care/Home Management 25    CHANTE Liu  9/21/2020

## 2020-09-21 NOTE — PLAN OF CARE
Pt accepted by [facility] pending auth, will cont to follow.     09/21/20 1233   Discharge Reassessment   Assessment Type Discharge Planning Reassessment   Provided patient/caregiver education on the expected discharge date and the discharge plan Yes   Do you have any problems affording any of your prescribed medications? No   Discharge Plan A Rehab   Discharge Plan B Rehab   DME Needed Upon Discharge  none   Anticipated Discharge Disposition Rehab   Can the patient/caregiver answer the patient profile reliably? Yes, cognitively intact   How does the patient rate their overall health at the present time? Fair   Describe the patient's ability to walk at the present time. Minor restrictions or changes   How often would a person be available to care for the patient? Occasionally   Number of comorbid conditions (as recorded on the chart) Two   During the past month, has the patient often been bothered by feeling down, depressed or hopeless? No   During the past month, has the patient often been bothered by little interest or pleasure in doing things? No   Post-Acute Status   Post-Acute Authorization Placement   Post-Acute Placement Status Pending Post-Acute Clinical Review   Discharge Delays None known at this time   Aleshia Matta, MSN  Case Management  Ext [phone]

## 2020-09-21 NOTE — PLAN OF CARE
Problem: Occupational Therapy Goal  Goal: Occupational Therapy Goal  Description: Goals to be met by: 10-14-20    Patient will increase functional independence with ADLs by performing:    Feeding with Modified [place].  UE Dressing with Stand-by Assistance.  LE Dressing with Stand-by Assistance. Met with shorts only without buckling them or zipping them  Grooming while standing at sink with Stand-by Assistance. Met  Toileting from toilet with Stand-by Assistance for hygiene and clothing management.   Toilet transfer to toilet with Stand-by Assistance. Met    Outcome: Ongoing, Progressing   Patient's goals are appropriate.   CHANTE Liu  9/21/2020

## 2020-09-22 PROBLEM — Z86.16 HISTORY OF 2019 NOVEL CORONAVIRUS DISEASE (COVID-19): Status: ACTIVE | Noted: 2020-09-22

## 2020-09-22 PROCEDURE — 25000003 PHARM REV CODE 250: Performed by: STUDENT IN AN ORGANIZED HEALTH CARE EDUCATION/TRAINING PROGRAM

## 2020-09-22 PROCEDURE — 93010 EKG 12-LEAD: ICD-10-PCS | Mod: ,,, | Performed by: INTERNAL MEDICINE

## 2020-09-22 PROCEDURE — 63600175 PHARM REV CODE 636 W HCPCS: Performed by: HOSPITALIST

## 2020-09-22 PROCEDURE — 25000003 PHARM REV CODE 250: Performed by: HOSPITALIST

## 2020-09-22 PROCEDURE — 94761 N-INVAS EAR/PLS OXIMETRY MLT: CPT

## 2020-09-22 PROCEDURE — 93010 ELECTROCARDIOGRAM REPORT: CPT | Mod: ,,, | Performed by: INTERNAL MEDICINE

## 2020-09-22 PROCEDURE — 93005 ELECTROCARDIOGRAM TRACING: CPT

## 2020-09-22 PROCEDURE — 99233 PR SUBSEQUENT HOSPITAL CARE,LEVL III: ICD-10-PCS | Mod: 95,,, | Performed by: INTERNAL MEDICINE

## 2020-09-22 PROCEDURE — 11000001 HC ACUTE MED/SURG PRIVATE ROOM

## 2020-09-22 PROCEDURE — 99233 SBSQ HOSP IP/OBS HIGH 50: CPT | Mod: 95,,, | Performed by: INTERNAL MEDICINE

## 2020-09-22 PROCEDURE — 25000003 PHARM REV CODE 250: Performed by: INTERNAL MEDICINE

## 2020-09-22 RX ORDER — OXYCODONE HYDROCHLORIDE 10 MG/1
10 TABLET ORAL EVERY 4 HOURS PRN
Refills: 0
Start: 2020-09-22 | End: 2020-09-25 | Stop reason: SDUPTHER

## 2020-09-22 RX ORDER — METHOCARBAMOL 500 MG/1
1000 TABLET, FILM COATED ORAL 3 TIMES DAILY
Refills: 0
Start: 2020-09-22 | End: 2020-09-25 | Stop reason: SDUPTHER

## 2020-09-22 RX ORDER — LEVOFLOXACIN 750 MG/1
750 TABLET ORAL DAILY
Qty: 4 TABLET | Refills: 0
Start: 2020-09-23 | End: 2020-09-27

## 2020-09-22 RX ORDER — TAMSULOSIN HYDROCHLORIDE 0.4 MG/1
0.4 CAPSULE ORAL DAILY
Qty: 30 CAPSULE | Refills: 11 | Status: SHIPPED | OUTPATIENT
Start: 2020-09-23 | End: 2021-03-05

## 2020-09-22 RX ORDER — TALC
6 POWDER (GRAM) TOPICAL NIGHTLY PRN
Refills: 0 | COMMUNITY
Start: 2020-09-22

## 2020-09-22 RX ORDER — ALPRAZOLAM 1 MG/1
0.5 TABLET ORAL 2 TIMES DAILY PRN
Start: 2020-09-22 | End: 2021-03-30 | Stop reason: SDUPTHER

## 2020-09-22 RX ORDER — LIDOCAINE 50 MG/G
2 PATCH TOPICAL DAILY
Refills: 0
Start: 2020-09-22 | End: 2020-10-08 | Stop reason: SDUPTHER

## 2020-09-22 RX ORDER — CARVEDILOL 12.5 MG/1
12.5 TABLET ORAL 2 TIMES DAILY
Start: 2020-09-22 | End: 2021-03-05 | Stop reason: ALTCHOICE

## 2020-09-22 RX ORDER — OXYCODONE HCL 20 MG/1
20 TABLET, FILM COATED, EXTENDED RELEASE ORAL EVERY 12 HOURS
Status: DISCONTINUED | OUTPATIENT
Start: 2020-09-22 | End: 2020-09-23 | Stop reason: HOSPADM

## 2020-09-22 RX ORDER — HYDRALAZINE HYDROCHLORIDE 25 MG/1
25 TABLET, FILM COATED ORAL EVERY 8 HOURS PRN
Start: 2020-09-22 | End: 2021-03-05 | Stop reason: ALTCHOICE

## 2020-09-22 RX ORDER — METHOCARBAMOL 500 MG/1
1000 TABLET, FILM COATED ORAL 3 TIMES DAILY
Status: DISCONTINUED | OUTPATIENT
Start: 2020-09-22 | End: 2020-09-23 | Stop reason: HOSPADM

## 2020-09-22 RX ORDER — AMOXICILLIN 250 MG
1 CAPSULE ORAL 2 TIMES DAILY
Start: 2020-09-22 | End: 2021-03-05

## 2020-09-22 RX ORDER — OXYCODONE HCL 20 MG/1
20 TABLET, FILM COATED, EXTENDED RELEASE ORAL EVERY 12 HOURS
Refills: 0
Start: 2020-09-22 | End: 2021-03-05

## 2020-09-22 RX ADMIN — METHOCARBAMOL 1000 MG: 500 TABLET ORAL at 08:09

## 2020-09-22 RX ADMIN — TAMSULOSIN HYDROCHLORIDE 0.4 MG: 0.4 CAPSULE ORAL at 09:09

## 2020-09-22 RX ADMIN — OXYCODONE HYDROCHLORIDE 10 MG: 10 TABLET ORAL at 10:09

## 2020-09-22 RX ADMIN — OXYCODONE 5 MG: 5 TABLET ORAL at 02:09

## 2020-09-22 RX ADMIN — OXYCODONE HYDROCHLORIDE 10 MG: 10 TABLET ORAL at 05:09

## 2020-09-22 RX ADMIN — ATORVASTATIN CALCIUM 40 MG: 20 TABLET, FILM COATED ORAL at 09:09

## 2020-09-22 RX ADMIN — HYDROMORPHONE HYDROCHLORIDE 1.5 MG: 1 INJECTION, SOLUTION INTRAMUSCULAR; INTRAVENOUS; SUBCUTANEOUS at 07:09

## 2020-09-22 RX ADMIN — CARVEDILOL 12.5 MG: 12.5 TABLET, FILM COATED ORAL at 08:09

## 2020-09-22 RX ADMIN — OXYCODONE HYDROCHLORIDE 20 MG: 20 TABLET, FILM COATED, EXTENDED RELEASE ORAL at 12:09

## 2020-09-22 RX ADMIN — HYDROMORPHONE HYDROCHLORIDE 1.5 MG: 1 INJECTION, SOLUTION INTRAMUSCULAR; INTRAVENOUS; SUBCUTANEOUS at 01:09

## 2020-09-22 RX ADMIN — ONDANSETRON 4 MG: 2 INJECTION INTRAMUSCULAR; INTRAVENOUS at 10:09

## 2020-09-22 RX ADMIN — METHOCARBAMOL 500 MG: 500 TABLET ORAL at 03:09

## 2020-09-22 RX ADMIN — CARVEDILOL 12.5 MG: 12.5 TABLET, FILM COATED ORAL at 09:09

## 2020-09-22 RX ADMIN — ALUMINUM HYDROXIDE, MAGNESIUM HYDROXIDE, AND SIMETHICONE 30 ML: 200; 200; 20 SUSPENSION ORAL at 11:09

## 2020-09-22 RX ADMIN — HYDROMORPHONE HYDROCHLORIDE 1.5 MG: 1 INJECTION, SOLUTION INTRAMUSCULAR; INTRAVENOUS; SUBCUTANEOUS at 05:09

## 2020-09-22 RX ADMIN — Medication 250 MG: at 09:09

## 2020-09-22 RX ADMIN — METHOCARBAMOL 500 MG: 500 TABLET ORAL at 09:09

## 2020-09-22 RX ADMIN — OXYCODONE HYDROCHLORIDE 20 MG: 20 TABLET, FILM COATED, EXTENDED RELEASE ORAL at 08:09

## 2020-09-22 RX ADMIN — PANTOPRAZOLE SODIUM 40 MG: 40 TABLET, DELAYED RELEASE ORAL at 09:09

## 2020-09-22 RX ADMIN — DOCUSATE SODIUM 50MG AND SENNOSIDES 8.6MG 1 TABLET: 8.6; 5 TABLET, FILM COATED ORAL at 08:09

## 2020-09-22 RX ADMIN — LEVOFLOXACIN 750 MG: 500 TABLET, FILM COATED ORAL at 09:09

## 2020-09-22 NOTE — PLAN OF CARE
"   09/22/20 0844   Post-Acute Status   Post-Acute Authorization Placement   Post-Acute Placement Status Referrals Sent     Awaiting insurance auth to [facility]. Fartun spoke with Selma and she informed "there is no update on auth but she will let Fartun know as soon as an answer is available." Sw to follow.   "

## 2020-09-22 NOTE — PLAN OF CARE
Problem: Fall Injury Risk  Goal: Absence of Fall and Fall-Related Injury  Outcome: Ongoing, Progressing     Problem: Adult Inpatient Plan of Care  Goal: Plan of Care Review  Outcome: Ongoing, Progressing     Problem: Skin Injury Risk Increased  Goal: Skin Health and Integrity  Outcome: Ongoing, Progressing   Pt is A,A,O x 4 . Stable V/S . Pt C/O pain in his bilateral wrist 7-9/10 during the day . Pain medications given as ordered . Pt reported mild relief of pain , MD aware .  Pt ambulated in the room and to the bathroom independently no falls or injuries per day . Both arms elevated as ordered . Pending placement.

## 2020-09-22 NOTE — PROGRESS NOTES
[facility]  Orthopedics  Progress Note    Patient Name: Andre Padgett  MRN: [id number]  Admission Date: 9/13/2020  Hospital Length of Stay: 9 days  Attending Provider: Devi Vargas MD  Primary Care Provider: Hunter Diop MD  Follow-up For: Procedure(s) (LRB):  ORIF, FRACTURE, RADIUS OR ULNA, synthes, right, large C arm at a diagonal from the rear of the room, ancef, velcro wrist splint (Bilateral)    Post-Operative Day: 8 Days Post-Op  Subjective:     Principal Problem:Closed Colles' fracture of right radius with routine healing    Principal Orthopedic Problem: Same    Interval History: Pt seen and examined at bedside. Pain controlled. Main complaint is swelling primarily of the right fingers.     Review of patient's allergies indicates:  No Known Allergies    Current Facility-Administered Medications   Medication    acetaminophen tablet 650 mg    albuterol-ipratropium 2.5 mg-0.5 mg/3 mL nebulizer solution 3 mL    ALPRAZolam tablet 0.5 mg    aluminum-magnesium hydroxide-simethicone 200-200-20 mg/5 mL suspension 30 mL    ascorbic acid (vitamin C) tablet 250 mg    atorvastatin tablet 40 mg    bisacodyL suppository 10 mg    carvediloL tablet 12.5 mg    diphenhydrAMINE capsule 25 mg    glucose chewable tablet 16 g    glucose chewable tablet 24 g    hydrALAZINE tablet 25 mg    HYDROmorphone injection 1.5 mg    levoFLOXacin tablet 750 mg    lidocaine 5 % patch 2 patch    melatonin tablet 6 mg    methocarbamoL tablet 500 mg    ondansetron injection 4 mg    oxyCODONE immediate release tablet 5 mg    oxyCODONE immediate release tablet Tab 10 mg    oxyCODONE immediate release tablet Tab 10 mg    pantoprazole EC tablet 40 mg    polyethylene glycol packet 17 g    prochlorperazine injection Soln 5 mg    senna-docusate 8.6-50 mg per tablet 1 tablet    sodium chloride 0.9% flush 10 mL    sodium chloride 0.9% flush 10 mL    tamsulosin 24 hr capsule 0.4 mg     Objective:  "    Vital Signs (Most Recent):  Temp: 98.3 °F (36.8 °C) (09/22/20 0921)  Pulse: 75 (09/22/20 0921)  Resp: 18 (09/22/20 0921)  BP: (!) 143/67 (09/22/20 0921)  SpO2: 95 % (09/22/20 0921) Vital Signs (24h Range):  Temp:  [97.1 °F (36.2 °C)-99.3 °F (37.4 °C)] 98.3 °F (36.8 °C)  Pulse:  [72-91] 75  Resp:  [14-20] 18  SpO2:  [91 %-95 %] 95 %  BP: (124-150)/(67-72) 143/67     Weight: 78.2 kg (172 lb 6.4 oz)  Height: 5' 10" (177.8 cm)  Body mass index is 24.74 kg/m².    Ortho/SPM Exam     HEENT: normocephalic, atraumatic  Resp: no increased work of breathing  CV: regular rate and rhythm  MSK: moves b/l lower extremities well    Bilateral upper extremities:  Wrist braces in place, no surrounding erythema  Moderate swelling in fingers on right, minimal swelling left  Sensation intact in M/U/R nerve distributions  Motor intact AIN/PIN/U/R nerves  Cap refill <3 sec b/l        Significant Labs:   BMP:   Recent Labs   Lab 09/21/20  0803      *   K 3.9      CO2 27   BUN 14   CREATININE 0.7   CALCIUM 9.7   MG 2.0     CBC:   Recent Labs   Lab 09/21/20  0803   WBC 7.77   HGB 12.7*   HCT 37.6*        CMP:   Recent Labs   Lab 09/21/20  0803   *   K 3.9      CO2 27      BUN 14   CREATININE 0.7   CALCIUM 9.7   ALBUMIN 3.2*   ANIONGAP 8   EGFRNONAA >60.0     All pertinent labs within the past 24 hours have been reviewed.      Significant Imaging: I have reviewed all pertinent imaging results/findings.   No new imaging.     Assessment/Plan:     * Closed Colles' fracture of right radius with routine healing  Andre Padgett is a 50 y.o. male with bilateral DRF and subarachnoid hemmorhage now s/p Bilateral distal radius ORIF.      - Discussed importance of upper extremity elevation, need to keep dressings dry, and weight bearing restrictions  - Weight bearing status: NWB BUE can do ROM of elbow  - Pain control: Multimodal   - Aggressive elevation to BUE  - Antibiotics: Levofloxacin for " prostatitis  - DVT Prophylaxis: SCD's at all times when not ambulating.  - PT/OT  - Lines/Drains: None    Dispo: per primary; rehab pending insurance auth        Closed Colles' fracture of left radius with routine healing  See right DRF          Linn Mcdonough MD  Orthopedics  [facility]

## 2020-09-22 NOTE — PLAN OF CARE
09/22/2020      Andre Padgett  [address] Medicine Dept[address]  [facility] [address] [place] [address]  [phone]  [phone] after hours  [phone] fax ALPRAZolam 1 MG tablet  Take 0.5 tablets (0.5 mg total) by mouth 2 (two) times daily as needed for Anxiety.   # 10   No Refills        _____e-sig_________  Devi Vargas MD  09/22/2020 09/22/2020      Andre WILLIS Padgett  [address] Medicine Dept[address]  [facility] [address] [place] [address]  [phone]  [phone] after hours  [phone] fax oxyCODONE 20 mg 12 hr tablet OXYCONTIN  Take 1 tablet (20 mg total) by mouth every 12 (twelve) hours.  # 10   No Refills         oxyCODONE 10 mg Tab immediate release tablet ROXICODONE  Take 1 tablet (10 mg total) by mouth every 4 (four) hours as needed for Pain.  # 20           ____e-sig_____________  Devi Vargas MD  09/22/2020

## 2020-09-22 NOTE — ASSESSMENT & PLAN NOTE
Andre Padgett is a 50 y.o. male with bilateral DRF and subarachnoid hemmorhage now s/p Bilateral distal radius ORIF.      - Discussed importance of upper extremity elevation, need to keep dressings dry, and weight bearing restrictions  - Weight bearing status: NWB BUE can do ROM of elbow  - Pain control: Multimodal   - Aggressive elevation to BUE  - Antibiotics: Levofloxacin for prostatitis  - DVT Prophylaxis: SCD's at all times when not ambulating.  - PT/OT  - Lines/Drains: None    Dispo: per primary; rehab pending insurance auth

## 2020-09-22 NOTE — PROGRESS NOTES
[facility] Hospital Medicine  Telemedicine Progress Note    Patient Name: Andre Padgett  MRN: [id number]  Patient Class: IP- Inpatient   Admission Date: 9/13/2020  Length of Stay: 9 days  Attending Physician: Devi Vargas MD  Primary Care Provider: Hunter Diop MD    [facility] Medicine Team: [facility] VIRTUAL TEAM 10 Devi Vargas MD  Virtual Telemedicine Progress Note  Start time: 1043  Chief complaint: Closed Colles' fracture of right radius with routine healing  The patient location is: [facility]/6 A  The patient arrived at: 9/13/2020  9:11 PM  Present with the patient at the time of the telemed/virtual assessment: telepresenter   End time:  1055  Total time spent with patient: 12 min  I have assessed findings virtually using a telemedicine platform and with assistance of the bedside nurse or telemedicine presenter.  The attending portion of this evaluation, treatment, and documentation was performed per Devi Vargas MD via audiovisual.    Patient was transferred to the telemedicine service on: 9/19/2020    Subjective:     Admission CC:   Chief Complaint   Patient presents with    Fall     Fall after standing on stool, bilateral wrist fractures. Obvious defromities. -LOC     Follow up visit for: Closed Colles' fracture of right radius with routine healing    Interval History / Events Overnight:   The patient is able to provide adequate history. Additional history was obtained from chart review. No significant events reported by Nursing.  Patient complains of B wrist pain. Symptoms have been unchanged since yesterday. Associated symptoms include: fatigue. Symptoms are stable. Alleviating factors include: nothing. Pain control with opioids partially effective.    Data reviewed 9/22/2020: Lab test(s) reviewed: H&H stable  I have assessed findings virtually using a telemedicine platform and with assistance of the bedside nurse or telemedicine presenter.      Review of Systems    Constitutional: Negative for fever.   Respiratory: Negative for shortness of breath.    Musculoskeletal: Positive for back pain.     Objective:     Vital Signs (Most Recent):  Temp: 98.2 °F (36.8 °C) (09/22/20 1509)  Pulse: 83 (09/22/20 1509)  Resp: 20 (09/22/20 1509)  BP: 139/72 (09/22/20 1509)  SpO2: (!) 92 % (09/22/20 1509) Vital Signs (24h Range):  Temp:  [97.1 °F (36.2 °C)-99.3 °F (37.4 °C)] 98.2 °F (36.8 °C)  Pulse:  [72-91] 83  Resp:  [14-20] 20  SpO2:  [92 %-95 %] 92 %  BP: (124-150)/(67-73) 139/72     Weight: 78.2 kg (172 lb 6.4 oz)  Body mass index is 24.74 kg/m².    Intake/Output Summary (Last 24 hours) at 9/22/2020 1621  Last data filed at 9/22/2020 1447  Gross per 24 hour   Intake 380 ml   Output 3 ml   Net 377 ml      Physical Exam  Constitutional:       General: He is not in acute distress.     Appearance: Normal appearance. He is not diaphoretic.   Eyes:      General: Lids are normal. No scleral icterus.        Right eye: No discharge.         Left eye: No discharge.      Conjunctiva/sclera: Conjunctivae normal.   Neck:      Trachea: Phonation normal.   Cardiovascular:      Rate and Rhythm: Normal rate.   Pulmonary:      Effort: Pulmonary effort is normal. No tachypnea, accessory muscle usage or respiratory distress.   Abdominal:      General: There is no distension.   Musculoskeletal:      Right wrist: He exhibits swelling.      Left wrist: He exhibits swelling.   Neurological:      Mental Status: He is alert and oriented to person, place, and time. He is not disoriented.   Psychiatric:         Attention and Perception: Attention normal.         Mood and Affect: Affect normal.         Speech: Speech normal.         Behavior: Behavior is cooperative.         Significant Labs:   Recent Labs   Lab 09/17/20  0438 09/18/20  0721 09/21/20  0803   WBC 8.83 6.54 7.77   HGB 11.0* 11.6* 12.7*   HCT 32.6* 34.5* 37.6*    182 281     Recent Labs   Lab 09/17/20  0438 09/18/20  0721 09/21/20  0803   GRAN  81.2*  7.2 69.2  4.5 75.1*  5.8   LYMPH 11.6*  1.0 19.3  1.3 15.8*  1.2   MONO 5.1  0.5 7.2  0.5 5.9  0.5   EOS 0.2 0.3 0.2     Recent Labs   Lab 09/17/20  0438 09/18/20  0721 09/21/20  0803    138 135*   K 3.9 4.1 3.9    101 100   CO2 29 26 27   BUN 8 10 14   CREATININE 0.7 0.7 0.7   * 107 103   CALCIUM 8.7 9.3 9.7   ALBUMIN 2.9* 3.0* 3.2*   MG  --   --  2.0   PHOS  --   --  3.6     Recent Labs   Lab 09/16/20  0317 09/17/20  0438 09/18/20  0721   ALKPHOS 51* 57 58   ALT 16 16 18   AST 26 27 27   PROT 6.1 6.3 6.8   BILITOT 0.6 0.8 0.8     Recent Labs   Lab 09/15/20  0027 09/15/20  0835   PROCAL 0.04  --    LACTATE 2.8* 1.8     Results for orders placed or performed in visit on 05/25/20   Vitamin D   Result Value Ref Range    Vit D, 25-Hydroxy 34 30 - 96 ng/mL     SARS-CoV2 (COVID-19) Qualitative PCR (no units)   Date Value   06/03/2020 Detected (A)   04/28/2020 Detected (A)       ECG Results    None         X-Ray Chest PA And Lateral  Narrative: EXAMINATION:  XR CHEST PA AND LATERAL    CLINICAL HISTORY:  temp. sepsis alert;    TECHNIQUE:  PA and lateral views of the chest were performed.    COMPARISON:  September 14, 2020    FINDINGS:  Two views of the chest are submitted.  The cardiomediastinal silhouette appears stable.  There is mild diminished depth of inspiration, atelectatic changes are noted.  There is no evidence for confluent infiltrate or consolidation, significant pleural effusion or pneumothorax.  The osseous structures demonstrate chronic change, there appears to be an acute rib fracture involving the posterior right 3rd rib for which clinical and historical correlation is needed.  Impression: Diminished depth of inspiration, atelectatic change and chronic change.    Right 3rd rib fracture appears acute, clinical and historical correlation is needed.    Electronically signed by: Suleiman Blake  Date:    09/16/2020  Time:    05:59        Assessment/Plan:      Active Diagnoses:     Diagnosis Date Noted POA    PRINCIPAL PROBLEM:  Closed Colles' fracture of right radius with routine healing [S52.531D] 09/14/2020 Not Applicable    History of 2019 novel coronavirus disease (COVID-19) [Z86.19] 09/22/2020 Yes    Closed fracture of one rib of right side with routine healing [S22.31XD] 09/20/2020 Not Applicable    Acute prostatitis [N41.0] 09/19/2020 Yes    Debility [R53.81] 09/16/2020 Yes    Closed Colles' fracture of left radius with routine healing [S52.532D] 09/14/2020 Not Applicable    Subarachnoid hemorrhage [I60.9] 09/13/2020 Yes    Hyperlipidemia [E78.5] 04/09/2020 Yes      Problems Resolved During this Admission:    Diagnosis Date Noted Date Resolved POA    Abnormal x-ray of abdomen [R93.5] 09/14/2020 09/17/2020 Yes       Overview / ICU Course:    Andre Padgett is a 50 y.o. male admitted for Closed Colles' fracture of right radius with routine healing.  Patient with hx of previous cerebral aneurysm, prior COIVD infection now resolved, presenting following fall from ladder with outstretched hands resulting in bilateral colles fractures and head trauma s/p Small SAH . S/p repair ORIF of bilateral wrists with Ortho. Patient needs to remain in wrist braces until further notice. Neurosurgery feels the SAH is very small and will self resolve. Keppra x 7 days (stop date Sunday 9/20)  Patient has had intermittent Fevers suspicious for UTI versus prostatitis and was started on Zosyn but his urine culture returned negative, de-escalated to oral empmiric therapy, he reports on history prior history of possible prostatitis, due to stable clinical status now, no repeat fevers de-escalate to Oral levofloxacin for total 14-day course.      Inpatient Medications Prescribed for Management of Current Problems:     Scheduled Meds:    aluminum-magnesium hydroxide-simethicone  30 mL Oral QID (AC & HS)    ascorbic acid (vitamin C)  250 mg Oral Daily    atorvastatin  40 mg Oral Daily    carvediloL   12.5 mg Oral BID    levoFLOXacin  750 mg Oral Daily    lidocaine  2 patch Transdermal Q24H    methocarbamoL  500 mg Oral TID    oxyCODONE  20 mg Oral Q12H    pantoprazole  40 mg Oral Daily    polyethylene glycol  17 g Oral Daily    senna-docusate 8.6-50 mg  1 tablet Oral BID    tamsulosin  0.4 mg Oral Daily     Continuous Infusions:   As Needed: acetaminophen, albuterol-ipratropium, ALPRAZolam, bisacodyL, diphenhydrAMINE, glucose, glucose, hydrALAZINE, HYDROmorphone, melatonin, ondansetron, oxyCODONE, oxyCODONE, prochlorperazine, sodium chloride 0.9%, sodium chloride 0.9%    Assessment and Plan by Problem    Acute subarachnoid hemorrhage_Hx of Cerebral Aneurysm  -Noted on Head CT after trauma. Neurosurgery consulted and recommend conservative management, serial CT scans. Continue to monitor, neurochecks Q4. Hold home ASA, SCDs only for DVT ppx  -Keppra given for seizure ppx - completed     B/L Distal Radial Fractures  Rib fracture  -Orthopedic surgery consulted. S/p ORIF of bilateral wrists  -Pain control with Tylenol scheduled, opiates PRN  -PT/OT after surgery  - schedule oxycodone at 2300 to prevent breakthrough pain occurring in early AM              >after 1st night with this reported symptoms better controlled.   -reinforced with nursing to have more significant elevation of extremities, per verbal recs from orthopedics needs almost hanging stockinette type position to aid in reducing post-op inflammation.   - continue elevation; trial of stockinet for elevation  - NWB B UE     Acute Prostatitis-presumed  -patient with pyuria on recent UA and reported to be reporting -describing prostatitis type symptoms, renal ultrasound negative for pyelonephritis, Blood cultures negative, Urine culture shows no growth.   -Started on Flomax   -patient has had intermittent fevers, CXR reported stable pt is on broad spectrum Zosyn, denying acute urinary symptoms. Changed IV Zosyn to PO levofloxacin with plan for 14  days course.   -report that with repeat fevers Ortho feels that surgery site clean and intact.      Essential Hypertension  - patient denies history of hypertension or past treatment for hypertension.  -Coreg 12.5mg BID    Diet: Diet Adult Regular (IDDSI Level 7)  GI Prophylaxis: Indicated due to multiple minor risk factors  Significant LDAs:   IV Access Type: Peripheral  Urinary Catheter Indication if present: Patient Does Not Have Urinary Catheter  Other Lines/Tubes/Drains:    HIGH RISK CONDITION(S):   Patient has an abrupt change in neurologic status: SAH  Patient is currently receiving parenteral controlled substances: Dilaudid     Goals of Care:    Previous admission:  5/5/20  Likely prognosis:  Good  Code Status: Full Code  Comfort Only: No  Hospice: No  Goals at discharge: remain at home, with physician follow-up    Discharge Planning   BRAYAN: 9/23/2020     Code Status: Full Code   Is the patient medically ready for discharge?: Yes    Reason for patient still in hospital (select all that apply): Pending disposition  Discharge Plan A: Rehab   Discharge Delays: None known at this time    VTE Risk Mitigation (From admission, onward)         Ordered     IP VTE HIGH RISK PATIENT  Once      09/14/20 0816     Place sequential compression device  Until discontinued      09/14/20 0816     Place BEAR hose  Until discontinued      09/14/20 0816     Place sequential compression device  Until discontinued      09/13/20 2800                   Devi Vargas MD  Department of Hospital Medicine   [facility]

## 2020-09-22 NOTE — SUBJECTIVE & OBJECTIVE
"Principal Problem:Closed Colles' fracture of right radius with routine healing    Principal Orthopedic Problem: Same    Interval History: Pt seen and examined at bedside. Pain controlled. Main complaint is swelling primarily of the right fingers.     Review of patient's allergies indicates:  No Known Allergies    Current Facility-Administered Medications   Medication    acetaminophen tablet 650 mg    albuterol-ipratropium 2.5 mg-0.5 mg/3 mL nebulizer solution 3 mL    ALPRAZolam tablet 0.5 mg    aluminum-magnesium hydroxide-simethicone 200-200-20 mg/5 mL suspension 30 mL    ascorbic acid (vitamin C) tablet 250 mg    atorvastatin tablet 40 mg    bisacodyL suppository 10 mg    carvediloL tablet 12.5 mg    diphenhydrAMINE capsule 25 mg    glucose chewable tablet 16 g    glucose chewable tablet 24 g    hydrALAZINE tablet 25 mg    HYDROmorphone injection 1.5 mg    levoFLOXacin tablet 750 mg    lidocaine 5 % patch 2 patch    melatonin tablet 6 mg    methocarbamoL tablet 500 mg    ondansetron injection 4 mg    oxyCODONE immediate release tablet 5 mg    oxyCODONE immediate release tablet Tab 10 mg    oxyCODONE immediate release tablet Tab 10 mg    pantoprazole EC tablet 40 mg    polyethylene glycol packet 17 g    prochlorperazine injection Soln 5 mg    senna-docusate 8.6-50 mg per tablet 1 tablet    sodium chloride 0.9% flush 10 mL    sodium chloride 0.9% flush 10 mL    tamsulosin 24 hr capsule 0.4 mg     Objective:     Vital Signs (Most Recent):  Temp: 98.3 °F (36.8 °C) (09/22/20 0921)  Pulse: 75 (09/22/20 0921)  Resp: 18 (09/22/20 0921)  BP: (!) 143/67 (09/22/20 0921)  SpO2: 95 % (09/22/20 0921) Vital Signs (24h Range):  Temp:  [97.1 °F (36.2 °C)-99.3 °F (37.4 °C)] 98.3 °F (36.8 °C)  Pulse:  [72-91] 75  Resp:  [14-20] 18  SpO2:  [91 %-95 %] 95 %  BP: (124-150)/(67-72) 143/67     Weight: 78.2 kg (172 lb 6.4 oz)  Height: 5' 10" (177.8 cm)  Body mass index is 24.74 kg/m².    Ortho/SPM Exam   "   HEENT: normocephalic, atraumatic  Resp: no increased work of breathing  CV: regular rate and rhythm  MSK: moves b/l lower extremities well    Bilateral upper extremities:  Wrist braces in place, no surrounding erythema  Moderate swelling in fingers on right, minimal swelling left  Sensation intact in M/U/R nerve distributions  Motor intact AIN/PIN/U/R nerves  Cap refill <3 sec b/l        Significant Labs:   BMP:   Recent Labs   Lab 09/21/20  0803      *   K 3.9      CO2 27   BUN 14   CREATININE 0.7   CALCIUM 9.7   MG 2.0     CBC:   Recent Labs   Lab 09/21/20  0803   WBC 7.77   HGB 12.7*   HCT 37.6*        CMP:   Recent Labs   Lab 09/21/20  0803   *   K 3.9      CO2 27      BUN 14   CREATININE 0.7   CALCIUM 9.7   ALBUMIN 3.2*   ANIONGAP 8   EGFRNONAA >60.0     All pertinent labs within the past 24 hours have been reviewed.      Significant Imaging: I have reviewed all pertinent imaging results/findings.   No new imaging.

## 2020-09-22 NOTE — PLAN OF CARE
[facility]    FACILITY TRANSFER ORDERS      Patient Name: Andre Padgett  YOB: 1970    PCP: Hunter Diop MD   PCP Address: [address] [place] [address] / [place] [address]  PCP Phone Number: [phone]  PCP Fax: [phone]    Encounter Date: 09/23/2020    Admit to: IP Rehab    Vital Signs:  Routine    Diagnoses:   Active Hospital Problems    Diagnosis  POA    *Closed Colles' fracture of right radius with routine healing [S52.531D]  Not Applicable    History of 2019 novel coronavirus disease (COVID-19) [Z86.19]  Yes    Closed fracture of one rib of right side with routine healing [S22.31XD]  Not Applicable    Acute prostatitis [N41.0]  Yes    Debility [R53.81]  Yes    Closed Colles' fracture of left radius with routine healing [S52.532D]  Not Applicable    Subarachnoid hemorrhage [I60.9]  Yes    Hyperlipidemia [E78.5]  Yes      Resolved Hospital Problems    Diagnosis Date Resolved POA    Abnormal x-ray of abdomen [R93.5] 09/17/2020 Yes       Allergies:Review of patient's allergies indicates:  No Known Allergies    Diet: regular diet     Activities: Activity as tolerated with upper extremity elevation, need to keep dressings dry  - Weight bearing status: NWB BUE, can do ROM of elbow  - Aggressive elevation to BUE    Nursing: Routine post-op care. Arrange transportation to post-op follow up appointments.    Labs: Per facility protocol     CONSULTS:    Physical Therapy to evaluate and treat. , Occupational Therapy to evaluate and treat. and  to evaluate for community resources/long-range planning.      Medications: Review discharge medications with patient and family and provide education.      Current Discharge Medication List      START taking these medications    Details   carvediloL (COREG) 12.5 MG tablet Take 1 tablet (12.5 mg total) by mouth 2 (two) times daily.    Comments: .      hydrALAZINE (APRESOLINE) 25 MG tablet Take 1 tablet (25 mg total) by mouth every  8 (eight) hours as needed (SBP > 160).  Qty:      Comments: .      levoFLOXacin (LEVAQUIN) 750 MG tablet Take 1 tablet (750 mg total) by mouth once daily. for 4 days  Qty: 4 tablet, Refills: 0      lidocaine (LIDODERM) 5 % Place 2 patches onto the skin once daily. Remove & Discard patch within 12 hours or as directed by MD  Refills: 0      melatonin (MELATIN) 3 mg tablet Take 2 tablets (6 mg total) by mouth nightly as needed for Insomnia.  Qty:  , Refills: 0      oxyCODONE (OXYCONTIN) 20 mg 12 hr tablet Take 1 tablet (20 mg total) by mouth every 12 (twelve) hours.  Refills: 0    Comments: Quantity prescribed more than 7 day supply? No      oxyCODONE (ROXICODONE) 10 mg Tab immediate release tablet Take 1 tablet (10 mg total) by mouth every 4 (four) hours as needed for Pain.  Qty:  , Refills: 0    Comments: Quantity prescribed more than 7 day supply? No      senna-docusate 8.6-50 mg (PERICOLACE) 8.6-50 mg per tablet Take 1 tablet by mouth 2 (two) times daily.  Qty:        tamsulosin (FLOMAX) 0.4 mg Cap Take 1 capsule (0.4 mg total) by mouth once daily.  Qty: 30 capsule, Refills: 11         CONTINUE these medications which have CHANGED    Details   ALPRAZolam (XANAX) 1 MG tablet Take 0.5 tablets (0.5 mg total) by mouth 2 (two) times daily as needed for Anxiety.      methocarbamoL (ROBAXIN) 500 MG Tab Take 2 tablets (1,000 mg total) by mouth 3 (three) times daily. for 10 days  Refills: 0         CONTINUE these medications which have NOT CHANGED    Details   ascorbic acid (VITAMIN C) 100 MG tablet Take 100 mg by mouth once daily.      atorvastatin (LIPITOR) 40 MG tablet TAKE 1 TABLET(40 MG) BY MOUTH EVERY DAY  Qty: 90 tablet, Refills: 3      pantoprazole (PROTONIX) 40 MG tablet Take 1 tablet (40 mg total) by mouth once daily.  Qty: 30 tablet, Refills: 11    Associated Diagnoses: Acid indigestion; Belching; Weight loss         STOP taking these medications       aspirin 81 MG Chew Comments:   Reason for Stopping:          diclofenac (VOLTAREN) 75 MG EC tablet Comments:   Reason for Stopping:         zinc sulfate (ZINCATE) 220 (50) mg capsule Comments:   Reason for Stopping:              Follow-up Information     Schedule an appointment as soon as possible for a visit with Hao Thorne MD.    Specialty: Orthopedic Surgery  Why: As previously planned, Wound check, For discharge from hospital follow up  Contact information:  [address]  [facility] [address]  [phone]             Hunter Diop MD. Schedule an appointment as soon as possible for a visit in 1 month.    Specialty: Internal Medicine  Why: For discharge from hospital follow up  Contact information:  [address]  [facility] [address]  [phone]                 Future Appointments   Date Time Provider Department Center   9/28/2020  1:45 PM Ino George NP [facility]   10/27/2020 11:00 AM [facility] OIC-MRI1 [facility] MRI IC [facility]   10/27/2020 11:30 AM Carolann Dumont PA-C [facility] NEUROS7 [facility]   11/11/2020  2:30 PM [facility]         Devi Vargas MD  09/23/2020

## 2020-09-23 VITALS
OXYGEN SATURATION: 91 % | HEART RATE: 84 BPM | TEMPERATURE: 98 F | WEIGHT: 172.38 LBS | RESPIRATION RATE: 18 BRPM | SYSTOLIC BLOOD PRESSURE: 137 MMHG | BODY MASS INDEX: 24.68 KG/M2 | HEIGHT: 70 IN | DIASTOLIC BLOOD PRESSURE: 66 MMHG

## 2020-09-23 PROCEDURE — 99239 HOSP IP/OBS DSCHRG MGMT >30: CPT | Mod: 95,,, | Performed by: INTERNAL MEDICINE

## 2020-09-23 PROCEDURE — 99900035 HC TECH TIME PER 15 MIN (STAT)

## 2020-09-23 PROCEDURE — 94761 N-INVAS EAR/PLS OXIMETRY MLT: CPT

## 2020-09-23 PROCEDURE — 25000003 PHARM REV CODE 250: Performed by: HOSPITALIST

## 2020-09-23 PROCEDURE — 99239 PR HOSPITAL DISCHARGE DAY,>30 MIN: ICD-10-PCS | Mod: 95,,, | Performed by: INTERNAL MEDICINE

## 2020-09-23 PROCEDURE — 25000003 PHARM REV CODE 250: Performed by: INTERNAL MEDICINE

## 2020-09-23 PROCEDURE — U0003 INFECTIOUS AGENT DETECTION BY NUCLEIC ACID (DNA OR RNA); SEVERE ACUTE RESPIRATORY SYNDROME CORONAVIRUS 2 (SARS-COV-2) (CORONAVIRUS DISEASE [COVID-19]), AMPLIFIED PROBE TECHNIQUE, MAKING USE OF HIGH THROUGHPUT TECHNOLOGIES AS DESCRIBED BY CMS-2020-01-R: HCPCS

## 2020-09-23 PROCEDURE — 63600175 PHARM REV CODE 636 W HCPCS: Performed by: HOSPITALIST

## 2020-09-23 RX ADMIN — OXYCODONE 5 MG: 5 TABLET ORAL at 01:09

## 2020-09-23 RX ADMIN — DOCUSATE SODIUM 50MG AND SENNOSIDES 8.6MG 1 TABLET: 8.6; 5 TABLET, FILM COATED ORAL at 08:09

## 2020-09-23 RX ADMIN — Medication 250 MG: at 08:09

## 2020-09-23 RX ADMIN — ONDANSETRON 4 MG: 2 INJECTION INTRAMUSCULAR; INTRAVENOUS at 11:09

## 2020-09-23 RX ADMIN — OXYCODONE HYDROCHLORIDE 20 MG: 20 TABLET, FILM COATED, EXTENDED RELEASE ORAL at 08:09

## 2020-09-23 RX ADMIN — METHOCARBAMOL 1000 MG: 500 TABLET ORAL at 08:09

## 2020-09-23 RX ADMIN — OXYCODONE HYDROCHLORIDE 10 MG: 10 TABLET ORAL at 11:09

## 2020-09-23 RX ADMIN — ATORVASTATIN CALCIUM 40 MG: 20 TABLET, FILM COATED ORAL at 08:09

## 2020-09-23 RX ADMIN — PANTOPRAZOLE SODIUM 40 MG: 40 TABLET, DELAYED RELEASE ORAL at 08:09

## 2020-09-23 RX ADMIN — MELATONIN TAB 3 MG 6 MG: 3 TAB at 12:09

## 2020-09-23 RX ADMIN — CARVEDILOL 12.5 MG: 12.5 TABLET, FILM COATED ORAL at 08:09

## 2020-09-23 RX ADMIN — TAMSULOSIN HYDROCHLORIDE 0.4 MG: 0.4 CAPSULE ORAL at 08:09

## 2020-09-23 RX ADMIN — LEVOFLOXACIN 750 MG: 500 TABLET, FILM COATED ORAL at 08:09

## 2020-09-23 NOTE — PROGRESS NOTES
"[facility]  Adult Nutrition  Progress Note    SUMMARY       Recommendations    1. Continue regular diet as tolerated, with Boost Plus TID.     Goals: 1. Pt to intake >75% of EEN and EPN by RD follow up.  Nutrition Goal Status: new  Communication of RD Recs: other (POC)    Reason for Assessment    Reason For Assessment: length of stay  Diagnosis: other (see comments)(fracture of right radius)  Relevant Medical History: HLD, aneurysm  Interdisciplinary Rounds: did not attend  General Information Comments: Pt admitted with radius fracture. Pt eating about 50% of meals, with boost plus supplement ordered. Pt has no recent weight loss noted. NFPE not warrented at this time.  Nutrition Discharge Planning: Continue regular diet to meet nutritional needs.    Nutrition Risk Screen    Nutrition Risk Screen: no indicators present    Nutrition/Diet History    Spiritual, Cultural Beliefs, Muslim Practices, Values that Affect Care: no    Anthropometrics    Temp: 98.3 °F (36.8 °C)  Height: 5' 10" (177.8 cm)  Height (inches): 70 in  Weight: 78.2 kg (172 lb 6.4 oz)  Weight (lb): 172.4 lb  Ideal Body Weight (IBW), Male: 166 lb  % Ideal Body Weight, Male (lb): 103.86 %  BMI (Calculated): 24.7       Lab/Procedures/Meds    Pertinent Labs Reviewed: reviewed  Pertinent Labs Comments: Na 135, Alb 3.2  Pertinent Medications Reviewed: reviewed  Pertinent Medications Comments: Vit C, senna    Estimated/Assessed Needs    Weight Used For Calorie Calculations: 78 kg (171 lb 15.3 oz)  Energy Calorie Requirements (kcal): 2064 kcals/d  Energy Need Method: Garvin-St Jeor((x1.25))  Protein Requirements: 78-94 gm/d  Weight Used For Protein Calculations: 78 kg (171 lb 15.3 oz)     Estimated Fluid Requirement Method: RDA Method  RDA Method (mL): 2064         Nutrition Prescription Ordered    Current Diet Order: Regular diet  Oral Nutrition Supplement: Boost plus    Evaluation of Received Nutrient/Fluid Intake    I/O: -.9L since " admit  Comments: LBM 9/21  Tolerance: tolerating  % Intake of Estimated Energy Needs: 50%  % Meal Intake: 50%    Nutrition Risk    Level of Risk/Frequency of Follow-up: low     Assessment and Plan    Nutrition Problem  Decreased energy intake    Related to (etiology):   Decreased appetite    Signs and Symptoms (as evidenced by):   Pt eating approximately 50% of meals    Interventions/Recommendations (treatment strategy):  Collaboration of nutrition care with other providers  Encourage PO intake with ONS    Nutrition Diagnosis Status:   new        Monitor and Evaluation    Food and Nutrient Intake: energy intake, food and beverage intake  Food and Nutrient Adminstration: diet order  Physical Activity and Function: nutrition-related ADLs and IADLs  Anthropometric Measurements: weight, weight change  Biochemical Data, Medical Tests and Procedures: electrolyte and renal panel, gastrointestinal profile, glucose/endocrine profile, inflammatory profile, lipid profile  Nutrition-Focused Physical Findings: overall appearance          Nutrition Follow-Up    RD Follow-up?: Yes

## 2020-09-23 NOTE — PLAN OF CARE
Pt ok to d/c to [facility]. \Hospital [facility] a [facility] and Outlook.\""ab, w/c van to pickup Pt at 1230pm. Nurse to call report to  and room is 145. Pt and nurse updated, staff aware of d/c time. Updated orders provided to [facility], Pt is aware rehab is in [place]. Pt ok to d/c.

## 2020-09-23 NOTE — PLAN OF CARE
[facility] has authorization for admission, SW to arrange transportation once bed is assigned. Will cont to follow.     Aleshia Matta, MSN  Case Management  Ext [phone]

## 2020-09-23 NOTE — PLAN OF CARE
Fartun informed by [facility] that another facility had submitted for auth and [facility] unable to apply. Fartun informed via [facility] this am that [facility] has accepted Pt today and has insurance auth. Fartun provided rehab orders and scripts and will follow with transport once bed assigned.

## 2020-09-23 NOTE — PLAN OF CARE
09/23/20 0827   Post-Acute Status   Post-Acute Authorization Placement   Post-Acute Placement Status (!) Delay between Facility and Payor     Sw awaiting on insurance auth. Sw placed call to [place] with [facility] to see if auth was provided, Sw to follow.

## 2020-09-23 NOTE — PLAN OF CARE
Recommendations     1. Continue regular diet as tolerated, with Boost Plus TID.     Goals: 1. Pt to intake >75% of EEN and EPN by RD follow up.  Nutrition Goal Status: new

## 2020-09-23 NOTE — PT/OT/SLP PROGRESS
Occupational Therapy      Patient Name:  Andre Padgett   MRN:  [id number]    Patient not seen today secondary to patient declining therapy as he reports he is getting ready to discharge. Patient also verbalized that he had pain medication and its about to kick in.  . Will follow-up for next treatment session as scheduled.    CHANTE Liu  9/23/2020

## 2020-09-23 NOTE — NURSING
Patient is ready for discharge. Patient stable alert and oriented. IVs removed. No complaints of pain. Discussed discharge plan. Reviewed medications and side effects, appointments, and answered questions with patient and family. Pt being discharged to rehab facility. Nurse gave report to facility.

## 2020-09-23 NOTE — DISCHARGE SUMMARY
[facility] Hospital Medicine  Telemedicine Discharge Summary      Patient Name: Andre Padgett  MRN: [id number]  Admission Date: 9/13/2020  Hospital Length of Stay: 10 days  Discharge Date and Time:  09/23/2020 11:53 AM  Attending Physician: Devi Vargas MD   Discharging Provider: Devi Vargas MD  Primary Care Provider: Hunter Diop MD    [facility] Medicine Team: [facility] VIRTUAL TEAM 10 Devi Vargas MD   Patient was transferred to the telemedicine service on: 9/19/2020  This document was prepared by chart review and may not directly reflect my personal knowledge of the patient's case, clinical course, or significant events during the hospital stay.    HPI: 50 y.o. male admitted for Closed Colles' fracture of right radius with routine healing.  Patient with hx of previous cerebral aneurysm, prior COIVD infection now resolved, presenting following fall from ladder with outstretched hands resulting in bilateral colles fractures and head trauma resulting in small SAH .      Procedure(s) (LRB):  ORIF, FRACTURE, RADIUS OR ULNA, synthes, right, large C arm at a diagonal from the rear of the room, ancef, velcro wrist splint (Bilateral)      Hospital Course:  S/p repair ORIF of bilateral wrists with Ortho. Patient needs to remain in wrist braces until further notice. Neurosurgery feels the SAH is very small and will self resolve. Keppra x 7 days (stop date Sunday 9/20).  Patient has had intermittent Fevers suspicious for UTI versus prostatitis and was started on Zosyn but his urine culture returned negative, de-escalated to oral empmiric therapy, he reports on history prior history of possible prostatitis, due to stable clinical status now, no repeat fevers de-escalate to Oral levofloxacin for total 14-day course.      Acute subarachnoid hemorrhage_Hx of Cerebral Aneurysm  -Noted on Head CT after trauma. Neurosurgery consulted and recommend conservative management, serial CT scans. Continue  to monitor, neurochecks Q4. Hold home ASA, SCDs only for DVT ppx  -Keppra given for seizure ppx - completed     B/L Distal Radial Fractures  Rib fracture  -Orthopedic surgery consulted. S/p ORIF of bilateral wrists  -Pain control with Tylenol scheduled, opiates PRN  -PT/OT after surgery  - schedule oxycodone at 2300 to prevent breakthrough pain occurring in early AM              >after 1st night with this reported symptoms better controlled.   -reinforced with nursing to have more significant elevation of extremities, per verbal recs from orthopedics needs almost hanging stockinette type position to aid in reducing post-op inflammation.   - continue elevation; trial of stockinet for elevation  - NWB B UE     Acute Prostatitis-presumed  -patient with pyuria on recent UA and reported to be reporting -describing prostatitis type symptoms, renal ultrasound negative for pyelonephritis, Blood cultures negative, Urine culture shows no growth.   -Started on Flomax   -patient has had intermittent fevers, CXR reported stable pt is on broad spectrum Zosyn, denying acute urinary symptoms. Changed IV Zosyn to PO levofloxacin with plan for 14 days course.   -report that with repeat fevers Ortho feels that surgery site clean and intact.      Essential Hypertension  - patient denies history of hypertension or past treatment for hypertension.  -Coreg 12.5mg BID    Consults:   Consults (From admission, onward)        Status Ordering Provider     Inpatient consult to Neurosurgery  Once     Provider:  (Not yet assigned)    Acknowledged LEROY WRIGHT     Inpatient consult to Orthopedic Surgery  Once     Provider:  (Not yet assigned)    Completed VADIM GAN     Inpatient consult to Physical Medicine Rehab  Once     Provider:  (Not yet assigned)    Completed SANDRO UP     Inpatient consult to Social Work/Case Management  Once     Provider:  (Not yet assigned)    Acknowledged SAURABH BURROUGHS     Inpatient virtual  consult to Hospital Medicine  Once     Provider:  (Not yet assigned)    Completed YEN KRAUSE          Final Active Diagnoses:    Diagnosis Date Noted POA    PRINCIPAL PROBLEM:  Closed Colles' fracture of right radius with routine healing [S52.531D] 09/14/2020 Not Applicable    History of 2019 novel coronavirus disease (COVID-19) [Z86.19] 09/22/2020 Yes    Closed fracture of one rib of right side with routine healing [S22.31XD] 09/20/2020 Not Applicable    Acute prostatitis [N41.0] 09/19/2020 Yes    Debility [R53.81] 09/16/2020 Yes    Closed Colles' fracture of left radius with routine healing [S52.532D] 09/14/2020 Not Applicable    Subarachnoid hemorrhage [I60.9] 09/13/2020 Yes    Hyperlipidemia [E78.5] 04/09/2020 Yes      Problems Resolved During this Admission:    Diagnosis Date Noted Date Resolved POA    Abnormal x-ray of abdomen [R93.5] 09/14/2020 09/17/2020 Yes      Discharged Condition: stable    Disposition: Rehab Facility    Follow Up:  Follow-up Information     Schedule an appointment as soon as possible for a visit with Hao Thorne MD.    Specialty: Orthopedic Surgery  Why: As previously planned, Wound check, For discharge from hospital follow up  Contact information:  [address]  [facility] [address]  [phone]             Hunter Diop MD. Schedule an appointment as soon as possible for a visit in 1 month.    Specialty: Internal Medicine  Why: For discharge from hospital follow up  Contact information:  [address] [facility] [address]  [phone]                 Patient Instructions:      Diet Adult Regular     Notify your health care provider if you experience any of the following:  temperature >100.4     Notify your health care provider if you experience any of the following:  persistent nausea and vomiting or diarrhea     Notify your health care provider if you experience any of the following:  redness, tenderness, or signs of infection (pain, swelling,  redness, odor or green/yellow discharge around incision site)     Notify your health care provider if you experience any of the following:  difficulty breathing or increased cough     Notify your health care provider if you experience any of the following:  severe persistent headache     Notify your health care provider if you experience any of the following:  persistent dizziness, light-headedness, or visual disturbances     Notify your health care provider if you experience any of the following:  increased confusion or weakness     Leave dressing on - Keep it clean, dry, and intact until clinic visit     Lifting restrictions   Order Comments: NWB MATEUS UE     Activity as tolerated     Weight bearing restrictions (specify):   Order Comments: NWB B UE     Medications:  Reconciled Home Medications:      Medication List      START taking these medications    carvediloL 12.5 MG tablet  Commonly known as: COREG  Take 1 tablet (12.5 mg total) by mouth 2 (two) times daily.     hydrALAZINE 25 MG tablet  Commonly known as: APRESOLINE  Take 1 tablet (25 mg total) by mouth every 8 (eight) hours as needed (SBP > 160).     levoFLOXacin 750 MG tablet  Commonly known as: LEVAQUIN  Take 1 tablet (750 mg total) by mouth once daily. for 4 days     lidocaine 5 %  Commonly known as: LIDODERM  Place 2 patches onto the skin once daily. Remove & Discard patch within 12 hours or as directed by MD     melatonin 3 mg tablet  Commonly known as: MELATIN  Take 2 tablets (6 mg total) by mouth nightly as needed for Insomnia.     * oxyCODONE 20 mg 12 hr tablet  Commonly known as: OXYCONTIN  Take 1 tablet (20 mg total) by mouth every 12 (twelve) hours.     * oxyCODONE 10 mg Tab immediate release tablet  Commonly known as: ROXICODONE  Take 1 tablet (10 mg total) by mouth every 4 (four) hours as needed for Pain.     senna-docusate 8.6-50 mg 8.6-50 mg per tablet  Commonly known as: PERICOLACE  Take 1 tablet by mouth 2 (two) times daily.     tamsulosin  0.4 mg Cap  Commonly known as: FLOMAX  Take 1 capsule (0.4 mg total) by mouth once daily.         * This list has 2 medication(s) that are the same as other medications prescribed for you. Read the directions carefully, and ask your doctor or other care provider to review them with you.            CHANGE how you take these medications    methocarbamoL 500 MG Tab  Commonly known as: ROBAXIN  Take 2 tablets (1,000 mg total) by mouth 3 (three) times daily. for 10 days  What changed:   · how much to take  · when to take this  · reasons to take this        CONTINUE taking these medications    ALPRAZolam 1 MG tablet  Commonly known as: XANAX  Take 0.5 tablets (0.5 mg total) by mouth 2 (two) times daily as needed for Anxiety.     atorvastatin 40 MG tablet  Commonly known as: LIPITOR  TAKE 1 TABLET(40 MG) BY MOUTH EVERY DAY     pantoprazole 40 MG tablet  Commonly known as: PROTONIX  Take 1 tablet (40 mg total) by mouth once daily.     VITAMIN C 100 MG tablet  Generic drug: ascorbic acid (vitamin C)  Take 100 mg by mouth once daily.        STOP taking these medications    aspirin 81 MG Chew     diclofenac 75 MG EC tablet  Commonly known as: VOLTAREN     zinc sulfate 220 (50) mg capsule  Commonly known as: ZINCATE            Significant Diagnostic Studies:   Recent Labs   Lab 09/17/20 0438 09/18/20 0721 09/21/20  0803   WBC 8.83 6.54 7.77   HGB 11.0* 11.6* 12.7*   HCT 32.6* 34.5* 37.6*    182 281     Recent Labs   Lab 09/17/20 0438 09/18/20 0721 09/21/20  0803   GRAN 81.2*  7.2 69.2  4.5 75.1*  5.8   LYMPH 11.6*  1.0 19.3  1.3 15.8*  1.2   MONO 5.1  0.5 7.2  0.5 5.9  0.5   EOS 0.2 0.3 0.2     Recent Labs   Lab 09/17/20 0438 09/18/20 0721 09/21/20  0803    138 135*   K 3.9 4.1 3.9    101 100   CO2 29 26 27   BUN 8 10 14   CREATININE 0.7 0.7 0.7   * 107 103   CALCIUM 8.7 9.3 9.7   ALBUMIN 2.9* 3.0* 3.2*   MG  --   --  2.0   PHOS  --   --  3.6     Recent Labs   Lab 09/17/20  0431  09/18/20  0721   ALKPHOS 57 58   ALT 16 18   AST 27 27   PROT 6.3 6.8   BILITOT 0.8 0.8     Procalcitonin (ng/mL)   Date Value   09/15/2020 0.04     Lactate (Lactic Acid) (mmol/L)   Date Value   09/15/2020 1.8   09/15/2020 2.8 (H)     BNP (pg/mL)   Date Value   12/05/2016 <10     CRP (mg/L)   Date Value   05/25/2020 0.3   06/22/2018 0.5   04/13/2011 1.45   03/11/2009 1.2     Sed Rate   Date Value   06/22/2018 2 mm/Hr   04/13/2011 5 mm/hr   08/13/2009 4 mm/hr   03/11/2009 5 mm/hr   09/11/2007 2 mm/hr     No results found for: DDIMER  No results found for: FERRITIN  LD (U/L)   Date Value   04/13/2011 141   04/13/2011 341 (H)   05/16/2008 151     Troponin I   Date Value   08/23/2019 <0.006 ng/mL   12/05/2016 <0.006 ng/mL   12/05/2016 <0.006 ng/mL   09/12/2013 <0.006 ng/mL   04/13/2011 <0.006 ng/ml   11/06/2008 <0.006 ng/ml   05/23/2008 0.052 ng/ml (H)     CPK (U/L)   Date Value   12/26/2019 92   08/23/2019 125   04/14/2011 49   04/13/2011 49   04/13/2011 58   11/06/2008 117   05/23/2008 88     Results for orders placed or performed in visit on 05/25/20   Vitamin D   Result Value Ref Range    Vit D, 25-Hydroxy 34 30 - 96 ng/mL     SARS-CoV2 (COVID-19) Qualitative PCR (no units)   Date Value   06/03/2020 Detected (A)   04/28/2020 Detected (A)     Results for orders placed or performed during the hospital encounter of 09/13/20   X-Ray Chest 1 View    Narrative    EXAMINATION:  XR CHEST 1 VIEW    CLINICAL HISTORY:  Chest wall pain.    TECHNIQUE:  Single frontal view of the chest was performed.    COMPARISON:  Multiple prior radiographs of the chest, most recent from 05/25/2020.    FINDINGS:  There is an appearance of possible free air under the left hemidiaphragm versus intraluminal air within the stomach.  The lungs are well expanded and clear. No focal opacities are seen. The pleural spaces are clear.  The cardiac silhouette is unremarkable.  The visualized osseous structures demonstrate degenerative changes.       Impression    1. No acute cardiopulmonary abnormality.  2. Appearance consistent with free air under the left hemidiaphragm versus air within the stomach.  Recommend clinical correlation, and if indicated, dedicated imaging of the abdomen should be considered.  This report was flagged in Epic as abnormal.      Electronically signed by: Judson Arenas  Date:    09/13/2020  Time:    22:15     Results for orders placed or performed during the hospital encounter of 09/13/20   X-Ray Chest PA And Lateral    Narrative    EXAMINATION:  XR CHEST PA AND LATERAL    CLINICAL HISTORY:  temp. sepsis alert;    TECHNIQUE:  PA and lateral views of the chest were performed.    COMPARISON:  September 14, 2020    FINDINGS:  Two views of the chest are submitted.  The cardiomediastinal silhouette appears stable.  There is mild diminished depth of inspiration, atelectatic changes are noted.  There is no evidence for confluent infiltrate or consolidation, significant pleural effusion or pneumothorax.  The osseous structures demonstrate chronic change, there appears to be an acute rib fracture involving the posterior right 3rd rib for which clinical and historical correlation is needed.      Impression    Diminished depth of inspiration, atelectatic change and chronic change.    Right 3rd rib fracture appears acute, clinical and historical correlation is needed.      Electronically signed by: Suleiman Blake  Date:    09/16/2020  Time:    05:59       Pending Diagnostic Studies:     Procedure Component Value Units Date/Time    COVID-19 Routine Screening Extended Care Placement [[id number]] Collected: 09/23/20 1153    Order Status: Sent Lab Status: No result     Specimen: Nasopharyngeal         Indwelling Lines/Drains at time of discharge:  none    Time spent on the discharge of patient: 50 minutes  Patient was seen and examined on the date of discharge and determined to be suitable for discharge.  Time was spent speaking with consultants and  case management, reviewing records, and/or discussing the plan of care with patient/family.  Start time: 0959  Chief complaint: Closed Colles' fracture of right radius with routine healing  The patient location is: 606/606 [address]  The patient arrived at: 9/13/2020  9:11 PM  Present with the patient at the time of the telemed/virtual assessment: telepresenter   End time:  1007  Total time spent with patient: 8 min  I have assessed findings virtually using a telemedicine platform and with assistance of the bedside nurse or telemedicine presenter.  The attending portion of this evaluation, treatment, and documentation was performed per Devi Vargas MD via audiovisual.       Devi Vargas MD  Department of Hospital Medicine  [facility]

## 2020-09-23 NOTE — PLAN OF CARE
Pt transferred to [facility], SW arranged transportation, [facility] will assume care.     09/23/20 1324   Final Note   Assessment Type Final Discharge Note   Anticipated Discharge Disposition Rehab   Post-Acute Status   Post-Acute Authorization Placement   Post-Acute Placement Status Set-up Complete   Discharge Delays None known at this time   Aleshia Matta, MSN  Case Management  Ext [phone]

## 2020-09-24 LAB — SARS-COV-2 RNA RESP QL NAA+PROBE: NOT DETECTED

## 2020-09-24 NOTE — PLAN OF CARE
"CM received a call from pt's sister (Lani Padgett from [place]) upset that her brother, who is AAOX4 was placed into rehab. I informed her that Mr. Padgett was involved in his decision throughout the d/c planning process and agreed to enter into rehab. CM informed sister that when offered to reach out to family he denied the need for assist stating, "I can do it". CM suggested that Lani,(pt's sister) reach out to the  and  there at the facility for further assistance.    Aleshia Matta, MSN  Case Management  Ext [phone]  "

## 2020-09-25 ENCOUNTER — PATIENT MESSAGE (OUTPATIENT)
Dept: INTERNAL MEDICINE | Facility: CLINIC | Age: 50
End: 2020-09-25

## 2020-09-25 ENCOUNTER — TELEPHONE (OUTPATIENT)
Dept: ORTHOPEDICS | Facility: CLINIC | Age: 50
End: 2020-09-25

## 2020-09-25 DIAGNOSIS — I60.9 SUBARACHNOID HEMORRHAGE: Primary | ICD-10-CM

## 2020-09-25 DIAGNOSIS — S52.599A OTHER CLOSED FRACTURE OF DISTAL END OF RADIUS, UNSPECIFIED LATERALITY, INITIAL ENCOUNTER: Primary | ICD-10-CM

## 2020-09-25 DIAGNOSIS — S52.531D CLOSED COLLES' FRACTURE OF RIGHT RADIUS WITH ROUTINE HEALING: ICD-10-CM

## 2020-09-25 DIAGNOSIS — S52.532D CLOSED COLLES' FRACTURE OF LEFT RADIUS WITH ROUTINE HEALING: ICD-10-CM

## 2020-09-25 RX ORDER — METHOCARBAMOL 500 MG/1
1000 TABLET, FILM COATED ORAL 3 TIMES DAILY
Qty: 60 TABLET | Refills: 0 | Status: SHIPPED | OUTPATIENT
Start: 2020-09-25 | End: 2020-10-05

## 2020-09-25 RX ORDER — OXYCODONE HYDROCHLORIDE 10 MG/1
10 TABLET ORAL EVERY 4 HOURS PRN
Qty: 42 TABLET | Refills: 0 | Status: SHIPPED | OUTPATIENT
Start: 2020-09-25 | End: 2021-03-05

## 2020-09-28 ENCOUNTER — OFFICE VISIT (OUTPATIENT)
Dept: ORTHOPEDICS | Facility: CLINIC | Age: 50
End: 2020-09-28
Payer: MEDICAID

## 2020-09-28 ENCOUNTER — PATIENT MESSAGE (OUTPATIENT)
Dept: INTERNAL MEDICINE | Facility: CLINIC | Age: 50
End: 2020-09-28

## 2020-09-28 VITALS
BODY MASS INDEX: 24.59 KG/M2 | SYSTOLIC BLOOD PRESSURE: 124 MMHG | DIASTOLIC BLOOD PRESSURE: 68 MMHG | HEART RATE: 92 BPM | WEIGHT: 171.75 LBS | HEIGHT: 70 IN

## 2020-09-28 DIAGNOSIS — S52.91XD CLOSED FRACTURE OF BOTH RADII WITH ROUTINE HEALING, SUBSEQUENT ENCOUNTER: Primary | ICD-10-CM

## 2020-09-28 DIAGNOSIS — S52.92XD CLOSED FRACTURE OF BOTH RADII WITH ROUTINE HEALING, SUBSEQUENT ENCOUNTER: Primary | ICD-10-CM

## 2020-09-28 DIAGNOSIS — Z98.890 POST-OPERATIVE STATE: ICD-10-CM

## 2020-09-28 PROCEDURE — 99214 OFFICE O/P EST MOD 30 MIN: CPT | Mod: PBBFAC | Performed by: NURSE PRACTITIONER

## 2020-09-28 PROCEDURE — 99999 PR PBB SHADOW E&M-EST. PATIENT-LVL IV: ICD-10-PCS | Mod: PBBFAC,,, | Performed by: NURSE PRACTITIONER

## 2020-09-28 PROCEDURE — 99024 PR POST-OP FOLLOW-UP VISIT: ICD-10-PCS | Mod: ,,, | Performed by: NURSE PRACTITIONER

## 2020-09-28 PROCEDURE — 99999 PR PBB SHADOW E&M-EST. PATIENT-LVL IV: CPT | Mod: PBBFAC,,, | Performed by: NURSE PRACTITIONER

## 2020-09-28 PROCEDURE — 99024 POSTOP FOLLOW-UP VISIT: CPT | Mod: ,,, | Performed by: NURSE PRACTITIONER

## 2020-09-28 NOTE — LETTER
September 28, 2020      [facility]  [address], [address]  [facility] [address]  Phone: [phone]       Patient: Andre Padgett   YOB: 1970  Date of Visit: 09/28/2020    To Whom It May Concern:    Elvira Padgett  was at [facility] on 09/28/2020. He underwent an open reduction internal fixation of comminuted intra-articular bilateral distal radius fractures on 9/14/2020 by Dr. Thorne.  He may not return to work/school at this time.  He is currently be treated for bilateral radial fractures and cannot bear any sort of weight in either hand.  He will be re-evaluated in 4 weeks.  If you have any additional questions or if I can be of further assistance, please do not hesitate to contact me.    Sincerely,        Ino George NP

## 2020-09-28 NOTE — PROGRESS NOTES
Mr. Padgett is here today for a post-operative visit after undergoing an ORIF for his comminuted intra-articular bilateral distal radius fracture by Dr. Thorne on 9/14/2020.    Interval History:  He reports that he is doing ok, still having a lot of pain and muscle spasms.  He reports he signed himself out of rehab and now is back home.  He is here today with his sister.  She plans on staying with him for the remainder of the week.  They have several questions regarding getting help in the home.  Pain is manageable with medication.  He is taking pain medication.  He is currently not getting therapy.  He denies fever, chills, and sweats since the time of the surgery.     Physical exam:  Velcro wrist splints removed and dressings taken down.  Incision is clean, dry and intact.  There is no redness or drainage present.  Incisions were cleaned with alcohol.  Stratifix end was cut.  He has swelling to bilateral hands R>L.  There is bruising to his bilateral thumbs.  Cap refill is < 3 secs on both hands.  He reports numbness to his finger tips bilaterally.  Incision to right wrist was steri stripped for added support as some of his dermabond came off when pulling back dressing.  His right incision was then covered with foam dressing and then his Velcro wrist splints were reapplied.      RADS: none done today    Assessment:  Post-op visit (2 weeks)    Plan:    ICD-10-CM ICD-9-CM   1. Closed fracture of both radii with routine healing, subsequent encounter  S52.91XD V54.12    S52.92XD    2. Post-operative state  Z98.890 V45.89     Current care, treatment plan, precautions, activity level/ modifications, limitations, rehabilitation exercises and proposed future treatment were discussed with the patient. We discussed the need to monitor for changes in symptoms and condition and report them to the physician.  Discussed importance of compliance with all appointments and follow up examinations.       - Pain medication: refill  was not needed,   - Pain medication refill policy provided to patient for review, yes  - Patient is to return to clinic in 4 weeks  - At time x-ray of his bilateral wrist x-rays is needed  - At time consider advancing weight bearing and increasing ROM activities.  - Per post-op note, it is recommended patient with bilateral wrist fractures requiring ORIF, R>L.  May gently use his left wrist for ACTIVITY OF DAILY LIVING's.  Keep NWB to bilateral arms for now.  - Will give note for unemployment as he has to file for disability due to loss of job.  - Will consult Case Management as he needs assistance in home and home health.  Attempted to send to [facility] but refused as they do not accept Medicaid.    - Patient advised to clean incisions daily with Betadine and/or alcohol and monitor for signs/symptoms of infections.  - Patient advised to elevate arms on pillows at night and ROM of fingers as much as possible during the day.  - Call for questions or concerns.     If there are any questions prior to scheduled follow up, the patient was instructed to contact the office

## 2020-09-30 ENCOUNTER — OFFICE VISIT (OUTPATIENT)
Dept: INTERNAL MEDICINE | Facility: CLINIC | Age: 50
End: 2020-09-30
Payer: MEDICAID

## 2020-09-30 VITALS
DIASTOLIC BLOOD PRESSURE: 66 MMHG | OXYGEN SATURATION: 96 % | SYSTOLIC BLOOD PRESSURE: 120 MMHG | HEART RATE: 100 BPM | BODY MASS INDEX: 24.23 KG/M2 | WEIGHT: 168.88 LBS

## 2020-09-30 DIAGNOSIS — I60.9 SUBARACHNOID HEMORRHAGE: ICD-10-CM

## 2020-09-30 DIAGNOSIS — R53.81 DEBILITY: ICD-10-CM

## 2020-09-30 DIAGNOSIS — S22.31XA CLOSED FRACTURE OF ONE RIB OF RIGHT SIDE, INITIAL ENCOUNTER: ICD-10-CM

## 2020-09-30 DIAGNOSIS — S52.532D CLOSED COLLES' FRACTURE OF LEFT RADIUS WITH ROUTINE HEALING: Primary | ICD-10-CM

## 2020-09-30 DIAGNOSIS — S52.531D CLOSED COLLES' FRACTURE OF RIGHT RADIUS WITH ROUTINE HEALING: ICD-10-CM

## 2020-09-30 DIAGNOSIS — F41.9 ANXIETY: ICD-10-CM

## 2020-09-30 DIAGNOSIS — M54.42 BILATERAL LOW BACK PAIN WITH LEFT-SIDED SCIATICA, UNSPECIFIED CHRONICITY: ICD-10-CM

## 2020-09-30 PROCEDURE — 99999 PR PBB SHADOW E&M-EST. PATIENT-LVL IV: ICD-10-PCS | Mod: PBBFAC,,, | Performed by: INTERNAL MEDICINE

## 2020-09-30 PROCEDURE — 99214 OFFICE O/P EST MOD 30 MIN: CPT | Mod: S$PBB,,, | Performed by: INTERNAL MEDICINE

## 2020-09-30 PROCEDURE — 99999 PR PBB SHADOW E&M-EST. PATIENT-LVL IV: CPT | Mod: PBBFAC,,, | Performed by: INTERNAL MEDICINE

## 2020-09-30 PROCEDURE — 99214 OFFICE O/P EST MOD 30 MIN: CPT | Mod: PBBFAC | Performed by: INTERNAL MEDICINE

## 2020-09-30 PROCEDURE — 99214 PR OFFICE/OUTPT VISIT, EST, LEVL IV, 30-39 MIN: ICD-10-PCS | Mod: S$PBB,,, | Performed by: INTERNAL MEDICINE

## 2020-09-30 RX ORDER — ACETAMINOPHEN 500 MG
TABLET ORAL
Qty: 1 EACH | Refills: 0 | Status: SHIPPED | OUTPATIENT
Start: 2020-09-30 | End: 2022-12-13

## 2020-09-30 NOTE — LETTER
September 30, 2020    Andre WILLIS Padgett  [address] [facility] [address]             [facility]  [address]  [facility] [address]  Phone: [phone]  Fax: [phone] To whom it may concern:    Patient has a rib fracture. Please provide an incentive spirometer to prevent atelectasis and pneumonia.       If you have any questions or concerns, please don't hesitate to call.    Sincerely,         Hunter Diop MD

## 2020-09-30 NOTE — PROGRESS NOTES
Subjective:       Patient ID: Andre Padgett is a 50 y.o. male.    Chief Complaint: Follow-up    Patient here for hospital follow-up.  He is attended by his sister basically the patient was home alone with his 10-year-old daughter.  He says he climbed on a stool to try to reach a lizard high on a wall near the ceiling but when he went to swing at it with a broom he had for gotten that the chair had wheels and it slid out from under him.  He landed on both outstretched hands breaking both wrists and hitting his head and ribs.  He suffered a brain bleed, 2 broken wrist requiring surgery in a broken rib.  He had surgery, his sister is helping and has arrange some help at home for least a few weeks.  Home health has been ordered.  At this point we or waiting on orthopedic follow-up.  He wanted an incentive spirometer to prevent problems with the lungs from the cracked rib.  He also wanted a home blood pressure machine because it fluctuated in the hospital.  I offered to social work consult but they are holding off until they talk to their insurance.    Review of Systems   Constitutional: Negative for chills, fatigue, fever and unexpected weight change.   HENT: Negative for nosebleeds and trouble swallowing.    Eyes: Negative for pain and visual disturbance.   Respiratory: Negative for cough, shortness of breath and wheezing.    Cardiovascular: Positive for chest pain ( right posterior chest). Negative for palpitations.   Gastrointestinal: Negative for abdominal pain, constipation, diarrhea, nausea and vomiting.   Genitourinary: Negative for difficulty urinating and hematuria.   Musculoskeletal: Positive for arthralgias (Both wrists and right rib). Negative for neck pain.   Integumentary:  Negative for rash.   Neurological: Negative for dizziness and headaches.   Hematological: Does not bruise/bleed easily.   Psychiatric/Behavioral: Negative for dysphoric mood, sleep disturbance and suicidal ideas.         Objective:       Physical Exam  Constitutional:       General: He is not in acute distress.     Appearance: He is well-developed.   HENT:      Head: Normocephalic and atraumatic.      Right Ear: External ear normal.      Left Ear: External ear normal.      Mouth/Throat:      Pharynx: No oropharyngeal exudate.   Eyes:      General: No scleral icterus.     Conjunctiva/sclera: Conjunctivae normal.      Pupils: Pupils are equal, round, and reactive to light.   Neck:      Musculoskeletal: Normal range of motion and neck supple.      Thyroid: No thyromegaly.      Comments: No supraclavicular nodes palpated  Cardiovascular:      Rate and Rhythm: Normal rate and regular rhythm.      Heart sounds: Normal heart sounds. No murmur.   Pulmonary:      Effort: Pulmonary effort is normal.      Breath sounds: Normal breath sounds. No wheezing.   Chest:      Chest wall: Tenderness (Right posterior chest rib fracture) present.   Abdominal:      General: Bowel sounds are normal.      Palpations: Abdomen is soft. There is no mass.      Tenderness: There is no abdominal tenderness.   Musculoskeletal:         General: Deformity ( braces on both forearms post wrist surgery) present.   Lymphadenopathy:      Cervical: No cervical adenopathy.   Skin:     Coloration: Skin is not pale.      Findings: No erythema or rash.   Neurological:      Mental Status: He is alert and oriented to person, place, and time.   Psychiatric:         Mood and Affect: Mood normal.         Behavior: Behavior normal.         Assessment:       1. Closed Colles' fracture of left radius with routine healing    2. Closed Colles' fracture of right radius with routine healing    3. Bilateral low back pain with left-sided sciatica, unspecified chronicity    4. Anxiety    5. Subarachnoid hemorrhage    6. Debility    7. Closed fracture of one rib of right side, initial encounter        Plan:       Andre was seen today for follow-up.    Diagnoses and all orders for this visit:    Closed  Colles' fracture of left radius with routine healing    Closed Colles' fracture of right radius with routine healing    Bilateral low back pain with left-sided sciatica, unspecified chronicity    Anxiety    Subarachnoid hemorrhage    Debility    Closed fracture of one rib of right side, initial encounter    Other orders  -     blood pressure monitor (BLOOD PRESSURE KIT) Kit; Monitor Blood Pressure daily.        follow-up in a few weeks

## 2020-10-02 ENCOUNTER — SSC ENCOUNTER (OUTPATIENT)
Dept: ADMINISTRATIVE | Facility: OTHER | Age: 50
End: 2020-10-02

## 2020-10-02 ENCOUNTER — TELEPHONE (OUTPATIENT)
Dept: ORTHOPEDICS | Facility: CLINIC | Age: 50
End: 2020-10-02

## 2020-10-02 ENCOUNTER — TELEPHONE (OUTPATIENT)
Dept: INTERNAL MEDICINE | Facility: CLINIC | Age: 50
End: 2020-10-02

## 2020-10-02 DIAGNOSIS — I60.9 SUBARACHNOID HEMORRHAGE: ICD-10-CM

## 2020-10-02 DIAGNOSIS — M54.42 BILATERAL LOW BACK PAIN WITH LEFT-SIDED SCIATICA, UNSPECIFIED CHRONICITY: ICD-10-CM

## 2020-10-02 DIAGNOSIS — S52.531D CLOSED COLLES' FRACTURE OF RIGHT RADIUS WITH ROUTINE HEALING: ICD-10-CM

## 2020-10-02 DIAGNOSIS — S52.532D CLOSED COLLES' FRACTURE OF LEFT RADIUS WITH ROUTINE HEALING: Primary | ICD-10-CM

## 2020-10-02 NOTE — TELEPHONE ENCOUNTER
Called and spoke with pt's sister in regards to pt's home health care order. Informed pt's sister [facility] home health care denied pt due to pt's insurance being medicaid. Also informed pt's sister I spoke with Ino and he messaged pt's PCP and sent in a case request for the pt and Ino suggest they give pt's PCP a call. Pt's sister verbally understood and will give pt's PCP a call to resolve the issue.

## 2020-10-02 NOTE — PROGRESS NOTES
Please note the following patient's information was forwarded to the Medicaid ([place] Medicaid, [employer], [employer], [facility], [facility], [facility]) Case Management office.    Please contact Outpatient Care Management at [phone] (Ext. [phone]) with any questions.    Thank you,    Dayna Velasquez, [facility]  Outpatient Case Mgmnt  [phone]

## 2020-10-02 NOTE — TELEPHONE ENCOUNTER
----- Message from Reggie Almonte sent at 10/2/2020 11:01 AM CDT -----  Contact: sister  Lani  Pt needs to come in for his after surgery f/u   no appt was available  and pt needs appt between 10-2  last week in Oct  due to transportation     Please Call     Contact  [phone]

## 2020-10-02 NOTE — TELEPHONE ENCOUNTER
Spoke with pt's sister.  Provided follow up appointment info to her.  Sister verbalized understanding

## 2020-10-02 NOTE — TELEPHONE ENCOUNTER
----- Message from Judi Almonte sent at 10/2/2020  2:56 PM CDT -----  Contact: Patient [phone]  Requesting Orders    Orders being requested: Home Health & PT    Reason for request: Had wrist surgery    Does patient have future appt with PCP: no    Please call to schedule once orders have been placed.    Thank You

## 2020-10-05 ENCOUNTER — TELEPHONE (OUTPATIENT)
Dept: INTERNAL MEDICINE | Facility: CLINIC | Age: 50
End: 2020-10-05

## 2020-10-05 ENCOUNTER — PATIENT MESSAGE (OUTPATIENT)
Dept: INTERNAL MEDICINE | Facility: CLINIC | Age: 50
End: 2020-10-05

## 2020-10-05 DIAGNOSIS — M54.42 BILATERAL LOW BACK PAIN WITH LEFT-SIDED SCIATICA, UNSPECIFIED CHRONICITY: ICD-10-CM

## 2020-10-05 DIAGNOSIS — S52.531D CLOSED COLLES' FRACTURE OF RIGHT RADIUS WITH ROUTINE HEALING: ICD-10-CM

## 2020-10-05 DIAGNOSIS — S52.532D CLOSED COLLES' FRACTURE OF LEFT RADIUS WITH ROUTINE HEALING: Primary | ICD-10-CM

## 2020-10-05 DIAGNOSIS — I60.9 SUBARACHNOID HEMORRHAGE: ICD-10-CM

## 2020-10-05 NOTE — TELEPHONE ENCOUNTER
Called pt and informed him [facility] does not accept his medicaid.     Pt is requesting a referral be faxed to [facility]

## 2020-10-05 NOTE — TELEPHONE ENCOUNTER
----- Message from Lorin Wheeler sent at 10/5/2020  3:10 PM CDT -----  Contact: self/[phone]  Pt called in regards to having home health provider is available for the dr. Pt would like a call back,     Please advise

## 2020-10-05 NOTE — TELEPHONE ENCOUNTER
Spoke to pt. He was denied home health through [facility] because they do not accept medicaid. Pt is requesting to use [facility]

## 2020-10-05 NOTE — TELEPHONE ENCOUNTER
----- Message from Judi Almonte sent at 10/5/2020 10:32 AM CDT -----  Contact: Patient [phone]  Requesting to speak with you regarding his home health orders.    Please call and advise.    Thank You

## 2020-10-06 ENCOUNTER — TELEPHONE (OUTPATIENT)
Dept: INTERNAL MEDICINE | Facility: CLINIC | Age: 50
End: 2020-10-06

## 2020-10-06 ENCOUNTER — TELEPHONE (OUTPATIENT)
Dept: ORTHOPEDICS | Facility: CLINIC | Age: 50
End: 2020-10-06

## 2020-10-06 NOTE — TELEPHONE ENCOUNTER
----- Message from Светлана Petit sent at 10/6/2020 11:03 AM CDT -----  Contact: Andre henna [phone]  Type:  Needs Medical Advice    Who Called: Andre aguillon  Symptoms (please be specific):   How long has patient had these symptoms:   Pharmacy name and phone #:   Would the patient rather a call back or a response via [facility]? Call back  Best Call Back Number: [phone]  Additional Information: Pt is requesting a call back from the nurse in regards to home health

## 2020-10-06 NOTE — TELEPHONE ENCOUNTER
Spoke to pt and informed him I was unable to get in touch With [facility].    Called pt's medicaid group and printed a list of home health care providers in network.      Spoke to pt and read him the list of in network providers. Pt chooses to use [facility] (home health agency)    Order faxed to [facility]

## 2020-10-06 NOTE — TELEPHONE ENCOUNTER
Spoke with patient, said he was having swelling to his right arm and hand that has improved since loosening his wrist splint.  He is also reporting chronic numbness to tip of his thumbs and wanted to see how long that last.  Advised numbness should improve over next several weeks if not would need to do EMG.  Also advised swelling likely due to splint tightness.  Advised he can remove if sitting down.  He should keep his hands elevated above his heart.      Patient said he has found a company willing to accept his Medicaid for therapy and his PCP is working on this for him.    He will call if symptoms fail to improve.  Advised if this is the case, he can come in for a visit.  Verb understanding.    ----- Message from Carole Redding MA sent at 10/6/2020 10:19 AM CDT -----  Contact: pt    ----- Message -----  From: Venus Ramirez  Sent: 10/6/2020  10:11 AM CDT  To: Mati MICHAEL Staff    Please call pt at [phone]    Patient is having right arm swelling and bilateral hand numbness    Thank you

## 2020-10-06 NOTE — TELEPHONE ENCOUNTER
----- Message from Judi Almonte sent at 10/6/2020  2:44 PM CDT -----  Contact: Patient [phone]  Patient stated that they missed a call from Kelly Bhatia MA    Please call and advise.    Thank You

## 2020-10-06 NOTE — TELEPHONE ENCOUNTER
Called the home health ([facility])  pt wants to use. I was placed on hold for over 10 minutes. And they refused to give a fax number. Stating a supervisor will need to speak with someone from out office.    Called pt and left a message requesting a call back.

## 2020-10-06 NOTE — TELEPHONE ENCOUNTER
----- Message from Zully Ham sent at 10/6/2020  1:34 PM CDT -----  Contact: Self [phone]  Returning a phone call.    Who left a message for the patient:  nurse    Do they know what this is regarding:  Calling to leave the address for the home health care the pt will like to use. The name is [facility] at [address] [place] [address] [phone]     Would they like a phone call back or a response via [facility]:  call back after 3pm

## 2020-10-07 ENCOUNTER — TELEPHONE (OUTPATIENT)
Dept: INTERNAL MEDICINE | Facility: CLINIC | Age: 50
End: 2020-10-07

## 2020-10-07 ENCOUNTER — TELEPHONE (OUTPATIENT)
Dept: ORTHOPEDICS | Facility: CLINIC | Age: 50
End: 2020-10-07

## 2020-10-07 DIAGNOSIS — S52.532D CLOSED COLLES' FRACTURE OF LEFT RADIUS WITH ROUTINE HEALING: Primary | ICD-10-CM

## 2020-10-07 DIAGNOSIS — I60.9 SUBARACHNOID HEMORRHAGE: ICD-10-CM

## 2020-10-07 DIAGNOSIS — S52.531D CLOSED COLLES' FRACTURE OF RIGHT RADIUS WITH ROUTINE HEALING: ICD-10-CM

## 2020-10-07 NOTE — TELEPHONE ENCOUNTER
Spoke with pt     He was able to find a place to get therapy and he will pay out of pocket for the services.   Pt will have the facility call me if anything is needed

## 2020-10-07 NOTE — TELEPHONE ENCOUNTER
----- Message from Reggie Almonte sent at 10/7/2020  2:25 PM CDT -----  Contact: self  Pt  needs to speak with nurse he needs home therapy ASAP been 3 weeks no therapy due to Medicaid    Please Call     Contact  [phone]

## 2020-10-07 NOTE — TELEPHONE ENCOUNTER
----- Message from Analisa Dunn sent at 10/7/2020  1:44 PM CDT -----  Regarding: Home Health  Contact: [phone]  Patient Returning Call from [facility]    Who Left Message for Patient: Kelly Bhatia    Communication Preference: [phone]    Additional Information:  Pt is requesting a call back to see if the forms was submitted for Home Health.

## 2020-10-07 NOTE — TELEPHONE ENCOUNTER
----- Message from Brianna Ray sent at 10/7/2020 10:58 AM CDT -----  Contact: [phone]  Patient called to follow up on orders for HH. Please call and advise

## 2020-10-07 NOTE — TELEPHONE ENCOUNTER
Informed pt that I contacted all of the home health providers listed as in network for his insurance and they are not currently accepting medicaid patients.      Pt would like to speak with case management

## 2020-10-08 ENCOUNTER — OFFICE VISIT (OUTPATIENT)
Dept: INTERNAL MEDICINE | Facility: CLINIC | Age: 50
End: 2020-10-08
Payer: MEDICAID

## 2020-10-08 ENCOUNTER — OFFICE VISIT (OUTPATIENT)
Dept: ORTHOPEDICS | Facility: CLINIC | Age: 50
End: 2020-10-08
Payer: MEDICAID

## 2020-10-08 VITALS
DIASTOLIC BLOOD PRESSURE: 81 MMHG | BODY MASS INDEX: 24.18 KG/M2 | WEIGHT: 168.88 LBS | HEIGHT: 70 IN | HEART RATE: 110 BPM | SYSTOLIC BLOOD PRESSURE: 121 MMHG

## 2020-10-08 VITALS
OXYGEN SATURATION: 98 % | HEIGHT: 70 IN | SYSTOLIC BLOOD PRESSURE: 118 MMHG | BODY MASS INDEX: 23.2 KG/M2 | WEIGHT: 162.06 LBS | HEART RATE: 100 BPM | DIASTOLIC BLOOD PRESSURE: 74 MMHG

## 2020-10-08 DIAGNOSIS — S52.92XD CLOSED FRACTURE OF BOTH RADII WITH ROUTINE HEALING, SUBSEQUENT ENCOUNTER: Primary | ICD-10-CM

## 2020-10-08 DIAGNOSIS — R09.81 SINUS CONGESTION: ICD-10-CM

## 2020-10-08 DIAGNOSIS — S52.91XD CLOSED FRACTURE OF BOTH RADII WITH ROUTINE HEALING, SUBSEQUENT ENCOUNTER: Primary | ICD-10-CM

## 2020-10-08 DIAGNOSIS — S22.31XA CLOSED FRACTURE OF ONE RIB OF RIGHT SIDE, INITIAL ENCOUNTER: Primary | ICD-10-CM

## 2020-10-08 PROCEDURE — 99213 OFFICE O/P EST LOW 20 MIN: CPT | Mod: S$PBB,,, | Performed by: STUDENT IN AN ORGANIZED HEALTH CARE EDUCATION/TRAINING PROGRAM

## 2020-10-08 PROCEDURE — 99999 PR PBB SHADOW E&M-EST. PATIENT-LVL IV: ICD-10-PCS | Mod: PBBFAC,,, | Performed by: NURSE PRACTITIONER

## 2020-10-08 PROCEDURE — 99213 PR OFFICE/OUTPT VISIT, EST, LEVL III, 20-29 MIN: ICD-10-PCS | Mod: S$PBB,,, | Performed by: STUDENT IN AN ORGANIZED HEALTH CARE EDUCATION/TRAINING PROGRAM

## 2020-10-08 PROCEDURE — 99024 PR POST-OP FOLLOW-UP VISIT: ICD-10-PCS | Mod: ,,, | Performed by: NURSE PRACTITIONER

## 2020-10-08 PROCEDURE — 99213 OFFICE O/P EST LOW 20 MIN: CPT | Mod: PBBFAC,27 | Performed by: STUDENT IN AN ORGANIZED HEALTH CARE EDUCATION/TRAINING PROGRAM

## 2020-10-08 PROCEDURE — 99999 PR PBB SHADOW E&M-EST. PATIENT-LVL IV: CPT | Mod: PBBFAC,,, | Performed by: NURSE PRACTITIONER

## 2020-10-08 PROCEDURE — 99999 PR PBB SHADOW E&M-EST. PATIENT-LVL III: ICD-10-PCS | Mod: PBBFAC,,, | Performed by: STUDENT IN AN ORGANIZED HEALTH CARE EDUCATION/TRAINING PROGRAM

## 2020-10-08 PROCEDURE — 99214 OFFICE O/P EST MOD 30 MIN: CPT | Mod: PBBFAC | Performed by: NURSE PRACTITIONER

## 2020-10-08 PROCEDURE — 99999 PR PBB SHADOW E&M-EST. PATIENT-LVL III: CPT | Mod: PBBFAC,,, | Performed by: STUDENT IN AN ORGANIZED HEALTH CARE EDUCATION/TRAINING PROGRAM

## 2020-10-08 PROCEDURE — 99024 POSTOP FOLLOW-UP VISIT: CPT | Mod: ,,, | Performed by: NURSE PRACTITIONER

## 2020-10-08 RX ORDER — LIDOCAINE 50 MG/G
2 PATCH TOPICAL DAILY
Refills: 0
Start: 2020-10-08 | End: 2021-03-05

## 2020-10-08 RX ORDER — METHOCARBAMOL 750 MG/1
750 TABLET, FILM COATED ORAL 4 TIMES DAILY PRN
Qty: 40 TABLET | Refills: 0 | Status: SHIPPED | OUTPATIENT
Start: 2020-10-08 | End: 2020-10-18

## 2020-10-08 RX ORDER — FLUTICASONE PROPIONATE 50 MCG
2 SPRAY, SUSPENSION (ML) NASAL DAILY
Qty: 11.1 ML | Refills: 1 | Status: SHIPPED | OUTPATIENT
Start: 2020-10-08 | End: 2020-11-07

## 2020-10-08 RX ORDER — LORATADINE 10 MG/1
10 TABLET ORAL DAILY
Qty: 30 TABLET | Refills: 3 | Status: SHIPPED | OUTPATIENT
Start: 2020-10-08 | End: 2021-03-05

## 2020-10-08 RX ORDER — DICLOFENAC SODIUM 10 MG/G
2 GEL TOPICAL 4 TIMES DAILY PRN
Qty: 1 TUBE | Refills: 1 | Status: SHIPPED | OUTPATIENT
Start: 2020-10-08 | End: 2021-03-05

## 2020-10-08 NOTE — PROGRESS NOTES
Subjective:       Patient ID: Andre Padgett is a 50 y.o. male.    Chief Complaint: No chief complaint on file.    Pt presents to clinic for an urgent care visit to discuss the following     1) Back/rib pain - Traumatic. Recent fall s/p ORIF for his comminuted intra-articular bilateral distal radius fracture by Dr. Thorne on 9/14/2020. Follows with Ortho. Also fx R rib. Pain is persistent/mildly improving, but still having difficulty sleeping. Pt has been using Oxycodone prescribed by Ortho.     2) Sinus congestion - Nose feels congested but denies fever, chills, cough, sore throat, etc.     Review of Systems   Constitutional: Negative for activity change, appetite change, chills, fatigue, fever and unexpected weight change.   HENT: Positive for nasal congestion. Negative for nosebleeds, sore throat and trouble swallowing.    Eyes: Negative for pain and visual disturbance.   Respiratory: Negative for cough, chest tightness, shortness of breath and wheezing.    Cardiovascular: Negative for chest pain, palpitations and leg swelling.   Gastrointestinal: Negative for abdominal pain, constipation, diarrhea, nausea and vomiting.   Genitourinary: Negative for difficulty urinating, dysuria, flank pain and hematuria.   Musculoskeletal: Positive for arthralgias and back pain. Negative for myalgias and neck pain.   Integumentary:  Negative for rash.   Neurological: Negative for dizziness, syncope, weakness and headaches.   Hematological: Does not bruise/bleed easily.   Psychiatric/Behavioral: Negative for agitation, confusion, dysphoric mood, sleep disturbance and suicidal ideas.         Objective:      Physical Exam  Constitutional:       General: He is not in acute distress.     Appearance: He is well-developed.   HENT:      Head: Normocephalic and atraumatic.      Right Ear: External ear normal.      Left Ear: External ear normal.      Mouth/Throat:      Pharynx: No oropharyngeal exudate.   Eyes:      General: No scleral  icterus.     Conjunctiva/sclera: Conjunctivae normal.      Pupils: Pupils are equal, round, and reactive to light.   Neck:      Musculoskeletal: Normal range of motion and neck supple.      Thyroid: No thyromegaly.      Comments: No supraclavicular nodes palpated  Cardiovascular:      Rate and Rhythm: Normal rate and regular rhythm.      Heart sounds: Normal heart sounds. No murmur.   Pulmonary:      Effort: Pulmonary effort is normal.      Breath sounds: Normal breath sounds. No wheezing.   Chest:      Chest wall: Tenderness (Right posterior chest rib fracture) present.   Abdominal:      General: Bowel sounds are normal.      Palpations: Abdomen is soft. There is no mass.      Tenderness: There is no abdominal tenderness.   Musculoskeletal:         General: Deformity ( braces on both forearms post wrist surgery) present.   Lymphadenopathy:      Cervical: No cervical adenopathy.   Skin:     Coloration: Skin is not pale.      Findings: No erythema or rash.   Neurological:      Mental Status: He is alert and oriented to person, place, and time.   Psychiatric:         Mood and Affect: Mood normal.         Behavior: Behavior normal.         Assessment:       1. Closed fracture of one rib of right side, initial encounter    2. Sinus congestion        Plan:       Andre was seen today for back pain.  Diagnoses and all orders for this visit:    Closed fracture of one rib of right side, initial encounter  -- Pt informed that rib fractures take a significant amount of time to heal.   -- May try a wrap, but this may worsen atelectasis so I would not recommend  -- Oxycodone per Ortho  -- Introduce multi-modal pain regimen:   -     methocarbamoL (ROBAXIN) 750 MG Tab; Take 1 tablet (750 mg total) by mouth 4 (four) times daily as needed.  -     lidocaine (LIDODERM) 5 %; Place 2 patches onto the skin once daily. Remove & Discard patch within 12 hours   -     diclofenac sodium (VOLTAREN) 1 % Gel; Apply 2 g topically 4 (four) times  daily as needed.    Sinus congestion  -- No indication for CXR at this time  -- No respiratory symptoms  -- Continue using incentive spirometer as tolerated  -- BID Flonase and daily/qhs Claritin for nasal congestion. Would also recommend nasal saline rinse BID.          Follow up with PCP.     Plan discussed with attending Dr. Hendrickson, further recommendations as per attending addendum. Please feel free to call with any questions or concerns.    Anibal Badillo MD  Internal Medicine Resident, PGY-3

## 2020-10-08 NOTE — PROGRESS NOTES
Mr. Padgett is here today for a post-operative visit after undergoing an ORIF for his comminuted intra-articular bilateral distal radius fracture by Dr. Thorne on 9/14/2020.  He was last seen by me on 9/28/2020 and comes in today due to concerns about his right hand being swollen.  He also reports having issues with getting into therapy.  He states his PCP has also tried helping him find a therapist who will accept his Medicaid.    Interval History:  He reports that he is doing ok, just concerned about his right arm swelling.  He is also reporting some numbness to the tips of his thumbs bilaterally.  States his wrist are very sensitive.  He is taking his medication as prescribed and it controls his pain.  He is eager to find a therapist.    He is taking pain medication.  He is currently not getting therapy.  He denies fever, chills, and sweats since the time of the surgery.  Patient admits he has only elevated his arms for a few minutes every few hours, he tries to elevate at night but due to his fracture rib, he finds it difficult to find a comfortable sleeping position.  He said no one has tried to massage his hand or fingers.    Physical exam:  Velcro wrist splints removed.  Incisions to bilateral wrist are healing.  There is no redness or drainage present.  His right hand and fingers are swollen.  He has minimal ROM of his fingers.  There is some excoriation at the base of his thumbs where the velcro straps lay.  He has intact sensation to the medial and lateral aspects of both arms.  Cap refill is < 3 secs.  He does have some bruising to bilateral thumbs that appear to be healing.        RADS: none done today    Assessment:  Post-op visit (2 weeks)    Plan:    ICD-10-CM ICD-9-CM   1. Closed fracture of both radii with routine healing, subsequent encounter  S52.91XD V54.12    S52.92XD      Current care, treatment plan, precautions, activity level/ modifications, limitations, rehabilitation exercises and proposed  future treatment were discussed with the patient. We discussed the need to monitor for changes in symptoms and condition and report them to the physician.  Discussed importance of compliance with all appointments and follow up examinations.       - Pain medication: refill was not needed,   - Pain medication refill policy provided to patient for review, yes  - Patient is to return to clinic in 4 weeks as scheduled.  - At time x-ray of his bilateral wrist x-rays is needed  - Will send outpatient therapy orders to [facility] to begin ROM activities.  - At next appointment will consider advancing weight bearing and increasing ROM activities.  - Per post-op note, it is recommended patient with bilateral wrist fractures requiring ORIF, R>L.  May gently use his left wrist for [facility]'s.  Keep NWB to bilateral arms for now.  - Patient reminded the importance of keeping his arms elevated on pillows as much as possible and ROM of fingers as much as possible during the day.  Ok to remove splints when sitting at home to allow to do ROM of fingers and massage fingers and hands.    -Andre was advised to keep the incision clean and dry for the next 24 hours after which he may wash the area with antibacterial soap in the shower.   -He not submerge until the incision is completely healed  -Patient was advised to monitor wound closely and multiple times daily for any problems. Call clinic immediately or report to ED for immediate medical attention for any complications including reopening of wound, drainage, purulence, redness, streaking, odor, pain out of proportion, fever, chills, etc.     - Call for questions or concerns.     If there are any questions prior to scheduled follow up, the patient was instructed to contact the office

## 2020-10-09 ENCOUNTER — CLINICAL SUPPORT (OUTPATIENT)
Dept: REHABILITATION | Facility: HOSPITAL | Age: 50
End: 2020-10-09
Payer: MEDICAID

## 2020-10-09 DIAGNOSIS — M25.60 RANGE OF MOTION DEFICIT: ICD-10-CM

## 2020-10-09 DIAGNOSIS — S52.91XD CLOSED FRACTURE OF BOTH RADII WITH ROUTINE HEALING, SUBSEQUENT ENCOUNTER: ICD-10-CM

## 2020-10-09 DIAGNOSIS — S52.92XD CLOSED FRACTURE OF BOTH RADII WITH ROUTINE HEALING, SUBSEQUENT ENCOUNTER: ICD-10-CM

## 2020-10-09 PROCEDURE — 97530 THERAPEUTIC ACTIVITIES: CPT

## 2020-10-09 PROCEDURE — 97165 OT EVAL LOW COMPLEX 30 MIN: CPT

## 2020-10-09 NOTE — PATIENT INSTRUCTIONS
"[facility], OCCUPATIONAL THERAPY  HOME EXERCISE PROGRAM     Complete the following two massages for 2 minutes, 2 x/day:                                                       Complete the following exercises for 10 repetitions, 6 x/day:     Raise both arms overhead.    AROM: Elbow Flexion / Extension        Bend and straighten elbow       AROM: Supination / Pronation   With your elbow by your side, turn your   palm up then turn your palm down.      AROM: Wrist Flexion / Extension               Bend your wrist forward and back as far as possible.          AROM: Wrist Radial / Ulnar Deviation  Bend your wrist from side to side as far as possible.      AROM: DIP Flexion / Extension  Pinch middle knuckle to prevent bending.   Bend end knuckle until stretch is felt. Hold   3 seconds. Relax. Straighten finger as far as possible.      AROM: PIP Flexion / Extension  Pinch bottom knuckle  to prevent bending.   Actively bend middle knuckle until stretch is felt.   Hold 3 seconds. Relax. Straighten finger as far as possible.          AROM: Isolated MCP Flexion / Extension ("Wave")   Bend only your large, bottom knuckles. Hold 3 seconds.   Keep the tips of your fingers straight. Straighten fingers.      AROM: Isolated IPJ Flexion / Extension ("Hook")   Bend only your middle and end knuckles. Hold 3 seconds.   Straighten your fingers.       AROM: MCP and PIP Flexion / Extension ("Straight Fist")  Bend your bottom and middle knuckles, keeping the tips of your fingers straight.   Try to touch the pads of your fingers on your palm. Hold 3 seconds.   Straighten your fingers.       AROM: Composite Flexion / Extension ("Full Fist")  Bend every joint in your hand into a fist. Hold 3 seconds.   Straighten your fingers.         AROM: Composte Extension ("Finger Lifts")  Lift your finger off of the table one at a time. Hold 3 seconds.   Relax your finger.      AROM: Abduction / Adduction  With hand flat on table, spread all " "fingers apart,   then bring them together as close as possible.      AROM: Thumb IP Flexion / Extension  Brace thumb below tip joint. Bend joint as far as   possible then straighten.      AROM: Composite Flexion   Bend both joints of thumb as far as possible.   Try to touch base of little finger.                            AROM: Composite Flesion ("Pinky Slides")  Touch thumb to tip of small finger. Slide thumb down   small finger into palm.           Therapist: CHANTE Garcia, AVI      "

## 2020-10-09 NOTE — PLAN OF CARE
"  [facility] Occupational Therapy  Initial Evaluation     Name: Andre Padgett  Clinic Number: [id number]    Therapy Diagnosis:   Encounter Diagnoses   Name Primary?    Closed fracture of both radii with routine healing, subsequent encounter     Range of motion deficit      Physician: Ino George NP    Physician Orders: Work on gentle ROM of bilateral wrist, fingers.  No weight bearing in either hand for now.  Medical Diagnosis: S52.91XD,S52.92XD (ICD-10-CM) - Closed fracture of both radii with routine healing, subsequent encounter     Surgical Procedure and Date:    09/14/2020     Open reduction internal fixation of comminuted intra-articular bilateral distal radius fractures, 3 or more parts each      Evaluation Date: 10/9/2020  Insurance: Medicaid  Insurance Authorization period Expiration: 12/31/2020     Plan of Care Certification Period: 10/9/2020 to 12/9/2020    Visit # / Visits Authortized: 1 / 20  Time In:10:15 am  Time Out: 11:10 am  Total Billable Time: 45 minutes    Precautions: Standard    Subjective     Involved Side: bilateral  Dominant Side: Right  Date of Onset: 9/13/2020  Mechanism of Injury: hopped on a stool and fell off  History of Current Condition: Pt went to ER and had surgery the next day. Pt was in post op dressing for 2 weeks. Pt presents today with bilateral pre-yanely wrist braces.  Imaging: X-ray 9/14/2020 FINDINGS:  Bilateral comminuted distal radial fractures appear in stable position and alignment following closed reduction and splinting.  Previous Therapy: yes    Patient's Goals for Therapy: "be able to move hands back to where they were before"    Pain:  Functional Pain Scale Rating 0-10:   5/10 on average  5/10 at best  10/10 at worst  Location: bilateral hands/wrists  Description: Throbbing  Aggravating Factors: Walking and Nothing  Easing Factors: rest    Occupation:   for [employer]  Working presently: unemployed  Duties: N/A    Functional " Limitations/Social History:    Previous functional status includes: Independent with all ADLs.   Pt has help from 10 to 1 and 5:30 to 7.  Current FunctionalStatus   Home/Living environment : lives alone      Limitation of Functional Status as follows:   ADLs/IADLs:     - Feeding:Unable    - Bathing: Unable    - Dressing/Grooming: Unable    - Driving: Unable     Leisure: Unable to work out      Past Medical History/Physical Systems Review:   Andre Padgett  has a past medical history of Aneurysm, Anxiety, Cerebral aneurysm, nonruptured, Colon adenoma: 3/18 repeat colonoscpy 2023, Diverticulosis of large intestine without hemorrhage: see colonoscopy 3/18; sigmoid and descending colon, Fatty liver: see CT 3/18, and Umbilical hernia without obstruction and without gangrene: see CT 3/18.    Andre Padgett  has a past surgical history that includes Colonoscopy (N/A, 3/26/2018); Cerebral angiogram (2011); and ORIF forearm fracture (Bilateral, 9/14/2020).    Andre has a current medication list which includes the following prescription(s): alprazolam, ascorbic acid (vitamin c), atorvastatin, blood pressure monitor, carvedilol, diclofenac sodium, fluticasone propionate, hydralazine, lidocaine, loratadine, melatonin, methocarbamol, oxycodone, oxycodone, pantoprazole, senna-docusate 8.6-50 mg, and tamsulosin.    Review of patient's allergies indicates:  No Known Allergies       Objective   Observation/Appearance:  Edema present and steri strips over right volar forearm incision. Pt very guarded with movements but appeared more relaxed post session.    Edema. Measured in centimeters.   10/9/2020 10/9/2020    Left Right   Proximal Wrist Crease 19.5 20   Mid palm 22.4 24   MCPs 21 22.5     Hand ROM. Measured in degrees.   10/9/2020 10/9/2020    Left Right        Index: DPC 5 7   MP Extension 0 30        Long:  DPC 5 8   MP Ext 0 30        Ring:   DPC 5 8   MP Ext 0 15        Small:  DPC 5 6   MP Ext 0 10        Thumb: DPC 7 8      Wrist ROM  Date 10/9/2020 10/9/2020    Left Right   Supination/Pronation -35/80 -60/70   Wrist ext/flex 0/10 -10/15   Wrist RD/UD 5/5 5/5       Sensation:  Deferred     Strength (Dyanmometer) and Pinch Strength (Pinch Gauge)  Measured in pounds and psi. Average of three trials.   10/9/2020 10/9/2020    Left Right   Rung II def def   Hensley Pinch def def   3pt Pinch def def   2pt Pinch def def           CMS Impairment/Limitation/Restriction for FOTO Wrist Survey    Therapist reviewed FOTO scores for Andre Padgett on 10/9/2020.   FOTO documents entered into [employer] - see Media section.    Limitation Score: 74%  Category: Carrying    Current : CL = least 60% but < 80% impaired, limited or restricted  Goal: CK = at least 40% but < 60% impaired, limited or restricted           Treatment     Treatment Time In: 10:15 am  Treatment Time Out: 11:10 am  Total Treatment time separate from Evaluation time:20 minutes    Andre received the following supervised modalities after being cleared for contradictions for 0 minutes:   -NT    Andre received therapeutic activities for 20 minutes including:  -AROM as follows: thumb IP block, thumb opposition, pinky slides, jt blocking, TGES, finger ext, finger abd/add, wrist ext/flex, RD/UD, sup/pron, elbow ext/flex and shoulder ext/flex x 10 reps    Home Exercise Program/Education:  Issued HEP (see patient instructions in EMR) and educated on modality use for pain management . Exercises were reviewed and Andre was able to demonstrate them prior to the end of the session.   Pt received a written copy of exercises to perform at home. Andre demonstrated good  understanding of the education provided.  Pt was advised to perform these exercises free of pain, and to stop performing them if pain occurs.    Patient/Family Education: role of OT, goals for OT, scheduling/cancellations - pt verbalized understanding. Discussed insurance limitations with patient.    Additional Education provided:  "Issued Tubigrip D for left arm, Tubigrip E for right arm for edema management    Assessment     Andre Padgett is a 50 y.o. male referred to outpatient occupational/hand therapy and presents with a medical diagnosis of BL distal radius fxs s/p ORIF on 9/14/2020, resulting in pain, edema, decreased range of motion, decreased functional use of both hands and demonstrates limitations as described in the chart below. Following a brief medical record review it is determined that pt will benefit from occupational therapy services in order to maximize pain free and/or functional use of bilateral hands/wrists.     The patient's rehab potential is Good.     Anticipated barriers to occupational therapy: BL fractures to both wrists  Pt has no cultural, educational or language barriers to learning provided.    Profile and History Assessment of Occupational Performance Level of Clinical Decision Making Complexity Score   Occupational Profile:   Andre Padgett is a 50 y.o. male who lives alone and is currently unemployed. Andre Padgett has difficulty with  feeding, bathing, grooming and dressing  housework/household chores  affecting his/her daily functional abilities. His/her main goal for therapy is "be able to move hands back to where they were before".     Comorbidities:    has a past medical history of Aneurysm, Anxiety, Cerebral aneurysm, nonruptured, Colon adenoma: 3/18 repeat colonoscpy 2023, Diverticulosis of large intestine without hemorrhage: see colonoscopy 3/18; sigmoid and descending colon, Fatty liver: see CT 3/18, and Umbilical hernia without obstruction and without gangrene: see CT 3/18.        Medical and Therapy History Review:   Brief               Performance Deficits    Physical:  Joint Mobility  Joint Stability  Muscle Power/Strength  Muscle Endurance  Skin Integrity/Scar Formation  Edema   Strength  Pinch Strength  Gross Motor Coordination  Fine Motor Coordination  Pain    Cognitive:  No " Deficits    Psychosocial:    No Deficits     Clinical Decision Making:  low    Assessment Process:  Problem-Focused Assessments    Modification/Need for Assistance:  Not Necessary    Intervention Selection:  Limited Treatment Options       low  Based on PMHX, co morbidities , data from assessments and functional level of assistance required with task and clinical presentation directly impacting function.       The following goals were discussed with the patient and patient is in agreement with them as to be addressed in the treatment plan.     Goals:   Short Term (4 weeks on 11/9/2020):  1)   Patient to be IND with HEP and modalities for pain management.  2)   Increase GARZA of all finger by 3 cm  degrees for finger flexion  to increase functional hand use for grasping.  3)   Measure /pinch strength at 6 weeks post op.  4)   Decrease edema .2-.3 cm to increase joint mobility /flexibility for improved overall functional hand use.   5)   Decrease complaints of pain to  3 out of 10 to increase functional hand use for ADL/work/leisure activities.  6) Pt will be independent with dressing and feeding himself.    Long Term (by discharge):  1)   Pt will report 1 out of 10 pain with BL hand use.  2)   Patient to score at CK on FOTO to demonstrate improved perception of functional BL hand use.  3)   Pt will return to prior level of function for ADLs and household management.       Plan   Certification Period/Plan of care expiration: 10/9/2020 to 12/9/2020.    Outpatient Occupational Therapy 2 times weekly for 8 weeks may include the following interventions: Paraffin, Fluidotherapy, Manual therapy/joint mobilizations, Modalities for pain management, US 3 mhz, Therapeutic exercises/activities., Strengthening, Orthotic Fabrication/Fit/Training, Edema Control and Scar Management.      Stephanie Mason, OT

## 2020-10-14 ENCOUNTER — CLINICAL SUPPORT (OUTPATIENT)
Dept: REHABILITATION | Facility: HOSPITAL | Age: 50
End: 2020-10-14
Payer: MEDICAID

## 2020-10-14 DIAGNOSIS — M25.60 RANGE OF MOTION DEFICIT: ICD-10-CM

## 2020-10-14 PROCEDURE — 97530 THERAPEUTIC ACTIVITIES: CPT

## 2020-10-14 NOTE — PROGRESS NOTES
Occupational Therapy Daily Treatment Note     Date: 10/14/2020  Name: Andre Padgett  Clinic Number: [id number]    Therapy Diagnosis:   Encounter Diagnosis   Name Primary?    Range of motion deficit      Physician: Ino George NP     Physician Orders: Work on gentle ROM of bilateral wrist, fingers.  No weight bearing in either hand for now.  Medical Diagnosis: S52.91XD,S52.92XD (ICD-10-CM) - Closed fracture of both radii with routine healing, subsequent encounter      Surgical Procedure and Date:    09/14/2020     Open reduction internal fixation of comminuted intra-articular bilateral distal radius fractures, 3 or more parts each       Evaluation Date: 10/9/2020  Insurance: Medicaid  Insurance Authorization period Expiration: 12/31/2020      Plan of Care Certification Period: 10/9/2020 to 12/9/2020     Visit # / Visits Authortized: 2 / 20  Time In:11:30 am  Time Out: 12:20 pm  Total Billable Time: 30 minutes     Precautions: Standard           Subjective     Pt reports: he was compliant with home exercise program given last session.   Response to previous treatment:more motion in left hand, decreased edema in right hand  Functional change: able to open door in front of house, can brush teeth, able to pull pants up,  a plum with left hand    Pain: 2/10  Location: bilateral wrists      Objective   Edema. Measured in centimeters.    10/9/2020 10/9/2020     Left Right   Proximal Wrist Crease 19.5 20   Mid palm 22.4 24   MCPs 21 22.5      Hand ROM. Measured in degrees.    10/9/2020 10/9/2020     Left Right           Index: DPC 5 7   MP Extension 0 30           Long:  DPC 5 8   MP Ext 0 30           Ring:   DPC 5 8   MP Ext 0 15           Small:  DPC 5 6   MP Ext 0 10           Thumb: DPC 7 8      Wrist ROM  Date 10/9/2020 10/9/2020     Left Right   Supination/Pronation -35/80 -60/70   Wrist ext/flex 0/10 -10/15   Wrist RD/UD 5/5 5/5         Sensation:  Deferred       Strength (Dyanmometer) and Pinch Strength (Pinch Gauge)  Measured in pounds and psi. Average of three trials.    10/9/2020 10/9/2020     Left Right   Rung II def def   Hensley Pinch def def   3pt Pinch def def   2pt Pinch def def           Andre received the following supervised modalities after being cleared for contradictions for 10 minutes:   -Patient received MH x 10 min to bl hands to increase blood flow, circulation and tissue elasticity prior to therex    Andre received therapeutic activities for 20 minutes including:  -AROM as follows: thumb IP block, thumb opposition, pinky slides, jt blocking, TGES, finger ext, finger abd/add, wrist ext/flex, RD/UD, sup/pron, elbow ext/flex and shoulder ext/flex x 10 reps  -debrided all eschar off left wrist/foream, no drainage noted following debridement         Home Exercises and Education Provided     Education provided:   - none today  - Progress towards goals: Good     Written Home Exercises Provided: Patient instructed to cont prior HEP.  Exercises were reviewed and Andre was able to demonstrate them prior to the end of the session.  Andre demonstrated good  understanding of the education provided.     See EMR under Patient Instructions for exercises provided prior visit.     Andre demonstrated good  understanding of the education provided.         Assessment   Andre Padgett is a 50 y.o. male referred to outpatient occupational/hand therapy and presents with a medical diagnosis of BL distal radius fxs s/p ORIF on 9/14/2020, resulting in pain, edema, decreased range of and decreased functional use of both hands. Pt reports decreased pain in both wrists. He is using his left hand to open doors,  fruit, pull up pants and brush teeth. Edema appears decreased in both wrists. Pt is motivated and participated well in therapy.    Pt would continue to benefit from skilled OT.      Andre is progressing well towards his goals and there are no updates to goals at this  time. Pt prognosis is Good.     Pt will continue to benefit from skilled outpatient occupational therapy to address the deficits listed in the problem list on initial evaluation provide pt/family education and to maximize pt's level of independence in the home and community environment.     Anticipated barriers to occupational therapy: bilateral wrist fractures    Pt's spiritual, cultural and educational needs considered and pt agreeable to plan of care and goals.    Goals:  Short Term (4 weeks on 11/9/2020):  1)   Patient to be IND with HEP and modalities for pain management.- Met 10/14/2020  2)   Increase GARZA of all finger by 3 cm  degrees for finger flexion  to increase functional hand use for grasping.-progressing  3)   Measure /pinch strength at 6 weeks post op.-progressing  4)   Decrease edema .2-.3 cm to increase joint mobility /flexibility for improved overall functional hand use. -progressing  5)   Decrease complaints of pain to  3 out of 10 to increase functional hand use for ADL/work/leisure activities.-progressing  6) Pt will be independent with dressing and feeding himself.-progressing     Long Term (by discharge):  1)   Pt will report 1 out of 10 pain with BL hand use.-progressing  2)   Patient to score at CK on FOTO to demonstrate improved perception of functional BL hand use.-progressing  3)   Pt will return to prior level of function for ADLs and household management.-progressing       Plan   Certification Period/Plan of care expiration: 10/9/2020 to 12/9/2020.     Outpatient Occupational Therapy 2 times weekly for 8 weeks may include the following interventions: Paraffin, Fluidotherapy, Manual therapy/joint mobilizations, Modalities for pain management, US 3 mhz, Therapeutic exercises/activities., Strengthening, Orthotic Fabrication/Fit/Training, Edema Control and Scar Management.       Discussed Plan of Care with patient: Yes  Updates/Grading for next session: Advance as tolerated      Stephanie IGNACIO  Isabella, OT

## 2020-10-16 ENCOUNTER — CLINICAL SUPPORT (OUTPATIENT)
Dept: REHABILITATION | Facility: HOSPITAL | Age: 50
End: 2020-10-16
Payer: MEDICAID

## 2020-10-16 DIAGNOSIS — M25.60 RANGE OF MOTION DEFICIT: ICD-10-CM

## 2020-10-16 PROCEDURE — 97530 THERAPEUTIC ACTIVITIES: CPT

## 2020-10-16 NOTE — PROGRESS NOTES
"                            Occupational Therapy Daily Treatment Note     Date: 10/16/2020  Name: Andre Padgett  Clinic Number: [id number]    Therapy Diagnosis:   Encounter Diagnosis   Name Primary?    Range of motion deficit      Physician: Ino George NP     Physician Orders: Work on gentle ROM of bilateral wrist, fingers.  No weight bearing in either hand for now.  Medical Diagnosis: S52.91XD,S52.92XD (ICD-10-CM) - Closed fracture of both radii with routine healing, subsequent encounter      Surgical Procedure and Date:    09/14/2020     Open reduction internal fixation of comminuted intra-articular bilateral distal radius fractures, 3 or more parts each       Evaluation Date: 10/9/2020  Insurance: Medicaid  Insurance Authorization period Expiration: 12/31/2020      Plan of Care Certification Period: 10/9/2020 to 12/9/2020     Visit # / Visits Authortized: 3 / 20  Time In: 1240 pm  Time Out: 1328 pm   Total Billable Time: 38 minutes     Precautions: Standard    Subjective     Pt reports: "I'm having no pain today, just a little discomfort. I'm still having a lot of numbness in my right thumb. I'm just waiting til that goes away."   he was compliant with home exercise program given last session.   Response to previous treatment: more motion in fingers of left hand, more motion in R index finger   Functional change: able to use both hands to lift a plate     Pain: 0/10  Location: bilateral wrists      Objective   Edema. Measured in centimeters.    10/9/2020 10/9/2020     Left Right   Proximal Wrist Crease 19.5 20   Mid palm 22.4 24   MCPs 21 22.5      Hand ROM. Measured in degrees.    10/9/2020 10/9/2020     Left Right           Index: DPC 5 7   MP Extension 0 30           Long:  DPC 5 8   MP Ext 0 30           Ring:   DPC 5 8   MP Ext 0 15           Small:  DPC 5 6   MP Ext 0 10           Thumb: DPC 7 8      Wrist ROM  Date 10/9/2020 10/9/2020     Left Right   Supination/Pronation -35/80 -60/70   Wrist " ext/flex 0/10 -10/15   Wrist RD/UD 5/5 5/5     Sensation:  Deferred      Strength (Dyanmometer) and Pinch Strength (Pinch Gauge)  Measured in pounds and psi. Average of three trials.    10/9/2020 10/9/2020     Left Right   Rung II def def   Hensley Pinch def def   3pt Pinch def def   2pt Pinch def def     Andre received the following supervised modalities after being cleared for contradictions for 10 minutes:   -Patient received MH x 10 min to bl hands to increase blood flow, circulation and tissue elasticity prior to therex    Andre received manual therapy for 8 minutes including:   - Retrograde massage to B hands/fingers to decrease swelling    Andre received therapeutic activities for 30 minutes including:  -AROM as follows: thumb IP block, thumb opposition, pinky slides, jt blocking, TGES, finger ext, finger abd/add, wrist ext/flex, RD/UD, sup/pron, elbow ext/flex and shoulder ext/flex x 10 reps    Home Exercises and Education Provided     Education provided:   - none today  - Progress towards goals: Good     Written Home Exercises Provided: Patient instructed to cont prior HEP.  Exercises were reviewed and Andre was able to demonstrate them prior to the end of the session.  Andre demonstrated good  understanding of the education provided.     See EMR under Patient Instructions for exercises provided prior visit.     Additional Education provided: Issued Tubigrip D for left arm, Tubigrip E for right arm for edema management    Andre demonstrated good  understanding of the education provided.     Assessment   Andre Padgett is a 50 y.o. male referred to outpatient occupational/hand therapy and presents with a medical diagnosis of BL distal radius fxs s/p ORIF on 9/14/2020, resulting in pain, edema, decreased range of and decreased functional use of both hands. Pt had good tolerance to AROM exercises today. Pt reports improved functional use of B/L hands as evidenced by pt now able to lift a plate with both  hands as per pt self report. Decreased edema and improved AROM in left index and long fingers noted by observation. Pt is motivated and participated well in therapy. Pt's main complaint is numbness in tip of R thumb and stiffness in B/L wrists.     Pt would continue to benefit from skilled OT.      Andre is progressing well towards his goals and there are no updates to goals at this time. Pt prognosis is Good.     Pt will continue to benefit from skilled outpatient occupational therapy to address the deficits listed in the problem list on initial evaluation provide pt/family education and to maximize pt's level of independence in the home and community environment.     Anticipated barriers to occupational therapy: bilateral wrist fractures    Pt's spiritual, cultural and educational needs considered and pt agreeable to plan of care and goals.    Goals:  Short Term (4 weeks on 11/9/2020):  1)   Patient to be IND with HEP and modalities for pain management.- Met 10/14/2020  2)   Increase GARZA of all finger by 3 cm  degrees for finger flexion  to increase functional hand use for grasping.-progressing  3)   Measure /pinch strength at 6 weeks post op.-progressing  4)   Decrease edema .2-.3 cm to increase joint mobility /flexibility for improved overall functional hand use. -progressing  5)   Decrease complaints of pain to  3 out of 10 to increase functional hand use for ADL/work/leisure activities.-progressing  6) Pt will be independent with dressing and feeding himself.-progressing     Long Term (by discharge):  1)   Pt will report 1 out of 10 pain with BL hand use.-progressing  2)   Patient to score at CK on FOTO to demonstrate improved perception of functional BL hand use.-progressing  3)   Pt will return to prior level of function for ADLs and household management.-progressing       Plan   Certification Period/Plan of care expiration: 10/9/2020 to 12/9/2020.     Outpatient Occupational Therapy 2 times weekly for  8 weeks may include the following interventions: Paraffin, Fluidotherapy, Manual therapy/joint mobilizations, Modalities for pain management, US 3 mhz, Therapeutic exercises/activities., Strengthening, Orthotic Fabrication/Fit/Training, Edema Control and Scar Management.     Discussed Plan of Care with patient: Yes  Updates/Grading for next session: Advance as tolerated      Yuliya Matos, OT

## 2020-10-19 ENCOUNTER — CLINICAL SUPPORT (OUTPATIENT)
Dept: REHABILITATION | Facility: HOSPITAL | Age: 50
End: 2020-10-19
Payer: MEDICAID

## 2020-10-19 DIAGNOSIS — M25.60 RANGE OF MOTION DEFICIT: ICD-10-CM

## 2020-10-19 PROCEDURE — 97530 THERAPEUTIC ACTIVITIES: CPT

## 2020-10-19 NOTE — PROGRESS NOTES
"                            Occupational Therapy Daily Treatment Note     Date: 10/19/2020  Name: Andre Padgett  Clinic Number: [id number]    Therapy Diagnosis:   Encounter Diagnosis   Name Primary?    Range of motion deficit      Physician: Ino George NP     Physician Orders: Work on gentle ROM of bilateral wrist, fingers.  No weight bearing in either hand for now.  Medical Diagnosis: S52.91XD,S52.92XD (ICD-10-CM) - Closed fracture of both radii with routine healing, subsequent encounter      Surgical Procedure and Date:    09/14/2020     Open reduction internal fixation of comminuted intra-articular bilateral distal radius fractures, 3 or more parts each       Evaluation Date: 10/9/2020  Insurance: Medicaid  Insurance Authorization period Expiration: 12/31/2020      Plan of Care Certification Period: 10/9/2020 to 12/9/2020     Visit # / Visits Authortized: 4 / 20  Time In: 1531 pm  Time Out: 1636 pm   Total Billable Time: 55 minutes     Precautions: Standard    Subjective     Pt reports: "I'm getting antsy about my right hand. I feel like it's progressing more slowly than my left. I'm also still having numbness in the tip of my right thumb."   he was compliant with home exercise program given last session.   Response to previous treatment: more movement in fingers of R hand, able to make tighter fist with L hand   Functional change: able to don and doff braces independently, able to manipulate sink faucet knobs     Pain: 2/10  Location: bilateral wrists (ulnar styloids)     Objective   Edema. Measured in centimeters.    10/9/2020 10/9/2020 10/19/2020 10/19/2020     Left Right Left Right   Proximal Wrist Crease 19.5 20 18.5 20.5   Mid palm 22.4 24 20.4 20.5   MCPs 21 22.5 19.8 21.5      Hand ROM. Measured in degrees/cm.    10/9/2020 10/9/2020 10/19/2020   10/19/2020       Left Right Left Right             Index: DPC 5 7 2 cm  3.5 cm   MP Extension 0 30 0 0             Long:  DPC 5 8 3.5 cm 4 cm   MP Ext 0 " 30 0 0             Ring:   DPC 5 8 4 cm 4 cm   MP Ext 0 15 0 0             Small:  DPC 5 6 3.5 cm 3.5 cm   MP Ext 0 10 0 0             Thumb: DPC 7 8 touch 2 cm      Wrist ROM  Date 10/9/2020 10/9/2020 10/19/2020   10/19/2020       Left Right Left Right   Supination/Pronation -35/80 -60/70 0/85 0/85   Wrist ext/flex 0/10 -10/15 45/25 25/30   Wrist RD/UD 5/5 5/5 15/10 10/15     Sensation:  Deferred      Strength (Dyanmometer) and Pinch Strength (Pinch Gauge)  Measured in pounds and psi. Average of three trials.    10/9/2020 10/9/2020     Left Right   Rung II def def   Hensley Pinch def def   3pt Pinch def def   2pt Pinch def def     Andre received the following supervised modalities after being cleared for contradictions for 10 minutes:   -Patient received MH x 10 min to bl hands to increase blood flow, circulation and tissue elasticity prior to therex    Andre received therapeutic activities for 55 minutes including:  -AROM as follows: thumb IP block, thumb opposition, pinky slides, jt blocking, TGES, finger ext, finger abd/add, wrist ext/flex, RD/UD, sup/pron.   -ROM and edema measurements detailed above     Home Exercises and Education Provided     Education provided:   - Progress towards goals: Good   - Brace wear and precautions: don B/L braces appropriately with less restriction to relieve pressure on ulnar styloids     Written Home Exercises Provided: Patient instructed to cont prior HEP.  Exercises were reviewed and Andre was able to demonstrate them prior to the end of the session.  Andre demonstrated good  understanding of the education provided.     See EMR under Patient Instructions for exercises provided initial evaluation .     Andre demonstrated good  understanding of the education provided.     Assessment   Andre Padgett is a 50 y.o. male referred to outpatient occupational/hand therapy and presents with a medical diagnosis of BL distal radius fxs s/p ORIF on 9/14/2020, resulting in pain,  edema, decreased range of and decreased functional use of both hands. Pt continues with good tolerance to AROM exercises today and good adherence to HEP as per pt self report. He presents with improved edema and AROM in both hands since initial eval. Pt reports improved functional use of B/L hands as evidenced by pt now able to don and doff braces independently and manipulate sink faucet knobs. Pt is motivated and participated well in therapy. Pt's main complaint is numbness in tip of R thumb and stiffness in B/L wrists (R>L).     Pt would continue to benefit from skilled OT.      Andre is progressing well towards his goals and there are no updates to goals at this time. Pt prognosis is Good.     Pt will continue to benefit from skilled outpatient occupational therapy to address the deficits listed in the problem list on initial evaluation provide pt/family education and to maximize pt's level of independence in the home and community environment.     Anticipated barriers to occupational therapy: bilateral wrist fractures    Pt's spiritual, cultural and educational needs considered and pt agreeable to plan of care and goals.    Goals:  Short Term (4 weeks on 11/9/2020):  1)   Patient to be IND with HEP and modalities for pain management.- Met 10/14/2020  2)   Increase GARZA of all finger by 3 cm  degrees for finger flexion  to increase functional hand use for grasping.-progressing  3)   Measure /pinch strength at 6 weeks post op.-progressing  4)   Decrease edema .2-.3 cm to increase joint mobility /flexibility for improved overall functional hand use. -progressing  5)   Decrease complaints of pain to  3 out of 10 to increase functional hand use for ADL/work/leisure activities.-progressing  6) Pt will be independent with dressing and feeding himself.-progressing     Long Term (by discharge):  1)   Pt will report 1 out of 10 pain with BL hand use.-progressing  2)   Patient to score at CK on FOTO to demonstrate  improved perception of functional BL hand use.-progressing  3)   Pt will return to prior level of function for ADLs and household management.-progressing       Plan   Certification Period/Plan of care expiration: 10/9/2020 to 12/9/2020.     Outpatient Occupational Therapy 2 times weekly for 8 weeks may include the following interventions: Paraffin, Fluidotherapy, Manual therapy/joint mobilizations, Modalities for pain management, US 3 mhz, Therapeutic exercises/activities., Strengthening, Orthotic Fabrication/Fit/Training, Edema Control and Scar Management.     Discussed Plan of Care with patient: Yes  Updates/Grading for next session: Advance as tolerated      Yuliya Matos, OT

## 2020-10-22 ENCOUNTER — CLINICAL SUPPORT (OUTPATIENT)
Dept: REHABILITATION | Facility: HOSPITAL | Age: 50
End: 2020-10-22
Payer: MEDICAID

## 2020-10-22 DIAGNOSIS — M25.60 RANGE OF MOTION DEFICIT: ICD-10-CM

## 2020-10-22 PROCEDURE — 97530 THERAPEUTIC ACTIVITIES: CPT

## 2020-10-22 NOTE — PROGRESS NOTES
"                            Occupational Therapy Daily Treatment Note     Date: 10/22/2020  Name: Andre Padgett  Clinic Number: [id number]    Therapy Diagnosis:   Encounter Diagnosis   Name Primary?    Range of motion deficit      Physician: Ino George NP     Physician Orders: Work on gentle ROM of bilateral wrist, fingers.  No weight bearing in either hand for now.  Medical Diagnosis: S52.91XD,S52.92XD (ICD-10-CM) - Closed fracture of both radii with routine healing, subsequent encounter      Surgical Procedure and Date:    09/14/2020     Open reduction internal fixation of comminuted intra-articular bilateral distal radius fractures, 3 or more parts each       Evaluation Date: 10/9/2020  Insurance: Medicaid  Insurance Authorization period Expiration: 12/31/2020      Plan of Care Certification Period: 10/9/2020 to 12/9/2020     Visit # / Visits Authortized: 5 / 20  Time In: 2:06 pm  Time Out: 2:59 pm   Total Billable Time: 43 minutes     Precautions: Standard    Subjective     Pt reports: "I tried to bathe myself on my own yesterday, but I'm not quite there."   he was compliant with home exercise program given last session.   Response to previous treatment: increased motivation following education on progress   Functional change: able to don shoes and tie shoelaces, able to open backdoor of home     Pain: 0/10  Location: N/A    Objective   Edema. Measured in centimeters.    10/9/2020 10/9/2020 10/19/2020 10/19/2020     Left Right Left Right   Proximal Wrist Crease 19.5 20 18.5 20.5   Mid palm 22.4 24 20.4 20.5   MCPs 21 22.5 19.8 21.5      Hand ROM. Measured in degrees/cm.    10/9/2020 10/9/2020 10/19/2020   10/19/2020       Left Right Left Right             Index: DPC 5 7 2 cm  3.5 cm   MP Extension 0 30 0 0             Long:  DPC 5 8 3.5 cm 4 cm   MP Ext 0 30 0 0             Ring:   DPC 5 8 4 cm 4 cm   MP Ext 0 15 0 0             Small:  DPC 5 6 3.5 cm 3.5 cm   MP Ext 0 10 0 0             Thumb: DPC 7 8 " touch 2 cm      Wrist ROM  Date 10/9/2020 10/9/2020 10/19/2020   10/19/2020       Left Right Left Right   Supination/Pronation -35/80 -60/70 0/85 0/85   Wrist ext/flex 0/10 -10/15 45/25 25/30   Wrist RD/UD 5/5 5/5 15/10 10/15     Sensation:  Deferred      Strength (Dyanmometer) and Pinch Strength (Pinch Gauge)  Measured in pounds and psi. Average of three trials.    10/9/2020 10/9/2020     Left Right   Rung II def def   Hensley Pinch def def   3pt Pinch def def   2pt Pinch def def     Andre received the following supervised modalities after being cleared for contradictions for 10 minutes:   -Patient received MH x 10 min to bl hands to increase blood flow, circulation and tissue elasticity prior to therex    Andre received therapeutic activities for 43 minutes including:  -AROM as follows: thumb IP block, pinky slides, jt blocking to small PIP/DIP, TGES, finger ext, finger abd/add, wrist ext/flex, RD/UD, sup/pron x 10 each; blue wheel pron/sup and flex/ext x 2 minutes each; PRE wrist in neutral position with large pipe for flex/ext/ulnar-radial deviation x 10 reps, each direction.   -debrided all eschar off right wrist/foream, no drainage noted following debridement    Home Exercises and Education Provided     Education provided:   - Progress towards goals: Good     Written Home Exercises Provided: Patient instructed to cont prior HEP.  Exercises were reviewed and Andre was able to demonstrate them prior to the end of the session.  Andre demonstrated good  understanding of the education provided.     See EMR under Patient Instructions for exercises provided initial evaluation .     Additional Education provided: Issued Tubigrip D for left arm, Tubigrip E and D for right arm for edema management.    Andre demonstrated good  understanding of the education provided.     Assessment   Andre Padgett is a 50 y.o. male referred to outpatient occupational/hand therapy and presents with a medical diagnosis of BL distal  radius fxs s/p ORIF on 9/14/2020, resulting in pain, edema, decreased range of and decreased functional use of both hands. Pt is s/p Sx 5 weeks, 3 days. Advanced light activities with no difficulty and continues with good tolerance to AROM exercises today. Pt is motivated and participated well in therapy. Pt's main complaint is stiffness in R wrist.     Pt would continue to benefit from skilled OT.      Andre is progressing well towards his goals and there are no updates to goals at this time. Pt prognosis is Good.     Pt will continue to benefit from skilled outpatient occupational therapy to address the deficits listed in the problem list on initial evaluation provide pt/family education and to maximize pt's level of independence in the home and community environment.     Anticipated barriers to occupational therapy: bilateral wrist fractures    Pt's spiritual, cultural and educational needs considered and pt agreeable to plan of care and goals.    Goals:  Short Term (4 weeks on 11/9/2020):  1)   Patient to be IND with HEP and modalities for pain management.- Met 10/14/2020  2)   Increase GARZA of all finger by 3 cm  degrees for finger flexion  to increase functional hand use for grasping.-progressing  3)   Measure /pinch strength at 6 weeks post op.-progressing  4)   Decrease edema .2-.3 cm to increase joint mobility /flexibility for improved overall functional hand use. -progressing  5)   Decrease complaints of pain to  3 out of 10 to increase functional hand use for ADL/work/leisure activities.-progressing  6) Pt will be independent with dressing and feeding himself.-progressing     Long Term (by discharge):  1)   Pt will report 1 out of 10 pain with BL hand use.-progressing  2)   Patient to score at CK on FOTO to demonstrate improved perception of functional BL hand use.-progressing  3)   Pt will return to prior level of function for ADLs and household management.-progressing       Plan   Certification  Period/Plan of care expiration: 10/9/2020 to 12/9/2020.     Outpatient Occupational Therapy 2 times weekly for 8 weeks may include the following interventions: Paraffin, Fluidotherapy, Manual therapy/joint mobilizations, Modalities for pain management, US 3 mhz, Therapeutic exercises/activities., Strengthening, Orthotic Fabrication/Fit/Training, Edema Control and Scar Management.     Discussed Plan of Care with patient: Yes  Updates/Grading for next session: Advance as tolerated      Yuliya Matos, OT

## 2020-10-26 ENCOUNTER — HOSPITAL ENCOUNTER (OUTPATIENT)
Dept: RADIOLOGY | Facility: HOSPITAL | Age: 50
Discharge: HOME | End: 2020-10-26
Attending: NURSE PRACTITIONER
Payer: MEDICAID

## 2020-10-26 ENCOUNTER — OFFICE VISIT (OUTPATIENT)
Dept: ORTHOPEDICS | Facility: CLINIC | Age: 50
End: 2020-10-26
Payer: MEDICAID

## 2020-10-26 ENCOUNTER — PATIENT OUTREACH (OUTPATIENT)
Dept: ADMINISTRATIVE | Facility: OTHER | Age: 50
End: 2020-10-26

## 2020-10-26 VITALS
HEART RATE: 100 BPM | DIASTOLIC BLOOD PRESSURE: 77 MMHG | SYSTOLIC BLOOD PRESSURE: 112 MMHG | BODY MASS INDEX: 24.94 KG/M2 | HEIGHT: 70 IN | WEIGHT: 174.19 LBS

## 2020-10-26 DIAGNOSIS — Z98.890 POST-OPERATIVE STATE: ICD-10-CM

## 2020-10-26 DIAGNOSIS — S52.92XD CLOSED FRACTURE OF BOTH RADII WITH ROUTINE HEALING, SUBSEQUENT ENCOUNTER: Primary | ICD-10-CM

## 2020-10-26 DIAGNOSIS — S52.91XD CLOSED FRACTURE OF BOTH RADII WITH ROUTINE HEALING, SUBSEQUENT ENCOUNTER: Primary | ICD-10-CM

## 2020-10-26 DIAGNOSIS — M25.532 PAIN IN BOTH WRISTS: ICD-10-CM

## 2020-10-26 DIAGNOSIS — M25.531 PAIN IN BOTH WRISTS: ICD-10-CM

## 2020-10-26 DIAGNOSIS — M25.532 PAIN IN BOTH WRISTS: Primary | ICD-10-CM

## 2020-10-26 DIAGNOSIS — M25.531 PAIN IN BOTH WRISTS: Primary | ICD-10-CM

## 2020-10-26 PROCEDURE — 99214 OFFICE O/P EST MOD 30 MIN: CPT | Mod: PBBFAC,25 | Performed by: NURSE PRACTITIONER

## 2020-10-26 PROCEDURE — 99999 PR PBB SHADOW E&M-EST. PATIENT-LVL IV: CPT | Mod: PBBFAC,,, | Performed by: NURSE PRACTITIONER

## 2020-10-26 PROCEDURE — 73110 XR WRIST COMPLETE 3 VIEWS BILATERAL: ICD-10-PCS | Mod: 26,50,, | Performed by: RADIOLOGY

## 2020-10-26 PROCEDURE — 73110 X-RAY EXAM OF WRIST: CPT | Mod: 26,50,, | Performed by: RADIOLOGY

## 2020-10-26 PROCEDURE — 73110 X-RAY EXAM OF WRIST: CPT | Mod: TC,50

## 2020-10-26 PROCEDURE — 99024 POSTOP FOLLOW-UP VISIT: CPT | Mod: ,,, | Performed by: NURSE PRACTITIONER

## 2020-10-26 PROCEDURE — 99999 PR PBB SHADOW E&M-EST. PATIENT-LVL IV: ICD-10-PCS | Mod: PBBFAC,,, | Performed by: NURSE PRACTITIONER

## 2020-10-26 PROCEDURE — 99024 PR POST-OP FOLLOW-UP VISIT: ICD-10-PCS | Mod: ,,, | Performed by: NURSE PRACTITIONER

## 2020-10-26 NOTE — PROGRESS NOTES
Care Everywhere: updated  Immunization: updated(delay in links)   Health Maintenance: updated  Media Review:  Legacy Review:   Order placed:   Upcoming appts:

## 2020-10-26 NOTE — PROGRESS NOTES
Mr. Padgett is here today for a post-operative visit after undergoing an ORIF for his comminuted intra-articular bilateral distal radius fracture by Dr. Thorne on 9/14/2020.  He was last seen by me on 10/8/2020.  He reports he has been going to therapy.  He feels his left wrist is progressing more than his right.  He is wanting to know when he can start driving as he is going through a divorce and is ex-spouse is wanting him to drive their daughter to school.      Interval History:  He reports that he is doing ok, no pain currently.  He is going to [facility].  He reports he is doing his HEP.  He reports improved ROM at his finger L>R.  No numbness or tingling sensation.  No falls or injures.  Reports swelling has improved with elevation and therapy.    He is not taking pain medication.  He is getting assistance in the home but has to pay out of pocket.  He would like to return to work but understands his wrists have to get better.  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   He denies fever, chills, and sweats since the time of the surgery.      Physical exam:  Velcro wrist splints removed.  Incisions to bilateral wrist are healing well.  There is no redness or drainage present.  His right hand is less swollen than when last seen.  He has improved ROM of his fingers, still has difficulty with right thumb to pinky finger.  ROM of wrist is 5 degrees in all planes bilaterally.  He has intact sensation to the medial and lateral aspects of both arms.  Cap refill is < 3 secs.      RADS: Bilateral wrist x-rays were obtained and personally reviewed by me hardware to bilateral distal radius fractures are in good position.  Fractures to bilateral distal radius' are in good position and alignment.  No new fractures seen.    Assessment:  Post-op visit (6 weeks)    Plan:    ICD-10-CM ICD-9-CM   1. Closed fracture of both radii with routine healing, subsequent encounter  S52.91XD V54.12     S52.92XD    2. Post-operative state  Z98.890 V45.89     Current care, treatment plan, precautions, activity level/ modifications, limitations, rehabilitation exercises and proposed future treatment were discussed with the patient. We discussed the need to monitor for changes in symptoms and condition and report them to the physician.  Discussed importance of compliance with all appointments and follow up examinations.       - Pain medication: refill was not needed,   - Pain medication refill policy provided to patient for review, yes  - Patient is to return to clinic in 6 weeks as scheduled.  - At time x-ray of his bilateral wrist x-rays is needed.  - [facility], will allow him to start weight bearing activities at 2 lbs and then increase by 2 lbs/month thereafter as he tolerates.  - Advised no driving for now, may consider at next follow up.  - Ok to remove splints when at home to do HEP including ROM of fingers, wrist and elbow.  Should wear splints when ambulatory.  - Call for questions or concerns.     If there are any questions prior to scheduled follow up, the patient was instructed to contact the office

## 2020-10-27 ENCOUNTER — HOSPITAL ENCOUNTER (OUTPATIENT)
Dept: RADIOLOGY | Facility: HOSPITAL | Age: 50
Discharge: HOME | End: 2020-10-27
Attending: INTERNAL MEDICINE
Payer: MEDICAID

## 2020-10-27 ENCOUNTER — OFFICE VISIT (OUTPATIENT)
Dept: NEUROSURGERY | Facility: CLINIC | Age: 50
End: 2020-10-27
Payer: MEDICAID

## 2020-10-27 VITALS
WEIGHT: 170.63 LBS | HEIGHT: 70 IN | SYSTOLIC BLOOD PRESSURE: 107 MMHG | DIASTOLIC BLOOD PRESSURE: 74 MMHG | HEART RATE: 94 BPM | BODY MASS INDEX: 24.43 KG/M2

## 2020-10-27 DIAGNOSIS — I67.1 CEREBRAL ANEURYSM, NONRUPTURED: ICD-10-CM

## 2020-10-27 DIAGNOSIS — Z86.79 HISTORY OF NONTRAUMATIC RUPTURE OF CEREBRAL ANEURYSM: ICD-10-CM

## 2020-10-27 PROCEDURE — 70544 MRA BRAIN WITHOUT CONTRAST: ICD-10-PCS | Mod: 26,,, | Performed by: RADIOLOGY

## 2020-10-27 PROCEDURE — 70544 MR ANGIOGRAPHY HEAD W/O DYE: CPT | Mod: TC

## 2020-10-27 PROCEDURE — 99213 OFFICE O/P EST LOW 20 MIN: CPT | Mod: S$PBB,,, | Performed by: PHYSICIAN ASSISTANT

## 2020-10-27 PROCEDURE — 99999 PR PBB SHADOW E&M-EST. PATIENT-LVL IV: ICD-10-PCS | Mod: PBBFAC,,, | Performed by: PHYSICIAN ASSISTANT

## 2020-10-27 PROCEDURE — 99214 OFFICE O/P EST MOD 30 MIN: CPT | Mod: PBBFAC,25 | Performed by: PHYSICIAN ASSISTANT

## 2020-10-27 PROCEDURE — 99213 PR OFFICE/OUTPT VISIT, EST, LEVL III, 20-29 MIN: ICD-10-PCS | Mod: S$PBB,,, | Performed by: PHYSICIAN ASSISTANT

## 2020-10-27 PROCEDURE — 70544 MR ANGIOGRAPHY HEAD W/O DYE: CPT | Mod: 26,,, | Performed by: RADIOLOGY

## 2020-10-27 PROCEDURE — 99999 PR PBB SHADOW E&M-EST. PATIENT-LVL IV: CPT | Mod: PBBFAC,,, | Performed by: PHYSICIAN ASSISTANT

## 2020-10-27 NOTE — PROGRESS NOTES
Neurosurgery  Established Patient    SUBJECTIVE:     History of Present Illness:  Patient is a 50 year old male with PMH of coiled Acomm aneurysm (s/p coiling in 2011) who presents 6 weeks s/p fall causing bilateral colles fractures and subarachnoid hemorrhage. He was standing on a stool trying to catch a lizard off the wall when he fell off the stool onto both hands out stretched. CT head done in the hospital showed small L sylvian fissure and R parietal SAH. MRA in hospital was negative for residual aneurysm filling.   Patient denies any headaches or changes in vision at this time. Denies weakness or numbness. Both forearms in cast and brace. MRA w/out contrast done this morning. Patient refused MRA w/ contrast due to fear of passing out from being stuck by a needle. Denies use of blood thinners.     Review of patient's allergies indicates:  No Known Allergies    Current Outpatient Medications   Medication Sig Dispense Refill    ALPRAZolam (XANAX) 1 MG tablet Take 0.5 tablets (0.5 mg total) by mouth 2 (two) times daily as needed for Anxiety.      ascorbic acid (VITAMIN C) 100 MG tablet Take 100 mg by mouth once daily.      atorvastatin (LIPITOR) 40 MG tablet TAKE 1 TABLET(40 MG) BY MOUTH EVERY DAY 90 tablet 3    fluticasone propionate (FLONASE) 50 mcg/actuation nasal spray 2 sprays (100 mcg total) by Each Nostril route once daily. 11.1 mL 1    loratadine (CLARITIN) 10 mg tablet Take 1 tablet (10 mg total) by mouth once daily. 30 tablet 3    melatonin (MELATIN) 3 mg tablet Take 2 tablets (6 mg total) by mouth nightly as needed for Insomnia.  0    blood pressure monitor (BLOOD PRESSURE KIT) Kit Monitor Blood Pressure daily. (Patient not taking: Reported on 10/27/2020) 1 each 0    carvediloL (COREG) 12.5 MG tablet Take 1 tablet (12.5 mg total) by mouth 2 (two) times daily. (Patient not taking: Reported on 10/27/2020)      diclofenac sodium (VOLTAREN) 1 % Gel Apply 2 g topically 4 (four) times daily as  needed. (Patient not taking: Reported on 10/27/2020) 1 Tube 1    hydrALAZINE (APRESOLINE) 25 MG tablet Take 1 tablet (25 mg total) by mouth every 8 (eight) hours as needed (SBP > 160). (Patient not taking: Reported on 10/27/2020)      lidocaine (LIDODERM) 5 % Place 2 patches onto the skin once daily. Remove & Discard patch within 12 hours or as directed by MD (Patient not taking: Reported on 10/27/2020)  0    oxyCODONE (OXYCONTIN) 20 mg 12 hr tablet Take 1 tablet (20 mg total) by mouth every 12 (twelve) hours. (Patient not taking: Reported on 10/27/2020)  0    oxyCODONE (ROXICODONE) 10 mg Tab immediate release tablet Take 1 tablet (10 mg total) by mouth every 4 (four) hours as needed for Pain. (Patient not taking: Reported on 10/27/2020) 42 tablet 0    pantoprazole (PROTONIX) 40 MG tablet Take 1 tablet (40 mg total) by mouth once daily. (Patient not taking: Reported on 10/27/2020) 30 tablet 11    senna-docusate 8.6-50 mg (PERICOLACE) 8.6-50 mg per tablet Take 1 tablet by mouth 2 (two) times daily. (Patient not taking: Reported on 10/27/2020)      tamsulosin (FLOMAX) 0.4 mg Cap Take 1 capsule (0.4 mg total) by mouth once daily. (Patient not taking: Reported on 10/27/2020) 30 capsule 11     No current facility-administered medications for this visit.        Past Medical History:   Diagnosis Date    Aneurysm     Right sided filling anterior communicating aneurysm s/p coiling 2011    Anxiety     Cerebral aneurysm, nonruptured 12/27/2016    Colon adenoma: 3/18 repeat colonoscpy 2023 6/22/2018    Diverticulosis of large intestine without hemorrhage: see colonoscopy 3/18; sigmoid and descending colon 6/22/2018    Fatty liver: see CT 3/18 6/22/2018    Umbilical hernia without obstruction and without gangrene: see CT 3/18 6/22/2018     Past Surgical History:   Procedure Laterality Date    CEREBRAL ANGIOGRAM  2011    angiogram/coiling    COLONOSCOPY N/A 3/26/2018    Procedure: COLONOSCOPY;  Surgeon: Sanjiv DE LA CRUZ  MD Renee;  Location: [facility] (4TH FLR);  Service: Endoscopy;  Laterality: N/A;    ORIF FOREARM FRACTURE Bilateral 9/14/2020    Procedure: ORIF, FRACTURE, RADIUS OR ULNA, synthes, right, large C arm at a diagonal from the rear of the room, ancef, velcro wrist splint;  Surgeon: Hao Thorne MD;  Location: [facility] OR 2ND FLR;  Service: Orthopedics;  Laterality: Bilateral;     Family History     Problem Relation (Age of Onset)    Brain cancer Father    Diabetes Mother    DAVID disease Mother    Hypertension Mother    No Known Problems Daughter, Sister    Thyroid disease Sister        Social History     Socioeconomic History    Marital status: Legally      Spouse name: Not on file    Number of children: Not on file    Years of education: Not on file    Highest education level: Not on file   Occupational History    Not on file   Social Needs    Financial resource strain: Not hard at all    Food insecurity     Worry: Never true     Inability: Never true    Transportation needs     Medical: No     Non-medical: No   Tobacco Use    Smoking status: Never Smoker    Smokeless tobacco: Never Used   Substance and Sexual Activity    Alcohol use: Not Currently     Comment: ocassionally - twice a week    Drug use: No    Sexual activity: Not on file   Lifestyle    Physical activity     Days per week: 7 days     Minutes per session: 40 min    Stress: To some extent   Relationships    Social connections     Talks on phone: More than three times a week     Gets together: Twice a week     Attends [employer] service: Not on file     Active member of club or organization: Yes     Attends meetings of clubs or organizations: 1 to 4 times per year     Relationship status:    Other Topics Concern    Not on file   Social History Narrative    Not on file       Review of Systems   Constitutional: Negative for chills and fever.   HENT: Negative for rhinorrhea and trouble swallowing.    Eyes: Negative for  "photophobia and visual disturbance.   Respiratory: Negative for cough and shortness of breath.    Cardiovascular: Negative for chest pain and palpitations.   Gastrointestinal: Negative for nausea and vomiting.   Genitourinary: Negative for difficulty urinating and dysuria.   Musculoskeletal: Negative for back pain, gait problem and neck pain.   Skin: Negative for rash and wound.   Neurological: Negative for tremors, seizures, speech difficulty, weakness, light-headedness, numbness and headaches.   Hematological: Negative for adenopathy. Does not bruise/bleed easily.   Psychiatric/Behavioral: Negative for behavioral problems and confusion.       OBJECTIVE:     Vital Signs  Pulse: 94  BP: 107/74  Pain Score:   3  Height: 5' 10" (177.8 cm)  Weight: 77.4 kg (170 lb 10.2 oz)  Body mass index is 24.48 kg/m².    Neurosurgery Physical Exam  General: well developed, well nourished, no distress.   Head: normocephalic, atraumatic  Neck: No tracheal deviation.   Neurologic: Alert and oriented. Thought content appropriate.  GCS: Motor: 6/Verbal: 5/Eyes: 4 GCS Total: 15  Mental Status: Awake, Alert, Oriented x 4  Language: No aphasia  Speech: No dysarthria  Cranial nerves: face symmetric, tongue midline, CN II-XII grossly intact.   Eyes: pupils equal, round, reactive to light with accomodation, EOMI.     Sensory: intact to light touch throughout  Motor Strength: Moves all extremities spontaneously with good tone.  Full lower extremities. No abnormal movements seen. Antigravity upper extremities exam limited by casting of both forearms. No focal deficits.     Pronator Drift: no drift noted  Skin: Skin is warm, dry and intact. Cast and brace in place bilaterally on both forearms to hands.  Gait: normal                  Diagnostic Results:  MRA brain w/out contrast 10/27 was independently reviewed by myself and Dr. Valente, along with the associated radiology report. Shows previously coiled Acomm aneurysm with 1-2mm of residual " filling at the base, which appears similar to last angiogram completed in 2016. No visualized filling or recurrence at the dome or lateral aspects of the aneurysm, however, study limited secondary to lack of contrast. No other abnormalities noted.     ASSESSMENT/PLAN:     Mr. Powell is a 50 year old male with history of aneurysm who presents to the clinic 6 weeks s/p mechanical fall in which he sustained small traumatic subarachnoid hemorrhage and bilateral colles fracture. Neuro exam WNL.     Hx of coiled Acomm aneurysm:  -MRA w/ and w/out contrast in 1 year   -F/u in 1 year       Carolann Dumont PA-C  Neurosurgery  [facility]

## 2020-10-30 ENCOUNTER — CLINICAL SUPPORT (OUTPATIENT)
Dept: REHABILITATION | Facility: HOSPITAL | Age: 50
End: 2020-10-30
Attending: INTERNAL MEDICINE
Payer: MEDICAID

## 2020-10-30 DIAGNOSIS — M25.60 RANGE OF MOTION DEFICIT: ICD-10-CM

## 2020-10-30 PROCEDURE — 97530 THERAPEUTIC ACTIVITIES: CPT

## 2020-10-30 NOTE — PROGRESS NOTES
"                            Occupational Therapy Daily Treatment Note     Date: 10/30/2020  Name: Andre Padgett  Clinic Number: [id number]    Therapy Diagnosis:   Encounter Diagnosis   Name Primary?    Range of motion deficit      Physician: Ino George NP     Physician Orders: Work on gentle ROM of bilateral wrist, fingers.  No weight bearing in either hand for now.  Medical Diagnosis: S52.91XD,S52.92XD (ICD-10-CM) - Closed fracture of both radii with routine healing, subsequent encounter      Surgical Procedure and Date:    09/14/2020     Open reduction internal fixation of comminuted intra-articular bilateral distal radius fractures, 3 or more parts each       Evaluation Date: 10/9/2020  Insurance: Medicaid  Insurance Authorization period Expiration: 12/31/2020      Plan of Care Certification Period: 10/9/2020 to 12/9/2020     Visit # / Visits Authortized: 6 / 20  Time In: 1:54 pm  Time Out: 2:37 pm   Total Billable Time: 43 minutes  Pt arrived 24 minutes late to outpatient OT appointment due to traffic.     Precautions: Standard    Subjective     Pt reports: "I'm still having that numbness in the tips of my thumbs. When is that going to go away?"   he was compliant with home exercise program given last session.   Response to previous treatment: Good  Functional change: able to button shirt     Pain: 0/10  Location: N/A    Objective   Edema. Measured in centimeters.    10/9/2020 10/9/2020 10/19/2020 10/19/2020     Left Right Left Right   Proximal Wrist Crease 19.5 20 18.5 20.5   Mid palm 22.4 24 20.4 20.5   MCPs 21 22.5 19.8 21.5      Hand ROM. Measured in degrees/cm.    10/9/2020 10/9/2020 10/19/2020 10/19/2020     Left Right Left Right             Index: DPC 5 7 2 cm  3.5 cm   MP Extension 0 30 0 0             Long:  DPC 5 8 3.5 cm 4 cm   MP Ext 0 30 0 0             Ring:   DPC 5 8 4 cm 4 cm   MP Ext 0 15 0 0             Small:  DPC 5 6 3.5 cm 3.5 cm   MP Ext 0 10 0 0             Thumb: DPC 7 8 touch 2 " cm      Wrist ROM  Date 10/9/2020 10/9/2020 10/19/2020 10/19/2020     Left Right Left Right   Supination/Pronation -35/80 -60/70 0/85 0/85   Wrist ext/flex 0/10 -10/15 45/25 25/30   Wrist RD/UD 5/5 5/5 15/10 10/15     Sensation:  Deferred      Strength (Dyanmometer) and Pinch Strength (Pinch Gauge)  Measured in pounds and psi. Average of three trials.    10/9/2020 10/9/2020     Left Right   Rung II def def   Hensley Pinch def def   3pt Pinch def def   2pt Pinch def def        CMS Impairment/Limitation/Restriction for FOTO Wrist Survey     Therapist reviewed FOTO scores for Andre Padgett on 10/30/2020.   FOTO documents entered into "[employer] - see Media section.     Limitation Score: 72%  Category: Carrying     Current : CL = least 60% but < 80% impaired, limited or restricted  Goal: CK = at least 40% but < 60% impaired, limited or restricted            Andre received the following supervised modalities after being cleared for contradictions for 8 minutes:   -Patient received MH x 8 min to R wrist and grasps with small dowel during length of heating and fluidotherapy to L wrist/hand x 8 min to increase blood flow, circulation and tissue elasticity. Pt instructed on performing AROM exercises while receiving heat to increase active motion.     Andre received therapeutic activities for 35 minutes including:  -AROM as follows: wrist ext/flex, sup/pron x 10 each; wrist dextericiser x 2 minutes  -PROM as follows: wrist flex/ext, intrinsic stretch, composite flexion, prayer stretch 3 x 30 second holds  -Light strengthening with pink sponge 30 reps x 3 second holds   -Steri strips removed from R volar wrist, no discharge noted.     Pt arrived 24 minutes late to outpatient OT appointment. Due to limited time, all planned therapeutic activities not achieved.    Home Exercises and Education Provided     Education provided:   - PROM as follows, added to HEP: wrist flex/ext, intrinsic stretch, composite flexion, prayer stretch 3 x  30 second holds, 2-3 x a day.  - Light strengthening with pink sponge 30 reps x 3 second holds, 2 x a day.   - Progress towards goals: Good     Written Home Exercises Provided: Patient instructed to cont prior HEP.  Exercises were reviewed and Andre was able to demonstrate them prior to the end of the session.  Andre demonstrated good  understanding of the education provided.     See EMR under Patient Instructions for exercises provided initial evaluation .     Additional Education provided: Issued Tubigrip D to left and right arm for edema management.    Andre demonstrated good  understanding of the education provided.     Assessment   Andre Padgett is a 50 y.o. male referred to outpatient occupational/hand therapy and presents with a medical diagnosis of BL distal radius fxs s/p ORIF on 9/14/2020, resulting in pain, edema, decreased range of and decreased functional use of both hands. Pt is s/p Sx 6 weeks, 4 days. Pt continues to advance with light activities and light gripping with mild difficulty. He states he performs HEP with good compliance and is now able to button shirts. However, in therapy pt continues to appear very guarded with posture and position of BUEs, seeming to contribute to stiffness in B/L wrists. Pt's main complaint is stiffness in R wrist and numbness in tips of both thumbs.     Pt would continue to benefit from skilled OT.      Andre is progressing well towards his goals and there are no updates to goals at this time. Pt prognosis is Good     Pt will continue to benefit from skilled outpatient occupational therapy to address the deficits listed in the problem list on initial evaluation provide pt/family education and to maximize pt's level of independence in the home and community environment.     Anticipated barriers to occupational therapy: bilateral wrist fractures    Pt's spiritual, cultural and educational needs considered and pt agreeable to plan of care and  goals.    Goals:  Short Term (4 weeks on 11/9/2020):  1)   Patient to be IND with HEP and modalities for pain management.- Met 10/14/2020  2)   Increase GARZA of all finger by 3 cm  degrees for finger flexion  to increase functional hand use for grasping.-progressing  3)   Measure /pinch strength at 6 weeks post op.-progressing  4)   Decrease edema .2-.3 cm to increase joint mobility /flexibility for improved overall functional hand use. -progressing  5)   Decrease complaints of pain to  3 out of 10 to increase functional hand use for ADL/work/leisure activities.-progressing  6) Pt will be independent with dressing and feeding himself.-progressing     Long Term (by discharge):  1)   Pt will report 1 out of 10 pain with BL hand use.-progressing  2)   Patient to score at CK on FOTO to demonstrate improved perception of functional BL hand use.-progressing  3)   Pt will return to prior level of function for ADLs and household management.-progressing       Plan   Certification Period/Plan of care expiration: 10/9/2020 to 12/9/2020.     Outpatient Occupational Therapy 2 times weekly for 8 weeks may include the following interventions: Paraffin, Fluidotherapy, Manual therapy/joint mobilizations, Modalities for pain management, US 3 mhz, Therapeutic exercises/activities., Strengthening, Orthotic Fabrication/Fit/Training, Edema Control and Scar Management.     Discussed Plan of Care with patient: Yes  Updates/Grading for next session: Update edema and AROM measurements as able    Yuliya Matos OT

## 2020-10-30 NOTE — PATIENT INSTRUCTIONS
[facility]  OCCUPATIONAL THERAPY  HOME EXERCISE PROGRAM         Sitting with elbows on table and palms together, slowly lower wrists toward table until stretch is felt. Be sure to keep palms together throughout stretch. Hold 30 seconds. Relax.  Repeat 3 times. Do 2-3 sessions per day.  Copyright © I. All rights reserved.           And perform full fist stretches.  3 x 30 second holds, 2-3 x a day.   Therapist: Yuliya Matos OT

## 2020-11-02 ENCOUNTER — CLINICAL SUPPORT (OUTPATIENT)
Dept: REHABILITATION | Facility: HOSPITAL | Age: 50
End: 2020-11-02
Payer: MEDICAID

## 2020-11-02 DIAGNOSIS — M25.60 RANGE OF MOTION DEFICIT: ICD-10-CM

## 2020-11-02 PROCEDURE — 97530 THERAPEUTIC ACTIVITIES: CPT

## 2020-11-02 NOTE — PROGRESS NOTES
"                            Occupational Therapy Daily Treatment Note     Date: 11/2/2020  Name: Andre Padgett  Clinic Number: [id number]    Therapy Diagnosis:   Encounter Diagnosis   Name Primary?    Range of motion deficit      Physician: Ino George NP     Physician Orders: Work on gentle ROM of bilateral wrist, fingers.  No weight bearing in either hand for now.  Medical Diagnosis: S52.91XD,S52.92XD (ICD-10-CM) - Closed fracture of both radii with routine healing, subsequent encounter      Surgical Procedure and Date:    09/14/2020     Open reduction internal fixation of comminuted intra-articular bilateral distal radius fractures, 3 or more parts each       Evaluation Date: 10/9/2020  Insurance: Medicaid  Insurance Authorization period Expiration: 12/31/2020      Plan of Care Certification Period: 10/9/2020 to 12/9/2020     Visit # / Visits Authortized: 7/ 20  Time In: 4:50 pm  Time Out: 5:35 pm   Total Billable Time: 45  minutes       Precautions: Standard    Subjective     Pt reports: "I'm still having that numbness in the tips of my thumbs."   he was compliant with home exercise program given last session.   Response to previous treatment: Good  Functional change: able to put shoes on and tie them    Pain: 0/10  Location: N/A    Objective   Edema. Measured in centimeters.    10/9/2020 10/9/2020 10/19/2020 10/19/2020 11/2/2020 11/2/2020     Left Right Left Right Left Right   Proximal Wrist Crease 19.5 20 18.5 20.5 18.3 19   Mid palm 22.4 24 20.4 20.5 20 20.5   MCPs 21 22.5 19.8 21.5 20.8 21      Hand ROM. Measured in degrees/cm.    10/9/2020 10/9/2020 10/19/2020 10/19/2020 11/2/2020 11/2/2020     Left Right Left Right Left Right               Index: DPC 5 7 2 cm  3.5 cm touch 2 cm   MP Extension 0 30 0 0 0 0               Long:  DPC 5 8 3.5 cm 4 cm touch 2 cm   MP Ext 0 30 0 0 0 0               Ring:   DPC 5 8 4 cm 4 cm touch 3 cm   MP Ext 0 15 0 0 0 0               Small:  DPC 5 6 3.5 cm 3.5 cm " touch 2 cm   MP Ext 0 10 0 0 0 0               Thumb: DPC 7 8 touch 2 cm touch .5 cm      Wrist ROM  Date 10/9/2020 10/9/2020 10/19/2020 10/19/2020 11/2/2020 11/2/2020     Left Right Left Right Left Right   Supination/Pronation -35/80 -60/70 0/85 0/85 15/85 35/85   Wrist ext/flex 0/10 -10/15 45/25 25/30 40/30 30/30   Wrist RD/UD 5/5 5/5 15/10 10/15 15/15 20/15     Sensation:  Deferred      Strength (Dyanmometer) and Pinch Strength (Pinch Gauge)  Measured in pounds and psi. Average of three trials.    10/9/2020 10/9/2020     Left Right   Rung II def def   Hensley Pinch def def   3pt Pinch def def   2pt Pinch def def     Andre received therapeutic activities for 45 minutes including:  -Patient received fluido x 10 min to BL wrist while performing AROM ex   -A/AAROM as follows: TGEs, thumb opposition, finger abd/add, finger ext, wrist ext/flex, RD/UD and  sup/pron x 10 reps each BL hands  -Prayer stretch x 10 reps        Home Exercises and Education Provided     Education provided:   - none today  - Progress towards goals: Good     Written Home Exercises Provided: Patient instructed to cont prior HEP.  Exercises were reviewed and Andre was able to demonstrate them prior to the end of the session.  Andre demonstrated good  understanding of the education provided.     See EMR under Patient Instructions for exercises provided initial evaluation .     Additional Education provided: Issued Tubigrip D to left and right arm for edema management.    Andre demonstrated good  understanding of the education provided.     Assessment   Andre Padgett is a 50 y.o. male referred to outpatient occupational/hand therapy and presents with a medical diagnosis of BL distal radius fxs s/p ORIF on 9/14/2020, resulting in pain, edema, decreased range of and decreased functional use of both hands. Pt is s/p 7 weeks ORIF to BL wrists. Pt continues to make slow steady progress with improving his AROM in BL hands/wrists. Edema has decreased  in both hands/wrists.  He states he performs HEP with good compliance and is now able to stalin and tie shoes.      Pt would continue to benefit from skilled OT.      Andre is progressing well towards his goals and there are no updates to goals at this time. Pt prognosis is Good     Pt will continue to benefit from skilled outpatient occupational therapy to address the deficits listed in the problem list on initial evaluation provide pt/family education and to maximize pt's level of independence in the home and community environment.     Anticipated barriers to occupational therapy: bilateral wrist fractures    Pt's spiritual, cultural and educational needs considered and pt agreeable to plan of care and goals.    Goals:  Short Term (4 weeks on 11/9/2020):  1)   Patient to be IND with HEP and modalities for pain management.- Met 10/14/2020  2)   Increase GARZA of all finger by 3 cm  degrees for finger flexion  to increase functional hand use for grasping.- Met 11/2/2020  3)   Measure /pinch strength at 6 weeks post op.-progressing  4)   Decrease edema .2-.3 cm to increase joint mobility /flexibility for improved overall functional hand use. - Met 11/2/2020  5)   Decrease complaints of pain to  3 out of 10 to increase functional hand use for ADL/work/leisure activities.-progressing  6) Pt will be independent with dressing and feeding himself.-progressing     Long Term (by discharge):  1)   Pt will report 1 out of 10 pain with BL hand use.-progressing  2)   Patient to score at CK on FOTO to demonstrate improved perception of functional BL hand use.-progressing  3)   Pt will return to prior level of function for ADLs and household management.-progressing       Plan   Certification Period/Plan of care expiration: 10/9/2020 to 12/9/2020.     Outpatient Occupational Therapy 2 times weekly for 8 weeks may include the following interventions: Paraffin, Fluidotherapy, Manual therapy/joint mobilizations, Modalities for pain  management, US 3 mhz, Therapeutic exercises/activities., Strengthening, Orthotic Fabrication/Fit/Training, Edema Control and Scar Management.     Discussed Plan of Care with patient: Yes  Updates/Grading for next session: Update edema and AROM measurements as able    Stephanie Mason, OT

## 2020-11-04 ENCOUNTER — CLINICAL SUPPORT (OUTPATIENT)
Dept: REHABILITATION | Facility: HOSPITAL | Age: 50
End: 2020-11-04
Payer: MEDICAID

## 2020-11-04 DIAGNOSIS — M25.60 RANGE OF MOTION DEFICIT: ICD-10-CM

## 2020-11-04 PROCEDURE — 97530 THERAPEUTIC ACTIVITIES: CPT

## 2020-11-04 NOTE — PATIENT INSTRUCTIONS
[facility]  OCCUPATIONAL THERAPY  HOME EXERCISE PROGRAM     Complete the following strengthening program 1x/day.                   _            Stephanie SHARIF CHT  Certified Hand Therapist  Occupational Therapist

## 2020-11-04 NOTE — PROGRESS NOTES
"                            Occupational Therapy Daily Treatment Note     Date: 11/4/2020  Name: Andre Padgett  Clinic Number: [id number]    Therapy Diagnosis:   Encounter Diagnosis   Name Primary?    Range of motion deficit      Physician: Ino George NP     Physician Orders: Work on gentle ROM of bilateral wrist, fingers.  No weight bearing in either hand for now.  Medical Diagnosis: S52.91XD,S52.92XD (ICD-10-CM) - Closed fracture of both radii with routine healing, subsequent encounter      Surgical Procedure and Date:    09/14/2020     Open reduction internal fixation of comminuted intra-articular bilateral distal radius fractures, 3 or more parts each       Evaluation Date: 10/9/2020  Insurance: Medicaid  Insurance Authorization period Expiration: 12/31/2020      Plan of Care Certification Period: 10/9/2020 to 12/9/2020     Visit # / Visits Authortized: 8/ 20  Time In: 1:45 pm  Time Out:  2:35 pm   Total Billable Time: 50 minutes       Precautions: Standard    Subjective     Pt reports: "I'm still having that numbness in the tips of my thumbs." "I can write checks and feed myself with my right hand now."   he was compliant with home exercise program given last session.   Response to previous treatment: Good  Functional change: able to put shoes on and tie them    Pain: 0/10  Location: N/A    Objective   Edema. Measured in centimeters.    10/9/2020 10/9/2020 10/19/2020 10/19/2020 11/2/2020 11/2/2020     Left Right Left Right Left Right   Proximal Wrist Crease 19.5 20 18.5 20.5 18.3 19   Mid palm 22.4 24 20.4 20.5 20 20.5   MCPs 21 22.5 19.8 21.5 20.8 21      Hand ROM. Measured in degrees/cm.    10/9/2020 10/9/2020 10/19/2020 10/19/2020 11/2/2020 11/2/2020     Left Right Left Right Left Right               Index: DPC 5 7 2 cm  3.5 cm touch 2 cm   MP Extension 0 30 0 0 0 0               Long:  DPC 5 8 3.5 cm 4 cm touch 2 cm   MP Ext 0 30 0 0 0 0               Ring:   DPC 5 8 4 cm 4 cm touch 3 cm   MP Ext " 0 15 0 0 0 0               Small:  DPC 5 6 3.5 cm 3.5 cm touch 2 cm   MP Ext 0 10 0 0 0 0               Thumb: DPC 7 8 touch 2 cm touch .5 cm      Wrist ROM  Date 10/9/2020 10/9/2020 10/19/2020 10/19/2020 11/2/2020 11/2/2020     Left Right Left Right Left Right   Supination/Pronation -35/80 -60/70 0/85 0/85 15/85 35/85   Wrist ext/flex 0/10 -10/15 45/25 25/30 40/30 30/30   Wrist RD/UD 5/5 5/5 15/10 10/15 15/15 20/15     Sensation:  Deferred      Strength (Dyanmometer) and Pinch Strength (Pinch Gauge)  Measured in pounds and psi. Average of three trials.    10/9/2020 10/9/2020 11/4/2020 11/4/2020     Left Right Left Right   Rung II def def 22 12     Andre received therapeutic activities for 50 minutes including:  -Patient received fluido x 10 min to BL wrists while performing AROM ex   -A/AAROM as follows: TGEs, thumb opposition, finger abd/add, finger ext, wrist ext/flex, RD/UD and  sup/pron x 10 reps each BL hands  -Prayer stretch x 10 reps  -Wrist maze 3' each BL  -yellow putty: 2' molding, 2' grasping BL hands          Home Exercises and Education Provided     Education provided:   - none today  - Progress towards goals: Good     Written Home Exercises Provided: Patient instructed to cont prior HEP.  Exercises were reviewed and Andre was able to demonstrate them prior to the end of the session.  Andre demonstrated good  understanding of the education provided.     See EMR under Patient Instructions for exercises provided initial evaluation .     Additional Education provided: Issued Tubigrip D to left and right arm for edema management.    Andre demonstrated good  understanding of the education provided.     Assessment   Andre Padgett is a 50 y.o. male referred to outpatient occupational/hand therapy and presents with a medical diagnosis of BL distal radius fxs s/p ORIF on 9/14/2020, resulting in pain, edema, decreased range of and decreased functional use of both hands. Pt is s/p 7 weeks, 2 days ORIF  to BL wrists.  Pt is now able to feed himself with his right hand and write checks with his right hand. He is very motivated and participates well. Advanced with yellow putty with BL hands.    Pt would continue to benefit from skilled OT.      Andre is progressing well towards his goals and there are no updates to goals at this time. Pt prognosis is Good     Pt will continue to benefit from skilled outpatient occupational therapy to address the deficits listed in the problem list on initial evaluation provide pt/family education and to maximize pt's level of independence in the home and community environment.     Anticipated barriers to occupational therapy: bilateral wrist fractures    Pt's spiritual, cultural and educational needs considered and pt agreeable to plan of care and goals.    Goals:  Short Term (4 weeks on 11/9/2020):  1)   Patient to be IND with HEP and modalities for pain management.- Met 10/14/2020  2)   Increase GARZA of all finger by 3 cm  degrees for finger flexion  to increase functional hand use for grasping.- Met 11/2/2020  3)   Measure /pinch strength at 6 weeks post op.- Met 11/4/2020  4)   Decrease edema .2-.3 cm to increase joint mobility /flexibility for improved overall functional hand use. - Met 11/2/2020  5)   Decrease complaints of pain to  3 out of 10 to increase functional hand use for ADL/work/leisure activities.-progressing  6) Pt will be independent with dressing and feeding himself.- progressing     Long Term (by discharge):  1)   Pt will report 1 out of 10 pain with BL hand use.-Met 11/4/2020  2)   Patient to score at CK on FOTO to demonstrate improved perception of functional BL hand use.-progressing  3)   Pt will return to prior level of function for ADLs and household management.-progressing       Plan:    Certification Period/Plan of care expiration: 10/9/2020 to 12/9/2020.     Outpatient Occupational Therapy 2 times weekly for 8 weeks may include the following  interventions: Paraffin, Fluidotherapy, Manual therapy/joint mobilizations, Modalities for pain management, US 3 mhz, Therapeutic exercises/activities., Strengthening, Orthotic Fabrication/Fit/Training, Edema Control and Scar Management.     Discussed Plan of Care with patient: Yes  Updates/Grading for next session: Update edema and AROM measurements as able    Stephanie Mason, OT

## 2020-11-09 ENCOUNTER — CLINICAL SUPPORT (OUTPATIENT)
Dept: REHABILITATION | Facility: HOSPITAL | Age: 50
End: 2020-11-09
Payer: MEDICAID

## 2020-11-09 DIAGNOSIS — M25.60 RANGE OF MOTION DEFICIT: ICD-10-CM

## 2020-11-09 PROCEDURE — 97530 THERAPEUTIC ACTIVITIES: CPT

## 2020-11-09 NOTE — PROGRESS NOTES
Occupational Therapy Daily Treatment Note     Date: 11/9/2020  Name: Andre Padgett  Clinic Number: [id number]    Therapy Diagnosis:   Encounter Diagnosis   Name Primary?    Range of motion deficit      Physician: Ino George NP     Physician Orders: Work on gentle ROM of bilateral wrist, fingers.  No weight bearing in either hand for now.  Medical Diagnosis: S52.91XD,S52.92XD (ICD-10-CM) - Closed fracture of both radii with routine healing, subsequent encounter      Surgical Procedure and Date:    09/14/2020     Open reduction internal fixation of comminuted intra-articular bilateral distal radius fractures, 3 or more parts each       Evaluation Date: 10/9/2020  Insurance: Medicaid  Insurance Authorization period Expiration: 12/31/2020      Plan of Care Certification Period: 10/9/2020 to 12/9/2020     Visit # / Visits Authortized: 9/ 20  Time In: 12:15  pm  Time Out: 1:05  pm   Total Billable Time: 50 minutes    Precautions: Standard    Return to MD: 12/7/2020    Subjective     Pt reports: Pt states he stopped wearing tubigrip, and is now wearing compression sleeves issued with orthoses.    He was compliant with home exercise program given last session.   Response to previous treatment: Improved ROM  Functional change: Able to do more when showering.    Pain: 1/10  Location: N/A    Objective   Edema. Measured in centimeters.    10/9/2020 10/9/2020 10/19/2020 10/19/2020 11/2/2020 11/2/2020     Left Right Left Right Left Right   Proximal Wrist Crease 19.5 20 18.5 20.5 18.3 19   Mid palm 22.4 24 20.4 20.5 20 20.5   MCPs 21 22.5 19.8 21.5 20.8 21      Hand ROM. Measured in degrees/cm.    10/9/2020 10/9/2020 10/19/2020 10/19/2020 11/2/2020 11/2/2020     Left Right Left Right Left Right               Index: DPC 5 7 2 cm  3.5 cm touch 2 cm   MP Extension 0 30 0 0 0 0               Long:  DPC 5 8 3.5 cm 4 cm touch 2 cm   MP Ext 0 30 0 0 0 0               Ring:   DPC 5 8 4 cm 4 cm touch 3  cm   MP Ext 0 15 0 0 0 0               Small:  DPC 5 6 3.5 cm 3.5 cm touch 2 cm   MP Ext 0 10 0 0 0 0               Thumb: DPC 7 8 touch 2 cm touch .5 cm      Wrist ROM  Date 10/9/2020 10/9/2020 10/19/2020 10/19/2020 11/2/2020 11/2/2020     Left Right Left Right Left Right   Supination/Pronation -35/80 -60/70 0/85 0/85 15/85 35/85   Wrist ext/flex 0/10 -10/15 45/25 25/30 40/30 30/30   Wrist RD/UD 5/5 5/5 15/10 10/15 15/15 20/15     Sensation:  Deferred      Strength (Dyanmometer) and Pinch Strength (Pinch Gauge)  Measured in pounds and psi. Average of three trials.    10/9/2020 10/9/2020 11/4/2020 11/4/2020     Left Right Left Right   Rung II def def 22 12     Andre received therapeutic activities for 40 minutes including:  -Patient received fluido x 10 min to BL wrists while performing AROM ex   -A/AAROM as follows: TGEs, thumb opposition, finger abd/add, finger ext, wrist ext/flex, RD/UD and  sup/pron x 10 reps each BL hands  -Prayer stretch x 10 reps  -Wrist maze 3' each BL  -yellow putty: 2' molding, 2' grasping BL hands (at home)  -yellow pw pushes & pulls 1 x 15 reps BL hands  -hand gripper with 2 yellow bands 2 x 15 reps (add red band for L hand)           Home Exercises and Education Provided     Education provided:   - none today  - Progress towards goals: Good     Written Home Exercises Provided: Patient instructed to cont prior HEP.  Exercises were reviewed and Andre was able to demonstrate them prior to the end of the session.  Andre demonstrated good  understanding of the education provided.     See EMR under Patient Instructions for exercises provided initial evaluation .     Additional Education provided: NT  Andre demonstrated good  understanding of the education provided.     Assessment   Andre Padgett is a 50 y.o. male referred to outpatient occupational/hand therapy and presents with a medical diagnosis of BL distal radius fxs s/p ORIF on 9/14/2020, resulting in pain, edema, decreased  range of and decreased functional use of both hands. Pt is s/p 8 weeks ORIF to BL wrists.  Pt reports he is able to do more for himself when he is showering. He is very motivated and participates well. Pt advanced in strengthening activities this day with mild difficulty, yellow pw and hand gripper.    Pt would continue to benefit from skilled OT.      Andre is progressing well towards his goals and there are no updates to goals at this time. Pt prognosis is Good     Pt will continue to benefit from skilled outpatient occupational therapy to address the deficits listed in the problem list on initial evaluation provide pt/family education and to maximize pt's level of independence in the home and community environment.     Anticipated barriers to occupational therapy: bilateral wrist fractures    Pt's spiritual, cultural and educational needs considered and pt agreeable to plan of care and goals.    Goals:  Short Term (4 weeks on 11/9/2020):  1)   Patient to be IND with HEP and modalities for pain management.- Met 10/14/2020  2)   Increase GARZA of all finger by 3 cm  degrees for finger flexion  to increase functional hand use for grasping.- Met 11/2/2020  3)   Measure /pinch strength at 6 weeks post op.- Met 11/4/2020  4)   Decrease edema .2-.3 cm to increase joint mobility /flexibility for improved overall functional hand use. - Met 11/2/2020  5)   Decrease complaints of pain to  3 out of 10 to increase functional hand use for ADL/work/leisure activities.-Met 11/4/2020  6)   Pt will be independent with dressing and feeding himself.- progressing     Long Term (by discharge):  1)   Pt will report 1 out of 10 pain with BL hand use.-Met 11/4/2020  2)   Patient to score at CK on FOTO to demonstrate improved perception of functional BL hand use.-progressing  3)   Pt will return to prior level of function for ADLs and household management.-progressing       Plan:    Certification Period/Plan of care expiration:  "10/9/2020 to 12/9/2020.     Outpatient Occupational Therapy 2 times weekly for 8 weeks may include the following interventions: Paraffin, Fluidotherapy, Manual therapy/joint mobilizations, Modalities for pain management, US 3 mhz, Therapeutic exercises/activities., Strengthening, Orthotic Fabrication/Fit/Training, Edema Control and Scar Management.     Discussed Plan of Care with patient: Yes  Updates/Grading for next session: Update edema and AROM measurements as able    Student: KARAN Casper    " I certify that I was present in the room directing the student in service delivery and guiding them using my skilled judgement. As the co-signing therapist, I have reviewed the student's documentation and am responsible for the treatment, assessment and plan."    Therapist: CHANTE Garcia, AVI Mason, BRENDA         "

## 2020-11-11 ENCOUNTER — CLINICAL SUPPORT (OUTPATIENT)
Dept: REHABILITATION | Facility: HOSPITAL | Age: 50
End: 2020-11-11
Attending: FAMILY MEDICINE
Payer: MEDICAID

## 2020-11-11 DIAGNOSIS — M25.60 RANGE OF MOTION DEFICIT: ICD-10-CM

## 2020-11-11 PROCEDURE — 97530 THERAPEUTIC ACTIVITIES: CPT

## 2020-11-11 NOTE — PROGRESS NOTES
Occupational Therapy Daily Treatment Note     Date: 11/11/2020  Name: Andre Padgett  Clinic Number: [id number]    Therapy Diagnosis:   Encounter Diagnosis   Name Primary?    Range of motion deficit      Physician: Ino George NP     Physician Orders: Work on gentle ROM of bilateral wrist, fingers.  No weight bearing in either hand for now.  Medical Diagnosis: S52.91XD,S52.92XD (ICD-10-CM) - Closed fracture of both radii with routine healing, subsequent encounter      Surgical Procedure and Date:    09/14/2020     Open reduction internal fixation of comminuted intra-articular bilateral distal radius fractures, 3 or more parts each       Evaluation Date: 10/9/2020  Insurance: Medicaid  Insurance Authorization period Expiration: 12/31/2020      Plan of Care Certification Period: 10/9/2020 to 12/9/2020     Visit # / Visits Authortized: 10/ 20  Time In: 12:15  pm  Time Out: 1:05  pm   Total Billable Time: 50 minutes    Precautions: Standard    Return to MD: 12/7/2020    Subjective     Pt reports: Pt states his knuckles in BL hands are sore this day, and that he has been doing his theraputty regularly at home.     He was compliant with home exercise program given last session.   Response to previous treatment: Improved ROM  Functional change:  Pt reports he is beginning to feed himself with R hand.     Pain: 3/10 in knuckles of both hands, 3/10 in R wrist  Location: N/A    Objective   Edema. Measured in centimeters.    10/9/2020 10/9/2020 10/19/2020 10/19/2020 11/2/2020 11/2/2020     Left Right Left Right Left Right   Proximal Wrist Crease 19.5 20 18.5 20.5 18.3 19   Mid palm 22.4 24 20.4 20.5 20 20.5   MCPs 21 22.5 19.8 21.5 20.8 21      Hand ROM. Measured in degrees/cm.    10/9/2020 10/9/2020 10/19/2020 10/19/2020 11/2/2020 11/2/2020     Left Right Left Right Left Right               Index: DPC 5 7 2 cm  3.5 cm touch 2 cm   MP Extension 0 30 0 0 0 0               Long:  DPC 5 8 3.5  cm 4 cm touch 2 cm   MP Ext 0 30 0 0 0 0               Ring:   DPC 5 8 4 cm 4 cm touch 3 cm   MP Ext 0 15 0 0 0 0               Small:  DPC 5 6 3.5 cm 3.5 cm touch 2 cm   MP Ext 0 10 0 0 0 0               Thumb: DPC 7 8 touch 2 cm touch .5 cm      Wrist ROM  Date 10/9/2020 10/9/2020 10/19/2020 10/19/2020 11/2/2020 11/2/2020     Left Right Left Right Left Right   Supination/Pronation -35/80 -60/70 0/85 0/85 15/85 35/85   Wrist ext/flex 0/10 -10/15 45/25 25/30 40/30 30/30   Wrist RD/UD 5/5 5/5 15/10 10/15 15/15 20/15     Sensation:  Deferred      Strength (Dyanmometer) and Pinch Strength (Pinch Gauge)  Measured in pounds and psi. Average of three trials.    10/9/2020 10/9/2020 11/4/2020 11/4/2020     Left Right Left Right   Rung II def def 22 12     Andre received therapeutic activities for 50 minutes including:  -Patient received fluido x 10 min to BL wrists while performing AROM ex   -A/AAROM as follows: TGEs, thumb opposition, finger abd/add, finger ext, wrist ext/flex, RD/UD and  sup/pron x 10 reps each BL hands  -Prayer stretch x 10 reps  -Wrist maze 3' each BL  -yellow putty: 2' molding, 2' grasping BL hands (at home)  -yellow pw pushes & pulls 1 x 15 reps BL hands  -hand gripper with 2 yellow bands 2 x 15 reps (add red band for L hand)         CMS Impairment/Limitation/Restriction for FOTO Wrist Survey    Therapist reviewed FOTO scores for Andre Padgett on 11/11/2020.   FOTO documents entered into [employer] - see Media section.    Limitation Score: 68%  Category: Carrying    Current : CL = least 60% but < 80% impaired, limited or restricted  Goal: CL = least 60% but < 80% impaired, limited or restricted               Home Exercises and Education Provided     Education provided:   - none today  - Progress towards goals: Good     Written Home Exercises Provided: Patient instructed to cont prior HEP.  Exercises were reviewed and Andre was able to demonstrate them prior to the end of the session.  Andre  demonstrated good  understanding of the education provided.     See EMR under Patient Instructions for exercises provided initial evaluation .     Additional Education provided: NT  Andre demonstrated good  understanding of the education provided.     Assessment   Andre Padgett is a 50 y.o. male referred to outpatient occupational/hand therapy and presents with a medical diagnosis of BL distal radius fxs s/p ORIF on 9/14/2020, resulting in pain, edema, decreased range of and decreased functional use of both hands. .  Pt reports he is able to feed himself more with R hand. He is very motivated and participates well. Pt continued in strengthening activities this day with mild difficulty. Pt instructed on yellow putty 1-2x per day at home.     Pt would continue to benefit from skilled OT.      Andre is progressing well towards his goals and there are no updates to goals at this time. Pt prognosis is Good     Pt will continue to benefit from skilled outpatient occupational therapy to address the deficits listed in the problem list on initial evaluation provide pt/family education and to maximize pt's level of independence in the home and community environment.     Anticipated barriers to occupational therapy: bilateral wrist fractures    Pt's spiritual, cultural and educational needs considered and pt agreeable to plan of care and goals.    Goals:  Short Term (4 weeks on 11/9/2020):  1)   Patient to be IND with HEP and modalities for pain management.- Met 10/14/2020  2)   Increase GARZA of all finger by 3 cm  degrees for finger flexion  to increase functional hand use for grasping.- Met 11/2/2020  3)   Measure /pinch strength at 6 weeks post op.- Met 11/4/2020  4)   Decrease edema .2-.3 cm to increase joint mobility /flexibility for improved overall functional hand use. - Met 11/2/2020  5)   Decrease complaints of pain to  3 out of 10 to increase functional hand use for ADL/work/leisure activities.-Met  "11/4/2020  6)   Pt will be independent with dressing and feeding himself.- progressing     Long Term (by discharge):  1)   Pt will report 1 out of 10 pain with BL hand use.-Met 11/4/2020  2)   Patient to score at CK on FOTO to demonstrate improved perception of functional BL hand use.-progressing  3)   Pt will return to prior level of function for ADLs and household management.-progressing       Plan:    Certification Period/Plan of care expiration: 10/9/2020 to 12/9/2020.     Outpatient Occupational Therapy 2 times weekly for 8 weeks may include the following interventions: Paraffin, Fluidotherapy, Manual therapy/joint mobilizations, Modalities for pain management, US 3 mhz, Therapeutic exercises/activities., Strengthening, Orthotic Fabrication/Fit/Training, Edema Control and Scar Management.     Discussed Plan of Care with patient: Yes  Updates/Grading for next session: Update edema and AROM measurements as able    Student: KARAN Casper    " I certify that I was present in the room directing the student in service delivery and guiding them using my skilled judgement. As the co-signing therapist, I have reviewed the student's documentation and am responsible for the treatment, assessment and plan."    Therapist: CHANTE Garcia, ZION Mason, OT         "

## 2020-11-16 ENCOUNTER — CLINICAL SUPPORT (OUTPATIENT)
Dept: REHABILITATION | Facility: HOSPITAL | Age: 50
End: 2020-11-16
Payer: MEDICAID

## 2020-11-16 DIAGNOSIS — M25.60 RANGE OF MOTION DEFICIT: ICD-10-CM

## 2020-11-16 PROCEDURE — 97530 THERAPEUTIC ACTIVITIES: CPT

## 2020-11-16 NOTE — PROGRESS NOTES
Occupational Therapy Daily Treatment Note     Date: 11/16/2020  Name: Andre Padgett  Clinic Number: [id number]    Therapy Diagnosis:   Encounter Diagnosis   Name Primary?    Range of motion deficit      Physician: Ino George NP     Physician Orders: Work on gentle ROM of bilateral wrist, fingers.  No weight bearing in either hand for now.  Medical Diagnosis: S52.91XD,S52.92XD (ICD-10-CM) - Closed fracture of both radii with routine healing, subsequent encounter      Surgical Procedure and Date:    09/14/2020     Open reduction internal fixation of comminuted intra-articular bilateral distal radius fractures, 3 or more parts each       Evaluation Date: 10/9/2020  Insurance: Medicaid  Insurance Authorization period Expiration: 12/31/2020      Plan of Care Certification Period: 10/9/2020 to 12/9/2020     Visit # / Visits Authortized: 11/ 20  Time In: 10:50 am  Time Out: 11:40 am   Total Billable Time: 50 minutes    Precautions: Standard    Return to MD: 12/7/2020    Subjective     Pt reports: He was compliant with home exercise program given last session.   Response to previous treatment: Improved ROM  Functional change:  Pt reports he is performing light housekeeping tasks.    Pain: 2/10  Location: N/A    Objective   Edema. Measured in centimeters.    10/9/2020 10/9/2020 10/19/2020 10/19/2020 11/2/2020 11/2/2020     Left Right Left Right Left Right   Proximal Wrist Crease 19.5 20 18.5 20.5 18.3 19   Mid palm 22.4 24 20.4 20.5 20 20.5   MCPs 21 22.5 19.8 21.5 20.8 21      Hand ROM. Measured in degrees/cm.    10/9/2020 10/9/2020 10/19/2020 10/19/2020 11/2/2020 11/2/2020     Left Right Left Right Left Right               Index: DPC 5 7 2 cm  3.5 cm touch 2 cm   MP Extension 0 30 0 0 0 0               Long:  DPC 5 8 3.5 cm 4 cm touch 2 cm   MP Ext 0 30 0 0 0 0               Ring:   DPC 5 8 4 cm 4 cm touch 3 cm   MP Ext 0 15 0 0 0 0               Small:  DPC 5 6 3.5 cm 3.5 cm touch 2  cm   MP Ext 0 10 0 0 0 0               Thumb: DPC 7 8 touch 2 cm touch .5 cm      Wrist ROM  Date 10/9/2020 10/9/2020 10/19/2020 10/19/2020 11/2/2020 11/2/2020     Left Right Left Right Left Right   Supination/Pronation -35/80 -60/70 0/85 0/85 15/85 35/85   Wrist ext/flex 0/10 -10/15 45/25 25/30 40/30 30/30   Wrist RD/UD 5/5 5/5 15/10 10/15 15/15 20/15     Sensation:  c/o numbness in tips of fingers      Strength (Dyanmometer) and Pinch Strength (Pinch Gauge)  Measured in pounds and psi. Average of three trials.    10/9/2020 10/9/2020 11/4/2020 11/4/2020     Left Right Left Right   Rung II def def 22 12     Andre received therapeutic activities for 50 minutes including:  -Patient received fluido x 10 min to BL wrists while performing AROM ex   -A/AAROM as follows: TGEs, thumb opposition, finger abd/add, finger ext, wrist ext/flex (open/closed hand), RD/UD (open/closed hand) and  sup/pron x 10 reps each BL hands  -Prayer stretch x 10 reps  -Wrist maze 3' each BL  -yellow putty: 2' molding, 2' grasping BL hands (at home)  -red pw pushes & pulls 2 x 15 reps BL hands  -hand gripper with 2 yellow bands 2 x 15 reps (add red band for L hand)           Home Exercises and Education Provided     Education provided:   - wrist ext/flex and RD/UD with closed fists  - Progress towards goals: Good     Written Home Exercises Provided: Patient instructed to cont prior HEP.  Exercises were reviewed and Andre was able to demonstrate them prior to the end of the session.  Andre demonstrated good  understanding of the education provided.     See EMR under Patient Instructions for exercises provided initial evaluation .     Additional Education provided: NT  Andre demonstrated good  understanding of the education provided.     Assessment   Andre Padgett is a 50 y.o. male referred to outpatient occupational/hand therapy and presents with a medical diagnosis of BL distal radius fxs s/p ORIF on 9/14/2020, resulting in pain, edema,  decreased range of and decreased functional use of both hands. Advanced activities with mild difficulty. Pt reports that he is using both hands for light household activities. He is very motivated and participates well.     Pt would continue to benefit from skilled OT.      Andre is progressing well towards his goals and there are no updates to goals at this time. Pt prognosis is Good     Pt will continue to benefit from skilled outpatient occupational therapy to address the deficits listed in the problem list on initial evaluation provide pt/family education and to maximize pt's level of independence in the home and community environment.     Anticipated barriers to occupational therapy: bilateral wrist fractures    Pt's spiritual, cultural and educational needs considered and pt agreeable to plan of care and goals.    Goals:  Short Term (4 weeks on 11/9/2020):  1)   Patient to be IND with HEP and modalities for pain management.- Met 10/14/2020  2)   Increase GARZA of all finger by 3 cm  degrees for finger flexion  to increase functional hand use for grasping.- Met 11/2/2020  3)   Measure /pinch strength at 6 weeks post op.- Met 11/4/2020  4)   Decrease edema .2-.3 cm to increase joint mobility /flexibility for improved overall functional hand use. - Met 11/2/2020  5)   Decrease complaints of pain to  3 out of 10 to increase functional hand use for ADL/work/leisure activities.-Met 11/4/2020  6)   Pt will be independent with dressing and feeding himself.- progressing     Long Term (by discharge):  1)   Pt will report 1 out of 10 pain with BL hand use.-Met 11/4/2020  2)   Patient to score at CK on FOTO to demonstrate improved perception of functional BL hand use.-progressing  3)   Pt will return to prior level of function for ADLs and household management.-progressing       Plan: Measure next session.   Certification Period/Plan of care expiration: 10/9/2020 to 12/9/2020.     Outpatient Occupational Therapy 2  times weekly for 8 weeks may include the following interventions: Paraffin, Fluidotherapy, Manual therapy/joint mobilizations, Modalities for pain management, US 3 mhz, Therapeutic exercises/activities., Strengthening, Orthotic Fabrication/Fit/Training, Edema Control and Scar Management.     Discussed Plan of Care with patient: Yes  Updates/Grading for next session: Update edema and AROM measurements as able      Stephanie Mason, OT

## 2020-11-18 ENCOUNTER — CLINICAL SUPPORT (OUTPATIENT)
Dept: REHABILITATION | Facility: HOSPITAL | Age: 50
End: 2020-11-18
Payer: MEDICAID

## 2020-11-18 DIAGNOSIS — M25.60 RANGE OF MOTION DEFICIT: ICD-10-CM

## 2020-11-18 PROCEDURE — 97530 THERAPEUTIC ACTIVITIES: CPT

## 2020-11-18 NOTE — PROGRESS NOTES
Occupational Therapy Daily Treatment Note     Date: 11/18/2020  Name: Andre Padgett  Clinic Number: [id number]    Therapy Diagnosis:   Encounter Diagnosis   Name Primary?    Range of motion deficit      Physician: Ino George NP     Physician Orders: Work on gentle ROM of bilateral wrist, fingers.  No weight bearing in either hand for now.  Medical Diagnosis: S52.91XD,S52.92XD (ICD-10-CM) - Closed fracture of both radii with routine healing, subsequent encounter      Surgical Procedure and Date:    09/14/2020     Open reduction internal fixation of comminuted intra-articular bilateral distal radius fractures, 3 or more parts each       Evaluation Date: 10/9/2020  Insurance: Medicaid  Insurance Authorization period Expiration: 12/31/2020      Plan of Care Certification Period: 10/9/2020 to 12/9/2020     Visit # / Visits Authortized: 12/ 20  Time In: 11:35 am  Time Out: 12:35 am   Total Billable Time: 50 minutes    Precautions: Standard    Return to MD: 12/7/2020    Subjective     Pt reports: He was compliant with home exercise program given last session.   Response to previous treatment: Improved ROM  Functional change:  Pt reports he is performing light housekeeping tasks.    Pain: 2/10  Location: N/A    Objective   Edema. Measured in centimeters.    10/9/2020 10/9/2020 10/19/2020 10/19/2020 11/2/2020 11/2/2020 11/18/2020 11/18/2020     Left Right Left Right Left Right Left Right   Proximal Wrist Crease 19.5 20 18.5 20.5 18.3 19 18.1 19   Mid palm 22.4 24 20.4 20.5 20 20.5 20.5 22   MCPs 21 22.5 19.8 21.5 20.8 21 20.5 21      Hand ROM. Measured in degrees/cm.    10/9/2020 10/9/2020 10/19/2020 10/19/2020 11/2/2020 11/2/2020 11/18/2020     Left Right Left Right Left Right Left/Roght                Index: DPC 5 7 2 cm  3.5 cm touch 2 cm touch   MP Extension 0 30 0 0 0 0                 Long:  DPC 5 8 3.5 cm 4 cm touch 2 cm touch   MP Ext 0 30 0 0 0 0                 Ring:   DPC 5 8  4 cm 4 cm touch 3 cm touch   MP Ext 0 15 0 0 0 0                 Small:  DPC 5 6 3.5 cm 3.5 cm touch 2 cm touch   MP Ext 0 10 0 0 0 0                 Thumb: DPC 7 8 touch 2 cm touch .5 cm touch      Wrist AROM  Date 10/9/2020 10/9/2020 10/19/2020 10/19/2020 11/2/2020 11/2/2020 11/18/2020 11/18/2020     Left Right Left Right Left Right Left Right   Supination/Pronation -35/80 -60/70 0/85 0/85 15/85 35/85 45/90 40/90   Wrist ext/flex 0/10 -10/15 45/25 25/30 40/30 30/30 45/30 35/30   Wrist RD/UD 5/5 5/5 15/10 10/15 15/15 20/15 25/20 20/15     Sensation:  c/o numbness in tips of fingers      Strength (Dyanmometer) and Pinch Strength (Pinch Gauge)  Measured in pounds and psi. Average of three trials.    10/9/2020 10/9/2020 11/4/2020 11/4/2020 11/18/2020 11/18/12335     Left Right Left Right Left Right   Rung II def def 22 12 27 17     Andre received therapeutic activities for 50 minutes including:  -Patient received fluido x 10 min to BL wrists while performing AROM ex   -A/AAROM as follows: TGEs, thumb opposition, finger abd/add, finger ext, wrist ext/flex (open/closed hand), RD/UD (open/closed hand) and  sup/pron x 10 reps each BL hands  -Prayer stretch x 10 reps  -Wrist maze 3' each BL  -yellow putty: 2' molding, 2' grasping BL hands (at home)  -red pw pushes & pulls 2 x 15 reps BL hands  -hand gripper with 2 yellow bands 2 x 15 reps (add red band for L hand)           Home Exercises and Education Provided     Education provided:   - none today  - Progress towards goals: Good     Written Home Exercises Provided: Patient instructed to cont prior HEP.  Exercises were reviewed and Andre was able to demonstrate them prior to the end of the session.  Andre demonstrated good  understanding of the education provided.     See EMR under Patient Instructions for exercises provided initial evaluation .     Additional Education provided: NT  Andre demonstrated good  understanding of the education provided.     Assessment    Andre Padgett is a 50 y.o. male referred to outpatient occupational/hand therapy and presents with a medical diagnosis of BL distal radius fxs s/p ORIF on 9/14/2020, resulting in pain, edema, decreased range of and decreased functional use of both hands. Pt continues to make slow steady progress with decreasing edema and increasing AROM of his fingers and wrists.  strength has also improved in both hands.  He is very motivated and participates well.     Pt would continue to benefit from skilled OT.      Andre is progressing well towards his goals and there are no updates to goals at this time. Pt prognosis is Good     Pt will continue to benefit from skilled outpatient occupational therapy to address the deficits listed in the problem list on initial evaluation provide pt/family education and to maximize pt's level of independence in the home and community environment.     Anticipated barriers to occupational therapy: bilateral wrist fractures    Pt's spiritual, cultural and educational needs considered and pt agreeable to plan of care and goals.    Goals:  Short Term (4 weeks on 11/9/2020):  1)   Patient to be IND with HEP and modalities for pain management.- Met 10/14/2020  2)   Increase GARZA of all finger by 3 cm  degrees for finger flexion  to increase functional hand use for grasping.- Met 11/2/2020  3)   Measure /pinch strength at 6 weeks post op.- Met 11/4/2020  4)   Decrease edema .2-.3 cm to increase joint mobility /flexibility for improved overall functional hand use. - Met 11/2/2020  5)   Decrease complaints of pain to  3 out of 10 to increase functional hand use for ADL/work/leisure activities.-Met 11/4/2020  6)   Pt will be independent with dressing and feeding himself.- progressing     Long Term (by discharge):  1)   Pt will report 1 out of 10 pain with BL hand use.-Met 11/4/2020  2)   Patient to score at CK on FOTO to demonstrate improved perception of functional BL hand  use.-progressing  3)   Pt will return to prior level of function for ADLs and household management.-progressing       Plan:    Certification Period/Plan of care expiration: 10/9/2020 to 12/9/2020.     Outpatient Occupational Therapy 2 times weekly for 8 weeks may include the following interventions: Paraffin, Fluidotherapy, Manual therapy/joint mobilizations, Modalities for pain management, US 3 mhz, Therapeutic exercises/activities., Strengthening, Orthotic Fabrication/Fit/Training, Edema Control and Scar Management.     Discussed Plan of Care with patient: Yes  Updates/Grading for next session: Update edema and AROM measurements as able      Stephanie Mason, OT

## 2020-11-23 ENCOUNTER — CLINICAL SUPPORT (OUTPATIENT)
Dept: REHABILITATION | Facility: HOSPITAL | Age: 50
End: 2020-11-23
Payer: MEDICAID

## 2020-11-23 DIAGNOSIS — M25.60 RANGE OF MOTION DEFICIT: ICD-10-CM

## 2020-11-23 PROCEDURE — 97530 THERAPEUTIC ACTIVITIES: CPT

## 2020-11-23 NOTE — PROGRESS NOTES
"                            Occupational Therapy Daily Treatment Note     Date: 11/23/2020  Name: Andre Padgett  Clinic Number: [id number]    Therapy Diagnosis:   Encounter Diagnosis   Name Primary?    Range of motion deficit      Physician: Ino George NP     Physician Orders: Work on gentle ROM of bilateral wrist, fingers.  No weight bearing in either hand for now.  Medical Diagnosis: S52.91XD,S52.92XD (ICD-10-CM) - Closed fracture of both radii with routine healing, subsequent encounter      Surgical Procedure and Date:    09/14/2020     Open reduction internal fixation of comminuted intra-articular bilateral distal radius fractures, 3 or more parts each       Evaluation Date: 10/9/2020  Insurance: Medicaid  Insurance Authorization period Expiration: 12/31/2020      Plan of Care Certification Period: 10/9/2020 to 12/9/2020     Visit # / Visits Authortized: 13/ 20  Time In: 11:47 am  Time Out: 12:25 pm   Total Billable Time: 38 minutes    Precautions: Standard    Return to MD: 12/7/2020    Subjective     Pt reports: Pt reports he has some soreness from doing more at home.  He was compliant with home exercise program given last session.   Response to previous treatment: Improved ROM  Functional change: "I am able to twist knobs and open doors."     Pain: 2/10; pain in R ulnar side of wrist  Location: N/A    Objective   Edema. Measured in centimeters.    10/9/2020 10/9/2020 10/19/2020 10/19/2020 11/2/2020 11/2/2020 11/18/2020 11/18/2020     Left Right Left Right Left Right Left Right   Proximal Wrist Crease 19.5 20 18.5 20.5 18.3 19 18.1 19   Mid palm 22.4 24 20.4 20.5 20 20.5 20.5 22   MCPs 21 22.5 19.8 21.5 20.8 21 20.5 21      Hand ROM. Measured in degrees/cm.    10/9/2020 10/9/2020 10/19/2020 10/19/2020 11/2/2020 11/2/2020 11/18/2020     Left Right Left Right Left Right Left/Roght                Index: DPC 5 7 2 cm  3.5 cm touch 2 cm touch   MP Extension 0 30 0 0 0 0                 Long:  DPC 5 8 3.5 " cm 4 cm touch 2 cm touch   MP Ext 0 30 0 0 0 0                 Ring:   DPC 5 8 4 cm 4 cm touch 3 cm touch   MP Ext 0 15 0 0 0 0                 Small:  DPC 5 6 3.5 cm 3.5 cm touch 2 cm touch   MP Ext 0 10 0 0 0 0                 Thumb: DPC 7 8 touch 2 cm touch .5 cm touch      Wrist AROM  Date 10/9/2020 10/9/2020 10/19/2020 10/19/2020 11/2/2020 11/2/2020 11/18/2020 11/18/2020     Left Right Left Right Left Right Left Right   Supination/Pronation -35/80 -60/70 0/85 0/85 15/85 35/85 45/90 40/90   Wrist ext/flex 0/10 -10/15 45/25 25/30 40/30 30/30 45/30 35/30   Wrist RD/UD 5/5 5/5 15/10 10/15 15/15 20/15 25/20 20/15     Sensation:  c/o numbness in tips of fingers      Strength (Dyanmometer) and Pinch Strength (Pinch Gauge)  Measured in pounds and psi. Average of three trials.    10/9/2020 10/9/2020 11/4/2020 11/4/2020 11/18/2020 11/18/50505     Left Right Left Right Left Right   Rung II def def 22 12 27 17     Andre received therapeutic activities for 38 minutes including:  -Patient received fluido x 8 min to BL wrists while performing AROM ex   -A/AAROM as follows: TGEs, thumb opposition, finger abd/add, finger ext, wrist ext/flex (open/closed hand), RD/UD (open/closed hand) and  sup/pron x 10 reps each BL hands  -Prayer stretch x 10 reps  -Wrist maze 3' each BL  -yellow putty: 2' molding, 2' grasping BL hands (at home)  -red pw pushes & pulls 2 x 15 reps BL hands  -hand gripper with 2 yellow bands 2 x 15 reps (add red band for L hand)           Home Exercises and Education Provided     Education provided:   - none today  - Progress towards goals: Good     Written Home Exercises Provided: Patient instructed to cont prior HEP.  Exercises were reviewed and Andre was able to demonstrate them prior to the end of the session.  Andre demonstrated good  understanding of the education provided.     See EMR under Patient Instructions for exercises provided initial evaluation .     Additional Education  provided: NT  Andre demonstrated good  understanding of the education provided.     Assessment   Andre Padgett is a 50 y.o. male referred to outpatient occupational/hand therapy and presents with a medical diagnosis of BL distal radius fxs s/p ORIF on 9/14/2020, resulting in pain, edema, decreased range of and decreased functional use of both hands. Pt continues to make slow steady progress. Pt reports some soreness from doing more with BL hands around his home. Pt reports functionally he is able to twist door knobs and open doors. He is very motivated and participates well.     Pt would continue to benefit from skilled OT.      Andre is progressing well towards his goals and there are no updates to goals at this time. Pt prognosis is Good     Pt will continue to benefit from skilled outpatient occupational therapy to address the deficits listed in the problem list on initial evaluation provide pt/family education and to maximize pt's level of independence in the home and community environment.     Anticipated barriers to occupational therapy: bilateral wrist fractures    Pt's spiritual, cultural and educational needs considered and pt agreeable to plan of care and goals.    Goals:  Short Term (4 weeks on 11/9/2020):  1)   Patient to be IND with HEP and modalities for pain management.- Met 10/14/2020  2)   Increase GARZA of all finger by 3 cm  degrees for finger flexion  to increase functional hand use for grasping.- Met 11/2/2020  3)   Measure /pinch strength at 6 weeks post op.- Met 11/4/2020  4)   Decrease edema .2-.3 cm to increase joint mobility /flexibility for improved overall functional hand use. - Met 11/2/2020  5)   Decrease complaints of pain to  3 out of 10 to increase functional hand use for ADL/work/leisure activities.-Met 11/4/2020  6)   Pt will be independent with dressing and feeding himself.- progressing     Long Term (by discharge):  1)   Pt will report 1 out of 10 pain with BL hand use.-Met  "11/4/2020  2)   Patient to score at CK on FOTO to demonstrate improved perception of functional BL hand use.-progressing  3)   Pt will return to prior level of function for ADLs and household management.-progressing       Plan:    Certification Period/Plan of care expiration: 10/9/2020 to 12/9/2020.     Outpatient Occupational Therapy 2 times weekly for 8 weeks may include the following interventions: Paraffin, Fluidotherapy, Manual therapy/joint mobilizations, Modalities for pain management, US 3 mhz, Therapeutic exercises/activities., Strengthening, Orthotic Fabrication/Fit/Training, Edema Control and Scar Management.     Discussed Plan of Care with patient: Yes  Updates/Grading for next session: Update edema and AROM measurements as able    Student: KARAN Casper    " I certify that I was present in the room directing the student in service delivery and guiding them using my skilled judgement. As the co-signing therapist, I have reviewed the student's documentation and am responsible for the treatment, assessment and plan."    Therapist: CHANTE Garcia, OT         "

## 2020-11-24 ENCOUNTER — TELEPHONE (OUTPATIENT)
Dept: UROLOGY | Facility: CLINIC | Age: 50
End: 2020-11-24

## 2020-11-24 ENCOUNTER — TELEPHONE (OUTPATIENT)
Dept: ORTHOPEDICS | Facility: CLINIC | Age: 50
End: 2020-11-24

## 2020-11-24 NOTE — TELEPHONE ENCOUNTER
lmovm -- this patient has never been seen by either dr tierney nor robert nicole    We have no records to fax

## 2020-11-24 NOTE — TELEPHONE ENCOUNTER
----- Message from Griselda Acosta sent at 11/24/2020 12:32 PM CST -----  Pt asking for a callback regarding a few questions he would like to ask regarding his previous surgery please contact pt           Contact info [phone]

## 2020-11-24 NOTE — TELEPHONE ENCOUNTER
Spoke with pt.   States he is having an increase in pain in his left arm      Pt scheduled to be evaluated tomorrow by Ino George NP

## 2020-11-24 NOTE — TELEPHONE ENCOUNTER
I spoke with patient and gave him first available appt with Emelyn Cervantes NP in [place] on 12-, patient also given [facility] phone number and [facility] phone number to call to see if he can get earlier appt.                    ----- Message from Khalida Moreno RN sent at 11/24/2020  2:03 PM CST -----  Regarding: FW: Pt is requesting his labs from in July or after  Contact: [phone]  Randell Galvez,  Do you think you could help get this patient scheduled with a provider?  This patient has medicaid and thinks they are developing prostatitis.  MRN: [id number]   ----- Message -----  From: Brea Paiz LPN  Sent: 11/23/2020   7:43 AM CST  To: Khalida Moreno RN  Subject: FW: Pt is requesting his labs from in July o#    This is a medicaid est pt of dr wilburn wants to be seen for prostatitis. We do not have any available appts here/[facility] or [place] before dec 10.   ----- Message -----  From: Keila Garcia LPN  Sent: 11/20/2020   1:23 PM CST  To: Brea Paiz LPN  Subject: FW: Pt is requesting his labs from in July o#    Pt states he wants to see dr. Wilburn as soon as he comes back for scrotal and penile pain or another md. He would like to talk to you on Monday    ----- Message -----  From: Brea Mcnamara  Sent: 11/20/2020  12:29 PM CST  To: [facility] Staff  Subject: Pt is requesting his labs from in July or af#    Pt is insisting on a fu appt with [facility]. For scrotal pain.  Not sure if he has other issues.  Kind of difficult pt.  Not really sure why he is coming.  Please take a look at [facility] notes so see.

## 2020-11-24 NOTE — TELEPHONE ENCOUNTER
----- Message from Flory Herring sent at 11/24/2020  3:53 PM CST -----  Regarding: call back  Name of Who is Calling: DILLON WASHINGTON [[id number]] What is the request in detail: Patient called [facility] to scheduled an appointment and and was told that he need his medical records faxed over to [phone]. Can the clinic reply by [facility]: No What Number to [facility] Back [facility] [phone]

## 2020-11-24 NOTE — TELEPHONE ENCOUNTER
----- Message from Reggie Almonte sent at 11/24/2020  3:21 PM CST -----  Contact: self  Pt returning call back to [place]     Please Call     Contact [phone]

## 2020-11-25 ENCOUNTER — CLINICAL SUPPORT (OUTPATIENT)
Dept: REHABILITATION | Facility: HOSPITAL | Age: 50
End: 2020-11-25
Payer: MEDICAID

## 2020-11-25 DIAGNOSIS — M25.60 RANGE OF MOTION DEFICIT: ICD-10-CM

## 2020-11-25 PROCEDURE — 97530 THERAPEUTIC ACTIVITIES: CPT

## 2020-11-25 NOTE — PROGRESS NOTES
"                            Occupational Therapy Daily Treatment Note     Date: 11/25/2020  Name: Andre Padgett  Clinic Number: [id number]    Therapy Diagnosis:   Encounter Diagnosis   Name Primary?    Range of motion deficit      Physician: Ino George NP     Physician Orders: Work on gentle ROM of bilateral wrist, fingers.  No weight bearing in either hand for now.  Medical Diagnosis: S52.91XD,S52.92XD (ICD-10-CM) - Closed fracture of both radii with routine healing, subsequent encounter      Surgical Procedure and Date:    09/14/2020     Open reduction internal fixation of comminuted intra-articular bilateral distal radius fractures, 3 or more parts each       Evaluation Date: 10/9/2020  Insurance: Medicaid  Insurance Authorization period Expiration: 12/31/2020      Plan of Care Certification Period: 10/9/2020 to 12/9/2020     Visit # / Visits Authortized: 14/ 20  Time In: 12:20 pm  Time Out: 1:05 pm   Total Billable Time: 45 minutes    Precautions: Standard    Return to MD: 12/7/2020    Subjective     Pt reports: Pt reports he has some soreness on volar proximal left forearm.  He was compliant with home exercise program given last session.   Response to previous treatment: Improved ROM  Functional change: "I am able to twist knobs and open doors."     Pain: 3/10  Location: N/A    Objective   Edema. Measured in centimeters.    10/9/2020 10/9/2020 10/19/2020 10/19/2020 11/2/2020 11/2/2020 11/18/2020 11/18/2020     Left Right Left Right Left Right Left Right   Proximal Wrist Crease 19.5 20 18.5 20.5 18.3 19 18.1 19   Mid palm 22.4 24 20.4 20.5 20 20.5 20.5 22   MCPs 21 22.5 19.8 21.5 20.8 21 20.5 21      Hand ROM. Measured in degrees/cm.    10/9/2020 10/9/2020 10/19/2020 10/19/2020 11/2/2020 11/2/2020 11/18/2020     Left Right Left Right Left Right Left/Roght                Index: DPC 5 7 2 cm  3.5 cm touch 2 cm touch   MP Extension 0 30 0 0 0 0                 Long:  DPC 5 8 3.5 cm 4 cm touch 2 cm touch "   MP Ext 0 30 0 0 0 0                 Ring:   DPC 5 8 4 cm 4 cm touch 3 cm touch   MP Ext 0 15 0 0 0 0                 Small:  DPC 5 6 3.5 cm 3.5 cm touch 2 cm touch   MP Ext 0 10 0 0 0 0                 Thumb: DPC 7 8 touch 2 cm touch .5 cm touch      Wrist AROM  Date 10/9/2020 10/9/2020 10/19/2020 10/19/2020 11/2/2020 11/2/2020 11/18/2020 11/18/2020     Left Right Left Right Left Right Left Right   Supination/Pronation -35/80 -60/70 0/85 0/85 15/85 35/85 45/90 40/90   Wrist ext/flex 0/10 -10/15 45/25 25/30 40/30 30/30 45/30 35/30   Wrist RD/UD 5/5 5/5 15/10 10/15 15/15 20/15 25/20 20/15     Sensation:  c/o numbness in tips of fingers      Strength (Dyanmometer) and Pinch Strength (Pinch Gauge)  Measured in pounds and psi. Average of three trials.    10/9/2020 10/9/2020 11/4/2020 11/4/2020 11/18/2020 11/18/96818     Left Right Left Right Left Right   Rung II def def 22 12 27 17     Andre received therapeutic activities for 45 minutes including:  -Patient received fluido x 8 min to BL wrists while performing AROM ex   -A/AAROM as follows: TGEs, thumb opposition, finger abd/add, finger ext, wrist ext/flex (open/closed hand), RD/UD (open/closed hand) and  sup/pron x 10 reps each BL hands  -Prayer stretch x 10 reps  -Wrist maze 3' each BL  -yellow putty: 2' molding, 2' grasping BL hands (at home)  -red pw pushes & pulls 2 x 15 reps BL hands  -hand gripper with 2 yellow bands 2 x 15 reps (add red band for L hand)           Home Exercises and Education Provided     Education provided:   - none today  - Progress towards goals: Good     Written Home Exercises Provided: Patient instructed to cont prior HEP.  Exercises were reviewed and Andre was able to demonstrate them prior to the end of the session.  Andre demonstrated good  understanding of the education provided.     See EMR under Patient Instructions for exercises provided initial evaluation .     Additional Education provided: WINTER Powell demonstrated good   understanding of the education provided.     Assessment   Andre Padgett is a 50 y.o. male referred to outpatient occupational/hand therapy and presents with a medical diagnosis of BL distal radius fxs s/p ORIF on 9/14/2020, resulting in pain, edema, decreased range of and decreased functional use of both hands. Pt continued with activities with mild difficulty. Pt is complaining of increased pain today especially on proximal volar forearm. Pt remains very motivated.     Pt would continue to benefit from skilled OT.      Andre is progressing well towards his goals and there are no updates to goals at this time. Pt prognosis is Good     Pt will continue to benefit from skilled outpatient occupational therapy to address the deficits listed in the problem list on initial evaluation provide pt/family education and to maximize pt's level of independence in the home and community environment.     Anticipated barriers to occupational therapy: bilateral wrist fractures    Pt's spiritual, cultural and educational needs considered and pt agreeable to plan of care and goals.    Goals:  Short Term (4 weeks on 11/9/2020):  1)   Patient to be IND with HEP and modalities for pain management.- Met 10/14/2020  2)   Increase GARZA of all finger by 3 cm  degrees for finger flexion  to increase functional hand use for grasping.- Met 11/2/2020  3)   Measure /pinch strength at 6 weeks post op.- Met 11/4/2020  4)   Decrease edema .2-.3 cm to increase joint mobility /flexibility for improved overall functional hand use. - Met 11/2/2020  5)   Decrease complaints of pain to  3 out of 10 to increase functional hand use for ADL/work/leisure activities.-Met 11/4/2020  6)   Pt will be independent with dressing and feeding himself.- progressing     Long Term (by discharge):  1)   Pt will report 1 out of 10 pain with BL hand use.-Met 11/4/2020  2)   Patient to score at CK on FOTO to demonstrate improved perception of functional BL hand  use.-progressing  3)   Pt will return to prior level of function for ADLs and household management.-progressing       Plan:    Certification Period/Plan of care expiration: 10/9/2020 to 12/9/2020.     Outpatient Occupational Therapy 2 times weekly for 8 weeks may include the following interventions: Paraffin, Fluidotherapy, Manual therapy/joint mobilizations, Modalities for pain management, US 3 mhz, Therapeutic exercises/activities., Strengthening, Orthotic Fabrication/Fit/Training, Edema Control and Scar Management.     Discussed Plan of Care with patient: Yes  Updates/Grading for next session: Advance as tolerated.        Stephanie Mason, OT

## 2020-12-01 ENCOUNTER — CLINICAL SUPPORT (OUTPATIENT)
Dept: REHABILITATION | Facility: HOSPITAL | Age: 50
End: 2020-12-01
Attending: FAMILY MEDICINE
Payer: MEDICAID

## 2020-12-01 DIAGNOSIS — S52.92XD CLOSED FRACTURE OF BOTH RADII WITH ROUTINE HEALING, SUBSEQUENT ENCOUNTER: ICD-10-CM

## 2020-12-01 DIAGNOSIS — Z98.890 POST-OPERATIVE STATE: ICD-10-CM

## 2020-12-01 DIAGNOSIS — M25.60 RANGE OF MOTION DEFICIT: ICD-10-CM

## 2020-12-01 DIAGNOSIS — S52.91XD CLOSED FRACTURE OF BOTH RADII WITH ROUTINE HEALING, SUBSEQUENT ENCOUNTER: ICD-10-CM

## 2020-12-01 PROCEDURE — 97530 THERAPEUTIC ACTIVITIES: CPT

## 2020-12-01 NOTE — PROGRESS NOTES
"                            Occupational Therapy Daily Treatment Note     Date: 12/1/2020  Name: Andre Padgett  Clinic Number: [id number]    Therapy Diagnosis:   Encounter Diagnoses   Name Primary?    Closed fracture of both radii with routine healing, subsequent encounter     Post-operative state     Range of motion deficit      Physician: Ino George NP     Physician Orders: Work on gentle ROM of bilateral wrist, fingers.  No weight bearing in either hand for now.  Medical Diagnosis: S52.91XD,S52.92XD (ICD-10-CM) - Closed fracture of both radii with routine healing, subsequent encounter      Surgical Procedure and Date:    09/14/2020     Open reduction internal fixation of comminuted intra-articular bilateral distal radius fractures, 3 or more parts each       Evaluation Date: 10/9/2020  Insurance: Medicaid  Insurance Authorization period Expiration: 12/31/2020      Plan of Care Certification Period: 10/9/2020 to 12/9/2020     Visit # / Visits Authortized: 14/ 20  Time In: 10:45 am  Time Out: 11:30 am   Total Billable Time: 45 minutes    Precautions: Standard    Return to MD: 12/7/2020    Subjective     Pt reports: "My fingers are a little sore today."  He was compliant with home exercise program given last session.   Response to previous treatment: Improved ROM  Functional change: Pt reports he was able to do some cooking for Thanksgiving.      Pain: 3 /10  Location: N/A    Objective   Edema. Measured in centimeters.    10/9/2020 10/9/2020 10/19/2020 10/19/2020 11/2/2020 11/2/2020 11/18/2020 11/18/2020     Left Right Left Right Left Right Left Right   Proximal Wrist Crease 19.5 20 18.5 20.5 18.3 19 18.1 19   Mid palm 22.4 24 20.4 20.5 20 20.5 20.5 22   MCPs 21 22.5 19.8 21.5 20.8 21 20.5 21      Hand ROM. Measured in degrees/cm.    10/9/2020 10/9/2020 10/19/2020 10/19/2020 11/2/2020 11/2/2020 11/18/2020     Left Right Left Right Left Right Left/Roght                Index: DPC 5 7 2 cm  3.5 cm touch 2 " cm touch   MP Extension 0 30 0 0 0 0                 Long:  DPC 5 8 3.5 cm 4 cm touch 2 cm touch   MP Ext 0 30 0 0 0 0                 Ring:   DPC 5 8 4 cm 4 cm touch 3 cm touch   MP Ext 0 15 0 0 0 0                 Small:  DPC 5 6 3.5 cm 3.5 cm touch 2 cm touch   MP Ext 0 10 0 0 0 0                 Thumb: DPC 7 8 touch 2 cm touch .5 cm touch      Wrist AROM  Date 10/9/2020 10/9/2020 10/19/2020 10/19/2020 11/2/2020 11/2/2020 11/18/2020 11/18/2020     Left Right Left Right Left Right Left Right   Supination/Pronation -35/80 -60/70 0/85 0/85 15/85 35/85 45/90 40/90   Wrist ext/flex 0/10 -10/15 45/25 25/30 40/30 30/30 45/30 35/30   Wrist RD/UD 5/5 5/5 15/10 10/15 15/15 20/15 25/20 20/15     Sensation:  c/o numbness in tips of fingers      Strength (Dyanmometer) and Pinch Strength (Pinch Gauge)  Measured in pounds and psi. Average of three trials.    10/9/2020 10/9/2020 11/4/2020 11/4/2020 11/18/2020 11/18/40044     Left Right Left Right Left Right   Rung II def def 22 12 27 17     Andre received therapeutic activities for 45 minutes including:  -Patient received fluido x 8 min to BL wrists while performing AROM ex   -A/AAROM as follows: TGEs, thumb opposition, finger abd/add, finger ext, wrist ext/flex (open/closed hand), RD/UD (open/closed hand) and  sup/pron x 10 reps each BL hands  -Prayer stretch x 10 reps  -Wrist maze 3' each BL  -yellow putty: 2' molding, 2' grasping BL hands (at home)  -red pw pushes & pulls 2 x 15 reps BL hands  -hand gripper with 2 yellow bands 2 x 15 reps (add red band for L hand)    Pt issued Tubigrip C    Home Exercises and Education Provided     Education provided:   - none today  - Progress towards goals: Good     Written Home Exercises Provided: Patient instructed to cont prior HEP.  Exercises were reviewed and Andre was able to demonstrate them prior to the end of the session.  Andre demonstrated good  understanding of the education provided.     See EMR under Patient  Instructions for exercises provided initial evaluation .     Additional Education provided: NT  Andre demonstrated good  understanding of the education provided.     Assessment   Andre Padgett is a 50 y.o. male referred to outpatient occupational/hand therapy and presents with a medical diagnosis of BL distal radius fxs s/p ORIF on 9/14/2020, resulting in pain, edema, decreased range of and decreased functional use of both hands. Pt continued with activities with mild difficulty. Pt reports fingers are more sore this day due to increased activity at home. Pt reports functionally he did some cooking at home. Pt remains very motivated and participates well in therapy.    Pt would continue to benefit from skilled OT.      Andre is progressing well towards his goals and there are no updates to goals at this time. Pt prognosis is Good     Pt will continue to benefit from skilled outpatient occupational therapy to address the deficits listed in the problem list on initial evaluation provide pt/family education and to maximize pt's level of independence in the home and community environment.     Anticipated barriers to occupational therapy: bilateral wrist fractures    Pt's spiritual, cultural and educational needs considered and pt agreeable to plan of care and goals.    Goals:  Short Term (4 weeks on 11/9/2020):  1)   Patient to be IND with HEP and modalities for pain management.- Met 10/14/2020  2)   Increase GARZA of all finger by 3 cm  degrees for finger flexion  to increase functional hand use for grasping.- Met 11/2/2020  3)   Measure /pinch strength at 6 weeks post op.- Met 11/4/2020  4)   Decrease edema .2-.3 cm to increase joint mobility /flexibility for improved overall functional hand use. - Met 11/2/2020  5)   Decrease complaints of pain to  3 out of 10 to increase functional hand use for ADL/work/leisure activities.-Met 11/4/2020  6)   Pt will be independent with dressing and feeding himself.-  "progressing     Long Term (by discharge):  1)   Pt will report 1 out of 10 pain with BL hand use.-Met 11/4/2020  2)   Patient to score at CK on FOTO to demonstrate improved perception of functional BL hand use.-progressing  3)   Pt will return to prior level of function for ADLs and household management.-progressing       Plan: Measure next session. Advance to red putty.     Certification Period/Plan of care expiration: 10/9/2020 to 12/9/2020.     Outpatient Occupational Therapy 2 times weekly for 8 weeks may include the following interventions: Paraffin, Fluidotherapy, Manual therapy/joint mobilizations, Modalities for pain management, US 3 mhz, Therapeutic exercises/activities., Strengthening, Orthotic Fabrication/Fit/Training, Edema Control and Scar Management.     Discussed Plan of Care with patient: Yes  Updates/Grading for next session: Advance as tolerated.    Student: KARAN Casper    " I certify that I was present in the room directing the student in service delivery and guiding them using my skilled judgement. As the co-signing therapist, I have reviewed the student's documentation and am responsible for the treatment, assessment and plan."    Therapist: CHANTE Garcia, T      Stephanie Mason, OT         "

## 2020-12-04 ENCOUNTER — CLINICAL SUPPORT (OUTPATIENT)
Dept: REHABILITATION | Facility: HOSPITAL | Age: 50
End: 2020-12-04
Attending: FAMILY MEDICINE
Payer: MEDICAID

## 2020-12-04 DIAGNOSIS — M25.60 RANGE OF MOTION DEFICIT: ICD-10-CM

## 2020-12-04 PROCEDURE — 97530 THERAPEUTIC ACTIVITIES: CPT

## 2020-12-04 NOTE — PROGRESS NOTES
Mr. Padgett is here today for a post-operative visit after undergoing an ORIF for his comminuted intra-articular bilateral distal radius fracture by Dr. Thorne on 9/14/2020.  He was last seen by me on 10/26/2020.       Interval History:  He reports that he is doing well.  He feels he is progressing with therapy with [facility].  He is hoping to be able to start driving so he can take his daughter to school.  Currently going through a divorce and is having difficulty finding suitable work given his injuries and the pandemic.  He states he only wears his braces when he goes out for a walk and does not wear at home.  He denies hand or finger numbness.  He uses Tylenol or SMR PRN.  Denies falls or injuries since last seen.  He is not taking pain medication.  He denies fever, chills, and sweats since the time of the surgery.      Physical exam:  Velcro wrist splints removed.  Incisions to bilateral wrist are healed.  There is no redness or drainage present.  He has good finger motion and can perform thumb to finger exercises in both hands.  Hand  is 4/5 bilaterally.  ROM is approximately 10 degrees in all planes.  He has intact sensation to the medial and lateral aspects of both arms.  Cap refill is < 3 secs.      RADS: Bilateral wrist x-rays were obtained and personally reviewed by me hardware to bilateral distal radius fractures are in good position.  Fractures to bilateral distal radius' are in good position and alignment.  No new fractures seen.    Assessment:  Post-op visit (12 weeks)    Plan:    ICD-10-CM ICD-9-CM   1. Closed fracture of both radii with routine healing, subsequent encounter  S52.91XD V54.12    S52.92XD    2. Post-operative state  Z98.890 V45.89     Current care, treatment plan, precautions, activity level/ modifications, limitations, rehabilitation exercises and proposed future treatment were discussed with the patient. We discussed the need to monitor for changes in symptoms and condition and  report them to the physician.  Discussed importance of compliance with all appointments and follow up examinations.       - Pain medication: refill was not needed,   - Pain medication refill policy provided to patient for review, yes  - Patient is to return to clinic in 12 weeks as scheduled.  - At time x-ray of his bilateral wrist x-rays is needed.  - I feel he can try to start driving but he needs to exercise caution in doing so.  - Continue outpatient therapy orders with [facility] OT, work on increasing his weight bearing by 2 lbs/month as he tolerates.  - Ok to remove splints and continue HEP including ROM of fingers, wrist and elbow.    - Call for questions or concerns.     If there are any questions prior to scheduled follow up, the patient was instructed to contact the office

## 2020-12-04 NOTE — PROGRESS NOTES
Occupational Therapy Re-evaluation Note     Date: 12/4/2020  Name: Andre Padgett  Clinic Number: [id number]    Therapy Diagnosis:   Encounter Diagnosis   Name Primary?    Range of motion deficit      Physician: Ino George NP     Physician Orders: Work on gentle ROM of bilateral wrist, fingers.  No weight bearing in either hand for now.  Medical Diagnosis: S52.91XD,S52.92XD (ICD-10-CM) - Closed fracture of both radii with routine healing, subsequent encounter      Surgical Procedure and Date:    09/14/2020     Open reduction internal fixation of comminuted intra-articular bilateral distal radius fractures, 3 or more parts each       Evaluation Date: 10/9/2020  Insurance: Medicaid  Insurance Authorization period Expiration: 12/31/2020      Plan of Care Certification Period: 12/4/2020 to 2/4/2020     Visit # / Visits Authortized: 14/ 20  Time In: 9:30 am  Time Out: 10:25 am  Total Billable Time: 60 minutes    Precautions: Standard    Return to MD: 12/7/2020    Subjective     Pt reports: He was compliant with home exercise program given last session.   Response to previous treatment: Improved ROM  Functional change: Pt reports that he is dressing with a little difficulty.     Pain: 3 /10  Location: N/A    Objective   Edema. Measured in centimeters.    10/9/2020 10/9/2020 10/19/2020 10/19/2020 11/2/2020 11/2/2020 11/18/2020 11/18/2020 12/4/2020 12/4/2020     Left Right Left Right Left Right Left Right Left Right   Proximal Wrist Crease 19.5 20 18.5 20.5 18.3 19 18.1 19 18 19   Mid palm 22.4 24 20.4 20.5 20 20.5 20.5 22 20.5 21   MCPs 21 22.5 19.8 21.5 20.8 21 20.5 21 20 21      Hand ROM. Measured in degrees/cm.    10/9/2020 10/9/2020 10/19/2020 10/19/2020 11/2/2020 11/2/2020 11/18/2020 12/4/2020     Left Right Left Right Left Right Left/Right Left/Right                 Index: DPC 5 7 2 cm  3.5 cm touch 2 cm touch WNL   MP Extension 0 30 0 0 0 0                   Long:  DPC 5 8 3.5 cm  4 cm touch 2 cm touch WNL   MP Ext 0 30 0 0 0 0                   Ring:   DPC 5 8 4 cm 4 cm touch 3 cm touch WNL   MP Ext 0 15 0 0 0 0                   Small:  DPC 5 6 3.5 cm 3.5 cm touch 2 cm touch WNL   MP Ext 0 10 0 0 0 0                   Thumb: DPC 7 8 touch 2 cm touch .5 cm touch WNL      Wrist AROM  Date 10/9/2020 10/9/2020 10/19/2020 10/19/2020 11/2/2020 11/2/2020 11/18/2020 11/18/2020 12/4/2020 12/4/2020     Left Right Left Right Left Right Left Right Left Right   Supination/Pronation -35/80 -60/70 0/85 0/85 15/85 35/85 45/90 40/90 60/90 45/90   Wrist ext/flex 0/10 -10/15 45/25 25/30 40/30 30/30 45/30 35/30 50/35 40/30   Wrist RD/UD 5/5 5/5 15/10 10/15 15/15 20/15 25/20 20/15 25/20 20/20     Sensation:  c/o numbness in tips of fingers however less than 2 weeks ago      Strength (Dyanmometer) and Pinch Strength (Pinch Gauge)  Measured in pounds and psi. Average of three trials.    10/9/2020 10/9/2020 11/4/2020 11/4/2020 11/18/2020 11/18/75957 12/4/2020 12/4/2020     Left Right Left Right Left Right Left Right   Rung II def def 22 12 27 17 32 24     Andre received therapeutic activities for 55 minutes including:  -Patient received fluido x 8 min to BL wrists while performing AROM ex   -A/AAROM as follows: TGEs, thumb opposition, finger abd/add, finger ext, wrist ext/flex (open/closed hand), RD/UD (open/closed hand) and  sup/pron x 10 reps each BL hands  -Prayer stretch x 10 reps  -Wrist maze 3' each BL  -red putty: 2' molding, 2' grasping BL hands (at home)  -red pw pushes & pulls 2 x 15 reps BL hands  -hand gripper with 2 yellow bands 2 x 15 reps (add red band for L hand)  -rotation bar 1 plate x 30 reps  -1 lb wrist 3 ways 1 x 15 reps  -yellow flex bar: frowns, smiles and twists x 30 reps        Home Exercises and Education Provided     Education provided:   - red putty  - Progress towards goals: Good     Written Home Exercises Provided: Patient instructed to cont prior HEP.  Exercises were reviewed  and Andre was able to demonstrate them prior to the end of the session.  Andre demonstrated good  understanding of the education provided.     See EMR under Patient Instructions for exercises provided initial evaluation .     Additional Education provided: NT  Andre demonstrated good  understanding of the education provided.     Assessment   Andre Padgett is a 50 y.o. male referred to outpatient occupational/hand therapy and presents with a medical diagnosis of BL distal radius fxs s/p ORIF on 9/14/2020, resulting in pain, edema, decreased range of and decreased functional use of both hands. Pt continues to make slow steady progress with increasing wrist AROM,  and pinch strength. Finger motion is WNL. Pt is dressing himself and performing light activities with mild difficulty. Pt remains very motivated and participates well in therapy.    Pt would continue to benefit from skilled OT.      Andre is progressing well towards his goals and there are no updates to goals at this time. Pt prognosis is Good     Pt will continue to benefit from skilled outpatient occupational therapy to address the deficits listed in the problem list on initial evaluation provide pt/family education and to maximize pt's level of independence in the home and community environment.     Anticipated barriers to occupational therapy: bilateral wrist fractures    Pt's spiritual, cultural and educational needs considered and pt agreeable to plan of care and goals.    Goals:  Short Term (4 weeks on 11/9/2020):  1)   Patient to be IND with HEP and modalities for pain management.- Met 10/14/2020  2)   Increase GARZA of all finger by 3 cm  degrees for finger flexion  to increase functional hand use for grasping.- Met 11/2/2020  3)   Measure /pinch strength at 6 weeks post op.- Met 11/4/2020  4)   Decrease edema .2-.3 cm to increase joint mobility /flexibility for improved overall functional hand use. - Met 11/2/2020  5)   Decrease  complaints of pain to  3 out of 10 to increase functional hand use for ADL/work/leisure activities.-Met 11/4/2020  6)   Pt will be independent with dressing and feeding himself.- Met 12/4/2020     Long Term (by discharge):  1)   Pt will report 1 out of 10 pain with BL hand use.-Met 11/4/2020  2)   Patient to score at CK on FOTO to demonstrate improved perception of functional BL hand use.-progressing  3)   Pt will return to prior level of function for ADLs and household management.-progressing       Plan:     Certification Period/Plan of care expiration: 12/4/2020 to 2/4/2020.     Outpatient Occupational Therapy 2 times weekly for 8 weeks may include the following interventions: Paraffin, Fluidotherapy, Manual therapy/joint mobilizations, Modalities for pain management, US 3 mhz, Therapeutic exercises/activities., Strengthening, Orthotic Fabrication/Fit/Training, Edema Control and Scar Management.     Discussed Plan of Care with patient: Yes  Updates/Grading for next session: Advance as tolerated.      Stephanie Mason, OT

## 2020-12-04 NOTE — PLAN OF CARE
Occupational Therapy Re-evaluation Note     Date: 12/4/2020  Name: Andre Padgett  Clinic Number: [id number]    Therapy Diagnosis:   Encounter Diagnosis   Name Primary?    Range of motion deficit      Physician: Ino George NP     Physician Orders: Work on gentle ROM of bilateral wrist, fingers.  No weight bearing in either hand for now.  Medical Diagnosis: S52.91XD,S52.92XD (ICD-10-CM) - Closed fracture of both radii with routine healing, subsequent encounter      Surgical Procedure and Date:    09/14/2020     Open reduction internal fixation of comminuted intra-articular bilateral distal radius fractures, 3 or more parts each       Evaluation Date: 10/9/2020  Insurance: Medicaid  Insurance Authorization period Expiration: 12/31/2020      Plan of Care Certification Period: 12/4/2020 to 2/4/2020     Visit # / Visits Authortized: 14/ 20  Time In: 9:30 am  Time Out: 10:25 am  Total Billable Time: 60 minutes    Precautions: Standard    Return to MD: 12/7/2020    Subjective     Pt reports: He was compliant with home exercise program given last session.   Response to previous treatment: Improved ROM  Functional change: Pt reports that he is dressing with a little difficulty.     Pain: 3 /10  Location: N/A    Objective   Edema. Measured in centimeters.    10/9/2020 10/9/2020 10/19/2020 10/19/2020 11/2/2020 11/2/2020 11/18/2020 11/18/2020 12/4/2020 12/4/2020     Left Right Left Right Left Right Left Right Left Right   Proximal Wrist Crease 19.5 20 18.5 20.5 18.3 19 18.1 19 18 19   Mid palm 22.4 24 20.4 20.5 20 20.5 20.5 22 20.5 21   MCPs 21 22.5 19.8 21.5 20.8 21 20.5 21 20 21      Hand ROM. Measured in degrees/cm.    10/9/2020 10/9/2020 10/19/2020 10/19/2020 11/2/2020 11/2/2020 11/18/2020 12/4/2020     Left Right Left Right Left Right Left/Right Left/Right                 Index: DPC 5 7 2 cm  3.5 cm touch 2 cm touch WNL   MP Extension 0 30 0 0 0 0                   Long:  DPC 5 8 3.5 cm  4 cm touch 2 cm touch WNL   MP Ext 0 30 0 0 0 0                   Ring:   DPC 5 8 4 cm 4 cm touch 3 cm touch WNL   MP Ext 0 15 0 0 0 0                   Small:  DPC 5 6 3.5 cm 3.5 cm touch 2 cm touch WNL   MP Ext 0 10 0 0 0 0                   Thumb: DPC 7 8 touch 2 cm touch .5 cm touch WNL      Wrist AROM  Date 10/9/2020 10/9/2020 10/19/2020 10/19/2020 11/2/2020 11/2/2020 11/18/2020 11/18/2020 12/4/2020 12/4/2020     Left Right Left Right Left Right Left Right Left Right   Supination/Pronation -35/80 -60/70 0/85 0/85 15/85 35/85 45/90 40/90 60/90 45/90   Wrist ext/flex 0/10 -10/15 45/25 25/30 40/30 30/30 45/30 35/30 50/35 40/30   Wrist RD/UD 5/5 5/5 15/10 10/15 15/15 20/15 25/20 20/15 25/20 20/20     Sensation:  c/o numbness in tips of fingers however less than 2 weeks ago      Strength (Dyanmometer) and Pinch Strength (Pinch Gauge)  Measured in pounds and psi. Average of three trials.    10/9/2020 10/9/2020 11/4/2020 11/4/2020 11/18/2020 11/18/67457 12/4/2020 12/4/2020     Left Right Left Right Left Right Left Right   Rung II def def 22 12 27 17 32 24     Andre received therapeutic activities for 55 minutes including:  -Patient received fluido x 8 min to BL wrists while performing AROM ex   -A/AAROM as follows: TGEs, thumb opposition, finger abd/add, finger ext, wrist ext/flex (open/closed hand), RD/UD (open/closed hand) and  sup/pron x 10 reps each BL hands  -Prayer stretch x 10 reps  -Wrist maze 3' each BL  -red putty: 2' molding, 2' grasping BL hands (at home)  -red pw pushes & pulls 2 x 15 reps BL hands  -hand gripper with 2 yellow bands 2 x 15 reps (add red band for L hand)  -rotation bar 1 plate x 30 reps  -1 lb wrist 3 ways 1 x 15 reps  -yellow flex bar: frowns, smiles and twists x 30 reps        Home Exercises and Education Provided     Education provided:   - red putty  - Progress towards goals: Good     Written Home Exercises Provided: Patient instructed to cont prior HEP.  Exercises were reviewed  and Andre was able to demonstrate them prior to the end of the session.  Andre demonstrated good  understanding of the education provided.     See EMR under Patient Instructions for exercises provided initial evaluation .     Additional Education provided: NT  Andre demonstrated good  understanding of the education provided.     Assessment   Andre Padgett is a 50 y.o. male referred to outpatient occupational/hand therapy and presents with a medical diagnosis of BL distal radius fxs s/p ORIF on 9/14/2020, resulting in pain, edema, decreased range of and decreased functional use of both hands. Pt continues to make slow steady progress with increasing wrist AROM,  and pinch strength. Finger motion is WNL. Pt is dressing himself and performing light activities with mild difficulty. Pt remains very motivated and participates well in therapy.    Pt would continue to benefit from skilled OT.      Andre is progressing well towards his goals and there are no updates to goals at this time. Pt prognosis is Good     Pt will continue to benefit from skilled outpatient occupational therapy to address the deficits listed in the problem list on initial evaluation provide pt/family education and to maximize pt's level of independence in the home and community environment.     Anticipated barriers to occupational therapy: bilateral wrist fractures    Pt's spiritual, cultural and educational needs considered and pt agreeable to plan of care and goals.    Goals:  Short Term (4 weeks on 11/9/2020):  1)   Patient to be IND with HEP and modalities for pain management.- Met 10/14/2020  2)   Increase GARZA of all finger by 3 cm  degrees for finger flexion  to increase functional hand use for grasping.- Met 11/2/2020  3)   Measure /pinch strength at 6 weeks post op.- Met 11/4/2020  4)   Decrease edema .2-.3 cm to increase joint mobility /flexibility for improved overall functional hand use. - Met 11/2/2020  5)   Decrease  complaints of pain to  3 out of 10 to increase functional hand use for ADL/work/leisure activities.-Met 11/4/2020  6)   Pt will be independent with dressing and feeding himself.- Met 12/4/2020     Long Term (by discharge):  1)   Pt will report 1 out of 10 pain with BL hand use.-Met 11/4/2020  2)   Patient to score at CK on FOTO to demonstrate improved perception of functional BL hand use.-progressing  3)   Pt will return to prior level of function for ADLs and household management.-progressing       Plan:     Certification Period/Plan of care expiration: 12/4/2020 to 2/4/2020.     Outpatient Occupational Therapy 2 times weekly for 8 weeks may include the following interventions: Paraffin, Fluidotherapy, Manual therapy/joint mobilizations, Modalities for pain management, US 3 mhz, Therapeutic exercises/activities., Strengthening, Orthotic Fabrication/Fit/Training, Edema Control and Scar Management.     Discussed Plan of Care with patient: Yes  Updates/Grading for next session: Advance as tolerated.      Stephanie Mason OT     I certify the need for these services furnished under the plan of treatment and while under my care.     ____________________________________________________________________  Physician/Referring Practitioner   Date of Signature     Attg Note:  I agree with the therapy assessment and plan and consider it medically necessary.    Hao Thorne MD  12/04/2020

## 2020-12-07 ENCOUNTER — HOSPITAL ENCOUNTER (OUTPATIENT)
Dept: RADIOLOGY | Facility: HOSPITAL | Age: 50
Discharge: HOME | End: 2020-12-07
Attending: NURSE PRACTITIONER
Payer: MEDICAID

## 2020-12-07 ENCOUNTER — OFFICE VISIT (OUTPATIENT)
Dept: ORTHOPEDICS | Facility: CLINIC | Age: 50
End: 2020-12-07
Payer: MEDICAID

## 2020-12-07 ENCOUNTER — PATIENT MESSAGE (OUTPATIENT)
Dept: ADMINISTRATIVE | Facility: OTHER | Age: 50
End: 2020-12-07

## 2020-12-07 VITALS — BODY MASS INDEX: 25.55 KG/M2 | WEIGHT: 178.44 LBS | HEIGHT: 70 IN

## 2020-12-07 DIAGNOSIS — S52.92XD CLOSED FRACTURE OF BOTH RADII WITH ROUTINE HEALING, SUBSEQUENT ENCOUNTER: ICD-10-CM

## 2020-12-07 DIAGNOSIS — S52.92XD CLOSED FRACTURE OF BOTH RADII WITH ROUTINE HEALING, SUBSEQUENT ENCOUNTER: Primary | ICD-10-CM

## 2020-12-07 DIAGNOSIS — Z98.890 POST-OPERATIVE STATE: ICD-10-CM

## 2020-12-07 DIAGNOSIS — S52.91XD CLOSED FRACTURE OF BOTH RADII WITH ROUTINE HEALING, SUBSEQUENT ENCOUNTER: Primary | ICD-10-CM

## 2020-12-07 DIAGNOSIS — S52.91XD CLOSED FRACTURE OF BOTH RADII WITH ROUTINE HEALING, SUBSEQUENT ENCOUNTER: ICD-10-CM

## 2020-12-07 PROCEDURE — 73110 X-RAY EXAM OF WRIST: CPT | Mod: TC,50

## 2020-12-07 PROCEDURE — 99999 PR PBB SHADOW E&M-EST. PATIENT-LVL III: CPT | Mod: PBBFAC,,, | Performed by: NURSE PRACTITIONER

## 2020-12-07 PROCEDURE — 99024 PR POST-OP FOLLOW-UP VISIT: ICD-10-PCS | Mod: ,,, | Performed by: NURSE PRACTITIONER

## 2020-12-07 PROCEDURE — 99024 POSTOP FOLLOW-UP VISIT: CPT | Mod: ,,, | Performed by: NURSE PRACTITIONER

## 2020-12-07 PROCEDURE — 99213 OFFICE O/P EST LOW 20 MIN: CPT | Mod: PBBFAC,25 | Performed by: NURSE PRACTITIONER

## 2020-12-07 PROCEDURE — 73110 X-RAY EXAM OF WRIST: CPT | Mod: 26,50,, | Performed by: RADIOLOGY

## 2020-12-07 PROCEDURE — 99999 PR PBB SHADOW E&M-EST. PATIENT-LVL III: ICD-10-PCS | Mod: PBBFAC,,, | Performed by: NURSE PRACTITIONER

## 2020-12-07 PROCEDURE — 73110 XR WRIST COMPLETE 3 VIEWS BILATERAL: ICD-10-PCS | Mod: 26,50,, | Performed by: RADIOLOGY

## 2020-12-08 ENCOUNTER — CLINICAL SUPPORT (OUTPATIENT)
Dept: REHABILITATION | Facility: HOSPITAL | Age: 50
End: 2020-12-08
Attending: FAMILY MEDICINE
Payer: MEDICAID

## 2020-12-08 ENCOUNTER — PATIENT OUTREACH (OUTPATIENT)
Dept: ADMINISTRATIVE | Facility: OTHER | Age: 50
End: 2020-12-08

## 2020-12-08 DIAGNOSIS — M25.60 RANGE OF MOTION DEFICIT: ICD-10-CM

## 2020-12-08 PROCEDURE — 97530 THERAPEUTIC ACTIVITIES: CPT

## 2020-12-08 NOTE — PROGRESS NOTES
Health Maintenance Due   Topic Date Due    Hepatitis C Screening  1970    HIV Screening  09/05/1986    TETANUS VACCINE  09/05/1989    Aspirin/Antiplatelet Therapy  09/05/1989    Pneumococcal Vaccine (Medium Risk) (1 of 1 - PPSV23) 09/05/1990    Influenza Vaccine (1) 08/01/2020     Updates were requested from care everywhere.  Chart was reviewed for overdue Proactive [facility] Encounters (THELMA) topics (CRS, Breast Cancer Screening, Eye exam)  Health Maintenance has been updated.  [facility] immunization registry triggered.  Immunizations were reconciled.

## 2020-12-08 NOTE — PROGRESS NOTES
Occupational Therapy Daily Treatment Note     Date: 12/8/2020  Name: Andre Padgett  Clinic Number: [id number]    Therapy Diagnosis:   Encounter Diagnosis   Name Primary?    Range of motion deficit      Physician: Ino George NP     Physician Orders: Work on gentle ROM of bilateral wrist, fingers.  No weight bearing in either hand for now.  Medical Diagnosis: S52.91XD,S52.92XD (ICD-10-CM) - Closed fracture of both radii with routine healing, subsequent encounter      Surgical Procedure and Date:    09/14/2020     Open reduction internal fixation of comminuted intra-articular bilateral distal radius fractures, 3 or more parts each       Evaluation Date: 10/9/2020  Insurance: Medicaid  Insurance Authorization period Expiration: 12/31/2020      Plan of Care Certification Period: 12/4/2020 to 2/4/2020     Visit # / Visits Authortized: 15/ 20  Time In: 9:55 am  Time Out: 10:40 am  Total Billable Time: 45 minutes    Precautions: Standard    Subjective     Pt reports: Pt reports hands and fingers are sore from more use after being discharged from wearing braces.   Response to previous treatment: Improved ROM  Functional change: Pt reports that getting dressed and doing household tasks are becoming easier.      Pain: 0 /10; only stiffness  Location: N/A    Objective   Edema. Measured in centimeters.    10/9/2020 10/9/2020 10/19/2020 10/19/2020 11/2/2020 11/2/2020 11/18/2020 11/18/2020 12/4/2020 12/4/2020     Left Right Left Right Left Right Left Right Left Right   Proximal Wrist Crease 19.5 20 18.5 20.5 18.3 19 18.1 19 18 19   Mid palm 22.4 24 20.4 20.5 20 20.5 20.5 22 20.5 21   MCPs 21 22.5 19.8 21.5 20.8 21 20.5 21 20 21      Hand ROM. Measured in degrees/cm.    10/9/2020 10/9/2020 10/19/2020 10/19/2020 11/2/2020 11/2/2020 11/18/2020 12/4/2020     Left Right Left Right Left Right Left/Right Left/Right                 Index: DPC 5 7 2 cm  3.5 cm touch 2 cm touch WNL   MP Extension 0 30 0  0 0 0                   Long:  DPC 5 8 3.5 cm 4 cm touch 2 cm touch WNL   MP Ext 0 30 0 0 0 0                   Ring:   DPC 5 8 4 cm 4 cm touch 3 cm touch WNL   MP Ext 0 15 0 0 0 0                   Small:  DPC 5 6 3.5 cm 3.5 cm touch 2 cm touch WNL   MP Ext 0 10 0 0 0 0                   Thumb: DPC 7 8 touch 2 cm touch .5 cm touch WNL      Wrist AROM  Date 10/9/2020 10/9/2020 10/19/2020 10/19/2020 11/2/2020 11/2/2020 11/18/2020 11/18/2020 12/4/2020 12/4/2020     Left Right Left Right Left Right Left Right Left Right   Supination/Pronation -35/80 -60/70 0/85 0/85 15/85 35/85 45/90 40/90 60/90 45/90   Wrist ext/flex 0/10 -10/15 45/25 25/30 40/30 30/30 45/30 35/30 50/35 40/30   Wrist RD/UD 5/5 5/5 15/10 10/15 15/15 20/15 25/20 20/15 25/20 20/20     Sensation:  c/o of numbness in tips of fingers continues to decrease      Strength (Dyanmometer) and Pinch Strength (Pinch Gauge)  Measured in pounds and psi. Average of three trials.    10/9/2020 10/9/2020 11/4/2020 11/4/2020 11/18/2020 11/18/02564 12/4/2020 12/4/2020     Left Right Left Right Left Right Left Right   Rung II def def 22 12 27 17 32 24     Andre received therapeutic activities for 45 minutes including:  -Patient received fluido x 8 min to BL wrists while performing AROM ex   -A/AAROM as follows: TGEs, thumb opposition, finger abd/add, finger ext, wrist ext/flex (open/closed hand), RD/UD (open/closed hand) and sup/pron x 10 reps each BL hands  -Prayer stretch x 10 reps  -Wrist maze 3' each BL  -red putty: 2' molding, 2' grasping BL hands (at home)  -red pw pushes & pulls 2 x 15 reps BL hands  -hand gripper with 2 yellow & 1 red bands, 2 x 15 reps   -rotation bar 1 plate x 30 reps  -2  lb wrist 3 ways 1 x 15 reps  -yellow flex bar: frowns, smiles and twists x 30 reps        Home Exercises and Education Provided     Education provided:   - NT  - Progress towards goals: Good     Written Home Exercises Provided: Patient instructed to cont prior  HEP.  Exercises were reviewed and Andre was able to demonstrate them prior to the end of the session.  Andre demonstrated good  understanding of the education provided.     See EMR under Patient Instructions for exercises provided initial evaluation .     Additional Education provided: NT  Andre demonstrated good  understanding of the education provided.     Assessment   Andre Padgett is a 50 y.o. male referred to outpatient occupational/hand therapy and presents with a medical diagnosis of BL distal radius fxs s/p ORIF on 9/14/2020, resulting in pain, edema, decreased range of and decreased functional use of both hands. Pt continues to make slow steady progress with increasing wrist AROM,  and pinch strength. Pt advanced this day with strengthening with mild difficulty; hand gripper, 2# wrist 3 ways. Pt remains very motivated and participates well in therapy.    Pt would continue to benefit from skilled OT.      Andre is progressing well towards his goals and there are no updates to goals at this time. Pt prognosis is Good     Pt will continue to benefit from skilled outpatient occupational therapy to address the deficits listed in the problem list on initial evaluation provide pt/family education and to maximize pt's level of independence in the home and community environment.     Anticipated barriers to occupational therapy: bilateral wrist fractures    Pt's spiritual, cultural and educational needs considered and pt agreeable to plan of care and goals.    Goals:  Short Term (4 weeks on 11/9/2020):  1)   Patient to be IND with HEP and modalities for pain management.- Met 10/14/2020  2)   Increase GARZA of all finger by 3 cm  degrees for finger flexion  to increase functional hand use for grasping.- Met 11/2/2020  3)   Measure /pinch strength at 6 weeks post op.- Met 11/4/2020  4)   Decrease edema .2-.3 cm to increase joint mobility /flexibility for improved overall functional hand use. - Met  "11/2/2020  5)   Decrease complaints of pain to  3 out of 10 to increase functional hand use for ADL/work/leisure activities.-Met 11/4/2020  6)   Pt will be independent with dressing and feeding himself.- Met 12/4/2020     Long Term (by discharge):  1)   Pt will report 1 out of 10 pain with BL hand use.-Met 11/4/2020  2)   Patient to score at CK on FOTO to demonstrate improved perception of functional BL hand use.-progressing  3)   Pt will return to prior level of function for ADLs and household management.-progressing       Plan:     Certification Period/Plan of care expiration: 12/4/2020 to 2/4/2020.     Outpatient Occupational Therapy 2 times weekly for 8 weeks may include the following interventions: Paraffin, Fluidotherapy, Manual therapy/joint mobilizations, Modalities for pain management, US 3 mhz, Therapeutic exercises/activities., Strengthening, Orthotic Fabrication/Fit/Training, Edema Control and Scar Management.     Discussed Plan of Care with patient: Yes  Updates/Grading for next session: Advance as tolerated.    Student: KARAN Casper    " I certify that I was present in the room directing the student in service delivery and guiding them using my skilled judgement. As the co-signing therapist, I have reviewed the student's documentation and am responsible for the treatment, assessment and plan."    Therapist: CHANTE Garcia, T    Stephanie Mason, OT         "

## 2020-12-10 ENCOUNTER — CLINICAL SUPPORT (OUTPATIENT)
Dept: REHABILITATION | Facility: HOSPITAL | Age: 50
End: 2020-12-10
Attending: FAMILY MEDICINE
Payer: MEDICAID

## 2020-12-10 DIAGNOSIS — M25.60 RANGE OF MOTION DEFICIT: ICD-10-CM

## 2020-12-10 PROCEDURE — 97530 THERAPEUTIC ACTIVITIES: CPT

## 2020-12-10 NOTE — PROGRESS NOTES
Occupational Therapy Daily Treatment Note     Date: 12/10/2020  Name: Andre Padgett  Clinic Number: [id number]    Therapy Diagnosis:   Encounter Diagnosis   Name Primary?    Range of motion deficit      Physician: Ino George NP     Physician Orders: Work on gentle ROM of bilateral wrist, fingers.  No weight bearing in either hand for now.  Medical Diagnosis: S52.91XD,S52.92XD (ICD-10-CM) - Closed fracture of both radii with routine healing, subsequent encounter      Surgical Procedure and Date:    09/14/2020     Open reduction internal fixation of comminuted intra-articular bilateral distal radius fractures, 3 or more parts each       Evaluation Date: 10/9/2020  Insurance: Medicaid  Insurance Authorization period Expiration: 12/31/2020      Plan of Care Certification Period: 12/4/2020 to 2/4/2020     Visit # / Visits Authortized: 16/ 20  Time In: 9:10 am  Time Out: 10:05 am  Total Billable Time: 55 minutes    Precautions: Standard    Subjective     Pt reports: Pt reports no pain only stiffness in MPs.    Response to previous treatment: Improved ROM  Functional change: Pt reports that he is driving and dropping his daughter off at school and picking her up in the afternoon.     Pain: 0 /10; only stiffness  Location: N/A    Objective   Edema. Measured in centimeters.    10/9/2020 10/9/2020 10/19/2020 10/19/2020 11/2/2020 11/2/2020 11/18/2020 11/18/2020 12/4/2020 12/4/2020     Left Right Left Right Left Right Left Right Left Right   Proximal Wrist Crease 19.5 20 18.5 20.5 18.3 19 18.1 19 18 19   Mid palm 22.4 24 20.4 20.5 20 20.5 20.5 22 20.5 21   MCPs 21 22.5 19.8 21.5 20.8 21 20.5 21 20 21      Hand ROM. Measured in degrees/cm.    10/9/2020 10/9/2020 10/19/2020 10/19/2020 11/2/2020 11/2/2020 11/18/2020 12/4/2020     Left Right Left Right Left Right Left/Right Left/Right                 Index: DPC 5 7 2 cm  3.5 cm touch 2 cm touch WNL   MP Extension 0 30 0 0 0 0                    Long:  DPC 5 8 3.5 cm 4 cm touch 2 cm touch WNL   MP Ext 0 30 0 0 0 0                   Ring:   DPC 5 8 4 cm 4 cm touch 3 cm touch WNL   MP Ext 0 15 0 0 0 0                   Small:  DPC 5 6 3.5 cm 3.5 cm touch 2 cm touch WNL   MP Ext 0 10 0 0 0 0                   Thumb: DPC 7 8 touch 2 cm touch .5 cm touch WNL      Wrist AROM  Date 10/9/2020 10/9/2020 10/19/2020 10/19/2020 11/2/2020 11/2/2020 11/18/2020 11/18/2020 12/4/2020 12/4/2020     Left Right Left Right Left Right Left Right Left Right   Supination/Pronation -35/80 -60/70 0/85 0/85 15/85 35/85 45/90 40/90 60/90 45/90   Wrist ext/flex 0/10 -10/15 45/25 25/30 40/30 30/30 45/30 35/30 50/35 40/30   Wrist RD/UD 5/5 5/5 15/10 10/15 15/15 20/15 25/20 20/15 25/20 20/20     Sensation:  c/o of numbness in tips of fingers continues to decrease      Strength (Dyanmometer) and Pinch Strength (Pinch Gauge)  Measured in pounds and psi. Average of three trials.    10/9/2020 10/9/2020 11/4/2020 11/4/2020 11/18/2020 11/18/61893 12/4/2020 12/4/2020     Left Right Left Right Left Right Left Right   Rung II def def 22 12 27 17 32 24     Andre received therapeutic activities for 55 minutes including:  -Patient received fluido x 8 min to BL wrists while performing AROM ex   -A/AAROM as follows: TGEs, thumb opposition, finger abd/add, finger ext, wrist ext/flex (open/closed hand), RD/UD (open/closed hand) and sup/pron x 10 reps each BL hands  -Prayer stretch x 10 reps  -Wrist maze 3' each BL  -red putty: 2' molding, 2' grasping BL hands (at home)  -green pw pushes & pulls 2 x 15 reps BL hands  -hand gripper with 2 yellow & 1 red bands, 2 x 15 reps   -rotation bar 2 plates x 30 reps  -2  lb wrist 3 ways 1 x 15 reps  -yellow flex bar: frowns, smiles and twists x 30 reps        Home Exercises and Education Provided     Education provided:   - NT  - Progress towards goals: Good     Written Home Exercises Provided: Patient instructed to cont prior HEP.  Exercises were reviewed  and Andre was able to demonstrate them prior to the end of the session.  Andre demonstrated good  understanding of the education provided.     See EMR under Patient Instructions for exercises provided initial evaluation .     Additional Education provided: NT  Andre demonstrated good  understanding of the education provided.     Assessment   Andre Padgett is a 50 y.o. male referred to outpatient occupational/hand therapy and presents with a medical diagnosis of BL distal radius fxs s/p ORIF on 9/14/2020, resulting in pain, edema, decreased range of and decreased functional use of both hands. Pt continues to make slow steady progress with increasing wrist AROM,  and pinch strength. Pt advanced this day with strengthening with mild difficulty; green pw, rotation bar 2 plates. Pt remains very motivated and participates well in therapy.    Pt would continue to benefit from skilled OT.      Andre is progressing well towards his goals and there are no updates to goals at this time. Pt prognosis is Good     Pt will continue to benefit from skilled outpatient occupational therapy to address the deficits listed in the problem list on initial evaluation provide pt/family education and to maximize pt's level of independence in the home and community environment.     Anticipated barriers to occupational therapy: bilateral wrist fractures    Pt's spiritual, cultural and educational needs considered and pt agreeable to plan of care and goals.    Goals:  Short Term (4 weeks on 11/9/2020):  1)   Patient to be IND with HEP and modalities for pain management.- Met 10/14/2020  2)   Increase GARZA of all finger by 3 cm  degrees for finger flexion  to increase functional hand use for grasping.- Met 11/2/2020  3)   Measure /pinch strength at 6 weeks post op.- Met 11/4/2020  4)   Decrease edema .2-.3 cm to increase joint mobility /flexibility for improved overall functional hand use. - Met 11/2/2020  5)   Decrease complaints  "of pain to  3 out of 10 to increase functional hand use for ADL/work/leisure activities.-Met 11/4/2020  6)   Pt will be independent with dressing and feeding himself.- Met 12/4/2020     Long Term (by discharge):  1)   Pt will report 1 out of 10 pain with BL hand use.-Met 11/4/2020  2)   Patient to score at CK on FOTO to demonstrate improved perception of functional BL hand use.-progressing  3)   Pt will return to prior level of function for ADLs and household management.-progressing       Plan:     Certification Period/Plan of care expiration: 12/4/2020 to 2/4/2020.     Outpatient Occupational Therapy 2 times weekly for 8 weeks may include the following interventions: Paraffin, Fluidotherapy, Manual therapy/joint mobilizations, Modalities for pain management, US 3 mhz, Therapeutic exercises/activities., Strengthening, Orthotic Fabrication/Fit/Training, Edema Control and Scar Management.     Discussed Plan of Care with patient: Yes  Updates/Grading for next session: Advance as tolerated.    Student: KARAN Casper    " I certify that I was present in the room directing the student in service delivery and guiding them using my skilled judgement. As the co-signing therapist, I have reviewed the student's documentation and am responsible for the treatment, assessment and plan."    Therapist: CHANTE Garcia, AVI Mason, BRENDA         "

## 2020-12-15 ENCOUNTER — CLINICAL SUPPORT (OUTPATIENT)
Dept: REHABILITATION | Facility: HOSPITAL | Age: 50
End: 2020-12-15
Attending: FAMILY MEDICINE
Payer: MEDICAID

## 2020-12-15 DIAGNOSIS — M25.60 RANGE OF MOTION DEFICIT: ICD-10-CM

## 2020-12-15 PROCEDURE — 97530 THERAPEUTIC ACTIVITIES: CPT

## 2020-12-15 NOTE — PROGRESS NOTES
"                            Occupational Therapy Daily Treatment Note     Date: 12/15/2020  Name: Andre Padgett  Clinic Number: [id number]    Therapy Diagnosis:   Encounter Diagnosis   Name Primary?    Range of motion deficit      Physician: Ino George NP     Physician Orders: Work on gentle ROM of bilateral wrist, fingers.  No weight bearing in either hand for now.  Medical Diagnosis: S52.91XD,S52.92XD (ICD-10-CM) - Closed fracture of both radii with routine healing, subsequent encounter      Surgical Procedure and Date:    09/14/2020     Open reduction internal fixation of comminuted intra-articular bilateral distal radius fractures, 3 or more parts each       Evaluation Date: 10/9/2020  Insurance: Medicaid  Insurance Authorization period Expiration: 12/31/2020      Plan of Care Certification Period: 12/4/2020 to 2/4/2020     Visit # / Visits Authortized: 17/ 20  Time In: 10:45 am  Time Out: 11:35  am  Total Billable Time: 50 minutes    Precautions: Standard    Subjective     Pt reports: "My fingers feel tired and tight."   Response to previous treatment: Improved ROM  Functional change: "I can move my hands a little bit more."    Pain: 0 /10; only stiffness  Location: N/A    Objective   Edema. Measured in centimeters.    10/9/2020 10/9/2020 10/19/2020 10/19/2020 11/2/2020 11/2/2020 11/18/2020 11/18/2020 12/4/2020 12/4/2020     Left Right Left Right Left Right Left Right Left Right   Proximal Wrist Crease 19.5 20 18.5 20.5 18.3 19 18.1 19 18 19   Mid palm 22.4 24 20.4 20.5 20 20.5 20.5 22 20.5 21   MCPs 21 22.5 19.8 21.5 20.8 21 20.5 21 20 21      Hand ROM. Measured in degrees/cm.    10/9/2020 10/9/2020 10/19/2020 10/19/2020 11/2/2020 11/2/2020 11/18/2020 12/4/2020     Left Right Left Right Left Right Left/Right Left/Right                 Index: DPC 5 7 2 cm  3.5 cm touch 2 cm touch WNL   MP Extension 0 30 0 0 0 0                   Long:  DPC 5 8 3.5 cm 4 cm touch 2 cm touch WNL   MP Ext 0 30 0 0 0 0   "                 Ring:   DPC 5 8 4 cm 4 cm touch 3 cm touch WNL   MP Ext 0 15 0 0 0 0                   Small:  DPC 5 6 3.5 cm 3.5 cm touch 2 cm touch WNL   MP Ext 0 10 0 0 0 0                   Thumb: DPC 7 8 touch 2 cm touch .5 cm touch WNL      Wrist AROM  Date 10/9/2020 10/9/2020 10/19/2020 10/19/2020 11/2/2020 11/2/2020 11/18/2020 11/18/2020 12/4/2020 12/4/2020     Left Right Left Right Left Right Left Right Left Right   Supination/Pronation -35/80 -60/70 0/85 0/85 15/85 35/85 45/90 40/90 60/90 45/90   Wrist ext/flex 0/10 -10/15 45/25 25/30 40/30 30/30 45/30 35/30 50/35 40/30   Wrist RD/UD 5/5 5/5 15/10 10/15 15/15 20/15 25/20 20/15 25/20 20/20     Sensation:  c/o of numbness in tips of fingers continues to decrease      Strength (Dyanmometer) and Pinch Strength (Pinch Gauge)  Measured in pounds and psi. Average of three trials.    10/9/2020 10/9/2020 11/4/2020 11/4/2020 11/18/2020 11/18/20669 12/4/2020 12/4/2020     Left Right Left Right Left Right Left Right   Rung II def def 22 12 27 17 32 24     Andre received therapeutic activities for 50 minutes including:  -Patient received fluido x 8 min to BL wrists while performing AROM ex   -A/AAROM as follows: TGEs, thumb opposition, finger abd/add, finger ext, wrist ext/flex (open/closed hand), RD/UD (open/closed hand) and sup/pron x 10 reps each BL hands  -Prayer stretch x 10 reps  -Wrist maze 3' each BL  -red putty: 2' molding, 2' grasping BL hands (at home)  -green pw pushes & pulls 2 x 15 reps BL hands  -hand gripper with 2 yellow & 1 red bands, 2 x 15 reps   -rotation bar 2 plates x 30 reps  -2  lb wrist 3 ways 1 x 15 reps  -red flex bar: frowns, smiles and twists x 30 reps    *Pt issued tubigrip C for edema management    Home Exercises and Education Provided     Education provided:   - NT  - Progress towards goals: Good     Written Home Exercises Provided: Patient instructed to cont prior HEP.  Exercises were reviewed and Andre was able to demonstrate  them prior to the end of the session.  Andre demonstrated good  understanding of the education provided.     See EMR under Patient Instructions for exercises provided initial evaluation .     Additional Education provided: NT  Andre demonstrated good  understanding of the education provided.     Assessment   Andre Padgett is a 50 y.o. male referred to outpatient occupational/hand therapy and presents with a medical diagnosis of BL distal radius fxs s/p ORIF on 9/14/2020, resulting in pain, edema, decreased range of and decreased functional use of both hands. Pt continues to make slow steady progress with increasing wrist AROM,  and pinch strength. Pt advanced this day with strengthening with mild difficulty; red flexbar, hand gripper 3 yellow and one red band for L hand. Pt reports he can move his hands more. Pt remains very motivated and participates well in therapy.    Pt would continue to benefit from skilled OT.      Andre is progressing well towards his goals and there are no updates to goals at this time. Pt prognosis is Good     Pt will continue to benefit from skilled outpatient occupational therapy to address the deficits listed in the problem list on initial evaluation provide pt/family education and to maximize pt's level of independence in the home and community environment.     Anticipated barriers to occupational therapy: bilateral wrist fractures    Pt's spiritual, cultural and educational needs considered and pt agreeable to plan of care and goals.    Goals:  Short Term (4 weeks on 11/9/2020):  1)   Patient to be IND with HEP and modalities for pain management.- Met 10/14/2020  2)   Increase GARZA of all finger by 3 cm  degrees for finger flexion  to increase functional hand use for grasping.- Met 11/2/2020  3)   Measure /pinch strength at 6 weeks post op.- Met 11/4/2020  4)   Decrease edema .2-.3 cm to increase joint mobility /flexibility for improved overall functional hand use. - Met  "11/2/2020  5)   Decrease complaints of pain to  3 out of 10 to increase functional hand use for ADL/work/leisure activities.-Met 11/4/2020  6)   Pt will be independent with dressing and feeding himself.- Met 12/4/2020     Long Term (by discharge):  1)   Pt will report 1 out of 10 pain with BL hand use.-Met 11/4/2020  2)   Patient to score at CK on FOTO to demonstrate improved perception of functional BL hand use.-progressing  3)   Pt will return to prior level of function for ADLs and household management.-progressing       Plan: Measure next session.     Certification Period/Plan of care expiration: 12/4/2020 to 2/4/2020.     Outpatient Occupational Therapy 2 times weekly for 8 weeks may include the following interventions: Paraffin, Fluidotherapy, Manual therapy/joint mobilizations, Modalities for pain management, US 3 mhz, Therapeutic exercises/activities., Strengthening, Orthotic Fabrication/Fit/Training, Edema Control and Scar Management.     Discussed Plan of Care with patient: Yes  Updates/Grading for next session: Advance as tolerated.    Student: KARAN Casper    " I certify that I was present in the room directing the student in service delivery and guiding them using my skilled judgement. As the co-signing therapist, I have reviewed the student's documentation and am responsible for the treatment, assessment and plan."    Therapist: CHANTE Garcia, T    Stephanie Mason, OT         "

## 2020-12-17 ENCOUNTER — CLINICAL SUPPORT (OUTPATIENT)
Dept: REHABILITATION | Facility: HOSPITAL | Age: 50
End: 2020-12-17
Attending: FAMILY MEDICINE
Payer: MEDICAID

## 2020-12-17 DIAGNOSIS — M25.60 RANGE OF MOTION DEFICIT: ICD-10-CM

## 2020-12-17 PROCEDURE — 97530 THERAPEUTIC ACTIVITIES: CPT

## 2020-12-17 NOTE — PROGRESS NOTES
"                            Occupational Therapy Daily Treatment Note     Date: 12/17/2020  Name: Andre Padgett  Clinic Number: [id number]    Therapy Diagnosis:   Encounter Diagnosis   Name Primary?    Range of motion deficit      Physician: Ino George NP     Physician Orders: Work on gentle ROM of bilateral wrist, fingers.  No weight bearing in either hand for now.  Medical Diagnosis: S52.91XD,S52.92XD (ICD-10-CM) - Closed fracture of both radii with routine healing, subsequent encounter      Surgical Procedure and Date:    09/14/2020     Open reduction internal fixation of comminuted intra-articular bilateral distal radius fractures, 3 or more parts each       Evaluation Date: 10/9/2020  Insurance: Medicaid  Insurance Authorization period Expiration: 12/31/2020      Plan of Care Certification Period: 12/4/2020 to 2/4/2020     Visit # / Visits Authortized: 18/ 20  Time In: 9:15 am  Time Out: 10:15 am  Total Billable Time: 50 minutes    Precautions: Standard    Subjective     Pt reports: "I was really sore yesterday, I think I was having more swelling in my right wrist."   Response to previous treatment: Improved ROM  Functional change: Pt reports he is still performing light household tasks.     Pain: 0 /10; only stiffness  Location: N/A      CMS Impairment/Limitation/Restriction for FOTO Wrist Survey    Therapist reviewed FOTO scores for Andre Padgett on 12/17/2020.   FOTO documents entered into [employer] - see Media section.    Limitation Score: 52%  Category: Carrying    Current : CK = at least 40% but < 60% impaired, limited or restricted  Goal: CK = at least 40% but < 60% impaired, limited or restricted           Objective   Edema. Measured in centimeters.    10/9/2020 10/9/2020 10/19/2020 10/19/2020 11/2/2020 11/2/2020 11/18/2020 11/18/2020 12/4/2020 12/4/2020 12/17/2020 12/17/2020     Left Right Left Right Left Right Left Right Left Right Left Right   Proximal Wrist Crease 19.5 20 18.5 20.5 18.3 19 " 18.1 19 18 19 17.5 19   Mid palm 22.4 24 20.4 20.5 20 20.5 20.5 22 20.5 21 19.5 21   MCPs 21 22.5 19.8 21.5 20.8 21 20.5 21 20 21 19.5 20.5      Hand ROM. Measured in degrees/cm.    10/9/2020 10/9/2020 10/19/2020 10/19/2020 11/2/2020 11/2/2020 11/18/2020 12/4/2020     Left Right Left Right Left Right Left/Right Left/Right                 Index: DPC 5 7 2 cm  3.5 cm touch 2 cm touch WNL   MP Extension 0 30 0 0 0 0                   Long:  DPC 5 8 3.5 cm 4 cm touch 2 cm touch WNL   MP Ext 0 30 0 0 0 0                   Ring:   DPC 5 8 4 cm 4 cm touch 3 cm touch WNL   MP Ext 0 15 0 0 0 0                   Small:  DPC 5 6 3.5 cm 3.5 cm touch 2 cm touch WNL   MP Ext 0 10 0 0 0 0                   Thumb: DPC 7 8 touch 2 cm touch .5 cm touch WNL      Wrist AROM  Date 10/9/2020 10/9/2020 10/19/2020 10/19/2020 11/2/2020 11/2/2020 11/18/2020 11/18/2020 12/4/2020 12/4/2020 12/17/2020 12/17/2020     Left Right Left Right Left Right Left Right Left Right Left Right    Supination/Pronation -35/80 -60/70 0/85 0/85 15/85 35/85 45/90 40/90 60/90 45/90 70/90 65/90   Wrist ext/flex 0/10 -10/15 45/25 25/30 40/30 30/30 45/30 35/30 50/35 40/30 55/35 42/30   Wrist RD/UD 5/5 5/5 15/10 10/15 15/15 20/15 25/20 20/15 25/20 20/20 30/35 25/35     Sensation:  c/o of numbness in tips of fingers continues to decrease      Strength (Dyanmometer) and Pinch Strength (Pinch Gauge)  Measured in pounds and psi. Average of three trials.    10/9/2020 10/9/2020 11/4/2020/2020 11/4/2020 11/18/2020 11/18/20202 12/4/2020 12/4/2020 12/17/2020 12/17/2020     Left Right Left Right Left Right Left Right Left Right   Rung II def def 22 12 27 17 32 24 41 36      Andre received therapeutic activities for 50 minutes including:  -Patient received fluido x 8 min to BL wrists while performing AROM ex   -A/AAROM as follows: TGEs, thumb opposition, finger abd/add, finger ext, wrist ext/flex (open/closed hand), RD/UD (open/closed hand) and sup/pron x 10 reps each BL  hands  -Prayer stretch x 10 reps  -Wrist maze 3' each BL  -red putty: 2' molding, 2' grasping BL hands (at home)  -green pw pushes & pulls 2 x 15 reps BL hands  -hand gripper with 2 yellow & 1 red bands, 2 x 15 reps (3 yellow & one red for L hand)  -rotation bar 2 plates x 30 reps  -2 lb wrist 3 ways 1 x 15 reps  -red flex bar: frowns, smiles and twists x 30 reps    *Pt issued tubigrip D for edema management    Home Exercises and Education Provided     Education provided:   - NT  - Progress towards goals: Good     Written Home Exercises Provided: Patient instructed to cont prior HEP.  Exercises were reviewed and Andre was able to demonstrate them prior to the end of the session.  Andre demonstrated good  understanding of the education provided.     See EMR under Patient Instructions for exercises provided initial evaluation .     Additional Education provided: NT  Andre demonstrated good  understanding of the education provided.     Assessment   Andre Padgett is a 50 y.o. male referred to outpatient occupational/hand therapy and presents with a medical diagnosis of BL distal radius fxs s/p ORIF on 9/14/2020, resulting in pain, edema, decreased range of and decreased functional use of both hands. Pt continues to make slow steady progress with increasing wrist AROM,  and pinch strength. Decrease in edema noted this day in L wrist and hand. AROM measured and improvments noted.  strength measured this day and improvements noted. Pt continued this day with strengthening with mild difficulty. Pt reports he is still doing light household tasks. Pt remains very motivated and participates well in therapy. Will recommend Dynasplint for wrist ext/flex.    Pt would continue to benefit from skilled OT.      Andre is progressing well towards his goals and there are no updates to goals at this time. Pt prognosis is Good     Pt will continue to benefit from skilled outpatient occupational therapy to address the  "deficits listed in the problem list on initial evaluation provide pt/family education and to maximize pt's level of independence in the home and community environment.     Anticipated barriers to occupational therapy: bilateral wrist fractures    Pt's spiritual, cultural and educational needs considered and pt agreeable to plan of care and goals.    Goals:  Short Term (4 weeks on 11/9/2020):  1)   Patient to be IND with HEP and modalities for pain management.- Met 10/14/2020  2)   Increase GARZA of all finger by 3 cm  degrees for finger flexion  to increase functional hand use for grasping.- Met 11/2/2020  3)   Measure /pinch strength at 6 weeks post op.- Met 11/4/2020  4)   Decrease edema .2-.3 cm to increase joint mobility /flexibility for improved overall functional hand use. - Met 11/2/2020  5)   Decrease complaints of pain to  3 out of 10 to increase functional hand use for ADL/work/leisure activities.-Met 11/4/2020  6)   Pt will be independent with dressing and feeding himself.- Met 12/4/2020     Long Term (by discharge):  1)   Pt will report 1 out of 10 pain with BL hand use.-Met 11/4/2020  2)   Patient to score at CK on FOTO to demonstrate improved perception of functional BL hand use.-Met 12/17/2020  3)   Pt will return to prior level of function for ADLs and household management.-progressing       Plan: Advance to green flexbar.     Certification Period/Plan of care expiration: 12/4/2020 to 2/4/2020.     Outpatient Occupational Therapy 2 times weekly for 8 weeks may include the following interventions: Paraffin, Fluidotherapy, Manual therapy/joint mobilizations, Modalities for pain management, US 3 mhz, Therapeutic exercises/activities., Strengthening, Orthotic Fabrication/Fit/Training, Edema Control and Scar Management.     Discussed Plan of Care with patient: Yes  Updates/Grading for next session: Advance as tolerated.    Student: KARAN Casper    " I certify that I was present in the room " "directing the student in service delivery and guiding them using my skilled judgement. As the co-signing therapist, I have reviewed the student's documentation and am responsible for the treatment, assessment and plan."    Therapist: CHANTE Garcia, AVI Mason, OT         "

## 2020-12-21 ENCOUNTER — CLINICAL SUPPORT (OUTPATIENT)
Dept: REHABILITATION | Facility: HOSPITAL | Age: 50
End: 2020-12-21
Attending: FAMILY MEDICINE
Payer: MEDICAID

## 2020-12-21 DIAGNOSIS — S52.92XD CLOSED FRACTURE OF BOTH RADII WITH ROUTINE HEALING, SUBSEQUENT ENCOUNTER: Primary | ICD-10-CM

## 2020-12-21 DIAGNOSIS — S52.91XD CLOSED FRACTURE OF BOTH RADII WITH ROUTINE HEALING, SUBSEQUENT ENCOUNTER: Primary | ICD-10-CM

## 2020-12-21 PROCEDURE — 97110 THERAPEUTIC EXERCISES: CPT

## 2020-12-21 PROCEDURE — 97022 WHIRLPOOL THERAPY: CPT

## 2020-12-21 NOTE — PROGRESS NOTES
"                            Occupational Therapy Daily Treatment Note     Date: 12/21/2020  Name: Andre Padgett  Clinic Number: [id number]    Therapy Diagnosis:  BL distal radius fractures     Physician: Ino George NP     Physician Orders: Work on gentle ROM of bilateral wrist, fingers.  No weight bearing in either hand for now.    Medical Diagnosis: S52.91XD,S52.92XD (ICD-10-CM) - Closed fracture of both radii with routine healing, subsequent encounter      Surgical Procedure and Date:    09/14/2020     Open reduction internal fixation of comminuted intra-articular bilateral distal radius fractures, 3 or more parts each       Evaluation Date: 10/9/2020  Insurance: Medicaid  Insurance Authorization period Expiration: 12/31/2020      Plan of Care Certification Period: 12/4/2020 to 2/4/2020     Visit # / Visits Authortized: 19/ 20  Time In: 10:15  am  Time Out: 11:00 am    Total Billable Time: 50 minutes    Precautions: Standard    Subjective     Pt reports:   "I was really sore yesterday, I think I was having more swelling in my right wrist."     Response to previous treatment: Improved ROM  Functional change: Pt reports he is still performing light household tasks.     Pain: 0 /10; only stiffness  Location: N/A      CMS Impairment/Limitation/Restriction for FOTO Wrist Survey    Therapist reviewed FOTO scores for Andre Padgett on 12/21/2020.   FOTO documents entered into [employer] - see Media section.    Limitation Score: 52%  Category: Carrying    Current : CK = at least 40% but < 60% impaired, limited or restricted  Goal: CK = at least 40% but < 60% impaired, limited or restricted           Objective   Edema. Measured in centimeters.    10/9/2020 10/9/2020 10/19/2020 10/19/2020 11/2/2020 11/2/2020 11/18/2020 11/18/2020 12/4/2020 12/4/2020 12/17/2020 12/17/2020     Left Right Left Right Left Right Left Right Left Right Left Right   Proximal Wrist Crease 19.5 20 18.5 20.5 18.3 19 18.1 19 18 19 17.5 19   Mid " palm 22.4 24 20.4 20.5 20 20.5 20.5 22 20.5 21 19.5 21   MCPs 21 22.5 19.8 21.5 20.8 21 20.5 21 20 21 19.5 20.5      Hand ROM. Measured in degrees/cm.    10/9/2020 10/9/2020 10/19/2020 10/19/2020 11/2/2020 11/2/2020 11/18/2020 12/4/2020     Left Right Left Right Left Right Left/Right Left/Right                 Index: DPC 5 7 2 cm  3.5 cm touch 2 cm touch WNL   MP Extension 0 30 0 0 0 0                   Long:  DPC 5 8 3.5 cm 4 cm touch 2 cm touch WNL   MP Ext 0 30 0 0 0 0                   Ring:   DPC 5 8 4 cm 4 cm touch 3 cm touch WNL   MP Ext 0 15 0 0 0 0                   Small:  DPC 5 6 3.5 cm 3.5 cm touch 2 cm touch WNL   MP Ext 0 10 0 0 0 0                   Thumb: DPC 7 8 touch 2 cm touch .5 cm touch WNL      Wrist AROM  Date 10/9/2020 10/9/2020 10/19/2020 10/19/2020 11/2/2020 11/2/2020 11/18/2020 11/18/2020 12/4/2020 12/4/2020 12/17/2020 12/17/2020     Left Right Left Right Left Right Left Right Left Right Left Right    Supination/Pronation -35/80 -60/70 0/85 0/85 15/85 35/85 45/90 40/90 60/90 45/90 70/90 65/90   Wrist ext/flex 0/10 -10/15 45/25 25/30 40/30 30/30 45/30 35/30 50/35 40/30 55/35 42/30   Wrist RD/UD 5/5 5/5 15/10 10/15 15/15 20/15 25/20 20/15 25/20 20/20 30/35 25/35     Sensation:  c/o of numbness in tips of fingers continues to decrease      Strength (Dyanmometer) and Pinch Strength (Pinch Gauge)  Measured in pounds and psi. Average of three trials.    10/9/2020 10/9/2020 11/4/2020 11/4/2020 11/18/2020 11/18/50858 12/4/2020 12/4/2020 12/17/2020 12/17/2020     Left Right Left Right Left Right Left Right Left Right   Rung II def def 22 12 27 17 32 24 41 36      Andre received therapeutic activities for 50 minutes including:  -Patient received fluido x 8 min to BL wrists while performing AROM ex   -A/AAROM as follows: TGEs, thumb opposition, finger abd/add, finger ext, wrist ext/flex (open/closed hand), RD/UD (open/closed hand) and sup/pron x 10 reps each BL hands  -Prayer stretch x 10  reps  -Wrist maze 3' each BL  -red putty: 2' molding, 2' grasping BL hands (at home)  -green pw pushes & pulls 2 x 15 reps BL hands  -hand gripper with 2 yellow & 1 red bands, 2 x 15 reps (3 yellow & one red for L hand)  -rotation bar 2 plates x 30 reps  -2 lb wrist 3 ways 1 x 15 reps  -red flex bar: frowns, smiles and twists x 30 reps    *Pt issued tubigrip D for edema management    Home Exercises and Education Provided     Education provided:   - NT  - Progress towards goals: Good     Written Home Exercises Provided: Patient instructed to cont prior HEP.  Exercises were reviewed and Andre was able to demonstrate them prior to the end of the session.  Andre demonstrated good  understanding of the education provided.     See EMR under Patient Instructions for exercises provided initial evaluation .     Additional Education provided: NT  Andre demonstrated good  understanding of the education provided.     Assessment   Andre Padgett is a 50 y.o. male referred to outpatient occupational/hand therapy and presents with a medical diagnosis of BL distal radius fxs s/p ORIF on 9/14/2020, resulting in pain, edema, decreased range of and decreased functional use of both hands. Pt continues to make slow steady progress with increasing wrist AROM,  and pinch strength. Decrease in edema noted this day in L wrist and hand. AROM measured and improvments noted.  strength measured this day and improvements noted. Pt continued this day with strengthening with mild difficulty. Pt reports he is still doing light household tasks. Pt remains very motivated and participates well in therapy. Will recommend Dynasplint for wrist ext/flex.    Pt would continue to benefit from skilled OT.      Andre is progressing well towards his goals and there are no updates to goals at this time. Pt prognosis is Good     Pt will continue to benefit from skilled outpatient occupational therapy to address the deficits listed in the problem  list on initial evaluation provide pt/family education and to maximize pt's level of independence in the home and community environment.     Anticipated barriers to occupational therapy: bilateral wrist fractures    Pt's spiritual, cultural and educational needs considered and pt agreeable to plan of care and goals.    Goals:  Short Term (4 weeks on 11/9/2020):  1)   Patient to be IND with HEP and modalities for pain management.- Met 10/14/2020  2)   Increase GARZA of all finger by 3 cm  degrees for finger flexion  to increase functional hand use for grasping.- Met 11/2/2020  3)   Measure /pinch strength at 6 weeks post op.- Met 11/4/2020  4)   Decrease edema .2-.3 cm to increase joint mobility /flexibility for improved overall functional hand use. - Met 11/2/2020  5)   Decrease complaints of pain to  3 out of 10 to increase functional hand use for ADL/work/leisure activities.-Met 11/4/2020  6)   Pt will be independent with dressing and feeding himself.- Met 12/4/2020     Long Term (by discharge):  1)   Pt will report 1 out of 10 pain with BL hand use.-Met 11/4/2020  2)   Patient to score at CK on FOTO to demonstrate improved perception of functional BL hand use.-Met 12/17/2020  3)   Pt will return to prior level of function for ADLs and household management.-progressing       Plan: Advance to green flexbar.     Certification Period/Plan of care expiration: 12/4/2020 to 2/4/2020.     Outpatient Occupational Therapy 2 times weekly for 8 weeks may include the following interventions: Paraffin, Fluidotherapy, Manual therapy/joint mobilizations, Modalities for pain management, US 3 mhz, Therapeutic exercises/activities., Strengthening, Orthotic Fabrication/Fit/Training, Edema Control and Scar Management.     Discussed Plan of Care with patient: Yes  Updates/Grading for next session: Advance as tolerated.        Carissa Youngblood, OT

## 2020-12-24 ENCOUNTER — CLINICAL SUPPORT (OUTPATIENT)
Dept: REHABILITATION | Facility: HOSPITAL | Age: 50
End: 2020-12-24
Attending: FAMILY MEDICINE
Payer: MEDICAID

## 2020-12-24 DIAGNOSIS — S52.92XD CLOSED FRACTURE OF BOTH RADII WITH ROUTINE HEALING, SUBSEQUENT ENCOUNTER: Primary | ICD-10-CM

## 2020-12-24 DIAGNOSIS — S52.91XD CLOSED FRACTURE OF BOTH RADII WITH ROUTINE HEALING, SUBSEQUENT ENCOUNTER: Primary | ICD-10-CM

## 2020-12-24 NOTE — PROGRESS NOTES
"                            Occupational Therapy Daily Treatment Note     Date: 12/24/2020  Name: Andre Padgett  Clinic Number: [id number]    Therapy Diagnosis:  BL distal radius fractures     Physician: Ino George NP     Physician Orders: Work on gentle ROM of bilateral wrist, fingers.  No weight bearing in either hand for now.    Medical Diagnosis: S52.91XD,S52.92XD (ICD-10-CM) - Closed fracture of both radii with routine healing, subsequent encounter      Surgical Procedure and Date:    09/14/2020     Open reduction internal fixation of comminuted intra-articular bilateral distal radius fractures, 3 or more parts each       Evaluation Date: 10/9/2020  Insurance: Medicaid  Insurance Authorization period Expiration: 12/31/2020      Plan of Care Certification Period: 12/4/2020 to 2/4/2020     Visit # / Visits Authortized: 19/ 20  Time In: 10:15  am  Time Out: 11:00 am    Total Billable Time: 50 minutes    Precautions: Standard    Subjective     Pt reports:   "I was really sore yesterday, I think I was having more swelling in my right wrist."     Response to previous treatment: Improved ROM  Functional change: Pt reports he is still performing light household tasks.     Pain: 0 /10; only stiffness  Location: N/A      CMS Impairment/Limitation/Restriction for FOTO Wrist Survey    Therapist reviewed FOTO scores for Andre Padgett on 12/24/2020.   FOTO documents entered into [employer] - see Media section.    Limitation Score: 52%  Category: Carrying    Current : CK = at least 40% but < 60% impaired, limited or restricted  Goal: CK = at least 40% but < 60% impaired, limited or restricted           Objective   Edema. Measured in centimeters.    10/9/2020 10/9/2020 10/19/2020 10/19/2020 11/2/2020 11/2/2020 11/18/2020 11/18/2020 12/4/2020 12/4/2020 12/17/2020 12/17/2020     Left Right Left Right Left Right Left Right Left Right Left Right   Proximal Wrist Crease 19.5 20 18.5 20.5 18.3 19 18.1 19 18 19 17.5 19   Mid " palm 22.4 24 20.4 20.5 20 20.5 20.5 22 20.5 21 19.5 21   MCPs 21 22.5 19.8 21.5 20.8 21 20.5 21 20 21 19.5 20.5      Hand ROM. Measured in degrees/cm.    10/9/2020 10/9/2020 10/19/2020 10/19/2020 11/2/2020 11/2/2020 11/18/2020 12/4/2020     Left Right Left Right Left Right Left/Right Left/Right                 Index: DPC 5 7 2 cm  3.5 cm touch 2 cm touch WNL   MP Extension 0 30 0 0 0 0                   Long:  DPC 5 8 3.5 cm 4 cm touch 2 cm touch WNL   MP Ext 0 30 0 0 0 0                   Ring:   DPC 5 8 4 cm 4 cm touch 3 cm touch WNL   MP Ext 0 15 0 0 0 0                   Small:  DPC 5 6 3.5 cm 3.5 cm touch 2 cm touch WNL   MP Ext 0 10 0 0 0 0                   Thumb: DPC 7 8 touch 2 cm touch .5 cm touch WNL      Wrist AROM  Date 10/9/2020 10/9/2020 10/19/2020 10/19/2020 11/2/2020 11/2/2020 11/18/2020 11/18/2020 12/4/2020 12/4/2020 12/17/2020 12/17/2020     Left Right Left Right Left Right Left Right Left Right Left Right    Supination/Pronation -35/80 -60/70 0/85 0/85 15/85 35/85 45/90 40/90 60/90 45/90 70/90 65/90   Wrist ext/flex 0/10 -10/15 45/25 25/30 40/30 30/30 45/30 35/30 50/35 40/30 55/35 42/30   Wrist RD/UD 5/5 5/5 15/10 10/15 15/15 20/15 25/20 20/15 25/20 20/20 30/35 25/35     Sensation:  c/o of numbness in tips of fingers continues to decrease      Strength (Dyanmometer) and Pinch Strength (Pinch Gauge)  Measured in pounds and psi. Average of three trials.    10/9/2020 10/9/2020 11/4/2020 11/4/2020 11/18/2020 11/18/63894 12/4/2020 12/4/2020 12/17/2020 12/17/2020     Left Right Left Right Left Right Left Right Left Right   Rung II def def 22 12 27 17 32 24 41 36      Andre received therapeutic activities for 50 minutes including:  -Patient received fluido x 8 min to BL wrists while performing AROM ex   -A/AAROM as follows: TGEs, thumb opposition, finger abd/add, finger ext, wrist ext/flex (open/closed hand), RD/UD (open/closed hand) and sup/pron x 10 reps each BL hands  -Prayer stretch x 10  reps  -Wrist maze 3' each BL  -red putty: 2' molding, 2' grasping BL hands (at home)  -green pw pushes & pulls 2 x 15 reps BL hands  -hand gripper with 2 yellow & 1 red bands, 2 x 15 reps (3 yellow & one red for L hand)  -rotation bar 2 plates x 30 reps  -2 lb wrist 3 ways 1 x 15 reps  -red flex bar: frowns, smiles and twists x 30 reps    *Pt issued tubigrip D for edema management    Home Exercises and Education Provided     Education provided:   - NT  - Progress towards goals: Good     Written Home Exercises Provided: Patient instructed to cont prior HEP.  Exercises were reviewed and Andre was able to demonstrate them prior to the end of the session.  Andre demonstrated good  understanding of the education provided.     See EMR under Patient Instructions for exercises provided initial evaluation .     Additional Education provided: NT  Andre demonstrated good  understanding of the education provided.     Assessment   Andre Padgett is a 50 y.o. male referred to outpatient occupational/hand therapy and presents with a medical diagnosis of BL distal radius fxs s/p ORIF on 9/14/2020, resulting in pain, edema, decreased range of and decreased functional use of both hands. Pt continues to make slow steady progress with increasing wrist AROM,  and pinch strength. Decrease in edema noted this day in L wrist and hand. AROM measured and improvments noted.  strength measured this day and improvements noted. Pt continued this day with strengthening with mild difficulty. Pt reports he is still doing light household tasks. Pt remains very motivated and participates well in therapy. Will recommend Dynasplint for wrist ext/flex.    Pt would continue to benefit from skilled OT.      Andre is progressing well towards his goals and there are no updates to goals at this time. Pt prognosis is Good     Pt will continue to benefit from skilled outpatient occupational therapy to address the deficits listed in the problem  list on initial evaluation provide pt/family education and to maximize pt's level of independence in the home and community environment.     Anticipated barriers to occupational therapy: bilateral wrist fractures    Pt's spiritual, cultural and educational needs considered and pt agreeable to plan of care and goals.    Goals:  Short Term (4 weeks on 11/9/2020):  1)   Patient to be IND with HEP and modalities for pain management.- Met 10/14/2020  2)   Increase GARZA of all finger by 3 cm  degrees for finger flexion  to increase functional hand use for grasping.- Met 11/2/2020  3)   Measure /pinch strength at 6 weeks post op.- Met 11/4/2020  4)   Decrease edema .2-.3 cm to increase joint mobility /flexibility for improved overall functional hand use. - Met 11/2/2020  5)   Decrease complaints of pain to  3 out of 10 to increase functional hand use for ADL/work/leisure activities.-Met 11/4/2020  6)   Pt will be independent with dressing and feeding himself.- Met 12/4/2020     Long Term (by discharge):  1)   Pt will report 1 out of 10 pain with BL hand use.-Met 11/4/2020  2)   Patient to score at CK on FOTO to demonstrate improved perception of functional BL hand use.-Met 12/17/2020  3)   Pt will return to prior level of function for ADLs and household management.-progressing       Plan: Advance to green flexbar.     Certification Period/Plan of care expiration: 12/4/2020 to 2/4/2020.     Outpatient Occupational Therapy 2 times weekly for 8 weeks may include the following interventions: Paraffin, Fluidotherapy, Manual therapy/joint mobilizations, Modalities for pain management, US 3 mhz, Therapeutic exercises/activities., Strengthening, Orthotic Fabrication/Fit/Training, Edema Control and Scar Management.     Discussed Plan of Care with patient: Yes  Updates/Grading for next session: Advance as tolerated.        Carissa Youngblood, OT

## 2020-12-29 ENCOUNTER — CLINICAL SUPPORT (OUTPATIENT)
Dept: REHABILITATION | Facility: HOSPITAL | Age: 50
End: 2020-12-29
Attending: FAMILY MEDICINE
Payer: MEDICAID

## 2020-12-29 DIAGNOSIS — M25.60 RANGE OF MOTION DEFICIT: ICD-10-CM

## 2020-12-29 PROCEDURE — 97530 THERAPEUTIC ACTIVITIES: CPT

## 2020-12-29 NOTE — PROGRESS NOTES
"                            Occupational Therapy Daily Treatment Note     Date: 12/29/2020  Name: Andre Padgett  Clinic Number: [id number]    Therapy Diagnosis:  BL distal radius fractures     Physician: Ino George NP     Physician Orders: Work on gentle ROM of bilateral wrist, fingers.  No weight bearing in either hand for now.    Medical Diagnosis: S52.91XD,S52.92XD (ICD-10-CM) - Closed fracture of both radii with routine healing, subsequent encounter      Surgical Procedure and Date:    09/14/2020     Open reduction internal fixation of comminuted intra-articular bilateral distal radius fractures, 3 or more parts each       Evaluation Date: 10/9/2020  Insurance: Medicaid  Insurance Authorization period Expiration: 12/31/2020      Plan of Care Certification Period: 12/4/2020 to 2/4/2020     Visit # / Visits Authortized: 20/ 20  Time In: 10:45  am  Time Out:11:30  am    Total Billable Time: 45 minutes    Precautions: Standard    Subjective     Pt reports:   "I was really sore yesterday, I think I was having more swelling in my right wrist."     Response to previous treatment: Improved ROM  Functional change: Pt reports he is still performing light household tasks.     Pain: 1 /10  Location: N/A          Objective   Edema. Measured in centimeters.    10/9/2020 10/9/2020 10/19/2020 10/19/2020 11/2/2020 11/2/2020 11/18/2020 11/18/2020 12/4/2020 12/4/2020 12/17/2020 12/17/2020     Left Right Left Right Left Right Left Right Left Right Left Right   Proximal Wrist Crease 19.5 20 18.5 20.5 18.3 19 18.1 19 18 19 17.5 19   Mid palm 22.4 24 20.4 20.5 20 20.5 20.5 22 20.5 21 19.5 21   MCPs 21 22.5 19.8 21.5 20.8 21 20.5 21 20 21 19.5 20.5      Hand ROM. Measured in degrees/cm.    10/9/2020 10/9/2020 10/19/2020 10/19/2020 11/2/2020 11/2/2020 11/18/2020 12/4/2020     Left Right Left Right Left Right Left/Right Left/Right                 Index: DPC 5 7 2 cm  3.5 cm touch 2 cm touch WNL   MP Extension 0 30 0 0 0 0        "            Long:  DPC 5 8 3.5 cm 4 cm touch 2 cm touch WNL   MP Ext 0 30 0 0 0 0                   Ring:   DPC 5 8 4 cm 4 cm touch 3 cm touch WNL   MP Ext 0 15 0 0 0 0                   Small:  DPC 5 6 3.5 cm 3.5 cm touch 2 cm touch WNL   MP Ext 0 10 0 0 0 0                   Thumb: DPC 7 8 touch 2 cm touch .5 cm touch WNL      Wrist AROM  Date 10/9/2020 10/9/2020 10/19/2020 10/19/2020 11/2/2020 11/2/2020 11/18/2020 11/18/2020 12/4/2020 12/4/2020 12/17/2020 12/17/2020     Left Right Left Right Left Right Left Right Left Right Left Right    Supination/Pronation -35/80 -60/70 0/85 0/85 15/85 35/85 45/90 40/90 60/90 45/90 70/90 65/90   Wrist ext/flex 0/10 -10/15 45/25 25/30 40/30 30/30 45/30 35/30 50/35 40/30 55/35 42/30   Wrist RD/UD 5/5 5/5 15/10 10/15 15/15 20/15 25/20 20/15 25/20 20/20 30/35 25/35     Sensation:  c/o of numbness in tips of fingers continues to decrease      Strength (Dyanmometer) and Pinch Strength (Pinch Gauge)  Measured in pounds and psi. Average of three trials.    10/9/2020 10/9/2020 11/4/2020 11/4/2020 11/18/2020 11/18/56479 12/4/2020 12/4/2020 12/17/2020 12/17/2020     Left Right Left Right Left Right Left Right Left Right   Rung II def def 22 12 27 17 32 24 41 36      Andre received therapeutic activities for 45 minutes including:  -STM to BL wrists and hands  -Patient received fluido x 8 min to BL wrists while performing AROM ex   -A/AAROM as follows: TGEs, thumb opposition, finger abd/add, finger ext, wrist ext/flex (open/closed hand), RD/UD (open/closed hand) and sup/pron x 10 reps each BL hands  -Prayer stretch x 10 reps  -Wrist maze 2' each BL  -red putty: 2' molding, 2' grasping BL hands (at home)  -green pw pushes & pulls 2 x 15 reps BL hands  -hand gripper with 2 yellow & 1 red bands, 2 x 15 reps (3 yellow & one red for L hand)  -rotation bar 2 plates x 30 reps  -2 lb wrist 3 ways 1 x 15 reps  -green flex bar: frowns, smiles and twists x 30 reps      Home Exercises and Education  Provided     Education provided:   - NT  - Progress towards goals: Good     Written Home Exercises Provided: Patient instructed to cont prior HEP.  Exercises were reviewed and Andre was able to demonstrate them prior to the end of the session.  Andre demonstrated good  understanding of the education provided.     See EMR under Patient Instructions for exercises provided initial evaluation .     Additional Education provided: NT  Andre demonstrated good  understanding of the education provided.     Assessment   Andre Padgett is a 50 y.o. male referred to outpatient occupational/hand therapy and presents with a medical diagnosis of BL distal radius fxs s/p ORIF on 9/14/2020, resulting in pain, edema, decreased range of and decreased functional use of both hands. Pt tolerated all activities with mild complaints of pain. Advanced with green flex bar.  Pt remains very motivated and participates well in therapy.     Pt would continue to benefit from skilled OT.      Andre is progressing well towards his goals and there are no updates to goals at this time. Pt prognosis is Good     Pt will continue to benefit from skilled outpatient occupational therapy to address the deficits listed in the problem list on initial evaluation provide pt/family education and to maximize pt's level of independence in the home and community environment.     Anticipated barriers to occupational therapy: bilateral wrist fractures    Pt's spiritual, cultural and educational needs considered and pt agreeable to plan of care and goals.    Goals:  Short Term (4 weeks on 11/9/2020):  1)   Patient to be IND with HEP and modalities for pain management.- Met 10/14/2020  2)   Increase GARZA of all finger by 3 cm  degrees for finger flexion  to increase functional hand use for grasping.- Met 11/2/2020  3)   Measure /pinch strength at 6 weeks post op.- Met 11/4/2020  4)   Decrease edema .2-.3 cm to increase joint mobility /flexibility for improved  overall functional hand use. - Met 11/2/2020  5)   Decrease complaints of pain to  3 out of 10 to increase functional hand use for ADL/work/leisure activities.-Met 11/4/2020  6)   Pt will be independent with dressing and feeding himself.- Met 12/4/2020     Long Term (by discharge):  1)   Pt will report 1 out of 10 pain with BL hand use.-Met 11/4/2020  2)   Patient to score at CK on FOTO to demonstrate improved perception of functional BL hand use.-Met 12/17/2020  3)   Pt will return to prior level of function for ADLs and household management.-progressing       Plan: Re-evaluate next session.     Certification Period/Plan of care expiration: 12/4/2020 to 2/4/2020.     Outpatient Occupational Therapy 2 times weekly for 8 weeks may include the following interventions: Paraffin, Fluidotherapy, Manual therapy/joint mobilizations, Modalities for pain management, US 3 mhz, Therapeutic exercises/activities., Strengthening, Orthotic Fabrication/Fit/Training, Edema Control and Scar Management.     Discussed Plan of Care with patient: Yes  Updates/Grading for next session: Advance as tolerated.        Stephanie Mason, OT

## 2020-12-30 ENCOUNTER — TELEPHONE (OUTPATIENT)
Dept: INTERNAL MEDICINE | Facility: CLINIC | Age: 50
End: 2020-12-30

## 2020-12-30 NOTE — TELEPHONE ENCOUNTER
Can we please clarify this request.  He had a colonoscopy 2 years ago and the recommendation in the chart is a repeat at 5 years.  Is he having some new symptoms or issue or did I miss understand the repeat date?

## 2020-12-30 NOTE — TELEPHONE ENCOUNTER
----- Message from Yesica Simms sent at 12/30/2020  4:09 PM CST -----  Regarding: referral  Contact: [phone]  Type:  Patient Requesting Referral    Who Called: pt   Does the patient already have the specialty appointment scheduled?: yes  If yes, what is the date of that appointment?: 1/5  Referral to What Specialty: colonoscopy   Reason for Referral:colonoscopy   Does the patient want the referral with a specific physician?:  [facility]  Patient Requesting a Response?: yes  Would the patient rather a call back or a response via [facility]? call back   Best Call Back Number:[phone]  Additional Information:

## 2020-12-30 NOTE — TELEPHONE ENCOUNTER
Pt states he is scheduled to see gastro on 1/5. He is requesting a colonoscopy because he is experiencing some new problems. When uses the restroom he does not feel like his bowels are completely empty. He also has a pain in the lower abdomen where the colon forms

## 2020-12-31 ENCOUNTER — CLINICAL SUPPORT (OUTPATIENT)
Dept: REHABILITATION | Facility: HOSPITAL | Age: 50
End: 2020-12-31
Attending: FAMILY MEDICINE
Payer: MEDICAID

## 2020-12-31 DIAGNOSIS — M25.60 RANGE OF MOTION DEFICIT: ICD-10-CM

## 2020-12-31 PROCEDURE — 97530 THERAPEUTIC ACTIVITIES: CPT

## 2020-12-31 NOTE — PROGRESS NOTES
Occupational Therapy Daily Treatment Note     Date: 12/31/2020  Name: Andre Padgett  Clinic Number: [id number]    Therapy Diagnosis:  BL distal radius fractures     Physician: Ino George NP     Physician Orders: Work on gentle ROM of bilateral wrist, fingers.  No weight bearing in either hand for now.    Medical Diagnosis: S52.91XD,S52.92XD (ICD-10-CM) - Closed fracture of both radii with routine healing, subsequent encounter      Surgical Procedure and Date:    09/14/2020     Open reduction internal fixation of comminuted intra-articular bilateral distal radius fractures, 3 or more parts each       Evaluation Date: 10/9/2020  Insurance: Medicaid  Insurance Authorization period Expiration: 12/31/2020      Plan of Care Certification Period: 12/4/2020 to 2/4/2020     Visit # / Visits Authortized: 4/ 20  Visit #21  Time In: 9:15 am  Time Out 10:15:am    Total Billable Time: 50 minutes    Precautions: Standard    Subjective     Pt reports: he is having slightly more pain today. Pt reports that he is compliant with his HEP.  Response to previous treatment: Improved ROM  Functional change: Pt reports he is still performing light household tasks.     Pain: 2 /10  Location: N/A          Objective   Edema. Measured in centimeters.    10/9/2020 10/9/2020 10/19/2020 10/19/2020 11/2/2020 11/2/2020 11/18/2020 11/18/2020 12/4/2020 12/4/2020 12/17/2020 12/17/2020 12/31/2020 [id number]     Left Right Left Right Left Right Left Right Left Right Left Right Left Right   Proximal Wrist Crease 19.5 20 18.5 20.5 18.3 19 18.1 19 18 19 17.5 19 18 18.9   Mid palm 22.4 24 20.4 20.5 20 20.5 20.5 22 20.5 21 19.5 21 21 22   MCPs 21 22.5 19.8 21.5 20.8 21 20.5 21 20 21 19.5 20.5 20 21      Hand ROM. Measured in degrees/cm.    10/9/2020 10/9/2020 10/19/2020 10/19/2020 11/2/2020 11/2/2020 11/18/2020 12/4/2020     Left Right Left Right Left Right Left/Right Left/Right                 Index: DPC 5 7 2 cm  3.5 cm  touch 2 cm touch WNL   MP Extension 0 30 0 0 0 0                   Long:  DPC 5 8 3.5 cm 4 cm touch 2 cm touch WNL   MP Ext 0 30 0 0 0 0                   Ring:   DPC 5 8 4 cm 4 cm touch 3 cm touch WNL   MP Ext 0 15 0 0 0 0                   Small:  DPC 5 6 3.5 cm 3.5 cm touch 2 cm touch WNL   MP Ext 0 10 0 0 0 0                   Thumb: DPC 7 8 touch 2 cm touch .5 cm touch WNL      Wrist AROM  Date 10/9/2020 10/9/2020 10/19/2020 10/19/2020 11/2/2020 11/2/2020 11/18/2020 11/18/2020 12/4/2020 12/4/2020 12/17/2020 12/17/2020 12/31/2020 12/31/2020     Left Right Left Right Left Right Left Right Left Right Left Right  Left Right   Supination/Pronation -35/80 -60/70 0/85 0/85 15/85 35/85 45/90 40/90 60/90 45/90 70/90 65/90 75/90 75/90   Wrist ext/flex 0/10 -10/15 45/25 25/30 40/30 30/30 45/30 35/30 50/35 40/30 55/35 42/30 55/40 47/30   Wrist RD/UD 5/5 5/5 15/10 10/15 15/15 20/15 25/20 20/15 25/20 20/20 30/35 25/35 30/35 25/35     Sensation:  c/o of numbness in tips of fingers continues to decrease      Strength (Dyanmometer) and Pinch Strength (Pinch Gauge)  Measured in pounds and psi. Average of three trials.    10/9/2020 10/9/2020 11/4/2020 11/4/2020 11/18/2020 11/18/15600 12/4/2020 12/4/2020 12/17/2020 12/17/2020 12/31/2020 12/31/2020     Left Right Left Right Left Right Left Right Left Right Left Right   Rung II def def 22 12 27 17 32 24 41 36  45 38     Andre received therapeutic activities for 45 minutes including:  -STM to BL wrists and hands  -Patient received fluido x 8 min to BL wrists while performing AROM ex   -A/AAROM as follows: TGEs, thumb opposition, finger abd/add, finger ext, wrist ext/flex (open/closed hand), RD/UD (open/closed hand) and sup/pron x 10 reps each BL hands  -Prayer stretch x 10 reps  -Wrist maze 2' each BL  -green putty: 2' molding, 2' grasping BL hands (at home)  -green pw pushes & pulls 2 x 15 reps BL hands  -hand gripper with 3 yellow & 1 red bands, 2 x 15 reps   -rotation bar 3  plates x 30 reps  -2 lb wrist 3 ways 1 x 15 reps  -green flex bar: frowns, smiles and twists x 30 reps      Home Exercises and Education Provided     Education provided:   - green putty  - Progress towards goals: Good     Written Home Exercises Provided: Patient instructed to cont prior HEP and green putty given today.  Exercises were reviewed and Andre was able to demonstrate them prior to the end of the session.  Andre demonstrated good  understanding of the education provided.     See EMR under Patient Instructions for exercises provided initial evaluation .     Additional Education provided: green putty  Andre demonstrated good  understanding of the education provided.     Assessment   Andre Padgett is a 50 y.o. male referred to outpatient occupational/hand therapy and presents with a medical diagnosis of BL distal radius fxs s/p ORIF on 9/14/2020, resulting in pain, edema, decreased range of and decreased functional use of both hands. Pt tolerated all activities with mild complaints of pain. Advanced activities with mild difficulty. Pt continues to make slow steady progress with AROM and strength.  Pt remains very motivated and participates well in therapy.     Pt would continue to benefit from skilled OT.      Andre is progressing well towards his goals and there are no updates to goals at this time. Pt prognosis is Good     Pt will continue to benefit from skilled outpatient occupational therapy to address the deficits listed in the problem list on initial evaluation provide pt/family education and to maximize pt's level of independence in the home and community environment.     Anticipated barriers to occupational therapy: bilateral wrist fractures    Pt's spiritual, cultural and educational needs considered and pt agreeable to plan of care and goals.    Goals:  Short Term (4 weeks on 11/9/2020):  1)   Patient to be IND with HEP and modalities for pain management.- Met 10/14/2020  2)   Increase GARZA of  all finger by 3 cm  degrees for finger flexion  to increase functional hand use for grasping.- Met 11/2/2020  3)   Measure /pinch strength at 6 weeks post op.- Met 11/4/2020  4)   Decrease edema .2-.3 cm to increase joint mobility /flexibility for improved overall functional hand use. - Met 11/2/2020  5)   Decrease complaints of pain to  3 out of 10 to increase functional hand use for ADL/work/leisure activities.-Met 11/4/2020  6)   Pt will be independent with dressing and feeding himself.- Met 12/4/2020     Long Term (by discharge):  1)   Pt will report 1 out of 10 pain with BL hand use.-Met 11/4/2020  2)   Patient to score at CK on FOTO to demonstrate improved perception of functional BL hand use.-Met 12/17/2020  3)   Pt will return to prior level of function for ADLs and household management.-progressing       Plan: Continue OT.     Certification Period/Plan of care expiration: 12/4/2020 to 2/4/2020.     Outpatient Occupational Therapy 2 times weekly for 8 weeks may include the following interventions: Paraffin, Fluidotherapy, Manual therapy/joint mobilizations, Modalities for pain management, US 3 mhz, Therapeutic exercises/activities., Strengthening, Orthotic Fabrication/Fit/Training, Edema Control and Scar Management.     Discussed Plan of Care with patient: Yes  Updates/Grading for next session: Advance as tolerated.        Stephanie Mason, OT

## 2021-01-04 ENCOUNTER — CLINICAL SUPPORT (OUTPATIENT)
Dept: REHABILITATION | Facility: HOSPITAL | Age: 51
End: 2021-01-04
Attending: FAMILY MEDICINE
Payer: MEDICAID

## 2021-01-04 ENCOUNTER — TELEPHONE (OUTPATIENT)
Dept: INTERNAL MEDICINE | Facility: CLINIC | Age: 51
End: 2021-01-04

## 2021-01-04 DIAGNOSIS — M25.60 RANGE OF MOTION DEFICIT: Primary | ICD-10-CM

## 2021-01-04 DIAGNOSIS — I25.84 CORONARY ARTERY DISEASE DUE TO CALCIFIED CORONARY LESION: Primary | ICD-10-CM

## 2021-01-04 DIAGNOSIS — I25.10 CORONARY ARTERY DISEASE DUE TO CALCIFIED CORONARY LESION: Primary | ICD-10-CM

## 2021-01-04 PROCEDURE — 97022 WHIRLPOOL THERAPY: CPT

## 2021-01-04 PROCEDURE — 97110 THERAPEUTIC EXERCISES: CPT

## 2021-01-05 ENCOUNTER — OFFICE VISIT (OUTPATIENT)
Dept: GASTROENTEROLOGY | Facility: CLINIC | Age: 51
End: 2021-01-05
Payer: MEDICAID

## 2021-01-05 VITALS
BODY MASS INDEX: 25.65 KG/M2 | WEIGHT: 179.19 LBS | HEIGHT: 70 IN | OXYGEN SATURATION: 98 % | HEART RATE: 85 BPM | SYSTOLIC BLOOD PRESSURE: 110 MMHG | DIASTOLIC BLOOD PRESSURE: 60 MMHG | TEMPERATURE: 98 F | RESPIRATION RATE: 18 BRPM

## 2021-01-05 DIAGNOSIS — K58.1 IRRITABLE BOWEL SYNDROME WITH CONSTIPATION: ICD-10-CM

## 2021-01-05 DIAGNOSIS — R10.30 LOWER ABDOMINAL PAIN: Primary | ICD-10-CM

## 2021-01-05 PROCEDURE — 99999 PR PBB SHADOW E&M-EST. PATIENT-LVL V: CPT | Mod: PBBFAC,,, | Performed by: NURSE PRACTITIONER

## 2021-01-05 PROCEDURE — 99999 PR PBB SHADOW E&M-EST. PATIENT-LVL V: ICD-10-PCS | Mod: PBBFAC,,, | Performed by: NURSE PRACTITIONER

## 2021-01-05 PROCEDURE — 99215 OFFICE O/P EST HI 40 MIN: CPT | Mod: PBBFAC,PO | Performed by: NURSE PRACTITIONER

## 2021-01-05 PROCEDURE — 99214 PR OFFICE/OUTPT VISIT, EST, LEVL IV, 30-39 MIN: ICD-10-PCS | Mod: S$PBB,,, | Performed by: NURSE PRACTITIONER

## 2021-01-05 PROCEDURE — 99214 OFFICE O/P EST MOD 30 MIN: CPT | Mod: S$PBB,,, | Performed by: NURSE PRACTITIONER

## 2021-01-05 RX ORDER — DICYCLOMINE HYDROCHLORIDE 20 MG/1
20 TABLET ORAL 3 TIMES DAILY PRN
Qty: 60 TABLET | Refills: 2 | Status: SHIPPED | OUTPATIENT
Start: 2021-01-05 | End: 2022-02-15

## 2021-01-05 RX ORDER — POLYETHYLENE GLYCOL 3350 17 G/17G
17 POWDER, FOR SOLUTION ORAL DAILY
Qty: 510 G | Refills: 11 | Status: SHIPPED | OUTPATIENT
Start: 2021-01-05 | End: 2021-01-15

## 2021-01-06 ENCOUNTER — TELEPHONE (OUTPATIENT)
Dept: GASTROENTEROLOGY | Facility: CLINIC | Age: 51
End: 2021-01-06

## 2021-01-07 ENCOUNTER — CLINICAL SUPPORT (OUTPATIENT)
Dept: REHABILITATION | Facility: HOSPITAL | Age: 51
End: 2021-01-07
Attending: FAMILY MEDICINE
Payer: MEDICAID

## 2021-01-07 DIAGNOSIS — M25.60 RANGE OF MOTION DEFICIT: Primary | ICD-10-CM

## 2021-01-07 PROCEDURE — 97022 WHIRLPOOL THERAPY: CPT

## 2021-01-07 PROCEDURE — 97110 THERAPEUTIC EXERCISES: CPT

## 2021-01-08 ENCOUNTER — TELEPHONE (OUTPATIENT)
Dept: ORTHOPEDICS | Facility: CLINIC | Age: 51
End: 2021-01-08

## 2021-01-11 ENCOUNTER — CLINICAL SUPPORT (OUTPATIENT)
Dept: REHABILITATION | Facility: HOSPITAL | Age: 51
End: 2021-01-11
Attending: FAMILY MEDICINE
Payer: MEDICAID

## 2021-01-11 DIAGNOSIS — M25.60 RANGE OF MOTION DEFICIT: Primary | ICD-10-CM

## 2021-01-11 PROCEDURE — 97530 THERAPEUTIC ACTIVITIES: CPT

## 2021-01-13 ENCOUNTER — CLINICAL SUPPORT (OUTPATIENT)
Dept: REHABILITATION | Facility: HOSPITAL | Age: 51
End: 2021-01-13
Attending: FAMILY MEDICINE
Payer: MEDICAID

## 2021-01-13 DIAGNOSIS — M25.60 RANGE OF MOTION DEFICIT: ICD-10-CM

## 2021-01-13 PROCEDURE — 97530 THERAPEUTIC ACTIVITIES: CPT

## 2021-01-15 ENCOUNTER — TELEPHONE (OUTPATIENT)
Dept: GASTROENTEROLOGY | Facility: CLINIC | Age: 51
End: 2021-01-15

## 2021-01-15 DIAGNOSIS — K58.1 IRRITABLE BOWEL SYNDROME WITH CONSTIPATION: Primary | ICD-10-CM

## 2021-01-21 ENCOUNTER — CLINICAL SUPPORT (OUTPATIENT)
Dept: REHABILITATION | Facility: HOSPITAL | Age: 51
End: 2021-01-21
Attending: FAMILY MEDICINE
Payer: MEDICAID

## 2021-01-21 DIAGNOSIS — M25.60 RANGE OF MOTION DEFICIT: Primary | ICD-10-CM

## 2021-01-21 PROCEDURE — 97110 THERAPEUTIC EXERCISES: CPT

## 2021-01-21 PROCEDURE — 97022 WHIRLPOOL THERAPY: CPT

## 2021-01-25 ENCOUNTER — TELEPHONE (OUTPATIENT)
Dept: INTERNAL MEDICINE | Facility: CLINIC | Age: 51
End: 2021-01-25

## 2021-01-25 DIAGNOSIS — R10.9 ABDOMINAL PAIN, UNSPECIFIED ABDOMINAL LOCATION: Primary | ICD-10-CM

## 2021-01-27 ENCOUNTER — TELEPHONE (OUTPATIENT)
Dept: INTERNAL MEDICINE | Facility: CLINIC | Age: 51
End: 2021-01-27

## 2021-01-27 ENCOUNTER — CLINICAL SUPPORT (OUTPATIENT)
Dept: REHABILITATION | Facility: HOSPITAL | Age: 51
End: 2021-01-27
Attending: FAMILY MEDICINE
Payer: MEDICAID

## 2021-01-27 DIAGNOSIS — M25.60 RANGE OF MOTION DEFICIT: ICD-10-CM

## 2021-01-27 PROCEDURE — 97530 THERAPEUTIC ACTIVITIES: CPT

## 2021-01-28 ENCOUNTER — PATIENT MESSAGE (OUTPATIENT)
Dept: INTERNAL MEDICINE | Facility: CLINIC | Age: 51
End: 2021-01-28

## 2021-01-29 ENCOUNTER — PATIENT MESSAGE (OUTPATIENT)
Dept: INTERNAL MEDICINE | Facility: CLINIC | Age: 51
End: 2021-01-29

## 2021-02-03 ENCOUNTER — CLINICAL SUPPORT (OUTPATIENT)
Dept: REHABILITATION | Facility: HOSPITAL | Age: 51
End: 2021-02-03
Attending: FAMILY MEDICINE
Payer: MEDICAID

## 2021-02-03 DIAGNOSIS — M25.60 RANGE OF MOTION DEFICIT: ICD-10-CM

## 2021-02-03 PROCEDURE — 97530 THERAPEUTIC ACTIVITIES: CPT

## 2021-02-05 ENCOUNTER — CLINICAL SUPPORT (OUTPATIENT)
Dept: REHABILITATION | Facility: HOSPITAL | Age: 51
End: 2021-02-05
Attending: FAMILY MEDICINE
Payer: MEDICAID

## 2021-02-05 ENCOUNTER — DOCUMENTATION ONLY (OUTPATIENT)
Dept: REHABILITATION | Facility: HOSPITAL | Age: 51
End: 2021-02-05

## 2021-02-05 DIAGNOSIS — M25.60 RANGE OF MOTION DEFICIT: ICD-10-CM

## 2021-02-05 PROCEDURE — 97530 THERAPEUTIC ACTIVITIES: CPT

## 2021-02-10 ENCOUNTER — PATIENT OUTREACH (OUTPATIENT)
Dept: ADMINISTRATIVE | Facility: OTHER | Age: 51
End: 2021-02-10

## 2021-02-10 ENCOUNTER — TELEPHONE (OUTPATIENT)
Dept: GASTROENTEROLOGY | Facility: CLINIC | Age: 51
End: 2021-02-10

## 2021-02-12 ENCOUNTER — CLINICAL SUPPORT (OUTPATIENT)
Dept: REHABILITATION | Facility: HOSPITAL | Age: 51
End: 2021-02-12
Attending: FAMILY MEDICINE
Payer: MEDICAID

## 2021-02-12 DIAGNOSIS — M25.60 RANGE OF MOTION DEFICIT: ICD-10-CM

## 2021-02-12 PROCEDURE — 97530 THERAPEUTIC ACTIVITIES: CPT

## 2021-02-15 ENCOUNTER — TELEPHONE (OUTPATIENT)
Dept: INTERNAL MEDICINE | Facility: CLINIC | Age: 51
End: 2021-02-15

## 2021-02-15 DIAGNOSIS — R10.9 ABDOMINAL PAIN, UNSPECIFIED ABDOMINAL LOCATION: Primary | ICD-10-CM

## 2021-02-17 ENCOUNTER — CLINICAL SUPPORT (OUTPATIENT)
Dept: REHABILITATION | Facility: HOSPITAL | Age: 51
End: 2021-02-17
Attending: FAMILY MEDICINE
Payer: MEDICAID

## 2021-02-17 DIAGNOSIS — M25.60 RANGE OF MOTION DEFICIT: ICD-10-CM

## 2021-02-17 PROCEDURE — 97530 THERAPEUTIC ACTIVITIES: CPT

## 2021-02-18 ENCOUNTER — OCCUPATIONAL HEALTH (OUTPATIENT)
Dept: URGENT CARE | Facility: CLINIC | Age: 51
End: 2021-02-18

## 2021-02-18 DIAGNOSIS — Z02.83 ENCOUNTER FOR DRUG SCREENING: Primary | ICD-10-CM

## 2021-02-18 PROCEDURE — 80305 DRUG TEST PRSMV DIR OPT OBS: CPT | Mod: S$GLB,,, | Performed by: PREVENTIVE MEDICINE

## 2021-02-18 PROCEDURE — 80305 OOH NON-DOT DRUG SCREEN: ICD-10-PCS | Mod: S$GLB,,, | Performed by: PREVENTIVE MEDICINE

## 2021-02-19 ENCOUNTER — HOSPITAL ENCOUNTER (EMERGENCY)
Facility: HOSPITAL | Age: 51
Discharge: HOME | End: 2021-02-19
Attending: EMERGENCY MEDICINE
Payer: MEDICAID

## 2021-02-19 VITALS
HEIGHT: 70 IN | WEIGHT: 176 LBS | BODY MASS INDEX: 25.2 KG/M2 | HEART RATE: 75 BPM | OXYGEN SATURATION: 99 % | DIASTOLIC BLOOD PRESSURE: 85 MMHG | SYSTOLIC BLOOD PRESSURE: 145 MMHG | RESPIRATION RATE: 17 BRPM | TEMPERATURE: 98 F

## 2021-02-19 DIAGNOSIS — R10.9 INTERMITTENT ABDOMINAL PAIN: ICD-10-CM

## 2021-02-19 DIAGNOSIS — K40.90 RIGHT INGUINAL HERNIA: Primary | ICD-10-CM

## 2021-02-19 DIAGNOSIS — N50.811 RIGHT TESTICULAR PAIN: ICD-10-CM

## 2021-02-19 LAB
ALBUMIN SERPL BCP-MCNC: 4.7 G/DL (ref 3.5–5.2)
ALP SERPL-CCNC: 87 U/L (ref 55–135)
ALT SERPL W/O P-5'-P-CCNC: 42 U/L (ref 10–44)
ANION GAP SERPL CALC-SCNC: 13 MMOL/L (ref 8–16)
AST SERPL-CCNC: 28 U/L (ref 10–40)
BASOPHILS # BLD AUTO: 0.03 K/UL (ref 0–0.2)
BASOPHILS NFR BLD: 0.4 % (ref 0–1.9)
BILIRUB SERPL-MCNC: 1.2 MG/DL (ref 0.1–1)
BILIRUB UR QL STRIP: NEGATIVE
BUN SERPL-MCNC: 11 MG/DL (ref 6–20)
CALCIUM SERPL-MCNC: 9.9 MG/DL (ref 8.7–10.5)
CHLORIDE SERPL-SCNC: 104 MMOL/L (ref 95–110)
CLARITY UR REFRACT.AUTO: CLEAR
CO2 SERPL-SCNC: 24 MMOL/L (ref 23–29)
COLOR UR AUTO: ABNORMAL
CREAT SERPL-MCNC: 0.8 MG/DL (ref 0.5–1.4)
DIFFERENTIAL METHOD: ABNORMAL
EOSINOPHIL # BLD AUTO: 0.1 K/UL (ref 0–0.5)
EOSINOPHIL NFR BLD: 0.8 % (ref 0–8)
ERYTHROCYTE [DISTWIDTH] IN BLOOD BY AUTOMATED COUNT: 11.6 % (ref 11.5–14.5)
EST. GFR  (AFRICAN AMERICAN): >60 ML/MIN/1.73 M^2
EST. GFR  (NON AFRICAN AMERICAN): >60 ML/MIN/1.73 M^2
GLUCOSE SERPL-MCNC: 94 MG/DL (ref 70–110)
GLUCOSE UR QL STRIP: NEGATIVE
HCT VFR BLD AUTO: 49.2 % (ref 40–54)
HGB BLD-MCNC: 16.6 G/DL (ref 14–18)
HGB UR QL STRIP: NEGATIVE
IMM GRANULOCYTES # BLD AUTO: 0.03 K/UL (ref 0–0.04)
IMM GRANULOCYTES NFR BLD AUTO: 0.4 % (ref 0–0.5)
KETONES UR QL STRIP: ABNORMAL
LEUKOCYTE ESTERASE UR QL STRIP: ABNORMAL
LYMPHOCYTES # BLD AUTO: 1.5 K/UL (ref 1–4.8)
LYMPHOCYTES NFR BLD: 18.8 % (ref 18–48)
MCH RBC QN AUTO: 32.1 PG (ref 27–31)
MCHC RBC AUTO-ENTMCNC: 33.7 G/DL (ref 32–36)
MCV RBC AUTO: 95 FL (ref 82–98)
MICROSCOPIC COMMENT: NORMAL
MONOCYTES # BLD AUTO: 0.5 K/UL (ref 0.3–1)
MONOCYTES NFR BLD: 6.3 % (ref 4–15)
NEUTROPHILS # BLD AUTO: 5.7 K/UL (ref 1.8–7.7)
NEUTROPHILS NFR BLD: 73.3 % (ref 38–73)
NITRITE UR QL STRIP: NEGATIVE
NRBC BLD-RTO: 0 /100 WBC
PH UR STRIP: 7 [PH] (ref 5–8)
PLATELET # BLD AUTO: 209 K/UL (ref 150–350)
PMV BLD AUTO: 10.3 FL (ref 9.2–12.9)
POTASSIUM SERPL-SCNC: 4.1 MMOL/L (ref 3.5–5.1)
PROT SERPL-MCNC: 8.4 G/DL (ref 6–8.4)
PROT UR QL STRIP: NEGATIVE
RBC # BLD AUTO: 5.17 M/UL (ref 4.6–6.2)
RBC #/AREA URNS AUTO: 1 /HPF (ref 0–4)
SODIUM SERPL-SCNC: 141 MMOL/L (ref 136–145)
SP GR UR STRIP: 1.03 (ref 1–1.03)
URN SPEC COLLECT METH UR: ABNORMAL
WBC # BLD AUTO: 7.78 K/UL (ref 3.9–12.7)
WBC #/AREA URNS AUTO: 1 /HPF (ref 0–5)

## 2021-02-19 PROCEDURE — 99284 PR EMERGENCY DEPT VISIT,LEVEL IV: ICD-10-PCS | Mod: CR,,, | Performed by: PHYSICIAN ASSISTANT

## 2021-02-19 PROCEDURE — 85025 COMPLETE CBC W/AUTO DIFF WBC: CPT

## 2021-02-19 PROCEDURE — 99284 EMERGENCY DEPT VISIT MOD MDM: CPT | Mod: CR,,, | Performed by: PHYSICIAN ASSISTANT

## 2021-02-19 PROCEDURE — 86803 HEPATITIS C AB TEST: CPT

## 2021-02-19 PROCEDURE — 80053 COMPREHEN METABOLIC PANEL: CPT

## 2021-02-19 PROCEDURE — 86703 HIV-1/HIV-2 1 RESULT ANTBDY: CPT

## 2021-02-19 PROCEDURE — 99284 EMERGENCY DEPT VISIT MOD MDM: CPT | Mod: 25

## 2021-02-19 PROCEDURE — 81001 URINALYSIS AUTO W/SCOPE: CPT

## 2021-02-22 ENCOUNTER — CLINICAL SUPPORT (OUTPATIENT)
Dept: REHABILITATION | Facility: HOSPITAL | Age: 51
End: 2021-02-22
Attending: FAMILY MEDICINE
Payer: MEDICAID

## 2021-02-22 ENCOUNTER — OFFICE VISIT (OUTPATIENT)
Dept: GASTROENTEROLOGY | Facility: CLINIC | Age: 51
End: 2021-02-22
Payer: MEDICAID

## 2021-02-22 VITALS
HEART RATE: 77 BPM | SYSTOLIC BLOOD PRESSURE: 124 MMHG | WEIGHT: 188.31 LBS | DIASTOLIC BLOOD PRESSURE: 78 MMHG | BODY MASS INDEX: 27.02 KG/M2

## 2021-02-22 DIAGNOSIS — M25.60 RANGE OF MOTION DEFICIT: ICD-10-CM

## 2021-02-22 DIAGNOSIS — Z01.818 PREOPERATIVE EXAMINATION: ICD-10-CM

## 2021-02-22 DIAGNOSIS — R10.84 GENERALIZED ABDOMINAL PAIN: ICD-10-CM

## 2021-02-22 DIAGNOSIS — K59.04 CHRONIC IDIOPATHIC CONSTIPATION: Primary | ICD-10-CM

## 2021-02-22 LAB
HCV AB SERPL QL IA: NEGATIVE
HIV 1+2 AB+HIV1 P24 AG SERPL QL IA: NEGATIVE

## 2021-02-22 PROCEDURE — 99214 PR OFFICE/OUTPT VISIT, EST, LEVL IV, 30-39 MIN: ICD-10-PCS | Mod: S$PBB,,, | Performed by: NURSE PRACTITIONER

## 2021-02-22 PROCEDURE — 99214 OFFICE O/P EST MOD 30 MIN: CPT | Mod: PBBFAC,PO | Performed by: NURSE PRACTITIONER

## 2021-02-22 PROCEDURE — 99999 PR PBB SHADOW E&M-EST. PATIENT-LVL IV: CPT | Mod: PBBFAC,,, | Performed by: NURSE PRACTITIONER

## 2021-02-22 PROCEDURE — 97530 THERAPEUTIC ACTIVITIES: CPT

## 2021-02-22 PROCEDURE — 99999 PR PBB SHADOW E&M-EST. PATIENT-LVL IV: ICD-10-PCS | Mod: PBBFAC,,, | Performed by: NURSE PRACTITIONER

## 2021-02-22 PROCEDURE — 99214 OFFICE O/P EST MOD 30 MIN: CPT | Mod: S$PBB,,, | Performed by: NURSE PRACTITIONER

## 2021-02-22 RX ORDER — LUBIPROSTONE 8 UG/1
8 CAPSULE ORAL 2 TIMES DAILY WITH MEALS
Qty: 60 CAPSULE | Refills: 1 | Status: SHIPPED | OUTPATIENT
Start: 2021-02-22 | End: 2022-02-15

## 2021-02-22 RX ORDER — SODIUM, POTASSIUM,MAG SULFATES 17.5-3.13G
1 SOLUTION, RECONSTITUTED, ORAL ORAL DAILY
Qty: 1 KIT | Refills: 0 | Status: SHIPPED | OUTPATIENT
Start: 2021-02-22 | End: 2021-02-24

## 2021-02-25 ENCOUNTER — TELEPHONE (OUTPATIENT)
Dept: GASTROENTEROLOGY | Facility: CLINIC | Age: 51
End: 2021-02-25

## 2021-03-03 ENCOUNTER — CLINICAL SUPPORT (OUTPATIENT)
Dept: REHABILITATION | Facility: HOSPITAL | Age: 51
End: 2021-03-03
Attending: ORTHOPAEDIC SURGERY
Payer: MEDICAID

## 2021-03-03 DIAGNOSIS — M25.60 RANGE OF MOTION DEFICIT: ICD-10-CM

## 2021-03-03 PROCEDURE — 97530 THERAPEUTIC ACTIVITIES: CPT

## 2021-03-04 ENCOUNTER — TELEPHONE (OUTPATIENT)
Dept: ORTHOPEDICS | Facility: CLINIC | Age: 51
End: 2021-03-04

## 2021-03-08 ENCOUNTER — OFFICE VISIT (OUTPATIENT)
Dept: ORTHOPEDICS | Facility: CLINIC | Age: 51
End: 2021-03-08
Payer: MEDICAID

## 2021-03-08 ENCOUNTER — HOSPITAL ENCOUNTER (OUTPATIENT)
Dept: RADIOLOGY | Facility: HOSPITAL | Age: 51
Discharge: HOME | End: 2021-03-08
Attending: NURSE PRACTITIONER
Payer: MEDICAID

## 2021-03-08 VITALS
HEART RATE: 84 BPM | SYSTOLIC BLOOD PRESSURE: 123 MMHG | BODY MASS INDEX: 27.4 KG/M2 | HEIGHT: 70 IN | WEIGHT: 191.38 LBS | DIASTOLIC BLOOD PRESSURE: 82 MMHG

## 2021-03-08 DIAGNOSIS — M25.531 PAIN IN BOTH WRISTS: ICD-10-CM

## 2021-03-08 DIAGNOSIS — M25.531 PAIN IN BOTH WRISTS: Primary | ICD-10-CM

## 2021-03-08 DIAGNOSIS — M25.532 PAIN IN BOTH WRISTS: ICD-10-CM

## 2021-03-08 DIAGNOSIS — S52.91XD CLOSED FRACTURE OF BOTH RADII WITH ROUTINE HEALING, SUBSEQUENT ENCOUNTER: Primary | ICD-10-CM

## 2021-03-08 DIAGNOSIS — S52.92XD CLOSED FRACTURE OF BOTH RADII WITH ROUTINE HEALING, SUBSEQUENT ENCOUNTER: Primary | ICD-10-CM

## 2021-03-08 DIAGNOSIS — M25.532 PAIN IN BOTH WRISTS: Primary | ICD-10-CM

## 2021-03-08 PROCEDURE — 99999 PR PBB SHADOW E&M-EST. PATIENT-LVL III: ICD-10-PCS | Mod: PBBFAC,,, | Performed by: NURSE PRACTITIONER

## 2021-03-08 PROCEDURE — 73110 XR WRIST COMPLETE 3 VIEWS BILATERAL: ICD-10-PCS | Mod: 26,50,, | Performed by: RADIOLOGY

## 2021-03-08 PROCEDURE — 73110 X-RAY EXAM OF WRIST: CPT | Mod: TC,50

## 2021-03-08 PROCEDURE — 99213 OFFICE O/P EST LOW 20 MIN: CPT | Mod: S$PBB,,, | Performed by: NURSE PRACTITIONER

## 2021-03-08 PROCEDURE — 99213 PR OFFICE/OUTPT VISIT, EST, LEVL III, 20-29 MIN: ICD-10-PCS | Mod: S$PBB,,, | Performed by: NURSE PRACTITIONER

## 2021-03-08 PROCEDURE — 73110 X-RAY EXAM OF WRIST: CPT | Mod: 26,50,, | Performed by: RADIOLOGY

## 2021-03-08 PROCEDURE — 99999 PR PBB SHADOW E&M-EST. PATIENT-LVL III: CPT | Mod: PBBFAC,,, | Performed by: NURSE PRACTITIONER

## 2021-03-08 PROCEDURE — 99213 OFFICE O/P EST LOW 20 MIN: CPT | Mod: PBBFAC,25 | Performed by: NURSE PRACTITIONER

## 2021-03-09 ENCOUNTER — PATIENT MESSAGE (OUTPATIENT)
Dept: INTERNAL MEDICINE | Facility: CLINIC | Age: 51
End: 2021-03-09

## 2021-03-09 PROBLEM — K59.00 CONSTIPATION: Status: ACTIVE | Noted: 2021-03-09

## 2021-03-16 ENCOUNTER — CLINICAL SUPPORT (OUTPATIENT)
Dept: REHABILITATION | Facility: HOSPITAL | Age: 51
End: 2021-03-16
Attending: FAMILY MEDICINE
Payer: MEDICAID

## 2021-03-16 DIAGNOSIS — M25.60 RANGE OF MOTION DEFICIT: ICD-10-CM

## 2021-03-16 PROCEDURE — 97530 THERAPEUTIC ACTIVITIES: CPT

## 2021-03-30 RX ORDER — ALPRAZOLAM 1 MG/1
0.5 TABLET ORAL DAILY PRN
Qty: 30 TABLET | Refills: 0 | Status: SHIPPED | OUTPATIENT
Start: 2021-03-30 | End: 2022-02-15 | Stop reason: SDUPTHER

## 2021-04-07 DIAGNOSIS — F41.9 ANXIETY: Primary | ICD-10-CM

## 2021-04-07 RX ORDER — ALPRAZOLAM 1 MG/1
0.5 TABLET ORAL DAILY PRN
Qty: 30 TABLET | Refills: 0 | Status: CANCELLED | OUTPATIENT
Start: 2021-04-07

## 2021-04-15 ENCOUNTER — LAB VISIT (OUTPATIENT)
Dept: LAB | Facility: HOSPITAL | Age: 51
End: 2021-04-15
Attending: INTERNAL MEDICINE
Payer: MEDICAID

## 2021-04-15 DIAGNOSIS — I25.10 CORONARY ARTERY DISEASE DUE TO CALCIFIED CORONARY LESION: ICD-10-CM

## 2021-04-15 DIAGNOSIS — E78.00 PURE HYPERCHOLESTEROLEMIA: ICD-10-CM

## 2021-04-15 DIAGNOSIS — I25.84 CORONARY ARTERY DISEASE DUE TO CALCIFIED CORONARY LESION: ICD-10-CM

## 2021-04-15 LAB
ALBUMIN SERPL BCP-MCNC: 4.1 G/DL (ref 3.5–5.2)
ALP SERPL-CCNC: 73 U/L (ref 55–135)
ALT SERPL W/O P-5'-P-CCNC: 35 U/L (ref 10–44)
ANION GAP SERPL CALC-SCNC: 9 MMOL/L (ref 8–16)
AST SERPL-CCNC: 28 U/L (ref 10–40)
BILIRUB SERPL-MCNC: 0.8 MG/DL (ref 0.1–1)
BUN SERPL-MCNC: 10 MG/DL (ref 6–20)
CALCIUM SERPL-MCNC: 9.1 MG/DL (ref 8.7–10.5)
CHLORIDE SERPL-SCNC: 107 MMOL/L (ref 95–110)
CHOLEST SERPL-MCNC: 141 MG/DL (ref 120–199)
CHOLEST/HDLC SERPL: 2.6 {RATIO} (ref 2–5)
CO2 SERPL-SCNC: 25 MMOL/L (ref 23–29)
CREAT SERPL-MCNC: 0.8 MG/DL (ref 0.5–1.4)
EST. GFR  (AFRICAN AMERICAN): >60 ML/MIN/1.73 M^2
EST. GFR  (NON AFRICAN AMERICAN): >60 ML/MIN/1.73 M^2
GLUCOSE SERPL-MCNC: 103 MG/DL (ref 70–110)
HDLC SERPL-MCNC: 54 MG/DL (ref 40–75)
HDLC SERPL: 38.3 % (ref 20–50)
LDLC SERPL CALC-MCNC: 71 MG/DL (ref 63–159)
NONHDLC SERPL-MCNC: 87 MG/DL
POTASSIUM SERPL-SCNC: 4.2 MMOL/L (ref 3.5–5.1)
PROT SERPL-MCNC: 7.4 G/DL (ref 6–8.4)
SODIUM SERPL-SCNC: 141 MMOL/L (ref 136–145)
TRIGL SERPL-MCNC: 80 MG/DL (ref 30–150)

## 2021-04-15 PROCEDURE — 36415 COLL VENOUS BLD VENIPUNCTURE: CPT | Mod: PO | Performed by: INTERNAL MEDICINE

## 2021-04-15 PROCEDURE — 80053 COMPREHEN METABOLIC PANEL: CPT | Performed by: INTERNAL MEDICINE

## 2021-04-15 PROCEDURE — 80061 LIPID PANEL: CPT | Performed by: INTERNAL MEDICINE

## 2021-04-21 ENCOUNTER — PATIENT OUTREACH (OUTPATIENT)
Dept: ADMINISTRATIVE | Facility: OTHER | Age: 51
End: 2021-04-21

## 2021-04-22 ENCOUNTER — OFFICE VISIT (OUTPATIENT)
Dept: CARDIOLOGY | Facility: CLINIC | Age: 51
End: 2021-04-22
Payer: MEDICAID

## 2021-04-22 VITALS
HEART RATE: 76 BPM | HEIGHT: 70 IN | WEIGHT: 186.06 LBS | BODY MASS INDEX: 26.64 KG/M2 | SYSTOLIC BLOOD PRESSURE: 124 MMHG | DIASTOLIC BLOOD PRESSURE: 80 MMHG

## 2021-04-22 DIAGNOSIS — R00.2 PALPITATIONS: ICD-10-CM

## 2021-04-22 DIAGNOSIS — I25.84 CORONARY ARTERY DISEASE DUE TO CALCIFIED CORONARY LESION: Primary | ICD-10-CM

## 2021-04-22 DIAGNOSIS — E78.00 PURE HYPERCHOLESTEROLEMIA: ICD-10-CM

## 2021-04-22 DIAGNOSIS — I25.10 CORONARY ARTERY DISEASE DUE TO CALCIFIED CORONARY LESION: Primary | ICD-10-CM

## 2021-04-22 PROCEDURE — 99999 PR PBB SHADOW E&M-EST. PATIENT-LVL III: ICD-10-PCS | Mod: PBBFAC,,, | Performed by: INTERNAL MEDICINE

## 2021-04-22 PROCEDURE — 99999 PR PBB SHADOW E&M-EST. PATIENT-LVL III: CPT | Mod: PBBFAC,,, | Performed by: INTERNAL MEDICINE

## 2021-04-22 PROCEDURE — 99213 OFFICE O/P EST LOW 20 MIN: CPT | Mod: S$PBB,,, | Performed by: INTERNAL MEDICINE

## 2021-04-22 PROCEDURE — 99213 OFFICE O/P EST LOW 20 MIN: CPT | Mod: PBBFAC,PO | Performed by: INTERNAL MEDICINE

## 2021-04-22 PROCEDURE — 99213 PR OFFICE/OUTPT VISIT, EST, LEVL III, 20-29 MIN: ICD-10-PCS | Mod: S$PBB,,, | Performed by: INTERNAL MEDICINE

## 2021-05-13 ENCOUNTER — TELEPHONE (OUTPATIENT)
Dept: ORTHOPEDICS | Facility: CLINIC | Age: 51
End: 2021-05-13

## 2021-05-13 DIAGNOSIS — S52.92XD CLOSED FRACTURE OF BOTH RADII WITH ROUTINE HEALING, SUBSEQUENT ENCOUNTER: Primary | ICD-10-CM

## 2021-05-13 DIAGNOSIS — S52.91XD CLOSED FRACTURE OF BOTH RADII WITH ROUTINE HEALING, SUBSEQUENT ENCOUNTER: Primary | ICD-10-CM

## 2021-05-26 ENCOUNTER — CLINICAL SUPPORT (OUTPATIENT)
Dept: REHABILITATION | Facility: HOSPITAL | Age: 51
End: 2021-05-26
Payer: MEDICAID

## 2021-05-26 DIAGNOSIS — M25.60 RANGE OF MOTION DEFICIT: ICD-10-CM

## 2021-05-26 DIAGNOSIS — S52.92XD CLOSED FRACTURE OF BOTH RADII WITH ROUTINE HEALING, SUBSEQUENT ENCOUNTER: ICD-10-CM

## 2021-05-26 DIAGNOSIS — S52.91XD CLOSED FRACTURE OF BOTH RADII WITH ROUTINE HEALING, SUBSEQUENT ENCOUNTER: ICD-10-CM

## 2021-05-26 PROCEDURE — 97166 OT EVAL MOD COMPLEX 45 MIN: CPT | Mod: PO

## 2021-05-26 PROCEDURE — 97150 GROUP THERAPEUTIC PROCEDURES: CPT | Mod: PO

## 2021-06-04 RX ORDER — ATORVASTATIN CALCIUM 40 MG/1
TABLET, FILM COATED ORAL
Qty: 90 TABLET | Refills: 0 | Status: SHIPPED | OUTPATIENT
Start: 2021-06-04 | End: 2021-11-12

## 2021-06-11 ENCOUNTER — CLINICAL SUPPORT (OUTPATIENT)
Dept: REHABILITATION | Facility: HOSPITAL | Age: 51
End: 2021-06-11
Payer: MEDICAID

## 2021-06-11 DIAGNOSIS — M25.60 RANGE OF MOTION DEFICIT: ICD-10-CM

## 2021-06-11 PROCEDURE — 97530 THERAPEUTIC ACTIVITIES: CPT | Mod: PO

## 2021-06-15 ENCOUNTER — CLINICAL SUPPORT (OUTPATIENT)
Dept: REHABILITATION | Facility: HOSPITAL | Age: 51
End: 2021-06-15
Payer: MEDICAID

## 2021-06-15 DIAGNOSIS — M25.60 RANGE OF MOTION DEFICIT: ICD-10-CM

## 2021-06-15 PROCEDURE — 97530 THERAPEUTIC ACTIVITIES: CPT | Mod: PO

## 2021-06-30 ENCOUNTER — CLINICAL SUPPORT (OUTPATIENT)
Dept: REHABILITATION | Facility: HOSPITAL | Age: 51
End: 2021-06-30
Payer: MEDICAID

## 2021-06-30 DIAGNOSIS — M25.60 RANGE OF MOTION DEFICIT: ICD-10-CM

## 2021-06-30 PROCEDURE — 97530 THERAPEUTIC ACTIVITIES: CPT | Mod: PO

## 2021-07-07 ENCOUNTER — CLINICAL SUPPORT (OUTPATIENT)
Dept: REHABILITATION | Facility: HOSPITAL | Age: 51
End: 2021-07-07
Payer: MEDICAID

## 2021-07-07 DIAGNOSIS — M25.60 RANGE OF MOTION DEFICIT: ICD-10-CM

## 2021-07-07 PROCEDURE — 97530 THERAPEUTIC ACTIVITIES: CPT | Mod: PO

## 2021-07-28 ENCOUNTER — CLINICAL SUPPORT (OUTPATIENT)
Dept: REHABILITATION | Facility: HOSPITAL | Age: 51
End: 2021-07-28
Payer: MEDICAID

## 2021-07-28 DIAGNOSIS — M25.60 RANGE OF MOTION DEFICIT: ICD-10-CM

## 2021-07-28 PROCEDURE — 97530 THERAPEUTIC ACTIVITIES: CPT | Mod: PO

## 2021-08-05 ENCOUNTER — CLINICAL SUPPORT (OUTPATIENT)
Dept: REHABILITATION | Facility: HOSPITAL | Age: 51
End: 2021-08-05
Payer: MEDICAID

## 2021-08-05 DIAGNOSIS — M25.60 RANGE OF MOTION DEFICIT: ICD-10-CM

## 2021-08-05 PROCEDURE — 97530 THERAPEUTIC ACTIVITIES: CPT | Mod: PO

## 2021-08-13 ENCOUNTER — CLINICAL SUPPORT (OUTPATIENT)
Dept: REHABILITATION | Facility: HOSPITAL | Age: 51
End: 2021-08-13
Payer: MEDICAID

## 2021-08-13 DIAGNOSIS — M25.60 RANGE OF MOTION DEFICIT: ICD-10-CM

## 2021-08-13 PROCEDURE — 97530 THERAPEUTIC ACTIVITIES: CPT | Mod: PO

## 2021-08-19 ENCOUNTER — CLINICAL SUPPORT (OUTPATIENT)
Dept: REHABILITATION | Facility: HOSPITAL | Age: 51
End: 2021-08-19
Payer: MEDICAID

## 2021-08-19 DIAGNOSIS — M25.60 RANGE OF MOTION DEFICIT: ICD-10-CM

## 2021-08-19 PROCEDURE — 97530 THERAPEUTIC ACTIVITIES: CPT | Mod: PO

## 2021-08-27 ENCOUNTER — CLINICAL SUPPORT (OUTPATIENT)
Dept: REHABILITATION | Facility: HOSPITAL | Age: 51
End: 2021-08-27
Payer: MEDICAID

## 2021-08-27 DIAGNOSIS — M25.60 RANGE OF MOTION DEFICIT: ICD-10-CM

## 2021-08-27 PROCEDURE — 97530 THERAPEUTIC ACTIVITIES: CPT | Mod: PO

## 2021-11-24 ENCOUNTER — CLINICAL SUPPORT (OUTPATIENT)
Dept: URGENT CARE | Facility: CLINIC | Age: 51
End: 2021-11-24
Payer: COMMERCIAL

## 2021-11-24 DIAGNOSIS — Z11.52 ENCOUNTER FOR SCREENING FOR COVID-19: Primary | ICD-10-CM

## 2021-11-24 LAB
CTP QC/QA: YES
SARS-COV-2 RDRP RESP QL NAA+PROBE: NEGATIVE

## 2021-11-24 PROCEDURE — U0002 COVID-19 LAB TEST NON-CDC: HCPCS | Mod: QW,S$GLB,, | Performed by: NURSE PRACTITIONER

## 2021-11-24 PROCEDURE — U0002: ICD-10-PCS | Mod: QW,S$GLB,, | Performed by: NURSE PRACTITIONER

## 2021-11-30 ENCOUNTER — OFFICE VISIT (OUTPATIENT)
Dept: URGENT CARE | Facility: CLINIC | Age: 51
End: 2021-11-30
Payer: COMMERCIAL

## 2021-11-30 VITALS
DIASTOLIC BLOOD PRESSURE: 96 MMHG | SYSTOLIC BLOOD PRESSURE: 144 MMHG | BODY MASS INDEX: 26.63 KG/M2 | HEIGHT: 70 IN | OXYGEN SATURATION: 95 % | WEIGHT: 186 LBS | RESPIRATION RATE: 17 BRPM | TEMPERATURE: 98 F | HEART RATE: 88 BPM

## 2021-11-30 DIAGNOSIS — J01.90 ACUTE NON-RECURRENT SINUSITIS, UNSPECIFIED LOCATION: ICD-10-CM

## 2021-11-30 DIAGNOSIS — J03.90 EXUDATIVE TONSILLITIS: Primary | ICD-10-CM

## 2021-11-30 LAB
CTP QC/QA: YES
MOLECULAR STREP A: NEGATIVE

## 2021-11-30 PROCEDURE — 87651 STREP A DNA AMP PROBE: CPT | Mod: QW,S$GLB,, | Performed by: STUDENT IN AN ORGANIZED HEALTH CARE EDUCATION/TRAINING PROGRAM

## 2021-11-30 PROCEDURE — 99214 PR OFFICE/OUTPT VISIT, EST, LEVL IV, 30-39 MIN: ICD-10-PCS | Mod: S$GLB,,, | Performed by: STUDENT IN AN ORGANIZED HEALTH CARE EDUCATION/TRAINING PROGRAM

## 2021-11-30 PROCEDURE — 99214 OFFICE O/P EST MOD 30 MIN: CPT | Mod: S$GLB,,, | Performed by: STUDENT IN AN ORGANIZED HEALTH CARE EDUCATION/TRAINING PROGRAM

## 2021-11-30 PROCEDURE — 87651 POCT STREP A MOLECULAR: ICD-10-PCS | Mod: QW,S$GLB,, | Performed by: STUDENT IN AN ORGANIZED HEALTH CARE EDUCATION/TRAINING PROGRAM

## 2021-11-30 RX ORDER — PROMETHAZINE HYDROCHLORIDE AND DEXTROMETHORPHAN HYDROBROMIDE 6.25; 15 MG/5ML; MG/5ML
5 SYRUP ORAL EVERY 4 HOURS PRN
Qty: 118 ML | Refills: 0 | Status: SHIPPED | OUTPATIENT
Start: 2021-11-30 | End: 2022-12-13

## 2021-11-30 RX ORDER — AMOXICILLIN AND CLAVULANATE POTASSIUM 875; 125 MG/1; MG/1
1 TABLET, FILM COATED ORAL EVERY 12 HOURS
Qty: 14 TABLET | Refills: 0 | Status: SHIPPED | OUTPATIENT
Start: 2021-11-30 | End: 2021-12-07

## 2022-01-04 ENCOUNTER — TELEPHONE (OUTPATIENT)
Dept: INTERNAL MEDICINE | Facility: CLINIC | Age: 52
End: 2022-01-04
Payer: COMMERCIAL

## 2022-01-04 DIAGNOSIS — M25.562 CHRONIC PAIN OF LEFT KNEE: Primary | ICD-10-CM

## 2022-01-04 DIAGNOSIS — G89.29 CHRONIC PAIN OF LEFT KNEE: Primary | ICD-10-CM

## 2022-01-04 NOTE — TELEPHONE ENCOUNTER
----- Message from Zully Ham sent at 1/4/2022  4:16 PM CST -----  Contact: Self [phone]  Patient would like to get a referral.    Referral to what specialty:  Orthopedic     Does the patient want the referral with a specific physician:  Ino George NP    [facility] the [facility]     Reason (be specific):  left knee pain and wrist follow up    Does the patient already have the specialty clinic appointment scheduled:  no    If yes, what date is the appointment scheduled:       Is the insurance listed in Epic correct? (this is important for a referral):  Yes     Would the patient like a call back, or a response through their [facility] portal?:   call back    Comments:   Calling to request a referral for ortho. Pt states he has been having left knee pain and wrist follow up.

## 2022-01-06 ENCOUNTER — OFFICE VISIT (OUTPATIENT)
Dept: ORTHOPEDICS | Facility: CLINIC | Age: 52
End: 2022-01-06
Payer: COMMERCIAL

## 2022-01-06 ENCOUNTER — HOSPITAL ENCOUNTER (OUTPATIENT)
Dept: RADIOLOGY | Facility: HOSPITAL | Age: 52
Discharge: HOME | End: 2022-01-06
Attending: ORTHOPAEDIC SURGERY
Payer: COMMERCIAL

## 2022-01-06 DIAGNOSIS — G89.29 CHRONIC PAIN OF LEFT KNEE: ICD-10-CM

## 2022-01-06 DIAGNOSIS — M25.562 CHRONIC PAIN OF LEFT KNEE: ICD-10-CM

## 2022-01-06 DIAGNOSIS — M25.562 ACUTE PAIN OF LEFT KNEE: Primary | ICD-10-CM

## 2022-01-06 PROCEDURE — 73562 XR KNEE ORTHO LEFT WITH FLEXION: ICD-10-PCS | Mod: 26,RT,, | Performed by: RADIOLOGY

## 2022-01-06 PROCEDURE — 1160F RVW MEDS BY RX/DR IN RCRD: CPT | Mod: CPTII,S$GLB,, | Performed by: ORTHOPAEDIC SURGERY

## 2022-01-06 PROCEDURE — 1160F PR REVIEW ALL MEDS BY PRESCRIBER/CLIN PHARMACIST DOCUMENTED: ICD-10-PCS | Mod: CPTII,S$GLB,, | Performed by: ORTHOPAEDIC SURGERY

## 2022-01-06 PROCEDURE — 73564 X-RAY EXAM KNEE 4 OR MORE: CPT | Mod: 26,LT,, | Performed by: RADIOLOGY

## 2022-01-06 PROCEDURE — 73562 X-RAY EXAM OF KNEE 3: CPT | Mod: 26,RT,, | Performed by: RADIOLOGY

## 2022-01-06 PROCEDURE — 99999 PR PBB SHADOW E&M-EST. PATIENT-LVL II: ICD-10-PCS | Mod: PBBFAC,,, | Performed by: ORTHOPAEDIC SURGERY

## 2022-01-06 PROCEDURE — 99213 PR OFFICE/OUTPT VISIT, EST, LEVL III, 20-29 MIN: ICD-10-PCS | Mod: S$GLB,,, | Performed by: ORTHOPAEDIC SURGERY

## 2022-01-06 PROCEDURE — 73564 XR KNEE ORTHO LEFT WITH FLEXION: ICD-10-PCS | Mod: 26,LT,, | Performed by: RADIOLOGY

## 2022-01-06 PROCEDURE — 1159F MED LIST DOCD IN RCRD: CPT | Mod: CPTII,S$GLB,, | Performed by: ORTHOPAEDIC SURGERY

## 2022-01-06 PROCEDURE — 73562 X-RAY EXAM OF KNEE 3: CPT | Mod: 59,TC,RT

## 2022-01-06 PROCEDURE — 1159F PR MEDICATION LIST DOCUMENTED IN MEDICAL RECORD: ICD-10-PCS | Mod: CPTII,S$GLB,, | Performed by: ORTHOPAEDIC SURGERY

## 2022-01-06 PROCEDURE — 99213 OFFICE O/P EST LOW 20 MIN: CPT | Mod: S$GLB,,, | Performed by: ORTHOPAEDIC SURGERY

## 2022-01-06 PROCEDURE — 99999 PR PBB SHADOW E&M-EST. PATIENT-LVL II: CPT | Mod: PBBFAC,,, | Performed by: ORTHOPAEDIC SURGERY

## 2022-01-06 NOTE — PROGRESS NOTES
CC:  Left knee pain      HISTORY       HPI:  51-year-old male, used , left knee pain, atraumatic x1 week.  Feels like his knee has been swollen, pain when he stands on it for long periods of time, walking around at work, going up and down stairs.  Pain is in the anterior lateral aspect of his knee.  5/10, causing a limp a little bit.  He has not done anything for the pain other than secure his friends cane, but has not used.  He is not taking any medicine not used a brace, not iced    No locking, catching, giving way  No prior injections, surgeries or injuries    ROS:  Constitutional: Denies fever/chills  Neurological: Denies numbness/tingling (any exceptions noted in orthopaedic exam)   Psychiatric/Behavioral: Denies change in normal mood  Eyes: Denies change in vision  Cardiovascular: Denies chest pain  Respiratory: Denies shortness of breath  Hematologic/Lymphatic: Denies easy bleeding/bruising   Skin: Denies new rash or skin lesions   Gastrointestinal: Denies nausea/vomitting/diarrhea, change in bowel habits, abdominal pain   Allergic/Immunologic: Denies adverse reactions to current medications  Musculoskeletal: see HPI    PAST MEDICAL HISTORY:   Past Medical History:   Diagnosis Date    Aneurysm     Right sided filling anterior communicating aneurysm s/p coiling 2011    Anxiety     Cerebral aneurysm, nonruptured 12/27/2016    Colon adenoma: 3/18 repeat colonoscpy 2023 6/22/2018    Colon polyp     Diverticulosis of large intestine without hemorrhage: see colonoscopy 3/18; sigmoid and descending colon 6/22/2018    Fatty liver: see CT 3/18 6/22/2018    Umbilical hernia without obstruction and without gangrene: see CT 3/18 6/22/2018     PAST SURGICAL HISTORY:   Past Surgical History:   Procedure Laterality Date    CEREBRAL ANGIOGRAM  2011    angiogram/coiling    COLONOSCOPY N/A 3/26/2018    Procedure: COLONOSCOPY;  Surgeon: Sanjiv Duran MD;  Location: [facility] ([address] [place] [address]);  Service:  Endoscopy;  Laterality: N/A;    COLONOSCOPY N/A 3/9/2021    Procedure: COLONOSCOPY;  Surgeon: Aleshia Daly MD;  Location: [facility];  Service: Endoscopy;  Laterality: N/A;    ORIF FOREARM FRACTURE Bilateral 9/14/2020    Procedure: ORIF, FRACTURE, RADIUS OR ULNA, synthes, right, large C arm at a diagonal from the rear of the room, ancef, velcro wrist splint;  Surgeon: Hao Thorne MD;  Location: [address];  Service: Orthopedics;  Laterality: Bilateral;     FAMILY HISTORY:   Family History   Problem Relation Age of Onset    DAVID disease Mother     Hypertension Mother     Diabetes Mother     Brain cancer Father     Thyroid disease Sister     No Known Problems Daughter     No Known Problems Sister     Cirrhosis Neg Hx     Colon polyps Neg Hx     Crohn's disease Neg Hx     Liver cancer Neg Hx     Stomach cancer Neg Hx     Ulcerative colitis Neg Hx     Heart attack Neg Hx     Colon cancer Neg Hx      SOCIAL HISTORY:   Social History     Socioeconomic History    Marital status: Legally    Tobacco Use    Smoking status: Never Smoker    Smokeless tobacco: Never Used   Substance and Sexual Activity    Alcohol use: Not Currently     Comment: ocassionally - twice a week    Drug use: No     MEDICATIONS:   Current Outpatient Medications:     ALPRAZolam (XANAX) 1 MG tablet, Take 0.5 tablets (0.5 mg total) by mouth daily as needed for Anxiety., Disp: 30 tablet, Rfl: 0    ascorbic acid, vitamin C, (VITAMIN C) 100 MG tablet, Take 100 mg by mouth once daily., Disp: , Rfl:     atorvastatin (LIPITOR) 40 MG tablet, TAKE 1 TABLET(40 MG) BY MOUTH EVERY DAY, Disp: 90 tablet, Rfl: 0    blood pressure monitor (BLOOD PRESSURE KIT) Kit, Monitor Blood Pressure daily., Disp: 1 each, Rfl: 0    dicyclomine (BENTYL) 20 mg tablet, Take 1 tablet (20 mg total) by mouth 3 (three) times daily as needed. (Patient not taking: No sig reported), Disp: 60 tablet, Rfl: 2    lubiprostone (AMITIZA) 8 MCG Cap,  Take 1 capsule (8 mcg total) by mouth 2 (two) times daily with meals. (Patient not taking: No sig reported), Disp: 60 capsule, Rfl: 1    melatonin (MELATIN) 3 mg tablet, Take 2 tablets (6 mg total) by mouth nightly as needed for Insomnia., Disp:  , Rfl: 0    promethazine-dextromethorphan (PROMETHAZINE-DM) 6.25-15 mg/5 mL Syrp, Take 5 mLs by mouth every 4 (four) hours as needed (cough and congestion)., Disp: 118 mL, Rfl: 0  ALLERGIES: Review of patient's allergies indicates:  No Known Allergies      EXAM      VITAL SIGNS:   There were no vitals taken for this visit.      PE:  General:  no acute distress, appears stated age    Neuro: alert and oriented x3  Psych: normal mood  Head: normocephalic, atraumatic.   Eyes: no scleral icterus  Mouth: moist mucous membranes  Cardiovascular: extremities warm and well perfused  Lungs: breathing comfortably, equal chest rise bilat  Skin: clean, dry, intact (any exceptions noted in below musculoskeletal exam)    Musculoskeletal:  Left lower extremity  No gross deformities, wounds  No crepitus with knee range of motion  Mild tenderness palpation about anterior lateral joint line  Knee range of motion 0-130 degrees flexion.  No varus or valgus instability at full extension or 30° of flexion.  Negative anterior/posterior drawer, negative Lachman's  + Mireille for pain  Patella tracks midline with no instability  Hip range of motion to 100° of flexion, internal rotation to 20° external rotation of 25°  Motor intact 5/5 hip flex, quad, Tib Ant, gastroc, EHL, FHL.  Sensation intact saphenous, sural, deep/superficial peroneal, tibial nerves  Palp DP/PT pulse, BCR        XRAYS:  X-ray left knee demonstrate minimal degenerative changes, no acute fracture dislocation  (I independently reviewed and interpreted the above imaging)    MEDICAL DECISION MAKING       Encounter Diagnosis   Name Primary?    Acute pain of left knee        51-year-old male, left knee pain x1 week    Has some  degenerative changes, symptoms could be due to synovitis/degenerative meniscus tear.  Denies any inciting traumatic event or injury    Rest  Ice  Compression  Elevation  Already has a neoprene knee sleeve    Physical therapy  Over-the-counter analgesics    Next visit was not improved consider injection or MRI based on patient's desire/physical exam results    --weight bearing:  WBAT  --followup:  P.r.n.  --XR at next visit:  None      =====================  Santos Burt MD  Orthopaedic Surgery

## 2022-01-10 ENCOUNTER — CLINICAL SUPPORT (OUTPATIENT)
Dept: REHABILITATION | Facility: HOSPITAL | Age: 52
End: 2022-01-10
Payer: MEDICAID

## 2022-01-10 DIAGNOSIS — M62.81 QUADRICEPS WEAKNESS: ICD-10-CM

## 2022-01-10 DIAGNOSIS — R52 PAIN AGGRAVATED BY STANDING: ICD-10-CM

## 2022-01-10 DIAGNOSIS — M25.662 IMPAIRED RANGE OF MOTION OF LEFT KNEE: Primary | ICD-10-CM

## 2022-01-10 DIAGNOSIS — M25.562 ACUTE PAIN OF LEFT KNEE: ICD-10-CM

## 2022-01-10 DIAGNOSIS — M67.02 HEEL CORD TIGHTNESS, LEFT: ICD-10-CM

## 2022-01-10 PROCEDURE — 97161 PT EVAL LOW COMPLEX 20 MIN: CPT | Mod: PO | Performed by: PHYSICAL THERAPIST

## 2022-01-10 NOTE — PROGRESS NOTES
[facility]   Physical Therapy Initial Evaluation     Date: 1/10/2022   Name: Andre Padgett  Clinic Number: [id number]    Therapy Diagnosis:   Encounter Diagnoses   Name Primary?    Acute pain of left knee     Quadriceps weakness     Impaired range of motion of left knee Yes    Pain aggravated by standing     Heel cord tightness, left      Physician: Santos Burt    Physician Orders: PT Eval and Treat left knee   Medical Diagnosis from Referral:   Acute pain of left knee [M25.562]    Evaluation Date: 1/10/2022  Authorization Period Expiration: 12/31/2022  Plan of Care Expiration: 4/10/2022  Progress Note Due: 2/10/2022  Visit # / Visits authorized: 1/ 1   FOTO: 0/3     Precautions: Standard    Time In: 925  Time Out: 1010  Total Appointment Time (timed & untimed codes): 45 minutes      SUBJECTIVE       History of current condition: Andre reports constant throbbing in the left knee. Says he can not bend the knee completely. On his feet a lot due to his occupation. Feels like he cannot put all his weight on the leg.     Date of onset: insidious. Awoke one AM about a week and a half ago. Presently improved since onset.   Falls: no  Pain:  Current 4/10, worst 7/10, best 4/10   Location: left knee anterior and lateral aspect.   Description: Throbbing  Aggravating Factors: Walking  Easing Factors: sitting and massaging the knee with elevation.   Sleep: not disturbed.     Current Level of Function:   Personal - no limitation   Domestic - no limitation   Community - no limitation   Occupation - only with some restrictions   Sports/recreation/fitness - does not participate.  Prior Level of Function: no inhibitions or accommodations.   Prior Therapy: no   Social History: seperated lives alone. One flight of stairs in the home.   Occupation:       Patients goals: to be able to bend the knee and not have to favor the leg.   Imaging, X-Rays : No fracture dislocation bone  destruction or OCD seen.  There are spurs on the patella.  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          Medical History:   Past Medical History:   Diagnosis Date    Aneurysm     Right sided filling anterior communicating aneurysm s/p coiling 2011    Anxiety     Cerebral aneurysm, nonruptured 12/27/2016    Colon adenoma: 3/18 repeat colonoscpy 2023 6/22/2018    Colon polyp     Diverticulosis of large intestine without hemorrhage: see colonoscopy 3/18; sigmoid and descending colon 6/22/2018    Fatty liver: see CT 3/18 6/22/2018    Umbilical hernia without obstruction and without gangrene: see CT 3/18 6/22/2018       Surgical History:   Andre Padgett  has a past surgical history that includes Colonoscopy (N/A, 3/26/2018); Cerebral angiogram (2011); ORIF forearm fracture (Bilateral, 9/14/2020); and Colonoscopy (N/A, 3/9/2021).    Medications:   Andre has a current medication list which includes the following prescription(s): alprazolam, ascorbic acid (vitamin c), atorvastatin, blood pressure monitor, dicyclomine, lubiprostone, melatonin, and promethazine-dextromethorphan.    Allergies:   Review of patient's allergies indicates:  No Known Allergies       OBJECTIVE     Physical Appearance: no deformity or edema noted.   Posture Alignment: left forefoot pronation.   Sensation: Light Touch: Intact  Palpation: pain elicited lateral joint line, lateal patella and superior lateral patella facet.     Knee  Left  Pain/Dysfunction with Movement    AROM PROM MMT    Flexion 125 130 4-/5    Extension -15 0 3+/5      MMT:                                      L   Hip flexors                           3+/5  Gluteus medius                   3/5  Gluteus mirtha                3+/5            Special Tests: Knee   Left   Ant. Drawer - instability and - pain   Post. Drawer - instability and - pain   Lachman's - instability and - pain   Valgus at 0 Deg - instability and - pain    Valgus at 30 Deg - instability and - pain   Varus at 0 Deg - instability and - pain   Varus at 30 Deg - instability and - pain   Renovo Grind Test + pain         Functional Movement:  Squat    DP left knee     Single Leg Stand      R - functional / no pain, dysfunctional / no pain  Eyes open  R - dysfunctional / no pain BLE      eyes closed   FLEXIBILITY  Hamstrings R     L 55/60   Achilles - moderate limitation     Gait: Without AD  Analysis: step length is equal but with noted foot pronation.   Bed Mobility:Independent  Transfers: Independent      Limitation/Restriction for FOTO IE knee Survey - not performed.          TREATMENT     Total Treatment time (time-based codes) separate from Evaluation: 0 minutes       PATIENT EDUCATION AND HOME EXERCISES     Education provided:    - reviewed radiographs for education purposes so patient may better understand his condition, set realistic expectations and importance of self management.     Written Home Exercises Provided: NO .     ASSESSMENT     Andre is a 51 y.o. male referred to outpatient Physical Therapy with a medical diagnosis of Acute pain of left knee.  Patient presents with clinical findings of limited active knee extension due to a stability and motor control dysfunction, a knee flexion mobility dysfunction, Ankle dorsiflexion and tibial internal rotation mobility dysfunction.  Patient prognosis is Good.   Patientt will benefit from skilled outpatient Physical Therapy to address the deficits stated above and in the chart below, provide patient /family education, and to maximize patientt's level of independence.     Plan of care discussed with patient: Yes  Patient's spiritual, cultural and educational needs considered and patient is agreeable to the plan of care and goals as stated below:     Anticipated Barriers for therapy: scheduling     Medical Necessity is demonstrated by the following  History  Co-morbidities and personal factors that may impact the  plan of care Co-morbidities:   Past Medical History:   Diagnosis Date    Aneurysm     Right sided filling anterior communicating aneurysm s/p coiling 2011    Anxiety     Cerebral aneurysm, nonruptured 12/27/2016    Colon adenoma: 3/18 repeat colonoscpy 2023 6/22/2018    Colon polyp     Diverticulosis of large intestine without hemorrhage: see colonoscopy 3/18; sigmoid and descending colon 6/22/2018    Fatty liver: see CT 3/18 6/22/2018    Umbilical hernia without obstruction and without gangrene: see CT 3/18 6/22/2018       Personal Factors:   coping style     low   Examination  Body Structures and Functions, activity limitations and participation restrictions that may impact the plan of care Body Regions:   lower extremities  left knee     Body Systems:    musculoskeletal    Participation Restrictions:   None    Activity limitations:   Learning and applying knowledge  no deficits    General Tasks and Commands  no deficits    Communication  no deficits    Mobility  walking  standing     Self care  no deficits    Domestic Life  no deficits    Interactions/Relationships  no deficits    Life Areas  employment  no deficits    Community and Social Life  community life  recreation and leisure         low   Clinical Presentation stable and uncomplicated low   Decision Making/ Complexity Score: low     Goals:  Ankle / Lower extremity  Short Term Goals: 5 weeks   1. Pts pain decreased 20 - 40% for improved functional mobility and quality of life ONGOING  2. Pts PROM in knee flexion increased by 10 degrees to enhance AROM and functional mobility  ONGOING  3. Pts strength in the hip and quadriceps increased by 1/2 grade to facilitate improved active mobility and functional stability ONGOING  4. Pt to exhibit correct return demonstration of exercises for self-management and independence with HEP ONGOING  5. Pt to demonstrate enhanced neuromuscular response  with single leg static balance for improved functional mobility  and gait pattern  ONGOING    Long Term Goals : 10 weeks   1.  Pts pain level decreased to 75% or greater with walking for restoring functional mobility and ADL. ONGOING  2. Pts AROM in knee extension and flexion increased by 10 to 15 degrees to restore functional mobility and ADL  ONGOING  3. Pts strength in the hip and quad musculature increased by one grade to facilitate improved active mobility and functional stability  ONGOING  4. Pt will be independent with HEP for self-management. ONGOING   5. Pt to exhibit dynamic stability on the RLE / LLE for stable functional mobility and gait. ONGOING       PLAN   Plan of care Certification: 1/10/2022 to 4/10/2022.    Outpatient Physical Therapy 2 times weekly for 10 weeks to include the following interventions: Manual Therapy, Neuromuscular Re-ed, Patient Education and Therapeutic Exercise.     Abisai Fuentes, PT      I CERTIFY THE NEED FOR THESE SERVICES FURNISHED UNDER THIS PLAN OF TREATMENT AND WHILE UNDER MY CARE   Physician's comments:     Physician's Signature: ___________________________________________________

## 2022-01-11 NOTE — PROGRESS NOTES
"[facility]   Physical Therapy Treatment Note     Name: Andre Padgett  Clinic Number: [id number]    Therapy Diagnosis:   Encounter Diagnoses   Name Primary?    Pain aggravated by standing     Heel cord tightness, left      Physician: Santos Burt    Visit Date: 1/12/2022    Physician Orders: PT Eval and Treat left knee   Medical Diagnosis from Referral: Acute pain of left knee [M25.562]  Evaluation Date: 1/10/2022  Authorization Period Expiration: 12/31/2022  Plan of Care Expiration: 4/10/2022  Progress Note Due: 2/10/2022  Visit # / Visits authorized: 1/ 1   FOTO: 0/3   PTA Visit #: 0/5     Precautions: Standard     Time In: 12:46  Time Out: 1:30  Total Appointment Time (timed & untimed codes): 44 minutes      SUBJECTIVE     Pt reports: no change since initial eval.  He was not provided with home exercise program.  Response to previous treatment: IE performed   Functional change: ongoing    Pain: 4/10  Location: left knee        OBJECTIVE     Objective measures taken at progress report unless specified otherwise.      TREATMENT     BOLD INDICATES EXERCISES PERFORMED    Andre received the treatments listed below:    THERAPEUTIC EXERCISES to develop  strength, ROM and flexibility for 38 minutes including     Quad sets 20x3"  Heel slides x20   SAQ x20  SLR x20  Bridges x20  Clamshells x20 ea  SL hip abduction x20 ea  LAQ x20  gastroc stretch on incline 3x30"      MANUAL THERAPY TECHNIQUES  were applied for 6 minutes.  Tibial ER/IR mobs      PATIENT EDUCATION AND HOME EXERCISES     Home Exercises Provided and Patient Education Provided     Education provided:   - NONE PROVIDED TODAY    Written Home Exercises Provided: not provided today. Exercises were reviewed and Andre was able to demonstrate them prior to the end of the session.  Andre demonstrated good  understanding of the education provided. See EMR under Patient Instructions for exercises provided during therapy " sessions    ASSESSMENT     Pt presents today with significant deficits in knee extension and flexion ROM and ankle inversion at rest. He tolerates exercises well and does not demonstrate any improvements in knee ROM following manual therapy today. Continue to progress.    Andre Is progressing towards his goals.   Pt prognosis is Good.     Pt will continue to benefit from skilled outpatient physical therapy to address the deficits listed in the problem list box on initial evaluation, provide pt/family education and to maximize pt's level of independence in the home and community environment.     Pt's spiritual, cultural and educational needs considered and pt agreeable to plan of care and goals.     Anticipated barriers to physical therapy: scheduling    Goals:   Ankle / Lower extremity  Short Term Goals: 5 weeks   1. Pts pain decreased 20 - 40% for improved functional mobility and quality of life ONGOING  2. Pts PROM in knee flexion increased by 10 degrees to enhance AROM and functional mobility  ONGOING  3. Pts strength in the hip and quadriceps increased by 1/2 grade to facilitate improved active mobility and functional stability ONGOING  4. Pt to exhibit correct return demonstration of exercises for self-management and independence with HEP ONGOING  5. Pt to demonstrate enhanced neuromuscular response  with single leg static balance for improved functional mobility and gait pattern  ONGOING     Long Term Goals : 10 weeks   1.  Pts pain level decreased to 75% or greater with walking for restoring functional mobility and ADL. ONGOING  2. Pts AROM in knee extension and flexion increased by 10 to 15 degrees to restore functional mobility and ADL  ONGOING  3. Pts strength in the hip and quad musculature increased by one grade to facilitate improved active mobility and functional stability  ONGOING  4. Pt will be independent with HEP for self-management. ONGOING   5. Pt to exhibit dynamic stability on the RLE / LLE  for stable functional mobility and gait. ONGOING     PLAN     Plan of care Certification: 1/10/2022 to 4/10/2022.    Deborah Lobo, PT

## 2022-01-12 ENCOUNTER — CLINICAL SUPPORT (OUTPATIENT)
Dept: REHABILITATION | Facility: HOSPITAL | Age: 52
End: 2022-01-12
Payer: COMMERCIAL

## 2022-01-12 DIAGNOSIS — R52 PAIN AGGRAVATED BY STANDING: ICD-10-CM

## 2022-01-12 DIAGNOSIS — M67.02 HEEL CORD TIGHTNESS, LEFT: ICD-10-CM

## 2022-01-12 PROCEDURE — 97110 THERAPEUTIC EXERCISES: CPT | Mod: PO

## 2022-01-14 NOTE — PLAN OF CARE
[facility]   Physical Therapy Initial Evaluation     Date: 1/10/2022   Name: Andre Padgett  Clinic Number: [id number]    Therapy Diagnosis:   Encounter Diagnoses   Name Primary?    Acute pain of left knee     Quadriceps weakness     Impaired range of motion of left knee Yes    Pain aggravated by standing     Heel cord tightness, left      Physician: Santos Burt    Physician Orders: PT Eval and Treat left knee   Medical Diagnosis from Referral:   Acute pain of left knee [M25.562]    Evaluation Date: 1/10/2022  Authorization Period Expiration: 12/31/2022  Plan of Care Expiration: 4/10/2022  Progress Note Due: 2/10/2022  Visit # / Visits authorized: 1/ 1   FOTO: 0/3     Precautions: Standard    Time In: 925  Time Out: 1010  Total Appointment Time (timed & untimed codes): 45 minutes      SUBJECTIVE       History of current condition: Andre reports constant throbbing in the left knee. Says he can not bend the knee completely. On his feet a lot due to his occupation. Feels like he cannot put all his weight on the leg.     Date of onset: insidious. Awoke one AM about a week and a half ago. Presently improved since onset.   Falls: no  Pain:  Current 4/10, worst 7/10, best 4/10   Location: left knee anterior and lateral aspect.   Description: Throbbing  Aggravating Factors: Walking  Easing Factors: sitting and massaging the knee with elevation.   Sleep: not disturbed.     Current Level of Function:   Personal - no limitation   Domestic - no limitation   Community - no limitation   Occupation - only with some restrictions   Sports/recreation/fitness - does not participate.  Prior Level of Function: no inhibitions or accommodations.   Prior Therapy: no   Social History: seperated lives alone. One flight of stairs in the home.   Occupation:       Patients goals: to be able to bend the knee and not have to favor the leg.   Imaging, X-Rays : No fracture dislocation bone  destruction or OCD seen.  There are spurs on the patella.  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          Medical History:   Past Medical History:   Diagnosis Date    Aneurysm     Right sided filling anterior communicating aneurysm s/p coiling 2011    Anxiety     Cerebral aneurysm, nonruptured 12/27/2016    Colon adenoma: 3/18 repeat colonoscpy 2023 6/22/2018    Colon polyp     Diverticulosis of large intestine without hemorrhage: see colonoscopy 3/18; sigmoid and descending colon 6/22/2018    Fatty liver: see CT 3/18 6/22/2018    Umbilical hernia without obstruction and without gangrene: see CT 3/18 6/22/2018       Surgical History:   Andre Padgett  has a past surgical history that includes Colonoscopy (N/A, 3/26/2018); Cerebral angiogram (2011); ORIF forearm fracture (Bilateral, 9/14/2020); and Colonoscopy (N/A, 3/9/2021).    Medications:   Andre has a current medication list which includes the following prescription(s): alprazolam, ascorbic acid (vitamin c), atorvastatin, blood pressure monitor, dicyclomine, lubiprostone, melatonin, and promethazine-dextromethorphan.    Allergies:   Review of patient's allergies indicates:  No Known Allergies       OBJECTIVE     Physical Appearance: no deformity or edema noted.   Posture Alignment: left forefoot pronation.   Sensation: Light Touch: Intact  Palpation: pain elicited lateral joint line, lateal patella and superior lateral patella facet.     Knee  Left  Pain/Dysfunction with Movement    AROM PROM MMT    Flexion 125 130 4-/5    Extension -15 0 3+/5      MMT:                                      L   Hip flexors                           3+/5  Gluteus medius                   3/5  Gluteus mirtha                3+/5            Special Tests: Knee   Left   Ant. Drawer - instability and - pain   Post. Drawer - instability and - pain   Lachman's - instability and - pain   Valgus at 0 Deg - instability and - pain    Valgus at 30 Deg - instability and - pain   Varus at 0 Deg - instability and - pain   Varus at 30 Deg - instability and - pain   Aurelia Grind Test + pain         Functional Movement:  Squat    DP left knee     Single Leg Stand      R - functional / no pain, dysfunctional / no pain  Eyes open  R - dysfunctional / no pain BLE      eyes closed   FLEXIBILITY  Hamstrings R     L 55/60   Achilles - moderate limitation     Gait: Without AD  Analysis: step length is equal but with noted foot pronation.   Bed Mobility:Independent  Transfers: Independent      Limitation/Restriction for FOTO IE knee Survey - not performed.          TREATMENT     Total Treatment time (time-based codes) separate from Evaluation: 0 minutes       PATIENT EDUCATION AND HOME EXERCISES     Education provided:    - reviewed radiographs for education purposes so patient may better understand his condition, set realistic expectations and importance of self management.     Written Home Exercises Provided: NO .     ASSESSMENT     Andre is a 51 y.o. male referred to outpatient Physical Therapy with a medical diagnosis of Acute pain of left knee.  Patient presents with clinical findings of limited active knee extension due to a stability and motor control dysfunction, a knee flexion mobility dysfunction, Ankle dorsiflexion and tibial internal rotation mobility dysfunction.  Patient prognosis is Good.   Patientt will benefit from skilled outpatient Physical Therapy to address the deficits stated above and in the chart below, provide patient /family education, and to maximize patientt's level of independence.     Plan of care discussed with patient: Yes  Patient's spiritual, cultural and educational needs considered and patient is agreeable to the plan of care and goals as stated below:     Anticipated Barriers for therapy: scheduling     Medical Necessity is demonstrated by the following  History  Co-morbidities and personal factors that may impact the  plan of care Co-morbidities:   Past Medical History:   Diagnosis Date    Aneurysm     Right sided filling anterior communicating aneurysm s/p coiling 2011    Anxiety     Cerebral aneurysm, nonruptured 12/27/2016    Colon adenoma: 3/18 repeat colonoscpy 2023 6/22/2018    Colon polyp     Diverticulosis of large intestine without hemorrhage: see colonoscopy 3/18; sigmoid and descending colon 6/22/2018    Fatty liver: see CT 3/18 6/22/2018    Umbilical hernia without obstruction and without gangrene: see CT 3/18 6/22/2018       Personal Factors:   coping style     low   Examination  Body Structures and Functions, activity limitations and participation restrictions that may impact the plan of care Body Regions:   lower extremities  left knee     Body Systems:    musculoskeletal    Participation Restrictions:   None    Activity limitations:   Learning and applying knowledge  no deficits    General Tasks and Commands  no deficits    Communication  no deficits    Mobility  walking  standing     Self care  no deficits    Domestic Life  no deficits    Interactions/Relationships  no deficits    Life Areas  employment  no deficits    Community and Social Life  community life  recreation and leisure         low   Clinical Presentation stable and uncomplicated low   Decision Making/ Complexity Score: low     Goals:  Ankle / Lower extremity  Short Term Goals: 5 weeks   1. Pts pain decreased 20 - 40% for improved functional mobility and quality of life ONGOING  2. Pts PROM in knee flexion increased by 10 degrees to enhance AROM and functional mobility  ONGOING  3. Pts strength in the hip and quadriceps increased by 1/2 grade to facilitate improved active mobility and functional stability ONGOING  4. Pt to exhibit correct return demonstration of exercises for self-management and independence with HEP ONGOING  5. Pt to demonstrate enhanced neuromuscular response  with single leg static balance for improved functional mobility  and gait pattern  ONGOING    Long Term Goals : 10 weeks   1.  Pts pain level decreased to 75% or greater with walking for restoring functional mobility and ADL. ONGOING  2. Pts AROM in knee extension and flexion increased by 10 to 15 degrees to restore functional mobility and ADL  ONGOING  3. Pts strength in the hip and quad musculature increased by one grade to facilitate improved active mobility and functional stability  ONGOING  4. Pt will be independent with HEP for self-management. ONGOING   5. Pt to exhibit dynamic stability on the RLE / LLE for stable functional mobility and gait. ONGOING       PLAN   Plan of care Certification: 1/10/2022 to 4/10/2022.    Outpatient Physical Therapy 2 times weekly for 10 weeks to include the following interventions: Manual Therapy, Neuromuscular Re-ed, Patient Education and Therapeutic Exercise.     Abisai Fuentes, PT      I CERTIFY THE NEED FOR THESE SERVICES FURNISHED UNDER THIS PLAN OF TREATMENT AND WHILE UNDER MY CARE   Physician's comments:     Physician's Signature: ___________________________________________________

## 2022-01-26 ENCOUNTER — PATIENT MESSAGE (OUTPATIENT)
Dept: ADMINISTRATIVE | Facility: HOSPITAL | Age: 52
End: 2022-01-26
Payer: COMMERCIAL

## 2022-01-26 NOTE — PROGRESS NOTES
"[facility]   Physical Therapy Treatment Note     Name: Andre Padgett  Clinic Number: [id number]    Therapy Diagnosis:   Encounter Diagnoses   Name Primary?    Pain aggravated by standing     Heel cord tightness, left      Physician: Santos Burt    Visit Date: 1/27/2022    Physician Orders: PT Eval and Treat left knee   Medical Diagnosis from Referral: Acute pain of left knee [M25.562]  Evaluation Date: 1/10/2022  Authorization Period Expiration: 12/31/2022  Plan of Care Expiration: 4/10/2022  Progress Note Due: 2/10/2022  Visit # / Visits authorized: 2/20 + eval  FOTO: 0/3   PTA Visit #: 0/5     Precautions: Standard     Time In: 11:05 pm  Time Out: 11:45 pm  Total Appointment Time (timed & untimed codes): 40 minutes      SUBJECTIVE     Pt reports: knee is improving. States he attempted to call to reschedule previous visit but was told that there was nothing available.   He was semi compliant with home exercise program.  Response to previous treatment: IE performed   Functional change: ongoing    Pain: 4/10  Location: left knee        OBJECTIVE     Objective measures taken at progress report unless specified otherwise.      TREATMENT     BOLD INDICATES EXERCISES PERFORMED    Andre received the treatments listed below:    THERAPEUTIC EXERCISES to develop  strength, ROM and flexibility for 40 minutes including     Quad sets 20x3" L  Heel slides x20 L  +Hamstring stretch 3x30" L  SAQ x20 L  SLR x20 L  Bridges x20  Clamshells x20 B (add OTB at next visit)  SL hip abduction x20 B  LAQ 1# x20 L  gastroc stretch on incline 3x30" B  +step ups on 6" box x20 (up L, down R)  +TRX squats x20  +shuttle 1 black bands 3x10      MANUAL THERAPY TECHNIQUES  were applied for 0 minutes.  Tibial ER/IR mobs      PATIENT EDUCATION AND HOME EXERCISES     Home Exercises Provided and Patient Education Provided     Education provided:   - NONE PROVIDED TODAY    Written Home Exercises Provided: not " provided today. Exercises were reviewed and Andre was able to demonstrate them prior to the end of the session.  Andre demonstrated good  understanding of the education provided. See EMR under Patient Instructions for exercises provided during therapy sessions    ASSESSMENT     Pt presents today with improvements in gait and knee ROM and subjective reports of decreased pain. Pt tolerates progressions well today with some reports of muscle fatigue by the end of session. Continue to progress.     Andre Is progressing towards his goals.   Pt prognosis is Good.     Pt will continue to benefit from skilled outpatient physical therapy to address the deficits listed in the problem list box on initial evaluation, provide pt/family education and to maximize pt's level of independence in the home and community environment.     Pt's spiritual, cultural and educational needs considered and pt agreeable to plan of care and goals.     Anticipated barriers to physical therapy: scheduling    Goals:   Ankle / Lower extremity  Short Term Goals: 5 weeks   1. Pts pain decreased 20 - 40% for improved functional mobility and quality of life ONGOING  2. Pts PROM in knee flexion increased by 10 degrees to enhance AROM and functional mobility  ONGOING  3. Pts strength in the hip and quadriceps increased by 1/2 grade to facilitate improved active mobility and functional stability ONGOING  4. Pt to exhibit correct return demonstration of exercises for self-management and independence with HEP ONGOING  5. Pt to demonstrate enhanced neuromuscular response  with single leg static balance for improved functional mobility and gait pattern  ONGOING     Long Term Goals : 10 weeks   1.  Pts pain level decreased to 75% or greater with walking for restoring functional mobility and ADL. ONGOING  2. Pts AROM in knee extension and flexion increased by 10 to 15 degrees to restore functional mobility and ADL  ONGOING  3. Pts strength in the hip and quad  musculature increased by one grade to facilitate improved active mobility and functional stability  ONGOING  4. Pt will be independent with HEP for self-management. ONGOING   5. Pt to exhibit dynamic stability on the RLE / LLE for stable functional mobility and gait. ONGOING     PLAN     Plan of care Certification: 1/10/2022 to 4/10/2022.    Deborah Lobo, PT

## 2022-01-27 ENCOUNTER — CLINICAL SUPPORT (OUTPATIENT)
Dept: REHABILITATION | Facility: HOSPITAL | Age: 52
End: 2022-01-27
Payer: MEDICAID

## 2022-01-27 DIAGNOSIS — M67.02 HEEL CORD TIGHTNESS, LEFT: ICD-10-CM

## 2022-01-27 DIAGNOSIS — R52 PAIN AGGRAVATED BY STANDING: ICD-10-CM

## 2022-01-27 PROCEDURE — 97110 THERAPEUTIC EXERCISES: CPT | Mod: PO

## 2022-01-28 NOTE — TELEPHONE ENCOUNTER
He has not seen me in well over a year. Has he seen anyone else in the dept? We would need a visit to fill if not.

## 2022-01-28 NOTE — TELEPHONE ENCOUNTER
Care Due:                  Date            Visit Type   Department     Provider  --------------------------------------------------------------------------------                                             [facility]  Last Visit: 09-      None         COREY Diop  Next Visit: None Scheduled  None         None Found                                                            Last  Test          Frequency    Reason                     Performed    Due Date  --------------------------------------------------------------------------------    Office Visit  12 months..  atorvastatin.............  09- 09-    [facility].........  12 months..  atorvastatin.............  Not Found    Overdue    Lipid Panel.  12 months..  atorvastatin.............  Not Found    Overdue    Powered by [email or web address]. Reference number: [id number].   1/28/2022 8:42:16 AM CST

## 2022-01-31 RX ORDER — ALPRAZOLAM 1 MG/1
TABLET ORAL
Qty: 30 TABLET | OUTPATIENT
Start: 2022-01-31

## 2022-02-01 ENCOUNTER — CLINICAL SUPPORT (OUTPATIENT)
Dept: REHABILITATION | Facility: HOSPITAL | Age: 52
End: 2022-02-01
Payer: COMMERCIAL

## 2022-02-01 DIAGNOSIS — R52 PAIN AGGRAVATED BY STANDING: ICD-10-CM

## 2022-02-01 DIAGNOSIS — M67.02 HEEL CORD TIGHTNESS, LEFT: ICD-10-CM

## 2022-02-01 PROCEDURE — 97110 THERAPEUTIC EXERCISES: CPT | Mod: PO,CQ

## 2022-02-01 NOTE — PROGRESS NOTES
"[facility]   Physical Therapy Treatment Note     Name: Andre Padgett  Clinic Number: [id number]    Therapy Diagnosis:   Encounter Diagnoses   Name Primary?    Pain aggravated by standing     Heel cord tightness, left      Physician: Santos Burt    Visit Date: 2/1/2022    Physician Orders: PT Eval and Treat left knee   Medical Diagnosis from Referral: Acute pain of left knee [M25.562]  Evaluation Date: 1/10/2022  Authorization Period Expiration: 12/31/2022  Plan of Care Expiration: 4/10/2022  Progress Note Due: 2/10/2022  Visit # / Visits authorized: 2/20 + eval  FOTO: 0/3   PTA Visit #: 0/5     Precautions: Standard     Time In: 11:45 am ( arrived late)   Time Out: 12:15 pm  Total Appointment Time (timed & untimed codes): 30 minutes      SUBJECTIVE     Pt reports: his knee is feeling better but still has pain.   He was semi compliant with home exercise program.  Response to previous treatment: Felt fine.    Functional change: ongoing    Pain: did not rate /10  Location: left knee        OBJECTIVE     Objective measures taken at progress report unless specified otherwise.      TREATMENT     BOLD INDICATES EXERCISES PERFORMED    Andre received the treatments listed below:    THERAPEUTIC EXERCISES to develop  strength, ROM and flexibility for  minutes including     Quad sets 20x3" L  Heel slides x20 L  Hamstring stretch 3x30" L  SAQ x20 L  SLR x20 L  Bridges x 20  Clamshells x20 B OTB  SL hip abduction x20 B  LAQ 2# x20 L  gastroc stretch on incline 3x30" B  step ups on 6" box x20 (up L, down R)  TRX squats x20  shuttle 2 black bands 3x10      MANUAL THERAPY TECHNIQUES  were applied for 0 minutes.  Tibial ER/IR mobs      PATIENT EDUCATION AND HOME EXERCISES     Home Exercises Provided and Patient Education Provided     Education provided:   - NONE PROVIDED TODAY    Written Home Exercises Provided: not provided today. Exercises were reviewed and Andre was able to demonstrate " them prior to the end of the session.  Andre demonstrated good  understanding of the education provided. See EMR under Patient Instructions for exercises provided during therapy sessions    ASSESSMENT     Pt performed the above exercises with good tolerance and no reports of pain.  Pt required verbal cueing on proper exercises form during lateral hip strengthening exercises to facilitate appropriate muscle activation. Pt was able to increase his resistance on shuttle presses and clamshells.     Andre Is progressing towards his goals.   Pt prognosis is Good.     Pt will continue to benefit from skilled outpatient physical therapy to address the deficits listed in the problem list box on initial evaluation, provide pt/family education and to maximize pt's level of independence in the home and community environment.     Pt's spiritual, cultural and educational needs considered and pt agreeable to plan of care and goals.     Anticipated barriers to physical therapy: scheduling    Goals:   Ankle / Lower extremity  Short Term Goals: 5 weeks   1. Pts pain decreased 20 - 40% for improved functional mobility and quality of life ONGOING  2. Pts PROM in knee flexion increased by 10 degrees to enhance AROM and functional mobility  ONGOING  3. Pts strength in the hip and quadriceps increased by 1/2 grade to facilitate improved active mobility and functional stability ONGOING  4. Pt to exhibit correct return demonstration of exercises for self-management and independence with HEP ONGOING  5. Pt to demonstrate enhanced neuromuscular response  with single leg static balance for improved functional mobility and gait pattern  ONGOING     Long Term Goals : 10 weeks   1.  Pts pain level decreased to 75% or greater with walking for restoring functional mobility and ADL. ONGOING  2. Pts AROM in knee extension and flexion increased by 10 to 15 degrees to restore functional mobility and ADL  ONGOING  3. Pts strength in the hip and quad  musculature increased by one grade to facilitate improved active mobility and functional stability  ONGOING  4. Pt will be independent with HEP for self-management. ONGOING   5. Pt to exhibit dynamic stability on the RLE / LLE for stable functional mobility and gait. ONGOING     PLAN     Plan of care Certification: 1/10/2022 to 4/10/2022.    Jamey Smith, PTA

## 2022-02-03 ENCOUNTER — CLINICAL SUPPORT (OUTPATIENT)
Dept: REHABILITATION | Facility: HOSPITAL | Age: 52
End: 2022-02-03
Payer: COMMERCIAL

## 2022-02-03 DIAGNOSIS — R52 PAIN AGGRAVATED BY STANDING: ICD-10-CM

## 2022-02-03 DIAGNOSIS — M67.02 HEEL CORD TIGHTNESS, LEFT: ICD-10-CM

## 2022-02-03 PROCEDURE — 97110 THERAPEUTIC EXERCISES: CPT | Mod: PO,CQ

## 2022-02-03 NOTE — PROGRESS NOTES
"[facility]   Physical Therapy Treatment Note     Name: Andre Padgett  Clinic Number: [id number]    Therapy Diagnosis:   Encounter Diagnoses   Name Primary?    Pain aggravated by standing     Heel cord tightness, left      Physician: Santos Burt    Visit Date: 2/3/2022    Physician Orders: PT Eval and Treat left knee   Medical Diagnosis from Referral: Acute pain of left knee [M25.562]  Evaluation Date: 1/10/2022  Authorization Period Expiration: 12/31/2022  Plan of Care Expiration: 4/10/2022  Progress Note Due: 2/10/2022  Visit # / Visits authorized: 4/20 + eval  FOTO: 0/3   PTA Visit #: 2/5     Precautions: Standard    Time In: 11:50 am ( arrived late)   Time Out: 12:15 pm  Total Appointment Time (timed & untimed codes): 25 minutes      SUBJECTIVE     Pt reports: no new complaints or concerns at this time.   He was semi compliant with home exercise program.  Response to previous treatment: Felt fine.    Functional change: ongoing    Pain: did not rate /10  Location: left knee        OBJECTIVE     Objective measures taken at progress report unless specified otherwise.      TREATMENT     BOLD INDICATES EXERCISES PERFORMED    Andre received the treatments listed below:    THERAPEUTIC EXERCISES to develop  strength, ROM and flexibility for  minutes including     Quad sets 20x3" L  Heel slides x20 L  Hamstring stretch 3x30" L  SAQ x20 L  SLR x20 L  Bridges x 20  Clamshells x20 B OTB  SL hip abduction x20 B  LAQ 2# x20 L  gastroc stretch on incline 3x30" B  step ups on 8" box x20 (up L, down R)  TRX squats x20  double leg shuttle 2 black bands 3x10  Single leg shuttle press 1 black band 3 x 10  Double leg shuttle calf raises 3 black bands 3 x 10    MANUAL THERAPY TECHNIQUES  were applied for 0 minutes.  Tibial ER/IR mobs      PATIENT EDUCATION AND HOME EXERCISES     Home Exercises Provided and Patient Education Provided     Education provided:   - NONE PROVIDED TODAY    Written " Home Exercises Provided: not provided today. Exercises were reviewed and Andre was able to demonstrate them prior to the end of the session.  Andre demonstrated good  understanding of the education provided. See EMR under Patient Instructions for exercises provided during therapy sessions    ASSESSMENT     Pt performed new strengthening exercises without reports of increased pain.  Pt continue's to respond well to PT POC at this time.      Andre Is progressing towards his goals.   Pt prognosis is Good.     Pt will continue to benefit from skilled outpatient physical therapy to address the deficits listed in the problem list box on initial evaluation, provide pt/family education and to maximize pt's level of independence in the home and community environment.     Pt's spiritual, cultural and educational needs considered and pt agreeable to plan of care and goals.     Anticipated barriers to physical therapy: scheduling    Goals:   Ankle / Lower extremity  Short Term Goals: 5 weeks   1. Pts pain decreased 20 - 40% for improved functional mobility and quality of life ONGOING  2. Pts PROM in knee flexion increased by 10 degrees to enhance AROM and functional mobility  ONGOING  3. Pts strength in the hip and quadriceps increased by 1/2 grade to facilitate improved active mobility and functional stability ONGOING  4. Pt to exhibit correct return demonstration of exercises for self-management and independence with HEP ONGOING  5. Pt to demonstrate enhanced neuromuscular response  with single leg static balance for improved functional mobility and gait pattern  ONGOING     Long Term Goals : 10 weeks   1.  Pts pain level decreased to 75% or greater with walking for restoring functional mobility and ADL. ONGOING  2. Pts AROM in knee extension and flexion increased by 10 to 15 degrees to restore functional mobility and ADL  ONGOING  3. Pts strength in the hip and quad musculature increased by one grade to facilitate  improved active mobility and functional stability  ONGOING  4. Pt will be independent with HEP for self-management. ONGOING   5. Pt to exhibit dynamic stability on the RLE / LLE for stable functional mobility and gait. ONGOING     PLAN     Plan of care Certification: 1/10/2022 to 4/10/2022.    Jamey Smith, PTA

## 2022-02-09 RX ORDER — ALPRAZOLAM 1 MG/1
0.5 TABLET ORAL DAILY PRN
Qty: 30 TABLET | Refills: 0 | OUTPATIENT
Start: 2022-02-09

## 2022-02-09 NOTE — TELEPHONE ENCOUNTER
----- Message from Mamadou Eden sent at 2/9/2022  1:46 PM CST -----  Contact: [phone]   Requesting an RX refill or new RX.  Is this a refill or new RX:  refill    RX name and strength (copy/paste from chart):  ALPRAZolam (XANAX) 1 MG tablet    Is this a 30 day or 90 day RX: 30    Pharmacy name and phone # (copy/paste from chart):      [id number] - [address] [address]  Phone: [phone] Fax: [phone]      The doctors have asked that we provide their patients with the following 2 reminders -- prescription refills can take up to 72 hours, and a friendly reminder that in the future you can use your [facility] account to request refills:

## 2022-02-09 NOTE — TELEPHONE ENCOUNTER
Care Due:                  Date            Visit Type   Department     Provider  --------------------------------------------------------------------------------                                EP -                              PRIMARY      [facility]  Last Visit: 09-      CARE ([facility])   MEDICINE       Hunter Diop  Next Visit: None Scheduled  None         None Found                                                            Last  Test          Frequency    Reason                     Performed    Due Date  --------------------------------------------------------------------------------    CMP.........  12 months..  atorvastatin.............  04-   04-    Lipid Panel.  12 months..  atorvastatin.............  04-   04-    Powered by [employer]. Reference number: [id number].   2/09/2022 3:23:59 PM CST

## 2022-02-15 ENCOUNTER — CLINICAL SUPPORT (OUTPATIENT)
Dept: REHABILITATION | Facility: HOSPITAL | Age: 52
End: 2022-02-15
Payer: COMMERCIAL

## 2022-02-15 ENCOUNTER — OFFICE VISIT (OUTPATIENT)
Dept: INTERNAL MEDICINE | Facility: CLINIC | Age: 52
End: 2022-02-15
Payer: COMMERCIAL

## 2022-02-15 VITALS
WEIGHT: 199.06 LBS | SYSTOLIC BLOOD PRESSURE: 114 MMHG | HEART RATE: 91 BPM | OXYGEN SATURATION: 96 % | DIASTOLIC BLOOD PRESSURE: 78 MMHG | HEIGHT: 70 IN | BODY MASS INDEX: 28.5 KG/M2

## 2022-02-15 DIAGNOSIS — Z00.00 ROUTINE PHYSICAL EXAMINATION: Primary | ICD-10-CM

## 2022-02-15 DIAGNOSIS — Z12.5 SCREENING FOR PROSTATE CANCER: ICD-10-CM

## 2022-02-15 DIAGNOSIS — M67.02 HEEL CORD TIGHTNESS, LEFT: ICD-10-CM

## 2022-02-15 DIAGNOSIS — K59.00 CONSTIPATION, UNSPECIFIED CONSTIPATION TYPE: ICD-10-CM

## 2022-02-15 DIAGNOSIS — R52 PAIN AGGRAVATED BY STANDING: ICD-10-CM

## 2022-02-15 DIAGNOSIS — K42.9 UMBILICAL HERNIA WITHOUT OBSTRUCTION AND WITHOUT GANGRENE: ICD-10-CM

## 2022-02-15 DIAGNOSIS — K40.90 LEFT INGUINAL HERNIA: ICD-10-CM

## 2022-02-15 PROCEDURE — 97110 THERAPEUTIC EXERCISES: CPT | Mod: PO | Performed by: PHYSICAL THERAPIST

## 2022-02-15 PROCEDURE — 99999 PR PBB SHADOW E&M-EST. PATIENT-LVL III: ICD-10-PCS | Mod: PBBFAC,,, | Performed by: INTERNAL MEDICINE

## 2022-02-15 PROCEDURE — 1159F PR MEDICATION LIST DOCUMENTED IN MEDICAL RECORD: ICD-10-PCS | Mod: CPTII,S$GLB,, | Performed by: INTERNAL MEDICINE

## 2022-02-15 PROCEDURE — 3008F PR BODY MASS INDEX (BMI) DOCUMENTED: ICD-10-PCS | Mod: CPTII,S$GLB,, | Performed by: INTERNAL MEDICINE

## 2022-02-15 PROCEDURE — 3078F PR MOST RECENT DIASTOLIC BLOOD PRESSURE < 80 MM HG: ICD-10-PCS | Mod: CPTII,S$GLB,, | Performed by: INTERNAL MEDICINE

## 2022-02-15 PROCEDURE — 3074F SYST BP LT 130 MM HG: CPT | Mod: CPTII,S$GLB,, | Performed by: INTERNAL MEDICINE

## 2022-02-15 PROCEDURE — 99396 PREV VISIT EST AGE 40-64: CPT | Mod: S$GLB,,, | Performed by: INTERNAL MEDICINE

## 2022-02-15 PROCEDURE — 99999 PR PBB SHADOW E&M-EST. PATIENT-LVL III: CPT | Mod: PBBFAC,,, | Performed by: INTERNAL MEDICINE

## 2022-02-15 PROCEDURE — 3008F BODY MASS INDEX DOCD: CPT | Mod: CPTII,S$GLB,, | Performed by: INTERNAL MEDICINE

## 2022-02-15 PROCEDURE — 3074F PR MOST RECENT SYSTOLIC BLOOD PRESSURE < 130 MM HG: ICD-10-PCS | Mod: CPTII,S$GLB,, | Performed by: INTERNAL MEDICINE

## 2022-02-15 PROCEDURE — 99396 PR PREVENTIVE VISIT,EST,40-64: ICD-10-PCS | Mod: S$GLB,,, | Performed by: INTERNAL MEDICINE

## 2022-02-15 PROCEDURE — 3078F DIAST BP <80 MM HG: CPT | Mod: CPTII,S$GLB,, | Performed by: INTERNAL MEDICINE

## 2022-02-15 PROCEDURE — 1159F MED LIST DOCD IN RCRD: CPT | Mod: CPTII,S$GLB,, | Performed by: INTERNAL MEDICINE

## 2022-02-15 RX ORDER — ALPRAZOLAM 1 MG/1
0.5 TABLET ORAL DAILY PRN
Qty: 30 TABLET | Refills: 5 | Status: SHIPPED | OUTPATIENT
Start: 2022-02-15 | End: 2023-05-02

## 2022-02-15 NOTE — PROGRESS NOTES
Subjective:       Patient ID: Andre Padgett is a 51 y.o. male.    Chief Complaint: Medication Refill    HPI:  Patient here exam.  It had been over a year since he had seen me.  He also wanted to get refill on his alprazolam.   reviewed.  He is actually doing better, has a new job working at [employer].  He still has some stress but is feeling better because he got .  He says this is actually a lot less stress and he has split custody with his daughter.  He has a right inguinal hernia that he is debating when to fix  Stable constipation  Knee pain for which he saw orthopedics and is getting therapy  Both wrist fractures have healed    Review of Systems   Constitutional: Negative for chills, fatigue and fever.   HENT: Negative for nosebleeds and trouble swallowing.    Eyes: Negative for pain and visual disturbance.   Respiratory: Negative for cough, shortness of breath and wheezing.    Cardiovascular: Negative for chest pain and palpitations.   Gastrointestinal: Negative for abdominal pain, constipation, diarrhea, nausea and vomiting.   Genitourinary: Negative for difficulty urinating and hematuria.   Musculoskeletal: Positive for arthralgias. Negative for back pain and neck pain.   Integumentary:  Negative for rash.   Neurological: Negative for dizziness and headaches.   Hematological: Does not bruise/bleed easily.   Psychiatric/Behavioral: Negative for dysphoric mood and sleep disturbance. The patient is nervous/anxious.            Past Medical History:   Diagnosis Date    Aneurysm     Right sided filling anterior communicating aneurysm s/p coiling 2011    Anxiety     Cerebral aneurysm, nonruptured 12/27/2016    Colon adenoma: 3/18 repeat colonoscpy 2023 6/22/2018    Colon polyp     Diverticulosis of large intestine without hemorrhage: see colonoscopy 3/18; sigmoid and descending colon 6/22/2018    Fatty liver: see CT 3/18 6/22/2018    Umbilical hernia without obstruction and without  gangrene: see CT 3/18 6/22/2018     Past Surgical History:   Procedure Laterality Date    CEREBRAL ANGIOGRAM  2011    angiogram/coiling    COLONOSCOPY N/A 3/26/2018    Procedure: COLONOSCOPY;  Surgeon: Sanjiv Duran MD;  Location: [facility] (4TH FLR);  Service: Endoscopy;  Laterality: N/A;    COLONOSCOPY N/A 3/9/2021    Procedure: COLONOSCOPY;  Surgeon: Aleshia Daly MD;  Location: [facility] ENDO;  Service: Endoscopy;  Laterality: N/A;    ORIF FOREARM FRACTURE Bilateral 9/14/2020    Procedure: ORIF, FRACTURE, RADIUS OR ULNA, synthes, right, large C arm at a diagonal from the rear of the room, ancef, velcro wrist splint;  Surgeon: Hao Thorne MD;  Location: [facility] OR [address] [place] [address];  Service: Orthopedics;  Laterality: Bilateral;      Patient Active Problem List   Diagnosis    Anxiety    DJD (degenerative joint disease) of thoracic spine    DJD (degenerative joint disease) of cervical spine, mild    Palpitations    Pure hypercholesterolemia    Rib pain on right side    Colon adenoma: 3/18 repeat colonoscopy 2023    Umbilical hernia without obstruction and without gangrene: see CT 3/18    Fatty liver: see CT 3/18    Diverticulosis of large intestine without hemorrhage: see colonoscopy 3/18; sigmoid and descending colon    Hyperlipidemia    History of nontraumatic rupture of cerebral aneurysm    Chronic bilateral thoracic back pain    Poor posture    Chronic bilateral low back pain without sciatica    Decreased strength    Decreased spinal mobility    Bilateral low back pain with left-sided sciatica    Subarachnoid hemorrhage    Closed Colles' fracture of right radius with routine healing    Closed Colles' fracture of left radius with routine healing    Acute prostatitis    Closed fracture of one rib of right side with routine healing    History of 2019 novel coronavirus disease (COVID-19)    Impaired range of motion of left knee    Lower abdominal pain    Irritable bowel syndrome with  constipation    Chronic idiopathic constipation    Constipation    Quadriceps weakness    Pain aggravated by standing    Heel cord tightness, left        Objective:      Physical Exam  Constitutional:       General: He is not in acute distress.     Appearance: He is well-developed.   HENT:      Head: Normocephalic and atraumatic.      Right Ear: Tympanic membrane, ear canal and external ear normal.      Left Ear: Tympanic membrane, ear canal and external ear normal.      Mouth/Throat:      Pharynx: No oropharyngeal exudate or posterior oropharyngeal erythema.   Eyes:      General: No scleral icterus.     Conjunctiva/sclera: Conjunctivae normal.      Pupils: Pupils are equal, round, and reactive to light.   Neck:      Thyroid: No thyromegaly.      Comments: No supraclavicular nodes palpated  Cardiovascular:      Rate and Rhythm: Normal rate and regular rhythm.      Pulses: Normal pulses.      Heart sounds: Normal heart sounds. No murmur heard.      Pulmonary:      Effort: Pulmonary effort is normal.      Breath sounds: Normal breath sounds. No wheezing.   Abdominal:      General: Bowel sounds are normal.      Palpations: Abdomen is soft. There is no mass.      Tenderness: There is no abdominal tenderness.   Musculoskeletal:         General: Tenderness (left knee) present.      Cervical back: Normal range of motion and neck supple.      Right lower leg: No edema.      Left lower leg: No edema.   Lymphadenopathy:      Cervical: No cervical adenopathy.   Skin:     Coloration: Skin is not jaundiced or pale.   Neurological:      General: No focal deficit present.      Mental Status: He is alert and oriented to person, place, and time.   Psychiatric:         Mood and Affect: Mood normal.         Behavior: Behavior normal.         Assessment:       Problem List Items Addressed This Visit        GI    Umbilical hernia without obstruction and without gangrene: see CT 3/18    Constipation      Other Visit Diagnoses      Routine physical examination    -  Primary    Screening for prostate cancer        Relevant Orders    PSA, Screening    Left inguinal hernia              Plan:         Andre was seen today for medication refill.    Diagnoses and all orders for this visit:    Routine physical examination    Screening for prostate cancer  -     PSA, Screening; Future    Left inguinal hernia    Umbilical hernia without obstruction and without gangrene: see CT 3/18    Constipation, unspecified constipation type    Other orders  -     ALPRAZolam (XANAX) 1 MG tablet; Take 0.5 tablets (0.5 mg total) by mouth daily as needed for Anxiety.             Follow-up in 6 months virtual or in-person

## 2022-02-15 NOTE — PROGRESS NOTES
"[facility]   Physical Therapy Treatment Note     Name: Andre Padgett  Clinic Number: [id number]    Therapy Diagnosis:   Encounter Diagnoses   Name Primary?    Pain aggravated by standing     Heel cord tightness, left      Physician: Santos uBrt    Visit Date: 2/15/2022    Physician Orders: PT Eval and Treat left knee   Medical Diagnosis from Referral: Acute pain of left knee [M25.562]  Evaluation Date: 1/10/2022  Authorization Period Expiration: 12/31/2022  Plan of Care Expiration: 4/10/2022  Progress Note Due: 2/10/2022  Visit # / Visits authorized: 4/20 + eval  FOTO: 0/3   PTA Visit #: 2/5     Precautions: Standard    Time In: 1140 am ( arrived late 1135 )   Time Out: 12:25 pm  Total Appointment Time (timed & untimed codes): 45 minutes      SUBJECTIVE     Pt reports: there is a stain sensation like bones are touching. There is not the pain that I had when I first started.  Just feels strange.      He was semi compliant with home exercise program.  Response to previous treatment: did fine    Functional change: cant walk as far and not able to stand     Pain: did not rate  0 /10  Location: left knee        OBJECTIVE     Objective measures taken at progress report unless specified otherwise.      TREATMENT     BOLD INDICATES EXERCISES PERFORMED    Andre received the treatments listed below:    THERAPEUTIC EXERCISES to develop  strength, ROM and flexibility for 40 minutes including     Quad sets 20x3" L   Heel slides x20 L  Hamstring stretch 3x30" L  SAQ x20 L  SLR x20 L  Bridges x 20  Clamshells x20 B OTB  SL hip abduction x20 L  SL hip adduction x20 L  LAQ ON MATRIX     gastroc stretch on incline 3x30" B  step ups on 8" box x20 (up L, down R)  TRX squats x20  double leg shuttle 2 black bands 3x10  Single leg shuttle press 1 black band 3 x 10  Double leg shuttle calf raises 3 black bands 3 x 10    MANUAL THERAPY TECHNIQUES  were applied for 0 minutes.  Tibial ER/IR " mobs      PATIENT EDUCATION AND HOME EXERCISES     Home Exercises Provided and Patient Education Provided     Education provided: X-Rays reviewed for education purposes to explain the anatomy and create a better understanding of what is taking place with his knee.   - NONE PROVIDED TODAY    Written Home Exercises Provided: not provided today. Exercises were reviewed and Andre was able to demonstrate them prior to the end of the session.  Andre demonstrated good  understanding of the education provided. See EMR under Patient Instructions for exercises provided during therapy sessions    ASSESSMENT     The patient completed the routine as noted. He did not report pain. No limitation associated with the exercises. Progress with CC activities for balance and stability.      Andre Is progressing towards his goals.   Pt prognosis is Good.     Pt will continue to benefit from skilled outpatient physical therapy to address the deficits listed in the problem list box on initial evaluation, provide pt/family education and to maximize pt's level of independence in the home and community environment.     Pt's spiritual, cultural and educational needs considered and pt agreeable to plan of care and goals.     Anticipated barriers to physical therapy: scheduling    Goals:   Ankle / Lower extremity  Short Term Goals: 5 weeks   1. Pts pain decreased 20 - 40% for improved functional mobility and quality of life ONGOING  2. Pts PROM in knee flexion increased by 10 degrees to enhance AROM and functional mobility  ONGOING  3. Pts strength in the hip and quadriceps increased by 1/2 grade to facilitate improved active mobility and functional stability ONGOING  4. Pt to exhibit correct return demonstration of exercises for self-management and independence with HEP ONGOING  5. Pt to demonstrate enhanced neuromuscular response  with single leg static balance for improved functional mobility and gait pattern  ONGOING     Long Term Goals :  10 weeks   1.  Pts pain level decreased to 75% or greater with walking for restoring functional mobility and ADL. ONGOING  2. Pts AROM in knee extension and flexion increased by 10 to 15 degrees to restore functional mobility and ADL  ONGOING  3. Pts strength in the hip and quad musculature increased by one grade to facilitate improved active mobility and functional stability  ONGOING  4. Pt will be independent with HEP for self-management. ONGOING   5. Pt to exhibit dynamic stability on the RLE / LLE for stable functional mobility and gait. ONGOING     PLAN     Plan of care Certification: 1/10/2022 to 4/10/2022.    Abisai Fuentes, PT

## 2022-02-17 ENCOUNTER — CLINICAL SUPPORT (OUTPATIENT)
Dept: REHABILITATION | Facility: HOSPITAL | Age: 52
End: 2022-02-17
Payer: MEDICAID

## 2022-02-17 DIAGNOSIS — M67.02 HEEL CORD TIGHTNESS, LEFT: ICD-10-CM

## 2022-02-17 DIAGNOSIS — R52 PAIN AGGRAVATED BY STANDING: ICD-10-CM

## 2022-02-17 PROCEDURE — 97110 THERAPEUTIC EXERCISES: CPT | Mod: PO,CQ

## 2022-02-17 NOTE — PROGRESS NOTES
"[facility]   Physical Therapy Treatment Note     Name: Andre Padgett  Clinic Number: [id number]    Therapy Diagnosis:   Encounter Diagnoses   Name Primary?    Pain aggravated by standing     Heel cord tightness, left      Physician: Santos Burt    Visit Date: 2/17/2022    Physician Orders: PT Eval and Treat left knee   Medical Diagnosis from Referral: Acute pain of left knee [M25.562]  Evaluation Date: 1/10/2022  Authorization Period Expiration: 12/31/2022  Plan of Care Expiration: 4/10/2022  Progress Note Due: 2/10/2022  Visit # / Visits authorized: 6/20 + eval  FOTO: 0/3   PTA Visit #: 2/5     Precautions: Standard    Time In: 1145 am ( arrived late)  Time Out: 12:15 pm  Total Appointment Time (timed & untimed codes): 30 minutes      SUBJECTIVE     Pt reports: he felt good after his last treatment session.      He was semi compliant with home exercise program.  Response to previous treatment: did fine    Functional change: Ongoing    Pain: did not rate  0 /10  Location: left knee        OBJECTIVE     Objective measures taken at progress report unless specified otherwise.      TREATMENT     BOLD INDICATES EXERCISES PERFORMED    Andre received the treatments listed below:    THERAPEUTIC EXERCISES to develop  strength, ROM and flexibility for 40 minutes including     Upright bike 6'   Quad sets 20x3" L   Heel slides x20 L  Hamstring stretch 3x30" L  SAQ x20 L  SLR x30 L  Bridges x 30  Clamshells x20 B OTB  SL hip abduction x30 L  SL hip adduction x20 L  LAQ ON MATRIX L LE 2.5#     gastroc stretch on incline 3x30" B  step ups on 8" box x20 (up L, down R)  TRX squats x 20  double leg shuttle 2 black bands 3x10  Single leg shuttle press 1 black band 3 x 10  Double leg shuttle calf raises 3 black bands 3 x 10    MANUAL THERAPY TECHNIQUES  were applied for 0 minutes.  Tibial ER/IR mobs      PATIENT EDUCATION AND HOME EXERCISES     Home Exercises Provided and Patient Education " Provided     Education provided: X-Rays reviewed for education purposes to explain the anatomy and create a better understanding of what is taking place with his knee.   - NONE PROVIDED TODAY    Written Home Exercises Provided: not provided today. Exercises were reviewed and Andre was able to demonstrate them prior to the end of the session.  Andre demonstrated good  understanding of the education provided. See EMR under Patient Instructions for exercises provided during therapy sessions    ASSESSMENT      Pt performed the above exercises with good tolerance and no reports of pain.  Pt was able to increased his repetitions on some of his therapeutic exercises this afternoon as he continue's to work towards improving his L LE strength as well as L knee stability.     Andre Is progressing towards his goals.   Pt prognosis is Good.     Pt will continue to benefit from skilled outpatient physical therapy to address the deficits listed in the problem list box on initial evaluation, provide pt/family education and to maximize pt's level of independence in the home and community environment.     Pt's spiritual, cultural and educational needs considered and pt agreeable to plan of care and goals.     Anticipated barriers to physical therapy: scheduling    Goals:   Ankle / Lower extremity  Short Term Goals: 5 weeks   1. Pts pain decreased 20 - 40% for improved functional mobility and quality of life ONGOING  2. Pts PROM in knee flexion increased by 10 degrees to enhance AROM and functional mobility  ONGOING  3. Pts strength in the hip and quadriceps increased by 1/2 grade to facilitate improved active mobility and functional stability ONGOING  4. Pt to exhibit correct return demonstration of exercises for self-management and independence with HEP ONGOING  5. Pt to demonstrate enhanced neuromuscular response  with single leg static balance for improved functional mobility and gait pattern  ONGOING     Long Term Goals : 10  weeks   1.  Pts pain level decreased to 75% or greater with walking for restoring functional mobility and ADL. ONGOING  2. Pts AROM in knee extension and flexion increased by 10 to 15 degrees to restore functional mobility and ADL  ONGOING  3. Pts strength in the hip and quad musculature increased by one grade to facilitate improved active mobility and functional stability  ONGOING  4. Pt will be independent with HEP for self-management. ONGOING   5. Pt to exhibit dynamic stability on the RLE / LLE for stable functional mobility and gait. ONGOING     PLAN     Plan of care Certification: 1/10/2022 to 4/10/2022.    Jamey Smith, PTA

## 2022-02-22 ENCOUNTER — CLINICAL SUPPORT (OUTPATIENT)
Dept: REHABILITATION | Facility: HOSPITAL | Age: 52
End: 2022-02-22
Payer: COMMERCIAL

## 2022-02-22 DIAGNOSIS — R52 PAIN AGGRAVATED BY STANDING: Primary | ICD-10-CM

## 2022-02-22 DIAGNOSIS — M67.02 HEEL CORD TIGHTNESS, LEFT: ICD-10-CM

## 2022-02-22 PROCEDURE — 97110 THERAPEUTIC EXERCISES: CPT | Mod: PO | Performed by: PHYSICAL THERAPIST

## 2022-02-22 NOTE — PROGRESS NOTES
"[facility]   Physical Therapy Treatment Note     Name: Andre Padgett  Clinic Number: [id number]    Therapy Diagnosis:   Encounter Diagnoses   Name Primary?    Pain aggravated by standing Yes    Heel cord tightness, left      Physician: Santos Burt    Visit Date: 2/22/2022    Physician Orders: PT Eval and Treat left knee   Medical Diagnosis from Referral: Acute pain of left knee [M25.562]  Evaluation Date: 1/10/2022  Authorization Period Expiration: 12/31/2022  Plan of Care Expiration: 4/10/2022  Progress Note Due: 2/10/2022  Visit # / Visits authorized: 6/20 + eval  FOTO: 0/3   PTA Visit #: 2/5     Precautions: Standard    Time In: 1140 am ( arrived late  1137)  Time Out: 1205  pm  Total Appointment Time (timed & untimed codes): 25 minutes      SUBJECTIVE     Pt reports: . The knee is feeling fine. Says he has a short window of maybe 25 min today .      He was semi compliant with home exercise program.  Response to previous treatment: did fine    Functional change: Ongoing    Pain: did not rate  0 /10  Location: left knee        OBJECTIVE     Objective measures taken at progress report unless specified otherwise.      TREATMENT     BOLD INDICATES EXERCISES PERFORMED    Andre received the treatments listed below:    THERAPEUTIC EXERCISES to develop  strength, ROM and flexibility for 25 minutes including     Upright bike 5'   LAQ ON MATRIX L LE  5# 3x10   double leg shuttle 2 black bands 3x10  Single leg shuttle press 1 black band 3 x 10  Double leg shuttle calf raises 3 black bands 3 x 10        Quad sets 20x3" L   Heel slides x20 L  Hamstring stretch 3x30" L  SAQ x20 L  SLR x30 L  Bridges x 30  Clamshells x20 B OTB  SL hip abduction x30 L  SL hip adduction x20 L    gastroc stretch on incline 3x30" B  step ups on 8" box x20 (up L, down R)  TRX squats x 20    MANUAL THERAPY TECHNIQUES  were applied for 0 minutes.  Tibial ER/IR mobs      PATIENT EDUCATION AND HOME EXERCISES "     Home Exercises Provided and Patient Education Provided     Education provided: X-Rays reviewed for education purposes to explain the anatomy and create a better understanding of what is taking place with his knee.   - NONE PROVIDED TODAY    Written Home Exercises Provided: not provided today. Exercises were reviewed and Andre was able to demonstrate them prior to the end of the session.  Andre demonstrated good  understanding of the education provided. See EMR under Patient Instructions for exercises provided during therapy sessions    ASSESSMENT     The patient was not able to complete the entire session due to other obligations. He did not demonstrate significant problems. Challenged with added resistance on the shuttle.     Andre Is progressing towards his goals.   Pt prognosis is Good.     Pt will continue to benefit from skilled outpatient physical therapy to address the deficits listed in the problem list box on initial evaluation, provide pt/family education and to maximize pt's level of independence in the home and community environment.     Pt's spiritual, cultural and educational needs considered and pt agreeable to plan of care and goals.     Anticipated barriers to physical therapy: scheduling    Goals:   Ankle / Lower extremity  Short Term Goals: 5 weeks   1. Pts pain decreased 20 - 40% for improved functional mobility and quality of life ONGOING  2. Pts PROM in knee flexion increased by 10 degrees to enhance AROM and functional mobility  ONGOING  3. Pts strength in the hip and quadriceps increased by 1/2 grade to facilitate improved active mobility and functional stability ONGOING  4. Pt to exhibit correct return demonstration of exercises for self-management and independence with HEP ONGOING  5. Pt to demonstrate enhanced neuromuscular response  with single leg static balance for improved functional mobility and gait pattern  ONGOING     Long Term Goals : 10 weeks   1.  Pts pain level decreased  to 75% or greater with walking for restoring functional mobility and ADL. ONGOING  2. Pts AROM in knee extension and flexion increased by 10 to 15 degrees to restore functional mobility and ADL  ONGOING  3. Pts strength in the hip and quad musculature increased by one grade to facilitate improved active mobility and functional stability  ONGOING  4. Pt will be independent with HEP for self-management. ONGOING   5. Pt to exhibit dynamic stability on the RLE / LLE for stable functional mobility and gait. ONGOING     PLAN     Plan of care Certification: 1/10/2022 to 4/10/2022.    Abisai Fuentes, PT

## 2022-03-16 ENCOUNTER — OFFICE VISIT (OUTPATIENT)
Dept: URGENT CARE | Facility: CLINIC | Age: 52
End: 2022-03-16
Payer: COMMERCIAL

## 2022-03-16 VITALS
HEIGHT: 70 IN | TEMPERATURE: 99 F | BODY MASS INDEX: 28.49 KG/M2 | WEIGHT: 199 LBS | DIASTOLIC BLOOD PRESSURE: 92 MMHG | RESPIRATION RATE: 18 BRPM | HEART RATE: 105 BPM | OXYGEN SATURATION: 98 % | SYSTOLIC BLOOD PRESSURE: 134 MMHG

## 2022-03-16 DIAGNOSIS — J06.9 VIRAL URI WITH COUGH: Primary | ICD-10-CM

## 2022-03-16 LAB
CTP QC/QA: YES
SARS-COV-2 RDRP RESP QL NAA+PROBE: NEGATIVE

## 2022-03-16 PROCEDURE — 99214 PR OFFICE/OUTPT VISIT, EST, LEVL IV, 30-39 MIN: ICD-10-PCS | Mod: S$GLB,CS,, | Performed by: STUDENT IN AN ORGANIZED HEALTH CARE EDUCATION/TRAINING PROGRAM

## 2022-03-16 PROCEDURE — 1159F MED LIST DOCD IN RCRD: CPT | Mod: CPTII,S$GLB,, | Performed by: STUDENT IN AN ORGANIZED HEALTH CARE EDUCATION/TRAINING PROGRAM

## 2022-03-16 PROCEDURE — 99214 OFFICE O/P EST MOD 30 MIN: CPT | Mod: S$GLB,CS,, | Performed by: STUDENT IN AN ORGANIZED HEALTH CARE EDUCATION/TRAINING PROGRAM

## 2022-03-16 PROCEDURE — 1160F PR REVIEW ALL MEDS BY PRESCRIBER/CLIN PHARMACIST DOCUMENTED: ICD-10-PCS | Mod: CPTII,S$GLB,, | Performed by: STUDENT IN AN ORGANIZED HEALTH CARE EDUCATION/TRAINING PROGRAM

## 2022-03-16 PROCEDURE — 3080F DIAST BP >= 90 MM HG: CPT | Mod: CPTII,S$GLB,, | Performed by: STUDENT IN AN ORGANIZED HEALTH CARE EDUCATION/TRAINING PROGRAM

## 2022-03-16 PROCEDURE — 1160F RVW MEDS BY RX/DR IN RCRD: CPT | Mod: CPTII,S$GLB,, | Performed by: STUDENT IN AN ORGANIZED HEALTH CARE EDUCATION/TRAINING PROGRAM

## 2022-03-16 PROCEDURE — 1159F PR MEDICATION LIST DOCUMENTED IN MEDICAL RECORD: ICD-10-PCS | Mod: CPTII,S$GLB,, | Performed by: STUDENT IN AN ORGANIZED HEALTH CARE EDUCATION/TRAINING PROGRAM

## 2022-03-16 PROCEDURE — 3008F PR BODY MASS INDEX (BMI) DOCUMENTED: ICD-10-PCS | Mod: CPTII,S$GLB,, | Performed by: STUDENT IN AN ORGANIZED HEALTH CARE EDUCATION/TRAINING PROGRAM

## 2022-03-16 PROCEDURE — 3075F PR MOST RECENT SYSTOLIC BLOOD PRESS GE 130-139MM HG: ICD-10-PCS | Mod: CPTII,S$GLB,, | Performed by: STUDENT IN AN ORGANIZED HEALTH CARE EDUCATION/TRAINING PROGRAM

## 2022-03-16 PROCEDURE — 3008F BODY MASS INDEX DOCD: CPT | Mod: CPTII,S$GLB,, | Performed by: STUDENT IN AN ORGANIZED HEALTH CARE EDUCATION/TRAINING PROGRAM

## 2022-03-16 PROCEDURE — U0002 COVID-19 LAB TEST NON-CDC: HCPCS | Mod: QW,S$GLB,, | Performed by: STUDENT IN AN ORGANIZED HEALTH CARE EDUCATION/TRAINING PROGRAM

## 2022-03-16 PROCEDURE — 3080F PR MOST RECENT DIASTOLIC BLOOD PRESSURE >= 90 MM HG: ICD-10-PCS | Mod: CPTII,S$GLB,, | Performed by: STUDENT IN AN ORGANIZED HEALTH CARE EDUCATION/TRAINING PROGRAM

## 2022-03-16 PROCEDURE — U0002: ICD-10-PCS | Mod: QW,S$GLB,, | Performed by: STUDENT IN AN ORGANIZED HEALTH CARE EDUCATION/TRAINING PROGRAM

## 2022-03-16 PROCEDURE — 3075F SYST BP GE 130 - 139MM HG: CPT | Mod: CPTII,S$GLB,, | Performed by: STUDENT IN AN ORGANIZED HEALTH CARE EDUCATION/TRAINING PROGRAM

## 2022-03-16 RX ORDER — IPRATROPIUM BROMIDE 42 UG/1
2 SPRAY, METERED NASAL 3 TIMES DAILY
Qty: 30 ML | Refills: 0 | Status: SHIPPED | OUTPATIENT
Start: 2022-03-16 | End: 2022-12-13

## 2022-03-16 RX ORDER — PREDNISONE 20 MG/1
40 TABLET ORAL DAILY
Qty: 6 TABLET | Refills: 0 | Status: SHIPPED | OUTPATIENT
Start: 2022-03-16 | End: 2022-03-19

## 2022-03-16 NOTE — PROGRESS NOTES
"Subjective:       Patient ID: Andre Padgett is a 51 y.o. male.    Vitals:  height is 5' 10" (1.778 m) and weight is 90.3 kg (199 lb). His temperature is 99.2 °F (37.3 °C). His blood pressure is 134/92 (abnormal) and his pulse is 105. His respiration is 18 and oxygen saturation is 98%.     Chief Complaint: Sinus Problem    Sinus Problem  This is a new problem. The current episode started in the past 7 days (thursday). The problem is unchanged. There has been no fever. He is experiencing no pain. Associated symptoms include congestion, coughing, headaches, a hoarse voice, sinus pressure, sneezing and a sore throat. Pertinent negatives include no chills, diaphoresis, ear pain, neck pain, shortness of breath or swollen glands. Treatments tried: Night/Day Quil  The treatment provided mild relief.       Constitution: Negative for chills and sweating.   HENT: Positive for congestion, sinus pressure and sore throat. Negative for ear pain.    Neck: Negative for neck pain.   Respiratory: Positive for cough. Negative for shortness of breath.    Allergic/Immunologic: Positive for sneezing.   Neurological: Positive for headaches.       Objective:      Physical Exam   Constitutional: He is oriented to person, place, and time. He does not appear ill. No distress.   HENT:   Head: Normocephalic.   Nose: Right sinus exhibits no maxillary sinus tenderness and no frontal sinus tenderness. Left sinus exhibits no maxillary sinus tenderness and no frontal sinus tenderness.   Mouth/Throat: Mucous membranes are moist. No oropharyngeal exudate or posterior oropharyngeal erythema. Oropharynx is clear.   Eyes: Conjunctivae are normal.   Pulmonary/Chest: No respiratory distress.   Lymphadenopathy:        Head (right side): No tonsillar adenopathy present.        Head (left side): No tonsillar adenopathy present.     He has no cervical adenopathy.   Neurological: He is alert and oriented to person, place, and time. Coordination normal.   Skin: " Skin is warm and dry.   Psychiatric: His behavior is normal.   Nursing note and vitals reviewed.        Assessment:       1. Viral URI with cough        Results for orders placed or performed in visit on 03/16/22   POCT COVID-19 Rapid Screening   Result Value Ref Range    POC Rapid COVID Negative Negative     Acceptable Yes        Plan:         Viral URI with cough  -     POCT COVID-19 Rapid Screening  -     predniSONE (DELTASONE) 20 MG tablet; Take 2 tablets (40 mg total) by mouth once daily. for 3 days  Dispense: 6 tablet; Refill: 0  -     ipratropium (ATROVENT) 42 mcg (0.06 %) nasal spray; 2 sprays by Nasal route 3 (three) times daily.  Dispense: 30 mL; Refill: 0    Discussed concern for seasonal/environmental allergies. Patient denies history of this. Also reviewed viral URI with cough. OTC medications for symptom relief were reviewed. Indications for f/u also reviewed. Patient verbalized understanding. No questions/concerns  prior to discharge.

## 2022-05-03 ENCOUNTER — PATIENT MESSAGE (OUTPATIENT)
Dept: RESEARCH | Facility: HOSPITAL | Age: 52
End: 2022-05-03
Payer: COMMERCIAL

## 2022-06-15 ENCOUNTER — OFFICE VISIT (OUTPATIENT)
Dept: URGENT CARE | Facility: CLINIC | Age: 52
End: 2022-06-15
Payer: COMMERCIAL

## 2022-06-15 VITALS
HEART RATE: 93 BPM | WEIGHT: 201 LBS | TEMPERATURE: 99 F | HEIGHT: 70 IN | OXYGEN SATURATION: 96 % | DIASTOLIC BLOOD PRESSURE: 89 MMHG | RESPIRATION RATE: 18 BRPM | SYSTOLIC BLOOD PRESSURE: 150 MMHG | BODY MASS INDEX: 28.77 KG/M2

## 2022-06-15 DIAGNOSIS — U07.1 COVID-19: Primary | ICD-10-CM

## 2022-06-15 DIAGNOSIS — R09.81 NASAL CONGESTION: ICD-10-CM

## 2022-06-15 DIAGNOSIS — R05.9 COUGH: ICD-10-CM

## 2022-06-15 LAB
CTP QC/QA: YES
SARS-COV-2 RDRP RESP QL NAA+PROBE: POSITIVE

## 2022-06-15 PROCEDURE — 3008F BODY MASS INDEX DOCD: CPT | Mod: CPTII,S$GLB,, | Performed by: NURSE PRACTITIONER

## 2022-06-15 PROCEDURE — 99213 PR OFFICE/OUTPT VISIT, EST, LEVL III, 20-29 MIN: ICD-10-PCS | Mod: S$GLB,,, | Performed by: NURSE PRACTITIONER

## 2022-06-15 PROCEDURE — 3077F SYST BP >= 140 MM HG: CPT | Mod: CPTII,S$GLB,, | Performed by: NURSE PRACTITIONER

## 2022-06-15 PROCEDURE — 1159F PR MEDICATION LIST DOCUMENTED IN MEDICAL RECORD: ICD-10-PCS | Mod: CPTII,S$GLB,, | Performed by: NURSE PRACTITIONER

## 2022-06-15 PROCEDURE — 99213 OFFICE O/P EST LOW 20 MIN: CPT | Mod: S$GLB,,, | Performed by: NURSE PRACTITIONER

## 2022-06-15 PROCEDURE — 1160F RVW MEDS BY RX/DR IN RCRD: CPT | Mod: CPTII,S$GLB,, | Performed by: NURSE PRACTITIONER

## 2022-06-15 PROCEDURE — 1159F MED LIST DOCD IN RCRD: CPT | Mod: CPTII,S$GLB,, | Performed by: NURSE PRACTITIONER

## 2022-06-15 PROCEDURE — 3077F PR MOST RECENT SYSTOLIC BLOOD PRESSURE >= 140 MM HG: ICD-10-PCS | Mod: CPTII,S$GLB,, | Performed by: NURSE PRACTITIONER

## 2022-06-15 PROCEDURE — U0002: ICD-10-PCS | Mod: QW,S$GLB,, | Performed by: NURSE PRACTITIONER

## 2022-06-15 PROCEDURE — 1160F PR REVIEW ALL MEDS BY PRESCRIBER/CLIN PHARMACIST DOCUMENTED: ICD-10-PCS | Mod: CPTII,S$GLB,, | Performed by: NURSE PRACTITIONER

## 2022-06-15 PROCEDURE — 3079F DIAST BP 80-89 MM HG: CPT | Mod: CPTII,S$GLB,, | Performed by: NURSE PRACTITIONER

## 2022-06-15 PROCEDURE — 3008F PR BODY MASS INDEX (BMI) DOCUMENTED: ICD-10-PCS | Mod: CPTII,S$GLB,, | Performed by: NURSE PRACTITIONER

## 2022-06-15 PROCEDURE — 3079F PR MOST RECENT DIASTOLIC BLOOD PRESSURE 80-89 MM HG: ICD-10-PCS | Mod: CPTII,S$GLB,, | Performed by: NURSE PRACTITIONER

## 2022-06-15 PROCEDURE — U0002 COVID-19 LAB TEST NON-CDC: HCPCS | Mod: QW,S$GLB,, | Performed by: NURSE PRACTITIONER

## 2022-06-15 RX ORDER — FLUTICASONE PROPIONATE 50 MCG
1 SPRAY, SUSPENSION (ML) NASAL DAILY
Qty: 16 ML | Refills: 0 | Status: SHIPPED | OUTPATIENT
Start: 2022-06-15 | End: 2022-12-13

## 2022-06-15 NOTE — PATIENT INSTRUCTIONS
- You must understand that you have received an Urgent Care treatment only and that you may be released before all of your medical problems are known or treated.   - You, the patient, will arrange for follow up care as instructed.   - If your condition worsens or fails to improve we recommend that you receive another evaluation at the ER immediately or contact your PCP to discuss your concerns.   - You can call [phone] or [phone] to help schedule an appointment with the appropriate provider.    Drink plenty of fluids   Get lots of rest  Tylenol or ibuprofen for pain/fever  Mucinex DM for daytime cough  Saline nasal rinses to irrigate sinus cavities  Warm salt water gargles for sore throat    Isolation:   Stay home for 5 days.  If you have no symptoms or your symptoms are resolving after 5 days, you can leave your house.  Continue to wear a mask around others for 5 additional days.  If you have a fever, continue to stay home until your fever resolves.

## 2022-06-15 NOTE — PROGRESS NOTES
"Subjective:       Patient ID: Andre Padgett is a 51 y.o. male.    Vitals:  height is 5' 10" (1.778 m) and weight is 91.2 kg (201 lb). His temperature is 99.4 °F (37.4 °C). His blood pressure is 150/89 (abnormal) and his pulse is 93. His respiration is 18 and oxygen saturation is 96%.     Chief Complaint: Cough    This is a 51 y.o. male who presents today with a chief complaint of cough, dry throat, nasal congestion, and body aches x 3 days. Pt denies fever, known sick contacts, but works with the public. Treated with Day Quill and tylenol with some relief.         Cough  This is a new problem. The current episode started in the past 7 days. The problem has been gradually worsening. The problem occurs constantly. The cough is non-productive. Associated symptoms include nasal congestion. Pertinent negatives include no heartburn, hemoptysis, myalgias, postnasal drip, rash, rhinorrhea, sore throat, shortness of breath, sweats, weight loss or wheezing. Associated symptoms comments: Dry throat, body aches. Nothing aggravates the symptoms. Treatments tried: Day Quill and tylenol. The treatment provided mild relief. There is no history of asthma, bronchitis or COPD.       HENT: Negative for postnasal drip and sore throat.    Respiratory: Positive for cough. Negative for bloody sputum, shortness of breath and wheezing.    Gastrointestinal: Negative for heartburn.   Musculoskeletal: Negative for muscle ache.   Skin: Negative for rash.       Objective:      Physical Exam   Constitutional: He is oriented to person, place, and time. He appears well-developed. He is cooperative.  Non-toxic appearance. He does not appear ill. No distress.   HENT:   Head: Normocephalic and atraumatic.   Ears:   Right Ear: Hearing, tympanic membrane, external ear and ear canal normal.   Left Ear: Hearing, tympanic membrane, external ear and ear canal normal.   Nose: Mucosal edema and rhinorrhea present. No nasal deformity. No epistaxis. Right " sinus exhibits no maxillary sinus tenderness and no frontal sinus tenderness. Left sinus exhibits no maxillary sinus tenderness and no frontal sinus tenderness.   Mouth/Throat: Uvula is midline, oropharynx is clear and moist and mucous membranes are normal. No trismus in the jaw. Normal dentition. No uvula swelling. No oropharyngeal exudate, posterior oropharyngeal edema or posterior oropharyngeal erythema.   Eyes: Conjunctivae and lids are normal. No scleral icterus.   Neck: Trachea normal and phonation normal. Neck supple. No edema present. No erythema present. No neck rigidity present.   Cardiovascular: Normal rate, regular rhythm, normal heart sounds and normal pulses.   Pulmonary/Chest: Effort normal and breath sounds normal. No tachypnea. No respiratory distress. He has no decreased breath sounds. He has no wheezes. He has no rhonchi.   cough         Comments: cough    Abdominal: Normal appearance.   Musculoskeletal: Normal range of motion.         General: No deformity. Normal range of motion.   Neurological: He is alert and oriented to person, place, and time. He exhibits normal muscle tone. Coordination normal.   Skin: Skin is warm, dry, intact, not diaphoretic and not pale.   Psychiatric: His speech is normal and behavior is normal. Judgment and thought content normal.   Nursing note and vitals reviewed.     Results for orders placed or performed in visit on 06/15/22   POCT COVID-19 Rapid Screening   Result Value Ref Range    POC Rapid COVID Positive (A) Negative     Acceptable Yes            Assessment:       1. COVID-19    2. Cough    3. Nasal congestion          Plan:         COVID-19    Cough  -     POCT COVID-19 Rapid Screening    Nasal congestion  -     fluticasone propionate (FLONASE) 50 mcg/actuation nasal spray; 1 spray (50 mcg total) by Each Nostril route once daily.  Dispense: 16 mL; Refill: 0         Patient Instructions   - You must understand that you have received an Urgent  Care treatment only and that you may be released before all of your medical problems are known or treated.   - You, the patient, will arrange for follow up care as instructed.   - If your condition worsens or fails to improve we recommend that you receive another evaluation at the ER immediately or contact your PCP to discuss your concerns.   - You can call [phone] or [phone] to help schedule an appointment with the appropriate provider.    Drink plenty of fluids   Get lots of rest  Tylenol or ibuprofen for pain/fever  Mucinex DM for daytime cough  Saline nasal rinses to irrigate sinus cavities  Warm salt water gargles for sore throat    Isolation:   Stay home for 5 days.  If you have no symptoms or your symptoms are resolving after 5 days, you can leave your house.  Continue to wear a mask around others for 5 additional days.  If you have a fever, continue to stay home until your fever resolves.

## 2022-07-12 DIAGNOSIS — R52 PAIN: Primary | ICD-10-CM

## 2022-07-13 ENCOUNTER — CLINICAL SUPPORT (OUTPATIENT)
Dept: REHABILITATION | Facility: HOSPITAL | Age: 52
End: 2022-07-13
Attending: NURSE PRACTITIONER
Payer: COMMERCIAL

## 2022-07-13 ENCOUNTER — OFFICE VISIT (OUTPATIENT)
Dept: ORTHOPEDICS | Facility: CLINIC | Age: 52
End: 2022-07-13
Payer: COMMERCIAL

## 2022-07-13 ENCOUNTER — HOSPITAL ENCOUNTER (OUTPATIENT)
Dept: RADIOLOGY | Facility: HOSPITAL | Age: 52
Discharge: HOME | End: 2022-07-13
Attending: NURSE PRACTITIONER
Payer: COMMERCIAL

## 2022-07-13 DIAGNOSIS — M77.12 LATERAL EPICONDYLITIS OF LEFT ELBOW: Primary | ICD-10-CM

## 2022-07-13 DIAGNOSIS — R52 PAIN: ICD-10-CM

## 2022-07-13 DIAGNOSIS — M77.12 LATERAL EPICONDYLITIS OF LEFT ELBOW: ICD-10-CM

## 2022-07-13 DIAGNOSIS — M25.522 LEFT ELBOW PAIN: ICD-10-CM

## 2022-07-13 PROCEDURE — 99999 PR PBB SHADOW E&M-EST. PATIENT-LVL III: ICD-10-PCS | Mod: PBBFAC,,, | Performed by: NURSE PRACTITIONER

## 2022-07-13 PROCEDURE — 1159F MED LIST DOCD IN RCRD: CPT | Mod: CPTII,S$GLB,, | Performed by: NURSE PRACTITIONER

## 2022-07-13 PROCEDURE — 1160F RVW MEDS BY RX/DR IN RCRD: CPT | Mod: CPTII,S$GLB,, | Performed by: NURSE PRACTITIONER

## 2022-07-13 PROCEDURE — 99213 PR OFFICE/OUTPT VISIT, EST, LEVL III, 20-29 MIN: ICD-10-PCS | Mod: S$GLB,,, | Performed by: NURSE PRACTITIONER

## 2022-07-13 PROCEDURE — 97166 OT EVAL MOD COMPLEX 45 MIN: CPT | Mod: PO

## 2022-07-13 PROCEDURE — 97530 THERAPEUTIC ACTIVITIES: CPT | Mod: PO

## 2022-07-13 PROCEDURE — 73110 X-RAY EXAM OF WRIST: CPT | Mod: TC,LT

## 2022-07-13 PROCEDURE — 1159F PR MEDICATION LIST DOCUMENTED IN MEDICAL RECORD: ICD-10-PCS | Mod: CPTII,S$GLB,, | Performed by: NURSE PRACTITIONER

## 2022-07-13 PROCEDURE — 73110 X-RAY EXAM OF WRIST: CPT | Mod: 26,LT,, | Performed by: RADIOLOGY

## 2022-07-13 PROCEDURE — 1160F PR REVIEW ALL MEDS BY PRESCRIBER/CLIN PHARMACIST DOCUMENTED: ICD-10-PCS | Mod: CPTII,S$GLB,, | Performed by: NURSE PRACTITIONER

## 2022-07-13 PROCEDURE — 73090 X-RAY EXAM OF FOREARM: CPT | Mod: TC,LT

## 2022-07-13 PROCEDURE — 73110 XR WRIST COMPLETE 3 VIEWS LEFT: ICD-10-PCS | Mod: 26,LT,, | Performed by: RADIOLOGY

## 2022-07-13 PROCEDURE — 99213 OFFICE O/P EST LOW 20 MIN: CPT | Mod: S$GLB,,, | Performed by: NURSE PRACTITIONER

## 2022-07-13 PROCEDURE — 73090 XR FOREARM LEFT: ICD-10-PCS | Mod: 26,LT,, | Performed by: INTERNAL MEDICINE

## 2022-07-13 PROCEDURE — 73090 X-RAY EXAM OF FOREARM: CPT | Mod: 26,LT,, | Performed by: INTERNAL MEDICINE

## 2022-07-13 PROCEDURE — 99999 PR PBB SHADOW E&M-EST. PATIENT-LVL III: CPT | Mod: PBBFAC,,, | Performed by: NURSE PRACTITIONER

## 2022-07-13 NOTE — PATIENT INSTRUCTIONS
Understanding Lateral Epicondylitis    Tendons are strong bands of tissue that connect muscles to bones. Lateral epicondylitis affects the tendons that connect muscles in the forearm to the lateral epicondyle. This is the bony knob on the outer side of the elbow. The condition occurs if the extensor tendons of the wrist become red and swollen (irritated). This can cause pain in the elbow, forearm, and wrist. Because the condition is sometimes caused by playing tennis, it is also known as tennis elbow.  How to say it  [id number]-glenn [id number]   What causes lateral epicondylitis?  The condition most often occurs because of overuse. This can be from any activity that repeatedly puts stress on the forearm extensor muscles or tendons and wrist. For instance, playing tennis, lifting weights, cutting meat, painting, and typing can all cause the condition. Wear and tear of the tendons from aging or an injury to the tendons can also cause the condition.  Symptoms of lateral epicondylitis  The most common symptom is pain. You may feel it on the outer side of the elbow and down the back of the forearm. It may be worse when moving or using the elbow, forearm, or wrist. You may also feel pain when gripping or lifting things.  Treatment for lateral epicondylitis  Treatments may include:  Resting the elbow, forearm, and wrist. Youll need to avoid movements that can make your symptoms worse. You also may need to avoid certain sports and types of work for a time. This helps relieve symptoms and prevent further damage to the tendons.  Changing the action that caused the problem. For instance, if the tendons were damaged from playing tennis, it may help to change your playing technique or use different equipment. This helps prevent further damage to the tendons.  Using cold packs. Putting an ice pack on the injured area can help reduce pain and swelling.  Taking pain medicines. Taking prescription or over-the-counter  pain medicines may help reduce pain and swelling.    Wearing a brace. This helps reduce strain on the muscles and tendons in the forearm, which may relieve symptoms. It is very important to wear the brace properly.  Doing exercises and physical therapy. These help improve strength and range of motion in the elbow, forearm, and wrist.  Getting shots of medicine into the injured area. These may help relieve symptoms for a time.  Having surgery. This may be an option if other treatments fail to relieve symptoms. In many cases, the surgeon removes the damaged tissue.  Possible complications of lateral epicondylitis  If the tendons involved dont heal properly, symptoms may return or get worse. To help prevent this, follow your treatment plan as directed.  When to call your healthcare provider  Call your healthcare provider right away if you have any of these:  Fever of 100.4°F (38°C) or higher, or as directed  Redness, swelling, or warmth in the elbow or forearm that gets worse  Symptoms that dont get better with treatment, or get worse  New symptoms   Date Last Reviewed: 3/10/2016  © 4356-3892 [employer]. [address] [place] [address]. All rights reserved. This information is not intended as a substitute for professional medical care. Always follow your healthcare professional's instructions.         Heat your elbow 10-15 minutes prior to exercising if possible    Wrist Extensor Stretch        Keeping elbow straight, grasp left hand and slowly bend wrist forward until stretch is felt. Hold 10 seconds. Relax.  Repeat 10 times. Do 3 sessions per day.    Copyright © [employer]. All rights reserved.      Wrist Flexor Stretch        Keeping elbow straight, grasp left hand and slowly bend wrist back until stretch is felt. Hold 10 seconds. Relax.  Repeat 10 times.  Do 3 sessions per day.

## 2022-07-13 NOTE — PLAN OF CARE
[facility] Occupational Therapy  Initial Evaluation     Date: 7/13/2022  Patient: Andre Padgett  Chart Number: [id number]  Referring Physician: Ino George NP  Therapy Diagnosis: No diagnosis found.    Medical Diagnosis: M77.12 (ICD-10-CM) - Lateral epicondylitis of left elbow  Physician Orders: Eval/tx  Evaluation Date: 7/13/2022  Plan of Care Certification Date: 10/13/2022  Authorization Period: 7/13/2023  Surgery Date and Procedure: 9/14/2020 bilateral DRF ORIF's  Date of Return to MD: SHANNON    Visit #: 1 of 1  Time In: 3:45 PM  Time Out: 4:30 PM  Total Billable Time: 45 min    Precautions: Standard    Subjective     Involved Side: Left  Dominant Side: Right  Date of Onset: 2 weeks  History of Current Condition: He reports he missed a step and fell forward face first and with his left arm out stretched.  He said he went to an ED and x-rays of his left wrist and left FA were taken which were negative for fracture. He reports prior to this fall, he has no pain or issues with his left FA/wrist.  Now, he is reporting pain just lateral to his left elbow and radiates down his FA to his wrist.  Denies finger numbness but feels the arm is weaker and it hurts to  objects and rotation of his FA.  Denies fever or chills.  He has been using OTC analgesics with some relief.      Imaging: Per above  Previous Therapy: OT for bilateral wrists following Sx    Patient's Goals for Therapy: Return to premorbid state    Pain:  Functional Pain Scale Rating 0-10:   5/10 on average  5/10 at best  6/10 at worst  Location: Left lateral elbow  Description: Burning, stabbing  Aggravating Factors: Grasping, lifting w/ FA pronated  Easing Factors: Rest    Previous Level of function Independent w/ ADL's    Current Level of Function Limited use of LUE 2/2 c/o pain    Occupation:  Auto salesman  Working presently: employed  Duties: keyboarding, writing    Past Medical History/Physical Systems Review:   Andre PEDRO  Lorin  has a past medical history of Aneurysm, Anxiety, Cerebral aneurysm, nonruptured, Colon adenoma: 3/18 repeat colonoscpy , Colon polyp, Diverticulosis of large intestine without hemorrhage: see colonoscopy 3/18; sigmoid and descending colon, Fatty liver: see CT 3/18, and Umbilical hernia without obstruction and without gangrene: see CT 3/18.    Andre Padgett  has a past surgical history that includes Colonoscopy (N/A, 2018); Cerebral angiogram (); ORIF forearm fracture (Bilateral, 2020); Colonoscopy (N/A, 2021); and Wrist surgery (Bilateral, 2020).    Andre has a current medication list which includes the following prescription(s): alprazolam, ascorbic acid (vitamin c), atorvastatin, blood pressure monitor, fluticasone propionate, ipratropium, melatonin, and promethazine-dextromethorphan.    Review of patient's allergies indicates:  No Known Allergies       Objective     Mental status: alert    Observation:   Unremarkable      Sensation:   WNL      Edema: Circumferential measurements: In centimters       Left Right   3 Inches distal to elbow crease 23.4 cm 23.1 cm   Elbow crease 27.2 cm 26.7 cm   3 Inches proximal to elbow crease 28.0 cm 27.6 cm       Range of Motion:   Left     WNL all joint planes    Special Tests:   Left   2022   Resisted LF ext +   Resisted WE +   Resisted supination +     Manual Muscle Testin/5 WE, sup, UD     Strength: (ADRIANNA Dynamometer in psi.)      2022    Left Right   Rung II 7 53       Treatment     Treatment Time In: 4:00 PM  Treatment Time Out: 4:15 PM  Total Treatment time separate from Evaluation time:15 min      Andre participated in dynamic functional therapeutic activities to improve functional performance for 15  minutes, including:  -HEP and joint protection training    Home Exercise Program/Education:  Issued HEP (see patient instructions in EMR) and educated on modality use for pain management . Exercises  were reviewed and Andre was able to demonstrate them prior to the end of the session.   Pt received a written copy of exercises to perform at home. Andre demonstrated good  understanding of the education provided.  Pt was advised to perform these exercises free of pain, and to stop performing them if pain occurs.    Patient/Family Education: role of OT, goals for OT, scheduling/cancellations - pt verbalized understanding. Discussed insurance limitations with patient.    Additional Education provided: Use of heat      Assessment     Andre Padgett is a 51 y.o. male presents with limitations as described in problem list. Patient can benefit from Occupational Therapy services for Iontophoresis, ultrasound, moist heat, therapeutic exercises, home exercise program provied with written instructions, ice and strengthening and orthotics, if deemed necessary . The following goals were discussed with the patient and she is in agreement with them as to be addressed in the treatment plan.    The patient's rehab potential is Good.     Anticipated barriers to occupational therapy: None  Pt has no cultural, educational or language barriers to learning provided.    Profile and History Assessment of Occupational Performance Level of Clinical Decision Making Complexity Score   Occupational Profile:   Andre Padgett is a 51 y.o. male who is currently employed Andre Padgett has difficulty with  ADLs and IADLs as listed previously, which  affecting his/her daily functional abilities.      Comorbidities:    has a past medical history of Aneurysm, Anxiety, Cerebral aneurysm, nonruptured, Colon adenoma: 3/18 repeat colonoscpy 2023, Colon polyp, Diverticulosis of large intestine without hemorrhage: see colonoscopy 3/18; sigmoid and descending colon, Fatty liver: see CT 3/18, and Umbilical hernia without obstruction and without gangrene: see CT 3/18.    Medical and Therapy History Review:   Brief               Performance  Deficits    Physical:  Muscle Power/Strength    Cognitive:  No Deficits    Psychosocial:    No Deficits     Clinical Decision Making:  moderate    Assessment Process:  Problem-Focused Assessments    Modification/Need for Assistance:  Not Necessary    Intervention Selection:  Multiple Treatment Options       moderate  Based on PMHX, co morbidities , data from assessments and functional level of assistance required with task and clinical presentation directly impacting function.         Goals:    LTG's (8 weeks):  1)   Increase  strength 30 lbs. to grasp hammer.  2)   Decrease complaints of pain to  2 out of 10 at worst to increase functional hand use for ADL/work/leisure activities.  3)   Pt will return to near to prior level of function for ADLs and household management reporting I or Mod I with ADLs (dressing, feeding, grooming, toileting).     STG's (4 weeks)  1)   Patient to be IND with HEP and modalities for pain/edema managment.  2)   Increase  strength 15 lbs. to improve functional grasp for ADLs/work/leisure activities.   3)   Patient to be IND with Orthotic use, wear and care precautions.   4)   Decrease complaints of pain to  4 out of 10 at worst to increase functional hand use for ADL/work/leisure activities.      Plan     Pt to be treated by Occupational Therapy 1-2 times per week for 8 weeks during the certification period from 7/13/2022 to 10/13/2022 to achieve the established goals.     Treatment to include: Manual therapy/joint mobilizations, Modalities for pain management, US 3 mhz, Therapeutic exercises/activities., Iontophoresis with 2.0 cc Dexamethasone, Strengthening and Joint Protection, as well as any other treatments deemed necessary based on the patient's needs or progress.     MALINI Bhagat  OTR/L, CHT  Occupational therapist, Certified Hand Therapist

## 2022-07-13 NOTE — PROGRESS NOTES
Chief Complaint & History of Present Illness:  Andre Padgett is a Established 51 y.o. year old male patient presenting with intermittent left elbow pain which started 2 weeks ago.  He is right hand dominant. There is a history of trauma.  He reports he missed a step and fell forward face first and with his left arm out stretched.  He said he went to an ED and x-rays of his left wrist and left FA were taken which were negative for fracture.  He states he decided to follow up with Ortho given his PMH of bilateral distal radius fracture s/p ORIF by Dr. Thorne on 9/14/2020.    He reports prior to this fall, he has no pain or issues with his left FA/wrist.  Now, he is reporting pain just lateral to his left elbow and radiates down his FA to his wrist.  Denies finger numbness but feels the arm is weaker and it hurts to  objects and rotation of his FA.  Denies fever or chills.  He has been using OTC analgesics with some relief.      Review of patient's allergies indicates:  No Known Allergies      Current Outpatient Medications   Medication Sig Dispense Refill    ALPRAZolam (XANAX) 1 MG tablet Take 0.5 tablets (0.5 mg total) by mouth daily as needed for Anxiety. 30 tablet 5    ascorbic acid, vitamin C, (VITAMIN C) 100 MG tablet Take 100 mg by mouth once daily.      atorvastatin (LIPITOR) 40 MG tablet TAKE 1 TABLET(40 MG) BY MOUTH EVERY DAY 90 tablet 0    blood pressure monitor (BLOOD PRESSURE KIT) Kit Monitor Blood Pressure daily. 1 each 0    fluticasone propionate (FLONASE) 50 mcg/actuation nasal spray 1 spray (50 mcg total) by Each Nostril route once daily. 16 mL 0    melatonin (MELATIN) 3 mg tablet Take 2 tablets (6 mg total) by mouth nightly as needed for Insomnia.  0    ipratropium (ATROVENT) 42 mcg (0.06 %) nasal spray 2 sprays by Nasal route 3 (three) times daily. (Patient not taking: No sig reported) 30 mL 0    promethazine-dextromethorphan (PROMETHAZINE-DM) 6.25-15 mg/5 mL Syrp Take 5 mLs by  mouth every 4 (four) hours as needed (cough and congestion). (Patient not taking: No sig reported) 118 mL 0     No current facility-administered medications for this visit.       Past Medical History:   Diagnosis Date    Aneurysm     Right sided filling anterior communicating aneurysm s/p coiling 2011    Anxiety     Cerebral aneurysm, nonruptured 12/27/2016    Colon adenoma: 3/18 repeat colonoscpy 2023 6/22/2018    Colon polyp     Diverticulosis of large intestine without hemorrhage: see colonoscopy 3/18; sigmoid and descending colon 6/22/2018    Fatty liver: see CT 3/18 6/22/2018    Umbilical hernia without obstruction and without gangrene: see CT 3/18 6/22/2018       Past Surgical History:   Procedure Laterality Date    CEREBRAL ANGIOGRAM  2011    angiogram/coiling    COLONOSCOPY N/A 03/26/2018    Procedure: COLONOSCOPY;  Surgeon: Sanjiv Duran MD;  [facility]);  Service: Endoscopy;  Laterality: N/A;    COLONOSCOPY N/A 03/09/2021    Procedure: COLONOSCOPY;  Surgeon: Aleshia Daly MD;  Location: [facility];  Service: Endoscopy;  Laterality: N/A;    ORIF FOREARM FRACTURE Bilateral 09/14/2020    Procedure: ORIF, FRACTURE, RADIUS OR ULNA, synthes, right, large C arm at a diagonal from the rear of the room, ancef, velcro wrist splint;  Surgeon: Hao Thorne MD;  Location: [facility] OR [address] [place] [address];  Service: Orthopedics;  Laterality: Bilateral;    WRIST SURGERY Bilateral 09/13/2020       Family History   Problem Relation Age of Onset    DAVID disease Mother     Hypertension Mother     Diabetes Mother     Brain cancer Father     Thyroid disease Sister     No Known Problems Daughter     No Known Problems Sister     Cirrhosis Neg Hx     Colon polyps Neg Hx     Crohn's disease Neg Hx     Liver cancer Neg Hx     Stomach cancer Neg Hx     Ulcerative colitis Neg Hx     Heart attack Neg Hx     Colon cancer Neg Hx        Review of Systems:  ROS:  Constitutional: no fever or  "chills  Eyes: no visual changes  ENT: no nasal congestion or sore throat  Respiratory: no cough or shortness of breath  Cardiovascular: no chest pain or palpitations  Gastrointestinal: no nausea or vomiting, tolerating diet  Genitourinary: no hematuria or dysuria  Integument/Breast: no rash or pruritis  Hematologic/Lymphatic: no easy bruising or lymphadenopathy  Musculoskeletal: left FA pain  Neurological: no seizures or tremors  Behavioral/Psych: no auditory or visual hallucinations  Endocrine: no heat or cold intolerance      OBJECTIVE:     PHYSICAL EXAM:  Vital Signs (Most Recent)  There were no vitals filed for this visit.     ,   Estimated body mass index is 28.84 kg/m² as calculated from the following:    Height as of 6/15/22: 5' 10" (1.778 m).    Weight as of 6/15/22: 91.2 kg (201 lb).   General Appearance: Well nourished, well developed, in no acute distress.  HENT: Normal cephalic, oropharynx pink and moist  Eyes: PERRLA bilaterally and EOM x 4  Respiratory: Even and unlabored  Skin: Warm and Dry.   Psychiatric: AAO x 4, Mood & affect are normal.    left  Elbow:   History of injury: yes, fall on hand, date: 6/30/22  Pain: Description: mild  Rest pain: no  Quality: aching  Location: lateral  Radiates to: arm and hand  Exacerbating factors: activity and lifting  Alleviating factors: rest  Neurological complaints: none  Vascular complaints: none  Previous treatments: OTC analgesics  lateral epicondylar tenderness, radial pulse normal, remainder of elbow exam is normal  ROM: pronation 80, supination 80 and wrist range of motion normal  Strength: supination grade 4 of 5 and pronation grade 4 of 5      RADIOGRAPHS:  X-ray of the left wrist and FA was obtained, findings show his distal radius fracture has healed.  Hardware is intact with no evidence of hardware failure.  His left FA x-ray is unremarkable.  All radiographs were personally reviewed by me.    ASSESSMENT/PLAN:       ICD-10-CM ICD-9-CM   1. Lateral " epicondylitis of left elbow  M77.12 726.32       Plan: We discussed with the patient at length all the different treatment options available for his elbow including anti-inflammatories, acetaminophen, rest, ice, physical therapy to include strengthening, range of motion exercise,  splinting,  occasional cortisone injections for temporary relief,  or possible surgical interventions.    -Andre PEDRO Padgett presents to clinic with c/c of left elbow pain which started 2 weeks.  -X-ray as above  -Recommend RICE therapy  -I suspect lateral epicondylitis.  Will get him a brace for this and refer him to OT with [facility].    -Follow up in 6 weeks or sooner if pain persist or worsens.  -May continue to use OTC analgesics.  -Call for questions.

## 2022-07-21 ENCOUNTER — TELEPHONE (OUTPATIENT)
Dept: OTOLARYNGOLOGY | Facility: CLINIC | Age: 52
End: 2022-07-21
Payer: COMMERCIAL

## 2022-08-10 ENCOUNTER — TELEPHONE (OUTPATIENT)
Dept: INTERNAL MEDICINE | Facility: CLINIC | Age: 52
End: 2022-08-10
Payer: COMMERCIAL

## 2022-08-10 NOTE — TELEPHONE ENCOUNTER
----- Message from Lucy Britton sent at 8/10/2022 10:29 AM CDT -----  Regarding: advice  Contact: patient  [phone]  [email or web address]     Patient is calling for Medical Advice regarding: fall 5 weeks ago - headaches - fracture of nose    How long has patient had these symptoms: 5 weeks     Pharmacy name and phone#:    Would like response via [employer]:  please call    Comments:

## 2022-08-10 NOTE — TELEPHONE ENCOUNTER
Spoke with patient, he reports that he has been having headaches off and on for last few days on left side of head where her fell 5 weeks ago, denies dizziness, chest pain, vision changes, declined appointment for evaluation  Patient requesting order for CT from PCP

## 2022-08-16 ENCOUNTER — TELEPHONE (OUTPATIENT)
Dept: INTERNAL MEDICINE | Facility: CLINIC | Age: 52
End: 2022-08-16
Payer: COMMERCIAL

## 2022-08-16 ENCOUNTER — HOSPITAL ENCOUNTER (EMERGENCY)
Facility: HOSPITAL | Age: 52
Discharge: LEFT BEFORE DISCHARGE | End: 2022-08-16
Attending: EMERGENCY MEDICINE
Payer: COMMERCIAL

## 2022-08-16 ENCOUNTER — OFFICE VISIT (OUTPATIENT)
Dept: INTERNAL MEDICINE | Facility: CLINIC | Age: 52
End: 2022-08-16
Payer: COMMERCIAL

## 2022-08-16 ENCOUNTER — PATIENT MESSAGE (OUTPATIENT)
Dept: INTERNAL MEDICINE | Facility: CLINIC | Age: 52
End: 2022-08-16

## 2022-08-16 VITALS
DIASTOLIC BLOOD PRESSURE: 84 MMHG | HEART RATE: 83 BPM | HEIGHT: 70 IN | OXYGEN SATURATION: 95 % | BODY MASS INDEX: 29.38 KG/M2 | WEIGHT: 205.25 LBS | SYSTOLIC BLOOD PRESSURE: 122 MMHG

## 2022-08-16 VITALS
HEART RATE: 71 BPM | RESPIRATION RATE: 18 BRPM | OXYGEN SATURATION: 97 % | WEIGHT: 205 LBS | SYSTOLIC BLOOD PRESSURE: 143 MMHG | HEIGHT: 70 IN | TEMPERATURE: 98 F | DIASTOLIC BLOOD PRESSURE: 86 MMHG | BODY MASS INDEX: 29.35 KG/M2

## 2022-08-16 DIAGNOSIS — R51.9 ACUTE NONINTRACTABLE HEADACHE, UNSPECIFIED HEADACHE TYPE: Primary | ICD-10-CM

## 2022-08-16 DIAGNOSIS — Z86.79 HISTORY OF NONTRAUMATIC RUPTURE OF CEREBRAL ANEURYSM: ICD-10-CM

## 2022-08-16 DIAGNOSIS — Z91.81 HISTORY OF RECENT FALL: ICD-10-CM

## 2022-08-16 DIAGNOSIS — Z86.79 HISTORY OF INTRACRANIAL HEMORRHAGE: ICD-10-CM

## 2022-08-16 DIAGNOSIS — S09.90XA HEAD TRAUMA, INITIAL ENCOUNTER: ICD-10-CM

## 2022-08-16 DIAGNOSIS — R51.9 NONINTRACTABLE HEADACHE, UNSPECIFIED CHRONICITY PATTERN, UNSPECIFIED HEADACHE TYPE: Primary | ICD-10-CM

## 2022-08-16 LAB
CRP SERPL-MCNC: 1.1 MG/L (ref 0–8.2)
ERYTHROCYTE [SEDIMENTATION RATE] IN BLOOD BY PHOTOMETRIC METHOD: 8 MM/HR (ref 0–23)

## 2022-08-16 PROCEDURE — 99284 EMERGENCY DEPT VISIT MOD MDM: CPT | Mod: ,,, | Performed by: EMERGENCY MEDICINE

## 2022-08-16 PROCEDURE — 99999 PR PBB SHADOW E&M-EST. PATIENT-LVL V: CPT | Mod: PBBFAC,,, | Performed by: NURSE PRACTITIONER

## 2022-08-16 PROCEDURE — 99999 PR PBB SHADOW E&M-EST. PATIENT-LVL V: ICD-10-PCS | Mod: PBBFAC,,, | Performed by: NURSE PRACTITIONER

## 2022-08-16 PROCEDURE — 3074F SYST BP LT 130 MM HG: CPT | Mod: CPTII,S$GLB,, | Performed by: NURSE PRACTITIONER

## 2022-08-16 PROCEDURE — 99283 EMERGENCY DEPT VISIT LOW MDM: CPT | Mod: 25

## 2022-08-16 PROCEDURE — 3079F DIAST BP 80-89 MM HG: CPT | Mod: CPTII,S$GLB,, | Performed by: NURSE PRACTITIONER

## 2022-08-16 PROCEDURE — 86140 C-REACTIVE PROTEIN: CPT | Performed by: PHYSICIAN ASSISTANT

## 2022-08-16 PROCEDURE — 1159F MED LIST DOCD IN RCRD: CPT | Mod: CPTII,S$GLB,, | Performed by: NURSE PRACTITIONER

## 2022-08-16 PROCEDURE — 3008F PR BODY MASS INDEX (BMI) DOCUMENTED: ICD-10-PCS | Mod: CPTII,S$GLB,, | Performed by: NURSE PRACTITIONER

## 2022-08-16 PROCEDURE — 99214 OFFICE O/P EST MOD 30 MIN: CPT | Mod: S$GLB,,, | Performed by: NURSE PRACTITIONER

## 2022-08-16 PROCEDURE — 85652 RBC SED RATE AUTOMATED: CPT | Performed by: PHYSICIAN ASSISTANT

## 2022-08-16 PROCEDURE — 99214 PR OFFICE/OUTPT VISIT, EST, LEVL IV, 30-39 MIN: ICD-10-PCS | Mod: S$GLB,,, | Performed by: NURSE PRACTITIONER

## 2022-08-16 PROCEDURE — 1160F RVW MEDS BY RX/DR IN RCRD: CPT | Mod: CPTII,S$GLB,, | Performed by: NURSE PRACTITIONER

## 2022-08-16 PROCEDURE — 86803 HEPATITIS C AB TEST: CPT | Performed by: PHYSICIAN ASSISTANT

## 2022-08-16 PROCEDURE — 3008F BODY MASS INDEX DOCD: CPT | Mod: CPTII,S$GLB,, | Performed by: NURSE PRACTITIONER

## 2022-08-16 PROCEDURE — 99284 PR EMERGENCY DEPT VISIT,LEVEL IV: ICD-10-PCS | Mod: ,,, | Performed by: EMERGENCY MEDICINE

## 2022-08-16 PROCEDURE — 1160F PR REVIEW ALL MEDS BY PRESCRIBER/CLIN PHARMACIST DOCUMENTED: ICD-10-PCS | Mod: CPTII,S$GLB,, | Performed by: NURSE PRACTITIONER

## 2022-08-16 PROCEDURE — 3079F PR MOST RECENT DIASTOLIC BLOOD PRESSURE 80-89 MM HG: ICD-10-PCS | Mod: CPTII,S$GLB,, | Performed by: NURSE PRACTITIONER

## 2022-08-16 PROCEDURE — 3074F PR MOST RECENT SYSTOLIC BLOOD PRESSURE < 130 MM HG: ICD-10-PCS | Mod: CPTII,S$GLB,, | Performed by: NURSE PRACTITIONER

## 2022-08-16 PROCEDURE — 87389 HIV-1 AG W/HIV-1&-2 AB AG IA: CPT | Performed by: PHYSICIAN ASSISTANT

## 2022-08-16 PROCEDURE — 1159F PR MEDICATION LIST DOCUMENTED IN MEDICAL RECORD: ICD-10-PCS | Mod: CPTII,S$GLB,, | Performed by: NURSE PRACTITIONER

## 2022-08-16 NOTE — ED TRIAGE NOTES
Pt presents to ED CHALL #1 52 yo male with PMH of cerebral aneurysm, fall with facial fracture, hyperlipidemia, anxiety, and diverticulosis. Pt c/o recurrent headaches since fall 6-8 weeks ago. Pt also reports some blurred vision in the left eye intermittently over the past 6-8 weeks. Pt was sent to ED by Dr. Jaeger. VS WNL. Pt denies any headache or pain at this time. NAD.

## 2022-08-16 NOTE — ED PROVIDER NOTES
Encounter Date: 8/16/2022       History     Chief Complaint   Patient presents with    Multiple complaints     Fell 6 weeks ago, started with headaches week ago     This is a 51 year old male with a PMH of ruptured anterior communicating aneurysm s/p coil 2011, traumatic SAH 2020 presenting to the ED from clinic with headache. He suffered a mechanical slip and fall on stairs approximately 6 to 8 weeks ago causing a left sided facial fracture. He was evaluated at an OSH for this. He presented to clinic today with new onset headaches that began about a week ago. He describes pain in the frontal and bitemporal areas, pain above his left eye, and blurred vision in the left visual field. Symptoms are intermittent without associated symptoms, exacerbating or alleviating factors. He denies photo/phonophobia, nausea, increased lacrimation, rhinorrhea, focal weakness/numbness. He rates his headache 2/10. No recent fever, chills, URI symptoms, chest pain, syncope, SOB, nausea/vomiting, changes in urination.        Review of patient's allergies indicates:  No Known Allergies  Past Medical History:   Diagnosis Date    Aneurysm     Right sided filling anterior communicating aneurysm s/p coiling 2011    Anxiety     Cerebral aneurysm, nonruptured 12/27/2016    Colon adenoma: 3/18 repeat colonoscpy 2023 6/22/2018    Colon polyp     Diverticulosis of large intestine without hemorrhage: see colonoscopy 3/18; sigmoid and descending colon 6/22/2018    Fatty liver: see CT 3/18 6/22/2018    Umbilical hernia without obstruction and without gangrene: see CT 3/18 6/22/2018     Past Surgical History:   Procedure Laterality Date    CEREBRAL ANGIOGRAM  2011    angiogram/coiling    COLONOSCOPY N/A 03/26/2018    Procedure: COLONOSCOPY;  Surgeon: Sanjiv Duran MD;  Location: [address];  Service: Endoscopy;  Laterality: N/A;    COLONOSCOPY N/A 03/09/2021    Procedure: COLONOSCOPY;  Surgeon: Aleshia Daly MD;   Location: [facility];  Service: Endoscopy;  Laterality: N/A;    ORIF FOREARM FRACTURE Bilateral 09/14/2020    Procedure: ORIF, FRACTURE, RADIUS OR ULNA, synthes, right, large C arm at a diagonal from the rear of the room, ancef, velcro wrist splint;  Surgeon: Hao Thorne MD;  Location: [facility] OR [address] [place] [address];  Service: Orthopedics;  Laterality: Bilateral;    WRIST SURGERY Bilateral 09/13/2020     Family History   Problem Relation Age of Onset    DAVID disease Mother     Hypertension Mother     Diabetes Mother     Brain cancer Father     Thyroid disease Sister     No Known Problems Daughter     No Known Problems Sister     Cirrhosis Neg Hx     Colon polyps Neg Hx     Crohn's disease Neg Hx     Liver cancer Neg Hx     Stomach cancer Neg Hx     Ulcerative colitis Neg Hx     Heart attack Neg Hx     Colon cancer Neg Hx      Social History     Tobacco Use    Smoking status: Never Smoker    Smokeless tobacco: Never Used   Substance Use Topics    Alcohol use: Yes     Comment: ocassionally - twice a week    Drug use: No     Review of Systems   Constitutional: Negative for chills and fever.   HENT: Negative for rhinorrhea.    Eyes: Positive for visual disturbance. Negative for photophobia.   Respiratory: Negative for shortness of breath.    Cardiovascular: Negative for chest pain.   Gastrointestinal: Negative for nausea and vomiting.   Musculoskeletal: Negative for neck pain.   Skin: Negative for rash.   Allergic/Immunologic: Negative for immunocompromised state.   Neurological: Positive for headaches. Negative for syncope, weakness and numbness.   Hematological: Does not bruise/bleed easily.       Physical Exam     Initial Vitals [08/16/22 1150]   BP Pulse Resp Temp SpO2   (!) 143/86 71 18 98.4 °F (36.9 °C) 97 %      MAP       --         Physical Exam    Constitutional: He appears well-developed and well-nourished. No distress.   HENT:   Head: Atraumatic.   Eyes: Conjunctivae and EOM are normal. Pupils are  equal, round, and reactive to light.   Cardiovascular: Normal rate, regular rhythm and normal heart sounds.   No temporal artery tenderness to palpation   Pulmonary/Chest: Breath sounds normal. No respiratory distress. He has no wheezes. He has no rhonchi. He has no rales.     Neurological: He is alert and oriented to person, place, and time. He has normal strength. No cranial nerve deficit or sensory deficit. Coordination and gait normal. GCS eye subscore is 4. GCS verbal subscore is 5. GCS motor subscore is 6.   Skin: Skin is warm and dry. No rash noted.         ED Course   Procedures  Labs Reviewed   SEDIMENTATION RATE   C-REACTIVE PROTEIN   HIV 1 / 2 ANTIBODY   HEPATITIS C ANTIBODY          Imaging Results    None          Medications - No data to display  Medical Decision Making:   History:   Old Medical Records: I decided to obtain old medical records.  Old Records Summarized: records from clinic visits.  Clinical Tests:   Lab Tests: Ordered and Reviewed  Radiological Study: Ordered and Reviewed       APC / Resident Notes:   51 y.o. year old male presenting with headache.    DDx includes but is not limited to TBI headache, ICH, intracranial mass, giant cell arteritis.    ESR and CRP are normal, thus GCA is less likely.  MRI and MRA brain were ordered but the patient declined to wait for additional testing as he had not anticipated an emergency department visit when presenting to the clinic today. Unfortunately he eloped prior to obtaining advanced imaging.    I discussed the care of this patient with my supervising physician.         Attending Attestation:     Physician Attestation Statement for NP/PA:   I have conducted a face to face encounter with this patient in addition to the NP/PA, due to Medical Complexity    Other NP/PA Attestation Additions:      Medical Decision Making: Pt NAD, no FNDs, but given h/o HA's s/p fall despite neg initial CT head and h/o aneurysms will obtain vascular brain  imaging.    Notified by nurse that pt had eloped prior to imaging.     I discussed the patient's care with Advanced Practice Clinician, and did see this patient with the APC.  I reviewed their note and agree with the history, physical, assessment, diagnosis, treatment, and disposition plan provided by the Advanced Practice Clinician.                         Clinical Impression:   Final diagnoses:  [R51.9] Nonintractable headache, unspecified chronicity pattern, unspecified headache type (Primary)  [Z86.79] History of intracranial hemorrhage          ED Disposition Condition    Eloped               Alfreda Molina PA-C  08/16/22 1506       Truyd Fraser MD  08/17/22 7739

## 2022-08-16 NOTE — PROGRESS NOTES
"Subjective:       Patient ID: Andre Padgett is a 51 y.o. male.    Vitals:  height is 5' 10" (1.778 m) and weight is 93.1 kg (205 lb 4 oz). His blood pressure is 122/84 and his pulse is 83. His oxygen saturation is 95%.     Chief Complaint: Headache    Patient is a 51 y.o. male who presents to clinic for evaluation of headache that began 1 week ago. He states he tripped and fell down his stairs, hitting his head on the concrete floor. He was seen in the ER ([facility]) after this fall, states he had a negative workup (some kind of imaging of his arms and head). He reports taking ibuprofen with moderate, but temporary relief. States the pain is over his left eye. He currently rates pain 3/10. States vision in left eye is "slighty" blurry. Reports dizziness upon waking yesterday which has improved.     Denies nausea, photophobia, phonophobia, worsening pain with changing position, thunderclap onset, fever.     Last seen by neurosurgery in October 2020. Plan at that time was repeat MRA w/ without contrast.     He has a recent history of SAH 09/13/2020.     ROS      As per HPI and below:   General: No fever.   HENT: No facial pain.   Eyes: No eye pain.   Cardiovascular: No chest pain.   Respiratory: No dyspnea.   GI: No abdominal pain. No vomiting  Skin: No rashes.  Neuro: No syncope. Notes headache, notes dizziness yesterday.   Musculoskeletal: No extremity pain. No swelling.    All other systems negative.     Objective:      Physical Exam     Nursing note and vitals reviewed.    Constitutional: AAOx3. No distress.  Eyes: EOMI. No discharge. Anicteric.  HENT:   Neck: Normal range of motion. Neck supple.  Cardiovascular: Normal rate.  Regular rhythm.    Pulmonary/Chest: No respiratory distress. Effort normal.  No tachypnea.  Abdominal: No distension and no mass.   Musculoskeletal: Normal range of motion.   Neurological: GCS 15. Alert and oriented to person, place, and time. No gross cranial nerve, " light touch or strength deficit. Coordination normal.   Skin: Skin is warm and dry.   EXT: 2+ radial pulses.   Psychiatric: Behavior is normal. Judgment normal.        Assessment:       1. Acute nonintractable headache, unspecified headache type    2. History of nontraumatic rupture of cerebral aneurysm    3. Head trauma, initial encounter    4. History of recent fall          Plan:       Advised patient that they need to go to the Emergency Room for further evaluation of recent fall, new onset headache, and history of SAH. After discussing this at length, patient is agreeable to this plan. Patient transferred via security van with LPN escort to [facility] for further workup.    I discussed this case with my collaborating physician, Dr. Jamey Cyr, and the patient's PCP, Dr. Hunter Diop, who are both in agreement with this plan.      Acute nonintractable headache, unspecified headache type  -     Ambulatory referral/consult to Neurosurgery; Future; Expected date: 08/23/2022  -     CT Head Without Contrast; Future; Expected date: 08/16/2022  -     Refer to Emergency Dept.    History of nontraumatic rupture of cerebral aneurysm  -     Ambulatory referral/consult to Neurosurgery; Future; Expected date: 08/23/2022  -     CT Head Without Contrast; Future; Expected date: 08/16/2022  -     Refer to Emergency Dept.    Head trauma, initial encounter  -     CT Head Without Contrast; Future; Expected date: 08/16/2022  -     Refer to Emergency Dept.    History of recent fall  -     CT Head Without Contrast; Future; Expected date: 08/16/2022  -     Refer to Emergency Dept.         Follow up with PCP and Neurosurgery.    Nori Davis MSN, APRN, FNP-BC  08/17/2022

## 2022-08-16 NOTE — PROGRESS NOTES
Pt was transferred to the ER. Pt was able to ambulate to the Van. Once arrived at the ER pt was able to ambulate to the waiting room. Pt was triaged by ER Nurse CHAIM Vaca

## 2022-08-17 ENCOUNTER — TELEPHONE (OUTPATIENT)
Dept: INTERNAL MEDICINE | Facility: CLINIC | Age: 52
End: 2022-08-17
Payer: COMMERCIAL

## 2022-08-17 LAB — HIV 1+2 AB+HIV1 P24 AG SERPL QL IA: NEGATIVE

## 2022-08-17 NOTE — TELEPHONE ENCOUNTER
----- Message from Brennen Ramirez sent at 8/17/2022 10:13 AM CDT -----  Contact: Patient  The pt called and would like to have a nurse call him back    He would like to know if he should get a CT scan or an MRI    Please call him back at [phone]

## 2022-08-17 NOTE — TELEPHONE ENCOUNTER
----- Message from Talita Mohr MA sent at 8/17/2022 11:59 AM CDT -----  Contact: [phone]    ----- Message -----  From: Zahra Ham  Sent: 8/17/2022  11:41 AM CDT  To: Chikis JENNINGS Staff    Patient is returning a phone call.  Who left a message for the patient: Talita Mohr MA  Does patient know what this is regarding:  questions on portal , patient states his portal is not working and he would like a call back.   Would you like a call back, or a response through your [facility] portal?:   call back   Comments:

## 2022-08-17 NOTE — TELEPHONE ENCOUNTER
Spoke to Tayla Childers RN---Tayla left mess for patient to read portal mess and respond to questions that were asked to fill in the blanks regarding his care or lack of completion of his care due to his elopement from ER

## 2022-08-17 NOTE — TELEPHONE ENCOUNTER
Can we check how the pt is doing and see if he has read or can read my portal message?   He left ER before imaging.

## 2022-08-18 ENCOUNTER — PATIENT MESSAGE (OUTPATIENT)
Dept: INTERNAL MEDICINE | Facility: CLINIC | Age: 52
End: 2022-08-18
Payer: COMMERCIAL

## 2022-08-18 DIAGNOSIS — G44.319 ACUTE POST-TRAUMATIC HEADACHE, NOT INTRACTABLE: ICD-10-CM
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
## 2022-08-19 ENCOUNTER — PATIENT MESSAGE (OUTPATIENT)
Dept: INTERNAL MEDICINE | Facility: CLINIC | Age: 52
End: 2022-08-19
Payer: COMMERCIAL

## 2022-08-19 DIAGNOSIS — S09.90XA INJURY OF HEAD, INITIAL ENCOUNTER: ICD-10-CM
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
## 2022-08-19 DIAGNOSIS — G44.319 ACUTE POST-TRAUMATIC HEADACHE, NOT INTRACTABLE: ICD-10-CM

## 2022-08-24 ENCOUNTER — TELEPHONE (OUTPATIENT)
Dept: NEUROSURGERY | Facility: CLINIC | Age: 52
End: 2022-08-24
Payer: COMMERCIAL

## 2022-08-24 NOTE — TELEPHONE ENCOUNTER
Pt cites he continues to have headaches since his fall ~6wk ago.  Pt scheduled w/Fany for immediate F/U post T scheduled for 08.25.2022. Pt aware he can arrive early for the visit.  V/U.

## 2022-08-25 ENCOUNTER — TELEPHONE (OUTPATIENT)
Dept: NEUROSURGERY | Facility: CLINIC | Age: 52
End: 2022-08-25
Payer: COMMERCIAL

## 2022-08-25 ENCOUNTER — HOSPITAL ENCOUNTER (OUTPATIENT)
Dept: RADIOLOGY | Facility: HOSPITAL | Age: 52
Discharge: HOME | End: 2022-08-25
Attending: INTERNAL MEDICINE
Payer: COMMERCIAL
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
## 2022-08-25 DIAGNOSIS — G44.319 ACUTE POST-TRAUMATIC HEADACHE, NOT INTRACTABLE: ICD-10-CM

## 2022-08-25 DIAGNOSIS — S09.90XA INJURY OF HEAD, INITIAL ENCOUNTER: ICD-10-CM

## 2022-08-25 PROCEDURE — 70450 CT HEAD WITHOUT CONTRAST: ICD-10-PCS | Mod: 26,,, | Performed by: RADIOLOGY

## 2022-08-25 PROCEDURE — 70450 CT HEAD/BRAIN W/O DYE: CPT | Mod: 26,,, | Performed by: RADIOLOGY

## 2022-08-25 PROCEDURE — 70450 CT HEAD/BRAIN W/O DYE: CPT | Mod: TC

## 2022-08-25 NOTE — TELEPHONE ENCOUNTER
Spoke with patient. Informed him he was incorrectly scheduled. I have scheduled him with KAYY Ferguson for follow up after his scan at 12:30pm. Pt verbalizes understanding.

## 2022-08-25 NOTE — TELEPHONE ENCOUNTER
RONY pt, explained I noticed his CT that was scheduled for 12:30 pm today was moved to 5:45 pm today. Pt explained he is held up at work and will not make his appointment with [facility]. I moved his appointment to Monday 8/29/22 with Barbi. Pt happy with new date and time, letter mailed.

## 2022-08-26 ENCOUNTER — PATIENT MESSAGE (OUTPATIENT)
Dept: INTERNAL MEDICINE | Facility: CLINIC | Age: 52
End: 2022-08-26
Payer: COMMERCIAL

## 2022-08-30 LAB — HCV AB SERPL QL IA: NORMAL

## 2022-09-02 NOTE — PROGRESS NOTES
I have reviewed the chart and APRN's history and physical, assessment and plan. I have personally discussed the case with APRN and agree with the findings, assessment and plan.    Go to ER -

## 2022-09-27 ENCOUNTER — OFFICE VISIT (OUTPATIENT)
Dept: INTERNAL MEDICINE | Facility: CLINIC | Age: 52
End: 2022-09-27
Payer: MEDICAID

## 2022-09-27 VITALS
BODY MASS INDEX: 29.57 KG/M2 | WEIGHT: 206.56 LBS | HEART RATE: 82 BPM | SYSTOLIC BLOOD PRESSURE: 118 MMHG | DIASTOLIC BLOOD PRESSURE: 80 MMHG | OXYGEN SATURATION: 96 % | HEIGHT: 70 IN

## 2022-09-27 DIAGNOSIS — R10.11 RUQ PAIN: ICD-10-CM

## 2022-09-27 DIAGNOSIS — R14.0 BLOATING SYMPTOM: ICD-10-CM

## 2022-09-27 DIAGNOSIS — R10.9 ABDOMINAL PAIN, UNSPECIFIED ABDOMINAL LOCATION: ICD-10-CM

## 2022-09-27 DIAGNOSIS — K59.00 CONSTIPATION, UNSPECIFIED CONSTIPATION TYPE: Primary | ICD-10-CM

## 2022-09-27 PROCEDURE — 3008F PR BODY MASS INDEX (BMI) DOCUMENTED: ICD-10-PCS | Mod: CPTII,S$GLB,, | Performed by: INTERNAL MEDICINE

## 2022-09-27 PROCEDURE — 99999 PR PBB SHADOW E&M-EST. PATIENT-LVL IV: ICD-10-PCS | Mod: PBBFAC,,, | Performed by: INTERNAL MEDICINE

## 2022-09-27 PROCEDURE — 1160F PR REVIEW ALL MEDS BY PRESCRIBER/CLIN PHARMACIST DOCUMENTED: ICD-10-PCS | Mod: CPTII,S$GLB,, | Performed by: INTERNAL MEDICINE

## 2022-09-27 PROCEDURE — 99214 OFFICE O/P EST MOD 30 MIN: CPT | Mod: PBBFAC | Performed by: INTERNAL MEDICINE

## 2022-09-27 PROCEDURE — 3079F PR MOST RECENT DIASTOLIC BLOOD PRESSURE 80-89 MM HG: ICD-10-PCS | Mod: CPTII,S$GLB,, | Performed by: INTERNAL MEDICINE

## 2022-09-27 PROCEDURE — 99214 OFFICE O/P EST MOD 30 MIN: CPT | Mod: S$GLB,,, | Performed by: INTERNAL MEDICINE

## 2022-09-27 PROCEDURE — 3008F BODY MASS INDEX DOCD: CPT | Mod: CPTII,S$GLB,, | Performed by: INTERNAL MEDICINE

## 2022-09-27 PROCEDURE — 99999 PR PBB SHADOW E&M-EST. PATIENT-LVL IV: CPT | Mod: PBBFAC,,, | Performed by: INTERNAL MEDICINE

## 2022-09-27 PROCEDURE — 3074F SYST BP LT 130 MM HG: CPT | Mod: CPTII,S$GLB,, | Performed by: INTERNAL MEDICINE

## 2022-09-27 PROCEDURE — 1160F RVW MEDS BY RX/DR IN RCRD: CPT | Mod: CPTII,S$GLB,, | Performed by: INTERNAL MEDICINE

## 2022-09-27 PROCEDURE — 1159F MED LIST DOCD IN RCRD: CPT | Mod: CPTII,S$GLB,, | Performed by: INTERNAL MEDICINE

## 2022-09-27 PROCEDURE — 1159F PR MEDICATION LIST DOCUMENTED IN MEDICAL RECORD: ICD-10-PCS | Mod: CPTII,S$GLB,, | Performed by: INTERNAL MEDICINE

## 2022-09-27 PROCEDURE — 3074F PR MOST RECENT SYSTOLIC BLOOD PRESSURE < 130 MM HG: ICD-10-PCS | Mod: CPTII,S$GLB,, | Performed by: INTERNAL MEDICINE

## 2022-09-27 PROCEDURE — 99214 PR OFFICE/OUTPT VISIT, EST, LEVL IV, 30-39 MIN: ICD-10-PCS | Mod: S$GLB,,, | Performed by: INTERNAL MEDICINE

## 2022-09-27 PROCEDURE — 3079F DIAST BP 80-89 MM HG: CPT | Mod: CPTII,S$GLB,, | Performed by: INTERNAL MEDICINE

## 2022-09-27 NOTE — PROGRESS NOTES
Subjective:       Patient ID: Andre Padgett is a 52 y.o. male.    Chief Complaint: Flank Pain and Constipation    HPI  C/o several weeks of constipation and bubbly noises in stomach.  Yesterday abd became bloated and tight with localized pain RUQ.  Yesterday applied heated pads to abd which helped.    He has greenwood BM , but evacuation feels incomplete.  One fleeting episode of nausea yesterday.  Diet not changed.  Good appetite. No wt loss - actually gained wt.  No fever, chills, dysuria, hematuria.  These symptoms are different than previous IBS symptoms.  He takes stool softener daily.  Drinks alcohol weekly.  Non smoker.      H/o diverticulosis, ibs, constipation., umbilical hernia.  Manageable stress.    Colonoscopy 3/21- diverticulosis, hemorrhoids.    Rarely takes xanax for anxiety.          Review of Systems   Constitutional:  Negative for activity change and unexpected weight change.   HENT:  Negative for postnasal drip, rhinorrhea and sinus pressure/congestion.    Respiratory:  Negative for chest tightness and shortness of breath.    Cardiovascular:  Negative for chest pain and leg swelling.   Gastrointestinal:  Positive for constipation. Negative for abdominal pain, nausea and vomiting.   Genitourinary:  Negative for difficulty urinating, dysuria, hematuria and urgency.   Integumentary:  Negative for rash.   Neurological:  Negative for headaches.   Psychiatric/Behavioral:  Negative for dysphoric mood and sleep disturbance. The patient is not nervous/anxious.        Objective:      Physical Exam  Constitutional:       General: He is not in acute distress.     Appearance: He is well-developed. He is not ill-appearing, toxic-appearing or diaphoretic.   Eyes:      General: No scleral icterus.  Neck:      Thyroid: No thyromegaly.      Vascular: No JVD.   Cardiovascular:      Rate and Rhythm: Normal rate and regular rhythm.      Heart sounds: Normal heart sounds. No murmur heard.  Pulmonary:      Effort:  Pulmonary effort is normal. No respiratory distress.      Breath sounds: Normal breath sounds. No stridor. No wheezing, rhonchi or rales.   Abdominal:      General: There is no distension.      Palpations: Abdomen is soft. There is no mass.      Tenderness: There is no abdominal tenderness. There is no guarding or rebound.      Hernia: No hernia is present.   Musculoskeletal:      Cervical back: No rigidity or tenderness.      Right lower leg: No edema.      Left lower leg: No edema.   Lymphadenopathy:      Cervical: No cervical adenopathy.   Neurological:      Mental Status: He is alert and oriented to person, place, and time.   Psychiatric:         Mood and Affect: Mood normal.         Behavior: Behavior normal.         Thought Content: Thought content normal.       Assessment:       Problem List Items Addressed This Visit       Constipation - Primary     Other Visit Diagnoses       Abdominal pain, unspecified abdominal location        Relevant Orders    US Abdomen Complete    Bloating symptom        Relevant Orders    Comprehensive Metabolic Panel    CBC Without Differential    RUQ pain        Relevant Orders    US Abdomen Complete    BMI 29.0-29.9,adult                Plan:       Andre was seen today for flank pain and constipation.    Diagnoses and all orders for this visit:    Constipation, unspecified constipation type    Abdominal pain, unspecified abdominal location  -     US Abdomen Complete; Future    Bloating symptom  -     Comprehensive Metabolic Panel; Future  -     CBC Without Differential; Future    RUQ pain  -     US Abdomen Complete; Future    BMI 29.0-29.9,adult           Wt loss  Incr fiber  see pt instructions

## 2022-09-30 ENCOUNTER — HOSPITAL ENCOUNTER (OUTPATIENT)
Dept: RADIOLOGY | Facility: HOSPITAL | Age: 52
Discharge: HOME | End: 2022-09-30
Attending: INTERNAL MEDICINE
Payer: COMMERCIAL

## 2022-09-30 DIAGNOSIS — R10.9 ABDOMINAL PAIN, UNSPECIFIED ABDOMINAL LOCATION: ICD-10-CM

## 2022-09-30 DIAGNOSIS — R10.11 RUQ PAIN: ICD-10-CM

## 2022-09-30 PROCEDURE — 76700 US ABDOMEN COMPLETE: ICD-10-PCS | Mod: 26,,, | Performed by: RADIOLOGY

## 2022-09-30 PROCEDURE — 76700 US EXAM ABDOM COMPLETE: CPT | Mod: TC

## 2022-09-30 PROCEDURE — 76700 US EXAM ABDOM COMPLETE: CPT | Mod: 26,,, | Performed by: RADIOLOGY

## 2022-10-06 ENCOUNTER — PATIENT MESSAGE (OUTPATIENT)
Dept: RESEARCH | Facility: HOSPITAL | Age: 52
End: 2022-10-06
Payer: COMMERCIAL

## 2022-12-01 ENCOUNTER — TELEPHONE (OUTPATIENT)
Dept: INTERNAL MEDICINE | Facility: CLINIC | Age: 52
End: 2022-12-01
Payer: COMMERCIAL

## 2022-12-01 NOTE — TELEPHONE ENCOUNTER
----- Message from Ese Barnes sent at 12/1/2022 12:25 PM CST -----  Contact: [phone] Patient  No blue slot available to schedule an appointment for the patient.  Patient is established with which PCP: Dr Diop  Reason for the visit: stomach issues  Would the patient like a call back, or a response through their [facility] portal?:  call back

## 2022-12-12 ENCOUNTER — LAB VISIT (OUTPATIENT)
Dept: LAB | Facility: HOSPITAL | Age: 52
End: 2022-12-12
Payer: COMMERCIAL

## 2022-12-12 DIAGNOSIS — I25.84 CORONARY ARTERY DISEASE DUE TO CALCIFIED CORONARY LESION: ICD-10-CM

## 2022-12-12 DIAGNOSIS — I25.10 CORONARY ARTERY DISEASE DUE TO CALCIFIED CORONARY LESION: ICD-10-CM

## 2022-12-12 DIAGNOSIS — E78.00 PURE HYPERCHOLESTEROLEMIA: ICD-10-CM

## 2022-12-12 LAB
ALBUMIN SERPL BCP-MCNC: 4.2 G/DL (ref 3.5–5.2)
ALP SERPL-CCNC: 80 U/L (ref 55–135)
ALT SERPL W/O P-5'-P-CCNC: 33 U/L (ref 10–44)
ANION GAP SERPL CALC-SCNC: 10 MMOL/L (ref 8–16)
AST SERPL-CCNC: 23 U/L (ref 10–40)
BILIRUB SERPL-MCNC: 0.8 MG/DL (ref 0.1–1)
BUN SERPL-MCNC: 11 MG/DL (ref 6–20)
CALCIUM SERPL-MCNC: 9.9 MG/DL (ref 8.7–10.5)
CHLORIDE SERPL-SCNC: 103 MMOL/L (ref 95–110)
CHOLEST SERPL-MCNC: 184 MG/DL (ref 120–199)
CHOLEST/HDLC SERPL: 3.2 {RATIO} (ref 2–5)
CO2 SERPL-SCNC: 25 MMOL/L (ref 23–29)
CREAT SERPL-MCNC: 0.8 MG/DL (ref 0.5–1.4)
EST. GFR  (NO RACE VARIABLE): >60 ML/MIN/1.73 M^2
GLUCOSE SERPL-MCNC: 110 MG/DL (ref 70–110)
HDLC SERPL-MCNC: 58 MG/DL (ref 40–75)
HDLC SERPL: 31.5 % (ref 20–50)
LDLC SERPL CALC-MCNC: 97.8 MG/DL (ref 63–159)
NONHDLC SERPL-MCNC: 126 MG/DL
POTASSIUM SERPL-SCNC: 4.1 MMOL/L (ref 3.5–5.1)
PROT SERPL-MCNC: 7.6 G/DL (ref 6–8.4)
SODIUM SERPL-SCNC: 138 MMOL/L (ref 136–145)
TRIGL SERPL-MCNC: 141 MG/DL (ref 30–150)

## 2022-12-12 PROCEDURE — 80053 COMPREHEN METABOLIC PANEL: CPT | Performed by: INTERNAL MEDICINE

## 2022-12-12 PROCEDURE — 80061 LIPID PANEL: CPT | Performed by: INTERNAL MEDICINE

## 2022-12-12 PROCEDURE — 36415 COLL VENOUS BLD VENIPUNCTURE: CPT | Mod: PO | Performed by: INTERNAL MEDICINE

## 2022-12-13 ENCOUNTER — OFFICE VISIT (OUTPATIENT)
Dept: CARDIOLOGY | Facility: CLINIC | Age: 52
End: 2022-12-13
Payer: COMMERCIAL

## 2022-12-13 VITALS
BODY MASS INDEX: 30.29 KG/M2 | HEART RATE: 72 BPM | DIASTOLIC BLOOD PRESSURE: 86 MMHG | SYSTOLIC BLOOD PRESSURE: 136 MMHG | WEIGHT: 211.56 LBS | HEIGHT: 70 IN

## 2022-12-13 DIAGNOSIS — Z71.89 EDUCATED ABOUT COVID-19 VIRUS INFECTION: ICD-10-CM

## 2022-12-13 DIAGNOSIS — E78.00 PURE HYPERCHOLESTEROLEMIA: ICD-10-CM

## 2022-12-13 DIAGNOSIS — I25.84 CORONARY ARTERY DISEASE DUE TO CALCIFIED CORONARY LESION: Primary | ICD-10-CM

## 2022-12-13 DIAGNOSIS — I25.10 CORONARY ARTERY DISEASE DUE TO CALCIFIED CORONARY LESION: Primary | ICD-10-CM

## 2022-12-13 PROCEDURE — 1160F PR REVIEW ALL MEDS BY PRESCRIBER/CLIN PHARMACIST DOCUMENTED: ICD-10-PCS | Mod: CPTII,S$GLB,, | Performed by: INTERNAL MEDICINE

## 2022-12-13 PROCEDURE — 1160F RVW MEDS BY RX/DR IN RCRD: CPT | Mod: CPTII,S$GLB,, | Performed by: INTERNAL MEDICINE

## 2022-12-13 PROCEDURE — 93010 EKG 12-LEAD: ICD-10-PCS | Mod: S$GLB,,, | Performed by: INTERNAL MEDICINE

## 2022-12-13 PROCEDURE — 1159F PR MEDICATION LIST DOCUMENTED IN MEDICAL RECORD: ICD-10-PCS | Mod: CPTII,S$GLB,, | Performed by: INTERNAL MEDICINE

## 2022-12-13 PROCEDURE — 3075F SYST BP GE 130 - 139MM HG: CPT | Mod: CPTII,S$GLB,, | Performed by: INTERNAL MEDICINE

## 2022-12-13 PROCEDURE — 3079F PR MOST RECENT DIASTOLIC BLOOD PRESSURE 80-89 MM HG: ICD-10-PCS | Mod: CPTII,S$GLB,, | Performed by: INTERNAL MEDICINE

## 2022-12-13 PROCEDURE — 99999 PR PBB SHADOW E&M-EST. PATIENT-LVL III: ICD-10-PCS | Mod: PBBFAC,,, | Performed by: INTERNAL MEDICINE

## 2022-12-13 PROCEDURE — 3008F BODY MASS INDEX DOCD: CPT | Mod: CPTII,S$GLB,, | Performed by: INTERNAL MEDICINE

## 2022-12-13 PROCEDURE — 1159F MED LIST DOCD IN RCRD: CPT | Mod: CPTII,S$GLB,, | Performed by: INTERNAL MEDICINE

## 2022-12-13 PROCEDURE — 3008F PR BODY MASS INDEX (BMI) DOCUMENTED: ICD-10-PCS | Mod: CPTII,S$GLB,, | Performed by: INTERNAL MEDICINE

## 2022-12-13 PROCEDURE — 93005 EKG 12-LEAD: ICD-10-PCS | Mod: S$GLB,,, | Performed by: INTERNAL MEDICINE

## 2022-12-13 PROCEDURE — 93010 ELECTROCARDIOGRAM REPORT: CPT | Mod: S$GLB,,, | Performed by: INTERNAL MEDICINE

## 2022-12-13 PROCEDURE — 93005 ELECTROCARDIOGRAM TRACING: CPT | Mod: S$GLB,,, | Performed by: INTERNAL MEDICINE

## 2022-12-13 PROCEDURE — 3079F DIAST BP 80-89 MM HG: CPT | Mod: CPTII,S$GLB,, | Performed by: INTERNAL MEDICINE

## 2022-12-13 PROCEDURE — 99214 OFFICE O/P EST MOD 30 MIN: CPT | Mod: S$GLB,,, | Performed by: INTERNAL MEDICINE

## 2022-12-13 PROCEDURE — 99214 PR OFFICE/OUTPT VISIT, EST, LEVL IV, 30-39 MIN: ICD-10-PCS | Mod: S$GLB,,, | Performed by: INTERNAL MEDICINE

## 2022-12-13 PROCEDURE — 3075F PR MOST RECENT SYSTOLIC BLOOD PRESS GE 130-139MM HG: ICD-10-PCS | Mod: CPTII,S$GLB,, | Performed by: INTERNAL MEDICINE

## 2022-12-13 PROCEDURE — 99999 PR PBB SHADOW E&M-EST. PATIENT-LVL III: CPT | Mod: PBBFAC,,, | Performed by: INTERNAL MEDICINE

## 2022-12-13 RX ORDER — EZETIMIBE 10 MG/1
10 TABLET ORAL DAILY
Qty: 90 TABLET | Refills: 3 | Status: SHIPPED | OUTPATIENT
Start: 2022-12-13 | End: 2023-07-12

## 2022-12-13 RX ORDER — OMEGA-3-ACID ETHYL ESTERS 1 G/1
1 CAPSULE, LIQUID FILLED ORAL 2 TIMES DAILY
Qty: 180 CAPSULE | Refills: 3 | Status: SHIPPED | OUTPATIENT
Start: 2022-12-13 | End: 2024-01-16

## 2022-12-13 NOTE — PROGRESS NOTES
"  Subjective:     Problem List:  CACS 494 - in 2019  Hypercholesterolemia on a statin for >10 years  Palpitations    HPI:   Andre Padgett is a 52 y.o. male who presents for follow-up of coronary atherosclerosis  He does not report chest discomfort or shortness of breath with exertion.  On atorvastatin 40 mg, LDL is increased from the 70s to ~ 100 mg/dl.  Diet and exercise have worsened.  Started a juice from juice bar recently.  States he was going through divorce and exercising vigorously 1-2 years ago when his LDL was in the 70s.  He had COVID in 3/2020 and again in 2022.      Review of patient's allergies indicates:  No Known Allergies     Current Outpatient Medications   Medication Sig    ALPRAZolam (XANAX) 1 MG tablet Take 0.5 tablets (0.5 mg total) by mouth daily as needed for Anxiety.    ascorbic acid, vitamin C, (VITAMIN C) 100 MG tablet Take 100 mg by mouth once daily.    atorvastatin (LIPITOR) 40 MG tablet TAKE 1 TABLET(40 MG) BY MOUTH EVERY DAY    melatonin (MELATIN) 3 mg tablet Take 2 tablets (6 mg total) by mouth nightly as needed for Insomnia.     No current facility-administered medications for this visit.       Social history:  Andre aPdgett  reports that he has never smoked. He has never used smokeless tobacco. He reports current alcohol use. He reports that he does not use drugs.      Objective:     Physical Exam  Constitutional:       Appearance: He is well-developed.      Comments: /86   Pulse 72   Ht 5' 10" (1.778 m)   Wt 95.9 kg (211 lb 8.5 oz)   BMI 30.35 kg/m²      HENT:      Head: Normocephalic and atraumatic.   Neck:      Vascular: No carotid bruit or JVD.   Cardiovascular:      Rate and Rhythm: Normal rate and regular rhythm.      Pulses:           Radial pulses are 2+ on the right side and 2+ on the left side.        Posterior tibial pulses are 2+ on the right side and 2+ on the left side.      Heart sounds: S1 normal and S2 normal. No murmur heard.    No gallop. "   Pulmonary:      Effort: Pulmonary effort is normal.      Breath sounds: No wheezing or rales.   Chest:      Chest wall: There is no dullness to percussion.   Abdominal:      Palpations: Abdomen is soft. There is no hepatomegaly or splenomegaly.      Tenderness: There is no abdominal tenderness.   Musculoskeletal:      Right lower leg: No edema.      Left lower leg: No edema.   Skin:     General: Skin is warm and dry.      Findings: No bruising.      Nails: There is no clubbing.   Neurological:      Mental Status: He is alert and oriented to person, place, and time.      Gait: Gait normal.   Psychiatric:         Speech: Speech normal.         Behavior: Behavior normal.         Thought Content: Thought content normal.         Judgment: Judgment normal.           Lab Results   Component Value Date    CHOL 184 12/12/2022    HDL 58 12/12/2022    LDLCALC 97.8 12/12/2022    TRIG 141 12/12/2022    CHOLHDL 31.5 12/12/2022     Lab Results   Component Value Date     12/12/2022    CREATININE 0.8 12/12/2022    BUN 11 12/12/2022     12/12/2022    K 4.1 12/12/2022     12/12/2022    CO2 25 12/12/2022     Lab Results   Component Value Date    ALT 33 12/12/2022    AST 23 12/12/2022    ALKPHOS 80 12/12/2022    BILITOT 0.8 12/12/2022       No results found for this or any previous visit.       No valid procedures specified.        Assessment and Plan:       ICD-10-CM ICD-9-CM   1. Coronary artery disease due to calcified coronary lesion  I25.10 414.00    I25.84 414.4   2. Pure hypercholesterolemia  E78.00 272.0   3. Educated about COVID-19 virus infection  Z71.89 V65.49      LDL goal ~ 70.  Cholesterol education. Recommended a healthy diet that emphasizes the intake of vegetables, fruits, nuts, whole grains, vegetable or lean animal protein and fish and minimizes the intake of trans fats, saturated fats, red meat and processed red meats, refined carbohydrates and sweetened beverages.    Continue current dose of  atorvastatin.  Add ezetimibe 10 mg.  Also add fish oil.  Exercise program outlined       Orders placed during this encounter:     Coronary artery disease due to calcified coronary lesion  -     EKG 12-lead  -     Lipid Panel; Future; Expected date: 03/13/2023  -     Comprehensive Metabolic Panel; Future; Expected date: 03/13/2023  -     Lipid Panel; Future; Expected date: 12/14/2023  -     Comprehensive Metabolic Panel; Future; Expected date: 12/14/2023  -     omega-3 acid ethyl esters (LOVAZA) 1 gram capsule; Take 1 capsule (1 g total) by mouth 2 (two) times daily.  Dispense: 180 capsule; Refill: 3    Pure hypercholesterolemia  -     Lipid Panel; Future; Expected date: 12/14/2023  -     Comprehensive Metabolic Panel; Future; Expected date: 12/14/2023    Educated about COVID-19 virus infection    Other orders  -     ezetimibe (ZETIA) 10 mg tablet; Take 1 tablet (10 mg total) by mouth once daily.  Dispense: 90 tablet; Refill: 3     Counseling regarding diet, exercise program, benefits and side effects of medications prescribed by me > 50% of 30 minutes    Follow up in about 1 year (around 12/13/2023).

## 2022-12-13 NOTE — PATIENT INSTRUCTIONS
Component   12/12/2022 4/15/2021 12/2/2019 2/21/2018   Cholesterol      variable 184 141 161 191   Triglycerides      < 150 mg/dL 141 80 147 101   HDL (good)      > 50 mg/dL 58 54 56 56   LDL (bad)      ~ 70 mg/dL 97.8 71.0 75.6 114.8   HDL/Cholesterol  > 30% 31.5 38.3 34.8 29.3

## 2022-12-15 ENCOUNTER — OFFICE VISIT (OUTPATIENT)
Dept: INTERNAL MEDICINE | Facility: CLINIC | Age: 52
End: 2022-12-15
Payer: COMMERCIAL

## 2022-12-15 VITALS
BODY MASS INDEX: 30.27 KG/M2 | DIASTOLIC BLOOD PRESSURE: 88 MMHG | HEIGHT: 70 IN | OXYGEN SATURATION: 97 % | SYSTOLIC BLOOD PRESSURE: 124 MMHG | HEART RATE: 80 BPM | WEIGHT: 211.44 LBS

## 2022-12-15 DIAGNOSIS — R14.0 ABDOMINAL BLOATING: Primary | ICD-10-CM

## 2022-12-15 DIAGNOSIS — K59.00 CONSTIPATION, UNSPECIFIED CONSTIPATION TYPE: ICD-10-CM

## 2022-12-15 DIAGNOSIS — Z01.00 ROUTINE EYE EXAM: ICD-10-CM

## 2022-12-15 PROCEDURE — 3074F PR MOST RECENT SYSTOLIC BLOOD PRESSURE < 130 MM HG: ICD-10-PCS | Mod: CPTII,S$GLB,, | Performed by: INTERNAL MEDICINE

## 2022-12-15 PROCEDURE — 3008F PR BODY MASS INDEX (BMI) DOCUMENTED: ICD-10-PCS | Mod: CPTII,S$GLB,, | Performed by: INTERNAL MEDICINE

## 2022-12-15 PROCEDURE — 3074F SYST BP LT 130 MM HG: CPT | Mod: CPTII,S$GLB,, | Performed by: INTERNAL MEDICINE

## 2022-12-15 PROCEDURE — 3079F PR MOST RECENT DIASTOLIC BLOOD PRESSURE 80-89 MM HG: ICD-10-PCS | Mod: CPTII,S$GLB,, | Performed by: INTERNAL MEDICINE

## 2022-12-15 PROCEDURE — 1159F MED LIST DOCD IN RCRD: CPT | Mod: CPTII,S$GLB,, | Performed by: INTERNAL MEDICINE

## 2022-12-15 PROCEDURE — 1159F PR MEDICATION LIST DOCUMENTED IN MEDICAL RECORD: ICD-10-PCS | Mod: CPTII,S$GLB,, | Performed by: INTERNAL MEDICINE

## 2022-12-15 PROCEDURE — 99999 PR PBB SHADOW E&M-EST. PATIENT-LVL IV: ICD-10-PCS | Mod: PBBFAC,,, | Performed by: INTERNAL MEDICINE

## 2022-12-15 PROCEDURE — 99214 OFFICE O/P EST MOD 30 MIN: CPT | Mod: S$GLB,,, | Performed by: INTERNAL MEDICINE

## 2022-12-15 PROCEDURE — 3008F BODY MASS INDEX DOCD: CPT | Mod: CPTII,S$GLB,, | Performed by: INTERNAL MEDICINE

## 2022-12-15 PROCEDURE — 1160F RVW MEDS BY RX/DR IN RCRD: CPT | Mod: CPTII,S$GLB,, | Performed by: INTERNAL MEDICINE

## 2022-12-15 PROCEDURE — 3079F DIAST BP 80-89 MM HG: CPT | Mod: CPTII,S$GLB,, | Performed by: INTERNAL MEDICINE

## 2022-12-15 PROCEDURE — 99214 PR OFFICE/OUTPT VISIT, EST, LEVL IV, 30-39 MIN: ICD-10-PCS | Mod: S$GLB,,, | Performed by: INTERNAL MEDICINE

## 2022-12-15 PROCEDURE — 99999 PR PBB SHADOW E&M-EST. PATIENT-LVL IV: CPT | Mod: PBBFAC,,, | Performed by: INTERNAL MEDICINE

## 2022-12-15 PROCEDURE — 1160F PR REVIEW ALL MEDS BY PRESCRIBER/CLIN PHARMACIST DOCUMENTED: ICD-10-PCS | Mod: CPTII,S$GLB,, | Performed by: INTERNAL MEDICINE

## 2022-12-15 NOTE — PROGRESS NOTES
Subjective:       Patient ID: Andre Padgett is a 52 y.o. male.    Chief Complaint: Abdominal Cramping, Bloated, Constipation, and Eye Problem    HPI: Pt has had bloating, cramping in abdomen. Increased gas, mild straining, mild constipation.  He saw 1 of my partners who reviewed his relatively recent C scope and did an ultrasound.  Some mild fatty change to the liver otherwise unremarkable.  Tried increasing his fiber and becoming a little more active and has help some.  Less bloating and less constipation.  He is feeling better but still wanted to follow-up.      Left eye irritated for 2-3 weeks at the corners. Visual acuity ok. Some clear tearing. No purulence.  He has not tried anything for it and is due for an eye exam.        Review of Systems   Constitutional:  Negative for chills, fatigue and fever.   HENT:  Negative for nosebleeds and trouble swallowing.    Eyes:  Negative for pain and visual disturbance.        Eye irritation at the lateral and medial corners.  Some clear tearing   Respiratory:  Negative for cough, shortness of breath and wheezing.    Cardiovascular:  Negative for chest pain and palpitations.   Gastrointestinal:  Positive for abdominal distention (mild bloating somewhat improved) and constipation (mild somewhat improved). Negative for abdominal pain, diarrhea, nausea and vomiting.   Genitourinary:  Negative for difficulty urinating and hematuria.   Musculoskeletal:  Negative for arthralgias, back pain and neck pain.   Integumentary:  Negative for rash.   Neurological:  Negative for dizziness and headaches.   Hematological:  Does not bruise/bleed easily.   Psychiatric/Behavioral:  Negative for dysphoric mood and sleep disturbance. The patient is nervous/anxious (clinically stable).          Past Medical History:   Diagnosis Date    Aneurysm     Right sided filling anterior communicating aneurysm s/p coiling 2011    Anxiety     Cerebral aneurysm, nonruptured 12/27/2016    Colon adenoma:  3/18 repeat colonoscpy 2023 6/22/2018    Colon polyp     Diverticulosis of large intestine without hemorrhage: see colonoscopy 3/18; sigmoid and descending colon 6/22/2018    Fatty liver: see CT 3/18 6/22/2018    Umbilical hernia without obstruction and without gangrene: see CT 3/18 6/22/2018     Past Surgical History:   Procedure Laterality Date    CEREBRAL ANGIOGRAM  2011    angiogram/coiling    COLONOSCOPY N/A 03/26/2018    Procedure: COLONOSCOPY;  Surgeon: Sanjiv Duran MD;  Location: [facility] (4TH FLR);  Service: Endoscopy;  Laterality: N/A;    COLONOSCOPY N/A 03/09/2021    Procedure: COLONOSCOPY;  Surgeon: Aleshia Daly MD;  Location: [facility];  Service: Endoscopy;  Laterality: N/A;    ORIF FOREARM FRACTURE Bilateral 09/14/2020    Procedure: ORIF, FRACTURE, RADIUS OR ULNA, synthes, right, large C arm at a diagonal from the rear of the room, ancef, velcro wrist splint;  Surgeon: Hao Thorne MD;  Location: [facility] OR [address] [place] [address];  Service: Orthopedics;  Laterality: Bilateral;    WRIST SURGERY Bilateral 09/13/2020      Patient Active Problem List   Diagnosis    Anxiety    DJD (degenerative joint disease) of thoracic spine    DJD (degenerative joint disease) of cervical spine, mild    Palpitations    Pure hypercholesterolemia    Rib pain on right side    Colon adenoma: 3/18 repeat colonoscopy 2023    Umbilical hernia without obstruction and without gangrene: see CT 3/18    Fatty liver: see CT 3/18    Diverticulosis of large intestine without hemorrhage: see colonoscopy 3/18; sigmoid and descending colon    Hyperlipidemia    History of nontraumatic rupture of cerebral aneurysm    Chronic bilateral thoracic back pain    Poor posture    Chronic bilateral low back pain without sciatica    Decreased strength    Decreased spinal mobility    Bilateral low back pain with left-sided sciatica    Subarachnoid hemorrhage    Closed Colles' fracture of right radius with routine healing    Closed Colles' fracture of  left radius with routine healing    Acute prostatitis    Closed fracture of one rib of right side with routine healing    History of 2019 novel coronavirus disease (COVID-19)    Impaired range of motion of left knee    Lower abdominal pain    Irritable bowel syndrome with constipation    Chronic idiopathic constipation    Constipation    Quadriceps weakness    Pain aggravated by standing    Heel cord tightness, left    Left elbow pain        Objective:      Physical Exam  Constitutional:       General: He is not in acute distress.     Appearance: He is well-developed.   HENT:      Head: Normocephalic and atraumatic.      Right Ear: Tympanic membrane, ear canal and external ear normal.      Left Ear: Tympanic membrane, ear canal and external ear normal.      Mouth/Throat:      Pharynx: No oropharyngeal exudate or posterior oropharyngeal erythema.   Eyes:      General: No scleral icterus.     Conjunctiva/sclera: Conjunctivae normal.      Pupils: Pupils are equal, round, and reactive to light.      Comments: Redness more so at this skin near the lateral and medial corners of the left eye but increased tearing.  Conjunctiva unremarkable.  Lids and lashes unremarkable.   Neck:      Thyroid: No thyromegaly.      Comments: No supraclavicular nodes palpated  Cardiovascular:      Rate and Rhythm: Normal rate and regular rhythm.      Pulses: Normal pulses.      Heart sounds: Normal heart sounds. No murmur heard.  Pulmonary:      Effort: Pulmonary effort is normal.      Breath sounds: Normal breath sounds. No wheezing.   Abdominal:      General: Bowel sounds are normal. There is no distension.      Palpations: Abdomen is soft. There is no mass.      Tenderness: There is no abdominal tenderness.      Hernia: A hernia (small umbilical) is present.   Musculoskeletal:         General: No tenderness.      Cervical back: Normal range of motion and neck supple.      Right lower leg: No edema.      Left lower leg: No edema.  "  Lymphadenopathy:      Cervical: No cervical adenopathy.   Skin:     Coloration: Skin is not jaundiced or pale.   Neurological:      General: No focal deficit present.      Mental Status: He is alert and oriented to person, place, and time.   Psychiatric:         Mood and Affect: Mood normal.         Behavior: Behavior normal.       Assessment:       Problem List Items Addressed This Visit          GI    Constipation     Other Visit Diagnoses       Abdominal bloating    -  Primary    Routine eye exam        Relevant Orders    Ambulatory referral/consult to Optometry            Plan:         Andre was seen today for abdominal cramping, bloated, constipation and eye problem.    Diagnoses and all orders for this visit:    Abdominal bloating    Constipation, unspecified constipation type    Routine eye exam  -     Ambulatory referral/consult to Optometry; Future     No obvious exposure or foreign body.  No trauma.  Could try Visine for the minor irritation but will assist with an eye appointment        Continue to increase fiber.  Information sheet given  Follow-up in a few weeks if not improving.  Consider gastro consultation             Portions of this note may have been created with voice recognition software. Occasional "wrong-word" or "sound-a-like" substitutions may have occurred due to the inherent limitations of voice recognition software. Please, read the note carefully and recognize, using context, where substitutions have occurred.  "

## 2023-03-17 ENCOUNTER — PATIENT MESSAGE (OUTPATIENT)
Dept: RESEARCH | Facility: HOSPITAL | Age: 53
End: 2023-03-17
Payer: COMMERCIAL

## 2023-03-23 ENCOUNTER — LAB VISIT (OUTPATIENT)
Dept: LAB | Facility: HOSPITAL | Age: 53
End: 2023-03-23
Attending: INTERNAL MEDICINE
Payer: COMMERCIAL

## 2023-03-23 DIAGNOSIS — I25.10 CORONARY ARTERY DISEASE DUE TO CALCIFIED CORONARY LESION: ICD-10-CM

## 2023-03-23 DIAGNOSIS — I25.84 CORONARY ARTERY DISEASE DUE TO CALCIFIED CORONARY LESION: ICD-10-CM

## 2023-03-23 LAB
ALBUMIN SERPL BCP-MCNC: 4.2 G/DL (ref 3.5–5.2)
ALP SERPL-CCNC: 61 U/L (ref 55–135)
ALT SERPL W/O P-5'-P-CCNC: 35 U/L (ref 10–44)
ANION GAP SERPL CALC-SCNC: 8 MMOL/L (ref 8–16)
AST SERPL-CCNC: 27 U/L (ref 10–40)
BILIRUB SERPL-MCNC: 0.9 MG/DL (ref 0.1–1)
BUN SERPL-MCNC: 11 MG/DL (ref 6–20)
CALCIUM SERPL-MCNC: 9 MG/DL (ref 8.7–10.5)
CHLORIDE SERPL-SCNC: 104 MMOL/L (ref 95–110)
CHOLEST SERPL-MCNC: 133 MG/DL (ref 120–199)
CHOLEST/HDLC SERPL: 2.2 {RATIO} (ref 2–5)
CO2 SERPL-SCNC: 27 MMOL/L (ref 23–29)
CREAT SERPL-MCNC: 0.8 MG/DL (ref 0.5–1.4)
EST. GFR  (NO RACE VARIABLE): >60 ML/MIN/1.73 M^2
GLUCOSE SERPL-MCNC: 108 MG/DL (ref 70–110)
HDLC SERPL-MCNC: 61 MG/DL (ref 40–75)
HDLC SERPL: 45.9 % (ref 20–50)
LDLC SERPL CALC-MCNC: 53 MG/DL (ref 63–159)
NONHDLC SERPL-MCNC: 72 MG/DL
POTASSIUM SERPL-SCNC: 4.3 MMOL/L (ref 3.5–5.1)
PROT SERPL-MCNC: 7.3 G/DL (ref 6–8.4)
SODIUM SERPL-SCNC: 139 MMOL/L (ref 136–145)
TRIGL SERPL-MCNC: 95 MG/DL (ref 30–150)

## 2023-03-23 PROCEDURE — 36415 COLL VENOUS BLD VENIPUNCTURE: CPT | Mod: PO | Performed by: INTERNAL MEDICINE

## 2023-03-23 PROCEDURE — 80061 LIPID PANEL: CPT | Performed by: INTERNAL MEDICINE

## 2023-03-23 PROCEDURE — 80053 COMPREHEN METABOLIC PANEL: CPT | Performed by: INTERNAL MEDICINE

## 2023-04-18 ENCOUNTER — TELEPHONE (OUTPATIENT)
Dept: INTERNAL MEDICINE | Facility: CLINIC | Age: 53
End: 2023-04-18
Payer: COMMERCIAL

## 2023-04-18 NOTE — TELEPHONE ENCOUNTER
----- Message from Hui Alex sent at 4/18/2023  1:26 PM CDT -----  Regarding: Pt called to request a Same Day Work in Appt for stomach issues and pt would like a call back today  Same Day Appointment Access Request:    Pt called to request a Same Day Work in Appt for stomach issues and pt would like a call back today    Pt can be reached at [phone]

## 2023-04-28 ENCOUNTER — TELEPHONE (OUTPATIENT)
Dept: INTERNAL MEDICINE | Facility: CLINIC | Age: 53
End: 2023-04-28
Payer: COMMERCIAL

## 2023-04-28 DIAGNOSIS — N52.9 ERECTILE DYSFUNCTION, UNSPECIFIED ERECTILE DYSFUNCTION TYPE: Primary | ICD-10-CM

## 2023-04-28 RX ORDER — SILDENAFIL 50 MG/1
50 TABLET, FILM COATED ORAL DAILY PRN
Qty: 10 TABLET | Refills: 10 | Status: SHIPPED | OUTPATIENT
Start: 2023-04-28 | End: 2024-04-27

## 2023-04-28 NOTE — TELEPHONE ENCOUNTER
----- Message from Lani Jones sent at 4/28/2023  9:11 AM CDT -----  Pt would like to be called back regarding need Rx for taiwo    Pt can be reached at  [phone]

## 2023-04-28 NOTE — TELEPHONE ENCOUNTER
Pt is requesting a prescription for Viagra, he said that he had taken it a long time ago and is wanting to get some more.     If you can send it to [facility] on [address]

## 2023-05-01 NOTE — PROGRESS NOTES
INTERNAL MEDICINE CLINIC - SAME DAY APPOINTMENT  Progress Note    PRESENTING HISTORY     PCP: Hunter Diop MD    Chief Complaint/Reason for Visit:   No chief complaint on file.    History of Present Illness & ROS : Mr. Andre Padgett is a 52 y.o. male.    Same day appt.   Pleasant gentleman.   Reports that he has been having abdominal pain for the past month, and also to right groin and lower back.   Occassional constipation, no vomiting, occassional nausea. No fever or chills. No recent travels. Enjoys eating foods that contain seeds and nuts.   No diarrhea.   No changes in level of physical activity .   No visible blood to stools or urine..   Does endorse drinking alcohol, 'twice a week, 5-6 beers and shots at a time'.   No chronic use of NSAIDs, ASA or Aspirin containing products.   No chest pain or SOB.     Review of Systems:  Eyes: denies visual changes at this time denies floaters   ENT: no nasal congestion or sore throat  Respiratory: no cough or shorness of breath  Cardiovascular: no chest pain or palpitation  Genitourinary: no hematuria or dysuria; denies frequency  Hematologic/Lymphatic: no easy bruising or lymphadenopathy  Musculoskeletal: no arthralgias or myalgias  Neurological: no seizures or tremors  Endocrine: no heat or cold intolerance      PAST HISTORY:     Past Medical History:   Diagnosis Date    Aneurysm     Right sided filling anterior communicating aneurysm s/p coiling 2011    Anxiety     Cerebral aneurysm, nonruptured 12/27/2016    Colon adenoma: 3/18 repeat colonoscpy 2023 6/22/2018    Colon polyp     Diverticulosis of large intestine without hemorrhage: see colonoscopy 3/18; sigmoid and descending colon 6/22/2018    Fatty liver: see CT 3/18 6/22/2018    Umbilical hernia without obstruction and without gangrene: see CT 3/18 6/22/2018       Past Surgical History:   Procedure Laterality Date    CEREBRAL ANGIOGRAM  2011    angiogram/coiling    COLONOSCOPY N/A 03/26/2018    Procedure:  COLONOSCOPY;  Surgeon: Sanjiv Duran MD;  Location: [facility] (4TH FLR);  Service: Endoscopy;  Laterality: N/A;    COLONOSCOPY N/A 03/09/2021    Procedure: COLONOSCOPY;  Surgeon: Aleshia Daly MD;  Location: [facility];  Service: Endoscopy;  Laterality: N/A;    ORIF FOREARM FRACTURE Bilateral 09/14/2020    Procedure: ORIF, FRACTURE, RADIUS OR ULNA, synthes, right, large C arm at a diagonal from the rear of the room, ancef, velcro wrist splint;  Surgeon: Hao Thorne MD;  Location: [facility] OR [address] [place] [address];  Service: Orthopedics;  Laterality: Bilateral;    WRIST SURGERY Bilateral 09/13/2020       Family History   Problem Relation Age of Onset    DAVID disease Mother     Hypertension Mother     Diabetes Mother     Brain cancer Father     Thyroid disease Sister     No Known Problems Daughter     No Known Problems Sister     Cirrhosis Neg Hx     Colon polyps Neg Hx     Crohn's disease Neg Hx     Liver cancer Neg Hx     Stomach cancer Neg Hx     Ulcerative colitis Neg Hx     Heart attack Neg Hx     Colon cancer Neg Hx        Social History     Socioeconomic History    Marital status: Legally    Tobacco Use    Smoking status: Never    Smokeless tobacco: Never   Substance and Sexual Activity    Alcohol use: Yes     Comment: ocassionally - twice a week    Drug use: No     Social Determinants of Health     Financial Resource Strain: Unknown    Difficulty of Paying Living Expenses: Patient refused   Food Insecurity: Unknown    Worried About Running Out of Food in the Last Year: Patient refused    Ran Out of Food in the Last Year: Patient refused   Transportation Needs: Unknown    Lack of Transportation (Medical): Patient refused    Lack of Transportation (Non-Medical): Patient refused   Physical Activity: Insufficiently Active    Days of Exercise per Week: 1 day    Minutes of Exercise per Session: 30 min   Stress: No Stress Concern Present    Feeling of Stress : Not at all   Social Connections: Unknown    Active  Member of Clubs or Organizations: Patient refused    Marital Status: Patient refused   Housing Stability: Unknown    Unable to Pay for Housing in the Last Year: Patient refused    Unstable Housing in the Last Year: Patient refused       MEDICATIONS & ALLERGIES:     Current Outpatient Medications on File Prior to Visit   Medication Sig Dispense Refill    ALPRAZolam (XANAX) 1 MG tablet Take 0.5 tablets (0.5 mg total) by mouth daily as needed for Anxiety. 30 tablet 5    ascorbic acid, vitamin C, (VITAMIN C) 100 MG tablet Take 100 mg by mouth once daily.      atorvastatin (LIPITOR) 40 MG tablet TAKE 1 TABLET(40 MG) BY MOUTH EVERY DAY 90 tablet 0    ezetimibe (ZETIA) 10 mg tablet Take 1 tablet (10 mg total) by mouth once daily. 90 tablet 3    melatonin (MELATIN) 3 mg tablet Take 2 tablets (6 mg total) by mouth nightly as needed for Insomnia.  0    omega-3 acid ethyl esters (LOVAZA) 1 gram capsule Take 1 capsule (1 g total) by mouth 2 (two) times daily. 180 capsule 3    sildenafiL (VIAGRA) 50 MG tablet Take 1 tablet (50 mg total) by mouth daily as needed for Erectile Dysfunction. 10 tablet 10     No current facility-administered medications on file prior to visit.        Review of patient's allergies indicates:  No Known Allergies    Medications Reconciliation:   I have reconciled the patient's home medications with the patient/family. I have updated all changes.  Refer to After-Visit Medication List.    OBJECTIVE:     Vital Signs:  There were no vitals filed for this visit.  Wt Readings from Last 3 Encounters:   12/15/22 0951 95.9 kg (211 lb 6.7 oz)   12/13/22 0758 95.9 kg (211 lb 8.5 oz)   09/27/22 0912 93.7 kg (206 lb 9.1 oz)     There is no height or weight on file to calculate BMI.     Wt Readings from Last 3 Encounters:   05/04/23 95.3 kg (210 lb 1.6 oz)   12/15/22 95.9 kg (211 lb 6.7 oz)   12/13/22 95.9 kg (211 lb 8.5 oz)     Temp Readings from Last 3 Encounters:   08/16/22 98.4 °F (36.9 °C) (Oral)   06/15/22 99.4  °F (37.4 °C)   03/16/22 99.2 °F (37.3 °C)     BP Readings from Last 3 Encounters:   05/04/23 138/84   12/15/22 124/88   12/13/22 136/86     Pulse Readings from Last 3 Encounters:   05/04/23 85   12/15/22 80   12/13/22 72       Physical Exam:  (Focused Exam)  General: Well developed, well nourished. No distress.  HEENT: Head is normocephalic, atraumatic  Eyes: Clear conjunctiva.  Neck: Supple, symmetrical neck; trachea midline.  Lungs: Clear to auscultation bilaterally and normal respiratory effort.  Cardiovascular: Heart with regular rate and rhythm. No murmurs, gallops or rubs  Extremities: No LE edema. Pulses 2+ and symmetric.   Abdomen: Diffusely tender to palpation, no guarding or rebound, soft, distended with normal bowel sounds.  Skin: Skin color, texture, turgor normal. No rashes.  Musculoskeletal: Normal gait.   Neurologic: Normal strength and tone. No focal numbness or weakness.       Laboratory  Lab Results   Component Value Date    WBC 6.48 09/30/2022    HGB 16.2 09/30/2022    HCT 47.2 09/30/2022     09/30/2022    CHOL 133 03/23/2023    TRIG 95 03/23/2023    HDL 61 03/23/2023    ALT 35 03/23/2023    AST 27 03/23/2023     03/23/2023    K 4.3 03/23/2023     03/23/2023    CREATININE 0.8 03/23/2023    BUN 11 03/23/2023    CO2 27 03/23/2023    TSH 1.460 05/25/2020    INR 1.1 09/13/2020    HGBA1C 5.4 02/21/2019       ASSESSMENT & PLAN:     Same day appt.     Abdominal pain, unspecified abdominal location x 2 weeks, progressive    Diverticulosis of large intestine without hemorrhage: see colonoscopy 3/18; sigmoid and descending colon    Flank pain x 2 weeks    Umbilical hernia without obstruction and without gangrene: see CT 3/18  *C Scope in 3/2021: Diverticulosis   *Seen by Dr. Diop in 12/2022 with symptoms similar ; improving at the time.  ? Colitis  ? Stone   ? Ulcer or cratering  -     Comprehensive Metabolic Panel; Future; Expected date: 05/04/2023  -     CBC W/ AUTO DIFFERENTIAL;  Future; Expected date: 05/04/2023  -     Amylase; Future; Expected date: 05/04/2023  -     Lipase; Future; Expected date: 05/04/2023  -     Cancel: CT Abdomen With Without Contrast; Future; Expected date: 05/04/2023  -     Urinalysis; Future; Expected date: 05/04/2023  -     CULTURE, URINE; Future; Expected date: 05/04/2023  -     X-Ray Lumbar Spine 5 View; Future; Expected date: 05/04/2023  -     CT Abdomen Pelvis W Wo Contrast; Future; Expected date: 05/04/2023  -     H. pylori antigen, stool; Future; Expected date: 05/04/2023    *Await labs and Imaging to determine next step.     Future Appointments   Date Time Provider Department Center   5/4/2023 11:20 AM LAB, APPOINTMENT [facility] IM [facility]   5/4/2023 11:30 AM [facility] XRIM1 485 LB LIMIT [facility] XRAY IM [facility]   5/8/2023  7:30 PM [facility] OIC-CT1 500 LB LIMIT [facility]        Medication List            Accurate as of May 4, 2023 11:10 AM. If you have any questions, ask your nurse or doctor.                CONTINUE taking these medications      ALPRAZolam 1 MG tablet  Commonly known as: XANAX  TAKE 1/2 TABLET(0.5 MG) BY MOUTH DAILY AS NEEDED FOR ANXIETY     ascorbic acid (vitamin C) 100 MG tablet  Commonly known as: VITAMIN C     atorvastatin 40 MG tablet  Commonly known as: LIPITOR  TAKE 1 TABLET(40 MG) BY MOUTH EVERY DAY     ezetimibe 10 mg tablet  Commonly known as: ZETIA  Take 1 tablet (10 mg total) by mouth once daily.     melatonin 3 mg tablet  Commonly known as: MELATIN  Take 2 tablets (6 mg total) by mouth nightly as needed for Insomnia.     omega-3 acid ethyl esters 1 gram capsule  Commonly known as: LOVAZA  Take 1 capsule (1 g total) by mouth 2 (two) times daily.     sildenafiL 50 MG tablet  Commonly known as: VIAGRA  Take 1 tablet (50 mg total) by mouth daily as needed for Erectile Dysfunction.              Signing Physician:  GRETA Nails

## 2023-05-02 DIAGNOSIS — F41.9 ANXIETY: Primary | ICD-10-CM

## 2023-05-02 RX ORDER — ALPRAZOLAM 1 MG/1
TABLET ORAL
Qty: 30 TABLET | Refills: 5 | Status: SHIPPED | OUTPATIENT
Start: 2023-05-02 | End: 2023-07-12 | Stop reason: SDUPTHER

## 2023-05-02 NOTE — TELEPHONE ENCOUNTER
No care due was identified.  Health [facility] Embedded Care Due Messages. Reference number: [id number].   5/02/2023 9:54:30 AM CDT

## 2023-05-03 ENCOUNTER — TELEPHONE (OUTPATIENT)
Dept: INTERNAL MEDICINE | Facility: CLINIC | Age: 53
End: 2023-05-03
Payer: COMMERCIAL

## 2023-05-03 NOTE — TELEPHONE ENCOUNTER
Received fax from [employer] regarding PA for Sildenafil Citrate 50MG tablets      [facility]      Andre Padgett Key: [id number] - Rx #: [id number]    Outcome- An active PA is already on file with expiration date of 04/27/2024. Please wait to resubmit request within 60 days of that expiration date to obtain a PA renewal.  Drug  Sildenafil Citrate 50MG tablets  Form  [employer] Electronic PA Form (2017 NCPDP)  Original Claim Info  75 CALL HELP DESK

## 2023-05-04 ENCOUNTER — OFFICE VISIT (OUTPATIENT)
Dept: INTERNAL MEDICINE | Facility: CLINIC | Age: 53
End: 2023-05-04
Payer: COMMERCIAL

## 2023-05-04 ENCOUNTER — HOSPITAL ENCOUNTER (OUTPATIENT)
Dept: RADIOLOGY | Facility: HOSPITAL | Age: 53
Discharge: HOME | End: 2023-05-04
Attending: NURSE PRACTITIONER
Payer: COMMERCIAL

## 2023-05-04 VITALS
DIASTOLIC BLOOD PRESSURE: 84 MMHG | WEIGHT: 210.13 LBS | BODY MASS INDEX: 30.08 KG/M2 | HEIGHT: 70 IN | HEART RATE: 85 BPM | OXYGEN SATURATION: 95 % | SYSTOLIC BLOOD PRESSURE: 138 MMHG

## 2023-05-04 DIAGNOSIS — K57.30 DIVERTICULOSIS OF LARGE INTESTINE WITHOUT HEMORRHAGE: ICD-10-CM

## 2023-05-04 DIAGNOSIS — K42.9 UMBILICAL HERNIA WITHOUT OBSTRUCTION AND WITHOUT GANGRENE: ICD-10-CM

## 2023-05-04 DIAGNOSIS — R10.30 LOWER ABDOMINAL PAIN: ICD-10-CM

## 2023-05-04 DIAGNOSIS — R10.9 FLANK PAIN: ICD-10-CM

## 2023-05-04 DIAGNOSIS — R10.9 ABDOMINAL PAIN, UNSPECIFIED ABDOMINAL LOCATION: Primary | ICD-10-CM

## 2023-05-04 DIAGNOSIS — R10.9 ABDOMINAL PAIN, UNSPECIFIED ABDOMINAL LOCATION: ICD-10-CM

## 2023-05-04 PROCEDURE — 3008F BODY MASS INDEX DOCD: CPT | Mod: CPTII,S$GLB,, | Performed by: NURSE PRACTITIONER

## 2023-05-04 PROCEDURE — 1159F MED LIST DOCD IN RCRD: CPT | Mod: CPTII,S$GLB,, | Performed by: NURSE PRACTITIONER

## 2023-05-04 PROCEDURE — 3075F SYST BP GE 130 - 139MM HG: CPT | Mod: CPTII,S$GLB,, | Performed by: NURSE PRACTITIONER

## 2023-05-04 PROCEDURE — 3075F PR MOST RECENT SYSTOLIC BLOOD PRESS GE 130-139MM HG: ICD-10-PCS | Mod: CPTII,S$GLB,, | Performed by: NURSE PRACTITIONER

## 2023-05-04 PROCEDURE — 99214 OFFICE O/P EST MOD 30 MIN: CPT | Mod: S$GLB,,, | Performed by: NURSE PRACTITIONER

## 2023-05-04 PROCEDURE — 3079F PR MOST RECENT DIASTOLIC BLOOD PRESSURE 80-89 MM HG: ICD-10-PCS | Mod: CPTII,S$GLB,, | Performed by: NURSE PRACTITIONER

## 2023-05-04 PROCEDURE — 3008F PR BODY MASS INDEX (BMI) DOCUMENTED: ICD-10-PCS | Mod: CPTII,S$GLB,, | Performed by: NURSE PRACTITIONER

## 2023-05-04 PROCEDURE — 99999 PR PBB SHADOW E&M-EST. PATIENT-LVL V: ICD-10-PCS | Mod: PBBFAC,,, | Performed by: NURSE PRACTITIONER

## 2023-05-04 PROCEDURE — 72110 X-RAY EXAM L-2 SPINE 4/>VWS: CPT | Mod: 26,,, | Performed by: RADIOLOGY

## 2023-05-04 PROCEDURE — 3079F DIAST BP 80-89 MM HG: CPT | Mod: CPTII,S$GLB,, | Performed by: NURSE PRACTITIONER

## 2023-05-04 PROCEDURE — 1159F PR MEDICATION LIST DOCUMENTED IN MEDICAL RECORD: ICD-10-PCS | Mod: CPTII,S$GLB,, | Performed by: NURSE PRACTITIONER

## 2023-05-04 PROCEDURE — 99999 PR PBB SHADOW E&M-EST. PATIENT-LVL V: CPT | Mod: PBBFAC,,, | Performed by: NURSE PRACTITIONER

## 2023-05-04 PROCEDURE — 72110 X-RAY EXAM L-2 SPINE 4/>VWS: CPT | Mod: TC

## 2023-05-04 PROCEDURE — 72110 XR LUMBAR SPINE COMPLETE 5 VIEW: ICD-10-PCS | Mod: 26,,, | Performed by: RADIOLOGY

## 2023-05-04 PROCEDURE — 99214 PR OFFICE/OUTPT VISIT, EST, LEVL IV, 30-39 MIN: ICD-10-PCS | Mod: S$GLB,,, | Performed by: NURSE PRACTITIONER

## 2023-05-07 ENCOUNTER — TELEPHONE (OUTPATIENT)
Dept: INTERNAL MEDICINE | Facility: CLINIC | Age: 53
End: 2023-05-07
Payer: COMMERCIAL

## 2023-05-07 RX ORDER — NITROFURANTOIN 25; 75 MG/1; MG/1
100 CAPSULE ORAL 2 TIMES DAILY
Qty: 14 CAPSULE | Refills: 0 | Status: SHIPPED | OUTPATIENT
Start: 2023-05-07 | End: 2023-07-12

## 2023-05-08 ENCOUNTER — HOSPITAL ENCOUNTER (OUTPATIENT)
Dept: RADIOLOGY | Facility: HOSPITAL | Age: 53
Discharge: HOME | End: 2023-05-08
Attending: NURSE PRACTITIONER
Payer: COMMERCIAL

## 2023-05-08 DIAGNOSIS — R10.9 ABDOMINAL PAIN, UNSPECIFIED ABDOMINAL LOCATION: ICD-10-CM

## 2023-05-08 PROCEDURE — 25500020 PHARM REV CODE 255: Performed by: NURSE PRACTITIONER

## 2023-05-08 PROCEDURE — 74177 CT ABD & PELVIS W/CONTRAST: CPT | Mod: TC

## 2023-05-08 PROCEDURE — 74177 CT ABDOMEN PELVIS WITH CONTRAST: ICD-10-PCS | Mod: 26,,, | Performed by: RADIOLOGY

## 2023-05-08 PROCEDURE — 74177 CT ABD & PELVIS W/CONTRAST: CPT | Mod: 26,,, | Performed by: RADIOLOGY

## 2023-05-08 RX ADMIN — IOHEXOL 100 ML: 350 INJECTION, SOLUTION INTRAVENOUS at 07:05

## 2023-05-20 RX ORDER — ATORVASTATIN CALCIUM 40 MG/1
TABLET, FILM COATED ORAL
Qty: 90 TABLET | Refills: 1 | Status: SHIPPED | OUTPATIENT
Start: 2023-05-20

## 2023-05-20 NOTE — TELEPHONE ENCOUNTER
No care due was identified.  Health [facility] Embedded Care Due Messages. Reference number: [id number].   5/20/2023 10:10:17 AM CDT

## 2023-05-20 NOTE — TELEPHONE ENCOUNTER
Refill Decision Note   Andre Padgett  is requesting a refill authorization.  Brief Assessment and Rationale for Refill:  Approve     Medication Therapy Plan:       Medication Reconciliation Completed: No   Comments:     No Care Gaps recommended.     Note composed:5:48 PM 05/20/2023

## 2023-07-12 ENCOUNTER — LAB VISIT (OUTPATIENT)
Dept: LAB | Facility: HOSPITAL | Age: 53
End: 2023-07-12
Attending: INTERNAL MEDICINE
Payer: COMMERCIAL

## 2023-07-12 ENCOUNTER — OFFICE VISIT (OUTPATIENT)
Dept: INTERNAL MEDICINE | Facility: CLINIC | Age: 53
End: 2023-07-12
Payer: COMMERCIAL

## 2023-07-12 VITALS
WEIGHT: 199.31 LBS | HEART RATE: 99 BPM | OXYGEN SATURATION: 97 % | DIASTOLIC BLOOD PRESSURE: 70 MMHG | BODY MASS INDEX: 28.53 KG/M2 | HEIGHT: 70 IN | SYSTOLIC BLOOD PRESSURE: 114 MMHG

## 2023-07-12 DIAGNOSIS — K57.90 DIVERTICULOSIS: ICD-10-CM

## 2023-07-12 DIAGNOSIS — R10.84 DIFFUSE ABDOMINAL PAIN: ICD-10-CM

## 2023-07-12 DIAGNOSIS — R19.7 DIARRHEA, UNSPECIFIED TYPE: ICD-10-CM

## 2023-07-12 DIAGNOSIS — F41.9 ANXIETY: ICD-10-CM

## 2023-07-12 DIAGNOSIS — R10.84 DIFFUSE ABDOMINAL PAIN: Primary | ICD-10-CM

## 2023-07-12 PROCEDURE — 87086 URINE CULTURE/COLONY COUNT: CPT | Performed by: INTERNAL MEDICINE

## 2023-07-12 PROCEDURE — 3074F PR MOST RECENT SYSTOLIC BLOOD PRESSURE < 130 MM HG: ICD-10-PCS | Mod: CPTII,S$GLB,, | Performed by: INTERNAL MEDICINE

## 2023-07-12 PROCEDURE — 1160F RVW MEDS BY RX/DR IN RCRD: CPT | Mod: CPTII,S$GLB,, | Performed by: INTERNAL MEDICINE

## 2023-07-12 PROCEDURE — 99999 PR PBB SHADOW E&M-EST. PATIENT-LVL IV: ICD-10-PCS | Mod: PBBFAC,,, | Performed by: INTERNAL MEDICINE

## 2023-07-12 PROCEDURE — 1160F PR REVIEW ALL MEDS BY PRESCRIBER/CLIN PHARMACIST DOCUMENTED: ICD-10-PCS | Mod: CPTII,S$GLB,, | Performed by: INTERNAL MEDICINE

## 2023-07-12 PROCEDURE — 3008F PR BODY MASS INDEX (BMI) DOCUMENTED: ICD-10-PCS | Mod: CPTII,S$GLB,, | Performed by: INTERNAL MEDICINE

## 2023-07-12 PROCEDURE — 99213 PR OFFICE/OUTPT VISIT, EST, LEVL III, 20-29 MIN: ICD-10-PCS | Mod: S$GLB,,, | Performed by: INTERNAL MEDICINE

## 2023-07-12 PROCEDURE — 3008F BODY MASS INDEX DOCD: CPT | Mod: CPTII,S$GLB,, | Performed by: INTERNAL MEDICINE

## 2023-07-12 PROCEDURE — 1159F PR MEDICATION LIST DOCUMENTED IN MEDICAL RECORD: ICD-10-PCS | Mod: CPTII,S$GLB,, | Performed by: INTERNAL MEDICINE

## 2023-07-12 PROCEDURE — 99213 OFFICE O/P EST LOW 20 MIN: CPT | Mod: S$GLB,,, | Performed by: INTERNAL MEDICINE

## 2023-07-12 PROCEDURE — 3078F DIAST BP <80 MM HG: CPT | Mod: CPTII,S$GLB,, | Performed by: INTERNAL MEDICINE

## 2023-07-12 PROCEDURE — 1159F MED LIST DOCD IN RCRD: CPT | Mod: CPTII,S$GLB,, | Performed by: INTERNAL MEDICINE

## 2023-07-12 PROCEDURE — 99999 PR PBB SHADOW E&M-EST. PATIENT-LVL IV: CPT | Mod: PBBFAC,,, | Performed by: INTERNAL MEDICINE

## 2023-07-12 PROCEDURE — 3078F PR MOST RECENT DIASTOLIC BLOOD PRESSURE < 80 MM HG: ICD-10-PCS | Mod: CPTII,S$GLB,, | Performed by: INTERNAL MEDICINE

## 2023-07-12 PROCEDURE — 3074F SYST BP LT 130 MM HG: CPT | Mod: CPTII,S$GLB,, | Performed by: INTERNAL MEDICINE

## 2023-07-12 RX ORDER — FAMOTIDINE 40 MG/1
40 TABLET, FILM COATED ORAL DAILY
Qty: 30 TABLET | Refills: 1 | Status: SHIPPED | OUTPATIENT
Start: 2023-07-12 | End: 2023-11-27

## 2023-07-12 RX ORDER — ALPRAZOLAM 1 MG/1
TABLET ORAL
Qty: 30 TABLET | Refills: 1 | Status: SHIPPED | OUTPATIENT
Start: 2023-07-12

## 2023-07-12 NOTE — PROGRESS NOTES
Subjective:       Patient ID: Andre Padgett is a 52 y.o. male.   Chief Complaint: Bloated and Diarrhea    Complains of about 3 days of bloating and diarrhea.  Started with stomach burning on Monday, yesterday had some diarrhea, upset stomach, cramps. Food seem to make things worse.  Try to eat some dry cereal.  But then kind of went off the bland diet yesterday evening and today.  No blood in the urine or stool.  Some back pain Monday but that seemed to improve.  No fever or rash.  About 8 or 9 weeks ago was seen by 1 of my partners for a more significant event.  He had CT scan labs in urine and he had a positive urine culture.  No diverticulitis.  Responded to treatment.  He had a colonoscopy in the last 2 years.  He is had some abdominal symptoms off and on for years and says he would like to see a gastro specialist because he feels that he has abdominal problems on a more frequent basis then he would like or should.  In the past he was on omeprazole and Prilosec but has not taken anything like that recently.  Anxiety has been fairly stable.    He was exposed to someone with vomiting at work but he believes they ended up having strep throat.    Review of Systems   Constitutional:  Negative for fever.   Gastrointestinal:  Positive for abdominal pain and diarrhea. Negative for vomiting.   Integumentary:  Negative for rash.   Psychiatric/Behavioral:  The patient is nervous/anxious.         Objective:      Physical Exam  Constitutional:       General: He is not in acute distress.     Appearance: He is well-developed.   HENT:      Head: Normocephalic and atraumatic.      Right Ear: Tympanic membrane, ear canal and external ear normal.      Left Ear: Tympanic membrane, ear canal and external ear normal.      Mouth/Throat:      Pharynx: No oropharyngeal exudate or posterior oropharyngeal erythema.   Eyes:      General: No scleral icterus.     Conjunctiva/sclera: Conjunctivae normal.      Pupils: Pupils are equal,  round, and reactive to light.   Neck:      Thyroid: No thyromegaly.      Comments: No supraclavicular nodes palpated  Cardiovascular:      Rate and Rhythm: Normal rate and regular rhythm.      Pulses: Normal pulses.      Heart sounds: Normal heart sounds. No murmur heard.  Pulmonary:      Effort: Pulmonary effort is normal.      Breath sounds: Normal breath sounds. No wheezing.   Abdominal:      General: Bowel sounds are normal. There is no distension.      Palpations: Abdomen is soft. There is no mass.      Tenderness: There is abdominal tenderness (epigastric). There is no guarding or rebound.   Musculoskeletal:         General: No tenderness.      Cervical back: Normal range of motion and neck supple.      Right lower leg: No edema.      Left lower leg: No edema.   Lymphadenopathy:      Cervical: No cervical adenopathy.   Skin:     Coloration: Skin is not jaundiced or pale.   Neurological:      General: No focal deficit present.      Mental Status: He is alert and oriented to person, place, and time.   Psychiatric:         Mood and Affect: Mood normal.         Behavior: Behavior normal.       Assessment:       Problem List Items Addressed This Visit          Psychiatric    Anxiety    Relevant Medications    ALPRAZolam (XANAX) 1 MG tablet     Other Visit Diagnoses       Diffuse abdominal pain    -  Primary    Relevant Orders    CULTURE, URINE    Ambulatory referral/consult to Gastroenterology    Diarrhea, unspecified type        Relevant Orders    Ambulatory referral/consult to Gastroenterology    Diverticulosis        Relevant Orders    Ambulatory referral/consult to Gastroenterology            Plan:       Andre was seen today for bloated and diarrhea.    Diagnoses and all orders for this visit:    Diffuse abdominal pain  -     CULTURE, URINE; Future  -     Ambulatory referral/consult to Gastroenterology; Future    Diarrhea, unspecified type  -     Ambulatory referral/consult to Gastroenterology;  "Future    Diverticulosis  -     Ambulatory referral/consult to Gastroenterology; Future    Anxiety  -     ALPRAZolam (XANAX) 1 MG tablet; TAKE 1/2 TABLET(0.5 MG) BY MOUTH DAILY AS NEEDED FOR ANXIETY    Other orders  -     famotidine (PEPCID) 40 MG tablet; Take 1 tablet (40 mg total) by mouth once daily.           I suspect this may be a virus, food poisoning, other nonspecific gastrointestinal issue.  Patient had constipation on his CT scan 2 months ago.  But we will do a urine since he had a UTI under similar circumstances 2-1/2 months ago.  He also would like to see gastro for a more comprehensive evaluation and consultation.    [place] diet for a few days avoiding red sauce dairy carbonation spicy food.  Call for failure to improve      Portions of this note may have been created with voice recognition software. Occasional "wrong-word" or "sound-a-like" substitutions may have occurred due to the inherent limitations of voice recognition software. Please, read the note carefully and recognize, using context, where substitutions have occurred.  "

## 2023-07-14 LAB — BACTERIA UR CULT: NO GROWTH

## 2023-08-01 ENCOUNTER — TELEPHONE (OUTPATIENT)
Dept: ENDOSCOPY | Facility: HOSPITAL | Age: 53
End: 2023-08-01
Payer: COMMERCIAL

## 2023-08-01 ENCOUNTER — OFFICE VISIT (OUTPATIENT)
Dept: GASTROENTEROLOGY | Facility: CLINIC | Age: 53
End: 2023-08-01
Payer: COMMERCIAL

## 2023-08-01 ENCOUNTER — PATIENT MESSAGE (OUTPATIENT)
Dept: ENDOSCOPY | Facility: HOSPITAL | Age: 53
End: 2023-08-01
Payer: COMMERCIAL

## 2023-08-01 VITALS
HEART RATE: 93 BPM | BODY MASS INDEX: 28.5 KG/M2 | SYSTOLIC BLOOD PRESSURE: 153 MMHG | WEIGHT: 199.06 LBS | DIASTOLIC BLOOD PRESSURE: 88 MMHG | HEIGHT: 70 IN

## 2023-08-01 DIAGNOSIS — R10.84 DIFFUSE ABDOMINAL PAIN: ICD-10-CM

## 2023-08-01 DIAGNOSIS — K57.90 DIVERTICULOSIS: ICD-10-CM

## 2023-08-01 DIAGNOSIS — R19.7 DIARRHEA, UNSPECIFIED TYPE: ICD-10-CM

## 2023-08-01 DIAGNOSIS — R10.9 ABDOMINAL PAIN, UNSPECIFIED ABDOMINAL LOCATION: Primary | ICD-10-CM

## 2023-08-01 PROCEDURE — 99214 PR OFFICE/OUTPT VISIT, EST, LEVL IV, 30-39 MIN: ICD-10-PCS | Mod: S$GLB,,, | Performed by: STUDENT IN AN ORGANIZED HEALTH CARE EDUCATION/TRAINING PROGRAM

## 2023-08-01 PROCEDURE — 3008F PR BODY MASS INDEX (BMI) DOCUMENTED: ICD-10-PCS | Mod: CPTII,S$GLB,, | Performed by: STUDENT IN AN ORGANIZED HEALTH CARE EDUCATION/TRAINING PROGRAM

## 2023-08-01 PROCEDURE — 99999 PR PBB SHADOW E&M-EST. PATIENT-LVL IV: ICD-10-PCS | Mod: PBBFAC,,, | Performed by: STUDENT IN AN ORGANIZED HEALTH CARE EDUCATION/TRAINING PROGRAM

## 2023-08-01 PROCEDURE — 3077F PR MOST RECENT SYSTOLIC BLOOD PRESSURE >= 140 MM HG: ICD-10-PCS | Mod: CPTII,S$GLB,, | Performed by: STUDENT IN AN ORGANIZED HEALTH CARE EDUCATION/TRAINING PROGRAM

## 2023-08-01 PROCEDURE — 3079F PR MOST RECENT DIASTOLIC BLOOD PRESSURE 80-89 MM HG: ICD-10-PCS | Mod: CPTII,S$GLB,, | Performed by: STUDENT IN AN ORGANIZED HEALTH CARE EDUCATION/TRAINING PROGRAM

## 2023-08-01 PROCEDURE — 3079F DIAST BP 80-89 MM HG: CPT | Mod: CPTII,S$GLB,, | Performed by: STUDENT IN AN ORGANIZED HEALTH CARE EDUCATION/TRAINING PROGRAM

## 2023-08-01 PROCEDURE — 3008F BODY MASS INDEX DOCD: CPT | Mod: CPTII,S$GLB,, | Performed by: STUDENT IN AN ORGANIZED HEALTH CARE EDUCATION/TRAINING PROGRAM

## 2023-08-01 PROCEDURE — 1159F MED LIST DOCD IN RCRD: CPT | Mod: CPTII,S$GLB,, | Performed by: STUDENT IN AN ORGANIZED HEALTH CARE EDUCATION/TRAINING PROGRAM

## 2023-08-01 PROCEDURE — 3077F SYST BP >= 140 MM HG: CPT | Mod: CPTII,S$GLB,, | Performed by: STUDENT IN AN ORGANIZED HEALTH CARE EDUCATION/TRAINING PROGRAM

## 2023-08-01 PROCEDURE — 99999 PR PBB SHADOW E&M-EST. PATIENT-LVL IV: CPT | Mod: PBBFAC,,, | Performed by: STUDENT IN AN ORGANIZED HEALTH CARE EDUCATION/TRAINING PROGRAM

## 2023-08-01 PROCEDURE — 1159F PR MEDICATION LIST DOCUMENTED IN MEDICAL RECORD: ICD-10-PCS | Mod: CPTII,S$GLB,, | Performed by: STUDENT IN AN ORGANIZED HEALTH CARE EDUCATION/TRAINING PROGRAM

## 2023-08-01 PROCEDURE — 99214 OFFICE O/P EST MOD 30 MIN: CPT | Mod: S$GLB,,, | Performed by: STUDENT IN AN ORGANIZED HEALTH CARE EDUCATION/TRAINING PROGRAM

## 2023-08-01 RX ORDER — DICYCLOMINE HYDROCHLORIDE 10 MG/1
10 CAPSULE ORAL
Qty: 120 CAPSULE | Refills: 0 | Status: SHIPPED | OUTPATIENT
Start: 2023-08-01 | End: 2024-01-30 | Stop reason: SDUPTHER

## 2023-08-01 NOTE — PROGRESS NOTES
"    [facility] Consultation Note    Reason for Consult:  The primary encounter diagnosis was Abdominal pain, unspecified abdominal location. Diagnoses of Diffuse abdominal pain, Diarrhea, unspecified type, and Diverticulosis were also pertinent to this visit.    PCP:   Hunter Diop   [address] / [place] [address]    Referring MD:  Hunter Diop Md  [address] [place] [address]    HPI:  This is a 52 y.o. male here for evaluation of abdominal pain. PMHx of Anxiety, Cerebral Aneurysm. He has had a few months of worsening abdominal cramping and bloating. Also has daily nausea in the AM upon waking. Pain was severe and associated with diarrhea in 5/2023 and he had some testing done. Blood work in 5/2023 showed normal CBC, CMP, Lipase, Amylase and negative HP stool. CT from same time showed stool in colon and colonic diverticulosis. Now he is not having diarrhea. Has BM most days but sometimes there is straining. No blood in stool. Weight is stable. His most bothersome complaint with RUQ bloating and tenderness. Also has lower abdominal cramping which is burning in nature at time of BM. Has two hernias in periumbilical region and inguinal. He denied dysphagia. Cscope in 2021 showed left sided diverticulosis with some associated narrowing as well as hemorrhoids. Repeat in 5 years. Cscope in 2018 removed a few small polyps. EGD in 2015 was normal.  Denied NSAID use. No Fh of CRC, gastric cancer, celiac or IBD.      Objective Findings:    Vital Signs:  BP (!) 153/88   Pulse 93   Ht 5' 10" (1.778 m)   Wt 90.3 kg (199 lb 1.2 oz)   BMI 28.56 kg/m²   Body mass index is 28.56 kg/m².    Physical Exam:  General Appearance: Well appearing in no acute distress  Head:   Normocephalic, without obvious abnormality  Eyes:    No scleral icterus    Lungs: CTA bilaterally in anterior and posterior fields   Heart:  Regular rate and rhythm, no murmurs heard  Abdomen: Soft, non " tender, non distended with positive bowel sounds in all four quadrants.      Imaging:    CT 5/2023   Impression:     1. No CT evidence of acute intra-abdominal abnormality.  Specifically, no evidence of nephrolithiasis or colitis as clinically questioned.  2. Moderate amount of stool throughout colon.       Endoscopy:      Cscope 2021        The perianal and digital rectal examinations were normal.        Multiple diverticula were found in the sigmoid colon and descending        colon. There was narrowing of the colon in association with the        diverticular opening. There was evidence of diverticular spasm.        The rectum and sigmoid colon were significantly redundant.        Internal hemorrhoids were found during retroflexion. The hemorrhoids        were medium-sized and Grade I (internal hemorrhoids that do not        prolapse).     Assessment:    Abdominal pain and bloating   Constipation   Diverticular disease with associated narrowing   Nausea     Patient with history of diverticular disease with 2021 cscope showing luminal narrowing. Concern that this could be contributing to lower GI cramping and bloating if the narrowing has worsened. Plan for cscope to evaluate. EGD at same time for pain and bloating since this is localized to the RUQ. Bentyl PRN      Recommendations:    - EGD and cscope for evaluation of symptoms and to assess the degree of narrowing in the colon   - Bentyl PRN   - Start fiber and miralax daily, given handout   - Further plan based on results     Follow up in about 3 months (around 11/1/2023).      Order summary:  Orders Placed This Encounter    dicyclomine (BENTYL) 10 MG capsule         Thank you so much for allowing me to participate in the care of Andre Holloway MD  Gastroenterology   [facility]

## 2023-08-01 NOTE — PATIENT INSTRUCTIONS
Start daily fiber.  Take 1 tsp of fiber powder (psyllium or other sugar-free powder).  Mix in 8 oz of water.  Take x 3-5 days.  Then, increase fiber by 1 tsp every 3-5 days until stool is easy to pass.  Stop and continue at that dose.   Do not exceed 6 tsps/day. GOAL:  More well-formed stool (one continuous well-formed piece vs. Pellets) and minimize straining with initiation.    Start miralax

## 2023-08-01 NOTE — TELEPHONE ENCOUNTER
"   Message  Received: Today  MD Zully Mueller, RN  Caller: Unspecified (Today,  1:46 PM)  Procedure: EGD/Colonoscopy     Diagnosis: Abdominal pain, GERD, and Nausea/Vomiting, constipation, diverticular disease with narrowing     Procedure Timin-4 weeks     *If within 4 weeks selected, please tray as high priority*     *If greater than 12 weeks, please select "5-12 weeks" and delay sending until 2 months prior to requested date*     Provider: Myself     Location: [facility]     Additional Scheduling Information: No scheduling concerns     Prep Specifications:Extended/Constipation prep     Have you attached a patient to this message: yes    "

## 2023-08-03 NOTE — PLAN OF CARE
09/15/20 1036   Post-Acute Status   Post-Acute Authorization Placement   Post-Acute Placement Status Awaiting Internal Medical Clearance     Sw provide list for Pt per PT/OT notes as Pt needs assistance with 2 fractured hands. Sw informed Pt, he is in agreement, he will review SNF/NH list and inform Sw in the pm of the choices.    5-Fu Pregnancy And Lactation Text: This medication is Pregnancy Category X and contraindicated in pregnancy and in women who may become pregnant. It is unknown if this medication is excreted in breast milk.

## 2023-08-10 DIAGNOSIS — K59.00 CONSTIPATION, UNSPECIFIED CONSTIPATION TYPE: ICD-10-CM

## 2023-08-10 DIAGNOSIS — K21.9 GASTROESOPHAGEAL REFLUX DISEASE, UNSPECIFIED WHETHER ESOPHAGITIS PRESENT: ICD-10-CM

## 2023-08-10 DIAGNOSIS — R11.2 NAUSEA AND VOMITING, UNSPECIFIED VOMITING TYPE: ICD-10-CM

## 2023-08-10 DIAGNOSIS — R10.9 ABDOMINAL PAIN, UNSPECIFIED ABDOMINAL LOCATION: Primary | ICD-10-CM

## 2023-08-10 DIAGNOSIS — K57.90 DIVERTICULAR DISEASE: ICD-10-CM

## 2023-09-19 ENCOUNTER — ANESTHESIA EVENT (OUTPATIENT)
Dept: ENDOSCOPY | Facility: HOSPITAL | Age: 53
End: 2023-09-19

## 2023-09-19 NOTE — ANESTHESIA PREPROCEDURE EVALUATION
09/19/2023  Andre Padgett is a 53 y.o., male.      Pre-op Assessment    I have reviewed the Patient Summary Reports.    I have reviewed the NPO Status.   I have reviewed the Medications.     Review of Systems  Anesthesia Hx:  Denies Family Hx of Anesthesia complications.   Denies Personal Hx of Anesthesia complications.   Social:  Non-Smoker    Hematology/Oncology:  Hematology Normal   Oncology Normal     EENT/Dental:EENT/Dental Normal   Cardiovascular:   hyperlipidemia    Pulmonary:  Pulmonary Normal    Renal/:  Renal/ Normal     Hepatic/GI:   Liver Disease,    Musculoskeletal:   Arthritis     Neurological:   Neuromuscular Disease, DJD of cervical and thoracic spine, rib pain right side  Subarachnoid hemorrhage  Aneurysm s/p coiling 2011  Cerebral aneurysm 2016   Endocrine:  Endocrine Normal    Dermatological:  Skin Normal    Psych:   anxiety          Physical Exam  General: Well nourished, Cooperative, Alert and Oriented        Anesthesia Plan  Type of Anesthesia, risks & benefits discussed:    Anesthesia Type: Gen Natural Airway  Intra-op Monitoring Plan: Standard ASA Monitors  Induction:  IV  Informed Consent: Informed consent signed with the Patient and all parties understand the risks and agree with anesthesia plan.  All questions answered. Patient consented to blood products? No  ASA Score: 3  Day of Surgery Review of History & Physical: H&P Update referred to the surgeon/provider.    Ready For Surgery From Anesthesia Perspective.     .       Patient Active Problem List   Diagnosis    Anxiety    DJD (degenerative joint disease) of thoracic spine    DJD (degenerative joint disease) of cervical spine, mild    Palpitations    Pure hypercholesterolemia    Rib pain on right side    Colon adenoma: 3/18 repeat colonoscopy 2023    Umbilical hernia without obstruction and without gangrene: see CT  3/18    Fatty liver: see CT 3/18    Diverticulosis of large intestine without hemorrhage: see colonoscopy 3/18; sigmoid and descending colon    Hyperlipidemia    History of nontraumatic rupture of cerebral aneurysm    Chronic bilateral thoracic back pain    Poor posture    Chronic bilateral low back pain without sciatica    Decreased strength    Decreased spinal mobility    Bilateral low back pain with left-sided sciatica    Subarachnoid hemorrhage    Closed Colles' fracture of right radius with routine healing    Closed Colles' fracture of left radius with routine healing    Acute prostatitis    Closed fracture of one rib of right side with routine healing    History of 2019 novel coronavirus disease (COVID-19)    Impaired range of motion of left knee    Lower abdominal pain    Irritable bowel syndrome with constipation    Chronic idiopathic constipation    Constipation    Quadriceps weakness    Pain aggravated by standing    Heel cord tightness, left    Left elbow pain       Past Medical History:   Diagnosis Date    Aneurysm     Right sided filling anterior communicating aneurysm s/p coiling 2011    Anxiety     Cerebral aneurysm, nonruptured 12/27/2016    Colon adenoma: 3/18 repeat colonoscpy 2023 6/22/2018    Colon polyp     Diverticulosis of large intestine without hemorrhage: see colonoscopy 3/18; sigmoid and descending colon 6/22/2018    Fatty liver: see CT 3/18 6/22/2018    Umbilical hernia without obstruction and without gangrene: see CT 3/18 6/22/2018       ECHO: No results found for this or any previous visit.      There is no height or weight on file to calculate BMI.    Tobacco Use: Low Risk  (8/1/2023)    Patient History     Smoking Tobacco Use: Never     Smokeless Tobacco Use: Never     Passive Exposure: Not on file       Social History     Substance and Sexual Activity   Drug Use No        Alcohol Use: Unknown (12/13/2022)    AUDIT-C     Frequency of Alcohol  Consumption: Patient refused     Average Number of Drinks: Patient refused     Frequency of Binge Drinking: Patient refused       Review of patient's allergies indicates:  No Known Allergies      Airway:  No value filed.   Past Surgical History:   Procedure Laterality Date    CEREBRAL ANGIOGRAM  2011    angiogram/coiling    COLONOSCOPY N/A 03/26/2018    Procedure: COLONOSCOPY;  Surgeon: Sanjiv Duran MD;  [facility]  Service: Endoscopy;  Laterality: N/A;    COLONOSCOPY N/A 03/09/2021    Procedure: COLONOSCOPY;  Surgeon: Aleshia Daly MD;  Location: [facility];  Service: Endoscopy;  Laterality: N/A;    ORIF FOREARM FRACTURE Bilateral 09/14/2020    Procedure: ORIF, FRACTURE, RADIUS OR ULNA, synthes, right, large C arm at a diagonal from the rear of the room, ancef, velcro wrist splint;  Surgeon: Hao Thorne MD;  Location: [facility] OR [address] [place] [address];  Service: Orthopedics;  Laterality: Bilateral;    WRIST SURGERY Bilateral 09/13/2020

## 2023-09-20 ENCOUNTER — NURSE TRIAGE (OUTPATIENT)
Dept: ADMINISTRATIVE | Facility: CLINIC | Age: 53
End: 2023-09-20
Payer: COMMERCIAL

## 2023-09-20 NOTE — TELEPHONE ENCOUNTER
Pt reports he has a colonoscopy tomorrow morning, picked up his prep today, but was given 2 bottles instead of one. Pt also states he is unable to find any prep instructions in his MyChart and wasn't given anything on paper. Pt is scheduled for 11AM, but has to be there for 10AM. Pt advised from [facility] ([facility]) instructions in regards to drinking clear liquids only (which he states he did remember to do that starting yesterday). Call placed to [facility] On Call MD, Dr. Reed, for the times pt would need to start his 2nd dose if his appointment is for 11AM and why he was prescribed 2 bottles of prep. Dr. Reed stated pt might have been ordered to do an extended prep, but now it is too late for him to do 2 bottles, so he needs to prepare just one bottle and start his first half at 6pm and then the 2nd half of prep at 3am. Pt informed and encouraged to call back with any worsening symptoms or questions. Pt verbalized understanding.      Reason for Disposition   Bowel prep for colonoscopy, questions about   [1] Caller has URGENT medicine question about med that PCP or specialist prescribed AND [2] triager unable to answer question   Abdominal bloating, cramping, nausea, or vomiting while drinking bowel prep, questions about    Additional Information   Negative: [1] Intentional drug overdose AND [2] suicidal thoughts or ideas   Negative: MORE THAN A DOUBLE DOSE of a prescription or over-the-counter (OTC) drug   Negative: [1] DOUBLE DOSE (an extra dose or lesser amount) of prescription drug AND [2] any symptoms (e.g., dizziness, nausea, pain, sleepiness)   Negative: [1] DOUBLE DOSE (an extra dose or lesser amount) of over-the-counter (OTC) drug AND [2] any symptoms (e.g., dizziness, nausea, pain, sleepiness)   Negative: Took another person's prescription drug   Negative: [1] DOUBLE DOSE (an extra dose or lesser amount) of prescription drug AND [2] NO symptoms  (Exception: A double dose of antibiotics.)   Negative:  Diabetes drug error or overdose (e.g., took wrong type of insulin or took extra dose)   Negative: [1] Prescription not at pharmacy AND [2] was prescribed by PCP recently (Exception: Triager has access to EMR and prescription is recorded there. Go to Home Care and confirm for pharmacy.)   Negative: [1] Pharmacy calling with prescription question AND [2] triager unable to answer question    Protocols used: Colonoscopy Symptoms and [id number], Medication Question Call-A-AH

## 2023-09-20 NOTE — H&P
Short Stay Endoscopy History and Physical    PCP - Hunter Diop MD  Referring Physician - PRE-ADMIT, ENDO -[facility]  No address on file    Procedure - EGD  ASA - per anesthesia  Mallampati - per anesthesia  History of Anesthesia problems - no  Family history Anesthesia problems -  no   Plan of anesthesia - General    HPI  53 y.o. male  Reason for procedure:  Abdominal pain, unspecified abdominal location [R10.9]  Gastroesophageal reflux disease, unspecified whether esophagitis present [K21.9]  Nausea and vomiting, unspecified vomiting type [R11.2]  Constipation, unspecified constipation type [K59.00]  Diverticular disease [K57.90]      ROS:  Constitutional: No fevers, chills, No weight loss  CV: No chest pain  Pulm: No cough, No shortness of breath  GI: see HPI    Medical History:  has a past medical history of Aneurysm, Anxiety, Cerebral aneurysm, nonruptured (12/27/2016), Colon adenoma: 3/18 repeat colonoscpy 2023 (6/22/2018), Colon polyp, Diverticulosis of large intestine without hemorrhage: see colonoscopy 3/18; sigmoid and descending colon (6/22/2018), Fatty liver: see CT 3/18 (6/22/2018), and Umbilical hernia without obstruction and without gangrene: see CT 3/18 (6/22/2018).    Surgical History:  has a past surgical history that includes Colonoscopy (N/A, 03/26/2018); Cerebral angiogram (2011); ORIF forearm fracture (Bilateral, 09/14/2020); Colonoscopy (N/A, 03/09/2021); and Wrist surgery (Bilateral, 09/13/2020).    Family History: family history includes Brain cancer in his father; Diabetes in his mother; DAVID disease in his mother; Hypertension in his mother; No Known Problems in his daughter and sister; Thyroid disease in his sister..    Social History:  reports that he has never smoked. He has never used smokeless tobacco. He reports current alcohol use. He reports that he does not use drugs.    Review of patient's allergies indicates:  No Known Allergies    Medications:   Medications  Prior to Admission   Medication Sig Dispense Refill Last Dose    ALPRAZolam (XANAX) 1 MG tablet TAKE 1/2 TABLET(0.5 MG) BY MOUTH DAILY AS NEEDED FOR ANXIETY 30 tablet 1     ascorbic acid, vitamin C, (VITAMIN C) 100 MG tablet Take 100 mg by mouth once daily.       atorvastatin (LIPITOR) 40 MG tablet TAKE 1 TABLET(40 MG) BY MOUTH EVERY DAY 90 tablet 1     famotidine (PEPCID) 40 MG tablet Take 1 tablet (40 mg total) by mouth once daily. 30 tablet 1     melatonin (MELATIN) 3 mg tablet Take 2 tablets (6 mg total) by mouth nightly as needed for Insomnia.  0     omega-3 acid ethyl esters (LOVAZA) 1 gram capsule Take 1 capsule (1 g total) by mouth 2 (two) times daily. 180 capsule 3     sildenafiL (VIAGRA) 50 MG tablet Take 1 tablet (50 mg total) by mouth daily as needed for Erectile Dysfunction. 10 tablet 10        Physical Exam:    Vital Signs: There were no vitals filed for this visit.    General Appearance: Well appearing in no acute distress  Abdomen: Soft, non tender, non distended with normal bowel sounds, no masses      Labs:  Lab Results   Component Value Date    WBC 5.94 05/04/2023    HGB 15.3 05/04/2023    HCT 44.6 05/04/2023     05/04/2023    CHOL 133 03/23/2023    TRIG 95 03/23/2023    HDL 61 03/23/2023    ALT 26 05/04/2023    AST 18 05/04/2023     05/04/2023    K 4.2 05/04/2023     05/04/2023    CREATININE 0.8 05/04/2023    BUN 11 05/04/2023    CO2 28 05/04/2023    TSH 1.460 05/25/2020    INR 1.1 09/13/2020    HGBA1C 5.4 02/21/2019       I have explained the risks and benefits of this endoscopic procedure to the patient including but not limited to bleeding, inflammation, infection, perforation, and death.    Assessment/Plan:     Abdominal pain and bloating   Diverticular disease with colonic luminal narrowing in descending colon   Nausea      - Proceed with EGD and colonoscopy     Lindy Holloway MD  Gastroenterology   [facility]

## 2023-09-21 ENCOUNTER — HOSPITAL ENCOUNTER (OUTPATIENT)
Facility: HOSPITAL | Age: 53
Discharge: HOME | End: 2023-09-21
Attending: STUDENT IN AN ORGANIZED HEALTH CARE EDUCATION/TRAINING PROGRAM | Admitting: STUDENT IN AN ORGANIZED HEALTH CARE EDUCATION/TRAINING PROGRAM
Payer: COMMERCIAL

## 2023-09-21 ENCOUNTER — ANESTHESIA (OUTPATIENT)
Dept: ENDOSCOPY | Facility: HOSPITAL | Age: 53
End: 2023-09-21
Payer: COMMERCIAL

## 2023-09-21 VITALS
BODY MASS INDEX: 28.49 KG/M2 | RESPIRATION RATE: 18 BRPM | HEIGHT: 70 IN | TEMPERATURE: 98 F | SYSTOLIC BLOOD PRESSURE: 134 MMHG | HEART RATE: 73 BPM | OXYGEN SATURATION: 97 % | WEIGHT: 199 LBS | DIASTOLIC BLOOD PRESSURE: 86 MMHG

## 2023-09-21 DIAGNOSIS — Z12.11 COLON CANCER SCREENING: Primary | ICD-10-CM

## 2023-09-21 DIAGNOSIS — K26.9 MULTIPLE DUODENAL ULCERS: Primary | ICD-10-CM

## 2023-09-21 PROCEDURE — D9220A PRA ANESTHESIA: ICD-10-PCS | Mod: ,,, | Performed by: NURSE ANESTHETIST, CERTIFIED REGISTERED

## 2023-09-21 PROCEDURE — 43239 EGD BIOPSY SINGLE/MULTIPLE: CPT | Performed by: STUDENT IN AN ORGANIZED HEALTH CARE EDUCATION/TRAINING PROGRAM

## 2023-09-21 PROCEDURE — 99900035 HC TECH TIME PER 15 MIN (STAT)

## 2023-09-21 PROCEDURE — 45378 DIAGNOSTIC COLONOSCOPY: CPT | Performed by: STUDENT IN AN ORGANIZED HEALTH CARE EDUCATION/TRAINING PROGRAM

## 2023-09-21 PROCEDURE — 27201012 HC FORCEPS, HOT/COLD, DISP: Performed by: STUDENT IN AN ORGANIZED HEALTH CARE EDUCATION/TRAINING PROGRAM

## 2023-09-21 PROCEDURE — 63600175 PHARM REV CODE 636 W HCPCS: Performed by: NURSE ANESTHETIST, CERTIFIED REGISTERED

## 2023-09-21 PROCEDURE — D9220A PRA ANESTHESIA: Mod: ,,, | Performed by: NURSE ANESTHETIST, CERTIFIED REGISTERED

## 2023-09-21 PROCEDURE — 45378 PR COLONOSCOPY,DIAGNOSTIC: ICD-10-PCS | Mod: 22,,, | Performed by: STUDENT IN AN ORGANIZED HEALTH CARE EDUCATION/TRAINING PROGRAM

## 2023-09-21 PROCEDURE — 88342 CHG IMMUNOCYTOCHEMISTRY: ICD-10-PCS | Mod: 26,,, | Performed by: STUDENT IN AN ORGANIZED HEALTH CARE EDUCATION/TRAINING PROGRAM

## 2023-09-21 PROCEDURE — 45378 DIAGNOSTIC COLONOSCOPY: CPT | Mod: 22,,, | Performed by: STUDENT IN AN ORGANIZED HEALTH CARE EDUCATION/TRAINING PROGRAM

## 2023-09-21 PROCEDURE — 43239 PR EGD, FLEX, W/BIOPSY, SGL/MULTI: ICD-10-PCS | Mod: 51,,, | Performed by: STUDENT IN AN ORGANIZED HEALTH CARE EDUCATION/TRAINING PROGRAM

## 2023-09-21 PROCEDURE — 88305 TISSUE EXAM BY PATHOLOGIST: ICD-10-PCS | Mod: 26,,, | Performed by: STUDENT IN AN ORGANIZED HEALTH CARE EDUCATION/TRAINING PROGRAM

## 2023-09-21 PROCEDURE — 37000009 HC ANESTHESIA EA ADD 15 MINS: Performed by: STUDENT IN AN ORGANIZED HEALTH CARE EDUCATION/TRAINING PROGRAM

## 2023-09-21 PROCEDURE — 37000008 HC ANESTHESIA 1ST 15 MINUTES: Performed by: STUDENT IN AN ORGANIZED HEALTH CARE EDUCATION/TRAINING PROGRAM

## 2023-09-21 PROCEDURE — 43239 EGD BIOPSY SINGLE/MULTIPLE: CPT | Mod: 51,,, | Performed by: STUDENT IN AN ORGANIZED HEALTH CARE EDUCATION/TRAINING PROGRAM

## 2023-09-21 PROCEDURE — 88342 IMHCHEM/IMCYTCHM 1ST ANTB: CPT | Mod: 26,,, | Performed by: STUDENT IN AN ORGANIZED HEALTH CARE EDUCATION/TRAINING PROGRAM

## 2023-09-21 PROCEDURE — 88305 TISSUE EXAM BY PATHOLOGIST: CPT | Mod: 26,,, | Performed by: STUDENT IN AN ORGANIZED HEALTH CARE EDUCATION/TRAINING PROGRAM

## 2023-09-21 PROCEDURE — 88342 IMHCHEM/IMCYTCHM 1ST ANTB: CPT | Performed by: STUDENT IN AN ORGANIZED HEALTH CARE EDUCATION/TRAINING PROGRAM

## 2023-09-21 PROCEDURE — 88305 TISSUE EXAM BY PATHOLOGIST: CPT | Mod: 59 | Performed by: STUDENT IN AN ORGANIZED HEALTH CARE EDUCATION/TRAINING PROGRAM

## 2023-09-21 PROCEDURE — 25000003 PHARM REV CODE 250: Performed by: NURSE ANESTHETIST, CERTIFIED REGISTERED

## 2023-09-21 RX ORDER — LIDOCAINE HYDROCHLORIDE 10 MG/ML
INJECTION, SOLUTION INTRAVENOUS
Status: DISCONTINUED | OUTPATIENT
Start: 2023-09-21 | End: 2023-09-21

## 2023-09-21 RX ORDER — PROPOFOL 10 MG/ML
VIAL (ML) INTRAVENOUS CONTINUOUS PRN
Status: DISCONTINUED | OUTPATIENT
Start: 2023-09-21 | End: 2023-09-21

## 2023-09-21 RX ORDER — OMEPRAZOLE 40 MG/1
40 CAPSULE, DELAYED RELEASE ORAL DAILY
Qty: 30 CAPSULE | Refills: 11 | Status: SHIPPED | OUTPATIENT
Start: 2023-09-21 | End: 2023-11-27 | Stop reason: SDUPTHER

## 2023-09-21 RX ORDER — ASPIRIN 81 MG/1
81 TABLET ORAL DAILY
COMMUNITY

## 2023-09-21 RX ORDER — PROPOFOL 10 MG/ML
VIAL (ML) INTRAVENOUS
Status: DISCONTINUED | OUTPATIENT
Start: 2023-09-21 | End: 2023-09-21

## 2023-09-21 RX ORDER — DEXMEDETOMIDINE HYDROCHLORIDE 100 UG/ML
INJECTION, SOLUTION INTRAVENOUS
Status: DISCONTINUED | OUTPATIENT
Start: 2023-09-21 | End: 2023-09-21

## 2023-09-21 RX ORDER — SODIUM CHLORIDE 9 MG/ML
INJECTION, SOLUTION INTRAVENOUS CONTINUOUS
Status: DISCONTINUED | OUTPATIENT
Start: 2023-09-21 | End: 2023-09-21 | Stop reason: HOSPADM

## 2023-09-21 RX ADMIN — Medication 150 MCG/KG/MIN: at 10:09

## 2023-09-21 RX ADMIN — PROPOFOL 50 MG: 10 INJECTION, EMULSION INTRAVENOUS at 10:09

## 2023-09-21 RX ADMIN — SODIUM CHLORIDE: 0.9 INJECTION, SOLUTION INTRAVENOUS at 10:09

## 2023-09-21 RX ADMIN — PROPOFOL 30 MG: 10 INJECTION, EMULSION INTRAVENOUS at 10:09

## 2023-09-21 RX ADMIN — GLYCOPYRROLATE 0.2 MG: 0.2 INJECTION, SOLUTION INTRAMUSCULAR; INTRAVENOUS at 10:09

## 2023-09-21 RX ADMIN — PROPOFOL 40 MG: 10 INJECTION, EMULSION INTRAVENOUS at 11:09

## 2023-09-21 RX ADMIN — LIDOCAINE HYDROCHLORIDE 50 MG: 10 INJECTION, SOLUTION INTRAVENOUS at 10:09

## 2023-09-21 RX ADMIN — DEXMEDETOMIDINE HYDROCHLORIDE 8 MCG: 100 INJECTION, SOLUTION INTRAVENOUS at 10:09

## 2023-09-21 RX ADMIN — BENZOCAINE 1 EACH: 200 SPRAY DENTAL; ORAL; PERIODONTAL at 10:09

## 2023-09-21 NOTE — TRANSFER OF CARE
"Anesthesia Transfer of Care Note    Patient: Andre Padgett    Procedure(s) Performed: Procedure(s) (LRB):  EGD (ESOPHAGOGASTRODUODENOSCOPY) (N/A)  COLONOSCOPY (N/A)    Patient location: PACU    Anesthesia Type: general    Transport from OR: Transported from OR on room air with adequate spontaneous ventilation    Post pain: adequate analgesia    Post assessment: no apparent anesthetic complications    Post vital signs: stable    Level of consciousness: awake    Nausea/Vomiting: no nausea/vomiting    Complications: none    Transfer of care protocol was followed      Last vitals:   Visit Vitals  BP (!) 143/85 (BP Location: Left arm, Patient Position: Lying)   Pulse 95   Temp 36.7 °C (98.1 °F) (Temporal)   Resp 18   Ht 5' 10" (1.778 m)   Wt 90.3 kg (199 lb)   SpO2 97%   BMI 28.55 kg/m²     "

## 2023-09-21 NOTE — PROVATION PATIENT INSTRUCTIONS
Discharge Summary/Instructions after an Endoscopic Procedure  Patient Name: Andre Padgett  Patient MRN: [id number]  Patient YOB: 1970 Thursday, September 21, 2023  Lindy Holloway MD  Dear patient,  As a result of recent federal legislation (The Federal Cures Act), you may   receive lab or pathology results from your procedure in your [facility]   account before your physician is able to contact you. Your physician or   their representative will relay the results to you with their   recommendations at their soonest availability.  Thank you,  RESTRICTIONS:  During your procedure today, you received medications for sedation.  These   medications may affect your judgment, balance and coordination.  Therefore,   for 24 hours, you have the following restrictions:   - DO NOT drive a car, operate machinery, make legal/financial decisions,   sign important papers or drink alcohol.    ACTIVITY:  Today: no heavy lifting, straining or running due to procedural   sedation/anesthesia.  The following day: return to full activity including work.  DIET:  Eat and drink normally unless instructed otherwise.     TREATMENT FOR COMMON SIDE EFFECTS:  - Mild abdominal pain, nausea, belching, bloating or excessive gas:  rest,   eat lightly and use a heating pad.  - Sore Throat: treat with throat lozenges and/or gargle with warm salt   water.  - Because air was used during the procedure, expelling large amounts of air   from your rectum or belching is normal.  - If a bowel prep was taken, you may not have a bowel movement for 1-3 days.    This is normal.  SYMPTOMS TO WATCH FOR AND REPORT TO YOUR PHYSICIAN:  1. Abdominal pain or bloating, other than gas cramps.  2. Chest pain.  3. Back pain.  4. Signs of infection such as: chills or fever occurring within 24 hours   after the procedure.  5. Rectal bleeding, which would show as bright red, maroon, or black stools.   (A tablespoon of blood from the rectum is not serious, especially  if   hemorrhoids are present.)  6. Vomiting.  7. Weakness or dizziness.  GO DIRECTLY TO THE NEAREST EMERGENCY ROOM IF YOU HAVE ANY OF THE FOLLOWING:      Difficulty breathing              Chills and/or fever over 101 F   Persistent vomiting and/or vomiting blood   Severe abdominal pain   Severe chest pain   Black, tarry stools   Bleeding- more than one tablespoon   Any other symptom or condition that you feel may need urgent attention  Your doctor recommends these additional instructions:  If any biopsies were taken, your doctors clinic will contact you in 1 to 2   weeks with any results.  - Discharge patient to home (ambulatory).   - Resume regular diet.   - Continue present medications.   - Await pathology results.   - Check gastrin today.   - Use Prilosec (omeprazole) 40 mg PO daily.  For questions, problems or results please call your physician - Lindy Holloway MD at Work:  [phone].  [facility], EMERGENCY ROOM PHONE NUMBER: [phone]  IF A COMPLICATION OR EMERGENCY SITUATION ARISES AND YOU ARE UNABLE TO REACH   YOUR PHYSICIAN - GO DIRECTLY TO THE EMERGENCY ROOM.  Lindy Holloway MD  9/21/2023 11:18:53 AM  This report has been verified and signed electronically.  Dear patient,  As a result of recent federal legislation (The Federal Cures Act), you may   receive lab or pathology results from your procedure in your [facility]   account before your physician is able to contact you. Your physician or   their representative will relay the results to you with their   recommendations at their soonest availability.  Thank you,  PROVATION

## 2023-09-21 NOTE — PLAN OF CARE
Pt arrived to recovery dosc via stretcher per ENDO team. Bedside report received. Pt attached to bedside monitor. VSS. Pt sedated post procedure. Pt on room air; oxygen sats 96%. Pt IV access saline locked. Warm blanket applied. Safety maintained.     Subjective:    CC: Jeff Collier Catherine Conner MD

## 2023-09-21 NOTE — PROVATION PATIENT INSTRUCTIONS
Discharge Summary/Instructions after an Endoscopic Procedure  Patient Name: Andre Padgett  Patient MRN: [id number]  Patient YOB: 1970 Thursday, September 21, 2023  Lindy Holloway MD  Dear patient,  As a result of recent federal legislation (The Federal Cures Act), you may   receive lab or pathology results from your procedure in your [facility]   account before your physician is able to contact you. Your physician or   their representative will relay the results to you with their   recommendations at their soonest availability.  Thank you,  RESTRICTIONS:  During your procedure today, you received medications for sedation.  These   medications may affect your judgment, balance and coordination.  Therefore,   for 24 hours, you have the following restrictions:   - DO NOT drive a car, operate machinery, make legal/financial decisions,   sign important papers or drink alcohol.    ACTIVITY:  Today: no heavy lifting, straining or running due to procedural   sedation/anesthesia.  The following day: return to full activity including work.  DIET:  Eat and drink normally unless instructed otherwise.     TREATMENT FOR COMMON SIDE EFFECTS:  - Mild abdominal pain, nausea, belching, bloating or excessive gas:  rest,   eat lightly and use a heating pad.  - Sore Throat: treat with throat lozenges and/or gargle with warm salt   water.  - Because air was used during the procedure, expelling large amounts of air   from your rectum or belching is normal.  - If a bowel prep was taken, you may not have a bowel movement for 1-3 days.    This is normal.  SYMPTOMS TO WATCH FOR AND REPORT TO YOUR PHYSICIAN:  1. Abdominal pain or bloating, other than gas cramps.  2. Chest pain.  3. Back pain.  4. Signs of infection such as: chills or fever occurring within 24 hours   after the procedure.  5. Rectal bleeding, which would show as bright red, maroon, or black stools.   (A tablespoon of blood from the rectum is not serious, especially  if   hemorrhoids are present.)  6. Vomiting.  7. Weakness or dizziness.  GO DIRECTLY TO THE NEAREST EMERGENCY ROOM IF YOU HAVE ANY OF THE FOLLOWING:      Difficulty breathing              Chills and/or fever over 101 F   Persistent vomiting and/or vomiting blood   Severe abdominal pain   Severe chest pain   Black, tarry stools   Bleeding- more than one tablespoon   Any other symptom or condition that you feel may need urgent attention  Your doctor recommends these additional instructions:  If any biopsies were taken, your doctors clinic will contact you in 1 to 2   weeks with any results.  - Discharge patient to home (ambulatory).   - Resume regular diet.   - Continue present medications.   - Repeat colonoscopy in 10 years for screening purposes.  For questions, problems or results please call your physician - Lindy Holloway MD at Work:  [phone].  [facility], EMERGENCY ROOM PHONE NUMBER: [phone]  IF A COMPLICATION OR EMERGENCY SITUATION ARISES AND YOU ARE UNABLE TO REACH   YOUR PHYSICIAN - GO DIRECTLY TO THE EMERGENCY ROOM.  Lindy Holloway MD  9/21/2023 11:21:51 AM  This report has been verified and signed electronically.  Dear patient,  As a result of recent federal legislation (The Federal Cures Act), you may   receive lab or pathology results from your procedure in your [facility]   account before your physician is able to contact you. Your physician or   their representative will relay the results to you with their   recommendations at their soonest availability.  Thank you,  PROVATION

## 2023-09-21 NOTE — ANESTHESIA POSTPROCEDURE EVALUATION
Anesthesia Post Evaluation    Patient: Andre Padgett    Procedure(s) Performed: Procedure(s) (LRB):  EGD (ESOPHAGOGASTRODUODENOSCOPY) (N/A)  COLONOSCOPY (N/A)    Final Anesthesia Type: general      Patient location during evaluation: GI PACU  Patient participation: Yes- Able to Participate  Level of consciousness: awake and alert  Post-procedure vital signs: reviewed and stable  Pain management: adequate  Airway patency: patent    PONV status at discharge: No PONV  Anesthetic complications: no      Cardiovascular status: blood pressure returned to baseline  Respiratory status: unassisted, spontaneous ventilation and room air  Hydration status: euvolemic  Follow-up not needed.          Vitals Value Taken Time   /86 09/21/23 1142   Temp 36.8 °C (98.2 °F) 09/21/23 1120   Pulse 73 09/21/23 1142   Resp 18 09/21/23 1142   SpO2 97 % 09/21/23 1142         Event Time   Out of Recovery 11:35:00         Pain/Aamir Score: Aamir Score: 10 (9/21/2023 11:35 AM)

## 2023-09-28 LAB
FINAL PATHOLOGIC DIAGNOSIS: NORMAL
GROSS: NORMAL
Lab: NORMAL
MICROSCOPIC EXAM: NORMAL

## 2023-11-27 ENCOUNTER — OFFICE VISIT (OUTPATIENT)
Dept: INTERNAL MEDICINE | Facility: CLINIC | Age: 53
End: 2023-11-27
Payer: COMMERCIAL

## 2023-11-27 VITALS
BODY MASS INDEX: 28.35 KG/M2 | SYSTOLIC BLOOD PRESSURE: 138 MMHG | HEIGHT: 70 IN | DIASTOLIC BLOOD PRESSURE: 88 MMHG | WEIGHT: 198 LBS

## 2023-11-27 DIAGNOSIS — R10.9 ABDOMINAL PAIN, UNSPECIFIED ABDOMINAL LOCATION: ICD-10-CM

## 2023-11-27 DIAGNOSIS — K26.9 MULTIPLE DUODENAL ULCERS: Primary | ICD-10-CM

## 2023-11-27 PROCEDURE — 3075F PR MOST RECENT SYSTOLIC BLOOD PRESS GE 130-139MM HG: ICD-10-PCS | Mod: CPTII,S$GLB,, | Performed by: NURSE PRACTITIONER

## 2023-11-27 PROCEDURE — 3079F DIAST BP 80-89 MM HG: CPT | Mod: CPTII,S$GLB,, | Performed by: NURSE PRACTITIONER

## 2023-11-27 PROCEDURE — 3008F PR BODY MASS INDEX (BMI) DOCUMENTED: ICD-10-PCS | Mod: CPTII,S$GLB,, | Performed by: NURSE PRACTITIONER

## 2023-11-27 PROCEDURE — 3075F SYST BP GE 130 - 139MM HG: CPT | Mod: CPTII,S$GLB,, | Performed by: NURSE PRACTITIONER

## 2023-11-27 PROCEDURE — 99213 PR OFFICE/OUTPT VISIT, EST, LEVL III, 20-29 MIN: ICD-10-PCS | Mod: S$GLB,,, | Performed by: NURSE PRACTITIONER

## 2023-11-27 PROCEDURE — 3008F BODY MASS INDEX DOCD: CPT | Mod: CPTII,S$GLB,, | Performed by: NURSE PRACTITIONER

## 2023-11-27 PROCEDURE — 3079F PR MOST RECENT DIASTOLIC BLOOD PRESSURE 80-89 MM HG: ICD-10-PCS | Mod: CPTII,S$GLB,, | Performed by: NURSE PRACTITIONER

## 2023-11-27 PROCEDURE — 99213 OFFICE O/P EST LOW 20 MIN: CPT | Mod: S$GLB,,, | Performed by: NURSE PRACTITIONER

## 2023-11-27 PROCEDURE — 99999 PR PBB SHADOW E&M-EST. PATIENT-LVL III: ICD-10-PCS | Mod: PBBFAC,,, | Performed by: NURSE PRACTITIONER

## 2023-11-27 PROCEDURE — 99999 PR PBB SHADOW E&M-EST. PATIENT-LVL III: CPT | Mod: PBBFAC,,, | Performed by: NURSE PRACTITIONER

## 2023-11-27 RX ORDER — DICYCLOMINE HYDROCHLORIDE 10 MG/1
10 CAPSULE ORAL 3 TIMES DAILY PRN
Qty: 40 CAPSULE | Refills: 0 | Status: SHIPPED | OUTPATIENT
Start: 2023-11-27 | End: 2023-12-27

## 2023-11-27 RX ORDER — OMEPRAZOLE 40 MG/1
40 CAPSULE, DELAYED RELEASE ORAL DAILY
Qty: 30 CAPSULE | Refills: 11 | Status: SHIPPED | OUTPATIENT
Start: 2023-11-27 | End: 2024-01-02

## 2023-11-27 NOTE — PROGRESS NOTES
INTERNAL MEDICINE PROGRESS/URGENT CARE NOTE    CHIEF COMPLAINT     No chief complaint on file.      HPI     Andre Padgett is a 53 y.o. male who presents for an urgent/follow up visit today.    C/o abd pain to RUQ x 4-5 days. No fevers, nvd, constipation.   Worsens with movement and if he lies down on it. Ice helps. Eating without issues. He does endorse consuming a lot more ETOH than usual. Not drinking daily.     Had EGD/c-scope 9/2023. Multiple nonbleeding duodenal ulcers and gastritis. Has been on prilosec. C-scope with diverticulosis. No colon lumen narrowing. Never started his prilosec. Denies any melena or hematochezia.           Past Medical History:  Past Medical History:   Diagnosis Date    Aneurysm     Right sided filling anterior communicating aneurysm s/p coiling 2011    Anxiety     Cerebral aneurysm, nonruptured 12/27/2016    Colon adenoma: 3/18 repeat colonoscpy 2023 6/22/2018    Colon polyp     Diverticulosis of large intestine without hemorrhage: see colonoscopy 3/18; sigmoid and descending colon 6/22/2018    Fatty liver: see CT 3/18 6/22/2018    Umbilical hernia without obstruction and without gangrene: see CT 3/18 6/22/2018        Past Surgical History:  Past Surgical History:   Procedure Laterality Date    CEREBRAL ANGIOGRAM  2011    angiogram/coiling    COLONOSCOPY N/A 03/26/2018    Procedure: COLONOSCOPY;  Surgeon: Sanjiv Duran MD;  Location: [facility] ([address] [place] [address]);  Service: Endoscopy;  Laterality: N/A;    COLONOSCOPY N/A 03/09/2021    Procedure: COLONOSCOPY;  Surgeon: Aleshia Daly MD;  Location: [facility] ENDO;  Service: Endoscopy;  Laterality: N/A;    COLONOSCOPY N/A 9/21/2023    Procedure: COLONOSCOPY;  Surgeon: Lindy Holloway MD;  Location: [facility] ENDOSCOPY;  Service: Gastroenterology;  Laterality: N/A;    ESOPHAGOGASTRODUODENOSCOPY N/A 9/21/2023    Procedure: EGD (ESOPHAGOGASTRODUODENOSCOPY);  Surgeon: Lindy Holloway MD;  Location: [facility] ENDOSCOPY;  Service: Gastroenterology;   Laterality: N/A;  instr via email; AP  9/18 Pre call complete. SG    ORIF FOREARM FRACTURE Bilateral 09/14/2020    Procedure: ORIF, FRACTURE, RADIUS OR ULNA, synthes, right, large C arm at a diagonal from the rear of the room, ancef, velcro wrist splint;  Surgeon: Hao Thorne MD;  Location: [facility] OR [address] [place] [address];  Service: Orthopedics;  Laterality: Bilateral;    WRIST SURGERY Bilateral 09/13/2020        Allergies:  Review of patient's allergies indicates:  No Known Allergies    Home Medications:    Current Outpatient Medications:     ALPRAZolam (XANAX) 1 MG tablet, TAKE 1/2 TABLET(0.5 MG) BY MOUTH DAILY AS NEEDED FOR ANXIETY, Disp: 30 tablet, Rfl: 1    ascorbic acid, vitamin C, (VITAMIN C) 100 MG tablet, Take 100 mg by mouth once daily., Disp: , Rfl:     aspirin (ECOTRIN) 81 MG EC tablet, Take 81 mg by mouth once daily., Disp: , Rfl:     atorvastatin (LIPITOR) 40 MG tablet, TAKE 1 TABLET(40 MG) BY MOUTH EVERY DAY, Disp: 90 tablet, Rfl: 1    dicyclomine (BENTYL) 10 MG capsule, Take 1 capsule (10 mg total) by mouth 3 (three) times daily as needed (abd pain)., Disp: 40 capsule, Rfl: 0    melatonin (MELATIN) 3 mg tablet, Take 2 tablets (6 mg total) by mouth nightly as needed for Insomnia., Disp:  , Rfl: 0    omega-3 acid ethyl esters (LOVAZA) 1 gram capsule, Take 1 capsule (1 g total) by mouth 2 (two) times daily., Disp: 180 capsule, Rfl: 3    omeprazole (PRILOSEC) 40 MG capsule, Take 1 capsule (40 mg total) by mouth once daily., Disp: 30 capsule, Rfl: 11    sildenafiL (VIAGRA) 50 MG tablet, Take 1 tablet (50 mg total) by mouth daily as needed for Erectile Dysfunction., Disp: 10 tablet, Rfl: 10     Review of Systems:  Review of Systems   Constitutional:  Negative for fever.   Respiratory:  Negative for cough and shortness of breath.    Cardiovascular:  Negative for chest pain.   Gastrointestinal:  Positive for abdominal pain. Negative for abdominal distention, blood in stool, constipation, diarrhea, nausea and vomiting.  "  Neurological:  Negative for weakness and headaches.         PHYSICAL EXAM     Vitals:    11/27/23 1003 11/27/23 1019   BP: (!) 140/100 138/88   BP Location: Right arm    Patient Position: Sitting    BP Method: Medium (Manual)    Weight: 89.8 kg (198 lb)    Height: 5' 10" (1.778 m)       Body mass index is 28.41 kg/m².     Physical Exam  Vitals reviewed.   Constitutional:       Appearance: Normal appearance. He is not toxic-appearing.   HENT:      Mouth/Throat:      Mouth: Mucous membranes are moist.      Pharynx: Oropharynx is clear.   Eyes:      Conjunctiva/sclera: Conjunctivae normal.   Cardiovascular:      Rate and Rhythm: Normal rate and regular rhythm.   Pulmonary:      Effort: Pulmonary effort is normal.      Breath sounds: Normal breath sounds.   Abdominal:      General: Bowel sounds are normal.      Palpations: Abdomen is soft.      Tenderness: There is no abdominal tenderness. There is no right CVA tenderness, left CVA tenderness, guarding or rebound. Negative signs include Mace's sign.   Lymphadenopathy:      Cervical: No cervical adenopathy.   Skin:     General: Skin is warm and dry.   Neurological:      General: No focal deficit present.      Mental Status: He is alert.   Psychiatric:         Mood and Affect: Mood normal.         Behavior: Behavior normal.         LABS     Lab Results   Component Value Date    HGBA1C 5.4 02/21/2019     CMP  Sodium   Date Value Ref Range Status   05/04/2023 143 136 - 145 mmol/L Final     Potassium   Date Value Ref Range Status   05/04/2023 4.2 3.5 - 5.1 mmol/L Final     Chloride   Date Value Ref Range Status   05/04/2023 106 95 - 110 mmol/L Final     CO2   Date Value Ref Range Status   05/04/2023 28 23 - 29 mmol/L Final     Glucose   Date Value Ref Range Status   05/04/2023 115 (H) 70 - 110 mg/dL Final     BUN   Date Value Ref Range Status   05/04/2023 11 6 - 20 mg/dL Final     Creatinine   Date Value Ref Range Status   05/04/2023 0.8 0.5 - 1.4 mg/dL Final     Calcium "   Date Value Ref Range Status   05/04/2023 9.8 8.7 - 10.5 mg/dL Final     Total Protein   Date Value Ref Range Status   05/04/2023 7.1 6.0 - 8.4 g/dL Final     Albumin   Date Value Ref Range Status   05/04/2023 3.9 3.5 - 5.2 g/dL Final     Total Bilirubin   Date Value Ref Range Status   05/04/2023 0.6 0.1 - 1.0 mg/dL Final     Comment:     For infants and newborns, interpretation of results should be based  on gestational age, weight and in agreement with clinical  observations.    Premature Infant recommended reference ranges:  Up to 24 hours.............<8.0 mg/dL  Up to 48 hours............<12.0 mg/dL  3-5 days..................<15.0 mg/dL  6-29 days.................<15.0 mg/dL       Alkaline Phosphatase   Date Value Ref Range Status   05/04/2023 58 55 - 135 U/L Final     AST   Date Value Ref Range Status   05/04/2023 18 10 - 40 U/L Final     ALT   Date Value Ref Range Status   05/04/2023 26 10 - 44 U/L Final     Anion Gap   Date Value Ref Range Status   05/04/2023 9 8 - 16 mmol/L Final     eGFR if    Date Value Ref Range Status   04/15/2021 >60.0 >60 mL/min/1.73 m^2 Final     eGFR if non    Date Value Ref Range Status   04/15/2021 >60.0 >60 mL/min/1.73 m^2 Final     Comment:     Calculation used to obtain the estimated glomerular filtration  rate (eGFR) is the CKD-EPI equation.        Lab Results   Component Value Date    WBC 5.94 05/04/2023    HGB 15.3 05/04/2023    HCT 44.6 05/04/2023    MCV 94 05/04/2023     05/04/2023     Lab Results   Component Value Date    CHOL 133 03/23/2023    CHOL 184 12/12/2022    CHOL 141 04/15/2021     Lab Results   Component Value Date    HDL 61 03/23/2023    HDL 58 12/12/2022    HDL 54 04/15/2021     Lab Results   Component Value Date    LDLCALC 53.0 (L) 03/23/2023    LDLCALC 97.8 12/12/2022    LDLCALC 71.0 04/15/2021     Lab Results   Component Value Date    TRIG 95 03/23/2023    TRIG 141 12/12/2022    TRIG 80 04/15/2021     Lab Results    Component Value Date    CHOLHDL 45.9 03/23/2023    CHOLHDL 31.5 12/12/2022    CHOLHDL 38.3 04/15/2021     Lab Results   Component Value Date    TSH 1.460 05/25/2020       ASSESSMENT     1. Multiple duodenal ulcers    2. Abdominal pain, unspecified abdominal location           PLAN      Exam benign. No red flags. Restart ppi and send bentyl. Highly advised to abstain from ETOH. Declined labs today to check lfts and lipase. He states he will call me if he fails to improve in the next few days and he will be agreeable to labs then. Highly encouraged him to follow up with GI as well.     1. Multiple duodenal ulcers  - omeprazole (PRILOSEC) 40 MG capsule; Take 1 capsule (40 mg total) by mouth once daily.  Dispense: 30 capsule; Refill: 11    2. Abdominal pain, unspecified abdominal location  - dicyclomine (BENTYL) 10 MG capsule; Take 1 capsule (10 mg total) by mouth 3 (three) times daily as needed (abd pain).  Dispense: 40 capsule; Refill: 0       Follow up with PCP     Patient was counseled on when to seek emergent care. Patient's plan/treatment was discussed including medications and possible side effects. Verbalized understanding of all instructions.          YAMINI Morillo  Department of Internal Medicine - [facility]  11/27/2023

## 2023-12-04 DIAGNOSIS — I25.84 CORONARY ARTERY DISEASE DUE TO CALCIFIED CORONARY LESION: Primary | ICD-10-CM

## 2023-12-04 DIAGNOSIS — I25.10 CORONARY ARTERY DISEASE DUE TO CALCIFIED CORONARY LESION: Primary | ICD-10-CM

## 2023-12-04 DIAGNOSIS — E78.00 PURE HYPERCHOLESTEROLEMIA: ICD-10-CM

## 2024-01-02 ENCOUNTER — TELEPHONE (OUTPATIENT)
Dept: GASTROENTEROLOGY | Facility: CLINIC | Age: 54
End: 2024-01-02
Payer: COMMERCIAL

## 2024-01-02 DIAGNOSIS — K21.9 GASTROESOPHAGEAL REFLUX DISEASE, UNSPECIFIED WHETHER ESOPHAGITIS PRESENT: Primary | ICD-10-CM

## 2024-01-02 RX ORDER — PANTOPRAZOLE SODIUM 40 MG/1
40 TABLET, DELAYED RELEASE ORAL DAILY
Qty: 30 TABLET | Refills: 11 | Status: SHIPPED | OUTPATIENT
Start: 2024-01-02 | End: 2025-01-01

## 2024-01-02 NOTE — TELEPHONE ENCOUNTER
----- Message from Bushra Camacho sent at 1/2/2024  8:07 AM CST -----  Contact: Andre [phone]  Would like to receive medical advice.     Would they like a call back or a response via [facility]:  call back    Additional information:  Andre is calling to speak to the provider or staff and say the RX omeprazole (PRILOSEC) 40 MG capsule had him in th bathroom and would like the provider to send something else. Please call Andre back for advice.       [id number] - [address] AT [address] [facility] [address]  [address] [facility] [address]  Phone: [phone] Fax: [phone]

## 2024-01-03 ENCOUNTER — NURSE TRIAGE (OUTPATIENT)
Dept: ADMINISTRATIVE | Facility: CLINIC | Age: 54
End: 2024-01-03
Payer: COMMERCIAL

## 2024-01-03 NOTE — TELEPHONE ENCOUNTER
"Pt is transferred to this NT. Pt states that he was recently dx with ulcers. Pt noticed that this morning when he had a BM there was "a little blood in it." Pt also notes he has a cold. Pt had a blood stain on his pants at work after he passed the bowel movement that coworkers noticed. He describes the stain as a "0.5 inch" and states that it soaked both through his underwear and pants. Pt states that his GI doc dx him with ulcers and wanted him to start a medication in September that he has not started.    Care Advice recommends that pt go to ED now. Pt verbalizes understanding and is instructed to call back with any new/worsening sxs, questions, or concerns.   Reason for Disposition   MODERATE rectal bleeding (small blood clots, passing blood without stool, or toilet water turns red) more than once a day     2x today    Additional Information   Negative: Passed out (i.e., fainted, collapsed and was not responding)   Negative: Shock suspected (e.g., cold/pale/clammy skin, too weak to stand, low BP, rapid pulse)   Negative: Vomiting red blood or black (coffee ground) material   Negative: Sounds like a life-threatening emergency to the triager   Negative: SEVERE rectal bleeding (large blood clots; constant or on and off bleeding)   Negative: SEVERE dizziness (e.g., unable to stand, requires support to walk, feels like passing out now)    Protocols used: Rectal Bleeding-A-OH    "

## 2024-01-15 DIAGNOSIS — I25.84 CORONARY ARTERY DISEASE DUE TO CALCIFIED CORONARY LESION: ICD-10-CM

## 2024-01-15 DIAGNOSIS — I25.10 CORONARY ARTERY DISEASE DUE TO CALCIFIED CORONARY LESION: ICD-10-CM

## 2024-01-16 RX ORDER — OMEGA-3-ACID ETHYL ESTERS 1 G/1
1 CAPSULE, LIQUID FILLED ORAL 2 TIMES DAILY
Qty: 180 CAPSULE | Refills: 3 | Status: SHIPPED | OUTPATIENT
Start: 2024-01-16

## 2024-01-26 ENCOUNTER — TELEPHONE (OUTPATIENT)
Dept: INTERNAL MEDICINE | Facility: CLINIC | Age: 54
End: 2024-01-26
Payer: COMMERCIAL

## 2024-01-26 NOTE — TELEPHONE ENCOUNTER
----- Message from Radha Rosales sent at 1/25/2024  4:12 PM CST -----  Contact: Andre   Andre would like a call back. He is having stomach pains & would like to schedule an appt with Dr Diop  I was not able to schedule

## 2024-01-30 ENCOUNTER — OFFICE VISIT (OUTPATIENT)
Dept: INTERNAL MEDICINE | Facility: CLINIC | Age: 54
End: 2024-01-30
Payer: COMMERCIAL

## 2024-01-30 VITALS
WEIGHT: 196.19 LBS | SYSTOLIC BLOOD PRESSURE: 120 MMHG | HEART RATE: 87 BPM | BODY MASS INDEX: 28.09 KG/M2 | DIASTOLIC BLOOD PRESSURE: 86 MMHG | OXYGEN SATURATION: 98 % | HEIGHT: 70 IN

## 2024-01-30 DIAGNOSIS — R10.9 ABDOMINAL CRAMPS: ICD-10-CM

## 2024-01-30 DIAGNOSIS — R10.84 DIFFUSE ABDOMINAL PAIN: Primary | ICD-10-CM

## 2024-01-30 DIAGNOSIS — K29.70 GASTRITIS, PRESENCE OF BLEEDING UNSPECIFIED, UNSPECIFIED CHRONICITY, UNSPECIFIED GASTRITIS TYPE: ICD-10-CM

## 2024-01-30 DIAGNOSIS — F41.9 ANXIETY: ICD-10-CM

## 2024-01-30 PROCEDURE — 3008F BODY MASS INDEX DOCD: CPT | Mod: CPTII,S$GLB,, | Performed by: INTERNAL MEDICINE

## 2024-01-30 PROCEDURE — 3079F DIAST BP 80-89 MM HG: CPT | Mod: CPTII,S$GLB,, | Performed by: INTERNAL MEDICINE

## 2024-01-30 PROCEDURE — 1160F RVW MEDS BY RX/DR IN RCRD: CPT | Mod: CPTII,S$GLB,, | Performed by: INTERNAL MEDICINE

## 2024-01-30 PROCEDURE — 1159F MED LIST DOCD IN RCRD: CPT | Mod: CPTII,S$GLB,, | Performed by: INTERNAL MEDICINE

## 2024-01-30 PROCEDURE — 99999 PR PBB SHADOW E&M-EST. PATIENT-LVL IV: CPT | Mod: PBBFAC,,, | Performed by: INTERNAL MEDICINE

## 2024-01-30 PROCEDURE — 99214 OFFICE O/P EST MOD 30 MIN: CPT | Mod: S$GLB,,, | Performed by: INTERNAL MEDICINE

## 2024-01-30 PROCEDURE — 3074F SYST BP LT 130 MM HG: CPT | Mod: CPTII,S$GLB,, | Performed by: INTERNAL MEDICINE

## 2024-01-30 RX ORDER — DICYCLOMINE HYDROCHLORIDE 10 MG/1
10 CAPSULE ORAL
Qty: 120 CAPSULE | Refills: 0 | Status: SHIPPED | OUTPATIENT
Start: 2024-01-30 | End: 2024-02-29

## 2024-01-30 NOTE — TELEPHONE ENCOUNTER
"Called and spoke to pt. Pt c/o "stomach issues". Pt states he was recently dx with stomach ulcers after scope but is still experiencing discomfort in low abd and requesting to see PCP ASAP. Appt available this am, pt confirmed.   "

## 2024-01-30 NOTE — PROGRESS NOTES
Subjective:       Patient ID: Andre Padgett is a 53 y.o. male.    Chief Complaint: Abdominal Pain    Patient with intermittent chronic abdominal pain comes in for follow-up.  He is continuing to have some cramping and bloating in the abdomen.  He said he primarily wanted to make sure that none of his test showed cancer and we reviewed his abdominal scan, upper and lower GI scopes.  I showed that there were no polyps or dysplasia noted on the scope or in the biopsy.  No H pylori but there were a couple of areas that seem to be biopsied for ulcer or gastritis.  One in the duodenum and 1 in the stomach.  Of note the patient has been nonadherent to his medication which makes the story a little more difficult to follow.  He had some abdominal cramping originally in August or September and Dr. Holloway gastroenterology, gave him a prescription for Levsin but he did not take it.  She said that was for cramps and spasms.  He then had the scopes which showed the ulcerations and gastritis but it has not clear if the cramping and bloating were related to those findings.  He then was delayed about a month in taking omeprazole then took it for a short time and said it gave him diarrhea.  This scope was the end of September but he did not take the omeprazole until about December according to him.  Then he contacted gastro and switched to Protonix which he said does not cause diarrhea but he thinks that makes the cramping and gas worse.  He is trying to finish that course out before following up with gastro in about 4 weeks but says the cramping is disconcerting.  We discussed trying the Levsin as originally prescribed he is willing to do so.  He did have 1 episode a few weeks ago where after a bowel movement he noticed what he said was a red bubble that fell into the commode.  It did not turn the water red.  He took his shower got dressed and went to work.  At some point at work he realized he had some blood in his under shorts  so he had to go home in change.  This has not happened again but he did call the nurse on-call gave him some instructions.  I suspect this may have been a small hemorrhoid or fissure but at that point he had just had a colonoscopy about 2 months before.  He may sometimes strange mildly    Review of Systems   Gastrointestinal:  Positive for abdominal distention, abdominal pain (cramping and bloating) and anal bleeding (see HPI). Negative for blood in stool and rectal pain.           Past Medical History:   Diagnosis Date    Aneurysm     Right sided filling anterior communicating aneurysm s/p coiling 2011    Anxiety     Cerebral aneurysm, nonruptured 12/27/2016    Colon adenoma: 3/18 repeat colonoscpy 2023 6/22/2018    Colon polyp     Diverticulosis of large intestine without hemorrhage: see colonoscopy 3/18; sigmoid and descending colon 6/22/2018    Fatty liver: see CT 3/18 6/22/2018    Umbilical hernia without obstruction and without gangrene: see CT 3/18 6/22/2018     Past Surgical History:   Procedure Laterality Date    CEREBRAL ANGIOGRAM  2011    angiogram/coiling    COLONOSCOPY N/A 03/26/2018    Procedure: COLONOSCOPY;  Surgeon: Sanjiv Duran MD;  Location: [facility] ENDO ([address] [place] [address]);  Service: Endoscopy;  Laterality: N/A;    COLONOSCOPY N/A 03/09/2021    Procedure: COLONOSCOPY;  Surgeon: Aleshia Daly MD;  Location: [facility] ENDO;  Service: Endoscopy;  Laterality: N/A;    COLONOSCOPY N/A 9/21/2023    Procedure: COLONOSCOPY;  Surgeon: Lindy Holloway MD;  Location: [facility] ENDOSCOPY;  Service: Gastroenterology;  Laterality: N/A;    ESOPHAGOGASTRODUODENOSCOPY N/A 9/21/2023    Procedure: EGD (ESOPHAGOGASTRODUODENOSCOPY);  Surgeon: Lindy Holloway MD;  Location: [facility] ENDOSCOPY;  Service: Gastroenterology;  Laterality: N/A;  instr via email; AP  9/18 Pre call complete. SG    ORIF FOREARM FRACTURE Bilateral 09/14/2020    Procedure: ORIF, FRACTURE, RADIUS OR ULNA, synthes, right, large C arm at a diagonal from the  rear of the room, ancef, velcro wrist splint;  Surgeon: Hao Thorne MD;  Location: [facility] OR [address] [place] [address];  Service: Orthopedics;  Laterality: Bilateral;    WRIST SURGERY Bilateral 09/13/2020      Patient Active Problem List   Diagnosis    Anxiety    DJD (degenerative joint disease) of thoracic spine    DJD (degenerative joint disease) of cervical spine, mild    Palpitations    Pure hypercholesterolemia    Rib pain on right side    Colon adenoma: 3/18 repeat colonoscopy 2023    Umbilical hernia without obstruction and without gangrene: see CT 3/18    Fatty liver: see CT 3/18    Diverticulosis of large intestine without hemorrhage: see colonoscopy 3/18; sigmoid and descending colon    Hyperlipidemia    History of nontraumatic rupture of cerebral aneurysm    Chronic bilateral thoracic back pain    Poor posture    Chronic bilateral low back pain without sciatica    Decreased strength    Decreased spinal mobility    Bilateral low back pain with left-sided sciatica    Subarachnoid hemorrhage    Closed Colles' fracture of right radius with routine healing    Closed Colles' fracture of left radius with routine healing    Acute prostatitis    Closed fracture of one rib of right side with routine healing    History of 2019 novel coronavirus disease (COVID-19)    Impaired range of motion of left knee    Lower abdominal pain    Irritable bowel syndrome with constipation    Chronic idiopathic constipation    Constipation    Quadriceps weakness    Pain aggravated by standing    Heel cord tightness, left    Left elbow pain        Objective:      Physical Exam  Constitutional:       General: He is not in acute distress.     Appearance: He is well-developed.   HENT:      Head: Normocephalic and atraumatic.      Right Ear: Tympanic membrane, ear canal and external ear normal.      Left Ear: Tympanic membrane, ear canal and external ear normal.      Mouth/Throat:      Pharynx: No oropharyngeal exudate or posterior oropharyngeal  erythema.   Eyes:      General: No scleral icterus.     Conjunctiva/sclera: Conjunctivae normal.      Pupils: Pupils are equal, round, and reactive to light.   Neck:      Thyroid: No thyromegaly.      Comments: No supraclavicular nodes palpated  Cardiovascular:      Rate and Rhythm: Normal rate and regular rhythm.      Pulses: Normal pulses.      Heart sounds: Normal heart sounds. No murmur heard.  Pulmonary:      Effort: Pulmonary effort is normal.      Breath sounds: Normal breath sounds. No wheezing.   Abdominal:      General: Bowel sounds are normal.      Palpations: Abdomen is soft. There is no mass.      Tenderness: There is no abdominal tenderness.   Genitourinary:     Comments: There may be some slight hemorrhoid tissue at the anus but nothing large tender or bleeding.  Musculoskeletal:         General: No tenderness.      Cervical back: Normal range of motion and neck supple.      Right lower leg: No edema.      Left lower leg: No edema.   Lymphadenopathy:      Cervical: No cervical adenopathy.   Skin:     Coloration: Skin is not jaundiced or pale.   Neurological:      General: No focal deficit present.      Mental Status: He is alert and oriented to person, place, and time.   Psychiatric:         Mood and Affect: Mood normal.         Behavior: Behavior normal.         Assessment:       Problem List Items Addressed This Visit          Psychiatric    Anxiety     Other Visit Diagnoses       Diffuse abdominal pain    -  Primary    Relevant Medications    dicyclomine (BENTYL) 10 MG capsule    Abdominal cramps        Gastritis, presence of bleeding unspecified, unspecified chronicity, unspecified gastritis type                Plan:         Andre was seen today for abdominal pain.    Diagnoses and all orders for this visit:    Diffuse abdominal pain  -     dicyclomine (BENTYL) 10 MG capsule; Take 1 capsule (10 mg total) by mouth before meals and at bedtime as needed (abdominal pain).    Abdominal  "cramps    Gastritis, presence of bleeding unspecified, unspecified chronicity, unspecified gastritis type    Anxiety       Follow up with Gastro in a few weeks, me prn and annually.               Portions of this note may have been created with voice recognition software. Occasional "wrong-word" or "sound-a-like" substitutions may have occurred due to the inherent limitations of voice recognition software. Please, read the note carefully and recognize, using context, where substitutions have occurred.  "

## 2024-02-03 ENCOUNTER — LAB VISIT (OUTPATIENT)
Dept: LAB | Facility: HOSPITAL | Age: 54
End: 2024-02-03
Payer: COMMERCIAL

## 2024-02-03 DIAGNOSIS — I25.10 CORONARY ARTERY DISEASE DUE TO CALCIFIED CORONARY LESION: ICD-10-CM

## 2024-02-03 DIAGNOSIS — I25.84 CORONARY ARTERY DISEASE DUE TO CALCIFIED CORONARY LESION: ICD-10-CM

## 2024-02-03 DIAGNOSIS — E78.00 PURE HYPERCHOLESTEROLEMIA: ICD-10-CM

## 2024-02-03 LAB
ALBUMIN SERPL BCP-MCNC: 3.9 G/DL (ref 3.5–5.2)
ALP SERPL-CCNC: 62 U/L (ref 55–135)
ALT SERPL W/O P-5'-P-CCNC: 27 U/L (ref 10–44)
ANION GAP SERPL CALC-SCNC: 10 MMOL/L (ref 8–16)
AST SERPL-CCNC: 22 U/L (ref 10–40)
BILIRUB SERPL-MCNC: 0.5 MG/DL (ref 0.1–1)
BUN SERPL-MCNC: 9 MG/DL (ref 6–20)
CALCIUM SERPL-MCNC: 9.1 MG/DL (ref 8.7–10.5)
CHLORIDE SERPL-SCNC: 105 MMOL/L (ref 95–110)
CHOLEST SERPL-MCNC: 169 MG/DL (ref 120–199)
CHOLEST/HDLC SERPL: 2.8 {RATIO} (ref 2–5)
CO2 SERPL-SCNC: 25 MMOL/L (ref 23–29)
CREAT SERPL-MCNC: 0.8 MG/DL (ref 0.5–1.4)
EST. GFR  (NO RACE VARIABLE): >60 ML/MIN/1.73 M^2
GLUCOSE SERPL-MCNC: 99 MG/DL (ref 70–110)
HDLC SERPL-MCNC: 61 MG/DL (ref 40–75)
HDLC SERPL: 36.1 % (ref 20–50)
LDLC SERPL CALC-MCNC: 72.4 MG/DL (ref 63–159)
NONHDLC SERPL-MCNC: 108 MG/DL
POTASSIUM SERPL-SCNC: 3.8 MMOL/L (ref 3.5–5.1)
PROT SERPL-MCNC: 7.1 G/DL (ref 6–8.4)
SODIUM SERPL-SCNC: 140 MMOL/L (ref 136–145)
TRIGL SERPL-MCNC: 178 MG/DL (ref 30–150)

## 2024-02-03 PROCEDURE — 80053 COMPREHEN METABOLIC PANEL: CPT | Performed by: INTERNAL MEDICINE

## 2024-02-03 PROCEDURE — 36415 COLL VENOUS BLD VENIPUNCTURE: CPT | Performed by: INTERNAL MEDICINE

## 2024-02-03 PROCEDURE — 80061 LIPID PANEL: CPT | Performed by: INTERNAL MEDICINE

## 2024-02-06 ENCOUNTER — OFFICE VISIT (OUTPATIENT)
Dept: CARDIOLOGY | Facility: CLINIC | Age: 54
End: 2024-02-06
Payer: COMMERCIAL

## 2024-02-06 VITALS
BODY MASS INDEX: 28.5 KG/M2 | HEIGHT: 70 IN | HEART RATE: 88 BPM | SYSTOLIC BLOOD PRESSURE: 142 MMHG | DIASTOLIC BLOOD PRESSURE: 74 MMHG | WEIGHT: 199.06 LBS

## 2024-02-06 DIAGNOSIS — I25.10 CORONARY ARTERY DISEASE DUE TO CALCIFIED CORONARY LESION: ICD-10-CM

## 2024-02-06 DIAGNOSIS — R93.1 AGATSTON CORONARY ARTERY CALCIUM SCORE BETWEEN 100 AND 400: ICD-10-CM

## 2024-02-06 DIAGNOSIS — E78.00 PURE HYPERCHOLESTEROLEMIA: Primary | ICD-10-CM

## 2024-02-06 DIAGNOSIS — I25.84 CORONARY ARTERY DISEASE DUE TO CALCIFIED CORONARY LESION: ICD-10-CM

## 2024-02-06 PROCEDURE — 3008F BODY MASS INDEX DOCD: CPT | Mod: CPTII,S$GLB,, | Performed by: PHYSICIAN ASSISTANT

## 2024-02-06 PROCEDURE — 93000 ELECTROCARDIOGRAM COMPLETE: CPT | Mod: S$GLB,,, | Performed by: INTERNAL MEDICINE

## 2024-02-06 PROCEDURE — 3078F DIAST BP <80 MM HG: CPT | Mod: CPTII,S$GLB,, | Performed by: PHYSICIAN ASSISTANT

## 2024-02-06 PROCEDURE — 99999 PR PBB SHADOW E&M-EST. PATIENT-LVL III: CPT | Mod: PBBFAC,,, | Performed by: PHYSICIAN ASSISTANT

## 2024-02-06 PROCEDURE — 99214 OFFICE O/P EST MOD 30 MIN: CPT | Mod: S$GLB,,, | Performed by: PHYSICIAN ASSISTANT

## 2024-02-06 PROCEDURE — 3077F SYST BP >= 140 MM HG: CPT | Mod: CPTII,S$GLB,, | Performed by: PHYSICIAN ASSISTANT

## 2024-02-06 PROCEDURE — 1159F MED LIST DOCD IN RCRD: CPT | Mod: CPTII,S$GLB,, | Performed by: PHYSICIAN ASSISTANT

## 2024-02-06 NOTE — PROGRESS NOTES
Cardiology Clinic Note  Reason for Visit: Annual visit, elevated calcium score     HPI:     Problem List:  CACS 494 - in 2019  Hypercholesterolemia on a statin for >10 years  Palpitations      Andre Padgett is a 53 y.o. M, who presents for routine follow up. He denies any acute complaints today. He does not exercise regularly. He has previously noted palpitations, but in retrospect feels this was related to stress, and has not noticed recently. He denies chest pain/pressure/tightness/discomfort, dyspnea on regular exertion, orthopnea, PND, peripheral edema, sustained palpitations, syncope or claudication symptoms.       The 10-year ASCVD risk score (Say DK, et al., 2019) is: 3.3%    Values used to calculate the score:      Age: 53 years      Sex: Male      Is Non- : No      Diabetic: No      Tobacco smoker: No      Systolic Blood Pressure: 142 mmHg      Is BP treated: No      HDL Cholesterol: 61 mg/dL      Total Cholesterol: 169 mg/dL        ROS:    Pertinent ROS included in HPI and below.  PMH:     Past Medical History:   Diagnosis Date    Aneurysm     Right sided filling anterior communicating aneurysm s/p coiling 2011    Anxiety     Cerebral aneurysm, nonruptured 12/27/2016    Colon adenoma: 3/18 repeat colonoscpy 2023 6/22/2018    Colon polyp     Diverticulosis of large intestine without hemorrhage: see colonoscopy 3/18; sigmoid and descending colon 6/22/2018    Fatty liver: see CT 3/18 6/22/2018    Umbilical hernia without obstruction and without gangrene: see CT 3/18 6/22/2018     Past Surgical History:   Procedure Laterality Date    CEREBRAL ANGIOGRAM  2011    angiogram/coiling    COLONOSCOPY N/A 03/26/2018    Procedure: COLONOSCOPY;  Surgeon: Sanjiv Duran MD;  Location: [facility] ([address] [place] [address]);  Service: Endoscopy;  Laterality: N/A;    COLONOSCOPY N/A 03/09/2021    Procedure: COLONOSCOPY;  Surgeon: Aleshia Daly MD;  Location: [facility];  Service: Endoscopy;   Laterality: N/A;    COLONOSCOPY N/A 9/21/2023    Procedure: COLONOSCOPY;  Surgeon: Lindy Holloway MD;  Location: [facility] ENDOSCOPY;  Service: Gastroenterology;  Laterality: N/A;    ESOPHAGOGASTRODUODENOSCOPY N/A 9/21/2023    Procedure: EGD (ESOPHAGOGASTRODUODENOSCOPY);  Surgeon: Lindy Holloway MD;  Location: [facility] ENDOSCOPY;  Service: Gastroenterology;  Laterality: N/A;  instr via email; AP  9/18 Pre call complete. SG    ORIF FOREARM FRACTURE Bilateral 09/14/2020    Procedure: ORIF, FRACTURE, RADIUS OR ULNA, synthes, right, large C arm at a diagonal from the rear of the room, ancef, velcro wrist splint;  Surgeon: Hao Thorne MD;  Location: [facility] OR [address] [place] [address];  Service: Orthopedics;  Laterality: Bilateral;    WRIST SURGERY Bilateral 09/13/2020     Allergies:   Review of patient's allergies indicates:  No Known Allergies  Medications:     Current Outpatient Medications on File Prior to Visit   Medication Sig Dispense Refill    ALPRAZolam (XANAX) 1 MG tablet TAKE 1/2 TABLET(0.5 MG) BY MOUTH DAILY AS NEEDED FOR ANXIETY 30 tablet 1    ascorbic acid, vitamin C, (VITAMIN C) 100 MG tablet Take 100 mg by mouth once daily.      aspirin (ECOTRIN) 81 MG EC tablet Take 81 mg by mouth once daily.      atorvastatin (LIPITOR) 40 MG tablet TAKE 1 TABLET(40 MG) BY MOUTH EVERY DAY 90 tablet 1    dicyclomine (BENTYL) 10 MG capsule Take 1 capsule (10 mg total) by mouth before meals and at bedtime as needed (abdominal pain). 120 capsule 0    melatonin (MELATIN) 3 mg tablet Take 2 tablets (6 mg total) by mouth nightly as needed for Insomnia.  0    pantoprazole (PROTONIX) 40 MG tablet Take 1 tablet (40 mg total) by mouth once daily. 30 tablet 11    sildenafiL (VIAGRA) 50 MG tablet Take 1 tablet (50 mg total) by mouth daily as needed for Erectile Dysfunction. 10 tablet 10    omega-3 acid ethyl esters (LOVAZA) 1 gram capsule TAKE 1 CAPSULE BY MOUTH TWICE DAILY (Patient not taking: Reported on 1/30/2024) 180 capsule 3     No current  "facility-administered medications on file prior to visit.     Social History:     Social History     Tobacco Use    Smoking status: Never    Smokeless tobacco: Never   Substance Use Topics    Alcohol use: Yes     Comment: ocassionally - twice a week     Family History:     Family History   Problem Relation Age of Onset    DAVID disease Mother     Hypertension Mother     Diabetes Mother     Brain cancer Father     Thyroid disease Sister     No Known Problems Daughter     No Known Problems Sister     Cirrhosis Neg Hx     Colon polyps Neg Hx     Crohn's disease Neg Hx     Liver cancer Neg Hx     Stomach cancer Neg Hx     Ulcerative colitis Neg Hx     Heart attack Neg Hx     Colon cancer Neg Hx      Physical Exam:   BP (!) 142/74   Pulse 88   Ht 5' 10" (1.778 m)   Wt 90.3 kg (199 lb 1.2 oz)   BMI 28.56 kg/m²      Physical Exam  Vitals and nursing note reviewed.   Constitutional:       Appearance: Normal appearance.   HENT:      Head: Normocephalic and atraumatic.   Neck:      Vascular: Normal carotid pulses. No carotid bruit or hepatojugular reflux.   Cardiovascular:      Rate and Rhythm: Normal rate and regular rhythm.      Chest Wall: PMI is not displaced.      Pulses:           Radial pulses are 2+ on the right side and 2+ on the left side.        Dorsalis pedis pulses are 2+ on the right side and 2+ on the left side.        Posterior tibial pulses are 2+ on the right side and 2+ on the left side.      Heart sounds: No murmur heard.  Pulmonary:      Effort: Pulmonary effort is normal.      Breath sounds: Normal breath sounds. No wheezing, rhonchi or rales.   Abdominal:      General: Bowel sounds are normal. There is no abdominal bruit.      Palpations: Abdomen is soft. There is no pulsatile mass.      Tenderness: There is no abdominal tenderness.   Feet:      Right foot:      Skin integrity: Skin integrity normal.      Left foot:      Skin integrity: Skin integrity normal.   Skin:     Capillary Refill: Capillary " refill takes less than 2 seconds.   Neurological:      General: No focal deficit present.      Mental Status: He is alert.   Psychiatric:         Mood and Affect: Mood and affect normal.         Speech: Speech normal.         Behavior: Behavior is cooperative.         Thought Content: Thought content normal.          Labs:     Blood Tests:  Lab Results   Component Value Date    BNP <10 2016     2024    K 3.8 2024     2024    CO2 25 2024    BUN 9 2024    CREATININE 0.8 2024    GLU 99 2024    HGBA1C 5.4 2019    MG 2.0 2020    AST 22 2024    ALT 27 2024    ALBUMIN 3.9 2024    PROT 7.1 2024    BILITOT 0.5 2024    WBC 5.94 2023    HGB 15.3 2023    HCT 44.6 2023    HCT 45 2017    MCV 94 2023     2023    INR 1.1 2020    TSH 1.460 2020       Lab Results   Component Value Date    CHOL 169 2024    HDL 61 2024    TRIG 178 (H) 2024       Lab Results   Component Value Date    LDLCALC 72.4 2024         Imaging:     Echocardiogram  None    Stress testing  SOLOMON 9?3/2019  The test was stopped secondary to fatigue.  There were no arrhythmias during stress.  Exercise capacity was average.  The EKG portion of this study is negative for myocardial ischemia.  Normal left ventricular systolic function. The estimated ejection fraction is 55%  Normal LV diastolic function.  Normal right ventricular systolic function.  Normal central venous pressure (3 mm Hg).  The stress echo portion of this study is negative for myocardial ischemia.    Cath Lab  None    Other  Event monitor 2019  Sinus rhythm, with two symptom episodes correlating with normal sinus rhythm and sinus tachycardia.     [id number]  NSR (personally reviewed)    Assessment:     1. Pure hypercholesterolemia    2. Coronary artery disease due to calcified coronary lesion    3. Agatston coronary  artery calcium score between 100 and 400    4. BMI 28.0-28.9,adult        Plan:     Pure hypercholesterolemia  -     IN OFFICE EKG 12-LEAD (to Muse)  Last FLP 2/3/2024: LDL 72.4 and   Continue atorvastatin 40 mg qD  Recommend fish oil supplement   Reduce dietary carbs, sugar and ETOH intake     Coronary artery disease due to calcified coronary lesion  Agatston coronary artery calcium score between 100 and 400  Asymptomatic  Continue ASA and statin     BMI 28  Weight loss through a combination of diet and exercise encouraged  Recommend 150 minutes a week (30 minutes per day, 5 days per week) of moderate intensity exercise        Signed:  Kirsty Farrell PA-C  Cardiology     2/17/2024 2:52 PM    Follow-up:     No future appointments.

## 2024-02-07 LAB
OHS QRS DURATION: 90 MS
OHS QTC CALCULATION: 423 MS

## 2024-02-17 NOTE — PATIENT INSTRUCTIONS
Dietary guidance to reduce your risk of heart disease:    LDL - bad type - lower is better.   HDL good type - higher is better  Your LDL should be less than 70    LDL - bad type - improves with diet and medications: typically statins; most other medications that lower LDL have not been proven to prevent heart attacks.  May not improve significantly with exercise alone.  Should be less than 70 mg/dl, but the lower the better. Statins (cholesterol lowering meds) lower LDL and also reduce inflammation within blood vessels.    HDL - good type - improves with exercise.  Ideally greater than 50 mg/dl. The proportion of HDL to the total cholesterol is more important than the absolute HDL.  This means a HDL of 45 out of a total cholesterol of 130 mg'dl is pretty good, but the same HDL out of a total of  200 mg/dl is not quite as good. A level of 30% or higher is ideal.    TGs (triglycerides) - also bad - can change very quickly and considerably with certain foods. Improve with diet, exercise and high dose fish oil.  In some cases a low carbohydrate diet will lower TGs better than a low fat diet.  Ideal range  mg/dl.      Sugar, fat and cholesterol in food:     A sensible diet that limits the intake of sugars, saturated (bad) fats and trans fats while increasing the intake of unsaturated (good) fats and plant proteins is the basis of the current dietary recommendations.      We now recommend drastically reducing the intake of sugar and sugary drinks. There is less emphasis on excluding all fat and more emphasis on the types of different fats. We continue to recommend eating more vegetables.    Cholesterol in our food is generally present in relatively small amounts. New dietary guidelines are less obsessed with the amount of cholesterol. However please do not confuse this with the role of cholesterol in our blood and arteries. The liver converts certain foods into cholesterol.  It is this cholesterol and other fats  "that clog up our arteries.      Most foods that are high in cholesterol are also high in saturated fat. But there is much more saturated fat than cholesterol in these foods. The saturated fat content matters more than cholesterol. On the other hand, there are a handful of foods that are high in cholesterol but do not contain much saturated fat: eggs, shrimp, crab legs and crawfish are OK to eat in small quantities as long as you do not deep concepcion them. So a few (2-3) eggs a week are fine (both the white and the yolk), but you can eat as many egg whites as you want. Also, some of these same foods irritate the inner lining of blood vessels by inducing inflammation (see below).  This occurs even if your blood cholesterol levels are "normal". So you should avoid foods that are high in saturated fat and sugar even if your blood cholesterol levels are normal.      Saturated fat is the bad fat - you should limit your intake of this. Deep fried foods, meats and other animal fats are high in saturated fat. Cookies, doughnuts and cakes are usually high in both saturated fat and sugar.       Unsaturated fat is the good fat. It contains the same number of calories as saturated fat but these fats do not get deposited in our arteries. The Mediterranean style diet encourages the intake of unsaturated fat - olive oil, avocado and unsalted nuts. So instead of baking a piece of fish, consider pan-frying it using olive oil.     You should eat a few servings of vegetables (and fruit as long as you are not diabetic) everyday. Substitute some plant proteins in place of meat: beans, lentils, quinoa and oatmeal. They are lean proteins.    Vegetables (particularly green vegetables such as broccoli, kale and spinach) have anti-inflammatory properties. Some fruits (certain berries) have anti-oxidant properties. However I think foods that reduce vascular inflammation are much  more important than the anti-oxidant effect of fruits and I predict " that we will be prescribing anti inflammatory medication specifically for blood vessels to prevent heart attacks in the future. In the mean time eat more of the vegetables with anti-oxidant properties.     Do not use stick butter or stick margarine. Butter that comes in a tub is soft butter and consists of 1/2 butter and 1/2 vegetable oil (either canola or olive oil). It is preferable to use soft butter in small quantities. Avocado butter is also an option.      Trans fats should definitely be avoided. Most foods that are labelled as containing 0 gms of trans fat may still contain several hundred milligrams of trans fat: creamer, margarine, dough, deep fried foods, ready made frosting, potato, corn and torilla chips, cakes, cookies, pie crusts and crackers containing shortening made with hydrogenated vegetable oil.       Avoid excessive red meat and pork as much as possible. Turkey, chicken and fish, if prepared properly, are ok.     More info:   [email or web address]/health/articles/16037-mediterranean-diet

## 2024-05-06 ENCOUNTER — TELEPHONE (OUTPATIENT)
Dept: GASTROENTEROLOGY | Facility: CLINIC | Age: 54
End: 2024-05-06
Payer: COMMERCIAL

## 2024-05-06 NOTE — TELEPHONE ENCOUNTER
MA spoke to patient, MA explained there are no open slots available at the moment, patient agreed to be put on waiting list.

## 2024-05-06 NOTE — TELEPHONE ENCOUNTER
----- Message from Adri Ham sent at 5/6/2024  3:40 PM CDT -----  Contact: [phone]  Pt is requesting a callback in regards to scheduling an appt for abdominal pains. There are no available appts listed. Please call.               Thank you

## 2024-05-22 DIAGNOSIS — N52.9 ERECTILE DYSFUNCTION, UNSPECIFIED ERECTILE DYSFUNCTION TYPE: ICD-10-CM

## 2024-05-22 NOTE — TELEPHONE ENCOUNTER
No care due was identified.  [facility] Embedded Care Due Messages. Reference number: [id number].   5/22/2024 3:58:21 PM CDT

## 2024-05-23 RX ORDER — SILDENAFIL 50 MG/1
TABLET, FILM COATED ORAL
Qty: 10 TABLET | Refills: 8 | Status: SHIPPED | OUTPATIENT
Start: 2024-05-23

## 2024-05-23 NOTE — TELEPHONE ENCOUNTER
Refill Routing Note   Medication(s) are not appropriate for processing by [facility] for the following reason(s):     DDI not previously overridden by current provider--after initial override, the Refill Center will be able to continue overrides      Drug-disease interaction: sildenafiL and Fatty liver     [facility] action(s):  Defer           Pharmacist review requested: Yes     Appointments  past 12m or future 3m with PCP    Date Provider   Last Visit   1/30/2024 Hunter Diop MD   Next Visit   Visit date not found Hunter Diop MD   ED visits in past 90 days: 0        Note composed:11:32 PM 05/22/2024

## 2024-05-23 NOTE — TELEPHONE ENCOUNTER
Refill Decision Note   Andre Lorin  is requesting a refill authorization.  Brief Assessment and Rationale for Refill:  Approve     Medication Therapy Plan:         Pharmacist review requested: Yes   Extended chart review required: Yes   Comments:     Note composed:3:13 AM 05/23/2024

## 2024-06-07 ENCOUNTER — OFFICE VISIT (OUTPATIENT)
Dept: URGENT CARE | Facility: CLINIC | Age: 54
End: 2024-06-07
Payer: COMMERCIAL

## 2024-06-07 VITALS
TEMPERATURE: 98 F | OXYGEN SATURATION: 95 % | DIASTOLIC BLOOD PRESSURE: 96 MMHG | WEIGHT: 199 LBS | BODY MASS INDEX: 28.49 KG/M2 | HEART RATE: 89 BPM | SYSTOLIC BLOOD PRESSURE: 138 MMHG | HEIGHT: 70 IN | RESPIRATION RATE: 18 BRPM

## 2024-06-07 DIAGNOSIS — R05.9 COUGH, UNSPECIFIED TYPE: ICD-10-CM

## 2024-06-07 DIAGNOSIS — J06.9 VIRAL URI WITH COUGH: Primary | ICD-10-CM

## 2024-06-07 LAB
CTP QC/QA: YES
CTP QC/QA: YES
POC MOLECULAR INFLUENZA A AGN: NEGATIVE
POC MOLECULAR INFLUENZA B AGN: NEGATIVE
SARS-COV-2 AG RESP QL IA.RAPID: NEGATIVE

## 2024-06-07 PROCEDURE — 87502 INFLUENZA DNA AMP PROBE: CPT | Mod: QW,S$GLB,, | Performed by: NURSE PRACTITIONER

## 2024-06-07 PROCEDURE — 87811 SARS-COV-2 COVID19 W/OPTIC: CPT | Mod: QW,S$GLB,, | Performed by: NURSE PRACTITIONER

## 2024-06-07 PROCEDURE — 99214 OFFICE O/P EST MOD 30 MIN: CPT | Mod: S$GLB,,, | Performed by: NURSE PRACTITIONER

## 2024-06-07 RX ORDER — METHYLPREDNISOLONE 4 MG/1
TABLET ORAL
Qty: 21 EACH | Refills: 0 | Status: SHIPPED | OUTPATIENT
Start: 2024-06-07 | End: 2024-06-28

## 2024-06-07 RX ORDER — ATORVASTATIN CALCIUM 40 MG/1
TABLET, FILM COATED ORAL
Qty: 90 TABLET | Refills: 2 | Status: SHIPPED | OUTPATIENT
Start: 2024-06-07

## 2024-06-07 RX ORDER — BENZONATATE 200 MG/1
200 CAPSULE ORAL 3 TIMES DAILY PRN
Qty: 30 CAPSULE | Refills: 0 | Status: SHIPPED | OUTPATIENT
Start: 2024-06-07 | End: 2024-06-17

## 2024-06-07 RX ORDER — FLUTICASONE PROPIONATE 50 MCG
1 SPRAY, SUSPENSION (ML) NASAL DAILY
Qty: 16 G | Refills: 0 | Status: SHIPPED | OUTPATIENT
Start: 2024-06-07

## 2024-06-07 NOTE — TELEPHONE ENCOUNTER
Andre Padgett  is requesting a refill authorization.  Brief Assessment and Rationale for Refill:  Approve     Medication Therapy Plan:         Pharmacist review requested: Yes   Extended chart review required: Yes   Comments:     Note composed:2:27 PM 06/07/2024

## 2024-06-07 NOTE — TELEPHONE ENCOUNTER
No care due was identified.  [facility] Embedded Care Due Messages. Reference number: [id number].   6/07/2024 11:46:45 AM CDT

## 2024-06-07 NOTE — TELEPHONE ENCOUNTER
Refill Routing Note   Medication(s) are not appropriate for processing by [facility] for the following reason(s):        Drug-disease interaction    [facility] action(s):  Defer        Medication Therapy Plan: Drug-Disease: atorvastatin and Subarachnoid hemorrhage      Appointments  past 12m or future 3m with PCP    Date Provider   Last Visit   1/30/2024 Hunter Diop MD   Next Visit   Visit date not found Hunter Diop MD   ED visits in past 90 days: 0        Note composed:12:02 PM 06/07/2024

## 2024-06-08 NOTE — PROGRESS NOTES
"Subjective:      Patient ID: Andre Padgett is a 53 y.o. male.    Vitals:  height is 5' 10" (1.778 m) and weight is 90.3 kg (199 lb). His oral temperature is 97.6 °F (36.4 °C). His blood pressure is 138/96 (abnormal) and his pulse is 89. His respiration is 18 and oxygen saturation is 95%.     Chief Complaint: Cough    This is a 53 y.o. male who presents today with a chief complaint of productive cough, nasal congestion and body aches. Symptoms started three days ago.     Cough  This is a new problem. The current episode started in the past 7 days. Associated symptoms include chills, a fever, myalgias, nasal congestion, postnasal drip, rhinorrhea and a sore throat. Pertinent negatives include no chest pain, ear congestion, ear pain, headaches, heartburn, hemoptysis, rash, shortness of breath, sweats, weight loss or wheezing. Treatments tried: sudafed, cough drops. The treatment provided mild relief. There is no history of asthma, bronchiectasis, bronchitis, COPD, emphysema, environmental allergies or pneumonia.       Constitution: Positive for chills and fever.   HENT:  Positive for congestion, postnasal drip and sore throat. Negative for ear pain.    Cardiovascular:  Negative for chest pain.   Respiratory:  Positive for cough. Negative for bloody sputum, shortness of breath and wheezing.    Gastrointestinal:  Negative for heartburn.   Musculoskeletal:  Positive for muscle ache.   Skin:  Negative for rash.   Allergic/Immunologic: Negative for environmental allergies.   Neurological:  Negative for headaches.      Objective:     Physical Exam   Constitutional: He is oriented to person, place, and time. He appears well-developed. He is cooperative.  Non-toxic appearance. He does not appear ill. No distress.   HENT:   Head: Normocephalic and atraumatic.   Ears:   Right Ear: Hearing, tympanic membrane, external ear and ear canal normal.   Left Ear: Hearing, tympanic membrane, external ear and ear canal normal.   Nose: " Congestion present. No mucosal edema, rhinorrhea or nasal deformity. No epistaxis. Right sinus exhibits no maxillary sinus tenderness and no frontal sinus tenderness. Left sinus exhibits no maxillary sinus tenderness and no frontal sinus tenderness.   Mouth/Throat: Uvula is midline, oropharynx is clear and moist and mucous membranes are normal. No trismus in the jaw. Normal dentition. No uvula swelling. No oropharyngeal exudate, posterior oropharyngeal edema or posterior oropharyngeal erythema.   Eyes: Conjunctivae and lids are normal. No scleral icterus.   Neck: Trachea normal and phonation normal. Neck supple. No edema present. No erythema present. No neck rigidity present.   Cardiovascular: Normal rate, regular rhythm, normal heart sounds and normal pulses.   Pulmonary/Chest: Effort normal and breath sounds normal. No respiratory distress. He has no decreased breath sounds. He has no rhonchi.   Abdominal: Normal appearance.   Musculoskeletal: Normal range of motion.         General: No deformity. Normal range of motion.   Neurological: He is alert and oriented to person, place, and time. He exhibits normal muscle tone. Coordination normal.   Skin: Skin is warm, dry, intact, not diaphoretic and not pale.   Psychiatric: His speech is normal and behavior is normal. Judgment and thought content normal.   Nursing note and vitals reviewed.      Assessment:     1. Viral URI with cough    2. Cough, unspecified type          Plan:      Encourage supportive care for viral illness. Patient insist on steroids. Oral steroids ordered as requested.     Results for orders placed or performed in visit on 06/07/24   SARS Coronavirus 2 Antigen, POCT Manual Read   Result Value Ref Range    SARS Coronavirus 2 Antigen Negative Negative     Acceptable Yes    POCT Influenza A/B MOLECULAR   Result Value Ref Range    POC Molecular Influenza A Ag Negative Negative    POC Molecular Influenza B Ag Negative Negative    Quality  Control Acceptable Yes         Viral URI with cough  -     benzonatate (TESSALON) 200 MG capsule; Take 1 capsule (200 mg total) by mouth 3 (three) times daily as needed for Cough.  Dispense: 30 capsule; Refill: 0  -     fluticasone propionate (FLONASE) 50 mcg/actuation nasal spray; 1 spray (50 mcg total) by Each Nostril route once daily.  Dispense: 16 g; Refill: 0  -     methylPREDNISolone (MEDROL DOSEPACK) 4 mg tablet; use as directed  Dispense: 21 each; Refill: 0    Cough, unspecified type  -     SARS Coronavirus 2 Antigen, POCT Manual Read  -     POCT Influenza A/B MOLECULAR  -     benzonatate (TESSALON) 200 MG capsule; Take 1 capsule (200 mg total) by mouth 3 (three) times daily as needed for Cough.  Dispense: 30 capsule; Refill: 0  -     fluticasone propionate (FLONASE) 50 mcg/actuation nasal spray; 1 spray (50 mcg total) by Each Nostril route once daily.  Dispense: 16 g; Refill: 0        Patient Instructions   Please drink plenty of fluids.  Please get plenty of rest.  Please return here or go to the Emergency Department for any concerns or worsening of condition.  If you do not have Hypertension or any history of palpitations, it is ok to take over the counter Sudafed or Mucinex D or Allegra-D or Claritin-D or Zyrtec-D.  If you do take one of the above, it is ok to combine that with plain over the counter Mucinex or Allegra or Claritin or Zyrtec.  If for example you are taking Zyrtec -D, you can combine that with Mucinex, but not Mucinex-D.  If you are taking Mucinex-D, you can combine that with plain Allegra or Claritin or Zyrtec.   If you do have Hypertension or palpitations, it is safe to take Coricidin HBP for relief of sinus symptoms.  We recommend you take over the counter Flonase (Fluticasone) or another nasally inhaled steroid unless you are already taking one.  Nasal irrigation with a saline spray or Netti Pot like device per their directions is also recommended.  If not allergic, please take over  the counter Tylenol (Acetaminophen) and/or Motrin (Ibuprofen) as directed for control of pain and/or fever.  Please follow up with your primary care doctor or specialist as needed.    If you  smoke, please stop smoking.

## 2024-06-10 ENCOUNTER — PATIENT MESSAGE (OUTPATIENT)
Dept: INTERNAL MEDICINE | Facility: CLINIC | Age: 54
End: 2024-06-10
Payer: COMMERCIAL

## 2024-11-22 ENCOUNTER — OFFICE VISIT (OUTPATIENT)
Dept: INTERNAL MEDICINE | Facility: CLINIC | Age: 54
End: 2024-11-22
Payer: COMMERCIAL

## 2024-11-22 ENCOUNTER — LAB VISIT (OUTPATIENT)
Dept: LAB | Facility: HOSPITAL | Age: 54
End: 2024-11-22
Attending: INTERNAL MEDICINE
Payer: COMMERCIAL

## 2024-11-22 VITALS
BODY MASS INDEX: 29.13 KG/M2 | HEIGHT: 70 IN | OXYGEN SATURATION: 96 % | WEIGHT: 203.5 LBS | HEART RATE: 96 BPM | SYSTOLIC BLOOD PRESSURE: 138 MMHG | DIASTOLIC BLOOD PRESSURE: 84 MMHG

## 2024-11-22 DIAGNOSIS — N41.0 ACUTE PROSTATITIS: ICD-10-CM

## 2024-11-22 DIAGNOSIS — N41.0 ACUTE PROSTATITIS: Primary | ICD-10-CM

## 2024-11-22 PROBLEM — I60.9 SUBARACHNOID HEMORRHAGE: Status: RESOLVED | Noted: 2020-09-13 | Resolved: 2024-11-22

## 2024-11-22 PROBLEM — R30.0 DYSURIA: Status: ACTIVE | Noted: 2024-11-22

## 2024-11-22 PROBLEM — S52.532D CLOSED COLLES' FRACTURE OF LEFT RADIUS WITH ROUTINE HEALING: Status: RESOLVED | Noted: 2020-09-14 | Resolved: 2024-11-22

## 2024-11-22 PROBLEM — M67.02 HEEL CORD TIGHTNESS, LEFT: Status: RESOLVED | Noted: 2022-01-10 | Resolved: 2024-11-22

## 2024-11-22 PROBLEM — Z86.16 HISTORY OF 2019 NOVEL CORONAVIRUS DISEASE (COVID-19): Status: RESOLVED | Noted: 2020-09-22 | Resolved: 2024-11-22

## 2024-11-22 PROBLEM — S52.531D CLOSED COLLES' FRACTURE OF RIGHT RADIUS WITH ROUTINE HEALING: Status: RESOLVED | Noted: 2020-09-14 | Resolved: 2024-11-22

## 2024-11-22 PROBLEM — R10.30 LOWER ABDOMINAL PAIN: Status: RESOLVED | Noted: 2021-01-05 | Resolved: 2024-11-22

## 2024-11-22 PROBLEM — R52 PAIN AGGRAVATED BY STANDING: Status: RESOLVED | Noted: 2022-01-10 | Resolved: 2024-11-22

## 2024-11-22 PROBLEM — M62.81 QUADRICEPS WEAKNESS: Status: RESOLVED | Noted: 2022-01-10 | Resolved: 2024-11-22

## 2024-11-22 PROBLEM — R07.81 RIB PAIN ON RIGHT SIDE: Status: RESOLVED | Noted: 2017-10-12 | Resolved: 2024-11-22

## 2024-11-22 PROBLEM — M25.522 LEFT ELBOW PAIN: Status: RESOLVED | Noted: 2022-07-13 | Resolved: 2024-11-22

## 2024-11-22 PROBLEM — M25.662 IMPAIRED RANGE OF MOTION OF LEFT KNEE: Status: RESOLVED | Noted: 2020-10-09 | Resolved: 2024-11-22

## 2024-11-22 PROBLEM — S22.31XD CLOSED FRACTURE OF ONE RIB OF RIGHT SIDE WITH ROUTINE HEALING: Status: RESOLVED | Noted: 2020-09-20 | Resolved: 2024-11-22

## 2024-11-22 LAB
BACTERIA #/AREA URNS AUTO: ABNORMAL /HPF
BILIRUB UR QL STRIP: NEGATIVE
CLARITY UR REFRACT.AUTO: CLEAR
COLOR UR AUTO: YELLOW
GLUCOSE UR QL STRIP: NEGATIVE
HGB UR QL STRIP: NEGATIVE
HYALINE CASTS UR QL AUTO: 0 /LPF
KETONES UR QL STRIP: NEGATIVE
LEUKOCYTE ESTERASE UR QL STRIP: NEGATIVE
MICROSCOPIC COMMENT: ABNORMAL
NITRITE UR QL STRIP: NEGATIVE
PH UR STRIP: 7 [PH] (ref 5–8)
PROT UR QL STRIP: ABNORMAL
RBC #/AREA URNS AUTO: 1 /HPF (ref 0–4)
SP GR UR STRIP: 1.02 (ref 1–1.03)
SQUAMOUS #/AREA URNS AUTO: 1 /HPF
URN SPEC COLLECT METH UR: ABNORMAL
WBC #/AREA URNS AUTO: 1 /HPF (ref 0–5)

## 2024-11-22 PROCEDURE — 99999 PR PBB SHADOW E&M-EST. PATIENT-LVL IV: CPT | Mod: PBBFAC,,, | Performed by: INTERNAL MEDICINE

## 2024-11-22 PROCEDURE — 87491 CHLMYD TRACH DNA AMP PROBE: CPT | Performed by: INTERNAL MEDICINE

## 2024-11-22 PROCEDURE — 81001 URINALYSIS AUTO W/SCOPE: CPT | Performed by: INTERNAL MEDICINE

## 2024-11-22 RX ORDER — SULFAMETHOXAZOLE AND TRIMETHOPRIM 800; 160 MG/1; MG/1
1 TABLET ORAL 2 TIMES DAILY
Qty: 30 TABLET | Refills: 0 | Status: SHIPPED | OUTPATIENT
Start: 2024-11-22 | End: 2024-12-07

## 2024-11-22 NOTE — PROGRESS NOTES
INTERNAL MEDICINE CLINIC - SAME DAY APPOINTMENT  Progress Note    PRESENTING HISTORY     PCP: Hunter Diop MD    Chief Complaint/Reason for Visit:     Chief Complaint   Patient presents with    Groin Pain    Abdominal Pain      History of Present Illness & ROS : Mr. Andre Padgett is a 54 y.o. male.      On Wednesday started with lower back pain.  Then he started with pain to right groin on urination.  He used ice with improvement.  He went to work today.    No dysuria. No polyuria.    Now with slight discomfort to the right groin.    Bowel movement normal.    PAST HISTORY:     Past Medical History:   Diagnosis Date    Acute prostatitis 09/19/2020    Aneurysm     Right sided filling anterior communicating aneurysm s/p coiling 2011    Anxiety     Cerebral aneurysm, nonruptured 12/27/2016    Chronic bilateral low back pain without sciatica 06/19/2020    Chronic bilateral thoracic back pain 06/19/2020    Chronic idiopathic constipation 02/22/2021    Closed Colles' fracture of left radius with routine healing 09/14/2020    Closed Colles' fracture of right radius with routine healing 09/14/2020    Closed fracture of one rib of right side with routine healing 09/20/2020    Colon adenoma: 3/18 repeat colonoscpy 2023 06/22/2018    Colon polyp     Diverticulosis of large intestine without hemorrhage: see colonoscopy 3/18; sigmoid and descending colon 06/22/2018    Fatty liver: see CT 3/18 06/22/2018    History of 2019 novel coronavirus disease (COVID-19) 09/22/2020    History of nontraumatic rupture of cerebral aneurysm 05/11/2020    Hyperlipidemia 04/09/2020    Irritable bowel syndrome with constipation 01/05/2021    Palpitations 10/17/2013    Pure hypercholesterolemia 10/17/2013    In 2103:  Total cholesterol was 269 with a LDL (c) of 186 mg/dl.      Subarachnoid hemorrhage 09/13/2020    Umbilical hernia without obstruction and without gangrene: see CT 3/18 06/22/2018       Past Surgical History:   Procedure  Laterality Date    CEREBRAL ANGIOGRAM  2011    angiogram/coiling    COLONOSCOPY N/A 03/26/2018    Procedure: COLONOSCOPY;  Surgeon: Sanjiv Duran MD;  Location: [facility] (4TH FLR);  Service: Endoscopy;  Laterality: N/A;    COLONOSCOPY N/A 03/09/2021    Procedure: COLONOSCOPY;  Surgeon: Aleshia Daly MD;  Location: [facility] ENDO;  Service: Endoscopy;  Laterality: N/A;    COLONOSCOPY N/A 9/21/2023    Procedure: COLONOSCOPY;  Surgeon: Lindy Holloway MD;  Location: [facility] ENDOSCOPY;  Service: Gastroenterology;  Laterality: N/A;    ESOPHAGOGASTRODUODENOSCOPY N/A 9/21/2023    Procedure: EGD (ESOPHAGOGASTRODUODENOSCOPY);  Surgeon: Lindy Holloway MD;  Location: [facility] ENDOSCOPY;  Service: Gastroenterology;  Laterality: N/A;  instr via email; AP  9/18 Pre call complete. SG    ORIF FOREARM FRACTURE Bilateral 09/14/2020    Procedure: ORIF, FRACTURE, RADIUS OR ULNA, synthes, right, large C arm at a diagonal from the rear of the room, ancef, velcro wrist splint;  Surgeon: Hao hTorne MD;  Location: [facility] OR 2ND FLR;  Service: Orthopedics;  Laterality: Bilateral;    WRIST SURGERY Bilateral 09/13/2020       Family History   Problem Relation Name Age of Onset    DAVID disease Mother      Hypertension Mother      Diabetes Mother      Brain cancer Father      Thyroid disease Sister      No Known Problems Daughter      No Known Problems Sister      Cirrhosis Neg Hx      Colon polyps Neg Hx      Crohn's disease Neg Hx      Liver cancer Neg Hx      Stomach cancer Neg Hx      Ulcerative colitis Neg Hx      Heart attack Neg Hx      Colon cancer Neg Hx         Social History     Socioeconomic History    Marital status: Legally    Tobacco Use    Smoking status: Never    Smokeless tobacco: Never   Substance and Sexual Activity    Alcohol use: Yes     Comment: ocassionally - twice a week    Drug use: No     Social Drivers of Health     Financial Resource Strain: Unknown (12/13/2022)    Overall Financial Resource Strain  (CARDIA)     Difficulty of Paying Living Expenses: Patient declined   Food Insecurity: Unknown (12/13/2022)    Hunger Vital Sign     Worried About Running Out of Food in the Last Year: Patient declined     Ran Out of Food in the Last Year: Patient declined   Transportation Needs: Unknown (12/13/2022)    PRAPARE - Transportation     Lack of Transportation (Medical): Patient declined     Lack of Transportation (Non-Medical): Patient declined   Physical Activity: Insufficiently Active (12/13/2022)    Exercise Vital Sign     Days of Exercise per Week: 1 day     Minutes of Exercise per Session: 30 min   Stress: No Stress Concern Present (12/13/2022)    [place] [place] [employer] - Occupational Stress Questionnaire     Feeling of Stress : Not at all   Housing Stability: Unknown (12/13/2022)    Housing Stability Vital Sign     Unable to Pay for Housing in the Last Year: Patient refused     Unstable Housing in the Last Year: Patient refused       MEDICATIONS & ALLERGIES:     Current Outpatient Medications on File Prior to Visit   Medication Sig Dispense Refill    ALPRAZolam (XANAX) 1 MG tablet TAKE 1/2 TABLET(0.5 MG) BY MOUTH DAILY AS NEEDED FOR ANXIETY 30 tablet 1    ascorbic acid, vitamin C, (VITAMIN C) 100 MG tablet Take 100 mg by mouth once daily.      aspirin (ECOTRIN) 81 MG EC tablet Take 81 mg by mouth once daily.      atorvastatin (LIPITOR) 40 MG tablet TAKE 1 TABLET(40 MG) BY MOUTH EVERY DAY 90 tablet 2    omega-3 acid ethyl esters (LOVAZA) 1 gram capsule TAKE 1 CAPSULE BY MOUTH TWICE DAILY 180 capsule 3    sildenafiL (VIAGRA) 50 MG tablet TAKE 1 TABLET(50 MG) BY MOUTH DAILY AS NEEDED FOR ERECTILE DYSFUNCTION 10 tablet 8    fluticasone propionate (FLONASE) 50 mcg/actuation nasal spray 1 spray (50 mcg total) by Each Nostril route once daily. (Patient not taking: Reported on 11/22/2024) 16 g 0    melatonin (MELATIN) 3 mg tablet Take 2 tablets (6 mg total) by mouth nightly as needed for Insomnia.  (Patient not taking: Reported on 11/22/2024)  0    pantoprazole (PROTONIX) 40 MG tablet Take 1 tablet (40 mg total) by mouth once daily. (Patient not taking: Reported on 11/22/2024) 30 tablet 11    [DISCONTINUED] sulfamethoxazole-trimethoprim 800-160mg (BACTRIM DS) 800-160 mg Tab Take 1 tablet by mouth 2 (two) times daily. for 14 days 28 tablet 0     No current facility-administered medications on file prior to visit.        Review of patient's allergies indicates:  No Known Allergies    Medications Reconciliation:   I have reconciled the patient's home medications with the patient/family. I have updated all changes.  Refer to After-Visit Medication List.    OBJECTIVE:     Vital Signs:  Vitals:    11/22/24 1619   BP: 138/84   Pulse: 96     Wt Readings from Last 3 Encounters:   11/22/24 1619 92.3 kg (203 lb 7.8 oz)   06/07/24 1926 90.3 kg (199 lb)   02/06/24 1605 90.3 kg (199 lb 1.2 oz)     Body mass index is 29.2 kg/m².     Physical Exam:  General: Well developed, well nourished. No distress.  HEENT: Head is normocephalic, atraumatic  Eyes: Clear conjunctiva.  Neck: Supple, symmetrical neck; trachea midline.  Lungs:  normal respiratory effort.  Cardiovascular: Heart with regular rate and rhythm.    Extremities: No LE edema.    Abdomen: Abdomen is soft, non-tender non-distended with normal bowel sounds.  Musculoskeletal: Normal gait.   Genital:  Normal penis.    No rash in the genital area.  Scrotum and epididymis normal. No inguinal hernia.  Small right inguinal node 0.8 cm.  Rectal: No perianal lesions or rash. Digital exam: prostate swollen 25 g, not tender; normal rectum.  Psychiatric: Normal affect. Alert.    Laboratory  Lab Results   Component Value Date    WBC 5.94 05/04/2023    HGB 15.3 05/04/2023    HCT 44.6 05/04/2023     05/04/2023    CHOL 169 02/03/2024    TRIG 178 (H) 02/03/2024    HDL 61 02/03/2024    ALT 27 02/03/2024    AST 22 02/03/2024     02/03/2024    K 3.8 02/03/2024      02/03/2024    CREATININE 0.8 02/03/2024    BUN 9 02/03/2024    CO2 25 02/03/2024    TSH 1.460 05/25/2020    INR 1.1 09/13/2020    HGBA1C 5.4 02/21/2019       ASSESSMENT & PLAN:     Acute prostatitis  - History of infection in the past.          Will check:  -     C. trachomatis/N. gonorrhoeae by AMP DNA; Future; Expected date: 11/22/2024  -     Urinalysis, Reflex to Urine Culture Urine, Clean Catch; Future; Expected date: 11/22/2024    Rx  -     sulfamethoxazole-trimethoprim 800-160mg (BACTRIM DS) 800-160 mg Tab; 1 tablet by Per OG tube route 2 (two) times daily. for 15 days  Dispense: 30 tablet; Refill: 0    After Visit Medication List :     Medication List            Accurate as of November 22, 2024  4:34 PM. If you have any questions, ask your nurse or doctor.                CHANGE how you take these medications      sulfamethoxazole-trimethoprim 800-160mg 800-160 mg Tab  Commonly known as: BACTRIM DS  1 tablet by Per OG tube route 2 (two) times daily. for 15 days  What changed: how to take this  Changed by: Javy Burt MD            CONTINUE taking these medications      ALPRAZolam 1 MG tablet  Commonly known as: XANAX  TAKE 1/2 TABLET(0.5 MG) BY MOUTH DAILY AS NEEDED FOR ANXIETY     ascorbic acid (vitamin C) 100 MG tablet  Commonly known as: VITAMIN C     aspirin 81 MG EC tablet  Commonly known as: ECOTRIN     atorvastatin 40 MG tablet  Commonly known as: LIPITOR  TAKE 1 TABLET(40 MG) BY MOUTH EVERY DAY     fluticasone propionate 50 mcg/actuation nasal spray  Commonly known as: FLONASE  1 spray (50 mcg total) by Each Nostril route once daily.     melatonin 3 mg tablet  Commonly known as: MELATIN  Take 2 tablets (6 mg total) by mouth nightly as needed for Insomnia.     omega-3 acid ethyl esters 1 gram capsule  Commonly known as: LOVAZA  TAKE 1 CAPSULE BY MOUTH TWICE DAILY     pantoprazole 40 MG tablet  Commonly known as: PROTONIX  Take 1 tablet (40 mg total) by mouth once daily.     sildenafiL 50 MG  tablet  Commonly known as: VIAGRA  TAKE 1 TABLET(50 MG) BY MOUTH DAILY AS NEEDED FOR ERECTILE DYSFUNCTION               Where to Get Your Medications        [facility]  [address] [facility] [facility] [address]      Hours: Mon-Fri, 8a-5:30p Phone: [phone]   sulfamethoxazole-trimethoprim 800-160mg 800-160 mg Tab         Signing Physician:  Javy Burt MD

## 2024-11-24 LAB
C TRACH DNA SPEC QL NAA+PROBE: NOT DETECTED
N GONORRHOEA DNA SPEC QL NAA+PROBE: NOT DETECTED

## 2024-12-13 ENCOUNTER — OFFICE VISIT (OUTPATIENT)
Dept: URGENT CARE | Facility: CLINIC | Age: 54
End: 2024-12-13
Payer: COMMERCIAL

## 2024-12-13 VITALS
OXYGEN SATURATION: 98 % | DIASTOLIC BLOOD PRESSURE: 96 MMHG | HEIGHT: 70 IN | TEMPERATURE: 99 F | RESPIRATION RATE: 16 BRPM | SYSTOLIC BLOOD PRESSURE: 136 MMHG | WEIGHT: 203 LBS | HEART RATE: 69 BPM | BODY MASS INDEX: 29.06 KG/M2

## 2024-12-13 DIAGNOSIS — T78.40XA ALLERGIC REACTION, INITIAL ENCOUNTER: ICD-10-CM

## 2024-12-13 DIAGNOSIS — K13.79 MOUTH SORES: Primary | ICD-10-CM

## 2024-12-13 RX ORDER — DEXAMETHASONE SODIUM PHOSPHATE 10 MG/ML
10 INJECTION INTRAMUSCULAR; INTRAVENOUS
Status: COMPLETED | OUTPATIENT
Start: 2024-12-13 | End: 2024-12-13

## 2024-12-13 RX ADMIN — DEXAMETHASONE SODIUM PHOSPHATE 10 MG: 10 INJECTION INTRAMUSCULAR; INTRAVENOUS at 12:12

## 2024-12-13 NOTE — PROGRESS NOTES
"Subjective:      Patient ID: Andre Padgett is a 54 y.o. male.    Vitals:  height is 5' 10" (1.778 m) and weight is 92.1 kg (203 lb). His oral temperature is 98.5 °F (36.9 °C). His blood pressure is 136/96 (abnormal) and his pulse is 69. His respiration is 16 and oxygen saturation is 98%.     Chief Complaint: Allergic Reaction    Pt states he used an anti aging cream yesterday and was in the sun, face is swollen and has a itchy rash. He also states blister on the roof of his mouth for 2 week.  Patient denies any new partners.    Allergic Reaction  This is a new problem. The current episode started yesterday. The problem has been gradually improving since onset. Pertinent negatives include no chest pain, coughing, drooling, eye itching, eye redness, rash, trouble swallowing or wheezing.       Constitution: Negative for chills and fever.   HENT:  Positive for mouth sores and facial swelling. Negative for dental problem, drooling, tongue pain, tongue lesion, sinus pressure, trouble swallowing and voice change.         Scratchy throat   Neck: Negative for neck pain.   Cardiovascular:  Negative for chest pain.   Eyes:  Positive for eyelid swelling. Negative for eye discharge, eye itching, eye redness, double vision and blurred vision.   Respiratory:  Negative for cough, shortness of breath and wheezing.    Musculoskeletal:  Negative for muscle ache.   Skin:  Positive for erythema. Negative for rash and bruising.      Past Medical History:   Diagnosis Date    Acute prostatitis 09/19/2020    Aneurysm     Right sided filling anterior communicating aneurysm s/p coiling 2011    Anxiety     Cerebral aneurysm, nonruptured 12/27/2016    Chronic bilateral low back pain without sciatica 06/19/2020    Chronic bilateral thoracic back pain 06/19/2020    Chronic idiopathic constipation 02/22/2021    Closed Colles' fracture of left radius with routine healing 09/14/2020    Closed Colles' fracture of right radius with routine " healing 09/14/2020    Closed fracture of one rib of right side with routine healing 09/20/2020    Colon adenoma: 3/18 repeat colonoscpy 2023 06/22/2018    Colon polyp     Diverticulosis of large intestine without hemorrhage: see colonoscopy 3/18; sigmoid and descending colon 06/22/2018    Fatty liver: see CT 3/18 06/22/2018    History of 2019 novel coronavirus disease (COVID-19) 09/22/2020    History of nontraumatic rupture of cerebral aneurysm 05/11/2020    Hyperlipidemia 04/09/2020    Irritable bowel syndrome with constipation 01/05/2021    Palpitations 10/17/2013    Pure hypercholesterolemia 10/17/2013    In 2103:  Total cholesterol was 269 with a LDL (c) of 186 mg/dl.      Subarachnoid hemorrhage 09/13/2020    Umbilical hernia without obstruction and without gangrene: see CT 3/18 06/22/2018       Past Surgical History:   Procedure Laterality Date    CEREBRAL ANGIOGRAM  2011    angiogram/coiling    COLONOSCOPY N/A 03/26/2018    Procedure: COLONOSCOPY;  Surgeon: Sanjiv Duran MD;  [facility]  Service: Endoscopy;  Laterality: N/A;    COLONOSCOPY N/A 03/09/2021    Procedure: COLONOSCOPY;  Surgeon: Aleshia Daly MD;  Location: [facility] ENDO;  Service: Endoscopy;  Laterality: N/A;    COLONOSCOPY N/A 9/21/2023    Procedure: COLONOSCOPY;  Surgeon: Lindy Holloway MD;  Location: [facility] ENDOSCOPY;  Service: Gastroenterology;  Laterality: N/A;    ESOPHAGOGASTRODUODENOSCOPY N/A 9/21/2023    Procedure: EGD (ESOPHAGOGASTRODUODENOSCOPY);  Surgeon: Lindy Holloway MD;  Location: [facility] ENDOSCOPY;  Service: Gastroenterology;  Laterality: N/A;  instr via email; AP  9/18 Pre call complete. SG    ORIF FOREARM FRACTURE Bilateral 09/14/2020    Procedure: ORIF, FRACTURE, RADIUS OR ULNA, synthes, right, large C arm at a diagonal from the rear of the room, ancef, velcro wrist splint;  Surgeon: Hao Thorne MD;  [facility];  Service: Orthopedics;  Laterality: Bilateral;     WRIST SURGERY Bilateral 09/13/2020       Family History   Problem Relation Name Age of Onset    DAVID disease Mother      Hypertension Mother      Diabetes Mother      Brain cancer Father      Thyroid disease Sister      No Known Problems Daughter      No Known Problems Sister      Cirrhosis Neg Hx      Colon polyps Neg Hx      Crohn's disease Neg Hx      Liver cancer Neg Hx      Stomach cancer Neg Hx      Ulcerative colitis Neg Hx      Heart attack Neg Hx      Colon cancer Neg Hx         Social History     Socioeconomic History    Marital status: Legally    Tobacco Use    Smoking status: Never    Smokeless tobacco: Never   Substance and Sexual Activity    Alcohol use: Yes     Comment: ocassionally - twice a week    Drug use: No     Social Drivers of Health     Financial Resource Strain: Unknown (12/13/2022)    Overall Financial Resource Strain (CARDIA)     Difficulty of Paying Living Expenses: Patient declined   Food Insecurity: Unknown (12/13/2022)    Hunger Vital Sign     Worried About Running Out of Food in the Last Year: Patient declined     Ran Out of Food in the Last Year: Patient declined   Transportation Needs: Unknown (12/13/2022)    PRAPARE - Transportation     Lack of Transportation (Medical): Patient declined     Lack of Transportation (Non-Medical): Patient declined   Physical Activity: Insufficiently Active (12/13/2022)    Exercise Vital Sign     Days of Exercise per Week: 1 day     Minutes of Exercise per Session: 30 min   Stress: No Stress Concern Present (12/13/2022)    [employer] - Occupational Stress Questionnaire     Feeling of Stress : Not at all   Housing Stability: Unknown (12/13/2022)    Housing Stability Vital Sign     Unable to Pay for Housing in the Last Year: Patient refused     Unstable Housing in the Last Year: Patient refused       Current Outpatient Medications   Medication Sig Dispense Refill    ALPRAZolam (XANAX) 1  MG tablet TAKE 1/2 TABLET(0.5 MG) BY MOUTH DAILY AS NEEDED FOR ANXIETY 30 tablet 1    ascorbic acid, vitamin C, (VITAMIN C) 100 MG tablet Take 100 mg by mouth once daily.      aspirin (ECOTRIN) 81 MG EC tablet Take 81 mg by mouth once daily.      atorvastatin (LIPITOR) 40 MG tablet TAKE 1 TABLET(40 MG) BY MOUTH EVERY DAY 90 tablet 2    omega-3 acid ethyl esters (LOVAZA) 1 gram capsule TAKE 1 CAPSULE BY MOUTH TWICE DAILY 180 capsule 3    sildenafiL (VIAGRA) 50 MG tablet TAKE 1 TABLET(50 MG) BY MOUTH DAILY AS NEEDED FOR ERECTILE DYSFUNCTION 10 tablet 8    diphenhydrAMINE-aluminum-magnesium hydroxide-simethicone-LIDOcaine viscous HCl 2% Swish and spit 15 mLs every 4 (four) hours as needed (sore mouth). 630 each 0     Current Facility-Administered Medications   Medication Dose Route Frequency Provider Last Rate Last Admin    dexAMETHasone injection 10 mg  10 mg Intramuscular 1 time in Clinic/HOD Kirsty Villavicencio PA-C           Review of patient's allergies indicates:  No Known Allergies    Objective:     Physical Exam   Constitutional:  Non-toxic appearance. He does not appear ill. No distress.   HENT:   Head:       Nose: Nose normal. No rhinorrhea or congestion.   Mouth/Throat: Mucous membranes are dry. No oropharyngeal exudate or posterior oropharyngeal erythema. No tonsillar exudate.       Eyes: Conjunctivae are normal. Pupils are equal, round, and reactive to light. No visual field deficit is present. Right eye exhibits no discharge and no hordeolum. No foreign body present in the right eye. Left eye exhibits no discharge and no hordeolum. No foreign body present in the left eye. No scleral icterus. periorbital hyperpigmentation  Cardiovascular: Normal heart sounds.   No murmur heard.Exam reveals no gallop and no friction rub.   Pulmonary/Chest: Effort normal and breath sounds normal. No stridor. No respiratory distress. He has no wheezes. He has no rhonchi. He has no rales.   Abdominal: Normal  appearance.   Neurological: He is alert.   Skin: Skin is warm, dry, not diaphoretic and no rash. erythema   Nursing note and vitals reviewed.      Assessment:     1. Mouth sores    2. Allergic reaction, initial encounter        Plan:       Mouth sores  -     diphenhydrAMINE-aluminum-magnesium hydroxide-simethicone-LIDOcaine viscous HCl 2%; Swish and spit 15 mLs every 4 (four) hours as needed (sore mouth).  Dispense: 630 each; Refill: 0    Allergic reaction, initial encounter  -     dexAMETHasone injection 10 mg        I have reviewed the patient chart and pertinent past imaging/labs.     Patient declines HSV testing     Patient Instructions   You received a steroid shot today - this can elevate your blood pressure, elevate your blood sugar, water weight gain, nervous energy, redness to the face and dimpling of the skin where the shot goes in. The steroid shot suppresses your immune system should you develop any new fevers please notify the urgent care or primary care provider.   Use Claritin in the morning and Benadryl at night Benadryl can be sedating please do not drink alcohol or drive with this medication.  Use magic mouthwash as needed for mouth sores.  You declined any HSV testing today should you change your mind please return to the clinic.  Advil/Tylenol with food as needed for pain relief.  Follow up with urgent care as needed

## 2024-12-13 NOTE — PATIENT INSTRUCTIONS
You received a steroid shot today - this can elevate your blood pressure, elevate your blood sugar, water weight gain, nervous energy, redness to the face and dimpling of the skin where the shot goes in. The steroid shot suppresses your immune system should you develop any new fevers please notify the urgent care or primary care provider.   Use Claritin in the morning and Benadryl at night Benadryl can be sedating please do not drink alcohol or drive with this medication.  Use magic mouthwash as needed for mouth sores.  You declined any HSV testing today should you change your mind please return to the clinic.  Advil/Tylenol with food as needed for pain relief.  Follow up with urgent care as needed

## 2025-01-21 ENCOUNTER — TELEPHONE (OUTPATIENT)
Dept: INTERNAL MEDICINE | Facility: CLINIC | Age: 55
End: 2025-01-21
Payer: COMMERCIAL

## 2025-01-21 NOTE — TELEPHONE ENCOUNTER
----- Message from Bridget sent at 1/18/2025 11:18 AM CST -----  Contact: Patient @ [phone]  [email or web address]     Patient is calling for Medical Advice regarding:  Symptom: Rectal Bleeding  Outcome: Schedule a same-day appointment or talk to a nurse or provider within 1 hour.  Reason: Just a small amount of blood and patient feels normal (acts normal)    The caller accepted this outcome.    How long has patient had these symptoms:    Pharmacy name and phone#:    Patient wants a call back or thru [facility]:call / portal message     Comments:Schedule for Tuesday and offered UC     Please advise patient replies from provider may take up to 48 hours.

## 2025-01-28 ENCOUNTER — OFFICE VISIT (OUTPATIENT)
Dept: URGENT CARE | Facility: CLINIC | Age: 55
End: 2025-01-28
Payer: COMMERCIAL

## 2025-01-28 VITALS
OXYGEN SATURATION: 98 % | SYSTOLIC BLOOD PRESSURE: 129 MMHG | BODY MASS INDEX: 29.13 KG/M2 | TEMPERATURE: 99 F | WEIGHT: 203 LBS | DIASTOLIC BLOOD PRESSURE: 89 MMHG | HEART RATE: 75 BPM | RESPIRATION RATE: 18 BRPM

## 2025-01-28 DIAGNOSIS — R05.9 COUGH, UNSPECIFIED TYPE: Primary | ICD-10-CM

## 2025-01-28 DIAGNOSIS — J42 CHRONIC BRONCHITIS, UNSPECIFIED CHRONIC BRONCHITIS TYPE: ICD-10-CM

## 2025-01-28 PROCEDURE — 71046 X-RAY EXAM CHEST 2 VIEWS: CPT | Mod: FY,S$GLB,, | Performed by: RADIOLOGY

## 2025-01-28 PROCEDURE — 99214 OFFICE O/P EST MOD 30 MIN: CPT | Mod: S$GLB,,, | Performed by: FAMILY MEDICINE

## 2025-01-28 RX ORDER — ALBUTEROL SULFATE 90 UG/1
2 INHALANT RESPIRATORY (INHALATION) EVERY 6 HOURS PRN
Qty: 18 G | Refills: 0 | Status: SHIPPED | OUTPATIENT
Start: 2025-01-28 | End: 2026-01-28

## 2025-01-28 RX ORDER — DOXYCYCLINE HYCLATE 100 MG
100 TABLET ORAL 2 TIMES DAILY
Qty: 20 TABLET | Refills: 0 | Status: SHIPPED | OUTPATIENT
Start: 2025-01-28

## 2025-01-28 RX ORDER — BENZONATATE 200 MG/1
200 CAPSULE ORAL 3 TIMES DAILY PRN
Qty: 30 CAPSULE | Refills: 0 | Status: SHIPPED | OUTPATIENT
Start: 2025-01-28 | End: 2025-02-07

## 2025-01-28 NOTE — PROGRESS NOTES
Subjective:      Patient ID: Andre Padgett is a 54 y.o. male.    Vitals:  vitals were not taken for this visit.     Chief Complaint: Cough    This is a 54 y.o. male who presents today with a chief complaint of  dry lingering cough, chest cngestion and also back pain. He states the symptoms has been going on for over a week. He had a cold and all of the symptoms were supressed but the lingering cough which is causing the chest congestion.    Home Tx: OTC cough medications    Cough  This is a new problem. The current episode started in the past 7 days. The problem has been unchanged. The problem occurs constantly. The cough is Non-productive. Associated symptoms include chest pain. Pertinent negatives include no chills, ear congestion, ear pain, fever, headaches, heartburn, hemoptysis, myalgias, nasal congestion, postnasal drip, rash, rhinorrhea, sore throat, shortness of breath, sweats, weight loss or wheezing. Nothing aggravates the symptoms. He has tried OTC cough suppressant for the symptoms. The treatment provided no relief. There is no history of asthma, bronchiectasis, bronchitis, COPD, emphysema, environmental allergies or pneumonia.     Constitution: Negative for chills and fever.   HENT:  Negative for ear pain, postnasal drip and sore throat.    Cardiovascular:  Positive for chest pain.   Respiratory:  Positive for cough. Negative for bloody sputum, shortness of breath and wheezing.    Gastrointestinal:  Negative for heartburn.   Musculoskeletal:  Negative for muscle ache.   Skin:  Negative for rash.   Allergic/Immunologic: Negative for environmental allergies.   Neurological:  Negative for headaches.    Objective:     Physical Exam   Constitutional: He is oriented to person, place, and time. No distress. normal  HENT:   Head: Normocephalic and atraumatic.   Ears:   Right Ear: Tympanic membrane, external ear and ear canal normal.   Left Ear: Tympanic membrane, external ear and ear canal normal.   Nose: No  rhinorrhea or congestion.   Eyes: Conjunctivae are normal. Pupils are equal, round, and reactive to light. Extraocular movement intact   Neck: Neck supple. No neck rigidity present.   Cardiovascular: Normal rate, regular rhythm, normal heart sounds and normal pulses.   No murmur heard.  Pulmonary/Chest: Effort normal and breath sounds normal.   Abdominal: Normal appearance and bowel sounds are normal. flat abdomen   Musculoskeletal: Normal range of motion.         General: Normal range of motion.   Neurological: no focal deficit. He is alert, oriented to person, place, and time and at baseline.   Skin: Skin is warm and dry. Capillary refill takes less than 2 seconds. No bruising   Psychiatric: His behavior is normal. Mood, judgment and thought content normal.   Nursing note and vitals reviewed.    Assessment:     Plan:   1. Cough, unspecified type  - XR CHEST PA AND LATERAL; Future  - benzonatate (TESSALON) 200 MG capsule; Take 1 capsule (200 mg total) by mouth 3 (three) times daily as needed.  Dispense: 30 capsule; Refill: 0    2. Chronic bronchitis, unspecified chronic bronchitis type  - albuterol (VENTOLIN HFA) 90 mcg/actuation inhaler; Inhale 2 puffs into the lungs every 6 (six) hours as needed for Wheezing. Rescue  Dispense: 18 g; Refill: 0  - doxycycline (VIBRA-TABS) 100 MG tablet; Take 1 tablet (100 mg total) by mouth 2 (two) times daily.  Dispense: 20 tablet; Refill: 0   All results discussed with pt prior to discharge from clinic

## 2025-04-09 ENCOUNTER — OFFICE VISIT (OUTPATIENT)
Dept: INTERNAL MEDICINE | Facility: CLINIC | Age: 55
End: 2025-04-09
Payer: COMMERCIAL

## 2025-04-09 VITALS
HEIGHT: 70 IN | HEART RATE: 88 BPM | WEIGHT: 202.38 LBS | DIASTOLIC BLOOD PRESSURE: 84 MMHG | SYSTOLIC BLOOD PRESSURE: 128 MMHG | BODY MASS INDEX: 28.97 KG/M2 | OXYGEN SATURATION: 97 %

## 2025-04-09 DIAGNOSIS — R10.9 ABDOMINAL PAIN, UNSPECIFIED ABDOMINAL LOCATION: Primary | ICD-10-CM

## 2025-04-09 DIAGNOSIS — K59.04 CHRONIC IDIOPATHIC CONSTIPATION: ICD-10-CM

## 2025-04-09 PROCEDURE — 3079F DIAST BP 80-89 MM HG: CPT | Mod: CPTII,S$GLB,, | Performed by: INTERNAL MEDICINE

## 2025-04-09 PROCEDURE — 1159F MED LIST DOCD IN RCRD: CPT | Mod: CPTII,S$GLB,, | Performed by: INTERNAL MEDICINE

## 2025-04-09 PROCEDURE — 3074F SYST BP LT 130 MM HG: CPT | Mod: CPTII,S$GLB,, | Performed by: INTERNAL MEDICINE

## 2025-04-09 PROCEDURE — 99999 PR PBB SHADOW E&M-EST. PATIENT-LVL IV: CPT | Mod: PBBFAC,,, | Performed by: INTERNAL MEDICINE

## 2025-04-09 PROCEDURE — 1160F RVW MEDS BY RX/DR IN RCRD: CPT | Mod: CPTII,S$GLB,, | Performed by: INTERNAL MEDICINE

## 2025-04-09 PROCEDURE — 99214 OFFICE O/P EST MOD 30 MIN: CPT | Mod: S$GLB,,, | Performed by: INTERNAL MEDICINE

## 2025-04-09 PROCEDURE — 3008F BODY MASS INDEX DOCD: CPT | Mod: CPTII,S$GLB,, | Performed by: INTERNAL MEDICINE

## 2025-04-09 NOTE — PROGRESS NOTES
"Assessment & Plan  Assessment & Plan              {There are no diagnoses linked to this encounter. (Refresh or delete this SmartLink)}      Follow-up: No follow-ups on file.    This note was generated with the assistance of ambient listening technology. Verbal consent was obtained by the patient and accompanying visitor(s) for the recording of patient appointment to facilitate this note. I attest to having reviewed and edited the generated note for accuracy, though some syntax or spelling errors may persist. Please contact the author of this note for any clarification.      ______________________________________________________________________    Chief Complaint  Chief Complaint   Patient presents with    Abdominal Pain       History of Present Illness                 {List of Health Risk Calculators:11427}     ROS  Review of Systems        Physical Exam  Vitals:    04/09/25 1332   BP: 128/84   BP Location: Left arm   Patient Position: Sitting   Pulse: 88   SpO2: 97%   Weight: 91.8 kg (202 lb 6.1 oz)   Height: 5' 10" (1.778 m)    Body mass index is 29.04 kg/m².  Weight: 91.8 kg (202 lb 6.1 oz)   Height: 5' 10" (177.8 cm)   Physical Exam  Constitutional:       General: He is not in acute distress.     Appearance: He is well-developed.   HENT:      Head: Normocephalic and atraumatic.   Eyes:      General: Lids are normal. No scleral icterus.     Conjunctiva/sclera: Conjunctivae normal.      Pupils: Pupils are equal, round, and reactive to light.   Neck:      Thyroid: No thyromegaly.      Vascular: No carotid bruit or JVD.   Cardiovascular:      Rate and Rhythm: Normal rate and regular rhythm.      Heart sounds: Normal heart sounds. No murmur heard.     No friction rub. No S3 or S4 sounds.   Pulmonary:      Effort: Pulmonary effort is normal.      Breath sounds: Normal breath sounds. No wheezing, rhonchi or rales.   Abdominal:      General: Bowel sounds are normal. There is no distension.      Palpations: Abdomen is " soft.      Tenderness: There is no guarding.   Musculoskeletal:      Cervical back: Full passive range of motion without pain and neck supple.      Right lower leg: No edema.      Left lower leg: No edema.   Skin:     General: Skin is warm and dry.      Findings: No rash.   Neurological:      Mental Status: He is alert and oriented to person, place, and time.   Psychiatric:         Speech: Speech normal.         Behavior: Behavior normal.         Thought Content: Thought content normal.

## 2025-04-09 NOTE — PROGRESS NOTES
Assessment & Plan  1. Abdominal pain, unspecified abdominal location -   Mostly reassurance today that his sharp/crampy abdominal pain does not seem to be incarcerated hernia.  Consideration was given for nephrolithiasis but I do not think this is accurate diagnosis    2. Chronic idiopathic constipation -  We will start trial of linaclotide.  We also discussed dicyclomine as needed.  At this point he wants to focus mainly on a trial of the linaclotide.I have discussed the common side effects of this(these) medication(s) and black box warnings (if applicable) with the patient and answered all of the questions they had at the time of this visit regarding this(these) medication(s).  Encouraged him to get back onto his regular routine of dietary fiber intake which seems to have been controlling his symptoms better  -     linaCLOtide (LINZESS) 72 mcg Cap capsule; Take 1-2 capsules ( mcg total) by mouth daily as needed (constipation).  Dispense: 30 capsule; Refill: 0            Health Maintenance reviewed, counseled on vaccines but he needs to get back to work..    Follow-up: No follow-ups on file.  ______________________________________________________________________    Chief Complaint  Chief Complaint   Patient presents with    Abdominal Pain       HPI  Andre Padgett is a 54 y.o. male with medical diagnoses as listed in the medical history and problem list that presents for abdominal pain.  Pt is known to me with their last appointment 11/22/2024.      Started about 3 days ago with right sided pain in the axillary line.  Then into the right inguinal line.  No dysuria or hematuria.  No BRBPR or melena.  No renal colic.  Today had some pain in the left inguinal line but this resolved quickly.  + bloating feeling.  H/o constipation.  Normal caliber stool.  Not pellets but lots of straining.  Increasing dietary fiber and hydration.  Has been a bit more constipated the last couple of days.  Hasn't been on OTC  "constipation meds recently.  This episode is similar but not the exact same as 2023 when he saw GI and had EGD/Colon.  Doesn't recall if dicyclomine helped him in the past.        ROS  Review of Systems        Physical Exam  Vitals:    04/09/25 1332   BP: 128/84   BP Location: Left arm   Patient Position: Sitting   Pulse: 88   SpO2: 97%   Weight: 91.8 kg (202 lb 6.1 oz)   Height: 5' 10" (1.778 m)    Body mass index is 29.04 kg/m².  Weight: 91.8 kg (202 lb 6.1 oz)   Height: 5' 10" (177.8 cm)   Physical Exam  Constitutional:       General: He is not in acute distress.     Appearance: He is well-developed.   HENT:      Head: Normocephalic and atraumatic.   Eyes:      General: Lids are normal. No scleral icterus.     Conjunctiva/sclera: Conjunctivae normal.      Pupils: Pupils are equal, round, and reactive to light.   Neck:      Thyroid: No thyromegaly.      Vascular: No carotid bruit or JVD.   Cardiovascular:      Rate and Rhythm: Normal rate and regular rhythm.      Heart sounds: Normal heart sounds. No murmur heard.     No friction rub. No S3 or S4 sounds.   Pulmonary:      Effort: Pulmonary effort is normal.      Breath sounds: Normal breath sounds. No wheezing, rhonchi or rales.   Abdominal:      General: Bowel sounds are normal. There is no distension.      Palpations: Abdomen is soft.      Tenderness: There is no guarding.      Hernia: A hernia is present. Hernia is present in the umbilical area (easily reducible) and right inguinal area (Easily reducible).   Genitourinary:     Comments: Chaperone offered but declined by the patient  Musculoskeletal:      Cervical back: Full passive range of motion without pain and neck supple.      Right lower leg: No edema.      Left lower leg: No edema.   Skin:     General: Skin is warm and dry.      Findings: No rash.   Neurological:      Mental Status: He is alert and oriented to person, place, and time.   Psychiatric:         Speech: Speech normal.         Behavior: " Behavior normal.         Thought Content: Thought content normal.

## 2025-04-21 NOTE — TELEPHONE ENCOUNTER
Care Due:                  Date            Visit Type   Department     Provider  --------------------------------------------------------------------------------                                EP -                              PRIMARY      [facility]  Last Visit: 04-      CARE ([facility])   MEDICINE       Ugo Virgen  Next Visit: None Scheduled  None         None Found                                                            Last  Test          Frequency    Reason                     Performed    Due Date  --------------------------------------------------------------------------------    CMP.........  12 months..  atorvastatin, omega-3....  02- 01-    Lipid Panel.  12 months..  atorvastatin, omega-3....  02- 01-    [employer] Embedded Care Due Messages. Reference number: [id number].   4/21/2025 8:08:21 AM CDT

## 2025-04-22 RX ORDER — ATORVASTATIN CALCIUM 40 MG/1
40 TABLET, FILM COATED ORAL
Qty: 90 TABLET | Refills: 0 | Status: SHIPPED | OUTPATIENT
Start: 2025-04-22

## 2025-04-22 NOTE — TELEPHONE ENCOUNTER
Refill Routing Note   Medication(s) are not appropriate for processing by [facility] for the following reason(s):        Required labs outdated    [facility] action(s):  Defer   Requires labs : Yes             Appointments  past 12m or future 3m with PCP    Date Provider   Last Visit   1/30/2024 Hunter Diop MD   Next Visit   Visit date not found Hunter Diop MD   ED visits in past 90 days: 0        Note composed:9:56 PM 04/21/2025

## 2025-04-30 ENCOUNTER — OFFICE VISIT (OUTPATIENT)
Dept: URGENT CARE | Facility: CLINIC | Age: 55
End: 2025-04-30
Payer: COMMERCIAL

## 2025-04-30 VITALS
BODY MASS INDEX: 28.63 KG/M2 | SYSTOLIC BLOOD PRESSURE: 138 MMHG | TEMPERATURE: 98 F | RESPIRATION RATE: 16 BRPM | WEIGHT: 200 LBS | HEART RATE: 83 BPM | OXYGEN SATURATION: 98 % | HEIGHT: 70 IN | DIASTOLIC BLOOD PRESSURE: 95 MMHG

## 2025-04-30 DIAGNOSIS — R03.0 ELEVATED BLOOD PRESSURE READING IN OFFICE WITHOUT DIAGNOSIS OF HYPERTENSION: ICD-10-CM

## 2025-04-30 DIAGNOSIS — H10.11 ALLERGIC CONJUNCTIVITIS OF RIGHT EYE: Primary | ICD-10-CM

## 2025-04-30 PROCEDURE — 99214 OFFICE O/P EST MOD 30 MIN: CPT | Mod: S$GLB,,, | Performed by: FAMILY MEDICINE

## 2025-04-30 RX ORDER — PREDNISOLONE ACETATE 10 MG/ML
1 SUSPENSION/ DROPS OPHTHALMIC 2 TIMES DAILY
Qty: 5 ML | Refills: 0 | Status: SHIPPED | OUTPATIENT
Start: 2025-04-30 | End: 2025-05-10

## 2025-04-30 NOTE — PROGRESS NOTES
"Subjective:      Patient ID: Andre Padgett is a 54 y.o. male.    Vitals:  height is 5' 10" (1.778 m) and weight is 90.7 kg (200 lb). His oral temperature is 98.3 °F (36.8 °C). His blood pressure is 138/95 (abnormal) and his pulse is 83. His respiration is 16 and oxygen saturation is 98%.     Chief Complaint: Eye Problem    This is a 54 y.o. male who presents today with a chief complaint of R eyelid possible stye that started on Sunday but got irritated on Monday. Watery eyes, crusty discharge.       Eye Problem   The right eye is affected. This is a new problem. The current episode started in the past 7 days. The problem occurs constantly. The problem has been unchanged. There was no injury mechanism. The pain is at a severity of 3/10. The pain is mild. There is No known exposure to pink eye. He Does not wear contacts. Associated symptoms include an eye discharge and eye redness.     Eyes:  Positive for eye discharge and eye redness.      Objective:     Physical Exam   Constitutional: He is oriented to person, place, and time. He appears well-developed.   HENT:   Head: Normocephalic and atraumatic.   Ears:   Right Ear: External ear normal.   Left Ear: External ear normal.   Nose: Nose normal.   Mouth/Throat: Oropharynx is clear and moist.   Eyes: Conjunctivae, EOM and lids are normal. Pupils are equal, round, and reactive to light. Right eye exhibits chemosis. Right eye exhibits no discharge, no exudate and no hordeolum. Left eye exhibits no chemosis, no exudate and no hordeolum. Right conjunctiva is not injected. Left conjunctiva is not injected. periorbital hyperpigmentation     Comments: Upper eyelid swelling on right    Neck: Trachea normal and phonation normal. Neck supple.   Musculoskeletal: Normal range of motion.         General: Normal range of motion.   Neurological: He is alert and oriented to person, place, and time.   Skin: Skin is warm, dry and intact.   Psychiatric: His speech is normal and behavior " is normal. Judgment and thought content normal.   Nursing note and vitals reviewed.      Assessment:     1. Allergic conjunctivitis of right eye    2. Elevated blood pressure reading in office without diagnosis of hypertension        Plan:       Allergic conjunctivitis of right eye  -     prednisoLONE acetate (PRED FORTE) 1 % DrpS; Place 1 drop into the right eye 2 (two) times daily. for 10 days  Dispense: 5 mL; Refill: 0    Elevated blood pressure reading in office without diagnosis of hypertension      Thank you for choosing [facility]!     Our goal in the Urgent Care is to always provide outstanding medical care. You may receive a survey by mail or e-mail in the next week regarding your experience today. We would greatly appreciate you completing and returning the survey. Your feedback provides us with a way to recognize our staff who provide very good care, and it helps us learn how to improve when your experience was below our aspiration of excellence.       We appreciate you trusting us with your medical care. We hope you feel better soon. We will be happy to take care of you for all of your future medical needs.  You must understand that you've received an Urgent Care treatment only and that you may be released before all your medical problems are known or treated. You, the patient, will arrange for follow up care as instructed.  Follow up with your PCP or specialty clinic as directed in the next 1-2 weeks if not improved or as needed.  You can call [phone] to schedule an appointment with the appropriate provider.  Another option is to follow up with [facility] ([email or web address]) virtually for quick simple medical advice.  If your condition worsens we recommend that you receive another evaluation at the emergency room immediately or contact your primary medical clinics after hours call service to discuss your concerns.  Please return here  or go to the Emergency Department for any concerns or worsening of condition.      *If you were prescribed a narcotic or controlled medication, do not drive or operate heavy equipment or machinery while taking these medications.

## 2025-05-20 ENCOUNTER — OFFICE VISIT (OUTPATIENT)
Dept: INTERNAL MEDICINE | Facility: CLINIC | Age: 55
End: 2025-05-20
Payer: COMMERCIAL

## 2025-05-20 ENCOUNTER — LAB VISIT (OUTPATIENT)
Dept: LAB | Facility: HOSPITAL | Age: 55
End: 2025-05-20
Attending: INTERNAL MEDICINE
Payer: COMMERCIAL

## 2025-05-20 VITALS
WEIGHT: 208.56 LBS | HEIGHT: 70 IN | DIASTOLIC BLOOD PRESSURE: 86 MMHG | BODY MASS INDEX: 29.86 KG/M2 | OXYGEN SATURATION: 98 % | SYSTOLIC BLOOD PRESSURE: 122 MMHG | HEART RATE: 94 BPM

## 2025-05-20 DIAGNOSIS — F41.9 ANXIETY: ICD-10-CM

## 2025-05-20 DIAGNOSIS — Z00.00 ROUTINE PHYSICAL EXAMINATION: Primary | ICD-10-CM

## 2025-05-20 DIAGNOSIS — Z00.00 ROUTINE PHYSICAL EXAMINATION: ICD-10-CM

## 2025-05-20 DIAGNOSIS — K62.5 RECTAL BLEED: ICD-10-CM

## 2025-05-20 LAB
ABSOLUTE EOSINOPHIL (OHS): 0.21 K/UL
ABSOLUTE MONOCYTE (OHS): 0.52 K/UL (ref 0.3–1)
ABSOLUTE NEUTROPHIL COUNT (OHS): 4.53 K/UL (ref 1.8–7.7)
ALBUMIN SERPL BCP-MCNC: 4.3 G/DL (ref 3.5–5.2)
ALP SERPL-CCNC: 58 UNIT/L (ref 40–150)
ALT SERPL W/O P-5'-P-CCNC: 33 UNIT/L (ref 10–44)
ANION GAP (OHS): 11 MMOL/L (ref 8–16)
AST SERPL-CCNC: 25 UNIT/L (ref 11–45)
BASOPHILS # BLD AUTO: 0.03 K/UL
BASOPHILS NFR BLD AUTO: 0.4 %
BILIRUB SERPL-MCNC: 0.8 MG/DL (ref 0.1–1)
BUN SERPL-MCNC: 10 MG/DL (ref 6–20)
CALCIUM SERPL-MCNC: 10.1 MG/DL (ref 8.7–10.5)
CHLORIDE SERPL-SCNC: 100 MMOL/L (ref 95–110)
CHOLEST SERPL-MCNC: 174 MG/DL (ref 120–199)
CHOLEST/HDLC SERPL: 2.6 {RATIO} (ref 2–5)
CO2 SERPL-SCNC: 29 MMOL/L (ref 23–29)
CREAT SERPL-MCNC: 0.8 MG/DL (ref 0.5–1.4)
EAG (OHS): 114 MG/DL (ref 68–131)
ERYTHROCYTE [DISTWIDTH] IN BLOOD BY AUTOMATED COUNT: 11.9 % (ref 11.5–14.5)
GFR SERPLBLD CREATININE-BSD FMLA CKD-EPI: >60 ML/MIN/1.73/M2
GLUCOSE SERPL-MCNC: 88 MG/DL (ref 70–110)
HBA1C MFR BLD: 5.6 % (ref 4–5.6)
HCT VFR BLD AUTO: 46.2 % (ref 40–54)
HDLC SERPL-MCNC: 66 MG/DL (ref 40–75)
HDLC SERPL: 37.9 % (ref 20–50)
HGB BLD-MCNC: 15.3 GM/DL (ref 14–18)
IMM GRANULOCYTES # BLD AUTO: 0.03 K/UL (ref 0–0.04)
IMM GRANULOCYTES NFR BLD AUTO: 0.4 % (ref 0–0.5)
LDLC SERPL CALC-MCNC: 87 MG/DL (ref 63–159)
LYMPHOCYTES # BLD AUTO: 1.61 K/UL (ref 1–4.8)
MCH RBC QN AUTO: 31.9 PG (ref 27–31)
MCHC RBC AUTO-ENTMCNC: 33.1 G/DL (ref 32–36)
MCV RBC AUTO: 97 FL (ref 82–98)
NONHDLC SERPL-MCNC: 108 MG/DL
NUCLEATED RBC (/100WBC) (OHS): 0 /100 WBC
PLATELET # BLD AUTO: 207 K/UL (ref 150–450)
PMV BLD AUTO: 11.6 FL (ref 9.2–12.9)
POTASSIUM SERPL-SCNC: 4.2 MMOL/L (ref 3.5–5.1)
PROT SERPL-MCNC: 7.8 GM/DL (ref 6–8.4)
PSA SERPL-MCNC: 3.17 NG/ML
RBC # BLD AUTO: 4.79 M/UL (ref 4.6–6.2)
RELATIVE EOSINOPHIL (OHS): 3 %
RELATIVE LYMPHOCYTE (OHS): 23.2 % (ref 18–48)
RELATIVE MONOCYTE (OHS): 7.5 % (ref 4–15)
RELATIVE NEUTROPHIL (OHS): 65.5 % (ref 38–73)
SODIUM SERPL-SCNC: 140 MMOL/L (ref 136–145)
TRIGL SERPL-MCNC: 105 MG/DL (ref 30–150)
WBC # BLD AUTO: 6.93 K/UL (ref 3.9–12.7)

## 2025-05-20 PROCEDURE — 84153 ASSAY OF PSA TOTAL: CPT

## 2025-05-20 PROCEDURE — 3008F BODY MASS INDEX DOCD: CPT | Mod: CPTII,S$GLB,, | Performed by: INTERNAL MEDICINE

## 2025-05-20 PROCEDURE — 99999 PR PBB SHADOW E&M-EST. PATIENT-LVL IV: CPT | Mod: PBBFAC,,, | Performed by: INTERNAL MEDICINE

## 2025-05-20 PROCEDURE — 85025 COMPLETE CBC W/AUTO DIFF WBC: CPT

## 2025-05-20 PROCEDURE — 99396 PREV VISIT EST AGE 40-64: CPT | Mod: S$GLB,,, | Performed by: INTERNAL MEDICINE

## 2025-05-20 PROCEDURE — 3079F DIAST BP 80-89 MM HG: CPT | Mod: CPTII,S$GLB,, | Performed by: INTERNAL MEDICINE

## 2025-05-20 PROCEDURE — 83036 HEMOGLOBIN GLYCOSYLATED A1C: CPT

## 2025-05-20 PROCEDURE — 1159F MED LIST DOCD IN RCRD: CPT | Mod: CPTII,S$GLB,, | Performed by: INTERNAL MEDICINE

## 2025-05-20 PROCEDURE — 84450 TRANSFERASE (AST) (SGOT): CPT

## 2025-05-20 PROCEDURE — 36415 COLL VENOUS BLD VENIPUNCTURE: CPT

## 2025-05-20 PROCEDURE — 1160F RVW MEDS BY RX/DR IN RCRD: CPT | Mod: CPTII,S$GLB,, | Performed by: INTERNAL MEDICINE

## 2025-05-20 PROCEDURE — 80061 LIPID PANEL: CPT

## 2025-05-20 PROCEDURE — 3074F SYST BP LT 130 MM HG: CPT | Mod: CPTII,S$GLB,, | Performed by: INTERNAL MEDICINE

## 2025-05-20 RX ORDER — ALPRAZOLAM 1 MG/1
TABLET ORAL
Qty: 30 TABLET | Refills: 1 | Status: SHIPPED | OUTPATIENT
Start: 2025-05-20

## 2025-05-20 NOTE — PROGRESS NOTES
Subjective:       Patient ID: Andre Padgett is a 54 y.o. male.    Chief Complaint: Annual Exam and Rectal Bleeding    Patient states he was due for his physical exam but primarily is here because of his discovery of blood with bowel movements.  Patient states for nights ago he was out partying and drinking with friends, nothing too unusual that he does from time to time.  He did have a bit more alcohol than would be usual for him.  The next day he felt some epigastric soreness but otherwise was okay, had a normal bowel movement, ate and drank normally.  He maybe noticed some sensitivity or itching near the anal area but not sure on the timing.  Sunday he awoke feeling fairly well, ate a fairly large rich breakfast and later that morning had 4 bowel movements that seemed to be solid normal stool but each had red blood from the rectum in the commode and with wiping.    This happened 4 times Sunday, once yesterday and once this morning.  No abdominal pain, stool seems normal but the bright red blood.  Incidentally he saw 1 of my partners for some constipation and was tried on Linzess about 6 or 8 weeks ago and that helped.  He does not think he overly strained over the weekend.  He has no known history of hemorrhoids or fissure.    Mild anxiety-infrequently uses alprazolam.   reviewed.  He would like a refill.    Rectal Bleeding  Pertinent negatives include no abdominal pain, arthralgias, chest pain, chills, coughing, fatigue, fever, headaches, nausea, neck pain, rash or vomiting.     Review of Systems   Constitutional:  Negative for chills, fatigue and fever.   HENT:  Negative for nosebleeds and trouble swallowing.    Eyes:  Negative for pain and visual disturbance.   Respiratory:  Negative for cough, shortness of breath and wheezing.    Cardiovascular:  Negative for chest pain and palpitations.   Gastrointestinal:  Positive for anal bleeding and hematochezia. Negative for abdominal pain, constipation, diarrhea,  nausea and vomiting.   Genitourinary:  Negative for difficulty urinating and hematuria.   Musculoskeletal:  Negative for arthralgias, back pain and neck pain.   Integumentary:  Negative for rash.   Neurological:  Negative for dizziness and headaches.   Hematological:  Does not bruise/bleed easily.   Psychiatric/Behavioral:  Negative for dysphoric mood and sleep disturbance. The patient is nervous/anxious.            Past Medical History:   Diagnosis Date    Acute prostatitis 09/19/2020    Aneurysm     Right sided filling anterior communicating aneurysm s/p coiling 2011    Anxiety     Cerebral aneurysm, nonruptured 12/27/2016    Chronic bilateral low back pain without sciatica 06/19/2020    Chronic bilateral thoracic back pain 06/19/2020    Chronic idiopathic constipation 02/22/2021    Closed Colles' fracture of left radius with routine healing 09/14/2020    Closed Colles' fracture of right radius with routine healing 09/14/2020    Closed fracture of one rib of right side with routine healing 09/20/2020    Colon adenoma: 3/18 repeat colonoscpy 2023 06/22/2018    Colon polyp     Diverticulosis of large intestine without hemorrhage: see colonoscopy 3/18; sigmoid and descending colon 06/22/2018    Fatty liver: see CT 3/18 06/22/2018    History of 2019 novel coronavirus disease (COVID-19) 09/22/2020    History of nontraumatic rupture of cerebral aneurysm 05/11/2020    Hyperlipidemia 04/09/2020    Irritable bowel syndrome with constipation 01/05/2021    Palpitations 10/17/2013    Pure hypercholesterolemia 10/17/2013    In 2103:  Total cholesterol was 269 with a LDL (c) of 186 mg/dl.      Subarachnoid hemorrhage 09/13/2020    Umbilical hernia without obstruction and without gangrene: see CT 3/18 06/22/2018     Past Surgical History:   Procedure Laterality Date    CEREBRAL ANGIOGRAM  2011    angiogram/coiling    COLONOSCOPY N/A 03/26/2018    Procedure: COLONOSCOPY;  Surgeon: Sanjiv Duran MD;  Location: [address]  [facility]);  Service: Endoscopy;  Laterality: N/A;    COLONOSCOPY N/A 03/09/2021    Procedure: COLONOSCOPY;  Surgeon: Aleshia Daly MD;  Location: [facility] ENDO;  Service: Endoscopy;  Laterality: N/A;    COLONOSCOPY N/A 9/21/2023    Procedure: COLONOSCOPY;  Surgeon: Lindy Holloway MD;  Location: [facility] ENDOSCOPY;  Service: Gastroenterology;  Laterality: N/A;    ESOPHAGOGASTRODUODENOSCOPY N/A 9/21/2023    Procedure: EGD (ESOPHAGOGASTRODUODENOSCOPY);  Surgeon: Lindy Holloway MD;  Location: [facility] ENDOSCOPY;  Service: Gastroenterology;  Laterality: N/A;  instr via email; AP  9/18 Pre call complete. SG    ORIF FOREARM FRACTURE Bilateral 09/14/2020    Procedure: ORIF, FRACTURE, RADIUS OR ULNA, synthes, right, large C arm at a diagonal from the rear of the room, ancef, velcro wrist splint;  Surgeon: Hao Thorne MD;  Location: [address];  Service: Orthopedics;  Laterality: Bilateral;    WRIST SURGERY Bilateral 09/13/2020      Problem List[1]     Objective:      Physical Exam  Constitutional:       General: He is not in acute distress.     Appearance: He is well-developed.   HENT:      Head: Normocephalic and atraumatic.      Right Ear: Tympanic membrane, ear canal and external ear normal.      Left Ear: Tympanic membrane, ear canal and external ear normal.      Mouth/Throat:      Pharynx: No oropharyngeal exudate or posterior oropharyngeal erythema.   Eyes:      General: No scleral icterus.     Conjunctiva/sclera: Conjunctivae normal.      Pupils: Pupils are equal, round, and reactive to light.   Neck:      Thyroid: No thyromegaly.      Comments: No supraclavicular nodes palpated  Cardiovascular:      Rate and Rhythm: Normal rate and regular rhythm.      Pulses: Normal pulses.      Heart sounds: Normal heart sounds. No murmur heard.  Pulmonary:      Effort: Pulmonary effort is normal.      Breath sounds: Normal breath sounds. No wheezing.   Abdominal:      General: Bowel sounds are normal.      Palpations:  Abdomen is soft. There is no mass.      Tenderness: There is no abdominal tenderness.   Genitourinary:     Comments: There was evidence of a possible small hemorrhoid at the lower right aspect of the anus.  Definitely not large.  No evidence of bleeding and no tenderness.  Rectal exam did not reveal any other obvious masses for hemorrhoid tenderness.  No clear stool to evaluate, essentially nothing on my gloved finger.  No bright red blood.  Musculoskeletal:         General: No tenderness.      Cervical back: Normal range of motion and neck supple.      Right lower leg: No edema.      Left lower leg: No edema.   Lymphadenopathy:      Cervical: No cervical adenopathy.   Skin:     Coloration: Skin is not jaundiced or pale.   Neurological:      General: No focal deficit present.      Mental Status: He is alert and oriented to person, place, and time.   Psychiatric:         Mood and Affect: Mood normal.         Behavior: Behavior normal.         Assessment:       Problem List Items Addressed This Visit          Psychiatric    Anxiety    Relevant Medications    ALPRAZolam (XANAX) 1 MG tablet     Other Visit Diagnoses         Routine physical examination    -  Primary    Relevant Orders    Lipid Panel    CBC Auto Differential    Comprehensive Metabolic Panel    PSA, Screening    Hemoglobin A1C      Rectal bleed        Relevant Orders    Ambulatory referral/consult to Colorectal Surgery            Plan:         Andre was seen today for annual exam and rectal bleeding.    Diagnoses and all orders for this visit:    Routine physical examination  -     Lipid Panel; Future  -     CBC Auto Differential; Future  -     Comprehensive Metabolic Panel; Future  -     PSA, Screening; Future  -     Hemoglobin A1C; Future    Anxiety  -     ALPRAZolam (XANAX) 1 MG tablet; TAKE 1/2 TABLET(0.5 MG) BY MOUTH DAILY AS NEEDED FOR ANXIETY    Rectal bleed  -     Ambulatory referral/consult to Colorectal Surgery; Future       Story seems most  "consistent with hemorrhoid or fissure bleed, close to the anus but because I do not see an obvious cause or site of bleeding I think it would be reasonable to have the patient seen in the Colorectal department.  He recently had a colonoscopy that was unremarkable except for diverticulosis.    Red flag signs and symptoms were reviewed and he will report anything of that nature to me in the near future.  Review labs and other findings as above              Portions of this note may have been created with voice recognition software. Occasional "wrong-word" or "sound-a-like" substitutions may have occurred due to the inherent limitations of voice recognition software. Please, read the note carefully and recognize, using context, where substitutions have occurred.       [1]   Patient Active Problem List  Diagnosis    Anxiety    DJD (degenerative joint disease) of thoracic spine    DJD (degenerative joint disease) of cervical spine, mild    Pure hypercholesterolemia    Colon adenoma: 3/18 repeat colonoscopy 2023    Umbilical hernia without obstruction and without gangrene: see CT 3/18    Fatty liver: see CT 3/18    Diverticulosis of large intestine without hemorrhage: see colonoscopy 3/18; sigmoid and descending colon    Hyperlipidemia    History of nontraumatic rupture of cerebral aneurysm    Chronic bilateral thoracic back pain    Chronic bilateral low back pain without sciatica    Irritable bowel syndrome with constipation    Chronic idiopathic constipation    Dysuria    Chronic bronchitis    Cough     "

## 2025-05-21 ENCOUNTER — TELEPHONE (OUTPATIENT)
Dept: SURGERY | Facility: CLINIC | Age: 55
End: 2025-05-21
Payer: COMMERCIAL

## 2025-05-21 ENCOUNTER — PATIENT MESSAGE (OUTPATIENT)
Dept: INTERNAL MEDICINE | Facility: CLINIC | Age: 55
End: 2025-05-21
Payer: COMMERCIAL

## 2025-05-21 NOTE — TELEPHONE ENCOUNTER
Called pt to confirm appt with Dr. Floyd on 5/22/25 at 11:00 AM in [facility] (2nd floor). Pt expressed understanding of appt's date, time and location.

## 2025-05-22 ENCOUNTER — OFFICE VISIT (OUTPATIENT)
Dept: SURGERY | Facility: CLINIC | Age: 55
End: 2025-05-22
Payer: COMMERCIAL

## 2025-05-22 VITALS
DIASTOLIC BLOOD PRESSURE: 74 MMHG | WEIGHT: 208.31 LBS | BODY MASS INDEX: 29.82 KG/M2 | SYSTOLIC BLOOD PRESSURE: 134 MMHG | HEIGHT: 70 IN | HEART RATE: 78 BPM

## 2025-05-22 DIAGNOSIS — K62.5 BRBPR (BRIGHT RED BLOOD PER RECTUM): ICD-10-CM

## 2025-05-22 DIAGNOSIS — K62.5 RECTAL BLEED: ICD-10-CM

## 2025-05-22 DIAGNOSIS — K59.01 CONSTIPATION BY DELAYED COLONIC TRANSIT: ICD-10-CM

## 2025-05-22 DIAGNOSIS — K64.2 PROLAPSED INTERNAL HEMORRHOIDS, GRADE 3: Primary | ICD-10-CM

## 2025-05-22 PROCEDURE — 99999 PR PBB SHADOW E&M-EST. PATIENT-LVL III: CPT | Mod: PBBFAC,,, | Performed by: COLON & RECTAL SURGERY

## 2025-05-22 NOTE — PROGRESS NOTES
Chief Complaint   Patient presents with    Rectal Bleeding         Andre Padgett is a 54 y.o. male who presents for evaluation of  bright red blood per rectum    They began noticing the blood on Sunday and has been bleeding ever since    It is dripping, on the toilet paper, and mixed in with the stool    The patient is currently not on any blood thinners    Fiber usage is none     bowel movements daily, sometimes hard, toilet time is 20 minutes    Past Medical History:   Diagnosis Date    Acute prostatitis 09/19/2020    Aneurysm     Right sided filling anterior communicating aneurysm s/p coiling 2011    Anxiety     Cerebral aneurysm, nonruptured 12/27/2016    Chronic bilateral low back pain without sciatica 06/19/2020    Chronic bilateral thoracic back pain 06/19/2020    Chronic idiopathic constipation 02/22/2021    Closed Colles' fracture of left radius with routine healing 09/14/2020    Closed Colles' fracture of right radius with routine healing 09/14/2020    Closed fracture of one rib of right side with routine healing 09/20/2020    Colon adenoma: 3/18 repeat colonoscpy 2023 06/22/2018    Colon polyp     Diverticulosis of large intestine without hemorrhage: see colonoscopy 3/18; sigmoid and descending colon 06/22/2018    Fatty liver: see CT 3/18 06/22/2018    History of 2019 novel coronavirus disease (COVID-19) 09/22/2020    History of nontraumatic rupture of cerebral aneurysm 05/11/2020    Hyperlipidemia 04/09/2020    Irritable bowel syndrome with constipation 01/05/2021    Palpitations 10/17/2013    Pure hypercholesterolemia 10/17/2013    In 2103:  Total cholesterol was 269 with a LDL (c) of 186 mg/dl.      Subarachnoid hemorrhage 09/13/2020    Umbilical hernia without obstruction and without gangrene: see CT 3/18 06/22/2018      Past Surgical History:   Procedure Laterality Date    CEREBRAL ANGIOGRAM  2011    angiogram/coiling    COLONOSCOPY N/A 03/26/2018    Procedure: COLONOSCOPY;  Surgeon: Sanjiv DE LA CRUZ  "MD Renee;  Location: [facility] ENDO (4TH FLR);  Service: Endoscopy;  Laterality: N/A;    COLONOSCOPY N/A 03/09/2021    Procedure: COLONOSCOPY;  Surgeon: Aleshia Daly MD;  Location: [facility] ENDO;  Service: Endoscopy;  Laterality: N/A;    COLONOSCOPY N/A 9/21/2023    Procedure: COLONOSCOPY;  Surgeon: Lindy Holloway MD;  Location: [facility] ENDOSCOPY;  Service: Gastroenterology;  Laterality: N/A;    ESOPHAGOGASTRODUODENOSCOPY N/A 9/21/2023    Procedure: EGD (ESOPHAGOGASTRODUODENOSCOPY);  Surgeon: Lindy Holloway MD;  Location: [facility] ENDOSCOPY;  Service: Gastroenterology;  Laterality: N/A;  instr via email; AP  9/18 Pre call complete. SG    ORIF FOREARM FRACTURE Bilateral 09/14/2020    Procedure: ORIF, FRACTURE, RADIUS OR ULNA, synthes, right, large C arm at a diagonal from the rear of the room, ancef, velcro wrist splint;  Surgeon: Hao Thorne MD;  [facility];  Service: Orthopedics;  Laterality: Bilateral;    WRIST SURGERY Bilateral 09/13/2020      Social History[1]   Family History   Problem Relation Name Age of Onset    DAVID disease Mother      Hypertension Mother      Diabetes Mother      Brain cancer Father      Thyroid disease Sister      No Known Problems Daughter      No Known Problems Sister      Cirrhosis Neg Hx      Colon polyps Neg Hx      Crohn's disease Neg Hx      Liver cancer Neg Hx      Stomach cancer Neg Hx      Ulcerative colitis Neg Hx      Heart attack Neg Hx      Colon cancer Neg Hx          Scheduled Meds:  Continuous Infusions:  PRN Meds:.     Review of patient's allergies indicates:  No Known Allergies       Review of Systems  Please see review of systems scanned into the chart       /74 (BP Location: Right arm, Patient Position: Sitting)   Pulse 78   Ht 5' 10" (1.778 m)   Wt 94.5 kg (208 lb 5.4 oz)   BMI 29.89 kg/m²     General:  Well nourished, no apparent distress  Skin:  Warm, dry  Eyes:  Pupils equally round and reactive to light and accommodation, " extraocular muscles intact  Neck:  No jugular distention, no lymphadenopathy  Lungs:  Chest rises symmetrically, equal breath sounds bilaterally  Cardiac:  Regular rate and rhythm  Abdomen:  Soft, nondistended, nontender  Extremities:  Moving bilateral extremities symmetrically, strength intact  Psych:  Appropriate, cooperative  Rectal:  Normal anal contour, no erythema or edema.  JUDY:  Good tone and control, no masses or fluctuance.  Anoscopy:  The anoscope was gently inserted without issue.  Grade 3 internal hemorrhoids were seen.  Left lateral  Hemorrhoid was banded without incident and injected with 1 mL of 1% lidocaine         ASSESSMENT:   grade 3 internal hemorrhoids, bright red blood per rectum, constipation        PLAN:    Risks, benefits, and alternatives of in-office banding versus hemorrhoidectomy were discussed with the patient.  They chose banding.    Risks include, but are not limited to, bleeding, infection, recurrence, need for further procedures.  The patient voiced understanding of the risks and the plan, all questions were answered, they elected to proceed.    Banding x1 performed without incident    A handout was given on procedure with instructions    Increase fiber and water intake    Follow up in 2 weeks     Decrease the amount of time on the toilet       [1]   Social History  Socioeconomic History    Marital status: Legally    Tobacco Use    Smoking status: Never     Passive exposure: Never    Smokeless tobacco: Never   Substance and Sexual Activity    Alcohol use: Yes     Comment: ocassionally - twice a week    Drug use: No     Social Drivers of Health     Financial Resource Strain: Unknown (12/13/2022)    Overall Financial Resource Strain (CARDIA)     Difficulty of Paying Living Expenses: Patient declined   Food Insecurity: Unknown (12/13/2022)    Hunger Vital Sign     Worried About Running Out of Food in the Last Year: Patient declined     Ran Out of Food in the Last Year:  Patient declined   Transportation Needs: Unknown (12/13/2022)    PRAPARE - Transportation     Lack of Transportation (Medical): Patient declined     Lack of Transportation (Non-Medical): Patient declined   Physical Activity: Insufficiently Active (12/13/2022)    Exercise Vital Sign     Days of Exercise per Week: 1 day     Minutes of Exercise per Session: 30 min   Stress: No Stress Concern Present (12/13/2022)    [place] - Occupational Stress Questionnaire     Feeling of Stress : Not at all   Housing Stability: Unknown (12/13/2022)    Housing Stability Vital Sign     Unable to Pay for Housing in the Last Year: Patient refused     Unstable Housing in the Last Year: Patient refused

## 2025-06-23 ENCOUNTER — OFFICE VISIT (OUTPATIENT)
Dept: URGENT CARE | Facility: CLINIC | Age: 55
End: 2025-06-23
Payer: COMMERCIAL

## 2025-06-23 VITALS
TEMPERATURE: 99 F | SYSTOLIC BLOOD PRESSURE: 138 MMHG | HEART RATE: 86 BPM | RESPIRATION RATE: 18 BRPM | OXYGEN SATURATION: 96 % | DIASTOLIC BLOOD PRESSURE: 82 MMHG

## 2025-06-23 DIAGNOSIS — J20.9 ACUTE BRONCHITIS, UNSPECIFIED ORGANISM: Primary | ICD-10-CM

## 2025-06-23 DIAGNOSIS — H65.193 ACUTE EFFUSION OF BOTH MIDDLE EARS: ICD-10-CM

## 2025-06-23 DIAGNOSIS — R09.82 PND (POST-NASAL DRIP): ICD-10-CM

## 2025-06-23 DIAGNOSIS — H61.20 CERUMEN DEBRIS ON TYMPANIC MEMBRANE, UNSPECIFIED LATERALITY: ICD-10-CM

## 2025-06-23 PROCEDURE — 99213 OFFICE O/P EST LOW 20 MIN: CPT | Mod: S$GLB,,, | Performed by: PHYSICIAN ASSISTANT

## 2025-06-23 RX ORDER — FLUTICASONE PROPIONATE 50 MCG
1 SPRAY, SUSPENSION (ML) NASAL DAILY
Qty: 16 G | Refills: 0 | Status: SHIPPED | OUTPATIENT
Start: 2025-06-23 | End: 2025-06-30

## 2025-06-23 RX ORDER — BENZONATATE 200 MG/1
200 CAPSULE ORAL 3 TIMES DAILY PRN
Qty: 30 CAPSULE | Refills: 0 | Status: SHIPPED | OUTPATIENT
Start: 2025-06-23 | End: 2025-07-03

## 2025-06-23 RX ORDER — CETIRIZINE HYDROCHLORIDE 10 MG/1
10 TABLET ORAL DAILY
Qty: 7 TABLET | Refills: 0 | Status: SHIPPED | OUTPATIENT
Start: 2025-06-23 | End: 2025-06-30

## 2025-06-23 RX ORDER — PROMETHAZINE HYDROCHLORIDE AND DEXTROMETHORPHAN HYDROBROMIDE 6.25; 15 MG/5ML; MG/5ML
5 SYRUP ORAL NIGHTLY PRN
Qty: 50 ML | Refills: 0 | Status: SHIPPED | OUTPATIENT
Start: 2025-06-23 | End: 2025-07-03

## 2025-06-24 NOTE — PATIENT INSTRUCTIONS
This is a viral infection antibiotics will not be helpful against it.  Use Flonase for postnasal drip.  Use Zyrtec for fluid behind the ears.  Use Tessalon as needed for cough.  Use cough syrup at night this can be sedating please do not drink alcohol or drive with this medication.  No indication for x-ray at this time.  Bronchitis can last for several weeks to several months. If your symptoms should worsen please return to the urgent care or go the emergency department for further evaluation. Use cough drops and warm salt gargles as needed for sore throat. Tylenol/ibuprofen as needed for pain/fevers. Drink plenty of fluids and get plenty of rest. F/u as needed

## 2025-06-24 NOTE — PROGRESS NOTES
Subjective:      Patient ID: Andre Padgett is a 54 y.o. male.    Vitals:  oral temperature is 99 °F (37.2 °C). His blood pressure is 138/82 and his pulse is 86. His respiration is 18 and oxygen saturation is 96%.     Chief Complaint: cough     Patient has had a cough for 9 days.  He was in contact with somebody that was sick unknown what they were diagnosed with.  He has tried DayQuil, NyQuil with no relief.        Constitution: Negative for appetite change, chills, fatigue and fever.   HENT:  Positive for postnasal drip. Negative for ear pain, ear discharge, hearing loss, dental problem, tongue pain, tongue lesion, facial swelling, congestion, sinus pain, sinus pressure, sore throat, trouble swallowing and voice change.    Neck: Negative for neck pain and neck stiffness.   Cardiovascular:  Negative for chest pain.   Eyes:  Negative for eye discharge and eye itching.   Respiratory:  Positive for cough and sputum production. Negative for chest tightness, bloody sputum and shortness of breath.    Gastrointestinal:  Negative for abdominal pain, nausea, vomiting, constipation and diarrhea.   Musculoskeletal:  Negative for muscle ache.   Skin:  Negative for rash.   Neurological:  Negative for dizziness and headaches.      Past Medical History:   Diagnosis Date    Acute prostatitis 09/19/2020    Aneurysm     Right sided filling anterior communicating aneurysm s/p coiling 2011    Anxiety     Cerebral aneurysm, nonruptured 12/27/2016    Chronic bilateral low back pain without sciatica 06/19/2020    Chronic bilateral thoracic back pain 06/19/2020    Chronic idiopathic constipation 02/22/2021    Closed Colles' fracture of left radius with routine healing 09/14/2020    Closed Colles' fracture of right radius with routine healing 09/14/2020    Closed fracture of one rib of right side with routine healing 09/20/2020    Colon adenoma: 3/18 repeat colonoscpy 2023 06/22/2018    Colon polyp     Diverticulosis of large intestine  without hemorrhage: see colonoscopy 3/18; sigmoid and descending colon 06/22/2018    Fatty liver: see CT 3/18 06/22/2018    History of 2019 novel coronavirus disease (COVID-19) 09/22/2020    History of nontraumatic rupture of cerebral aneurysm 05/11/2020    Hyperlipidemia 04/09/2020    Irritable bowel syndrome with constipation 01/05/2021    Palpitations 10/17/2013    Pure hypercholesterolemia 10/17/2013    In 2103:  Total cholesterol was 269 with a LDL (c) of 186 mg/dl.      Subarachnoid hemorrhage 09/13/2020    Umbilical hernia without obstruction and without gangrene: see CT 3/18 06/22/2018       Past Surgical History:   Procedure Laterality Date    CEREBRAL ANGIOGRAM  2011    angiogram/coiling    COLONOSCOPY N/A 03/26/2018    Procedure: COLONOSCOPY;  Surgeon: Sanjiv Duran MD;  Location: [facility] (4TH FLR);  Service: Endoscopy;  Laterality: N/A;    COLONOSCOPY N/A 03/09/2021    Procedure: COLONOSCOPY;  Surgeon: Aleshia Daly MD;  Location: [facility] ENDO;  Service: Endoscopy;  Laterality: N/A;    COLONOSCOPY N/A 9/21/2023    Procedure: COLONOSCOPY;  Surgeon: Lindy Holloway MD;  Location: [facility] ENDOSCOPY;  Service: Gastroenterology;  Laterality: N/A;    ESOPHAGOGASTRODUODENOSCOPY N/A 9/21/2023    Procedure: EGD (ESOPHAGOGASTRODUODENOSCOPY);  Surgeon: Lindy Holloway MD;  Location: [facility] ENDOSCOPY;  Service: Gastroenterology;  Laterality: N/A;  instr via email; AP  9/18 Pre call complete. SG    ORIF FOREARM FRACTURE Bilateral 09/14/2020    Procedure: ORIF, FRACTURE, RADIUS OR ULNA, synthes, right, large C arm at a diagonal from the rear of the room, ancef, velcro wrist splint;  Surgeon: Hao Thorne MD;  Location: [facility] OR [address] [place] [address];  Service: Orthopedics;  Laterality: Bilateral;    WRIST SURGERY Bilateral 09/13/2020       Family History   Problem Relation Name Age of Onset    DAVID disease Mother      Hypertension Mother      Diabetes Mother      Brain cancer Father      Thyroid disease Sister      No  Known Problems Daughter      No Known Problems Sister      Cirrhosis Neg Hx      Colon polyps Neg Hx      Crohn's disease Neg Hx      Liver cancer Neg Hx      Stomach cancer Neg Hx      Ulcerative colitis Neg Hx      Heart attack Neg Hx      Colon cancer Neg Hx         Social History     Socioeconomic History    Marital status: Legally    Tobacco Use    Smoking status: Never     Passive exposure: Never    Smokeless tobacco: Never   Substance and Sexual Activity    Alcohol use: Yes     Comment: ocassionally - twice a week    Drug use: No     Social Drivers of Health     Financial Resource Strain: Unknown (12/13/2022)    Overall Financial Resource Strain (CARDIA)     Difficulty of Paying Living Expenses: Patient declined   Food Insecurity: Unknown (12/13/2022)    Hunger Vital Sign     Worried About Running Out of Food in the Last Year: Patient declined     Ran Out of Food in the Last Year: Patient declined   Transportation Needs: Unknown (12/13/2022)    PRAPARE - Transportation     Lack of Transportation (Medical): Patient declined     Lack of Transportation (Non-Medical): Patient declined   Physical Activity: Insufficiently Active (12/13/2022)    Exercise Vital Sign     Days of Exercise per Week: 1 day     Minutes of Exercise per Session: 30 min   Stress: No Stress Concern Present (12/13/2022)    [employer] [employer] - Occupational Stress Questionnaire     Feeling of Stress : Not at all   Housing Stability: Unknown (12/13/2022)    Housing Stability Vital Sign     Unable to Pay for Housing in the Last Year: Patient refused     Unstable Housing in the Last Year: Patient refused       Current Medications[1]    Review of patient's allergies indicates:  No Known Allergies    Objective:     Physical Exam   Constitutional:  Non-toxic appearance. He does not appear ill. No distress.   HENT:   Head: Normocephalic and atraumatic.   Ears:   Right Ear: External ear and ear canal normal. Tympanic  membrane is not injected, not erythematous, not retracted and not bulging. A middle ear effusion is present. no impacted cerumen  Left Ear: External ear and ear canal normal. Tympanic membrane is not injected, not erythematous, not retracted and not bulging. A middle ear effusion is present. no impacted cerumen  Nose: Rhinorrhea present. No congestion. Right sinus exhibits no maxillary sinus tenderness and no frontal sinus tenderness. Left sinus exhibits no maxillary sinus tenderness and no frontal sinus tenderness.   Mouth/Throat: Mucous membranes are moist. No oropharyngeal exudate or posterior oropharyngeal erythema. Tonsils are 1+ on the right. Tonsils are 1+ on the left. No tonsillar exudate.   Eyes: Conjunctivae are normal. Right eye exhibits no discharge. Left eye exhibits no discharge.   Neck: Neck supple.   Cardiovascular: Normal rate, regular rhythm and normal heart sounds.   No murmur heard.Exam reveals no gallop and no friction rub.   Pulmonary/Chest: Effort normal and breath sounds normal. No stridor. No respiratory distress. He has no wheezes. He has no rhonchi. He has no rales.   Abdominal: Normal appearance.   Musculoskeletal:      Cervical back: He exhibits no tenderness.   Lymphadenopathy:     He has no cervical adenopathy.   Neurological: He is alert.   Skin: Skin is warm, dry, not diaphoretic and no rash.   Psychiatric: His behavior is normal. Mood normal.   Nursing note and vitals reviewed.      Assessment:     1. Acute bronchitis, unspecified organism    2. Acute effusion of both middle ears    3. PND (post-nasal drip)    4. Cerumen debris on tympanic membrane, unspecified laterality        Plan:       Acute bronchitis, unspecified organism  -     benzonatate (TESSALON) 200 MG capsule; Take 1 capsule (200 mg total) by mouth 3 (three) times daily as needed for Cough.  Dispense: 30 capsule; Refill: 0  -     promethazine-dextromethorphan (PROMETHAZINE-DM) 6.25-15 mg/5 mL Syrp; Take 5 mLs by mouth  nightly as needed (cough).  Dispense: 50 mL; Refill: 0    Acute effusion of both middle ears  -     cetirizine (ZYRTEC) 10 MG tablet; Take 1 tablet (10 mg total) by mouth once daily. for 7 days  Dispense: 7 tablet; Refill: 0    PND (post-nasal drip)  -     fluticasone propionate (FLONASE) 50 mcg/actuation nasal spray; 1 spray (50 mcg total) by Each Nostril route once daily. for 7 days  Dispense: 16 g; Refill: 0    Cerumen debris on tympanic membrane, unspecified laterality           I have reviewed the patient chart and pertinent past imaging/labs.  Patient advised to return whenever he feels better for ear flush     Patient Instructions   This is a viral infection antibiotics will not be helpful against it.  Use Flonase for postnasal drip.  Use Zyrtec for fluid behind the ears.  Use Tessalon as needed for cough.  Use cough syrup at night this can be sedating please do not drink alcohol or drive with this medication.  No indication for x-ray at this time.  Bronchitis can last for several weeks to several months. If your symptoms should worsen please return to the urgent care or go the emergency department for further evaluation. Use cough drops and warm salt gargles as needed for sore throat. Tylenol/ibuprofen as needed for pain/fevers. Drink plenty of fluids and get plenty of rest. F/u as needed             [1]   Current Outpatient Medications   Medication Sig Dispense Refill    ALPRAZolam (XANAX) 1 MG tablet TAKE 1/2 TABLET(0.5 MG) BY MOUTH DAILY AS NEEDED FOR ANXIETY 30 tablet 1    ascorbic acid, vitamin C, (VITAMIN C) 100 MG tablet Take 100 mg by mouth once daily.      aspirin (ECOTRIN) 81 MG EC tablet Take 81 mg by mouth once daily.      atorvastatin (LIPITOR) 40 MG tablet TAKE 1 TABLET(40 MG) BY MOUTH EVERY DAY 90 tablet 0    benzonatate (TESSALON) 200 MG capsule Take 1 capsule (200 mg total) by mouth 3 (three) times daily as needed for Cough. 30 capsule 0    cetirizine (ZYRTEC) 10 MG tablet Take 1 tablet (10  mg total) by mouth once daily. for 7 days 7 tablet 0    fluticasone propionate (FLONASE) 50 mcg/actuation nasal spray 1 spray (50 mcg total) by Each Nostril route once daily. for 7 days 16 g 0    linaCLOtide (LINZESS) 72 mcg Cap capsule Take 1-2 capsules ( mcg total) by mouth daily as needed (constipation). 30 capsule 0    omega-3 acid ethyl esters (LOVAZA) 1 gram capsule TAKE 1 CAPSULE BY MOUTH TWICE DAILY 180 capsule 3    promethazine-dextromethorphan (PROMETHAZINE-DM) 6.25-15 mg/5 mL Syrp Take 5 mLs by mouth nightly as needed (cough). 50 mL 0    sildenafiL (VIAGRA) 50 MG tablet TAKE 1 TABLET(50 MG) BY MOUTH DAILY AS NEEDED FOR ERECTILE DYSFUNCTION 10 tablet 8     No current facility-administered medications for this visit.

## 2025-06-26 ENCOUNTER — OFFICE VISIT (OUTPATIENT)
Dept: SURGERY | Facility: CLINIC | Age: 55
End: 2025-06-26
Payer: COMMERCIAL

## 2025-06-26 VITALS
RESPIRATION RATE: 18 BRPM | SYSTOLIC BLOOD PRESSURE: 149 MMHG | HEIGHT: 70 IN | HEART RATE: 81 BPM | BODY MASS INDEX: 30.62 KG/M2 | DIASTOLIC BLOOD PRESSURE: 91 MMHG | WEIGHT: 213.88 LBS

## 2025-06-26 DIAGNOSIS — K59.01 CONSTIPATION BY DELAYED COLONIC TRANSIT: ICD-10-CM

## 2025-06-26 DIAGNOSIS — K62.5 BRBPR (BRIGHT RED BLOOD PER RECTUM): ICD-10-CM

## 2025-06-26 DIAGNOSIS — K64.2 PROLAPSED INTERNAL HEMORRHOIDS, GRADE 3: Primary | ICD-10-CM

## 2025-06-26 PROCEDURE — 99999 PR PBB SHADOW E&M-EST. PATIENT-LVL III: CPT | Mod: PBBFAC,,, | Performed by: COLON & RECTAL SURGERY

## 2025-06-26 NOTE — PROGRESS NOTES
No chief complaint on file.        Andre Padgett is a 54 y.o. male who presents for evaluation of  bright red blood per rectum    Bleeding has resolved since last banding    The patient is currently not on any blood thinners    Fiber usage is none     bowel movements twice daily, sometimes hard, toilet time is now < 5 minutes    He would like to defer banding of any additional hemorrhoids today    Past Medical History:   Diagnosis Date    Acute prostatitis 09/19/2020    Aneurysm     Right sided filling anterior communicating aneurysm s/p coiling 2011    Anxiety     Cerebral aneurysm, nonruptured 12/27/2016    Chronic bilateral low back pain without sciatica 06/19/2020    Chronic bilateral thoracic back pain 06/19/2020    Chronic idiopathic constipation 02/22/2021    Closed Colles' fracture of left radius with routine healing 09/14/2020    Closed Colles' fracture of right radius with routine healing 09/14/2020    Closed fracture of one rib of right side with routine healing 09/20/2020    Colon adenoma: 3/18 repeat colonoscpy 2023 06/22/2018    Colon polyp     Diverticulosis of large intestine without hemorrhage: see colonoscopy 3/18; sigmoid and descending colon 06/22/2018    Fatty liver: see CT 3/18 06/22/2018    History of 2019 novel coronavirus disease (COVID-19) 09/22/2020    History of nontraumatic rupture of cerebral aneurysm 05/11/2020    Hyperlipidemia 04/09/2020    Irritable bowel syndrome with constipation 01/05/2021    Palpitations 10/17/2013    Pure hypercholesterolemia 10/17/2013    In 2103:  Total cholesterol was 269 with a LDL (c) of 186 mg/dl.      Subarachnoid hemorrhage 09/13/2020    Umbilical hernia without obstruction and without gangrene: see CT 3/18 06/22/2018      Past Surgical History:   Procedure Laterality Date    CEREBRAL ANGIOGRAM  2011    angiogram/coiling    COLONOSCOPY N/A 03/26/2018    Procedure: COLONOSCOPY;  Surgeon: Sanjiv Duran MD;  Location: [facility] ([address] [place] [address]);  Service:  "Endoscopy;  Laterality: N/A;    COLONOSCOPY N/A 03/09/2021    Procedure: COLONOSCOPY;  Surgeon: Aleshia Daly MD;  Location: [facility] ENDO;  Service: Endoscopy;  Laterality: N/A;    COLONOSCOPY N/A 9/21/2023    Procedure: COLONOSCOPY;  Surgeon: Lindy Holloway MD;  Location: [facility] ENDOSCOPY;  Service: Gastroenterology;  Laterality: N/A;    ESOPHAGOGASTRODUODENOSCOPY N/A 9/21/2023    Procedure: EGD (ESOPHAGOGASTRODUODENOSCOPY);  Surgeon: Lindy Holloway MD;  Location: [facility] ENDOSCOPY;  Service: Gastroenterology;  Laterality: N/A;  instr via email; AP  9/18 Pre call complete. SG    ORIF FOREARM FRACTURE Bilateral 09/14/2020    Procedure: ORIF, FRACTURE, RADIUS OR ULNA, synthes, right, large C arm at a diagonal from the rear of the room, ancef, velcro wrist splint;  Surgeon: Hao Thorne MD;  Location: [address];  Service: Orthopedics;  Laterality: Bilateral;    WRIST SURGERY Bilateral 09/13/2020      Social History[1]   Family History   Problem Relation Name Age of Onset    DAVID disease Mother      Hypertension Mother      Diabetes Mother      Brain cancer Father      Thyroid disease Sister      No Known Problems Daughter      No Known Problems Sister      Cirrhosis Neg Hx      Colon polyps Neg Hx      Crohn's disease Neg Hx      Liver cancer Neg Hx      Stomach cancer Neg Hx      Ulcerative colitis Neg Hx      Heart attack Neg Hx      Colon cancer Neg Hx          Scheduled Meds:  Continuous Infusions:  PRN Meds:.     Review of patient's allergies indicates:  No Known Allergies       Review of Systems  Please see review of systems scanned into the chart       BP (!) 149/91 (BP Location: Left arm, Patient Position: Sitting)   Pulse 81   Resp 18   Ht 5' 10" (1.778 m)   Wt 97 kg (213 lb 13.5 oz)   BMI 30.68 kg/m²     General:  Well nourished, no apparent distress  Skin:  Warm, dry  Eyes:  Pupils equally round and reactive to light and accommodation, extraocular muscles intact  Neck:  No jugular " distention, no lymphadenopathy  Lungs:  Chest rises symmetrically, equal breath sounds bilaterally  Cardiac:  Regular rate and rhythm  Abdomen:  Soft, nondistended, nontender  Extremities:  Moving bilateral extremities symmetrically, strength intact  Psych:  Appropriate, cooperative  Rectal:  Normal anal contour, no erythema or edema.  JUDY:  Good tone and control, no masses or fluctuance.  Anoscopy:  The anoscope was gently inserted without issue.  Grade 3 internal hemorrhoids were seen.  Patient would like to defer banding procedure today       ASSESSMENT:   grade 3 internal hemorrhoids, bright red blood per rectum, constipation        PLAN:    Risks, benefits, and alternatives of in-office banding versus hemorrhoidectomy were discussed with the patient.  They chose to defer additional procedures at this time.     Risks include, but are not limited to, bleeding, infection, recurrence, need for further procedures.  The patient voiced understanding of the risks and the plan, all questions were answered.    Banding of additional hemorrhoids deferred today    Increase fiber and water intake    Decrease the amount of time on the toilet           [1]   Social History  Socioeconomic History    Marital status: Legally    Tobacco Use    Smoking status: Never     Passive exposure: Never    Smokeless tobacco: Never   Substance and Sexual Activity    Alcohol use: Yes     Comment: ocassionally - twice a week    Drug use: No     Social Drivers of Health     Financial Resource Strain: Unknown (12/13/2022)    Overall Financial Resource Strain (CARDIA)     Difficulty of Paying Living Expenses: Patient declined   Food Insecurity: Unknown (12/13/2022)    Hunger Vital Sign     Worried About Running Out of Food in the Last Year: Patient declined     Ran Out of Food in the Last Year: Patient declined   Transportation Needs: Unknown (12/13/2022)    PRAPARE - Transportation     Lack of Transportation (Medical): Patient declined      Lack of Transportation (Non-Medical): Patient declined   Physical Activity: Insufficiently Active (12/13/2022)    Exercise Vital Sign     Days of Exercise per Week: 1 day     Minutes of Exercise per Session: 30 min   Stress: No Stress Concern Present (12/13/2022)    [employer] [place] [employer] - Occupational Stress Questionnaire     Feeling of Stress : Not at all   Housing Stability: Unknown (12/13/2022)    Housing Stability Vital Sign     Unable to Pay for Housing in the Last Year: Patient refused     Unstable Housing in the Last Year: Patient refused

## 2025-07-09 ENCOUNTER — TELEPHONE (OUTPATIENT)
Dept: GASTROENTEROLOGY | Facility: CLINIC | Age: 55
End: 2025-07-09
Payer: COMMERCIAL

## 2025-07-14 DIAGNOSIS — E78.00 PURE HYPERCHOLESTEROLEMIA: Primary | ICD-10-CM

## 2025-07-14 DIAGNOSIS — E78.49 OTHER HYPERLIPIDEMIA: ICD-10-CM

## 2025-07-14 DIAGNOSIS — I25.84 CORONARY ARTERY DISEASE DUE TO CALCIFIED CORONARY LESION: ICD-10-CM

## 2025-07-14 DIAGNOSIS — I25.10 CORONARY ARTERY DISEASE DUE TO CALCIFIED CORONARY LESION: ICD-10-CM

## (undated) DEVICE — DRAPE PLASTIC U 60X72

## (undated) DEVICE — BIT DRILL QC 1.8X110MM

## (undated) DEVICE — SEE MEDLINE ITEM 152622

## (undated) DEVICE — DRAPE C ARM 42 X 120 10/BX

## (undated) DEVICE — DRAPE STERI-DRAPE 1000 17X11IN

## (undated) DEVICE — SUPPORT WR FRARM PROCARE SM L

## (undated) DEVICE — TOURNIQUET SB QC DP 18X4IN

## (undated) DEVICE — DRESSING AQUACEL AG ADV 3.5X6

## (undated) DEVICE — SEE MEDLINE ITEM 157150

## (undated) DEVICE — DRILL BIT

## (undated) DEVICE — SUT MONOCRYL 3-0 PS-2 UND

## (undated) DEVICE — K-WIRE TRCR PT1.25MM D 150MM L
Type: IMPLANTABLE DEVICE | Site: WRIST | Status: NON-FUNCTIONAL
Removed: 2020-09-14

## (undated) DEVICE — SEE MEDLINE ITEM 152522

## (undated) DEVICE — SCREW STRDRV REC T8 2.4X26 SS
Type: IMPLANTABLE DEVICE | Site: WRIST | Status: NON-FUNCTIONAL
Removed: 2020-09-14

## (undated) DEVICE — CATH SUCTION 10FR

## (undated) DEVICE — SCREW STRDRV REC T8 2.4X28 SS
Type: IMPLANTABLE DEVICE | Site: WRIST | Status: NON-FUNCTIONAL
Removed: 2020-09-14

## (undated) DEVICE — TRAY MINOR ORTHO

## (undated) DEVICE — SEE MEDLINE ITEM 157173

## (undated) DEVICE — ELECTRODE REM PLYHSV RETURN 9

## (undated) DEVICE — APPLICATOR CHLORAPREP ORN 26ML

## (undated) DEVICE — SUT VICRYL PLUS 3-0 SH 18IN

## (undated) DEVICE — SHEET DRAPE FAN-FOLDED 3/4

## (undated) DEVICE — ADHESIVE DERMABOND ADVANCED

## (undated) DEVICE — SPONGE GAUZE 16PLY 4X4

## (undated) DEVICE — GAUZE SPONGE 4X4 12PLY

## (undated) DEVICE — SPONGE LAP 18X18 PREWASHED

## (undated) DEVICE — TAPE SURG DURAPORE 2 X10YD

## (undated) DEVICE — WIRE FIX KIRSCH TRCR 1.8X150MM
Type: IMPLANTABLE DEVICE | Site: WRIST | Status: NON-FUNCTIONAL
Removed: 2020-09-14

## (undated) DEVICE — K-WIRE TRCR PT1.6MM DIA 150MM
Type: IMPLANTABLE DEVICE | Site: WRIST | Status: NON-FUNCTIONAL
Removed: 2020-09-14